# Patient Record
Sex: MALE | Race: BLACK OR AFRICAN AMERICAN | NOT HISPANIC OR LATINO | URBAN - METROPOLITAN AREA
[De-identification: names, ages, dates, MRNs, and addresses within clinical notes are randomized per-mention and may not be internally consistent; named-entity substitution may affect disease eponyms.]

---

## 2017-12-17 ENCOUNTER — INPATIENT (INPATIENT)
Facility: HOSPITAL | Age: 77
LOS: 5 days | Discharge: HOME | End: 2017-12-23
Attending: INTERNAL MEDICINE | Admitting: INTERNAL MEDICINE
Payer: MEDICARE

## 2017-12-17 VITALS
RESPIRATION RATE: 20 BRPM | OXYGEN SATURATION: 93 % | DIASTOLIC BLOOD PRESSURE: 118 MMHG | HEART RATE: 88 BPM | TEMPERATURE: 97 F | SYSTOLIC BLOOD PRESSURE: 249 MMHG

## 2017-12-17 DIAGNOSIS — J18.9 PNEUMONIA, UNSPECIFIED ORGANISM: ICD-10-CM

## 2017-12-17 DIAGNOSIS — N17.9 ACUTE KIDNEY FAILURE, UNSPECIFIED: ICD-10-CM

## 2017-12-17 DIAGNOSIS — E11.9 TYPE 2 DIABETES MELLITUS WITHOUT COMPLICATIONS: ICD-10-CM

## 2017-12-17 DIAGNOSIS — I16.0 HYPERTENSIVE URGENCY: ICD-10-CM

## 2017-12-17 DIAGNOSIS — N13.9 OBSTRUCTIVE AND REFLUX UROPATHY, UNSPECIFIED: ICD-10-CM

## 2017-12-17 DIAGNOSIS — N20.0 CALCULUS OF KIDNEY: ICD-10-CM

## 2017-12-17 DIAGNOSIS — N40.0 BENIGN PROSTATIC HYPERPLASIA WITHOUT LOWER URINARY TRACT SYMPTOMS: ICD-10-CM

## 2017-12-17 LAB
ALBUMIN SERPL ELPH-MCNC: 3.4 G/DL — SIGNIFICANT CHANGE UP (ref 3.3–5)
ALP SERPL-CCNC: 85 U/L — SIGNIFICANT CHANGE UP (ref 40–120)
ALT FLD-CCNC: 23 U/L — SIGNIFICANT CHANGE UP (ref 12–78)
ANION GAP SERPL CALC-SCNC: 8 MMOL/L — SIGNIFICANT CHANGE UP (ref 5–17)
ANION GAP SERPL CALC-SCNC: 9 MMOL/L — SIGNIFICANT CHANGE UP (ref 5–17)
APPEARANCE UR: ABNORMAL
APPEARANCE UR: CLEAR — SIGNIFICANT CHANGE UP
APTT BLD: 27.7 SEC — SIGNIFICANT CHANGE UP (ref 27.5–37.4)
AST SERPL-CCNC: 35 U/L — SIGNIFICANT CHANGE UP (ref 15–37)
BACTERIA # UR AUTO: ABNORMAL
BACTERIA # UR AUTO: ABNORMAL
BASOPHILS # BLD AUTO: 0.1 K/UL — SIGNIFICANT CHANGE UP (ref 0–0.2)
BASOPHILS NFR BLD AUTO: 0.8 % — SIGNIFICANT CHANGE UP (ref 0–2)
BILIRUB SERPL-MCNC: 0.4 MG/DL — SIGNIFICANT CHANGE UP (ref 0.2–1.2)
BILIRUB UR-MCNC: NEGATIVE — SIGNIFICANT CHANGE UP
BILIRUB UR-MCNC: NEGATIVE — SIGNIFICANT CHANGE UP
BUN SERPL-MCNC: 32 MG/DL — HIGH (ref 7–23)
BUN SERPL-MCNC: 34 MG/DL — HIGH (ref 7–23)
CALCIUM SERPL-MCNC: 8.3 MG/DL — LOW (ref 8.5–10.1)
CALCIUM SERPL-MCNC: 8.6 MG/DL — SIGNIFICANT CHANGE UP (ref 8.5–10.1)
CHLORIDE SERPL-SCNC: 104 MMOL/L — SIGNIFICANT CHANGE UP (ref 96–108)
CHLORIDE SERPL-SCNC: 105 MMOL/L — SIGNIFICANT CHANGE UP (ref 96–108)
CK MB CFR SERPL CALC: 2.8 NG/ML — SIGNIFICANT CHANGE UP (ref 0.5–3.6)
CO2 SERPL-SCNC: 28 MMOL/L — SIGNIFICANT CHANGE UP (ref 22–31)
CO2 SERPL-SCNC: 32 MMOL/L — HIGH (ref 22–31)
COLOR SPEC: ABNORMAL
COLOR SPEC: YELLOW — SIGNIFICANT CHANGE UP
CREAT SERPL-MCNC: 3.01 MG/DL — HIGH (ref 0.5–1.3)
CREAT SERPL-MCNC: 3.03 MG/DL — HIGH (ref 0.5–1.3)
DIFF PNL FLD: ABNORMAL
DIFF PNL FLD: ABNORMAL
EOSINOPHIL # BLD AUTO: 0.8 K/UL — HIGH (ref 0–0.5)
EOSINOPHIL NFR BLD AUTO: 5.8 % — SIGNIFICANT CHANGE UP (ref 0–6)
EPI CELLS # UR: SIGNIFICANT CHANGE UP
EPI CELLS # UR: SIGNIFICANT CHANGE UP
FLUAV SPEC QL CULT: NEGATIVE — SIGNIFICANT CHANGE UP
FLUBV AG SPEC QL IA: NEGATIVE — SIGNIFICANT CHANGE UP
GLUCOSE BLDC GLUCOMTR-MCNC: 178 MG/DL — HIGH (ref 70–99)
GLUCOSE BLDC GLUCOMTR-MCNC: 69 MG/DL — LOW (ref 70–99)
GLUCOSE BLDC GLUCOMTR-MCNC: 91 MG/DL — SIGNIFICANT CHANGE UP (ref 70–99)
GLUCOSE BLDC GLUCOMTR-MCNC: 99 MG/DL — SIGNIFICANT CHANGE UP (ref 70–99)
GLUCOSE SERPL-MCNC: 133 MG/DL — HIGH (ref 70–99)
GLUCOSE SERPL-MCNC: 175 MG/DL — HIGH (ref 70–99)
GLUCOSE UR QL: 100 MG/DL
GLUCOSE UR QL: NEGATIVE MG/DL — SIGNIFICANT CHANGE UP
HCT VFR BLD CALC: 38.8 % — LOW (ref 39–50)
HGB BLD-MCNC: 11.7 G/DL — LOW (ref 13–17)
INR BLD: 1.06 RATIO — SIGNIFICANT CHANGE UP (ref 0.88–1.16)
KETONES UR-MCNC: ABNORMAL
KETONES UR-MCNC: NEGATIVE — SIGNIFICANT CHANGE UP
LACTATE SERPL-SCNC: 1.4 MMOL/L — SIGNIFICANT CHANGE UP (ref 0.7–2)
LEUKOCYTE ESTERASE UR-ACNC: ABNORMAL
LEUKOCYTE ESTERASE UR-ACNC: NEGATIVE — SIGNIFICANT CHANGE UP
LYMPHOCYTES # BLD AUTO: 1.6 K/UL — SIGNIFICANT CHANGE UP (ref 1–3.3)
LYMPHOCYTES # BLD AUTO: 11.8 % — LOW (ref 13–44)
MAGNESIUM SERPL-MCNC: 2.3 MG/DL — SIGNIFICANT CHANGE UP (ref 1.6–2.6)
MAGNESIUM SERPL-MCNC: 2.4 MG/DL — SIGNIFICANT CHANGE UP (ref 1.6–2.6)
MCHC RBC-ENTMCNC: 24.7 PG — LOW (ref 27–34)
MCHC RBC-ENTMCNC: 30.1 GM/DL — LOW (ref 32–36)
MCV RBC AUTO: 81.9 FL — SIGNIFICANT CHANGE UP (ref 80–100)
MONOCYTES # BLD AUTO: 0.6 K/UL — SIGNIFICANT CHANGE UP (ref 0–0.9)
MONOCYTES NFR BLD AUTO: 4.7 % — SIGNIFICANT CHANGE UP (ref 2–14)
NEUTROPHILS # BLD AUTO: 10.2 K/UL — HIGH (ref 1.8–7.4)
NEUTROPHILS NFR BLD AUTO: 76.8 % — SIGNIFICANT CHANGE UP (ref 43–77)
NITRITE UR-MCNC: NEGATIVE — SIGNIFICANT CHANGE UP
NITRITE UR-MCNC: POSITIVE
NT-PROBNP SERPL-SCNC: 2690 PG/ML — HIGH (ref 0–450)
PH UR: 5 — SIGNIFICANT CHANGE UP (ref 5–8)
PH UR: 7 — SIGNIFICANT CHANGE UP (ref 5–8)
PHOSPHATE SERPL-MCNC: 4.1 MG/DL — SIGNIFICANT CHANGE UP (ref 2.5–4.5)
PLATELET # BLD AUTO: 245 K/UL — SIGNIFICANT CHANGE UP (ref 150–400)
POTASSIUM SERPL-MCNC: 3.7 MMOL/L — SIGNIFICANT CHANGE UP (ref 3.5–5.3)
POTASSIUM SERPL-MCNC: 4.3 MMOL/L — SIGNIFICANT CHANGE UP (ref 3.5–5.3)
POTASSIUM SERPL-SCNC: 3.7 MMOL/L — SIGNIFICANT CHANGE UP (ref 3.5–5.3)
POTASSIUM SERPL-SCNC: 4.3 MMOL/L — SIGNIFICANT CHANGE UP (ref 3.5–5.3)
PROCALCITONIN SERPL-MCNC: 1.16 NG/ML — HIGH (ref 0–0.04)
PROT SERPL-MCNC: 8.1 GM/DL — SIGNIFICANT CHANGE UP (ref 6–8.3)
PROT UR-MCNC: 100 MG/DL
PROT UR-MCNC: 500 MG/DL
PROTHROM AB SERPL-ACNC: 11.6 SEC — SIGNIFICANT CHANGE UP (ref 9.8–12.7)
PSA FLD-MCNC: 24.96 NG/ML — HIGH (ref 0–4)
RBC # BLD: 4.73 M/UL — SIGNIFICANT CHANGE UP (ref 4.2–5.8)
RBC # FLD: 14.1 % — SIGNIFICANT CHANGE UP (ref 11–15)
RBC CASTS # UR COMP ASSIST: >50 /HPF (ref 0–4)
RBC CASTS # UR COMP ASSIST: ABNORMAL /HPF (ref 0–4)
SODIUM SERPL-SCNC: 142 MMOL/L — SIGNIFICANT CHANGE UP (ref 135–145)
SODIUM SERPL-SCNC: 144 MMOL/L — SIGNIFICANT CHANGE UP (ref 135–145)
SP GR SPEC: 1.01 — SIGNIFICANT CHANGE UP (ref 1.01–1.02)
SP GR SPEC: 1.02 — SIGNIFICANT CHANGE UP (ref 1.01–1.02)
TROPONIN I SERPL-MCNC: 0.05 NG/ML — HIGH (ref 0.01–0.04)
UROBILINOGEN FLD QL: NEGATIVE MG/DL — SIGNIFICANT CHANGE UP
UROBILINOGEN FLD QL: NEGATIVE MG/DL — SIGNIFICANT CHANGE UP
WBC # BLD: 13.2 K/UL — HIGH (ref 3.8–10.5)
WBC # FLD AUTO: 13.2 K/UL — HIGH (ref 3.8–10.5)
WBC UR QL: ABNORMAL
WBC UR QL: SIGNIFICANT CHANGE UP

## 2017-12-17 PROCEDURE — 71250 CT THORAX DX C-: CPT | Mod: 26

## 2017-12-17 PROCEDURE — 71010: CPT | Mod: 26

## 2017-12-17 PROCEDURE — 93010 ELECTROCARDIOGRAM REPORT: CPT

## 2017-12-17 PROCEDURE — 99291 CRITICAL CARE FIRST HOUR: CPT

## 2017-12-17 PROCEDURE — 74176 CT ABD & PELVIS W/O CONTRAST: CPT | Mod: 26

## 2017-12-17 PROCEDURE — 70450 CT HEAD/BRAIN W/O DYE: CPT | Mod: 26

## 2017-12-17 RX ORDER — HYDRALAZINE HCL 50 MG
50 TABLET ORAL ONCE
Qty: 0 | Refills: 0 | Status: COMPLETED | OUTPATIENT
Start: 2017-12-17 | End: 2017-12-17

## 2017-12-17 RX ORDER — GLUCAGON INJECTION, SOLUTION 0.5 MG/.1ML
1 INJECTION, SOLUTION SUBCUTANEOUS ONCE
Qty: 0 | Refills: 0 | Status: DISCONTINUED | OUTPATIENT
Start: 2017-12-17 | End: 2017-12-23

## 2017-12-17 RX ORDER — DEXTROSE 50 % IN WATER 50 %
1 SYRINGE (ML) INTRAVENOUS ONCE
Qty: 0 | Refills: 0 | Status: DISCONTINUED | OUTPATIENT
Start: 2017-12-17 | End: 2017-12-23

## 2017-12-17 RX ORDER — INSULIN LISPRO 100/ML
VIAL (ML) SUBCUTANEOUS AT BEDTIME
Qty: 0 | Refills: 0 | Status: DISCONTINUED | OUTPATIENT
Start: 2017-12-17 | End: 2017-12-23

## 2017-12-17 RX ORDER — DEXTROSE 50 % IN WATER 50 %
25 SYRINGE (ML) INTRAVENOUS ONCE
Qty: 0 | Refills: 0 | Status: DISCONTINUED | OUTPATIENT
Start: 2017-12-17 | End: 2017-12-23

## 2017-12-17 RX ORDER — SODIUM CHLORIDE 9 MG/ML
1000 INJECTION, SOLUTION INTRAVENOUS
Qty: 0 | Refills: 0 | Status: DISCONTINUED | OUTPATIENT
Start: 2017-12-17 | End: 2017-12-23

## 2017-12-17 RX ORDER — TAMSULOSIN HYDROCHLORIDE 0.4 MG/1
0.4 CAPSULE ORAL ONCE
Qty: 0 | Refills: 0 | Status: COMPLETED | OUTPATIENT
Start: 2017-12-17 | End: 2017-12-17

## 2017-12-17 RX ORDER — ALBUTEROL 90 UG/1
1 AEROSOL, METERED ORAL EVERY 4 HOURS
Qty: 0 | Refills: 0 | Status: COMPLETED | OUTPATIENT
Start: 2017-12-17 | End: 2018-11-15

## 2017-12-17 RX ORDER — IPRATROPIUM/ALBUTEROL SULFATE 18-103MCG
3 AEROSOL WITH ADAPTER (GRAM) INHALATION EVERY 6 HOURS
Qty: 0 | Refills: 0 | Status: DISCONTINUED | OUTPATIENT
Start: 2017-12-17 | End: 2017-12-22

## 2017-12-17 RX ORDER — FUROSEMIDE 40 MG
80 TABLET ORAL ONCE
Qty: 0 | Refills: 0 | Status: COMPLETED | OUTPATIENT
Start: 2017-12-17 | End: 2017-12-17

## 2017-12-17 RX ORDER — HYDRALAZINE HCL 50 MG
10 TABLET ORAL ONCE
Qty: 0 | Refills: 0 | Status: COMPLETED | OUTPATIENT
Start: 2017-12-17 | End: 2017-12-17

## 2017-12-17 RX ORDER — CHLORHEXIDINE GLUCONATE 213 G/1000ML
1 SOLUTION TOPICAL DAILY
Qty: 0 | Refills: 0 | Status: DISCONTINUED | OUTPATIENT
Start: 2017-12-17 | End: 2017-12-23

## 2017-12-17 RX ORDER — CEFTRIAXONE 500 MG/1
1 INJECTION, POWDER, FOR SOLUTION INTRAMUSCULAR; INTRAVENOUS ONCE
Qty: 0 | Refills: 0 | Status: COMPLETED | OUTPATIENT
Start: 2017-12-17 | End: 2017-12-17

## 2017-12-17 RX ORDER — AZITHROMYCIN 500 MG/1
500 TABLET, FILM COATED ORAL ONCE
Qty: 0 | Refills: 0 | Status: COMPLETED | OUTPATIENT
Start: 2017-12-17 | End: 2017-12-17

## 2017-12-17 RX ORDER — INSULIN LISPRO 100/ML
VIAL (ML) SUBCUTANEOUS
Qty: 0 | Refills: 0 | Status: DISCONTINUED | OUTPATIENT
Start: 2017-12-17 | End: 2017-12-23

## 2017-12-17 RX ORDER — ONDANSETRON 8 MG/1
4 TABLET, FILM COATED ORAL ONCE
Qty: 0 | Refills: 0 | Status: COMPLETED | OUTPATIENT
Start: 2017-12-17 | End: 2017-12-17

## 2017-12-17 RX ORDER — HYDRALAZINE HCL 50 MG
10 TABLET ORAL EVERY 6 HOURS
Qty: 0 | Refills: 0 | Status: DISCONTINUED | OUTPATIENT
Start: 2017-12-17 | End: 2017-12-23

## 2017-12-17 RX ORDER — MORPHINE SULFATE 50 MG/1
4 CAPSULE, EXTENDED RELEASE ORAL ONCE
Qty: 0 | Refills: 0 | Status: DISCONTINUED | OUTPATIENT
Start: 2017-12-17 | End: 2017-12-17

## 2017-12-17 RX ORDER — AZITHROMYCIN 500 MG/1
500 TABLET, FILM COATED ORAL EVERY 24 HOURS
Qty: 0 | Refills: 0 | Status: DISCONTINUED | OUTPATIENT
Start: 2017-12-18 | End: 2017-12-23

## 2017-12-17 RX ORDER — LABETALOL HCL 100 MG
100 TABLET ORAL
Qty: 0 | Refills: 0 | Status: DISCONTINUED | OUTPATIENT
Start: 2017-12-17 | End: 2017-12-23

## 2017-12-17 RX ORDER — HEPARIN SODIUM 5000 [USP'U]/ML
5000 INJECTION INTRAVENOUS; SUBCUTANEOUS EVERY 8 HOURS
Qty: 0 | Refills: 0 | Status: DISCONTINUED | OUTPATIENT
Start: 2017-12-17 | End: 2017-12-23

## 2017-12-17 RX ORDER — AMLODIPINE BESYLATE 2.5 MG/1
10 TABLET ORAL DAILY
Qty: 0 | Refills: 0 | Status: DISCONTINUED | OUTPATIENT
Start: 2017-12-17 | End: 2017-12-23

## 2017-12-17 RX ORDER — CEFTRIAXONE 500 MG/1
1 INJECTION, POWDER, FOR SOLUTION INTRAMUSCULAR; INTRAVENOUS EVERY 24 HOURS
Qty: 0 | Refills: 0 | Status: DISCONTINUED | OUTPATIENT
Start: 2017-12-18 | End: 2017-12-23

## 2017-12-17 RX ORDER — DEXTROSE 50 % IN WATER 50 %
12.5 SYRINGE (ML) INTRAVENOUS ONCE
Qty: 0 | Refills: 0 | Status: DISCONTINUED | OUTPATIENT
Start: 2017-12-17 | End: 2017-12-23

## 2017-12-17 RX ORDER — ACETAMINOPHEN 500 MG
650 TABLET ORAL EVERY 6 HOURS
Qty: 0 | Refills: 0 | Status: DISCONTINUED | OUTPATIENT
Start: 2017-12-17 | End: 2017-12-23

## 2017-12-17 RX ORDER — INFLUENZA VIRUS VACCINE 15; 15; 15; 15 UG/.5ML; UG/.5ML; UG/.5ML; UG/.5ML
0.5 SUSPENSION INTRAMUSCULAR ONCE
Qty: 0 | Refills: 0 | Status: COMPLETED | OUTPATIENT
Start: 2017-12-17 | End: 2017-12-17

## 2017-12-17 RX ORDER — NITROGLYCERIN 6.5 MG
10 CAPSULE, EXTENDED RELEASE ORAL
Qty: 50 | Refills: 0 | Status: DISCONTINUED | OUTPATIENT
Start: 2017-12-17 | End: 2017-12-17

## 2017-12-17 RX ORDER — TIOTROPIUM BROMIDE 18 UG/1
1 CAPSULE ORAL; RESPIRATORY (INHALATION) DAILY
Qty: 0 | Refills: 0 | Status: COMPLETED | OUTPATIENT
Start: 2017-12-17 | End: 2018-11-15

## 2017-12-17 RX ORDER — LABETALOL HCL 100 MG
10 TABLET ORAL ONCE
Qty: 0 | Refills: 0 | Status: COMPLETED | OUTPATIENT
Start: 2017-12-17 | End: 2017-12-17

## 2017-12-17 RX ADMIN — MORPHINE SULFATE 4 MILLIGRAM(S): 50 CAPSULE, EXTENDED RELEASE ORAL at 08:07

## 2017-12-17 RX ADMIN — AMLODIPINE BESYLATE 10 MILLIGRAM(S): 2.5 TABLET ORAL at 18:28

## 2017-12-17 RX ADMIN — Medication 3 MICROGRAM(S)/MIN: at 13:10

## 2017-12-17 RX ADMIN — Medication 100 MILLIGRAM(S): at 18:28

## 2017-12-17 RX ADMIN — ONDANSETRON 4 MILLIGRAM(S): 8 TABLET, FILM COATED ORAL at 08:07

## 2017-12-17 RX ADMIN — Medication 10 MILLIGRAM(S): at 07:48

## 2017-12-17 RX ADMIN — Medication 3 MICROGRAM(S)/MIN: at 09:03

## 2017-12-17 RX ADMIN — CEFTRIAXONE 100 GRAM(S): 500 INJECTION, POWDER, FOR SOLUTION INTRAMUSCULAR; INTRAVENOUS at 09:41

## 2017-12-17 RX ADMIN — Medication 80 MILLIGRAM(S): at 22:56

## 2017-12-17 RX ADMIN — Medication 10 MILLIGRAM(S): at 18:28

## 2017-12-17 RX ADMIN — HEPARIN SODIUM 5000 UNIT(S): 5000 INJECTION INTRAVENOUS; SUBCUTANEOUS at 21:30

## 2017-12-17 RX ADMIN — AZITHROMYCIN 255 MILLIGRAM(S): 500 TABLET, FILM COATED ORAL at 10:05

## 2017-12-17 RX ADMIN — CHLORHEXIDINE GLUCONATE 1 APPLICATION(S): 213 SOLUTION TOPICAL at 13:11

## 2017-12-17 RX ADMIN — TAMSULOSIN HYDROCHLORIDE 0.4 MILLIGRAM(S): 0.4 CAPSULE ORAL at 11:00

## 2017-12-17 RX ADMIN — Medication 50 MILLIGRAM(S): at 08:33

## 2017-12-17 RX ADMIN — Medication 10 MILLIGRAM(S): at 13:09

## 2017-12-17 RX ADMIN — MORPHINE SULFATE 4 MILLIGRAM(S): 50 CAPSULE, EXTENDED RELEASE ORAL at 09:56

## 2017-12-17 RX ADMIN — Medication 10 MILLIGRAM(S): at 23:28

## 2017-12-17 RX ADMIN — HEPARIN SODIUM 5000 UNIT(S): 5000 INJECTION INTRAVENOUS; SUBCUTANEOUS at 13:13

## 2017-12-17 NOTE — ED PROVIDER NOTE - CRITICAL CARE PROVIDED
interpretation of diagnostic studies/consultation with other physicians/direct patient care (not related to procedure)/documentation/consult w/ pt's family directly relating to pts condition

## 2017-12-17 NOTE — CONSULT NOTE ADULT - PROBLEM SELECTOR RECOMMENDATION 2
will manage conservatively for now,   Strain all urine  f/u urine culture will manage conservatively for now, IV antibiotics  Strain all urine  f/u urine culture

## 2017-12-17 NOTE — H&P ADULT - NSHPPHYSICALEXAM_GEN_ALL_CORE
GENERAL: NAD, well-groomed, well-developed  HEAD:  Atraumatic, Normocephalic  EYES: EOMI, PERRLA, +3,  conjunctiva and sclera clear  ENMT: No tonsillar erythema, exudates, or enlargement; Moist mucous membranes, Good dentition,  NECK: Supple, No JVD, Normal thyroid  NERVOUS SYSTEM:  Alert & Oriented X3, Motor CARDENAS, DTRs 2+ intact and symmetric  CHEST/LUNG: +rhonchi bilat, no wheezing, or rubs  HEART: Regular rate and rhythm; No murmurs, rubs, or gallops  ABDOMEN:  Round, Soft, Nontender, Nondistended; Bowel sounds present  EXTREMITIES:  2+ Peripheral Pulses, No clubbing, cyanosis, or + edema  SKIN: No rashes or lesions

## 2017-12-17 NOTE — H&P ADULT - PROBLEM SELECTOR PLAN 3
as above  Urology consult appreciated.  continue Flomax  insert Aguilar catheter strict I & O as above  Urology consult appreciated.  continue Flomax  insert Aguilar catheter strict I & O  US kidneys

## 2017-12-17 NOTE — H&P ADULT - NSHPLABSRESULTS_GEN_ALL_CORE
CBC Full  -  ( 17 Dec 2017 07:46 )  WBC Count : 13.2 K/uL  Hemoglobin : 11.7 g/dL  Hematocrit : 38.8 %  Platelet Count - Automated : 245 K/uL  Mean Cell Volume : 81.9 fl  Mean Cell Hemoglobin : 24.7 pg  Mean Cell Hemoglobin Concentration : 30.1 gm/dL  Auto Neutrophil # : 10.2 K/uL  Auto Lymphocyte # : 1.6 K/uL  Auto Monocyte # : 0.6 K/uL  Auto Eosinophil # : 0.8 K/uL  Auto Basophil # : 0.1 K/uL  Auto Neutrophil % : 76.8 %  Auto Lymphocyte % : 11.8 %  Auto Monocyte % : 4.7 %  Auto Eosinophil % : 5.8 %  Auto Basophil % : 0.8 %          142  |  105  |  32<H>  ----------------------------<  175<H>  3.7   |  28  |  3.03<H>    Ca    8.6      17 Dec 2017 07:46  Mg     2.3         TPro  8.1  /  Alb  3.4  /  TBili  0.4  /  DBili  x   /  AST  35  /  ALT  23  /  AlkPhos  85      LIVER FUNCTIONS - ( 17 Dec 2017 07:46 )  Alb: 3.4 g/dL / Pro: 8.1 gm/dL / ALK PHOS: 85 U/L / ALT: 23 U/L / AST: 35 U/L / GGT: x           CAPILLARY BLOOD GLUCOSE      POCT Blood Glucose.: 178 mg/dL (17 Dec 2017 12:32)    PT/INR - ( 17 Dec 2017 07:46 )   PT: 11.6 sec;   INR: 1.06 ratio         PTT - ( 17 Dec 2017 07:46 )  PTT:27.7 sec  Urinalysis Basic - ( 17 Dec 2017 09:46 )    Color: Yellow / Appearance: Clear / S.010 / pH: x  Gluc: x / Ketone: Negative  / Bili: Negative / Urobili: Negative mg/dL   Blood: x / Protein: 100 mg/dL / Nitrite: Negative   Leuk Esterase: Negative / RBC: 6-10 /HPF / WBC 0-2   Sq Epi: x / Non Sq Epi: Occasional / Bacteria: Occasional    < from: CT Head No Cont (17 @ 09:28) >    IMPRESSION:  Moderate small vessel white matter ischemic changes. No intracranial   hemorrhage, midline shift or mass effect.   Extensive sinus opacification with ossific densities in the ethmoid   sinuses and chronic changes of the medial walls of the maxillary sinuses.   Correlate clinically for acute on chronic sinusitis.  < end of copied text >  < from: CT Abdomen and Pelvis No Cont (17 @ 09:28) >    IMPRESSION:     Right upper lobe pneumonia.  Mild right hydroureteronephrosis and large perinephric and periureteral   stranding due to a 2 mm calculus within the distal right ureter.    Markedly enlarged prostate gland. Mild urinary bladder wall thickening   may be due to chronic bladder outlet obstruction. Correlate with   urinalysis.    < end of copied text >

## 2017-12-17 NOTE — ED PROVIDER NOTE - MEDICAL DECISION MAKING DETAILS
Patient pw chest pain. Patient has elevated bp, will need to lower it. concern for pna, will xray and culture. Patient pw chest pain. Patient has elevated bp, will need to lower it. concern for pna, will xray and culture. hypertensive emergency diagnosed 2/2 elevated trop with pressures in the 240s. started nitro gtt. patient ct scan confirms pna, CAP by crtieria. will treat for gram negative rods. Patient also has renal failure ,likely caused by the stone but also from htn and dm. icu called to evaluate. Patient pw chest pain. Patient has elevated bp, will need to lower it. concern for pna, will xray and culture. hypertensive emergency diagnosed 2/2 elevated trop with pressures in the 240s. started nitro gtt. patient ct scan confirms pna, CAP by crtieria. will treat for gram negative rods. Patient also has renal failure ,likely caused by the stone but also from htn and dm. icu called to evaluate. will hold off on fluid resuscitation while patient is this hypertensive.

## 2017-12-17 NOTE — H&P ADULT - PROBLEM SELECTOR PLAN 1
Admit ICU  -titrate bp down 25% for fist 24 hours - maintain sbp around 160 to 180 for now   -titrate tridal to off  - start Norvasc, labetalol, and hydralazine with parameters  - Adjust BP meds. Monitor BP closely. Salt restriction.   - Pt advised on compliance.

## 2017-12-17 NOTE — H&P ADULT - PROBLEM SELECTOR PLAN 2
.pneu As above  -cover with broad spectrum ABX,  -titrate oxygen, r/o flu and follow up viral panel, follow up cultures, dvt and ppi prophylaxis, send urine for legionella

## 2017-12-17 NOTE — ED ADULT NURSE NOTE - OBJECTIVE STATEMENT
Patients family at bedside stating patient had pain in the abdomen since this morning. Kt diarrhea, vomited x 1 this AM.

## 2017-12-17 NOTE — H&P ADULT - HISTORY OF PRESENT ILLNESS
77M hx htn, dm pw chest pain, abd pain, weakness and vomiting. per children, patient has uri x2 weeks but this morning was much worse. patient notes that he feels very week and that the real discomfort started early in the morning. chest pain is primarily left sided. abd pain is bl sides. no diarrhea, no dysuria, no fever.  In ER patient had elevated bp, hypertensive emergency diagnosed 2/2 elevated trop with pressures in the 240s. started nitro gtt.and sent to CT confirms pna  ICU called for further management

## 2017-12-17 NOTE — H&P ADULT - NSHPREVIEWOFSYSTEMS_GEN_ALL_CORE
CONSTITUTIONAL: No fever, weight loss, or fatigue  EYES: No eye pain, visual disturbances, or discharge  ENMT:  No difficulty hearing, tinnitus, vertigo; No sinus or throat pain  NECK: No pain or stiffness  RESPIRATORY: + cough,  no wheezing, chills or hemoptysis; + shortness of breath  CARDIOVASCULAR: + chest pain, palpitations, dizziness, or leg swelling  GASTROINTESTINAL: No abdominal or epigastric pain. No nausea, vomiting, or hematemesis; + diarrhea or constipation. No melena or hematochezia.  GENITOURINARY: + dysuria, + frequency, hematuria, or incontinence  NEUROLOGICAL: No headaches, memory loss, loss of strength, numbness, or tremors  SKIN: No itching, burning, rashes, or lesions   MUSCULOSKELETAL: No joint pain or swelling; No muscle, back, or extremity pain  PSYCHIATRIC: No depression, anxiety, mood swings, or difficulty sleeping  HEME/LYMPH: No easy bruising, or bleeding gums  ALLERGY AND IMMUNOLOGIC: No hives or eczema

## 2017-12-17 NOTE — ED PROVIDER NOTE - SECONDARY DIAGNOSIS.
CAP (community acquired pneumonia) Acute renal failure superimposed on stage 4 chronic kidney disease, unspecified acute renal failure type

## 2017-12-17 NOTE — ED PROVIDER NOTE - CARE PLAN
Principal Discharge DX:	Other chest pain Principal Discharge DX:	Hypertensive emergency  Secondary Diagnosis:	CAP (community acquired pneumonia)  Secondary Diagnosis:	Acute renal failure superimposed on stage 4 chronic kidney disease, unspecified acute renal failure type

## 2017-12-17 NOTE — ED PROVIDER NOTE - OBJECTIVE STATEMENT
Pertinent PMH/PSH/FHx/SHx and Review of Systems contained within:  77M hx htn, dm pw chest pain, abd pain, weakness and vomiting. per children, patient has uri x2 weeks but this morning was much worse. patient notes that he feels very week and that the real discomfort started early in the morning. chest pain is primarily left sided. abd pain is bl sides. no diarrhea, no dysuria, no fever  Fh and Sh not otherwise contributory  ROS otherwise negative

## 2017-12-17 NOTE — H&P ADULT - ASSESSMENT
77M hx htn, dm pw chest pain, abd pain, recent upper respiratory infection found to have markedly elevated BP in ER. and on CT found  Right upper lobe pneumonia. Mild right hydroureteronephrosis and large perinephric and periureteral  and a calculus within the distal right ureter and markedly enlarged prostate gland. Admit to ICU for hypertensive urgency, obstructive uropathy and pneumonia. D/W Dr. Cano critical care

## 2017-12-18 DIAGNOSIS — I10 ESSENTIAL (PRIMARY) HYPERTENSION: ICD-10-CM

## 2017-12-18 DIAGNOSIS — N40.1 BENIGN PROSTATIC HYPERPLASIA WITH LOWER URINARY TRACT SYMPTOMS: ICD-10-CM

## 2017-12-18 DIAGNOSIS — I16.1 HYPERTENSIVE EMERGENCY: ICD-10-CM

## 2017-12-18 DIAGNOSIS — H40.9 UNSPECIFIED GLAUCOMA: ICD-10-CM

## 2017-12-18 LAB
ANION GAP SERPL CALC-SCNC: 9 MMOL/L — SIGNIFICANT CHANGE UP (ref 5–17)
BUN SERPL-MCNC: 35 MG/DL — HIGH (ref 7–23)
CALCIUM SERPL-MCNC: 8.2 MG/DL — LOW (ref 8.5–10.1)
CHLORIDE SERPL-SCNC: 103 MMOL/L — SIGNIFICANT CHANGE UP (ref 96–108)
CHOLEST SERPL-MCNC: 164 MG/DL — SIGNIFICANT CHANGE UP (ref 10–199)
CO2 SERPL-SCNC: 30 MMOL/L — SIGNIFICANT CHANGE UP (ref 22–31)
CREAT SERPL-MCNC: 3.22 MG/DL — HIGH (ref 0.5–1.3)
GLUCOSE BLDC GLUCOMTR-MCNC: 108 MG/DL — HIGH (ref 70–99)
GLUCOSE BLDC GLUCOMTR-MCNC: 117 MG/DL — HIGH (ref 70–99)
GLUCOSE BLDC GLUCOMTR-MCNC: 131 MG/DL — HIGH (ref 70–99)
GLUCOSE BLDC GLUCOMTR-MCNC: 179 MG/DL — HIGH (ref 70–99)
GLUCOSE SERPL-MCNC: 115 MG/DL — HIGH (ref 70–99)
HBA1C BLD-MCNC: 6.4 % — HIGH (ref 4–5.6)
HCT VFR BLD CALC: 36.1 % — LOW (ref 39–50)
HDLC SERPL-MCNC: 49 MG/DL — SIGNIFICANT CHANGE UP (ref 40–125)
HGB BLD-MCNC: 11 G/DL — LOW (ref 13–17)
LIPID PNL WITH DIRECT LDL SERPL: 88 MG/DL — SIGNIFICANT CHANGE UP
MAGNESIUM SERPL-MCNC: 2.3 MG/DL — SIGNIFICANT CHANGE UP (ref 1.6–2.6)
MCHC RBC-ENTMCNC: 24.7 PG — LOW (ref 27–34)
MCHC RBC-ENTMCNC: 30.5 GM/DL — LOW (ref 32–36)
MCV RBC AUTO: 81.2 FL — SIGNIFICANT CHANGE UP (ref 80–100)
PHOSPHATE SERPL-MCNC: 4.9 MG/DL — HIGH (ref 2.5–4.5)
PLATELET # BLD AUTO: 237 K/UL — SIGNIFICANT CHANGE UP (ref 150–400)
POTASSIUM SERPL-MCNC: 3.8 MMOL/L — SIGNIFICANT CHANGE UP (ref 3.5–5.3)
POTASSIUM SERPL-SCNC: 3.8 MMOL/L — SIGNIFICANT CHANGE UP (ref 3.5–5.3)
RBC # BLD: 4.44 M/UL — SIGNIFICANT CHANGE UP (ref 4.2–5.8)
RBC # FLD: 14.2 % — SIGNIFICANT CHANGE UP (ref 11–15)
SODIUM SERPL-SCNC: 142 MMOL/L — SIGNIFICANT CHANGE UP (ref 135–145)
TOTAL CHOLESTEROL/HDL RATIO MEASUREMENT: 3.3 RATIO — LOW (ref 3.4–9.6)
TRIGL SERPL-MCNC: 134 MG/DL — SIGNIFICANT CHANGE UP (ref 10–149)
TROPONIN I SERPL-MCNC: 0.06 NG/ML — HIGH (ref 0.01–0.04)
WBC # BLD: 9.5 K/UL — SIGNIFICANT CHANGE UP (ref 3.8–10.5)
WBC # FLD AUTO: 9.5 K/UL — SIGNIFICANT CHANGE UP (ref 3.8–10.5)

## 2017-12-18 PROCEDURE — 76775 US EXAM ABDO BACK WALL LIM: CPT | Mod: 26

## 2017-12-18 PROCEDURE — 99238 HOSP IP/OBS DSCHRG MGMT 30/<: CPT

## 2017-12-18 RX ADMIN — Medication 100 MILLIGRAM(S): at 18:51

## 2017-12-18 RX ADMIN — Medication 10 MILLIGRAM(S): at 05:55

## 2017-12-18 RX ADMIN — Medication 3 MILLILITER(S): at 06:06

## 2017-12-18 RX ADMIN — HEPARIN SODIUM 5000 UNIT(S): 5000 INJECTION INTRAVENOUS; SUBCUTANEOUS at 14:15

## 2017-12-18 RX ADMIN — CEFTRIAXONE 100 GRAM(S): 500 INJECTION, POWDER, FOR SOLUTION INTRAMUSCULAR; INTRAVENOUS at 05:55

## 2017-12-18 RX ADMIN — Medication 3 MILLILITER(S): at 12:39

## 2017-12-18 RX ADMIN — Medication 10 MILLIGRAM(S): at 14:14

## 2017-12-18 RX ADMIN — HEPARIN SODIUM 5000 UNIT(S): 5000 INJECTION INTRAVENOUS; SUBCUTANEOUS at 05:30

## 2017-12-18 RX ADMIN — CHLORHEXIDINE GLUCONATE 1 APPLICATION(S): 213 SOLUTION TOPICAL at 12:43

## 2017-12-18 RX ADMIN — AMLODIPINE BESYLATE 10 MILLIGRAM(S): 2.5 TABLET ORAL at 05:29

## 2017-12-18 RX ADMIN — Medication 3 MILLILITER(S): at 00:14

## 2017-12-18 RX ADMIN — Medication 3 MILLILITER(S): at 23:33

## 2017-12-18 RX ADMIN — Medication 3 MILLILITER(S): at 17:36

## 2017-12-18 RX ADMIN — Medication 2: at 07:43

## 2017-12-18 RX ADMIN — Medication 100 MILLIGRAM(S): at 05:29

## 2017-12-18 RX ADMIN — HEPARIN SODIUM 5000 UNIT(S): 5000 INJECTION INTRAVENOUS; SUBCUTANEOUS at 21:35

## 2017-12-18 RX ADMIN — AZITHROMYCIN 255 MILLIGRAM(S): 500 TABLET, FILM COATED ORAL at 05:55

## 2017-12-18 NOTE — PROGRESS NOTE ADULT - SUBJECTIVE AND OBJECTIVE BOX
HPI:  77M hx htn, dm pw chest pain, abd pain, weakness and vomiting. per children, patient has uri x2 weeks but this morning was much worse. patient notes that he feels very week and that the real discomfort started early in the morning. chest pain is primarily left sided. abd pain is bl sides. no diarrhea, no dysuria, no fever.  In ER patient had elevated bp, hypertensive emergency diagnosed 2/2 elevated trop with pressures in the 240s. started nitro gtt.and sent to CT confirms pna  ICU called for further management (17 Dec 2017 15:28)    Over 24 Hrs: off nitro drip, remains stable    PAST MEDICAL & SURGICAL HISTORY:  HTN (hypertension)  BPH (Benign Prostatic Hypertrophy)  Glaucoma (Increased Eye Pressure)  HTN - Hypertension  No significant past surgical history  Laser Surgery :Right: ?  regarding procedure ; 12/07 ; secondary to glaucoma  Laser Surgery Left: ? regarding procedure ; secondary to glaucoma 12/07  Excision of Sinus Polyp: 2006      ## ROS:   CONSTITUTIONAL: No fever, chills  EYES: No eye pain, visual disturbances  ENMT:  No difficulty hearing, No sinus or throat pain  RESPIRATORY: No cough, wheezing, hemoptysis; No shortness of breath  CARDIOVASCULAR: No chest pain, palpitations  GASTROINTESTINAL: No abdominal or epigastric pain. No nausea, vomiting, or hematemesis; No diarrhea. No melena or hematochezia.  GENITOURINARY: No dysuria, frequency, hematuria  NEUROLOGICAL: No headaches  ENDOCRINE: No heat or cold intolerance  MUSCULOSKELETAL: No joint pain or swelling; No muscle, back, or extremity pain    ## O/E:  Vitals: T(C): 37.1 (12-18-17 @ 12:00), Max: 37.4 (12-18-17 @ 06:25)  HR: 92 (12-18-17 @ 14:00) (75 - 92)  BP: 165/66 (12-18-17 @ 14:00) (129/68 - 183/91)  BP(mean): 86 (12-18-17 @ 14:00) (84 - 122)  RR: 18 (12-18-17 @ 14:00) (12 - 20)  SpO2: 100% (12-18-17 @ 14:00) (95% - 100%)  Wt(kg): --  Gen: lying comfortably in bed in no apparent distress  HEENT: PERRL, EOMI  Resp: CTA B/L , + nafisa  CVS: S1S2 no m/r/g  Abd: soft NT/ND +BS  Ext: no c/c/e  Neuro: A&Ox3      12-17 @ 07:01  -  12-18 @ 07:00  --------------------------------------------------------  IN: 461 mL / OUT: 1210 mL / NET: -749 mL    12-18 @ 07:01  -  12-18 @ 14:49  --------------------------------------------------------  IN: 0 mL / OUT: 245 mL / NET: -245 mL          ## Labs:                        11.0   9.5   )-----------( 237      ( 18 Dec 2017 03:59 )             36.1     12-18    142  |  103  |  35<H>  ----------------------------<  115<H>  3.8   |  30  |  3.22<H>    Ca    8.2<L>      18 Dec 2017 03:59  Phos  4.9     12-18  Mg     2.3     12-18    TPro  8.1  /  Alb  3.4  /  TBili  0.4  /  DBili  x   /  AST  35  /  ALT  23  /  AlkPhos  85  12-17    PT/INR - ( 17 Dec 2017 07:46 )   PT: 11.6 sec;   INR: 1.06 ratio         PTT - ( 17 Dec 2017 07:46 )  PTT:27.7 sec    CARDIAC MARKERS ( 17 Dec 2017 07:46 )  .049 ng/mL / x     / x     / x     / 2.8 ng/mL        ## Imaging: reviewed    MEDICATIONS  (STANDING):  ALBUTerol    90 MICROgram(s) HFA Inhaler 1 Puff(s) Inhalation every 4 hours  ALBUTerol/ipratropium for Nebulization 3 milliLiter(s) Nebulizer every 6 hours  amLODIPine   Tablet 10 milliGRAM(s) Oral daily  azithromycin  IVPB 500 milliGRAM(s) IV Intermittent every 24 hours  cefTRIAXone   IVPB 1 Gram(s) IV Intermittent every 24 hours  chlorhexidine 4% Liquid 1 Application(s) Topical daily  dextrose 5%. 1000 milliLiter(s) (50 mL/Hr) IV Continuous <Continuous>  dextrose 50% Injectable 12.5 Gram(s) IV Push once  dextrose 50% Injectable 25 Gram(s) IV Push once  dextrose 50% Injectable 25 Gram(s) IV Push once  heparin  Injectable 5000 Unit(s) SubCutaneous every 8 hours  hydrALAZINE 10 milliGRAM(s) Oral every 6 hours  influenza   Vaccine 0.5 milliLiter(s) IntraMuscular once  insulin lispro (HumaLOG) corrective regimen sliding scale   SubCutaneous three times a day before meals  insulin lispro (HumaLOG) corrective regimen sliding scale   SubCutaneous at bedtime  labetalol 100 milliGRAM(s) Oral two times a day  tiotropium 18 MICROgram(s) Capsule 1 Capsule(s) Inhalation daily    MEDICATIONS  (PRN):  acetaminophen   Tablet. 650 milliGRAM(s) Oral every 6 hours PRN Mild Pain (1 - 3)  dextrose Gel 1 Dose(s) Oral once PRN Blood Glucose LESS THAN 70 milliGRAM(s)/deciliter  glucagon  Injectable 1 milliGRAM(s) IntraMuscular once PRN Glucose LESS THAN 70 milligrams/deciliter        ## Code status:  Goals of care discussion: [x] yes [ ] no  [x] full code  [ ] DNR  [ ] DNI  [ ] MERY

## 2017-12-18 NOTE — PROGRESS NOTE ADULT - SUBJECTIVE AND OBJECTIVE BOX
Patient is a 77y old  Male who presents with a chief complaint of The patient is a 77y Male complaining of chest pain. (17 Dec 2017 15:28)    PAST MEDICAL & SURGICAL HISTORY:  HTN (hypertension)  BPH (Benign Prostatic Hypertrophy)  Glaucoma (Increased Eye Pressure)  HTN - Hypertension  No significant past surgical history  Laser Surgery :Right: ?  regarding procedure ;  ; secondary to glaucoma  Laser Surgery Left: ? regarding procedure ; secondary to glaucoma   Excision of Sinus Polyp:     BAKARI CHARLES   77y    Male    BRIEF HOSPITAL COURSE:  admit with hypertensive urgency and CP SOB     Review of Systems:   + COUGH + chills PTA  + wheeze.       All other ROS are negative.    ICU Vital Signs Last 24 Hrs  T(C): 36.9 (17 Dec 2017 23:57), Max: 37.1 (17 Dec 2017 15:07)  T(F): 98.5 (17 Dec 2017 23:57), Max: 98.8 (17 Dec 2017 15:07)  HR: 81 (18 Dec 2017 00:16) (70 - 97)  BP: 182/84 (18 Dec 2017 00:00) (144/70 - 249/118)  BP(mean): 104 (18 Dec 2017 00:00) (85 - 128)  ABP: --  ABP(mean): --  RR: 17 (18 Dec 2017 00:00) (14 - 20)  SpO2: 100% (18 Dec 2017 00:16) (93% - 100%)    Physical Examination:    General:  NAD    HEENT:   no JVD    PULM:  bilaterall rhonchi with wheeze    CVS:  s1 s2 reg    ABD: soft NT ND    EXT:  no edema     SKIN:  warm    Neuro: alert non focal       LABS:                        11.7   13.2  )-----------( 245      ( 17 Dec 2017 07:46 )             38.8     12-    144  |  104  |  34<H>  ----------------------------<  133<H>  4.3   |  32<H>  |  3.01<H>    Ca    8.3<L>      17 Dec 2017 16:20  Phos  4.1     12-17  Mg     2.4     12-17    TPro  8.1  /  Alb  3.4  /  TBili  0.4  /  DBili  x   /  AST  35  /  ALT  23  /  AlkPhos  85  12-17      CARDIAC MARKERS ( 17 Dec 2017 07:46 )  .049 ng/mL / x     / x     / x     / 2.8 ng/mL      CAPILLARY BLOOD GLUCOSE  POCT Blood Glucose.: 91 mg/dL (17 Dec 2017 21:25)  PT/INR - ( 17 Dec 2017 07:46 )   PT: 11.6 sec;   INR: 1.06 ratio      PTT - ( 17 Dec 2017 07:46 )  PTT:27.7 sec  Urinalysis Basic - ( 17 Dec 2017 21:34 )    Color:  / Appearance: Slightly Turbid / S.025 / pH: x  Gluc: x / Ketone: Trace  / Bili: Negative / Urobili: Negative mg/dL   Blood: x / Protein: 500 mg/dL / Nitrite: Positive   Leuk Esterase: Moderate / RBC: >50 /HPF / WBC 6-10   Sq Epi: x / Non Sq Epi: Few / Bacteria: Many      CULTURES:  Rapid RVP Result: Detected ( @ 11:01)    Medications:  azithromycin  IVPB 500 milliGRAM(s) IV Intermittent every 24 hours  cefTRIAXone   IVPB 1 Gram(s) IV Intermittent every 24 hours  amLODIPine   Tablet 10 milliGRAM(s) Oral daily  hydrALAZINE 10 milliGRAM(s) Oral every 6 hours  labetalol 100 milliGRAM(s) Oral two times a day  ALBUTerol    90 MICROgram(s) HFA Inhaler 1 Puff(s) Inhalation every 4 hours  ALBUTerol/ipratropium for Nebulization 3 milliLiter(s) Nebulizer every 6 hours  tiotropium 18 MICROgram(s) Capsule 1 Capsule(s) Inhalation reji  acetaminophen   Tablet. 650 milliGRAM(s) Oral every 6 hours ME  heparin  Injectable 5000 Unit(s) SubCutaneous every 8 hours  dextrose 50% Injectable 12.5 Gram(s) IV Push once  dextrose 50% Injectable 25 Gram(s) IV Push once  dextrose 50% Injectable 25 Gram(s) IV Push once  dextrose Gel 1 Dose(s) Oral once PRN  glucagon  Injectable 1 milliGRAM(s) IntraMuscular once PRN  insulin lispro (HumaLOG) corrective regimen sliding scale   SubCutaneous three times a day before meals  insulin lispro (HumaLOG) corrective regimen sliding scale   SubCutaneous at bedtime  dextrose 5%. 1000 milliLiter(s) IV Continuous <Continuous>  influenza   Vaccine 0.5 milliLiter(s) IntraMuscular once  chlorhexidine 4% Liquid 1 Application(s) Topical daily        12-17 @ 07:01  -   @ 00:53  --------------------------------------------------------  IN: 91 mL / OUT: 760 mL / NET: -669 mL        RADIOLOGY/IMAGING/ECHO  < from: CT Chest No Cont (17 @ 09:28) >  IMPRESSION:     Right upper lobe pneumonia.    Mild right hydroureteronephrosis and large perinephric and periureteral   stranding due to a 2 mm calculus within the distal right ureter.    Markedly enlarged prostate gland. Mild urinary bladder wall thickening   may be due to chronic bladder outlet obstruction. Correlate with   urinalysis.    Additional findings as above.      < end of copied text >  < from: CT Chest No Cont (17 @ 09:28) >  IMPRESSION:     Right upper lobe pneumonia.    Mild right hydroureteronephrosis and large perinephric and periureteral   stranding due to a 2 mm calculus within the distal right ureter.    Markedly enlarged prostate gland. Mild urinary bladder wall thickening   may be due to chronic bladder outlet obstruction. Correlate with   urinalysis.    Additional findings as above.      Assessment/Plan:    77m HX HTN DM BPH admit with CP  hypertensive urgency BP > 240 systolic, rigors cough productive of yellow sputum     + chlamydia PNA   R hydro with perinephric stranding with r distal ureteral stone.  High PSA (?baseline)      NTG tapered off.  On PO meds  BP acceptable.    ABX for chlamydia PNA CAP  + PCT  wheezing from PNA  duonebs ordered  Urology consult for obstructing R ureteral stone.  ? VIRGEN/CKD  non oliguric  DVT prophylaxis   ISS  POC glucose OK

## 2017-12-18 NOTE — PROGRESS NOTE ADULT - ASSESSMENT
community acquired pneumonia   chlamydia pneumonia pneumonia   on zithromax which is the drug of choice any how   discharge plan   was in icu with hypertensive emergency

## 2017-12-18 NOTE — CONSULT NOTE ADULT - ASSESSMENT
76 y/o female admitted with pneumonia, mild right hydronephrosis secondary to 2mm calculus in distal right ureter, BPH
77M hx htn, dm pw chest pain, abd pain, weakness and vomiting. per children, patient has uri x2 weeks but this morning was much worse. patient notes that he feels very week and that the real discomfort started early in the morning. chest pain is primarily left sided. abd pain is bl sides. no diarrhea, no dysuria, no fever.  In ER patient had elevated bp, hypertensive emergency diagnosed 2/2 elevated trop with pressures in the 240s. started nitro gtt.and sent to CT confirm pneumonia. Admitted to CCU for further management. ID consult noted. Started on broad spectrum antibiotics. Pt. alert and awake at present.
sepsis   community acquired pneumonia   obstructive uropathy and perinephric stranding   broad spectrum antibiotics   discussed with NP in icu   will follow with you thanks

## 2017-12-18 NOTE — CONSULT NOTE ADULT - I WAS PHYSICALLY PRESENT FOR THE KEY PORTIONS OF THE EVALUATION AND MANAGEMENT (E/M) SERVICE PROVIDED.  I AGREE WITH THE ABOVE HISTORY, PHYSICAL, AND PLAN WHICH I HAVE REVIEWED AND EDITED WHERE APPROPRIATE
Triage:  Pt send to ED by [facility] referral due to reoccurring HA noted with position changes and intermittent upper back pain noted with activity. Pt states has evaluation of symptoms at [facility], but sent to ED for CT of head. Statement Selected

## 2017-12-18 NOTE — CONSULT NOTE ADULT - PROBLEM SELECTOR RECOMMENDATION 3
32 day old  with seizures. LP not obtained to rule out meningitis.   Discussed extensively with parents, re continuing treating with antimicrobials due to lack of LP.   For present recommend to continue ceftriaxone, ampicllin and acyclovir. Urology consult

## 2017-12-18 NOTE — PROGRESS NOTE ADULT - SUBJECTIVE AND OBJECTIVE BOX
HPI:  77M hx htn, dm pw chest pain, abd pain, weakness and vomiting. per children, patient has uri x2 weeks but this morning was much worse. patient notes that he feels very week and that the real discomfort started early in the morning. chest pain is primarily left sided. abd pain is bl sides. no diarrhea, no dysuria, no fever.  In ER patient had elevated bp, hypertensive emergency diagnosed 2/2 elevated trop with pressures in the 240s. started nitro gtt.and sent to CT confirms pna  seen in icu being transferred   feels much better       Allergies    grass, pollen (Rhinitis)  No Known Drug Allergies    Intolerances        MEDICATIONS  (STANDING):  ALBUTerol    90 MICROgram(s) HFA Inhaler 1 Puff(s) Inhalation every 4 hours  ALBUTerol/ipratropium for Nebulization 3 milliLiter(s) Nebulizer every 6 hours  amLODIPine   Tablet 10 milliGRAM(s) Oral daily  azithromycin  IVPB 500 milliGRAM(s) IV Intermittent every 24 hours  cefTRIAXone   IVPB 1 Gram(s) IV Intermittent every 24 hours  chlorhexidine 4% Liquid 1 Application(s) Topical daily  dextrose 5%. 1000 milliLiter(s) (50 mL/Hr) IV Continuous <Continuous>  dextrose 50% Injectable 12.5 Gram(s) IV Push once  dextrose 50% Injectable 25 Gram(s) IV Push once  dextrose 50% Injectable 25 Gram(s) IV Push once  heparin  Injectable 5000 Unit(s) SubCutaneous every 8 hours  hydrALAZINE 10 milliGRAM(s) Oral every 6 hours  influenza   Vaccine 0.5 milliLiter(s) IntraMuscular once  insulin lispro (HumaLOG) corrective regimen sliding scale   SubCutaneous three times a day before meals  insulin lispro (HumaLOG) corrective regimen sliding scale   SubCutaneous at bedtime  labetalol 100 milliGRAM(s) Oral two times a day  tiotropium 18 MICROgram(s) Capsule 1 Capsule(s) Inhalation daily    MEDICATIONS  (PRN):  acetaminophen   Tablet. 650 milliGRAM(s) Oral every 6 hours PRN Mild Pain (1 - 3)  dextrose Gel 1 Dose(s) Oral once PRN Blood Glucose LESS THAN 70 milliGRAM(s)/deciliter  glucagon  Injectable 1 milliGRAM(s) IntraMuscular once PRN Glucose LESS THAN 70 milligrams/deciliter      REVIEW OF SYSTEMS:    still coughing   still short of breath   fatigue plus   VITAL SIGNS:  T(C): 37.3 (1218-17 @ 16:45), Max: 37.4 (17 @ 06:25)  T(F): 99.2 (17 @ 16:45), Max: 99.3 (17 @ 06:25)  HR: 82 (17 @ 19:00) (72 - 92)  BP: 142/75 (17 @ 19:00) (129/68 - 183/91)  RR: 16 (17 @ 16:45) (12 - 20)  SpO2: 98% (17 @ 17:36) (95% - 100%)  Wt(kg): --    PHYSICAL EXAM:    GENERAL: not in any distress  HEENT: Neck is supple, normocephalic, atraumatic   CHEST/LUNG: decreased  to auscultation bilaterally; occ rhonchi   HEART: Regular rate and rhythm; No murmurs, rubs, or gallops  ABDOMEN: Soft, Nontender, Nondistended; Bowel sounds present, no rebound   EXTREMITIES:  2+ Peripheral Pulses, No clubbing, cyanosis, or edema  SKIN: No rashes or lesions  BACK: no pressor sore   NERVOUS SYSTEM:  Alert & Oriented X3, Good concentration  PSYCH: normal affect   an plus   LABS:                         11.0   9.5   )-----------( 237      ( 18 Dec 2017 03:59 )             36.1         142  |  103  |  35<H>  ----------------------------<  115<H>  3.8   |  30  |  3.22<H>    Ca    8.2<L>      18 Dec 2017 03:59  Phos  4.9       Mg     2.3         TPro  8.1  /  Alb  3.4  /  TBili  0.4  /  DBili  x   /  AST  35  /  ALT  23  /  AlkPhos  85      LIVER FUNCTIONS - ( 17 Dec 2017 07:46 )  Alb: 3.4 g/dL / Pro: 8.1 gm/dL / ALK PHOS: 85 U/L / ALT: 23 U/L / AST: 35 U/L / GGT: x           PT/INR - ( 17 Dec 2017 07:46 )   PT: 11.6 sec;   INR: 1.06 ratio         PTT - ( 17 Dec 2017 07:46 )  PTT:27.7 sec  Urinalysis Basic - ( 17 Dec 2017 21:34 )    Color:  / Appearance: Slightly Turbid / S.025 / pH: x  Gluc: x / Ketone: Trace  / Bili: Negative / Urobili: Negative mg/dL   Blood: x / Protein: 500 mg/dL / Nitrite: Positive   Leuk Esterase: Moderate / RBC: >50 /HPF / WBC 6-10   Sq Epi: x / Non Sq Epi: Few / Bacteria: Many        CARDIAC MARKERS ( 18 Dec 2017 03:59 )  .063 ng/mL / x     / x     / x     / x      CARDIAC MARKERS ( 17 Dec 2017 07:46 )  .049 ng/mL / x     / x     / x     / 2.8 ng/mL                    Culture Results:   No growth to date. ( @ 11:20)  Culture Results:   No growth to date. ( @ 11:20)                Radiology:    < from: Xray Chest 1 View AP/PA. (17 @ 08:44) >  MPRESSION: Bilateral patchy airspace opacities which may represent   pneumonia or pulmonary edema                EVELIA CAMACHO M.D., ATTENDING RADIOLOGIST  This document has been electronically signed. Dec 17 2017 10:42AM                < end of copied text >

## 2017-12-18 NOTE — CONSULT NOTE ADULT - PROBLEM SELECTOR PROBLEM 1
CAP (community acquired pneumonia)

## 2017-12-18 NOTE — CONSULT NOTE ADULT - PROBLEM SELECTOR PROBLEM 4
Diabetes mellitus
Essential hypertension
Acute renal failure superimposed on stage 4 chronic kidney disease, unspecified acute renal failure type

## 2017-12-18 NOTE — CHART NOTE - NSCHARTNOTEFT_GEN_A_CORE
77M hx htn, dm pw chest pain, abd pain, weakness and vomiting, recent URI, cough, phelm and rigors.  Chest pain is primarily left sided  In ER patient had elevated bp, hypertensive emergency diagnosed 2/2 elevated trop with pressures in the 240s. started nitro gtt.and sent to CT confirms pna  ICU called for further management 77M hx htn, dm pw chest pain, abd pain, weakness and vomiting, recent URI, cough, phelm and rigors.  Chest pain is primarily left sided  In ER patient had elevated bp, hypertensive emergency diagnosed 2/2 elevated trop with pressures in the 240s. started nitro gtt, and sent to CT confirms pna  ICU called for further management.  Pt found to have RUL infiltrate found to be Chlamydia PNA, obstructive uropathy with VIRGEN and hydronephrosis, markedly enlarged prostate gland. CT abdomen noted to have calculus within the distal R ureter.      Pt now off tridil gtt, BP normalized.  TTE pending, respiratory distress may have CHF component (responded well to lasix), Urology ([facility]) seeing patient.  Renal U/s pending.  Will likely need surgical intervention as per urology/surgeryb 77M hx htn, dm pw chest pain, abd pain, weakness and vomiting, recent URI, cough, phelm and rigors.  Chest pain is primarily left sided  In ER patient had elevated bp, hypertensive emergency diagnosed 2/2 elevated trop with pressures in the 240s. started nitro gtt, and sent to CT confirms pna  ICU called for further management.  Pt found to have RUL infiltrate found to be Chlamydia PNA, obstructive uropathy with VIRGEN and hydronephrosis, markedly enlarged prostate gland. CT abdomen noted to have calculus within the distal R ureter.      Pt now off tridil gtt, BP normalized.  TTE pending, respiratory distress may have CHF component (responded well to lasix), Urology ([facility]) seeing patient.  Renal U/s pending.  Will likely need surgical intervention as per urology/surgery.    Pt HD and VSS, pt can be transferred to med/surg floor at this time.  Pt signed out to Dr. Brown

## 2017-12-18 NOTE — PROGRESS NOTE ADULT - ASSESSMENT
77m HX HTN DM BPH admit with CP  hypertensive urgency BP > 240 systolic, rigors cough productive of yellow sputum + chlamydia PNA   R hydro with perinephric stranding with r distal ureteral stone. obstructive uropathy, High PSA (?baseline)      off nitro , BP NOW CONTROLLED ON NORVASC, HYDRALAZINE AND Labetalol  hihg PSA, obs uropathy, fup with urology, repeat US of kidneys as per Dr. Vásquez  cont abx for RUL pna, + chlamydia pneumonia, ID Fup  fup c/s  diurese prn, check echo, fup trops  monitor renal function and UO  DVT ppx

## 2017-12-18 NOTE — CONSULT NOTE ADULT - PROBLEM SELECTOR PROBLEM 5
Acute renal failure superimposed on stage 4 chronic kidney disease, unspecified acute renal failure type
Benign prostatic hyperplasia with lower urinary tract symptoms, symptom details unspecified

## 2017-12-18 NOTE — PROGRESS NOTE ADULT - SUBJECTIVE AND OBJECTIVE BOX
Patient seen and examined bedside in CCU.  No complaints offered.   Reports cough but denies dyspnea.  No overnight events.    T(F): 98.9 (12-18-17 @ 07:30), Max: 99.3 (12-18-17 @ 06:25)  HR: 83 (12-18-17 @ 11:00) (75 - 97)  BP: 152/73 (12-18-17 @ 11:00) (129/68 - 201/105)  RR: 17 (12-18-17 @ 11:00) (12 - 20)  SpO2: 99% (12-18-17 @ 11:00) (95% - 100%)  Wt(kg): --  CAPILLARY BLOOD GLUCOSE      POCT Blood Glucose.: 179 mg/dL (18 Dec 2017 07:35)  POCT Blood Glucose.: 91 mg/dL (17 Dec 2017 21:25)  POCT Blood Glucose.: 69 mg/dL (17 Dec 2017 21:22)  POCT Blood Glucose.: 99 mg/dL (17 Dec 2017 18:24)  POCT Blood Glucose.: 178 mg/dL (17 Dec 2017 12:32)      PHYSICAL EXAM:  General: NAD, alert and awake  HEENT: NCAT, EOMI, conjunctiva clear  Chest: respirations nonlabored. Coarse breath sounds b/l  Abdomen: soft, NTND.   Extremities: no pedal edema or calf tenderness noted   : uncircumcised phallus, adequate meatus. An catheter indwelling draining clear yellow urine    LABS:                        11.0   9.5   )-----------( 237      ( 18 Dec 2017 03:59 )             36.1   12-18    142  |  103  |  35<H>  ----------------------------<  115<H>  3.8   |  30  |  3.22<H>    Ca    8.2<L>      18 Dec 2017 03:59  Phos  4.9     12-18  Mg     2.3     12-18    TPro  8.1  /  Alb  3.4  /  TBili  0.4  /  DBili  x   /  AST  35  /  ALT  23  /  AlkPhos  85  12-17  PT/INR - ( 17 Dec 2017 07:46 )   PT: 11.6 sec;   INR: 1.06 ratio    PTT - ( 17 Dec 2017 07:46 )  PTT:27.7 sec    I/O: 461/1210cc    I&O's Detail    17 Dec 2017 07:01  -  18 Dec 2017 07:00  --------------------------------------------------------  IN:    IV PiggyBack: 50 mL    nitroglycerin  Infusion: 91 mL    Oral Fluid: 320 mL  Total IN: 461 mL    OUT:    Indwelling Catheter - Urethral: 1210 mL  Total OUT: 1210 mL    Total NET: -749 mL      18 Dec 2017 07:01  -  18 Dec 2017 11:25  --------------------------------------------------------  IN:  Total IN: 0 mL    OUT:    Indwelling Catheter - Urethral: 85 mL  Total OUT: 85 mL    Total NET: -85 mL      < from: CT Abdomen and Pelvis No Cont (12.17.17 @ 09:28) >  Right upper lobe pneumonia.    Mild right hydroureteronephrosis and large perinephric and periureteral   stranding due to a 2 mm calculus within the distal right ureter.    Markedly enlarged prostate gland. Mild urinary bladder wall thickening   may be due to chronic bladder outlet obstruction. Correlate with   urinalysis.    < end of copied text >      Prostate Ca Screen, PSA Total (12.17.17 @ 21:37)    Prostate Ca Screen, PSA Total: 24.96: Serum PSA is not an absolute test for malignancy. The PSA value  should be used in conjunction with information available from  clinical and other diagnostic procedures.  METHOD: Roche EIA ng/mL      Impression: 77y Male PMH HTN, glaucoma, BPH admitted with hypertensive emergency, chlamydia pneumonia, acute on chronic renal failure found with right hydronephrosis secondary to R ureteral stone. Afebrile with improved leukocytosis    Recommendations:  - IV hydration recommended. Continue an catheter for urine output monitoring. Trend renal function. Continue regular diet.  - as discussed with Dr Vásquez, Follow up renal US to evaluate R hydro; for ureteroscopy/stent insertion planning  - Follow up urine culture results  - continue zithromax/rocephin per ID for pneumonia  - continue CCU care; as discussed with CCU team, patient for downgrade to medical floor. C/w present antihypertensive therapy  - Continue VTE ppx c heparin

## 2017-12-19 LAB
ALBUMIN SERPL ELPH-MCNC: 2.5 G/DL — LOW (ref 3.3–5)
ALP SERPL-CCNC: 66 U/L — SIGNIFICANT CHANGE UP (ref 40–120)
ALT FLD-CCNC: 11 U/L — LOW (ref 12–78)
ANION GAP SERPL CALC-SCNC: 9 MMOL/L — SIGNIFICANT CHANGE UP (ref 5–17)
AST SERPL-CCNC: 15 U/L — SIGNIFICANT CHANGE UP (ref 15–37)
BILIRUB SERPL-MCNC: 0.4 MG/DL — SIGNIFICANT CHANGE UP (ref 0.2–1.2)
BUN SERPL-MCNC: 37 MG/DL — HIGH (ref 7–23)
CALCIUM SERPL-MCNC: 7.9 MG/DL — LOW (ref 8.5–10.1)
CHLORIDE SERPL-SCNC: 98 MMOL/L — SIGNIFICANT CHANGE UP (ref 96–108)
CO2 SERPL-SCNC: 31 MMOL/L — SIGNIFICANT CHANGE UP (ref 22–31)
CREAT SERPL-MCNC: 3.69 MG/DL — HIGH (ref 0.5–1.3)
CULTURE RESULTS: NO GROWTH — SIGNIFICANT CHANGE UP
GLUCOSE BLDC GLUCOMTR-MCNC: 107 MG/DL — HIGH (ref 70–99)
GLUCOSE BLDC GLUCOMTR-MCNC: 139 MG/DL — HIGH (ref 70–99)
GLUCOSE BLDC GLUCOMTR-MCNC: 140 MG/DL — HIGH (ref 70–99)
GLUCOSE BLDC GLUCOMTR-MCNC: 143 MG/DL — HIGH (ref 70–99)
GLUCOSE SERPL-MCNC: 152 MG/DL — HIGH (ref 70–99)
HCT VFR BLD CALC: 33.1 % — LOW (ref 39–50)
HGB BLD-MCNC: 9.8 G/DL — LOW (ref 13–17)
LEGIONELLA AG UR QL: NEGATIVE — SIGNIFICANT CHANGE UP
MAGNESIUM SERPL-MCNC: 2.2 MG/DL — SIGNIFICANT CHANGE UP (ref 1.6–2.6)
MCHC RBC-ENTMCNC: 23.7 PG — LOW (ref 27–34)
MCHC RBC-ENTMCNC: 29.7 GM/DL — LOW (ref 32–36)
MCV RBC AUTO: 79.8 FL — LOW (ref 80–100)
PHOSPHATE SERPL-MCNC: 4.1 MG/DL — SIGNIFICANT CHANGE UP (ref 2.5–4.5)
PLATELET # BLD AUTO: 225 K/UL — SIGNIFICANT CHANGE UP (ref 150–400)
POTASSIUM SERPL-MCNC: 3.4 MMOL/L — LOW (ref 3.5–5.3)
POTASSIUM SERPL-SCNC: 3.4 MMOL/L — LOW (ref 3.5–5.3)
PROT SERPL-MCNC: 6.6 GM/DL — SIGNIFICANT CHANGE UP (ref 6–8.3)
RBC # BLD: 4.15 M/UL — LOW (ref 4.2–5.8)
RBC # FLD: 14.7 % — SIGNIFICANT CHANGE UP (ref 11–15)
SODIUM SERPL-SCNC: 138 MMOL/L — SIGNIFICANT CHANGE UP (ref 135–145)
SPECIMEN SOURCE: SIGNIFICANT CHANGE UP
TROPONIN I SERPL-MCNC: 0.05 NG/ML — HIGH (ref 0.01–0.04)
WBC # BLD: 8.4 K/UL — SIGNIFICANT CHANGE UP (ref 3.8–10.5)
WBC # FLD AUTO: 8.4 K/UL — SIGNIFICANT CHANGE UP (ref 3.8–10.5)

## 2017-12-19 RX ORDER — TAMSULOSIN HYDROCHLORIDE 0.4 MG/1
0.4 CAPSULE ORAL AT BEDTIME
Qty: 0 | Refills: 0 | Status: DISCONTINUED | OUTPATIENT
Start: 2017-12-19 | End: 2017-12-23

## 2017-12-19 RX ADMIN — AZITHROMYCIN 255 MILLIGRAM(S): 500 TABLET, FILM COATED ORAL at 05:32

## 2017-12-19 RX ADMIN — Medication 10 MILLIGRAM(S): at 00:12

## 2017-12-19 RX ADMIN — Medication 3 MILLILITER(S): at 11:20

## 2017-12-19 RX ADMIN — Medication 10 MILLIGRAM(S): at 23:53

## 2017-12-19 RX ADMIN — CEFTRIAXONE 100 GRAM(S): 500 INJECTION, POWDER, FOR SOLUTION INTRAMUSCULAR; INTRAVENOUS at 05:33

## 2017-12-19 RX ADMIN — AMLODIPINE BESYLATE 10 MILLIGRAM(S): 2.5 TABLET ORAL at 05:32

## 2017-12-19 RX ADMIN — TAMSULOSIN HYDROCHLORIDE 0.4 MILLIGRAM(S): 0.4 CAPSULE ORAL at 21:33

## 2017-12-19 RX ADMIN — Medication 10 MILLIGRAM(S): at 18:50

## 2017-12-19 RX ADMIN — Medication 10 MILLIGRAM(S): at 05:32

## 2017-12-19 RX ADMIN — HEPARIN SODIUM 5000 UNIT(S): 5000 INJECTION INTRAVENOUS; SUBCUTANEOUS at 14:53

## 2017-12-19 RX ADMIN — HEPARIN SODIUM 5000 UNIT(S): 5000 INJECTION INTRAVENOUS; SUBCUTANEOUS at 05:32

## 2017-12-19 RX ADMIN — Medication 3 MILLILITER(S): at 05:33

## 2017-12-19 RX ADMIN — Medication 100 MILLIGRAM(S): at 05:32

## 2017-12-19 RX ADMIN — HEPARIN SODIUM 5000 UNIT(S): 5000 INJECTION INTRAVENOUS; SUBCUTANEOUS at 21:33

## 2017-12-19 RX ADMIN — Medication 3 MILLILITER(S): at 17:03

## 2017-12-19 RX ADMIN — Medication 100 MILLIGRAM(S): at 18:50

## 2017-12-19 NOTE — PROGRESS NOTE ADULT - SUBJECTIVE AND OBJECTIVE BOX
Patient seen and examined bedside resting comfortably.  Aguilar Catheter draining well.    T(F): 99 (12-19-17 @ 11:10), Max: 99.7 (12-18-17 @ 23:49)  HR: 83 (12-19-17 @ 11:10) (72 - 100)  BP: 133/80 (12-19-17 @ 11:10) (133/80 - 174/82)  RR: 20 (12-19-17 @ 11:10) (16 - 20)  SpO2: 98% (12-19-17 @ 11:10) (92% - 100%)    CAPILLARY BLOOD GLUCOSE      POCT Blood Glucose.: 139 mg/dL (19 Dec 2017 12:06)  POCT Blood Glucose.: 140 mg/dL (19 Dec 2017 08:24)  POCT Blood Glucose.: 117 mg/dL (18 Dec 2017 21:37)  POCT Blood Glucose.: 131 mg/dL (18 Dec 2017 16:33)      PHYSICAL EXAM:  General: NAD, WDWN  Neuro:  Alert & conscious  HEENT: NCAT, EOMI, conjunctiva clear  Lung: respirations nonlabored, good inspiratory effort  Abdomen: soft, NTND. No CVA tenderness  Extremities: no pedal edema or calf tenderness noted   : Aguilar catheter draining well.  LABS:                        9.8    8.4   )-----------( 225      ( 19 Dec 2017 07:29 )             33.1     12-19    138  |  98  |  37<H>  ----------------------------<  152<H>  3.4<L>   |  31  |  3.69<H>    Ca    7.9<L>      19 Dec 2017 07:29  Phos  4.1     12-19  Mg     2.2     12-19    TPro  6.6  /  Alb  2.5<L>  /  TBili  0.4  /  DBili  x   /  AST  15  /  ALT  11<L>  /  AlkPhos  66  12-19      I&O's Detail    18 Dec 2017 07:01  -  19 Dec 2017 07:00  --------------------------------------------------------  IN:    Solution: 50 mL    Solution: 250 mL  Total IN: 300 mL    OUT:    Indwelling Catheter - Urethral: 845 mL  Total OUT: 845 mL    Total NET: -545 mL

## 2017-12-19 NOTE — PROGRESS NOTE ADULT - SUBJECTIVE AND OBJECTIVE BOX
HPI:  77M hx htn, dm pw chest pain, abd pain, weakness and vomiting. per children, patient has uri x2 weeks but this morning was much worse. patient notes that he feels very week and that the real discomfort started early in the morning. chest pain is primarily left sided. abd pain is bl sides. no diarrhea, no dysuria, no fever.  In ER patient had elevated bp, hypertensive emergency diagnosed 2/2 elevated trop with pressures in the 240s. started nitro gtt.and sent to CT confirms pna  ICU called for further management (17 Dec 2017 15:28)  rsv panel with chlamydia trachomatis pneumonia    Allergies    grass, pollen (Rhinitis)  No Known Drug Allergies    Intolerances        MEDICATIONS  (STANDING):  ALBUTerol    90 MICROgram(s) HFA Inhaler 1 Puff(s) Inhalation every 4 hours  ALBUTerol/ipratropium for Nebulization 3 milliLiter(s) Nebulizer every 6 hours  amLODIPine   Tablet 10 milliGRAM(s) Oral daily  azithromycin  IVPB 500 milliGRAM(s) IV Intermittent every 24 hours  cefTRIAXone   IVPB 1 Gram(s) IV Intermittent every 24 hours  chlorhexidine 4% Liquid 1 Application(s) Topical daily  dextrose 5%. 1000 milliLiter(s) (50 mL/Hr) IV Continuous <Continuous>  dextrose 50% Injectable 12.5 Gram(s) IV Push once  dextrose 50% Injectable 25 Gram(s) IV Push once  dextrose 50% Injectable 25 Gram(s) IV Push once  heparin  Injectable 5000 Unit(s) SubCutaneous every 8 hours  hydrALAZINE 10 milliGRAM(s) Oral every 6 hours  influenza   Vaccine 0.5 milliLiter(s) IntraMuscular once  insulin lispro (HumaLOG) corrective regimen sliding scale   SubCutaneous three times a day before meals  insulin lispro (HumaLOG) corrective regimen sliding scale   SubCutaneous at bedtime  labetalol 100 milliGRAM(s) Oral two times a day  tamsulosin 0.4 milliGRAM(s) Oral at bedtime  tiotropium 18 MICROgram(s) Capsule 1 Capsule(s) Inhalation daily    MEDICATIONS  (PRN):  acetaminophen   Tablet. 650 milliGRAM(s) Oral every 6 hours PRN Mild Pain (1 - 3)  dextrose Gel 1 Dose(s) Oral once PRN Blood Glucose LESS THAN 70 milliGRAM(s)/deciliter  glucagon  Injectable 1 milliGRAM(s) IntraMuscular once PRN Glucose LESS THAN 70 milligrams/deciliter      REVIEW OF SYSTEMS:    feels better   cough plus short of breath plus  VITAL SIGNS:  T(C): 37.2 (17 @ 11:10), Max: 37.6 (17 @ 23:49)  T(F): 99 (17 @ 11:10), Max: 99.7 (17 @ 23:49)  HR: 83 (17 @ 11:10) (72 - 100)  BP: 133/80 (17 @ 11:10) (133/80 - 174/82)  RR: 20 (17 @ 11:10) (16 - 20)  SpO2: 98% (17 @ 11:10) (92% - 100%)  Wt(kg): --    PHYSICAL EXAM:    GENERAL: not in any distress  HEENT: Neck is supple, normocephalic, atraumatic   CHEST/LUNG: rhonchi  HEART: Regular rate and rhythm; No murmurs, rubs, or gallops  ABDOMEN: Soft, Nontender, Nondistended; Bowel sounds present, no rebound   EXTREMITIES:  2+ Peripheral Pulses, No clubbing, cyanosis, or edema  SKIN: No rashes or lesions  BACK: no pressor sore   NERVOUS SYSTEM:  Alert & Oriented X3, Good concentration  PSYCH: normal affect     LABS:                         9.8    8.4   )-----------( 225      ( 19 Dec 2017 07:29 )             33.1         138  |  98  |  37<H>  ----------------------------<  152<H>  3.4<L>   |  31  |  3.69<H>    Ca    7.9<L>      19 Dec 2017 07:29  Phos  4.1       Mg     2.2         TPro  6.6  /  Alb  2.5<L>  /  TBili  0.4  /  DBili  x   /  AST  15  /  ALT  11<L>  /  AlkPhos  66      LIVER FUNCTIONS - ( 19 Dec 2017 07:29 )  Alb: 2.5 g/dL / Pro: 6.6 gm/dL / ALK PHOS: 66 U/L / ALT: 11 U/L / AST: 15 U/L / GGT: x             Urinalysis Basic - ( 17 Dec 2017 21:34 )    Color:  / Appearance: Slightly Turbid / S.025 / pH: x  Gluc: x / Ketone: Trace  / Bili: Negative / Urobili: Negative mg/dL   Blood: x / Protein: 500 mg/dL / Nitrite: Positive   Leuk Esterase: Moderate / RBC: >50 /HPF / WBC 6-10   Sq Epi: x / Non Sq Epi: Few / Bacteria: Many        CARDIAC MARKERS ( 19 Dec 2017 07:29 )  .054 ng/mL / x     / x     / x     / x      CARDIAC MARKERS ( 18 Dec 2017 03:59 )  .063 ng/mL / x     / x     / x     / x                        Culture Results:   No growth ( @ 08:52)  Culture Results:   No growth to date. ( @ 11:20)  Culture Results:   No growth to date. ( @ 11:20)                Radiology: HPI:  77M hx htn, dm pw chest pain, abd pain, weakness and vomiting. per children, patient has uri x2 weeks but this morning was much worse. patient notes that he feels very week and that the real discomfort started early in the morning. chest pain is primarily left sided. abd pain is bl sides. no diarrhea, no dysuria, no fever.  In ER patient had elevated bp, hypertensive emergency diagnosed 2/2 elevated trop with pressures in the 240s. started nitro gtt.and sent to CT confirms pna  ICU called for further management (17 Dec 2017 15:28)  rsv panel with chlamydia trachomatis pneumonia  much better   Allergies    grass, pollen (Rhinitis)  No Known Drug Allergies    Intolerances        MEDICATIONS  (STANDING):  ALBUTerol    90 MICROgram(s) HFA Inhaler 1 Puff(s) Inhalation every 4 hours  ALBUTerol/ipratropium for Nebulization 3 milliLiter(s) Nebulizer every 6 hours  amLODIPine   Tablet 10 milliGRAM(s) Oral daily  azithromycin  IVPB 500 milliGRAM(s) IV Intermittent every 24 hours  cefTRIAXone   IVPB 1 Gram(s) IV Intermittent every 24 hours  chlorhexidine 4% Liquid 1 Application(s) Topical daily  dextrose 5%. 1000 milliLiter(s) (50 mL/Hr) IV Continuous <Continuous>  dextrose 50% Injectable 12.5 Gram(s) IV Push once  dextrose 50% Injectable 25 Gram(s) IV Push once  dextrose 50% Injectable 25 Gram(s) IV Push once  heparin  Injectable 5000 Unit(s) SubCutaneous every 8 hours  hydrALAZINE 10 milliGRAM(s) Oral every 6 hours  influenza   Vaccine 0.5 milliLiter(s) IntraMuscular once  insulin lispro (HumaLOG) corrective regimen sliding scale   SubCutaneous three times a day before meals  insulin lispro (HumaLOG) corrective regimen sliding scale   SubCutaneous at bedtime  labetalol 100 milliGRAM(s) Oral two times a day  tamsulosin 0.4 milliGRAM(s) Oral at bedtime  tiotropium 18 MICROgram(s) Capsule 1 Capsule(s) Inhalation daily    MEDICATIONS  (PRN):  acetaminophen   Tablet. 650 milliGRAM(s) Oral every 6 hours PRN Mild Pain (1 - 3)  dextrose Gel 1 Dose(s) Oral once PRN Blood Glucose LESS THAN 70 milliGRAM(s)/deciliter  glucagon  Injectable 1 milliGRAM(s) IntraMuscular once PRN Glucose LESS THAN 70 milligrams/deciliter      REVIEW OF SYSTEMS:    feels better   cough plus short of breath plus  VITAL SIGNS:  T(C): 37.2 (17 @ 11:10), Max: 37.6 (17 @ 23:49)  T(F): 99 (17 @ 11:10), Max: 99.7 (17 @ 23:49)  HR: 83 (17 @ 11:10) (72 - 100)  BP: 133/80 (17 @ 11:10) (133/80 - 174/82)  RR: 20 (17 @ 11:10) (16 - 20)  SpO2: 98% (17 @ 11:10) (92% - 100%)  Wt(kg): --    PHYSICAL EXAM:    GENERAL: not in any distress  HEENT: Neck is supple, normocephalic, atraumatic   CHEST/LUNG: rhonchi  HEART: Regular rate and rhythm; No murmurs, rubs, or gallops  ABDOMEN: Soft, Nontender, Nondistended; Bowel sounds present, no rebound   EXTREMITIES:  2+ Peripheral Pulses, No clubbing, cyanosis, or edema  SKIN: No rashes or lesions  BACK: no pressor sore   NERVOUS SYSTEM:  Alert & Oriented X3, Good concentration  PSYCH: normal affect     LABS:                         9.8    8.4   )-----------( 225      ( 19 Dec 2017 07:29 )             33.1         138  |  98  |  37<H>  ----------------------------<  152<H>  3.4<L>   |  31  |  3.69<H>    Ca    7.9<L>      19 Dec 2017 07:29  Phos  4.1       Mg     2.2         TPro  6.6  /  Alb  2.5<L>  /  TBili  0.4  /  DBili  x   /  AST  15  /  ALT  11<L>  /  AlkPhos  66      LIVER FUNCTIONS - ( 19 Dec 2017 07:29 )  Alb: 2.5 g/dL / Pro: 6.6 gm/dL / ALK PHOS: 66 U/L / ALT: 11 U/L / AST: 15 U/L / GGT: x             Urinalysis Basic - ( 17 Dec 2017 21:34 )    Color:  / Appearance: Slightly Turbid / S.025 / pH: x  Gluc: x / Ketone: Trace  / Bili: Negative / Urobili: Negative mg/dL   Blood: x / Protein: 500 mg/dL / Nitrite: Positive   Leuk Esterase: Moderate / RBC: >50 /HPF / WBC 6-10   Sq Epi: x / Non Sq Epi: Few / Bacteria: Many        CARDIAC MARKERS ( 19 Dec 2017 07:29 )  .054 ng/mL / x     / x     / x     / x      CARDIAC MARKERS ( 18 Dec 2017 03:59 )  .063 ng/mL / x     / x     / x     / x                        Culture Results:   No growth ( @ 08:52)  Culture Results:   No growth to date. ( @ 11:20)  Culture Results:   No growth to date. ( @ 11:20)                Radiology:

## 2017-12-19 NOTE — PROGRESS NOTE ADULT - SUBJECTIVE AND OBJECTIVE BOX
Patient is a 77y old  Male who presents with a chief complaint of The patient is a 77y Male complaining of chest pain. (17 Dec 2017 15:28)      INTERVAL HPI/OVERNIGHT EVENTS:    MEDICATIONS  (STANDING):  ALBUTerol    90 MICROgram(s) HFA Inhaler 1 Puff(s) Inhalation every 4 hours  ALBUTerol/ipratropium for Nebulization 3 milliLiter(s) Nebulizer every 6 hours  amLODIPine   Tablet 10 milliGRAM(s) Oral daily  azithromycin  IVPB 500 milliGRAM(s) IV Intermittent every 24 hours  cefTRIAXone   IVPB 1 Gram(s) IV Intermittent every 24 hours  chlorhexidine 4% Liquid 1 Application(s) Topical daily  dextrose 5%. 1000 milliLiter(s) (50 mL/Hr) IV Continuous <Continuous>  dextrose 50% Injectable 12.5 Gram(s) IV Push once  dextrose 50% Injectable 25 Gram(s) IV Push once  dextrose 50% Injectable 25 Gram(s) IV Push once  heparin  Injectable 5000 Unit(s) SubCutaneous every 8 hours  hydrALAZINE 10 milliGRAM(s) Oral every 6 hours  influenza   Vaccine 0.5 milliLiter(s) IntraMuscular once  insulin lispro (HumaLOG) corrective regimen sliding scale   SubCutaneous three times a day before meals  insulin lispro (HumaLOG) corrective regimen sliding scale   SubCutaneous at bedtime  labetalol 100 milliGRAM(s) Oral two times a day  tamsulosin 0.4 milliGRAM(s) Oral at bedtime  tiotropium 18 MICROgram(s) Capsule 1 Capsule(s) Inhalation daily    MEDICATIONS  (PRN):  acetaminophen   Tablet. 650 milliGRAM(s) Oral every 6 hours PRN Mild Pain (1 - 3)  dextrose Gel 1 Dose(s) Oral once PRN Blood Glucose LESS THAN 70 milliGRAM(s)/deciliter  glucagon  Injectable 1 milliGRAM(s) IntraMuscular once PRN Glucose LESS THAN 70 milligrams/deciliter      Allergies    grass, pollen (Rhinitis)  No Known Drug Allergies    Intolerances        REVIEW OF SYSTEMS:  CONSTITUTIONAL: No fever, weight loss, or fatigue  EYES: No eye pain, visual disturbances, or discharge  ENMT:  No difficulty hearing, tinnitus, vertigo; No sinus or throat pain  NECK: No pain or stiffness  BREASTS: No pain, masses, or nipple discharge  RESPIRATORY: No cough, wheezing, chills or hemoptysis; No shortness of breath  CARDIOVASCULAR: No chest pain, palpitations, dizziness, or leg swelling  GASTROINTESTINAL: No abdominal or epigastric pain. No nausea, vomiting, or hematemesis; No diarrhea or constipation. No melena or hematochezia.  GENITOURINARY: No dysuria, frequency, hematuria, or incontinence  NEUROLOGICAL: No headaches, memory loss, loss of strength, numbness, or tremors  SKIN: No itching, burning, rashes, or lesions   LYMPH NODES: No enlarged glands  ENDOCRINE: No heat or cold intolerance; No hair loss  MUSCULOSKELETAL: No joint pain or swelling; No muscle, back, or extremity pain  PSYCHIATRIC: No depression, anxiety, mood swings, or difficulty sleeping  HEME/LYMPH: No easy bruising, or bleeding gums  ALLERGY AND IMMUNOLOGIC: No hives or eczema    Vital Signs Last 24 Hrs  T(C): 37.2 (19 Dec 2017 05:13), Max: 37.6 (18 Dec 2017 23:49)  T(F): 99 (19 Dec 2017 05:13), Max: 99.7 (18 Dec 2017 23:49)  HR: 94 (19 Dec 2017 05:45) (72 - 100)  BP: 162/100 (19 Dec 2017 05:13) (138/85 - 174/82)  BP(mean): 94 (18 Dec 2017 16:00) (86 - 104)  RR: 18 (19 Dec 2017 05:13) (16 - 20)  SpO2: 92% (19 Dec 2017 05:45) (92% - 100%)    PHYSICAL EXAM:  GENERAL: NAD, well-groomed, Obese  HEAD:  Atraumatic, Normocephalic  EYES: EOMI, PERRL, conjunctiva and sclera clear  ENMT:  Moist mucous membranes, Good dentition, No lesions  NECK: Supple, No JVD, Normal thyroid  NERVOUS SYSTEM:  Alert & Oriented X 3, Good concentration; Motor Strength 5/5 B/L upper and lower extremities; DTRs 2+ intact and symmetric  CHEST/LUNG: Clear to percussion bilaterally; No rales, rhonchi, wheezing, or rubs  HEART: Regular rate and rhythm; No murmurs, rubs, or gallops  ABDOMEN: Soft, Nontender, Nondistended; Bowel sounds present. obese. Aguilar in place.  EXTREMITIES:  2+ Peripheral Pulses, No clubbing, cyanosis, or edema  LYMPH: No lymphadenopathy noted  SKIN: No rashes or lesions    LABS:                        9.8    8.4   )-----------( 225      ( 19 Dec 2017 07:29 )             33.1         138  |  98  |  37<H>  ----------------------------<  152<H>  3.4<L>   |  31  |  3.69<H>    Ca    7.9<L>      19 Dec 2017 07:29  Phos  4.1       Mg     2.2         TPro  6.6  /  Alb  2.5<L>  /  TBili  0.4  /  DBili  x   /  AST  15  /  ALT  11<L>  /  AlkPhos  66        Urinalysis Basic - ( 17 Dec 2017 21:34 )    Color:  / Appearance: Slightly Turbid / S.025 / pH: x  Gluc: x / Ketone: Trace  / Bili: Negative / Urobili: Negative mg/dL   Blood: x / Protein: 500 mg/dL / Nitrite: Positive   Leuk Esterase: Moderate / RBC: >50 /HPF / WBC 6-10   Sq Epi: x / Non Sq Epi: Few / Bacteria: Many      CAPILLARY BLOOD GLUCOSE      POCT Blood Glucose.: 140 mg/dL (19 Dec 2017 08:24)  POCT Blood Glucose.: 117 mg/dL (18 Dec 2017 21:37)  POCT Blood Glucose.: 131 mg/dL (18 Dec 2017 16:33)  POCT Blood Glucose.: 108 mg/dL (18 Dec 2017 12:38)      CARDIAC MARKERS ( 19 Dec 2017 07:29 )  .054 ng/mL / x     / x     / x     / x      CARDIAC MARKERS ( 18 Dec 2017 03:59 )  .063 ng/mL / x     / x     / x     / x          Hemoglobin A1C, Whole Blood: 6.4 % ( @ 09:48)  Procalcitonin, Serum: 1.16 ng/mL ( @ 21:06)    Cholesterol, Serum: 164 mg/dL ( @ 09:48)  HDL Cholesterol, Serum: 49 mg/dL ( @ 09:48)  Triglycerides, Serum: 134 mg/dL ( @ 09:48)        Culture - Blood (collected 17 Dec 2017 11:20)  Source: .Blood Blood  Preliminary Report (18 Dec 2017 12:01):    No growth to date.    Culture - Blood (collected 17 Dec 2017 11:20)  Source: .Blood Blood  Preliminary Report (18 Dec 2017 12:01):    No growth to date.        RADIOLOGY & ADDITIONAL TESTS:  < from: CT Abdomen and Pelvis No Cont (17 @ 09:28) >    EXAM:  CT ABDOMEN AND PELVIS                          EXAM:  CT CHEST                            PROCEDURE DATE:  2017          INTERPRETATION:  CLINICAL INFORMATION: Pneumonia and abdominal pain    COMPARISON: None    PROCEDURE:     CT of the chest, abdomen and pelvis without oral or intravenous contrast.   Coronal and sagittal reformats were performed.  FINDINGS:    LUNG AND LARGE AIRWAYS: The tracheobronchial tree is patent. There are   patchy opacities in the right upper lobe as well as faint dependent   patchy opacities in the left upper lobe and lung bases. Normal limits.  PLEURA: Within normal limits.  VESSELS: Within normal limits.  HEART: Upper limits of normal in size. No pericardial effusion.  MEDIASTINUM AND SAUMYA: A few mildly prominent mediastinal nodes, likely   reactive.  CHEST WALL AND LOWER NECK: There is a lipoma within the right scapular   region.    LIVER: Within normal limits.  BILE DUCTS: Normal caliber.  GALLBLADDER: Within normal limits.  SPLEEN: Within normal limits.  PANCREAS: Within normal limits.  ADRENALS: Within normal limits.  KIDNEYS/URETERS: Mild right hydroureteronephrosis due to a 2 mm calculus   within the right UVJ. There is surrounding right perinephric and   periureteral fat stranding. There are bilateral renal cysts as well as   additional subcentimeter hypodensities, too small to characterize.    BLADDER: Mild urinary bladder wall thickening.  REPRODUCTIVE ORGANS: Prostate gland is enlarged.    BOWEL: No bowel obstruction. Appendix is normal.  PERITONEUM: No ascites.  VESSELS:  Moderate atherosclerotic changes of the aorta and branching   vessels.  RETROPERITONEUM: No lymphadenopathy.    ABDOMINAL WALL: There is a small fat-containing umbilical hernia.  BONES: Within normal limits.    IMPRESSION:     Right upper lobe pneumonia.    Mild right hydroureteronephrosis and large perinephric and periureteral   stranding due to a 2 mm calculus within the distal right ureter.    Markedly enlarged prostate gland. Mild urinary bladder wall thickening   may be due to chronic bladder outlet obstruction. Correlate with   urinalysis.    Additional findings as above.        EVELIA CAMACHO M.D., ATTENDING RADIOLOGIST  This document has been electronically signed. Dec 17 2017 10:24AM       < end of copied text >    Imaging Personally Reviewed:  [x ] YES  [ ] NO    Consultant(s) Notes Reviewed:  [x ] YES  [ ] NO    Care Discussed with Consultants/Other Providers [ ] YES  [ ] NO    PROBLEMS:  HYPERTENSIVE EMERGENCY, COMMUNITY ACQUIRED PNEUMONIA  CHEST PAIN  Hypertensive emergency  Other chest pain  Hypertensive emergency  Glaucoma of both eyes, unspecified glaucoma type  Benign prostatic hyperplasia with lower urinary tract symptoms, symptom details unspecified  Essential hypertension  Diabetes mellitus  Obstructive uropathy  Hypertensive urgency  Acute renal failure superimposed on stage 4 chronic kidney disease, unspecified acute renal failure type  BPH (benign prostatic hyperplasia)  Renal stone  CAP (community acquired pneumonia)      Care discussed with family,         [  ]   yes  [  ]  No    imp:    stable[ ]    unstable[  ]     improving [   ]       unchanged  [  ]                Plans:  Continue present plans  [  ]               New consult [  ]   specialty  .......               order mariaa[  ]    test name.                  Discharge Planning  [  ]

## 2017-12-19 NOTE — PROGRESS NOTE ADULT - ASSESSMENT
77M hx htn, dm pw chest pain, abd pain, weakness and vomiting. per children, patient has uri x2 weeks but this morning was much worse. patient notes that he feels very week and that the real discomfort started early in the morning. chest pain is primarily left sided. abd pain is bl sides. no diarrhea, no dysuria, no fever.  In ER patient had elevated bp, hypertensive emergency diagnosed 2/2 elevated trop with pressures in the 240s. started nitro gtt.and sent to CT confirm pneumonia. Admitted to CCU for further management. ID consult noted. Started on broad spectrum antibiotics. Pt. alert and awake at present.   12/19/17 : Transferred from CCU. Alert and awake. Asymptomatic Aguilar draining clear urine. Cough present. Continue Iv antibiotics. Started on Flomax. Urology F/U.

## 2017-12-20 LAB
ALBUMIN SERPL ELPH-MCNC: 2.6 G/DL — LOW (ref 3.3–5)
ALP SERPL-CCNC: 61 U/L — SIGNIFICANT CHANGE UP (ref 40–120)
ALT FLD-CCNC: 15 U/L — SIGNIFICANT CHANGE UP (ref 12–78)
ANION GAP SERPL CALC-SCNC: 10 MMOL/L — SIGNIFICANT CHANGE UP (ref 5–17)
AST SERPL-CCNC: 15 U/L — SIGNIFICANT CHANGE UP (ref 15–37)
BILIRUB SERPL-MCNC: 0.3 MG/DL — SIGNIFICANT CHANGE UP (ref 0.2–1.2)
BUN SERPL-MCNC: 39 MG/DL — HIGH (ref 7–23)
CALCIUM SERPL-MCNC: 8 MG/DL — LOW (ref 8.5–10.1)
CHLORIDE SERPL-SCNC: 100 MMOL/L — SIGNIFICANT CHANGE UP (ref 96–108)
CO2 SERPL-SCNC: 29 MMOL/L — SIGNIFICANT CHANGE UP (ref 22–31)
CREAT SERPL-MCNC: 3.43 MG/DL — HIGH (ref 0.5–1.3)
CULTURE RESULTS: NO GROWTH — SIGNIFICANT CHANGE UP
GLUCOSE BLDC GLUCOMTR-MCNC: 110 MG/DL — HIGH (ref 70–99)
GLUCOSE BLDC GLUCOMTR-MCNC: 133 MG/DL — HIGH (ref 70–99)
GLUCOSE BLDC GLUCOMTR-MCNC: 140 MG/DL — HIGH (ref 70–99)
GLUCOSE BLDC GLUCOMTR-MCNC: 175 MG/DL — HIGH (ref 70–99)
GLUCOSE SERPL-MCNC: 161 MG/DL — HIGH (ref 70–99)
HCT VFR BLD CALC: 30.8 % — LOW (ref 39–50)
HGB BLD-MCNC: 9.4 G/DL — LOW (ref 13–17)
MCHC RBC-ENTMCNC: 24.3 PG — LOW (ref 27–34)
MCHC RBC-ENTMCNC: 30.5 GM/DL — LOW (ref 32–36)
MCV RBC AUTO: 79.6 FL — LOW (ref 80–100)
PLATELET # BLD AUTO: 205 K/UL — SIGNIFICANT CHANGE UP (ref 150–400)
POTASSIUM SERPL-MCNC: 3.5 MMOL/L — SIGNIFICANT CHANGE UP (ref 3.5–5.3)
POTASSIUM SERPL-SCNC: 3.5 MMOL/L — SIGNIFICANT CHANGE UP (ref 3.5–5.3)
PROT SERPL-MCNC: 6.5 GM/DL — SIGNIFICANT CHANGE UP (ref 6–8.3)
RBC # BLD: 3.88 M/UL — LOW (ref 4.2–5.8)
RBC # FLD: 14.7 % — SIGNIFICANT CHANGE UP (ref 11–15)
SODIUM SERPL-SCNC: 139 MMOL/L — SIGNIFICANT CHANGE UP (ref 135–145)
SPECIMEN SOURCE: SIGNIFICANT CHANGE UP
WBC # BLD: 7.4 K/UL — SIGNIFICANT CHANGE UP (ref 3.8–10.5)
WBC # FLD AUTO: 7.4 K/UL — SIGNIFICANT CHANGE UP (ref 3.8–10.5)

## 2017-12-20 RX ADMIN — TAMSULOSIN HYDROCHLORIDE 0.4 MILLIGRAM(S): 0.4 CAPSULE ORAL at 21:12

## 2017-12-20 RX ADMIN — AZITHROMYCIN 255 MILLIGRAM(S): 500 TABLET, FILM COATED ORAL at 05:12

## 2017-12-20 RX ADMIN — Medication 10 MILLIGRAM(S): at 17:56

## 2017-12-20 RX ADMIN — Medication 2: at 08:14

## 2017-12-20 RX ADMIN — HEPARIN SODIUM 5000 UNIT(S): 5000 INJECTION INTRAVENOUS; SUBCUTANEOUS at 05:12

## 2017-12-20 RX ADMIN — Medication 100 MILLIGRAM(S): at 05:13

## 2017-12-20 RX ADMIN — HEPARIN SODIUM 5000 UNIT(S): 5000 INJECTION INTRAVENOUS; SUBCUTANEOUS at 14:20

## 2017-12-20 RX ADMIN — Medication 3 MILLILITER(S): at 05:20

## 2017-12-20 RX ADMIN — HEPARIN SODIUM 5000 UNIT(S): 5000 INJECTION INTRAVENOUS; SUBCUTANEOUS at 21:12

## 2017-12-20 RX ADMIN — Medication 3 MILLILITER(S): at 11:16

## 2017-12-20 RX ADMIN — CEFTRIAXONE 100 GRAM(S): 500 INJECTION, POWDER, FOR SOLUTION INTRAMUSCULAR; INTRAVENOUS at 05:12

## 2017-12-20 RX ADMIN — Medication 10 MILLIGRAM(S): at 05:12

## 2017-12-20 RX ADMIN — AMLODIPINE BESYLATE 10 MILLIGRAM(S): 2.5 TABLET ORAL at 05:12

## 2017-12-20 RX ADMIN — Medication 3 MILLILITER(S): at 23:48

## 2017-12-20 RX ADMIN — Medication 3 MILLILITER(S): at 17:01

## 2017-12-20 RX ADMIN — Medication 10 MILLIGRAM(S): at 11:51

## 2017-12-20 RX ADMIN — Medication 100 MILLIGRAM(S): at 18:06

## 2017-12-20 RX ADMIN — Medication 3 MILLILITER(S): at 00:19

## 2017-12-20 NOTE — PROGRESS NOTE ADULT - SUBJECTIVE AND OBJECTIVE BOX
Patient is a 77y old  Male who presents with a chief complaint of The patient is a 77y Male complaining of chest pain. (17 Dec 2017 15:28)      INTERVAL HPI/OVERNIGHT EVENTS:    MEDICATIONS  (STANDING):  ALBUTerol    90 MICROgram(s) HFA Inhaler 1 Puff(s) Inhalation every 4 hours  ALBUTerol/ipratropium for Nebulization 3 milliLiter(s) Nebulizer every 6 hours  amLODIPine   Tablet 10 milliGRAM(s) Oral daily  azithromycin  IVPB 500 milliGRAM(s) IV Intermittent every 24 hours  cefTRIAXone   IVPB 1 Gram(s) IV Intermittent every 24 hours  chlorhexidine 4% Liquid 1 Application(s) Topical daily  dextrose 5%. 1000 milliLiter(s) (50 mL/Hr) IV Continuous <Continuous>  dextrose 50% Injectable 12.5 Gram(s) IV Push once  dextrose 50% Injectable 25 Gram(s) IV Push once  dextrose 50% Injectable 25 Gram(s) IV Push once  heparin  Injectable 5000 Unit(s) SubCutaneous every 8 hours  hydrALAZINE 10 milliGRAM(s) Oral every 6 hours  influenza   Vaccine 0.5 milliLiter(s) IntraMuscular once  insulin lispro (HumaLOG) corrective regimen sliding scale   SubCutaneous three times a day before meals  insulin lispro (HumaLOG) corrective regimen sliding scale   SubCutaneous at bedtime  labetalol 100 milliGRAM(s) Oral two times a day  tamsulosin 0.4 milliGRAM(s) Oral at bedtime  tiotropium 18 MICROgram(s) Capsule 1 Capsule(s) Inhalation daily    MEDICATIONS  (PRN):  acetaminophen   Tablet. 650 milliGRAM(s) Oral every 6 hours PRN Mild Pain (1 - 3)  dextrose Gel 1 Dose(s) Oral once PRN Blood Glucose LESS THAN 70 milliGRAM(s)/deciliter  glucagon  Injectable 1 milliGRAM(s) IntraMuscular once PRN Glucose LESS THAN 70 milligrams/deciliter      Allergies    grass, pollen (Rhinitis)  No Known Drug Allergies    Intolerances        REVIEW OF SYSTEMS:  CONSTITUTIONAL: No fever, weight loss, or fatigue  EYES: No eye pain, visual disturbances, or discharge  ENMT:  No difficulty hearing, tinnitus, vertigo; No sinus or throat pain  NECK: No pain or stiffness  BREASTS: No pain, masses, or nipple discharge  RESPIRATORY: No cough, wheezing, chills or hemoptysis; No shortness of breath  CARDIOVASCULAR: No chest pain, palpitations, dizziness, or leg swelling  GASTROINTESTINAL: No abdominal or epigastric pain. No nausea, vomiting, or hematemesis; No diarrhea or constipation. No melena or hematochezia.  GENITOURINARY: No dysuria, frequency, hematuria, or incontinence  NEUROLOGICAL: No headaches, memory loss, loss of strength, numbness, or tremors  SKIN: No itching, burning, rashes, or lesions   LYMPH NODES: No enlarged glands  ENDOCRINE: No heat or cold intolerance; No hair loss  MUSCULOSKELETAL: No joint pain or swelling; No muscle, back, or extremity pain  PSYCHIATRIC: No depression, anxiety, mood swings, or difficulty sleeping  HEME/LYMPH: No easy bruising, or bleeding gums  ALLERGY AND IMMUNOLOGIC: No hives or eczema    Vital Signs Last 24 Hrs  T(C): 36.6 (20 Dec 2017 05:14), Max: 37.2 (19 Dec 2017 11:10)  T(F): 97.8 (20 Dec 2017 05:14), Max: 99 (19 Dec 2017 11:10)  HR: 70 (20 Dec 2017 06:24) (70 - 95)  BP: 154/84 (20 Dec 2017 05:14) (133/80 - 154/84)  BP(mean): --  RR: 18 (20 Dec 2017 05:14) (16 - 20)  SpO2: 95% (20 Dec 2017 06:24) (92% - 98%)    PHYSICAL EXAM:  GENERAL: NAD, well-groomed, well-developed  HEAD:  Atraumatic, Normocephalic  EYES: EOMI, PERRL, conjunctiva and sclera clear  ENMT:  Moist mucous membranes, Good dentition, No lesions  NECK: Supple, No JVD, Normal thyroid  NERVOUS SYSTEM:  Alert & Oriented X3, Good concentration; Motor Strength 5/5 B/L upper and lower extremities; DTRs 2+ intact and symmetric  CHEST/LUNG: Clear to percussion bilaterally; No rales, rhonchi, wheezing, or rubs  HEART: Regular rate and rhythm; No murmurs, rubs, or gallops  ABDOMEN: Soft, Nontender, Nondistended; Bowel sounds present  EXTREMITIES:  2+ Peripheral Pulses, No clubbing, cyanosis, or edema  LYMPH: No lymphadenopathy noted  SKIN: No rashes or lesions    LABS:                        9.4    7.4   )-----------( 205      ( 20 Dec 2017 08:01 )             30.8     12-20    139  |  100  |  39<H>  ----------------------------<  161<H>  3.5   |  29  |  3.43<H>    Ca    8.0<L>      20 Dec 2017 08:01  Phos  4.1     12-19  Mg     2.2     12-19    TPro  6.5  /  Alb  2.6<L>  /  TBili  0.3  /  DBili  x   /  AST  15  /  ALT  15  /  AlkPhos  61  12-20        CAPILLARY BLOOD GLUCOSE      POCT Blood Glucose.: 175 mg/dL (20 Dec 2017 08:13)  POCT Blood Glucose.: 143 mg/dL (19 Dec 2017 21:32)  POCT Blood Glucose.: 107 mg/dL (19 Dec 2017 16:56)  POCT Blood Glucose.: 139 mg/dL (19 Dec 2017 12:06)      CARDIAC MARKERS ( 19 Dec 2017 07:29 )  .054 ng/mL / x     / x     / x     / x          Hemoglobin A1C, Whole Blood: 6.4 % (12-18 @ 09:48)  Procalcitonin, Serum: 1.16 ng/mL (12-17 @ 21:06)    Cholesterol, Serum: 164 mg/dL (12-18 @ 09:48)  HDL Cholesterol, Serum: 49 mg/dL (12-18 @ 09:48)  Triglycerides, Serum: 134 mg/dL (12-18 @ 09:48)        Culture - Urine (collected 18 Dec 2017 08:52)  Source: .Urine Catheterized  Final Report (19 Dec 2017 11:42):    No growth    Culture - Blood (collected 17 Dec 2017 11:20)  Source: .Blood Blood  Preliminary Report (18 Dec 2017 12:01):    No growth to date.    Culture - Blood (collected 17 Dec 2017 11:20)  Source: .Blood Blood  Preliminary Report (18 Dec 2017 12:01):    No growth to date.        RADIOLOGY & ADDITIONAL TESTS:    Imaging Personally Reviewed:  [ ] YES  [ ] NO    Consultant(s) Notes Reviewed:  [ ] YES  [ ] NO    Care Discussed with Consultants/Other Providers [ ] YES  [ ] NO    PROBLEMS:  HYPERTENSIVE EMERGENCY, COMMUNITY ACQUIRED PNEUMONIA  CHEST PAIN  Hypertensive emergency  Other chest pain  Hypertensive emergency  Glaucoma of both eyes, unspecified glaucoma type  Benign prostatic hyperplasia with lower urinary tract symptoms, symptom details unspecified  Essential hypertension  Diabetes mellitus  Obstructive uropathy  Hypertensive urgency  Acute renal failure superimposed on stage 4 chronic kidney disease, unspecified acute renal failure type  BPH (benign prostatic hyperplasia)  Renal stone  CAP (community acquired pneumonia)      Care discussed with family,         [  ]   yes  [  ]  No    imp:    stable[ ]    unstable[  ]     improving [   ]       unchanged  [  ]                Plans:  Continue present plans  [  ]               New consult [  ]   specialty  .......               order mariaa[  ]    test name.                  Discharge Planning  [  ]

## 2017-12-20 NOTE — CONSULT NOTE ADULT - SUBJECTIVE AND OBJECTIVE BOX
Chief Complaint:  Patient is a 77y old  Male who presents with a chief complaint of     HPI: 77M hx htn, dm pw chest pain, abd pain, weakness and vomiting. per children, patient has uri x2 weeks but this morning was much worse. patient notes that he feels very week and that the real discomfort started early in the morning. chest pain is primarily left sided. abd pain is bl sides. no diarrhea, no dysuria, no fever      PMH/PSH:PAST MEDICAL & SURGICAL HISTORY:      Allergies:  No Known Allergies      Medications:  heparin  Injectable 5000 Unit(s) SubCutaneous every 8 hours  hydrALAZINE Injectable 10 milliGRAM(s) IV Push Once  nitroglycerin  Infusion 10 MICROgram(s)/Min IV Continuous <Continuous>      Review of Systems:    General:  No weight loss, fevers, chills, night sweats, fatigue,   Eyes:  Good vision, no reported pain  ENT:  No sore throat, pain, runny nose, dysphagia  CV:  No pain, palpitations, hypo/hypertension  Resp:  No dyspnea, cough, tachypnea, wheezing  GI:  +abdominal pain, No nausea, + vomiting, No diarrhea, No constipation, No pruritis, No rectal bleeding, No tarry stools, No dysphagia,  :  No pain, bleeding, incontinence, nocturia  Muscle:  + weakness weakness  Breast:  No pain, abscess, mass, discharge  Neuro:  No weakness, tingling, memory problems  Psych:  No fatigue, insomnia, mood problems, depression  Endocrine:  No polyuria, polydipsia cold/heat intolerance  Heme:  No petechiae, ecchymosis, easy bruisability  Skin:  No rash, tattoos, scars, edema    Relevant Family History:   FAMILY HISTORY:      Relevant Social History:  Denies ETOH or tobacco history    Physical Exam:    Vital Signs:  Vital Signs Last 24 Hrs  T(C): 36.8 (17 Dec 2017 11:34), Max: 36.8 (17 Dec 2017 11:34)  T(F): 98.3 (17 Dec 2017 11:34), Max: 98.3 (17 Dec 2017 11:34)  HR: 80 (17 Dec 2017 11:34) (70 - 88)  BP: 194/106 (17 Dec 2017 11:34) (194/106 - 249/118)  BP(mean): --  RR: 19 (17 Dec 2017 11:34) (19 - 20)  SpO2: 95% (17 Dec 2017 11:34) (93% - 95%)  Daily     Daily     General:  Appears stated age, well-groomed, well-nourished, no distress  HEENT:  NC/AT,  conjunctivae clear and pink, no thyromegaly, nodules, adenopathy, no JVD  Chest:  Full & symmetric excursion, no increased effort, breath sounds clear  Cardiovascular:  Regular rhythm, S1, S2, no murmur/rub/S3/S4, no abdominal bruit, no edema  Abdomen:  Soft, non-tender, non-distended, normoactive bowel sounds,  no masses , no hepatosplenomegaly, no signs of chronic liver disease  Extremities:  no cyanosis, clubbing or edema  Skin:  No rash/erythema/ecchymoses/petechiae/wounds/abscess/warm/dry  Neuro/Psych:  Alert, oriented, no asterixis, no tremor, no encephalopathy    Laboratory:                          11.7   13.2  )-----------( 245      ( 17 Dec 2017 07:46 )             38.8         142  |  105  |  32<H>  ----------------------------<  175<H>  3.7   |  28  |  3.03<H>    Ca    8.6      17 Dec 2017 07:46  Mg     2.3         TPro  8.1  /  Alb  3.4  /  TBili  0.4  /  DBili  x   /  AST  35  /  ALT  23  /  AlkPhos  85      LIVER FUNCTIONS - ( 17 Dec 2017 07:46 )  Alb: 3.4 g/dL / Pro: 8.1 gm/dL / ALK PHOS: 85 U/L / ALT: 23 U/L / AST: 35 U/L / GGT: x           PT/INR - ( 17 Dec 2017 07:46 )   PT: 11.6 sec;   INR: 1.06 ratio         PTT - ( 17 Dec 2017 07:46 )  PTT:27.7 sec  Urinalysis Basic - ( 17 Dec 2017 09:46 )    Color: Yellow / Appearance: Clear / S.010 / pH: x  Gluc: x / Ketone: Negative  / Bili: Negative / Urobili: Negative mg/dL   Blood: x / Protein: 100 mg/dL / Nitrite: Negative   Leuk Esterase: Negative / RBC: 6-10 /HPF / WBC 0-2   Sq Epi: x / Non Sq Epi: Occasional / Bacteria: Occasional          Intake and Output      Imaging:    < from: CT Abdomen and Pelvis No Cont (17 @ 09:28) >    EXAM:  CT ABDOMEN AND PELVIS                          EXAM:  CT CHEST                            PROCEDURE DATE:  2017          INTERPRETATION:  CLINICAL INFORMATION: Pneumonia and abdominal pain    COMPARISON: None    PROCEDURE:     CT of the chest, abdomen and pelvis without oral or intravenous contrast.   Coronal and sagittal reformats were performed.  FINDINGS:    LUNG AND LARGE AIRWAYS: The tracheobronchial tree is patent. There are   patchy opacities in the right upper lobe as well as faint dependent   patchy opacities in the left upper lobe and lung bases. Normal limits.  PLEURA: Within normal limits.  VESSELS: Within normal limits.  HEART: Upper limits of normal in size. No pericardial effusion.  MEDIASTINUM AND SAUMYA: A few mildly prominent mediastinal nodes, likely   reactive.  CHEST WALL AND LOWER NECK: There is a lipoma within the right scapular   region.    LIVER: Within normal limits.  BILE DUCTS: Normal caliber.  GALLBLADDER: Within normal limits.  SPLEEN: Within normal limits.  PANCREAS: Within normal limits.  ADRENALS: Within normal limits.  KIDNEYS/URETERS: Mild right hydroureteronephrosis due to a 2 mm calculus   within the right UVJ. There is surrounding right perinephric and   periureteral fat stranding. There are bilateral renal cysts as well as   additional subcentimeter hypodensities, too small to characterize.    BLADDER: Mild urinary bladder wall thickening.  REPRODUCTIVE ORGANS: Prostate gland is enlarged.    BOWEL: No bowel obstruction. Appendix is normal.  PERITONEUM: No ascites.  VESSELS:  Moderate atherosclerotic changes of the aorta and branching   vessels.  RETROPERITONEUM: No lymphadenopathy.    ABDOMINAL WALL: There is a small fat-containing umbilical hernia.  BONES: Within normal limits.    IMPRESSION:     Right upper lobe pneumonia.    Mild right hydroureteronephrosis and large perinephric and periureteral   stranding due to a 2 mm calculus within the distal right ureter.    Markedly enlarged prostate gland. Mild urinary bladder wall thickening   may be due to chronic bladder outlet obstruction. Correlate with   urinalysis.    Additional findings as above.
HPI:  77M hx htn, dm pw chest pain, abd pain, weakness and vomiting. per children, patient has uri x2 weeks but this morning was much worse. patient notes that he feels very week and that the real discomfort started early in the morning. chest pain is primarily left sided. abd pain is bl sides. no diarrhea, no dysuria, no fever.  In ER patient had elevated bp, hypertensive emergency diagnosed 2/2 elevated trop with pressures in the 240s. started nitro gtt.and sent to CT confirms pna  ICU called for further management (17 Dec 2017 15:28)      PAST MEDICAL & SURGICAL HISTORY:  HTN (hypertension)  BPH (Benign Prostatic Hypertrophy)  Glaucoma (Increased Eye Pressure)  HTN - Hypertension  No significant past surgical history  Laser Surgery :Right: ?  regarding procedure ;  ; secondary to glaucoma  Laser Surgery Left: ? regarding procedure ; secondary to glaucoma   Excision of Sinus Polyp:       REVIEW OF SYSTEMS:    CONSTITUTIONAL: No fever, weight loss, or fatigue  EYES: No eye pain, visual disturbances, or discharge  ENMT:  No difficulty hearing, tinnitus, vertigo; No sinus or throat pain  NECK: No pain or stiffness  BREASTS: No pain, masses, or nipple discharge  RESPIRATORY: No cough, wheezing, chills or hemoptysis; No shortness of breath  CARDIOVASCULAR: No chest pain, palpitations, dizziness, or leg swelling  GASTROINTESTINAL: No abdominal or epigastric pain. No nausea, vomiting, or hematemesis; No diarrhea or constipation. No melena or hematochezia.  GENITOURINARY: No dysuria, frequency, hematuria, or incontinence  NEUROLOGICAL: No headaches, memory loss, loss of strength, numbness, or tremors  SKIN: No itching, burning, rashes, or lesions   LYMPH NODES: No enlarged glands  ENDOCRINE: No heat or cold intolerance; No hair loss  MUSCULOSKELETAL: No joint pain or swelling; No muscle, back, or extremity pain  PSYCHIATRIC: No depression, anxiety, mood swings, or difficulty sleeping  HEME/LYMPH: No easy bruising, or bleeding gums  ALLERGY AND IMMUNOLOGIC: No hives or eczema      MEDICATIONS  (STANDING):  ALBUTerol    90 MICROgram(s) HFA Inhaler 1 Puff(s) Inhalation every 4 hours  ALBUTerol/ipratropium for Nebulization 3 milliLiter(s) Nebulizer every 6 hours  amLODIPine   Tablet 10 milliGRAM(s) Oral daily  azithromycin  IVPB 500 milliGRAM(s) IV Intermittent every 24 hours  cefTRIAXone   IVPB 1 Gram(s) IV Intermittent every 24 hours  chlorhexidine 4% Liquid 1 Application(s) Topical daily  dextrose 5%. 1000 milliLiter(s) (50 mL/Hr) IV Continuous <Continuous>  dextrose 50% Injectable 12.5 Gram(s) IV Push once  dextrose 50% Injectable 25 Gram(s) IV Push once  dextrose 50% Injectable 25 Gram(s) IV Push once  heparin  Injectable 5000 Unit(s) SubCutaneous every 8 hours  hydrALAZINE 10 milliGRAM(s) Oral every 6 hours  influenza   Vaccine 0.5 milliLiter(s) IntraMuscular once  insulin lispro (HumaLOG) corrective regimen sliding scale   SubCutaneous three times a day before meals  insulin lispro (HumaLOG) corrective regimen sliding scale   SubCutaneous at bedtime  labetalol 100 milliGRAM(s) Oral two times a day  tiotropium 18 MICROgram(s) Capsule 1 Capsule(s) Inhalation daily    MEDICATIONS  (PRN):  acetaminophen   Tablet. 650 milliGRAM(s) Oral every 6 hours PRN Mild Pain (1 - 3)  dextrose Gel 1 Dose(s) Oral once PRN Blood Glucose LESS THAN 70 milliGRAM(s)/deciliter  glucagon  Injectable 1 milliGRAM(s) IntraMuscular once PRN Glucose LESS THAN 70 milligrams/deciliter      Allergies    grass, pollen (Rhinitis)  No Known Drug Allergies    Intolerances        SOCIAL HISTORY:    FAMILY HISTORY:  No pertinent family history in first degree relatives      Vital Signs Last 24 Hrs  T(C): 37.1 (18 Dec 2017 12:00), Max: 37.4 (18 Dec 2017 06:25)  T(F): 98.8 (18 Dec 2017 12:00), Max: 99.3 (18 Dec 2017 06:25)  HR: 92 (18 Dec 2017 14:00) (75 - 93)  BP: 165/66 (18 Dec 2017 14:00) (129/68 - 183/91)  BP(mean): 86 (18 Dec 2017 14:00) (84 - 122)  RR: 18 (18 Dec 2017 14:00) (12 - 20)  SpO2: 100% (18 Dec 2017 14:00) (95% - 100%)    PHYSICAL EXAM:    GENERAL: NAD, well-groomed, well-developed  HEAD:  Atraumatic, Normocephalic  EYES: EOMI, PERRL, conjunctiva and sclera clear  ENMT: No tonsillar erythema, exudates, or enlargement; Moist mucous membranes, Good dentition, No lesions  NECK: Supple, No JVD, Normal thyroid  NERVOUS SYSTEM:  Alert & Oriented X3, Good concentration; Motor Strength 5/5 B/L upper and lower extremities; DTRs 2+ intact and symmetric  CHEST/LUNG: Clear to percussion bilaterally; No rales, rhonchi, wheezing, or rubs  HEART: Regular rate and rhythm; No murmurs, rubs, or gallops  ABDOMEN: Soft, Nontender, Nondistended; Bowel sounds present  EXTREMITIES:  2+ Peripheral Pulses, No clubbing, cyanosis, or edema  LYMPH: No lymphadenopathy notedRECTAL: No masses, prostate normal size and smooth, Guiac negative   BREAST: No palpatble masses, skin no lesions no retractions, no discharages. adenexa no palpable masses noted   GYN: uterus normal size, adenexa no palpable masses, no CMT, no uterine discharge   SKIN: No rashes or lesions      LABS:                        11.0   9.5   )-----------( 237      ( 18 Dec 2017 03:59 )             36.1     12-    142  |  103  |  35<H>  ----------------------------<  115<H>  3.8   |  30  |  3.22<H>    Ca    8.2<L>      18 Dec 2017 03:59  Phos  4.9       Mg     2.3         TPro  8.1  /  Alb  3.4  /  TBili  0.4  /  DBili  x   /  AST  35  /  ALT  23  /  AlkPhos  85  12-17    PT/INR - ( 17 Dec 2017 07:46 )   PT: 11.6 sec;   INR: 1.06 ratio         PTT - ( 17 Dec 2017 07:46 )  PTT:27.7 sec  Urinalysis Basic - ( 17 Dec 2017 21:34 )    Color:  / Appearance: Slightly Turbid / S.025 / pH: x  Gluc: x / Ketone: Trace  / Bili: Negative / Urobili: Negative mg/dL   Blood: x / Protein: 500 mg/dL / Nitrite: Positive   Leuk Esterase: Moderate / RBC: >50 /HPF / WBC 6-10   Sq Epi: x / Non Sq Epi: Few / Bacteria: Many        Culture - Blood (collected 17 Dec 2017 11:20)  Source: .Blood Blood  Preliminary Report (18 Dec 2017 12:01):    No growth to date.    Culture - Blood (collected 17 Dec 2017 11:20)  Source: .Blood Blood  Preliminary Report (18 Dec 2017 12:01):    No growth to date.      RADIOLOGY & ADDITIONAL STUDIES:
Information from chart:  "Patient is a 77y old  Male who presents with a chief complaint of The patient is a 77y Male complaining of chest pain. (17 Dec 2017 15:28)    HPI:  77M hx htn, dm pw chest pain, abd pain, weakness and vomiting. per children, patient has uri x2 weeks but this morning was much worse. patient notes that he feels very week and that the real discomfort started early in the morning. chest pain is primarily left sided. abd pain is bl sides. no diarrhea, no dysuria, no fever.  In ER patient had elevated bp, hypertensive emergency diagnosed 2/2 elevated trop with pressures in the 240s. started nitro gtt.and sent to CT confirms pna  ICU called for further management (17 Dec 2017 15:28)   "  Patient with ICU course; MAP improved;   No hx of renal disease ; DM 5 years denies retinopathy; HTN 10 years; poor historian; Cr 1.7 in .    PAST MEDICAL & SURGICAL HISTORY:  HTN (hypertension)  BPH (Benign Prostatic Hypertrophy)  Glaucoma (Increased Eye Pressure)  HTN - Hypertension  No significant past surgical history  Laser Surgery :Right: ?  regarding procedure ;  ; secondary to glaucoma  Laser Surgery Left: ? regarding procedure ; secondary to glaucoma   Excision of Sinus Polyp:     FAMILY HISTORY:  No pertinent family history in first degree relatives    Allergies    grass, pollen (Rhinitis)  No Known Drug Allergies    Intolerances      Home Medications:  Aspir 81 oral delayed release tablet:  (17 Dec 2017 17:36)  metFORMIN:  (17 Dec 2017 17:36)  Tribenzor:  (17 Dec 2017 17:36)    MEDICATIONS  (STANDING):  ALBUTerol    90 MICROgram(s) HFA Inhaler 1 Puff(s) Inhalation every 4 hours  ALBUTerol/ipratropium for Nebulization 3 milliLiter(s) Nebulizer every 6 hours  amLODIPine   Tablet 10 milliGRAM(s) Oral daily  azithromycin  IVPB 500 milliGRAM(s) IV Intermittent every 24 hours  cefTRIAXone   IVPB 1 Gram(s) IV Intermittent every 24 hours  chlorhexidine 4% Liquid 1 Application(s) Topical daily  dextrose 5%. 1000 milliLiter(s) (50 mL/Hr) IV Continuous <Continuous>  dextrose 50% Injectable 12.5 Gram(s) IV Push once  dextrose 50% Injectable 25 Gram(s) IV Push once  dextrose 50% Injectable 25 Gram(s) IV Push once  heparin  Injectable 5000 Unit(s) SubCutaneous every 8 hours  hydrALAZINE 10 milliGRAM(s) Oral every 6 hours  influenza   Vaccine 0.5 milliLiter(s) IntraMuscular once  insulin lispro (HumaLOG) corrective regimen sliding scale   SubCutaneous three times a day before meals  insulin lispro (HumaLOG) corrective regimen sliding scale   SubCutaneous at bedtime  labetalol 100 milliGRAM(s) Oral two times a day  tamsulosin 0.4 milliGRAM(s) Oral at bedtime  tiotropium 18 MICROgram(s) Capsule 1 Capsule(s) Inhalation daily    MEDICATIONS  (PRN):  acetaminophen   Tablet. 650 milliGRAM(s) Oral every 6 hours PRN Mild Pain (1 - 3)  dextrose Gel 1 Dose(s) Oral once PRN Blood Glucose LESS THAN 70 milliGRAM(s)/deciliter  glucagon  Injectable 1 milliGRAM(s) IntraMuscular once PRN Glucose LESS THAN 70 milligrams/deciliter    Vital Signs Last 24 Hrs  T(C): 36.1 (20 Dec 2017 12:00), Max: 37.2 (19 Dec 2017 17:14)  T(F): 97 (20 Dec 2017 12:00), Max: 99 (19 Dec 2017 17:14)  HR: 77 (20 Dec 2017 12:00) (70 - 86)  BP: 135/71 (20 Dec 2017 12:00) (135/71 - 154/84)  BP(mean): --  RR: 17 (20 Dec 2017 12:00) (16 - 18)  SpO2: 98% (20 Dec 2017 12:00) (92% - 98%)    Daily     Daily Weight in k.6 (20 Dec 2017 05:14)  CAPILLARY BLOOD GLUCOSE      POCT Blood Glucose.: 140 mg/dL (20 Dec 2017 11:16)  POCT Blood Glucose.: 175 mg/dL (20 Dec 2017 08:13)  POCT Blood Glucose.: 143 mg/dL (19 Dec 2017 21:32)  POCT Blood Glucose.: 107 mg/dL (19 Dec 2017 16:56)    PHYSICAL EXAM:      T(C): 36.1 (17 @ 12:00), Max: 37.2 (17 @ 17:14)  HR: 77 (17 @ 12:00) (70 - 86)  BP: 135/71 (17 @ 12:00) (135/71 - 154/84)  RR: 17 (12-20-17 @ 12:00) (16 - 18)  SpO2: 98% (17 @ 12:00) (92% - 98%)  Wt(kg): --  Respiratory: clear anteriorly, decreased BS at bases  Cardiovascular: S1 S2  Gastrointestinal: soft NT ND +BS  Extremities: 1 edema      < from: US Renal (.17 @ 13:56) >  FINDINGS:    Right kidney:  10.5 x 4.3 x 4.3 cm. No  hydronephrosis or calculi.   Multiple cysts are seen in the right kidney, the largest of which in the   interpolar region measures 2.6 x 2.7 cm.    Left kidney:  10.1x 4.7 x 4.2 cm. No hydronephrosis or calculi. Multiple   cysts are seen in the left kidney, the largest of which in the upper pole   measures 3.3 x 3.7 cm.    Urinary bladder: Empty with a Aguilar catheter in place.    IMPRESSION: Bilateral renal cysts.      < end of copied text >              139  |  100  |  39<H>  ----------------------------<  161<H>  3.5   |  29  |  3.43<H>    Ca    8.0<L>      20 Dec 2017 08:01  Phos  4.1       Mg     2.2     -    TPro  6.5  /  Alb  2.6<L>  /  TBili  0.3  /  DBili  x   /  AST  15  /  ALT  15  /  AlkPhos  61  -                          9.4    7.4   )-----------( 205      ( 20 Dec 2017 08:01 )             30.8         Assessment and Plan    VIRGEN, suspected CKD with risk factors; HTN crisis, injury;   Slow titration of BP medications, readings much improved.  Will follow and add ACE/ARB once indices stabilize.
HPI:  77M hx htn, dm pw chest pain, abd pain, weakness and vomiting. per children, patient has uri x2 weeks but this morning was much worse. patient notes that he feels very week and that the real discomfort started early in the morning. chest pain is primarily left sided. abd pain is bl sides. no diarrhea, no dysuria, no fever.  In ER patient had elevated bp, hypertensive emergency diagnosed 2/2 elevated trop with pressures in the 240s. started nitro gtt.and sent to CT confirms pna  ICU called for further management (17 Dec 2017 15:28)      PAST MEDICAL & SURGICAL HISTORY:  HTN (hypertension)  BPH (Benign Prostatic Hypertrophy)  Glaucoma (Increased Eye Pressure)  HTN - Hypertension  No significant past surgical history  Laser Surgery :Right: ?  regarding procedure ;  ; secondary to glaucoma  Laser Surgery Left: ? regarding procedure ; secondary to glaucoma   Excision of Sinus Polyp:       SOCHX:  ex  tobacco,  quit  but only 3-5 cig per day  -no  alcohol  travel to [place] in may   FMHX: FA/MO  non- contributory   retired lives with wife 2 kids 17 and 23    Recent Travel:    Immunizations:    Allergies    Drug Allergies Not Recorded  grass, pollen (Rhinitis)    Intolerances        MEDICATIONS  (STANDING):  amLODIPine   Tablet 10 milliGRAM(s) Oral daily  chlorhexidine 4% Liquid 1 Application(s) Topical daily  dextrose 5%. 1000 milliLiter(s) (50 mL/Hr) IV Continuous <Continuous>  dextrose 50% Injectable 12.5 Gram(s) IV Push once  dextrose 50% Injectable 25 Gram(s) IV Push once  dextrose 50% Injectable 25 Gram(s) IV Push once  heparin  Injectable 5000 Unit(s) SubCutaneous every 8 hours  hydrALAZINE 10 milliGRAM(s) Oral every 6 hours  influenza   Vaccine 0.5 milliLiter(s) IntraMuscular once  insulin lispro (HumaLOG) corrective regimen sliding scale   SubCutaneous three times a day before meals  insulin lispro (HumaLOG) corrective regimen sliding scale   SubCutaneous at bedtime  labetalol 100 milliGRAM(s) Oral two times a day    MEDICATIONS  (PRN):  acetaminophen   Tablet. 650 milliGRAM(s) Oral every 6 hours PRN Mild Pain (1 - 3)  dextrose Gel 1 Dose(s) Oral once PRN Blood Glucose LESS THAN 70 milliGRAM(s)/deciliter  glucagon  Injectable 1 milliGRAM(s) IntraMuscular once PRN Glucose LESS THAN 70 milligrams/deciliter      REVIEW OF SYSTEMS:  CONSTITUTIONAL: No fever, weight loss, or fatigue  EYES: No eye pain, visual disturbances, or discharge  ENMT:  No difficulty hearing, tinnitus, vertigo; No sinus or throat pain  NECK: No pain or stiffness  RESPIRATORY: plus  cough, wheezing,   no chills or hemoptysis; plus  shortness of breath  CARDIOVASCULAR: No chest pain, palpitations, dizziness, or leg swelling  GASTROINTESTINAL: No abdominal or epigastric pain. No nausea, vomiting, or hematemesis; No diarrhea or constipation. No melena or hematochezia.  GENITOURINARY: No dysuria, frequency, hematuria, or incontinence  NEUROLOGICAL: No headaches, memory loss, loss of strength, numbness, or tremors  SKIN: No itching, burning, rashes, or lesions   LYMPH NODES: No enlarged glands  ENDOCRINE: No heat or cold intolerance; No hair loss  MUSCULOSKELETAL: No joint pain or swelling; No muscle, back, or extremity pain  PSYCHIATRIC: No depression, anxiety, mood swings, or difficulty sleeping  HEME/LYMPH: No easy bruising, or bleeding gums  ALLERGY AND IMMUNOLOGIC: No hives or eczema    VITAL SIGNS:    T(C): 37.1 (17 @ 15:07), Max: 37.1 (17 @ 15:07)  T(F): 98.8 (17 @ 15:07), Max: 98.8 (17 @ 15:07)  HR: 75 (17 @ 20:00) (70 - 97)  BP: 144/70 (17 @ 20:00) (144/70 - 249/118)  RR: 16 (17 @ 20:00) (14 - 20)  SpO2: 99% (17 @ 20:00) (93% - 100%)    PHYSICAL EXAM:    GENERAL: NAD, well-groomed, well-developed; obese  HEAD:  Atraumatic, Normocephalic  EYES: EOMI, PERRLA, conjunctiva and sclera clear  ENMT: Moist mucous membranes,   NECK: Supple, No JVD, Normal thyroid  NERVOUS SYSTEM:  Alert & Oriented X3, Good concentration; Motor Strength 5/5 B/L upper and lower extremities; DTRs 2+ intact and symmetric  CHEST/LUNG: bilateral rhonchi wheezing   poor air entry today  auscultation bilaterally;   HEART: Regular rate and rhythm; No murmurs, rubs, or gallops  ABDOMEN: Soft, Nontender, Nondistended; Bowel sounds present  obese   EXTREMITIES:  2+ Peripheral Pulses, No clubbing, cyanosis, or edema  left foot sl decreased pulses   LYMPH: No lymphadenopathy noted  SKIN: No rashes or lesions  BACK: no pressor sore     LABS:                         11.7   13.2  )-----------( 245      ( 17 Dec 2017 07:46 )             38.8         144  |  104  |  34<H>  ----------------------------<  133<H>  4.3   |  32<H>  |  3.01<H>    Ca    8.3<L>      17 Dec 2017 16:20  Phos  4.1       Mg     2.4         TPro  8.1  /  Alb  3.4  /  TBili  0.4  /  DBili  x   /  AST  35  /  ALT  23  /  AlkPhos  85      LIVER FUNCTIONS - ( 17 Dec 2017 07:46 )  Alb: 3.4 g/dL / Pro: 8.1 gm/dL / ALK PHOS: 85 U/L / ALT: 23 U/L / AST: 35 U/L / GGT: x           PT/INR - ( 17 Dec 2017 07:46 )   PT: 11.6 sec;   INR: 1.06 ratio         PTT - ( 17 Dec 2017 07:46 )  PTT:27.7 sec  Urinalysis Basic - ( 17 Dec 2017 09:46 )    Color: Yellow / Appearance: Clear / S.010 / pH: x  Gluc: x / Ketone: Negative  / Bili: Negative / Urobili: Negative mg/dL   Blood: x / Protein: 100 mg/dL / Nitrite: Negative   Leuk Esterase: Negative / RBC: 6-10 /HPF / WBC 0-2   Sq Epi: x / Non Sq Epi: Occasional / Bacteria: Occasional        CARDIAC MARKERS ( 17 Dec 2017 07:46 )  .049 ng/mL / x     / x     / x     / 2.8 ng/mL                                  Radiology:    < from: CT Abdomen and Pelvis No Cont (17 @ 09:28) >  IMPRESSION:     Right upper lobe pneumonia.    Mild right hydroureteronephrosis and large perinephric and periureteral   stranding due to a 2 mm calculus within the distal right ureter.    Markedly enlarged prostate gland. Mild urinary bladder wall thickening   may be due to chronic bladder outlet obstruction. Correlate with   urinalysis.    Additional findings as above.      < end of copied text >

## 2017-12-20 NOTE — PROGRESS NOTE ADULT - SUBJECTIVE AND OBJECTIVE BOX
77M hx htn, dm pw chest pain, abd pain, weakness and vomiting. per children, patient has uri x2 weeks but this morning was much worse. patient notes that he feels very week and that the real discomfort started early in the morning. chest pain is primarily left sided. abd pain is bl sides. no diarrhea, no dysuria, no fever.  In ER patient had elevated bp, hypertensive emergency diagnosed 2/2 elevated trop with pressures in the 240s. started nitro gtt.and sent to CT confirms pna  ICU called for further management (17 Dec 2017 15:28)  rsv panel with chlamydia trachomatis pneumonia  much better   Allergies    grass, pollen (Rhinitis)  No Known Drug Allergies    Intolerances        MEDICATIONS  (STANDING):  ALBUTerol    90 MICROgram(s) HFA Inhaler 1 Puff(s) Inhalation every 4 hours  ALBUTerol/ipratropium for Nebulization 3 milliLiter(s) Nebulizer every 6 hours  amLODIPine   Tablet 10 milliGRAM(s) Oral daily  azithromycin  IVPB 500 milliGRAM(s) IV Intermittent every 24 hours  cefTRIAXone   IVPB 1 Gram(s) IV Intermittent every 24 hours  chlorhexidine 4% Liquid 1 Application(s) Topical daily  dextrose 5%. 1000 milliLiter(s) (50 mL/Hr) IV Continuous <Continuous>  dextrose 50% Injectable 12.5 Gram(s) IV Push once  dextrose 50% Injectable 25 Gram(s) IV Push once  dextrose 50% Injectable 25 Gram(s) IV Push once  heparin  Injectable 5000 Unit(s) SubCutaneous every 8 hours  hydrALAZINE 10 milliGRAM(s) Oral every 6 hours  influenza   Vaccine 0.5 milliLiter(s) IntraMuscular once  insulin lispro (HumaLOG) corrective regimen sliding scale   SubCutaneous three times a day before meals  insulin lispro (HumaLOG) corrective regimen sliding scale   SubCutaneous at bedtime  labetalol 100 milliGRAM(s) Oral two times a day  tamsulosin 0.4 milliGRAM(s) Oral at bedtime  tiotropium 18 MICROgram(s) Capsule 1 Capsule(s) Inhalation daily    MEDICATIONS  (PRN):  acetaminophen   Tablet. 650 milliGRAM(s) Oral every 6 hours PRN Mild Pain (1 - 3)  dextrose Gel 1 Dose(s) Oral once PRN Blood Glucose LESS THAN 70 milliGRAM(s)/deciliter  glucagon  Injectable 1 milliGRAM(s) IntraMuscular once PRN Glucose LESS THAN 70 milligrams/deciliter      REVIEW OF SYSTEMS:  decreased cough   no short of breath   clinically better   VITAL SIGNS:  T(C): 36.1 (12-20-17 @ 12:00), Max: 37.2 (12-19-17 @ 17:14)  T(F): 97 (12-20-17 @ 12:00), Max: 99 (12-19-17 @ 17:14)  HR: 77 (12-20-17 @ 12:00) (70 - 86)  BP: 135/71 (12-20-17 @ 12:00) (135/71 - 154/84)  RR: 17 (12-20-17 @ 12:00) (16 - 18)  SpO2: 98% (12-20-17 @ 12:00) (92% - 98%)  Wt(kg): --    PHYSICAL EXAM:    GENERAL: not in any distress  HEENT: Neck is supple, normocephalic, atraumatic   CHEST/LUNG: Clear to auscultation bilaterally; occ , rhonchi,  HEART: Regular rate and rhythm; No murmurs, rubs, or gallops  ABDOMEN: Soft, Nontender, Nondistended; Bowel sounds present, no rebound   EXTREMITIES:  2+ Peripheral Pulses, No clubbing, cyanosis, or edema  GENITOURINARY: NEGATIVE   SKIN: No rashes or lesions  BACK: no pressor sore   NERVOUS SYSTEM:  Alert & Oriented X3, Good concentration  PSYCH: normal affect     LABS:                         9.4    7.4   )-----------( 205      ( 20 Dec 2017 08:01 )             30.8     12-20    139  |  100  |  39<H>  ----------------------------<  161<H>  3.5   |  29  |  3.43<H>    Ca    8.0<L>      20 Dec 2017 08:01  Phos  4.1     12-19  Mg     2.2     12-19    TPro  6.5  /  Alb  2.6<L>  /  TBili  0.3  /  DBili  x   /  AST  15  /  ALT  15  /  AlkPhos  61  12-20    LIVER FUNCTIONS - ( 20 Dec 2017 08:01 )  Alb: 2.6 g/dL / Pro: 6.5 gm/dL / ALK PHOS: 61 U/L / ALT: 15 U/L / AST: 15 U/L / GGT: x                 CARDIAC MARKERS ( 19 Dec 2017 07:29 )  .054 ng/mL / x     / x     / x     / x                        Culture Results:   No growth (12-19 @ 10:01)  Culture Results:   No growth (12-18 @ 08:52)  Culture Results:   No growth to date. (12-17 @ 11:20)  Culture Results:   No growth to date. (12-17 @ 11:20)          Legionella Antigen, Urine: Negative (12-18 @ 08:59)        Radiology:    < from: Xray Chest 1 View AP/PA. (12.17.17 @ 08:44) >  MPRESSION: Bilateral patchy airspace opacities which may represent   pneumonia or pulmonary edema                EVELIA CAMACHO M.D., ATTENDING RADIOLOGIST  This document has been electronically signed. Dec 17 2017 10:42AM                < end of copied text >

## 2017-12-20 NOTE — PROGRESS NOTE ADULT - ASSESSMENT
community acquired pneumonia   chlamydia pneumonia pneumonia   on zithromax which is the drug of choice any how   discharge plan   was in icu with hypertensive emergency   renal consult noted

## 2017-12-20 NOTE — PROGRESS NOTE ADULT - SUBJECTIVE AND OBJECTIVE BOX
Patient seen and examined bedside resting comfortably. No complaints offered. Admits to some hesitancy when urinating. Voiding spontaneously without difficulty. Denies hematuria and dysuria. Denies nausea and vomiting. Tolerating diet. Denies dyspnea.    T(F): 97.8 (12-20-17 @ 05:14), Max: 99 (12-19-17 @ 11:10)  HR: 70 (12-20-17 @ 06:24) (70 - 95)  BP: 154/84 (12-20-17 @ 05:14) (133/80 - 154/84)  RR: 18 (12-20-17 @ 05:14) (16 - 20)  SpO2: 95% (12-20-17 @ 06:24) (92% - 98%)  POCT Blood Glucose.: 175 mg/dL (20 Dec 2017 08:13)  POCT Blood Glucose.: 143 mg/dL (19 Dec 2017 21:32)  POCT Blood Glucose.: 107 mg/dL (19 Dec 2017 16:56)  POCT Blood Glucose.: 139 mg/dL (19 Dec 2017 12:06)    PHYSICAL EXAM:  General: NAD, WDWN, alert  HEENT: NCAT, EOMI, conjunctiva clear  CV: +S1S2 regular rate and rhythm  Lung: rhonchi bilaterally, respirations nonlabored, good inspiratory effort  Abdomen: Moderated distention, nontender  : voiding, yellow urine noted in urinal, No CVA tenderness. PVR: 27    LABS:                        9.4    7.4   )-----------( 205      ( 20 Dec 2017 08:01 )             30.8     12-20    139  |  100  |  39<H>  ----------------------------<  161<H>  3.5   |  29  |  3.43<H>    Ca    8.0<L>      20 Dec 2017 08:01  Phos  4.1     12-19  Mg     2.2     12-19    TPro  6.5  /  Alb  2.6<L>  /  TBili  0.3  /  DBili  x   /  AST  15  /  ALT  15  /  AlkPhos  61  12-20    I&O's Detail    19 Dec 2017 07:01  -  20 Dec 2017 07:00  --------------------------------------------------------  IN:    Solution: 250 mL    Solution: 50 mL  Total IN: 300 mL    OUT:    Voided: 600 mL  Total OUT: 600 mL    Total NET: -300 mL    Impression: 77y Male admitted with PMH HTN, BPH, Glaucoma admitted with Chlamydia PNA, hypertensive urgency and found to have right hydroureteronephrosis secondary to UVJ renal stone and enlarged Prostate, acute on chronic renal failure    Plan:  -voiding, no  intervention planned as patient is now pain free and without fever, possibly passed stone  -continue Flomax  -continue current medical management  -continue antibiotics per ID  -discussed with Dr Vásquez

## 2017-12-20 NOTE — PROGRESS NOTE ADULT - ASSESSMENT
77M hx htn, dm pw chest pain, abd pain, weakness and vomiting. per children, patient has uri x2 weeks but this morning was much worse. patient notes that he feels very week and that the real discomfort started early in the morning. chest pain is primarily left sided. abd pain is bl sides. no diarrhea, no dysuria, no fever.  In ER patient had elevated bp, hypertensive emergency diagnosed 2/2 elevated trop with pressures in the 240s. started nitro gtt.and sent to CT confirm pneumonia. Admitted to CCU for further management. ID consult noted. Started on broad spectrum antibiotics. Pt. alert and awake at present.  12/20/18 ; Pt alert ,awake. Asymptomatic. Elevated BUN and Creatinine. On Azithromycin for pneumonia. BPH with LUTS. Urology f/u noted, TOV.

## 2017-12-21 LAB
ANION GAP SERPL CALC-SCNC: 8 MMOL/L — SIGNIFICANT CHANGE UP (ref 5–17)
BUN SERPL-MCNC: 43 MG/DL — HIGH (ref 7–23)
CALCIUM SERPL-MCNC: 8.3 MG/DL — LOW (ref 8.5–10.1)
CHLORIDE SERPL-SCNC: 102 MMOL/L — SIGNIFICANT CHANGE UP (ref 96–108)
CO2 SERPL-SCNC: 30 MMOL/L — SIGNIFICANT CHANGE UP (ref 22–31)
CREAT SERPL-MCNC: 3.21 MG/DL — HIGH (ref 0.5–1.3)
GLUCOSE BLDC GLUCOMTR-MCNC: 100 MG/DL — HIGH (ref 70–99)
GLUCOSE BLDC GLUCOMTR-MCNC: 110 MG/DL — HIGH (ref 70–99)
GLUCOSE BLDC GLUCOMTR-MCNC: 115 MG/DL — HIGH (ref 70–99)
GLUCOSE BLDC GLUCOMTR-MCNC: 143 MG/DL — HIGH (ref 70–99)
GLUCOSE SERPL-MCNC: 153 MG/DL — HIGH (ref 70–99)
HCT VFR BLD CALC: 32.3 % — LOW (ref 39–50)
HGB BLD-MCNC: 9.8 G/DL — LOW (ref 13–17)
MCHC RBC-ENTMCNC: 24.2 PG — LOW (ref 27–34)
MCHC RBC-ENTMCNC: 30.2 GM/DL — LOW (ref 32–36)
MCV RBC AUTO: 80.2 FL — SIGNIFICANT CHANGE UP (ref 80–100)
PLATELET # BLD AUTO: 218 K/UL — SIGNIFICANT CHANGE UP (ref 150–400)
POTASSIUM SERPL-MCNC: 3.6 MMOL/L — SIGNIFICANT CHANGE UP (ref 3.5–5.3)
POTASSIUM SERPL-SCNC: 3.6 MMOL/L — SIGNIFICANT CHANGE UP (ref 3.5–5.3)
RBC # BLD: 4.03 M/UL — LOW (ref 4.2–5.8)
RBC # FLD: 15.1 % — HIGH (ref 11–15)
SODIUM SERPL-SCNC: 140 MMOL/L — SIGNIFICANT CHANGE UP (ref 135–145)
WBC # BLD: 6.5 K/UL — SIGNIFICANT CHANGE UP (ref 3.8–10.5)
WBC # FLD AUTO: 6.5 K/UL — SIGNIFICANT CHANGE UP (ref 3.8–10.5)

## 2017-12-21 RX ADMIN — Medication 10 MILLIGRAM(S): at 17:40

## 2017-12-21 RX ADMIN — Medication 10 MILLIGRAM(S): at 02:52

## 2017-12-21 RX ADMIN — Medication 10 MILLIGRAM(S): at 08:32

## 2017-12-21 RX ADMIN — HEPARIN SODIUM 5000 UNIT(S): 5000 INJECTION INTRAVENOUS; SUBCUTANEOUS at 22:08

## 2017-12-21 RX ADMIN — Medication 3 MILLILITER(S): at 05:20

## 2017-12-21 RX ADMIN — AMLODIPINE BESYLATE 10 MILLIGRAM(S): 2.5 TABLET ORAL at 06:27

## 2017-12-21 RX ADMIN — HEPARIN SODIUM 5000 UNIT(S): 5000 INJECTION INTRAVENOUS; SUBCUTANEOUS at 06:27

## 2017-12-21 RX ADMIN — Medication 3 MILLILITER(S): at 23:29

## 2017-12-21 RX ADMIN — AZITHROMYCIN 255 MILLIGRAM(S): 500 TABLET, FILM COATED ORAL at 05:27

## 2017-12-21 RX ADMIN — TAMSULOSIN HYDROCHLORIDE 0.4 MILLIGRAM(S): 0.4 CAPSULE ORAL at 22:08

## 2017-12-21 RX ADMIN — Medication 100 MILLIGRAM(S): at 17:40

## 2017-12-21 RX ADMIN — Medication 100 MILLIGRAM(S): at 06:27

## 2017-12-21 RX ADMIN — CEFTRIAXONE 100 GRAM(S): 500 INJECTION, POWDER, FOR SOLUTION INTRAMUSCULAR; INTRAVENOUS at 05:33

## 2017-12-21 RX ADMIN — HEPARIN SODIUM 5000 UNIT(S): 5000 INJECTION INTRAVENOUS; SUBCUTANEOUS at 14:29

## 2017-12-21 RX ADMIN — Medication 3 MILLILITER(S): at 11:07

## 2017-12-21 RX ADMIN — Medication 3 MILLILITER(S): at 17:33

## 2017-12-21 NOTE — PROGRESS NOTE ADULT - SUBJECTIVE AND OBJECTIVE BOX
Patient is a 77y old  Male who presents with a chief complaint of The patient is a 77y Male complaining of chest pain. (17 Dec 2017 15:28)      INTERVAL HPI/OVERNIGHT EVENTS: Says voiding freely.    MEDICATIONS  (STANDING):  ALBUTerol    90 MICROgram(s) HFA Inhaler 1 Puff(s) Inhalation every 4 hours  ALBUTerol/ipratropium for Nebulization 3 milliLiter(s) Nebulizer every 6 hours  amLODIPine   Tablet 10 milliGRAM(s) Oral daily  azithromycin  IVPB 500 milliGRAM(s) IV Intermittent every 24 hours  cefTRIAXone   IVPB 1 Gram(s) IV Intermittent every 24 hours  chlorhexidine 4% Liquid 1 Application(s) Topical daily  dextrose 5%. 1000 milliLiter(s) (50 mL/Hr) IV Continuous <Continuous>  dextrose 50% Injectable 12.5 Gram(s) IV Push once  dextrose 50% Injectable 25 Gram(s) IV Push once  dextrose 50% Injectable 25 Gram(s) IV Push once  heparin  Injectable 5000 Unit(s) SubCutaneous every 8 hours  hydrALAZINE 10 milliGRAM(s) Oral every 6 hours  influenza   Vaccine 0.5 milliLiter(s) IntraMuscular once  insulin lispro (HumaLOG) corrective regimen sliding scale   SubCutaneous three times a day before meals  insulin lispro (HumaLOG) corrective regimen sliding scale   SubCutaneous at bedtime  labetalol 100 milliGRAM(s) Oral two times a day  tamsulosin 0.4 milliGRAM(s) Oral at bedtime  tiotropium 18 MICROgram(s) Capsule 1 Capsule(s) Inhalation daily    MEDICATIONS  (PRN):  acetaminophen   Tablet. 650 milliGRAM(s) Oral every 6 hours PRN Mild Pain (1 - 3)  dextrose Gel 1 Dose(s) Oral once PRN Blood Glucose LESS THAN 70 milliGRAM(s)/deciliter  glucagon  Injectable 1 milliGRAM(s) IntraMuscular once PRN Glucose LESS THAN 70 milligrams/deciliter      Allergies    grass, pollen (Rhinitis)  No Known Drug Allergies    Intolerances        REVIEW OF SYSTEMS:  CONSTITUTIONAL: No fever, weight loss, or fatigue  EYES: No eye pain, visual disturbances, or discharge  ENMT:  No difficulty hearing, tinnitus, vertigo; No sinus or throat pain  NECK: No pain or stiffness  BREASTS: No pain, masses, or nipple discharge  RESPIRATORY: Still coughing  CARDIOVASCULAR: No chest pain, palpitations, dizziness, or leg swelling  GASTROINTESTINAL: No abdominal or epigastric pain. No nausea, vomiting, or hematemesis; No diarrhea or constipation. No melena or hematochezia.  GENITOURINARY: No dysuria, frequency, hematuria, or incontinence  NEUROLOGICAL: No headaches, memory loss, loss of strength, numbness, or tremors  SKIN: No itching, burning, rashes, or lesions   LYMPH NODES: No enlarged glands  ENDOCRINE: No heat or cold intolerance; No hair loss  MUSCULOSKELETAL: No joint pain or swelling; No muscle, back, or extremity pain  PSYCHIATRIC: No depression, anxiety, mood swings, or difficulty sleeping  HEME/LYMPH: No easy bruising, or bleeding gums  ALLERGY AND IMMUNOLOGIC: No hives or eczema    Vital Signs Last 24 Hrs  T(C): 36.9 (21 Dec 2017 05:38), Max: 36.9 (21 Dec 2017 05:38)  T(F): 98.4 (21 Dec 2017 05:38), Max: 98.4 (21 Dec 2017 05:38)  HR: 78 (21 Dec 2017 06:51) (72 - 86)  BP: 141/86 (21 Dec 2017 06:51) (135/71 - 190/93)  BP(mean): --  RR: 18 (21 Dec 2017 06:51) (17 - 20)  SpO2: 97% (21 Dec 2017 06:51) (96% - 99%)    PHYSICAL EXAM:  GENERAL: NAD, well-groomed, well-developed  HEAD:  Atraumatic, Normocephalic  EYES: EOMI, PERRL, conjunctiva and sclera clear  ENMT:  Moist mucous membranes, Good dentition, No lesions  NECK: Supple, No JVD, Normal thyroid  NERVOUS SYSTEM:  Alert & Oriented X3, Good concentration; Motor Strength 5/5 B/L upper and lower extremities; DTRs 2+ intact and symmetric  CHEST/LUNG: Clear to percussion bilaterally; No rales, rhonchi, wheezing, or rubs  HEART: Regular rate and rhythm; No murmurs, rubs, or gallops  ABDOMEN: Soft, Nontender, Nondistended; Bowel sounds present. Obese.  EXTREMITIES:  2+ Peripheral Pulses, No clubbing, cyanosis, or edema  LYMPH: No lymphadenopathy noted  SKIN: No rashes or lesions    LABS:                        9.8    6.5   )-----------( 218      ( 21 Dec 2017 07:34 )             32.3     12-21    140  |  102  |  43<H>  ----------------------------<  153<H>  3.6   |  30  |  3.21<H>    Ca    8.3<L>      21 Dec 2017 07:34    TPro  6.5  /  Alb  2.6<L>  /  TBili  0.3  /  DBili  x   /  AST  15  /  ALT  15  /  AlkPhos  61  12-20        CAPILLARY BLOOD GLUCOSE      POCT Blood Glucose.: 115 mg/dL (21 Dec 2017 08:31)  POCT Blood Glucose.: 110 mg/dL (20 Dec 2017 21:09)  POCT Blood Glucose.: 133 mg/dL (20 Dec 2017 16:34)  POCT Blood Glucose.: 140 mg/dL (20 Dec 2017 11:16)          Hemoglobin A1C, Whole Blood: 6.4 % (12-18 @ 09:48)  Procalcitonin, Serum: 1.16 ng/mL (12-17 @ 21:06)    Cholesterol, Serum: 164 mg/dL (12-18 @ 09:48)  HDL Cholesterol, Serum: 49 mg/dL (12-18 @ 09:48)  Triglycerides, Serum: 134 mg/dL (12-18 @ 09:48)        Culture - Urine (collected 19 Dec 2017 10:01)  Source: .Urine Catheterized  Final Report (20 Dec 2017 11:44):    No growth    Culture - Urine (collected 18 Dec 2017 08:52)  Source: .Urine Catheterized  Final Report (19 Dec 2017 11:42):    No growth    Culture - Blood (collected 17 Dec 2017 11:20)  Source: .Blood Blood  Preliminary Report (18 Dec 2017 12:01):    No growth to date.    Culture - Blood (collected 17 Dec 2017 11:20)  Source: .Blood Blood  Preliminary Report (18 Dec 2017 12:01):    No growth to date.        RADIOLOGY & ADDITIONAL TESTS:    Imaging Personally Reviewed:  [ ] YES  [ ] NO    Consultant(s) Notes Reviewed:  [ ] YES  [ ] NO    Care Discussed with Consultants/Other Providers [ ] YES  [ ] NO    PROBLEMS:  HYPERTENSIVE EMERGENCY, COMMUNITY ACQUIRED PNEUMONIA  CHEST PAIN  Hypertensive emergency  Other chest pain  Hypertensive emergency  Glaucoma of both eyes, unspecified glaucoma type  Benign prostatic hyperplasia with lower urinary tract symptoms, symptom details unspecified  Essential hypertension  Diabetes mellitus  Obstructive uropathy  Hypertensive urgency  Acute renal failure superimposed on stage 4 chronic kidney disease, unspecified acute renal failure type  BPH (benign prostatic hyperplasia)  Renal stone  CAP (community acquired pneumonia)      Care discussed with family,         [  ]   yes  [  ]  No    imp:    stable[ ]    unstable[  ]     improving [   ]       unchanged  [  ]                Plans:  Continue present plans  [  ]               New consult [  ]   specialty  .......               order mariaa[  ]    test name.                  Discharge Planning  [  ]

## 2017-12-21 NOTE — PROGRESS NOTE ADULT - SUBJECTIVE AND OBJECTIVE BOX
Subjective: pos productive cough.       MEDICATIONS  (STANDING):  ALBUTerol    90 MICROgram(s) HFA Inhaler 1 Puff(s) Inhalation every 4 hours  ALBUTerol/ipratropium for Nebulization 3 milliLiter(s) Nebulizer every 6 hours  amLODIPine   Tablet 10 milliGRAM(s) Oral daily  azithromycin  IVPB 500 milliGRAM(s) IV Intermittent every 24 hours  cefTRIAXone   IVPB 1 Gram(s) IV Intermittent every 24 hours  chlorhexidine 4% Liquid 1 Application(s) Topical daily  dextrose 5%. 1000 milliLiter(s) (50 mL/Hr) IV Continuous <Continuous>  dextrose 50% Injectable 12.5 Gram(s) IV Push once  dextrose 50% Injectable 25 Gram(s) IV Push once  dextrose 50% Injectable 25 Gram(s) IV Push once  heparin  Injectable 5000 Unit(s) SubCutaneous every 8 hours  hydrALAZINE 10 milliGRAM(s) Oral every 6 hours  influenza   Vaccine 0.5 milliLiter(s) IntraMuscular once  insulin lispro (HumaLOG) corrective regimen sliding scale   SubCutaneous three times a day before meals  insulin lispro (HumaLOG) corrective regimen sliding scale   SubCutaneous at bedtime  labetalol 100 milliGRAM(s) Oral two times a day  tamsulosin 0.4 milliGRAM(s) Oral at bedtime  tiotropium 18 MICROgram(s) Capsule 1 Capsule(s) Inhalation daily    MEDICATIONS  (PRN):  acetaminophen   Tablet. 650 milliGRAM(s) Oral every 6 hours PRN Mild Pain (1 - 3)  dextrose Gel 1 Dose(s) Oral once PRN Blood Glucose LESS THAN 70 milliGRAM(s)/deciliter  glucagon  Injectable 1 milliGRAM(s) IntraMuscular once PRN Glucose LESS THAN 70 milligrams/deciliter          T(C): 35.7 (12-21-17 @ 11:20), Max: 36.9 (12-21-17 @ 05:38)  HR: 69 (12-21-17 @ 11:20) (69 - 86)  BP: 145/79 (12-21-17 @ 11:20) (135/71 - 190/93)  RR: 18 (12-21-17 @ 11:20) (17 - 20)  SpO2: 100% (12-21-17 @ 11:20) (96% - 100%)  Wt(kg): --        I&O's Detail    20 Dec 2017 07:01  -  21 Dec 2017 07:00  --------------------------------------------------------  IN:    Oral Fluid: 650 mL    Solution: 250 mL    Solution: 50 mL  Total IN: 950 mL    OUT:    Voided: 550 mL  Total OUT: 550 mL    Total NET: 400 mL               PHYSICAL EXAM:    GENERAL: comfortable  EYES: EOMI, PERRLA, conjunctiva and sclera clear  NECK: Supple, no inc in JVP  CHEST/LUNG: rhonchi  HEART: S1S2  ABDOMEN: Soft, Nontender, Nondistended; Bowel sounds present  EXTREMITIES:  trace edema    LABS:  CBC Full  -  ( 21 Dec 2017 07:34 )  WBC Count : 6.5 K/uL  Hemoglobin : 9.8 g/dL  Hematocrit : 32.3 %  Platelet Count - Automated : 218 K/uL  Mean Cell Volume : 80.2 fl  Mean Cell Hemoglobin : 24.2 pg  Mean Cell Hemoglobin Concentration : 30.2 gm/dL  Auto Neutrophil # : x  Auto Lymphocyte # : x  Auto Monocyte # : x  Auto Eosinophil # : x  Auto Basophil # : x  Auto Neutrophil % : x  Auto Lymphocyte % : x  Auto Monocyte % : x  Auto Eosinophil % : x  Auto Basophil % : x    12-21    140  |  102  |  43<H>  ----------------------------<  153<H>  3.6   |  30  |  3.21<H>    Ca    8.3<L>      21 Dec 2017 07:34    TPro  6.5  /  Alb  2.6<L>  /  TBili  0.3  /  DBili  x   /  AST  15  /  ALT  15  /  AlkPhos  61  12-20        Culture Results:   No growth (12-19 @ 10:01)      Impression:  * VIRGEN -- peak Cr 3.6. Improving. Renal US without hydro  * Suspected CKD   * Uncontrolled BP. Better  Recommendations:  * Keep -150  * BMP/Cr daily  * Obtain outpt records of baseline

## 2017-12-21 NOTE — PROGRESS NOTE ADULT - SUBJECTIVE AND OBJECTIVE BOX
77M hx htn, dm pw chest pain, abd pain, weakness and vomiting. per children, patient has uri x2 weeks but this morning was much worse. patient notes that he feels very week and that the real discomfort started early in the morning. chest pain is primarily left sided. abd pain is bl sides. no diarrhea, no dysuria, no fever.  In ER patient had elevated bp, hypertensive emergency diagnosed 2/2 elevated trop with pressures in the 240s. started nitro gtt.and sent to CT confirms pna  ICU called for further management (17 Dec 2017 15:28)  rsv panel with chlamydia trachomatis pneumonia  doing well   main issue is now renal failure    grass, pollen (Rhinitis)  No Known Drug Allergies    Intolerances        MEDICATIONS  (STANDING):  ALBUTerol    90 MICROgram(s) HFA Inhaler 1 Puff(s) Inhalation every 4 hours  ALBUTerol/ipratropium for Nebulization 3 milliLiter(s) Nebulizer every 6 hours  amLODIPine   Tablet 10 milliGRAM(s) Oral daily  azithromycin  IVPB 500 milliGRAM(s) IV Intermittent every 24 hours  cefTRIAXone   IVPB 1 Gram(s) IV Intermittent every 24 hours  chlorhexidine 4% Liquid 1 Application(s) Topical daily  dextrose 5%. 1000 milliLiter(s) (50 mL/Hr) IV Continuous <Continuous>  dextrose 50% Injectable 12.5 Gram(s) IV Push once  dextrose 50% Injectable 25 Gram(s) IV Push once  dextrose 50% Injectable 25 Gram(s) IV Push once  heparin  Injectable 5000 Unit(s) SubCutaneous every 8 hours  hydrALAZINE 10 milliGRAM(s) Oral every 6 hours  influenza   Vaccine 0.5 milliLiter(s) IntraMuscular once  insulin lispro (HumaLOG) corrective regimen sliding scale   SubCutaneous three times a day before meals  insulin lispro (HumaLOG) corrective regimen sliding scale   SubCutaneous at bedtime  labetalol 100 milliGRAM(s) Oral two times a day  tamsulosin 0.4 milliGRAM(s) Oral at bedtime  tiotropium 18 MICROgram(s) Capsule 1 Capsule(s) Inhalation daily    MEDICATIONS  (PRN):  acetaminophen   Tablet. 650 milliGRAM(s) Oral every 6 hours PRN Mild Pain (1 - 3)  dextrose Gel 1 Dose(s) Oral once PRN Blood Glucose LESS THAN 70 milliGRAM(s)/deciliter  glucagon  Injectable 1 milliGRAM(s) IntraMuscular once PRN Glucose LESS THAN 70 milligrams/deciliter      REVIEW OF SYSTEMS:    feels fine   still coughing a lot   VITAL SIGNS:  T(C): 35.7 (12-21-17 @ 11:20), Max: 36.9 (12-21-17 @ 05:38)  T(F): 96.2 (12-21-17 @ 11:20), Max: 98.4 (12-21-17 @ 05:38)  HR: 69 (12-21-17 @ 11:20) (69 - 86)  BP: 145/79 (12-21-17 @ 11:20) (141/86 - 190/93)  RR: 18 (12-21-17 @ 11:20) (18 - 20)  SpO2: 100% (12-21-17 @ 11:20) (96% - 100%)  Wt(kg): --    PHYSICAL EXAM:    GENERAL: not in any distress  HEENT: Neck is supple, normocephalic, atraumatic   CHEST/LUNG: Clear to auscultation left  right lung  rales, rhonchi, plus   HEART: Regular rate and rhythm; No murmurs, rubs, or gallops  ABDOMEN: Soft, Nontender, Nondistended; Bowel sounds present, no rebound   EXTREMITIES:  2+ Peripheral Pulses, No clubbing, cyanosis, or edema  SKIN: No rashes or lesions  BACK: no pressor sore   NERVOUS SYSTEM:  Alert & Oriented X3,   LABS:                         9.8    6.5   )-----------( 218      ( 21 Dec 2017 07:34 )             32.3     12-21    140  |  102  |  43<H>  ----------------------------<  153<H>  3.6   |  30  |  3.21<H>    Ca    8.3<L>      21 Dec 2017 07:34    TPro  6.5  /  Alb  2.6<L>  /  TBili  0.3  /  DBili  x   /  AST  15  /  ALT  15  /  AlkPhos  61  12-20    LIVER FUNCTIONS - ( 20 Dec 2017 08:01 )  Alb: 2.6 g/dL / Pro: 6.5 gm/dL / ALK PHOS: 61 U/L / ALT: 15 U/L / AST: 15 U/L / GGT: x                                   Culture Results:   No growth (12-19 @ 10:01)  Culture Results:   No growth (12-18 @ 08:52)  Culture Results:   No growth to date. (12-17 @ 11:20)  Culture Results:   No growth to date. (12-17 @ 11:20)          Legionella Antigen, Urine: Negative (12-18 @ 08:59)        Radiology:      < from: Xray Chest 1 View AP/PA. (12.17.17 @ 08:44) >  IMPRESSION: Bilateral patchy airspace opacities which may represent   pneumonia or pulmonary edema                EVELIA CAMACHO M.D., ATTENDING RADIOLOGIST  This document has been electronically signed. Dec 17 2017 10:42AM                < end of copied text >

## 2017-12-21 NOTE — PROGRESS NOTE ADULT - ASSESSMENT
community acquired pneumonia   chlamydia pneumonia pneumonia   on zithromax which is the drug of choice any how   discharge plan   was in icu with hypertensive emergency   renal consult noted  will repeat chest xray and procalcitonin

## 2017-12-21 NOTE — PROGRESS NOTE ADULT - ASSESSMENT
77M hx htn, dm pw chest pain, abd pain, weakness and vomiting. per children, patient has uri x2 weeks but this morning was much worse. patient notes that he feels very week and that the real discomfort started early in the morning. chest pain is primarily left sided. abd pain is bl sides. no diarrhea, no dysuria, no fever.  In ER patient had elevated bp, hypertensive emergency diagnosed 2/2 elevated trop with pressures in the 240s. started nitro gtt.and sent to CT confirm pneumonia. Admitted to CCU for further management. ID consult noted. Started on broad spectrum antibiotics. Pt. alert and awake at present.  12/20/18 ; Pt alert ,awake. Asymptomatic. Elevated BUN and Creatinine. On Azithromycin for pneumonia. BPH with LUTS. Urology f/u noted, TOV.  12/21/17 : Pt. still has the cough. Voiding freely. bun rising. Will Do PVR .Urology f/u. ID f/U noted.

## 2017-12-21 NOTE — PROGRESS NOTE ADULT - SUBJECTIVE AND OBJECTIVE BOX
Patient seen and examined in chair.  Reports minimal hematuria.  He is otherwise without complaints.  Denies dysuria, urinary frequency.  Denies fever, chills, chest pain, sob.    T(F): 98.4 (12-21-17 @ 05:38), Max: 98.4 (12-21-17 @ 05:38)  HR: 78 (12-21-17 @ 06:51) (72 - 86)  BP: 141/86 (12-21-17 @ 06:51) (135/71 - 190/93)  RR: 18 (12-21-17 @ 06:51) (17 - 20)  SpO2: 97% (12-21-17 @ 06:51) (96% - 99%)    PHYSICAL EXAM:  General: Alert, oriented, NAD  CV: +S1S2 regular rate and rhythm  Lung: Respirations nonlabored  Abdomen: Soft, NTND  Extremities: No pedal edema, no calf tenderness b/l   : No suprapubic tenderness, external genitalia WNL     LABS:                        9.8    6.5   )-----------( 218      ( 21 Dec 2017 07:34 )             32.3     12-21    140  |  102  |  43<H>  ----------------------------<  153<H>  3.6   |  30  |  3.21<H>    Ca    8.3<L>      21 Dec 2017 07:34    TPro  6.5  /  Alb  2.6<L>  /  TBili  0.3  /  DBili  x   /  AST  15  /  ALT  15  /  AlkPhos  61  12-20    Impression: 77 year old male with PMH HTN, BPH, glaucoma admitted with Chlamydia PNA and hypertensive urgency, found to have right hydroureteronephrosis secondary to UVJ renal stone and enlarged prostate, acute on chronic renal failure  Plan:  - no  intervention planned  - continue flomax  - antibiotics per ID  - continue medical management

## 2017-12-22 LAB
ALBUMIN SERPL ELPH-MCNC: 2.5 G/DL — LOW (ref 3.3–5)
ALP SERPL-CCNC: 64 U/L — SIGNIFICANT CHANGE UP (ref 40–120)
ALT FLD-CCNC: 16 U/L — SIGNIFICANT CHANGE UP (ref 12–78)
ANION GAP SERPL CALC-SCNC: 7 MMOL/L — SIGNIFICANT CHANGE UP (ref 5–17)
AST SERPL-CCNC: 19 U/L — SIGNIFICANT CHANGE UP (ref 15–37)
BILIRUB SERPL-MCNC: 0.5 MG/DL — SIGNIFICANT CHANGE UP (ref 0.2–1.2)
BUN SERPL-MCNC: 37 MG/DL — HIGH (ref 7–23)
CALCIUM SERPL-MCNC: 8.1 MG/DL — LOW (ref 8.5–10.1)
CHLORIDE SERPL-SCNC: 104 MMOL/L — SIGNIFICANT CHANGE UP (ref 96–108)
CO2 SERPL-SCNC: 31 MMOL/L — SIGNIFICANT CHANGE UP (ref 22–31)
CREAT SERPL-MCNC: 2.81 MG/DL — HIGH (ref 0.5–1.3)
CULTURE RESULTS: SIGNIFICANT CHANGE UP
CULTURE RESULTS: SIGNIFICANT CHANGE UP
GLUCOSE BLDC GLUCOMTR-MCNC: 121 MG/DL — HIGH (ref 70–99)
GLUCOSE BLDC GLUCOMTR-MCNC: 126 MG/DL — HIGH (ref 70–99)
GLUCOSE BLDC GLUCOMTR-MCNC: 134 MG/DL — HIGH (ref 70–99)
GLUCOSE BLDC GLUCOMTR-MCNC: 99 MG/DL — SIGNIFICANT CHANGE UP (ref 70–99)
GLUCOSE SERPL-MCNC: 125 MG/DL — HIGH (ref 70–99)
HCT VFR BLD CALC: 30.3 % — LOW (ref 39–50)
HGB BLD-MCNC: 9.6 G/DL — LOW (ref 13–17)
MCHC RBC-ENTMCNC: 25.5 PG — LOW (ref 27–34)
MCHC RBC-ENTMCNC: 31.6 GM/DL — LOW (ref 32–36)
MCV RBC AUTO: 80.8 FL — SIGNIFICANT CHANGE UP (ref 80–100)
PLATELET # BLD AUTO: 220 K/UL — SIGNIFICANT CHANGE UP (ref 150–400)
POTASSIUM SERPL-MCNC: 3.9 MMOL/L — SIGNIFICANT CHANGE UP (ref 3.5–5.3)
POTASSIUM SERPL-SCNC: 3.9 MMOL/L — SIGNIFICANT CHANGE UP (ref 3.5–5.3)
PROT SERPL-MCNC: 6.5 GM/DL — SIGNIFICANT CHANGE UP (ref 6–8.3)
RBC # BLD: 3.75 M/UL — LOW (ref 4.2–5.8)
RBC # FLD: 14.4 % — SIGNIFICANT CHANGE UP (ref 11–15)
SODIUM SERPL-SCNC: 142 MMOL/L — SIGNIFICANT CHANGE UP (ref 135–145)
SPECIMEN SOURCE: SIGNIFICANT CHANGE UP
SPECIMEN SOURCE: SIGNIFICANT CHANGE UP
WBC # BLD: 6.6 K/UL — SIGNIFICANT CHANGE UP (ref 3.8–10.5)
WBC # FLD AUTO: 6.6 K/UL — SIGNIFICANT CHANGE UP (ref 3.8–10.5)

## 2017-12-22 RX ORDER — TIOTROPIUM BROMIDE 18 UG/1
1 CAPSULE ORAL; RESPIRATORY (INHALATION) DAILY
Qty: 0 | Refills: 0 | Status: DISCONTINUED | OUTPATIENT
Start: 2017-12-22 | End: 2017-12-23

## 2017-12-22 RX ORDER — ALBUTEROL 90 UG/1
1 AEROSOL, METERED ORAL EVERY 4 HOURS
Qty: 0 | Refills: 0 | Status: DISCONTINUED | OUTPATIENT
Start: 2017-12-22 | End: 2017-12-23

## 2017-12-22 RX ADMIN — Medication 3 MILLILITER(S): at 11:26

## 2017-12-22 RX ADMIN — Medication 10 MILLIGRAM(S): at 17:57

## 2017-12-22 RX ADMIN — HEPARIN SODIUM 5000 UNIT(S): 5000 INJECTION INTRAVENOUS; SUBCUTANEOUS at 06:18

## 2017-12-22 RX ADMIN — ALBUTEROL 1 PUFF(S): 90 AEROSOL, METERED ORAL at 17:01

## 2017-12-22 RX ADMIN — Medication 100 MILLIGRAM(S): at 06:19

## 2017-12-22 RX ADMIN — ALBUTEROL 1 PUFF(S): 90 AEROSOL, METERED ORAL at 22:14

## 2017-12-22 RX ADMIN — HEPARIN SODIUM 5000 UNIT(S): 5000 INJECTION INTRAVENOUS; SUBCUTANEOUS at 22:13

## 2017-12-22 RX ADMIN — HEPARIN SODIUM 5000 UNIT(S): 5000 INJECTION INTRAVENOUS; SUBCUTANEOUS at 13:35

## 2017-12-22 RX ADMIN — ALBUTEROL 1 PUFF(S): 90 AEROSOL, METERED ORAL at 13:35

## 2017-12-22 RX ADMIN — TAMSULOSIN HYDROCHLORIDE 0.4 MILLIGRAM(S): 0.4 CAPSULE ORAL at 22:14

## 2017-12-22 RX ADMIN — Medication 10 MILLIGRAM(S): at 00:14

## 2017-12-22 RX ADMIN — Medication 10 MILLIGRAM(S): at 11:38

## 2017-12-22 RX ADMIN — TIOTROPIUM BROMIDE 1 CAPSULE(S): 18 CAPSULE ORAL; RESPIRATORY (INHALATION) at 13:36

## 2017-12-22 RX ADMIN — Medication 100 MILLIGRAM(S): at 17:57

## 2017-12-22 RX ADMIN — Medication 3 MILLILITER(S): at 05:23

## 2017-12-22 RX ADMIN — AZITHROMYCIN 255 MILLIGRAM(S): 500 TABLET, FILM COATED ORAL at 05:15

## 2017-12-22 RX ADMIN — Medication 10 MILLIGRAM(S): at 06:18

## 2017-12-22 RX ADMIN — AMLODIPINE BESYLATE 10 MILLIGRAM(S): 2.5 TABLET ORAL at 06:19

## 2017-12-22 RX ADMIN — CEFTRIAXONE 100 GRAM(S): 500 INJECTION, POWDER, FOR SOLUTION INTRAMUSCULAR; INTRAVENOUS at 05:16

## 2017-12-22 NOTE — PROGRESS NOTE ADULT - SUBJECTIVE AND OBJECTIVE BOX
Patient seen in follow up for VIRGEN HTN; no distress.    MEDICATIONS  (STANDING):  ALBUTerol    90 MICROgram(s) HFA Inhaler 1 Puff(s) Inhalation every 4 hours  amLODIPine   Tablet 10 milliGRAM(s) Oral daily  azithromycin  IVPB 500 milliGRAM(s) IV Intermittent every 24 hours  cefTRIAXone   IVPB 1 Gram(s) IV Intermittent every 24 hours  chlorhexidine 4% Liquid 1 Application(s) Topical daily  dextrose 5%. 1000 milliLiter(s) (50 mL/Hr) IV Continuous <Continuous>  dextrose 50% Injectable 12.5 Gram(s) IV Push once  dextrose 50% Injectable 25 Gram(s) IV Push once  dextrose 50% Injectable 25 Gram(s) IV Push once  heparin  Injectable 5000 Unit(s) SubCutaneous every 8 hours  hydrALAZINE 10 milliGRAM(s) Oral every 6 hours  influenza   Vaccine 0.5 milliLiter(s) IntraMuscular once  insulin lispro (HumaLOG) corrective regimen sliding scale   SubCutaneous three times a day before meals  insulin lispro (HumaLOG) corrective regimen sliding scale   SubCutaneous at bedtime  labetalol 100 milliGRAM(s) Oral two times a day  tamsulosin 0.4 milliGRAM(s) Oral at bedtime  tiotropium 18 MICROgram(s) Capsule 1 Capsule(s) Inhalation daily    MEDICATIONS  (PRN):  acetaminophen   Tablet. 650 milliGRAM(s) Oral every 6 hours PRN Mild Pain (1 - 3)  dextrose Gel 1 Dose(s) Oral once PRN Blood Glucose LESS THAN 70 milliGRAM(s)/deciliter  glucagon  Injectable 1 milliGRAM(s) IntraMuscular once PRN Glucose LESS THAN 70 milligrams/deciliter    PHYSICAL EXAM:      T(C): 36 (12-22-17 @ 11:15), Max: 36.4 (12-21-17 @ 17:37)  HR: 79 (12-22-17 @ 11:51) (69 - 81)  BP: 148/77 (12-22-17 @ 11:15) (140/91 - 157/79)  RR: 20 (12-22-17 @ 11:15) (16 - 20)  SpO2: 97% (12-22-17 @ 11:51) (97% - 100%)  Wt(kg): --  Respiratory: scattered ronchi   Cardiovascular: S1 S2  Gastrointestinal: soft NT ND +BS  Extremities:   1 edema                                    9.6    6.6   )-----------( 220      ( 22 Dec 2017 07:46 )             30.3     12-22    142  |  104  |  37<H>  ----------------------------<  125<H>  3.9   |  31  |  2.81<H>    Ca    8.1<L>      22 Dec 2017 07:46    TPro  6.5  /  Alb  2.5<L>  /  TBili  0.5  /  DBili  x   /  AST  19  /  ALT  16  /  AlkPhos  64  12-22      LIVER FUNCTIONS - ( 22 Dec 2017 07:46 )  Alb: 2.5 g/dL / Pro: 6.5 gm/dL / ALK PHOS: 64 U/L / ALT: 16 U/L / AST: 19 U/L / GGT: x             Assessment and Plan:  VIRGEN suspected CKD 3; slowly improved;  Continue current rx; slow  titration of BP medications.

## 2017-12-22 NOTE — PROGRESS NOTE ADULT - ASSESSMENT
77M hx htn, dm pw chest pain, abd pain, weakness and vomiting. per children, patient has uri x2 weeks but this morning was much worse. patient notes that he feels very week and that the real discomfort started early in the morning. chest pain is primarily left sided. abd pain is bl sides. no diarrhea, no dysuria, no fever.  In ER patient had elevated bp, hypertensive emergency diagnosed 2/2 elevated trop with pressures in the 240s. started nitro gtt.and sent to CT confirm pneumonia. Admitted to CCU for further management. ID consult noted. Started on broad spectrum antibiotics. Pt. alert and awake at present.  12/20/18 ; Pt alert ,awake. Asymptomatic. Elevated BUN and Creatinine. On Azithromycin for pneumonia. BPH with LUTS. Urology f/u noted, TOV.  12/21/17 : Pt. still has the cough. Voiding freely. bun rising. Will Do PVR .Urology f/u. ID f/U noted.  12/22/17 : Pt. asymptomatic. Alert. BUN and creatinine trending down. Off Aguilar, voiding freely. On Antibiotics for pneumonia. BP is controlled. check renal function in AM . Discharge planning for AM.

## 2017-12-22 NOTE — PROGRESS NOTE ADULT - SUBJECTIVE AND OBJECTIVE BOX
Patient seen and examined bedside resting comfortably.  C/o mild cough still.   Voiding spontaneously without difficulty.  Denies hematuria and dysuria. Denies nausea and vomiting. Tolerating diet.  Denies chest pain, dyspnea, fever/chills.     T(F): 97.6 (12-22-17 @ 06:12), Max: 97.6 (12-21-17 @ 17:37)  HR: 80 (12-22-17 @ 06:53) (69 - 80)  BP: 157/79 (12-22-17 @ 06:12) (139/81 - 157/79)  RR: 16 (12-22-17 @ 06:12) (16 - 18)  SpO2: 98% (12-22-17 @ 06:53) (97% - 100%)    POCT Blood Glucose.: 126 mg/dL (22 Dec 2017 07:29)  POCT Blood Glucose.: 110 mg/dL (21 Dec 2017 22:06)  POCT Blood Glucose.: 100 mg/dL (21 Dec 2017 15:45)  POCT Blood Glucose.: 143 mg/dL (21 Dec 2017 12:04)      PHYSICAL EXAM:    General: NAD, alert and awake  HEENT: NCAT, EOMI, conjunctiva clear  Chest: nonlabored respirations, CTA b/l.  Abdomen: soft, NT/ND.   Extremities: no pedal edema or calf tenderness noted   : No suprapubic tenderness. No bladder distention. Clear yellow urine seen in urinal at bedside.     LABS:                        9.6    6.6   )-----------( 220      ( 22 Dec 2017 07:46 )             30.3   12-22    142  |  104  |  37<H>  ----------------------------<  125<H>  3.9   |  31  |  2.81<H>    Ca    8.1<L>      22 Dec 2017 07:46    TPro  6.5  /  Alb  2.5<L>  /  TBili  0.5  /  DBili  x   /  AST  19  /  ALT  16  /  AlkPhos  64  12-22    I&O's Detail    21 Dec 2017 07:01  -  22 Dec 2017 07:00  --------------------------------------------------------  IN:    Solution: 50 mL    Solution: 250 mL  Total IN: 300 mL    OUT:    Voided: 1200 mL  Total OUT: 1200 mL    Total NET: -900 mL    Impression: 77 year old male with PMH HTN, BPH, glaucoma admitted with Chlamydia PNA and hypertensive urgency, found to have right hydroureteronephrosis secondary to UVJ renal stone and enlarged prostate, acute on chronic renal failure    Plan:  - Patient doing well, voiding with no urinary symptoms, afebrile, Cr improving-- no intervention planned  - continue flomax  - antibiotics per ID  - continue medical management and supportive care  -  stable for discharge.  - Discussed with Dr. Vásquez

## 2017-12-22 NOTE — PROGRESS NOTE ADULT - SUBJECTIVE AND OBJECTIVE BOX
77M hx htn, dm pw chest pain, abd pain, weakness and vomiting. per children, patient has uri x2 weeks but this morning was much worse. patient notes that he feels very week and that the real discomfort started early in the morning. chest pain is primarily left sided. abd pain is bl sides. no diarrhea, no dysuria, no fever.  In ER patient had elevated bp, hypertensive emergency diagnosed 2/2 elevated trop with pressures in the 240s. started nitro gtt.and sent to CT confirms pna  ICU called for further management (17 Dec 2017 15:28)  patient stabilized transferred out   rsv panel with chlamydia trachomatis pneumonia  doing well   main issue is now renal failure    Allergies    grass, pollen (Rhinitis)  No Known Drug Allergies    Intolerances        MEDICATIONS  (STANDING):  ALBUTerol    90 MICROgram(s) HFA Inhaler 1 Puff(s) Inhalation every 4 hours  amLODIPine   Tablet 10 milliGRAM(s) Oral daily  azithromycin  IVPB 500 milliGRAM(s) IV Intermittent every 24 hours  cefTRIAXone   IVPB 1 Gram(s) IV Intermittent every 24 hours  chlorhexidine 4% Liquid 1 Application(s) Topical daily  dextrose 5%. 1000 milliLiter(s) (50 mL/Hr) IV Continuous <Continuous>  dextrose 50% Injectable 12.5 Gram(s) IV Push once  dextrose 50% Injectable 25 Gram(s) IV Push once  dextrose 50% Injectable 25 Gram(s) IV Push once  heparin  Injectable 5000 Unit(s) SubCutaneous every 8 hours  hydrALAZINE 10 milliGRAM(s) Oral every 6 hours  influenza   Vaccine 0.5 milliLiter(s) IntraMuscular once  insulin lispro (HumaLOG) corrective regimen sliding scale   SubCutaneous three times a day before meals  insulin lispro (HumaLOG) corrective regimen sliding scale   SubCutaneous at bedtime  labetalol 100 milliGRAM(s) Oral two times a day  tamsulosin 0.4 milliGRAM(s) Oral at bedtime  tiotropium 18 MICROgram(s) Capsule 1 Capsule(s) Inhalation daily    MEDICATIONS  (PRN):  acetaminophen   Tablet. 650 milliGRAM(s) Oral every 6 hours PRN Mild Pain (1 - 3)  dextrose Gel 1 Dose(s) Oral once PRN Blood Glucose LESS THAN 70 milliGRAM(s)/deciliter  glucagon  Injectable 1 milliGRAM(s) IntraMuscular once PRN Glucose LESS THAN 70 milligrams/deciliter      REVIEW OF SYSTEMS:    feels fine   cough is better     VITAL SIGNS:  T(C): 36 (12-22-17 @ 11:15), Max: 36.4 (12-22-17 @ 06:12)  T(F): 96.8 (12-22-17 @ 11:15), Max: 97.6 (12-22-17 @ 06:12)  HR: 81 (12-22-17 @ 18:01) (73 - 81)  BP: 166/81 (12-22-17 @ 18:01) (140/91 - 166/81)  RR: 16 (12-22-17 @ 18:01) (16 - 20)  SpO2: 95% (12-22-17 @ 18:01) (95% - 100%)  Wt(kg): --    PHYSICAL EXAM:    GENERAL: not in any distress  HEENT: Neck is supple, normocephalic, atraumatic   CHEST/LUNG: occasional  rhonchi, no wheezing  HEART: Regular rate and rhythm; No murmurs, rubs, or gallops  ABDOMEN: Soft, Nontender, Nondistended; Bowel sounds present, no rebound   EXTREMITIES:  2+ Peripheral Pulses, No clubbing, cyanosis, or edema  GENITOURINARY: NEGATIVE   SKIN: No rashes or lesions  BACK: no pressor sore   NERVOUS SYSTEM:  Alert & Oriented X3, Good concentration  PSYCH: normal affect     LABS:                         9.6    6.6   )-----------( 220      ( 22 Dec 2017 07:46 )             30.3     12-22    142  |  104  |  37<H>  ----------------------------<  125<H>  3.9   |  31  |  2.81<H>    Ca    8.1<L>      22 Dec 2017 07:46    TPro  6.5  /  Alb  2.5<L>  /  TBili  0.5  /  DBili  x   /  AST  19  /  ALT  16  /  AlkPhos  64  12-22    LIVER FUNCTIONS - ( 22 Dec 2017 07:46 )  Alb: 2.5 g/dL / Pro: 6.5 gm/dL / ALK PHOS: 64 U/L / ALT: 16 U/L / AST: 19 U/L / GGT: x                                   Culture Results:   No growth (12-19 @ 10:01)  Culture Results:   No growth (12-18 @ 08:52)  Culture Results:   No growth at 5 days. (12-17 @ 11:20)  Culture Results:   No growth at 5 days. (12-17 @ 11:20)          Legionella Antigen, Urine: Negative (12-18 @ 08:59)        Radiology:

## 2017-12-22 NOTE — PROGRESS NOTE ADULT - SUBJECTIVE AND OBJECTIVE BOX
Patient is a 77y old  Male who presents with a chief complaint of The patient is a 77y Male complaining of chest pain. (17 Dec 2017 15:28)      INTERVAL HPI/OVERNIGHT EVENTS: Mild cough    MEDICATIONS  (STANDING):  ALBUTerol    90 MICROgram(s) HFA Inhaler 1 Puff(s) Inhalation every 4 hours  ALBUTerol/ipratropium for Nebulization 3 milliLiter(s) Nebulizer every 6 hours  amLODIPine   Tablet 10 milliGRAM(s) Oral daily  azithromycin  IVPB 500 milliGRAM(s) IV Intermittent every 24 hours  cefTRIAXone   IVPB 1 Gram(s) IV Intermittent every 24 hours  chlorhexidine 4% Liquid 1 Application(s) Topical daily  dextrose 5%. 1000 milliLiter(s) (50 mL/Hr) IV Continuous <Continuous>  dextrose 50% Injectable 12.5 Gram(s) IV Push once  dextrose 50% Injectable 25 Gram(s) IV Push once  dextrose 50% Injectable 25 Gram(s) IV Push once  heparin  Injectable 5000 Unit(s) SubCutaneous every 8 hours  hydrALAZINE 10 milliGRAM(s) Oral every 6 hours  influenza   Vaccine 0.5 milliLiter(s) IntraMuscular once  insulin lispro (HumaLOG) corrective regimen sliding scale   SubCutaneous three times a day before meals  insulin lispro (HumaLOG) corrective regimen sliding scale   SubCutaneous at bedtime  labetalol 100 milliGRAM(s) Oral two times a day  tamsulosin 0.4 milliGRAM(s) Oral at bedtime  tiotropium 18 MICROgram(s) Capsule 1 Capsule(s) Inhalation daily    MEDICATIONS  (PRN):  acetaminophen   Tablet. 650 milliGRAM(s) Oral every 6 hours PRN Mild Pain (1 - 3)  dextrose Gel 1 Dose(s) Oral once PRN Blood Glucose LESS THAN 70 milliGRAM(s)/deciliter  glucagon  Injectable 1 milliGRAM(s) IntraMuscular once PRN Glucose LESS THAN 70 milligrams/deciliter      Allergies    grass, pollen (Rhinitis)  No Known Drug Allergies    Intolerances        REVIEW OF SYSTEMS:  CONSTITUTIONAL: No fever, weight loss, or fatigue  EYES: No eye pain, visual disturbances, or discharge  ENMT:  No difficulty hearing, tinnitus, vertigo; No sinus or throat pain  NECK: No pain or stiffness  BREASTS: No pain, masses, or nipple discharge  RESPIRATORY: No cough, wheezing, chills or hemoptysis; No shortness of breath  CARDIOVASCULAR: No chest pain, palpitations, dizziness, or leg swelling  GASTROINTESTINAL: No abdominal or epigastric pain. No nausea, vomiting, or hematemesis; No diarrhea or constipation. No melena or hematochezia.  GENITOURINARY: No dysuria, frequency, hematuria, or incontinence  NEUROLOGICAL: No headaches, memory loss, loss of strength, numbness, or tremors  SKIN: No itching, burning, rashes, or lesions   LYMPH NODES: No enlarged glands  ENDOCRINE: No heat or cold intolerance; No hair loss  MUSCULOSKELETAL: No joint pain or swelling; No muscle, back, or extremity pain  PSYCHIATRIC: No depression, anxiety, mood swings, or difficulty sleeping  HEME/LYMPH: No easy bruising, or bleeding gums  ALLERGY AND IMMUNOLOGIC: No hives or eczema    Vital Signs Last 24 Hrs  T(C): 36.4 (22 Dec 2017 06:12), Max: 36.4 (21 Dec 2017 17:37)  T(F): 97.6 (22 Dec 2017 06:12), Max: 97.6 (21 Dec 2017 17:37)  HR: 80 (22 Dec 2017 06:53) (69 - 81)  BP: 157/79 (22 Dec 2017 06:12) (139/81 - 157/79)  BP(mean): --  RR: 16 (22 Dec 2017 06:12) (16 - 18)  SpO2: 98% (22 Dec 2017 06:53) (97% - 100%)    PHYSICAL EXAM:  GENERAL: NAD, well-groomed, well-developed  HEAD:  Atraumatic, Normocephalic  EYES: EOMI, PERRL, conjunctiva and sclera clear  ENMT:  Moist mucous membranes, Good dentition, No lesions  NECK: Supple, No JVD, Normal thyroid  NERVOUS SYSTEM:  Alert & Oriented X3, Good concentration; Motor Strength 5/5 B/L upper and lower extremities; DTRs 2+ intact and symmetric  CHEST/LUNG: Clear to percussion bilaterally; No rales, rhonchi, wheezing, or rubs  HEART: Regular rate and rhythm; No murmurs, rubs, or gallops  ABDOMEN: Soft, Nontender, Nondistended; Bowel sounds present. Obese  EXTREMITIES:  2+ Peripheral Pulses, No clubbing, cyanosis, or edema  LYMPH: No lymphadenopathy noted  SKIN: No rashes or lesions    LABS:                        9.6    6.6   )-----------( 220      ( 22 Dec 2017 07:46 )             30.3     12-22    142  |  104  |  37<H>  ----------------------------<  125<H>  3.9   |  31  |  2.81<H>    Ca    8.1<L>      22 Dec 2017 07:46    TPro  6.5  /  Alb  2.5<L>  /  TBili  0.5  /  DBili  x   /  AST  19  /  ALT  16  /  AlkPhos  64  12-22        CAPILLARY BLOOD GLUCOSE      POCT Blood Glucose.: 126 mg/dL (22 Dec 2017 07:29)  POCT Blood Glucose.: 110 mg/dL (21 Dec 2017 22:06)  POCT Blood Glucose.: 100 mg/dL (21 Dec 2017 15:45)  POCT Blood Glucose.: 143 mg/dL (21 Dec 2017 12:04)          Hemoglobin A1C, Whole Blood: 6.4 % (12-18 @ 09:48)  Procalcitonin, Serum: 1.16 ng/mL (12-17 @ 21:06)    Cholesterol, Serum: 164 mg/dL (12-18 @ 09:48)  HDL Cholesterol, Serum: 49 mg/dL (12-18 @ 09:48)  Triglycerides, Serum: 134 mg/dL (12-18 @ 09:48)        Culture - Urine (collected 19 Dec 2017 10:01)  Source: .Urine Catheterized  Final Report (20 Dec 2017 11:44):    No growth    Culture - Urine (collected 18 Dec 2017 08:52)  Source: .Urine Catheterized  Final Report (19 Dec 2017 11:42):    No growth    Culture - Blood (collected 17 Dec 2017 11:20)  Source: .Blood Blood  Preliminary Report (18 Dec 2017 12:01):    No growth to date.    Culture - Blood (collected 17 Dec 2017 11:20)  Source: .Blood Blood  Preliminary Report (18 Dec 2017 12:01):    No growth to date.    PVR : 101 ml.    RADIOLOGY & ADDITIONAL TESTS:    Imaging Personally Reviewed:  [ ] YES  [ ] NO    Consultant(s) Notes Reviewed:  [ ] YES  [ ] NO    Care Discussed with Consultants/Other Providers [ ] YES  [ ] NO    PROBLEMS:  HYPERTENSIVE EMERGENCY, COMMUNITY ACQUIRED PNEUMONIA  CHEST PAIN  Hypertensive emergency  Other chest pain  Hypertensive emergency  Glaucoma of both eyes, unspecified glaucoma type  Benign prostatic hyperplasia with lower urinary tract symptoms, symptom details unspecified  Essential hypertension  Diabetes mellitus  Obstructive uropathy  Hypertensive urgency  Acute renal failure superimposed on stage 4 chronic kidney disease, unspecified acute renal failure type  BPH (benign prostatic hyperplasia)  Renal stone  CAP (community acquired pneumonia)      Care discussed with family,         [  ]   yes  [  ]  No    imp:    stable[ ]    unstable[  ]     improving [   ]       unchanged  [  ]                Plans:  Continue present plans  [  ]               New consult [  ]   specialty  .......               order mariaa[  ]    test name.                  Discharge Planning  [  ]

## 2017-12-23 ENCOUNTER — TRANSCRIPTION ENCOUNTER (OUTPATIENT)
Age: 77
End: 2017-12-23

## 2017-12-23 VITALS
OXYGEN SATURATION: 94 % | DIASTOLIC BLOOD PRESSURE: 68 MMHG | TEMPERATURE: 98 F | RESPIRATION RATE: 16 BRPM | HEART RATE: 80 BPM | SYSTOLIC BLOOD PRESSURE: 129 MMHG

## 2017-12-23 LAB
ANION GAP SERPL CALC-SCNC: 9 MMOL/L — SIGNIFICANT CHANGE UP (ref 5–17)
BUN SERPL-MCNC: 34 MG/DL — HIGH (ref 7–23)
CALCIUM SERPL-MCNC: 8.3 MG/DL — LOW (ref 8.5–10.1)
CHLORIDE SERPL-SCNC: 104 MMOL/L — SIGNIFICANT CHANGE UP (ref 96–108)
CO2 SERPL-SCNC: 28 MMOL/L — SIGNIFICANT CHANGE UP (ref 22–31)
CREAT SERPL-MCNC: 2.65 MG/DL — HIGH (ref 0.5–1.3)
GLUCOSE BLDC GLUCOMTR-MCNC: 143 MG/DL — HIGH (ref 70–99)
GLUCOSE BLDC GLUCOMTR-MCNC: 148 MG/DL — HIGH (ref 70–99)
GLUCOSE SERPL-MCNC: 128 MG/DL — HIGH (ref 70–99)
HCT VFR BLD CALC: 32 % — LOW (ref 39–50)
HGB BLD-MCNC: 9.6 G/DL — LOW (ref 13–17)
MCHC RBC-ENTMCNC: 24.1 PG — LOW (ref 27–34)
MCHC RBC-ENTMCNC: 30 GM/DL — LOW (ref 32–36)
MCV RBC AUTO: 80.3 FL — SIGNIFICANT CHANGE UP (ref 80–100)
PLATELET # BLD AUTO: 236 K/UL — SIGNIFICANT CHANGE UP (ref 150–400)
POTASSIUM SERPL-MCNC: 4.3 MMOL/L — SIGNIFICANT CHANGE UP (ref 3.5–5.3)
POTASSIUM SERPL-SCNC: 4.3 MMOL/L — SIGNIFICANT CHANGE UP (ref 3.5–5.3)
PROCALCITONIN SERPL-MCNC: 0.18 NG/ML — HIGH (ref 0–0.04)
RBC # BLD: 3.98 M/UL — LOW (ref 4.2–5.8)
RBC # FLD: 15.2 % — HIGH (ref 11–15)
SODIUM SERPL-SCNC: 141 MMOL/L — SIGNIFICANT CHANGE UP (ref 135–145)
WBC # BLD: 6.2 K/UL — SIGNIFICANT CHANGE UP (ref 3.8–10.5)
WBC # FLD AUTO: 6.2 K/UL — SIGNIFICANT CHANGE UP (ref 3.8–10.5)

## 2017-12-23 PROCEDURE — 71010: CPT | Mod: 26

## 2017-12-23 RX ORDER — LABETALOL HCL 100 MG
2 TABLET ORAL
Qty: 0 | Refills: 0 | COMMUNITY
Start: 2017-12-23 | End: 2018-01-21

## 2017-12-23 RX ORDER — TIOTROPIUM BROMIDE 18 UG/1
1 CAPSULE ORAL; RESPIRATORY (INHALATION)
Qty: 1 | Refills: 0 | OUTPATIENT
Start: 2017-12-23 | End: 2018-01-21

## 2017-12-23 RX ORDER — LABETALOL HCL 100 MG
1 TABLET ORAL
Qty: 60 | Refills: 0 | OUTPATIENT
Start: 2017-12-23 | End: 2018-01-21

## 2017-12-23 RX ORDER — ASPIRIN/CALCIUM CARB/MAGNESIUM 324 MG
0 TABLET ORAL
Qty: 0 | Refills: 0 | COMMUNITY

## 2017-12-23 RX ORDER — TAMSULOSIN HYDROCHLORIDE 0.4 MG/1
1 CAPSULE ORAL
Qty: 30 | Refills: 0 | OUTPATIENT
Start: 2017-12-23 | End: 2018-01-21

## 2017-12-23 RX ORDER — METFORMIN HYDROCHLORIDE 850 MG/1
0 TABLET ORAL
Qty: 0 | Refills: 0 | COMMUNITY

## 2017-12-23 RX ORDER — AMLODIPINE BESYLATE 2.5 MG/1
1 TABLET ORAL
Qty: 30 | Refills: 0 | OUTPATIENT
Start: 2017-12-23 | End: 2018-01-21

## 2017-12-23 RX ORDER — REPAGLINIDE 1 MG/1
1 TABLET ORAL
Qty: 0 | Refills: 0 | Status: DISCONTINUED | OUTPATIENT
Start: 2017-12-23 | End: 2017-12-23

## 2017-12-23 RX ORDER — ALBUTEROL 90 UG/1
1 AEROSOL, METERED ORAL
Qty: 1 | Refills: 0 | OUTPATIENT
Start: 2017-12-23 | End: 2018-01-21

## 2017-12-23 RX ORDER — ASPIRIN/CALCIUM CARB/MAGNESIUM 324 MG
1 TABLET ORAL
Qty: 30 | Refills: 0 | OUTPATIENT
Start: 2017-12-23 | End: 2018-01-21

## 2017-12-23 RX ORDER — REPAGLINIDE 1 MG/1
1 TABLET ORAL
Qty: 90 | Refills: 0 | OUTPATIENT
Start: 2017-12-23 | End: 2018-01-21

## 2017-12-23 RX ORDER — INFLUENZA VIRUS VACCINE 15; 15; 15; 15 UG/.5ML; UG/.5ML; UG/.5ML; UG/.5ML
0 SUSPENSION INTRAMUSCULAR
Qty: 0 | Refills: 0 | COMMUNITY
Start: 2017-12-23

## 2017-12-23 RX ORDER — AZITHROMYCIN 500 MG/1
1 TABLET, FILM COATED ORAL
Qty: 4 | Refills: 0 | OUTPATIENT
Start: 2017-12-23 | End: 2017-12-26

## 2017-12-23 RX ORDER — HYDRALAZINE HCL 50 MG
1 TABLET ORAL
Qty: 120 | Refills: 0 | OUTPATIENT
Start: 2017-12-23 | End: 2018-01-21

## 2017-12-23 RX ADMIN — AZITHROMYCIN 255 MILLIGRAM(S): 500 TABLET, FILM COATED ORAL at 06:48

## 2017-12-23 RX ADMIN — AMLODIPINE BESYLATE 10 MILLIGRAM(S): 2.5 TABLET ORAL at 06:49

## 2017-12-23 RX ADMIN — REPAGLINIDE 1 MILLIGRAM(S): 1 TABLET ORAL at 13:06

## 2017-12-23 RX ADMIN — HEPARIN SODIUM 5000 UNIT(S): 5000 INJECTION INTRAVENOUS; SUBCUTANEOUS at 13:07

## 2017-12-23 RX ADMIN — CEFTRIAXONE 100 GRAM(S): 500 INJECTION, POWDER, FOR SOLUTION INTRAMUSCULAR; INTRAVENOUS at 06:48

## 2017-12-23 RX ADMIN — Medication 10 MILLIGRAM(S): at 00:17

## 2017-12-23 RX ADMIN — Medication 10 MILLIGRAM(S): at 06:50

## 2017-12-23 RX ADMIN — Medication 100 MILLIGRAM(S): at 06:50

## 2017-12-23 RX ADMIN — ALBUTEROL 1 PUFF(S): 90 AEROSOL, METERED ORAL at 06:49

## 2017-12-23 RX ADMIN — ALBUTEROL 1 PUFF(S): 90 AEROSOL, METERED ORAL at 10:56

## 2017-12-23 RX ADMIN — ALBUTEROL 1 PUFF(S): 90 AEROSOL, METERED ORAL at 14:45

## 2017-12-23 RX ADMIN — ALBUTEROL 1 PUFF(S): 90 AEROSOL, METERED ORAL at 02:52

## 2017-12-23 RX ADMIN — TIOTROPIUM BROMIDE 1 CAPSULE(S): 18 CAPSULE ORAL; RESPIRATORY (INHALATION) at 13:06

## 2017-12-23 RX ADMIN — HEPARIN SODIUM 5000 UNIT(S): 5000 INJECTION INTRAVENOUS; SUBCUTANEOUS at 06:49

## 2017-12-23 NOTE — DISCHARGE NOTE ADULT - SECONDARY DIAGNOSIS.
Essential hypertension Acute renal failure superimposed on stage 4 chronic kidney disease, unspecified acute renal failure type Benign prostatic hyperplasia with lower urinary tract symptoms, symptom details unspecified Type 2 diabetes mellitus with hyperglycemia, without long-term current use of insulin Hydronephrosis due to obstruction of ureter

## 2017-12-23 NOTE — PROGRESS NOTE ADULT - PROVIDER SPECIALTY LIST ADULT
Critical Care
Critical Care
Infectious Disease
Internal Medicine
Intervent Cardiology
Nephrology
Nephrology
Urology
Infectious Disease

## 2017-12-23 NOTE — PROGRESS NOTE ADULT - PROBLEM SELECTOR PROBLEM 5
Diabetes mellitus
Obstructive uropathy
Benign prostatic hyperplasia with lower urinary tract symptoms, symptom details unspecified

## 2017-12-23 NOTE — PROGRESS NOTE ADULT - PROBLEM SELECTOR PROBLEM 4
Renal stone
Essential hypertension

## 2017-12-23 NOTE — PROGRESS NOTE ADULT - ASSESSMENT
77M hx htn, dm pw chest pain, abd pain, weakness and vomiting. per children, patient has uri x2 weeks but this morning was much worse. patient notes that he feels very week and that the real discomfort started early in the morning. chest pain is primarily left sided. abd pain is bl sides. no diarrhea, no dysuria, no fever.  In ER patient had elevated bp, hypertensive emergency diagnosed 2/2 elevated trop with pressures in the 240s. started nitro gtt.and sent to CT confirm pneumonia. Admitted to CCU for further management. ID consult noted. Started on broad spectrum antibiotics. Pt. alert and awake at present.  12/20/18 ; Pt alert ,awake. Asymptomatic. Elevated BUN and Creatinine. On Azithromycin for pneumonia. BPH with LUTS. Urology f/u noted, TOV.  12/21/17 : Pt. still has the cough. Voiding freely. bun rising. Will Do PVR .Urology f/u. ID f/U noted. Hydronephrosis resolved.  12/22/17 : Pt. asymptomatic. Alert. BUN and creatinine trending down. Off Aguilar, voiding freely. On Antibiotics for pneumonia. BP is controlled. check renal function in AM . Discharge planning for AM.  12/23/17 : Pt alert, asymptomatic. renal function improving. Voiding freely. BP controlled. Discharge home. Adv. F/U with PMD. Discharge home on Azithromycin.

## 2017-12-23 NOTE — DISCHARGE NOTE ADULT - MEDICATION SUMMARY - MEDICATIONS TO TAKE
I will START or STAY ON the medications listed below when I get home from the hospital:    Aspir 81 oral delayed release tablet  -- 1 tab(s) by mouth once a day   -- Indication: For CAD    tamsulosin 0.4 mg oral capsule  -- 1 cap(s) by mouth once a day (at bedtime)  -- Indication: For BPH (benign prostatic hyperplasia)    repaglinide 1 mg oral tablet  -- 1 tab(s) by mouth 3 times a day (before meals)  -- Indication: For DM    labetalol 100 mg oral tablet  -- 1 tab(s) by mouth 2 times a day  -- Indication: For HTN (hypertension)    albuterol 90 mcg/inh inhalation aerosol  -- 1 puff(s) inhaled every 4 hours  -- Indication: For COPD    tiotropium 18 mcg inhalation capsule  -- 1 cap(s) inhaled once a day  -- Indication: For COPD    amLODIPine 10 mg oral tablet  -- 1 tab(s) by mouth once a day  -- Indication: For HTN (hypertension)    influenza virus vaccine, inactivated  -- Indication: For Vaccine    azithromycin 500 mg oral tablet  -- 1 tab(s) by mouth once a day  -- Indication: For Pneumonia    hydrALAZINE 10 mg oral tablet  -- 1 tab(s) by mouth every 6 hours  -- Indication: For HTN (hypertension)

## 2017-12-23 NOTE — DISCHARGE NOTE ADULT - PATIENT PORTAL LINK FT
“You can access the [facility] Patient Portal, offered by [facility], by registering with the following website: [email or web address]”

## 2017-12-23 NOTE — DISCHARGE NOTE ADULT - CARE PLAN
Principal Discharge DX:	Hypertensive urgency  Goal:	Resolved  Instructions for follow-up, activity and diet:	DASH/TLC  Secondary Diagnosis:	Essential hypertension  Goal:	Monitor BP  Secondary Diagnosis:	Acute renal failure superimposed on stage 4 chronic kidney disease, unspecified acute renal failure type  Goal:	Improving  Secondary Diagnosis:	Benign prostatic hyperplasia with lower urinary tract symptoms, symptom details unspecified  Goal:	Flomax, F/U with Urology  Secondary Diagnosis:	Type 2 diabetes mellitus with hyperglycemia, without long-term current use of insulin  Goal:	Monitor BS  Secondary Diagnosis:	Hydronephrosis due to obstruction of ureter  Goal:	Resolved

## 2017-12-23 NOTE — PROGRESS NOTE ADULT - PROBLEM SELECTOR PROBLEM 7
Acute renal failure superimposed on stage 4 chronic kidney disease, unspecified acute renal failure type
Glaucoma of both eyes, unspecified glaucoma type

## 2017-12-23 NOTE — PROGRESS NOTE ADULT - PROBLEM SELECTOR PROBLEM 2
Essential hypertension
Benign prostatic hyperplasia with lower urinary tract symptoms, symptom details unspecified
Hypertensive urgency

## 2017-12-23 NOTE — PROGRESS NOTE ADULT - PROBLEM SELECTOR PROBLEM 3
Acute renal failure superimposed on stage 4 chronic kidney disease, unspecified acute renal failure type
Glaucoma of both eyes, unspecified glaucoma type
Obstructive uropathy

## 2017-12-23 NOTE — PROGRESS NOTE ADULT - PROBLEM SELECTOR PROBLEM 6
Hypertensive emergency
CAP (community acquired pneumonia)
Acute renal failure superimposed on stage 4 chronic kidney disease, unspecified acute renal failure type

## 2017-12-23 NOTE — DISCHARGE NOTE ADULT - MEDICATION SUMMARY - MEDICATIONS TO STOP TAKING
I will STOP taking the medications listed below when I get home from the hospital:    metFORMIN    Tribenzor

## 2017-12-23 NOTE — PROGRESS NOTE ADULT - SUBJECTIVE AND OBJECTIVE BOX
Patient is a 77y old  Male who presents with a chief complaint of The patient is a 77y Male complaining of chest pain. (17 Dec 2017 15:28)      INTERVAL HPI/OVERNIGHT EVENTS: clinically improved.     MEDICATIONS  (STANDING):  ALBUTerol    90 MICROgram(s) HFA Inhaler 1 Puff(s) Inhalation every 4 hours  amLODIPine   Tablet 10 milliGRAM(s) Oral daily  azithromycin  IVPB 500 milliGRAM(s) IV Intermittent every 24 hours  cefTRIAXone   IVPB 1 Gram(s) IV Intermittent every 24 hours  chlorhexidine 4% Liquid 1 Application(s) Topical daily  dextrose 5%. 1000 milliLiter(s) (50 mL/Hr) IV Continuous <Continuous>  dextrose 50% Injectable 12.5 Gram(s) IV Push once  dextrose 50% Injectable 25 Gram(s) IV Push once  dextrose 50% Injectable 25 Gram(s) IV Push once  heparin  Injectable 5000 Unit(s) SubCutaneous every 8 hours  hydrALAZINE 10 milliGRAM(s) Oral every 6 hours  influenza   Vaccine 0.5 milliLiter(s) IntraMuscular once  insulin lispro (HumaLOG) corrective regimen sliding scale   SubCutaneous three times a day before meals  insulin lispro (HumaLOG) corrective regimen sliding scale   SubCutaneous at bedtime  labetalol 100 milliGRAM(s) Oral two times a day  tamsulosin 0.4 milliGRAM(s) Oral at bedtime  tiotropium 18 MICROgram(s) Capsule 1 Capsule(s) Inhalation daily    MEDICATIONS  (PRN):  acetaminophen   Tablet. 650 milliGRAM(s) Oral every 6 hours PRN Mild Pain (1 - 3)  dextrose Gel 1 Dose(s) Oral once PRN Blood Glucose LESS THAN 70 milliGRAM(s)/deciliter  glucagon  Injectable 1 milliGRAM(s) IntraMuscular once PRN Glucose LESS THAN 70 milligrams/deciliter      Allergies    grass, pollen (Rhinitis)  No Known Drug Allergies    Intolerances        REVIEW OF SYSTEMS:  CONSTITUTIONAL: No fever, weight loss, or fatigue  EYES: No eye pain, visual disturbances, or discharge  ENMT:  No difficulty hearing, tinnitus, vertigo; No sinus or throat pain  NECK: No pain or stiffness  BREASTS: No pain, masses, or nipple discharge  RESPIRATORY: No cough, wheezing, chills or hemoptysis; No shortness of breath  CARDIOVASCULAR: No chest pain, palpitations, dizziness, or leg swelling  GASTROINTESTINAL: No abdominal or epigastric pain. No nausea, vomiting, or hematemesis; No diarrhea or constipation. No melena or hematochezia.  GENITOURINARY: No dysuria, frequency, hematuria, or incontinence  NEUROLOGICAL: No headaches, memory loss, loss of strength, numbness, or tremors  SKIN: No itching, burning, rashes, or lesions   LYMPH NODES: No enlarged glands  ENDOCRINE: No heat or cold intolerance; No hair loss  MUSCULOSKELETAL: No joint pain or swelling; No muscle, back, or extremity pain  PSYCHIATRIC: No depression, anxiety, mood swings, or difficulty sleeping  HEME/LYMPH: No easy bruising, or bleeding gums  ALLERGY AND IMMUNOLOGIC: No hives or eczema    Vital Signs Last 24 Hrs  T(C): 36.4 (23 Dec 2017 10:43), Max: 36.6 (22 Dec 2017 18:01)  T(F): 97.6 (23 Dec 2017 10:43), Max: 97.8 (22 Dec 2017 18:01)  HR: 74 (23 Dec 2017 10:43) (73 - 82)  BP: 138/71 (23 Dec 2017 10:43) (138/71 - 166/81)  BP(mean): --  RR: 18 (23 Dec 2017 10:43) (16 - 20)  SpO2: 100% (23 Dec 2017 10:43) (95% - 100%)    PHYSICAL EXAM:  GENERAL: NAD, well-groomed, well-developed. Obese  HEAD:  Atraumatic, Normocephalic  EYES: EOMI, PERRL, conjunctiva and sclera clear  ENMT:  Moist mucous membranes, Good dentition, No lesions  NECK: Supple, No JVD, Normal thyroid  NERVOUS SYSTEM:  Alert & Oriented X3, Good concentration; Motor Strength 5/5 B/L upper and lower extremities; DTRs 2+ intact and symmetric  CHEST/LUNG: Clear to percussion bilaterally; No rales, rhonchi, wheezing, or rubs  HEART: Regular rate and rhythm; No murmurs, rubs, or gallops  ABDOMEN: Soft, Nontender, Nondistended; Bowel sounds present. Obese.  EXTREMITIES:  2+ Peripheral Pulses, No clubbing, cyanosis, or edema  LYMPH: No lymphadenopathy noted  SKIN: No rashes or lesions    LABS:                        9.6    6.2   )-----------( 236      ( 23 Dec 2017 09:07 )             32.0     12-23    141  |  104  |  34<H>  ----------------------------<  128<H>  4.3   |  28  |  2.65<H>    Ca    8.3<L>      23 Dec 2017 09:07    TPro  6.5  /  Alb  2.5<L>  /  TBili  0.5  /  DBili  x   /  AST  19  /  ALT  16  /  AlkPhos  64  12-22        CAPILLARY BLOOD GLUCOSE      POCT Blood Glucose.: 148 mg/dL (23 Dec 2017 08:22)  POCT Blood Glucose.: 99 mg/dL (22 Dec 2017 21:47)  POCT Blood Glucose.: 121 mg/dL (22 Dec 2017 16:28)  POCT Blood Glucose.: 134 mg/dL (22 Dec 2017 11:35)          Procalcitonin, Serum: 0.18 ng/mL (12-23 @ 00:24)  Hemoglobin A1C, Whole Blood: 6.4 % (12-18 @ 09:48)  Procalcitonin, Serum: 1.16 ng/mL (12-17 @ 21:06)    Cholesterol, Serum: 164 mg/dL (12-18 @ 09:48)  HDL Cholesterol, Serum: 49 mg/dL (12-18 @ 09:48)  Triglycerides, Serum: 134 mg/dL (12-18 @ 09:48)    Direct LDL: 88: LDL     Culture - Urine (collected 19 Dec 2017 10:01)  Source: .Urine Catheterized  Final Report (20 Dec 2017 11:44):    No growth    Culture - Urine (collected 18 Dec 2017 08:52)  Source: .Urine Catheterized  Final Report (19 Dec 2017 11:42):    No growth    Culture - Blood (collected 17 Dec 2017 11:20)  Source: .Blood Blood  Final Report (22 Dec 2017 12:01):    No growth at 5 days.    Culture - Blood (collected 17 Dec 2017 11:20)  Source: .Blood Blood  Final Report (22 Dec 2017 12:01):    No growth at 5 days.        RADIOLOGY & ADDITIONAL TESTS:  < from: Xray Chest 1 View AP/PA. (12.23.17 @ 08:24) >    EXAM:  CHEST SINGLE VIEW                            PROCEDURE DATE:  12/23/2017          INTERPRETATION:  Exam Date: 12/23/2017 8:24 AM    History: Cough    Technique: Single frontal portable view of the chest with comparison to    12/17/2017    Findings:    The heart is enlarged. Improving right upper lobe pneumonia. . Resolution   of left lower lobe pneumonia. The apices and hemidiaphragms are   unremarkable. Degenerative changes of the visualized osseous structures.        Impression:    Improving right upper lobe pneumonia. . Resolution of left lower lobe   pneumonia.      CONRADO DALAL M.D., ATTENDING RADIOLOGIST  This document has been electronically signed. Dec 23 2017 10:26AM       < end of copied text >  < from: US Renal (12.18.17 @ 13:56) >    EXAM:  US KIDNEY(S)                            PROCEDURE DATE:  12/18/2017          INTERPRETATION:  CLINICAL INFORMATION: Hydronephrosis.    COMPARISON: Comparison is made to the CT scan of the abdomen dated   12/17/2017.    TECHNIQUE: Sonography of the kidneys and bladder.     FINDINGS:    Right kidney:  10.5 x 4.3 x 4.3 cm. No  hydronephrosis or calculi.   Multiple cysts are seen in the right kidney, the largest of which in the   interpolar region measures 2.6 x 2.7 cm.    Left kidney:  10.1x 4.7 x 4.2 cm. No hydronephrosis or calculi. Multiple   cysts are seen in the left kidney, the largest of which in the upper pole   measures 3.3 x 3.7 cm.    Urinary bladder: Empty with a Aguilar catheter in place.    IMPRESSION: Bilateral renal cysts.      JOSUE MOLINA M.D. ATTENDING RADIOLOGIST  This document has been electronically signed. Dec 18 2017  2:03PM       < end of copied text >    Imaging Personally Reviewed:  [x ] YES  [ ] NO    Consultant(s) Notes Reviewed:  [x ] YES  [ ] NO    Care Discussed with Consultants/Other Providers [ ] YES  [ ] NO    PROBLEMS:  HYPERTENSIVE EMERGENCY, COMMUNITY ACQUIRED PNEUMONIA  CHEST PAIN  Hypertensive emergency  Other chest pain  Hypertensive emergency  Glaucoma of both eyes, unspecified glaucoma type  Benign prostatic hyperplasia with lower urinary tract symptoms, symptom details unspecified  Essential hypertension  Diabetes mellitus  Obstructive uropathy  Hypertensive urgency  Acute renal failure superimposed on stage 4 chronic kidney disease, unspecified acute renal failure type  BPH (benign prostatic hyperplasia)  Renal stone  CAP (community acquired pneumonia)      Care discussed with family,         [  ]   yes  [  ]  No    imp:    stable[ ]    unstable[  ]     improving [   ]       unchanged  [  ]                Plans:  Continue present plans  [  ]               New consult [  ]   specialty  .......               order mariaa[  ]    test name.                  Discharge Planning  [  ]

## 2017-12-28 DIAGNOSIS — H40.9 UNSPECIFIED GLAUCOMA: ICD-10-CM

## 2017-12-28 DIAGNOSIS — N18.4 CHRONIC KIDNEY DISEASE, STAGE 4 (SEVERE): ICD-10-CM

## 2017-12-28 DIAGNOSIS — I16.0 HYPERTENSIVE URGENCY: ICD-10-CM

## 2017-12-28 DIAGNOSIS — N13.2 HYDRONEPHROSIS WITH RENAL AND URETERAL CALCULOUS OBSTRUCTION: ICD-10-CM

## 2017-12-28 DIAGNOSIS — J16.0 CHLAMYDIAL PNEUMONIA: ICD-10-CM

## 2017-12-28 DIAGNOSIS — Z79.84 LONG TERM (CURRENT) USE OF ORAL HYPOGLYCEMIC DRUGS: ICD-10-CM

## 2017-12-28 DIAGNOSIS — I12.9 HYPERTENSIVE CHRONIC KIDNEY DISEASE WITH STAGE 1 THROUGH STAGE 4 CHRONIC KIDNEY DISEASE, OR UNSPECIFIED CHRONIC KIDNEY DISEASE: ICD-10-CM

## 2017-12-28 DIAGNOSIS — N17.9 ACUTE KIDNEY FAILURE, UNSPECIFIED: ICD-10-CM

## 2017-12-28 DIAGNOSIS — Z79.82 LONG TERM (CURRENT) USE OF ASPIRIN: ICD-10-CM

## 2017-12-28 DIAGNOSIS — N40.1 BENIGN PROSTATIC HYPERPLASIA WITH LOWER URINARY TRACT SYMPTOMS: ICD-10-CM

## 2017-12-28 DIAGNOSIS — E11.22 TYPE 2 DIABETES MELLITUS WITH DIABETIC CHRONIC KIDNEY DISEASE: ICD-10-CM

## 2017-12-28 DIAGNOSIS — E11.65 TYPE 2 DIABETES MELLITUS WITH HYPERGLYCEMIA: ICD-10-CM

## 2018-06-04 ENCOUNTER — EMERGENCY (EMERGENCY)
Facility: HOSPITAL | Age: 78
LOS: 1 days | Discharge: HOME | End: 2018-06-04
Attending: EMERGENCY MEDICINE | Admitting: EMERGENCY MEDICINE
Payer: MEDICARE

## 2018-06-04 VITALS
SYSTOLIC BLOOD PRESSURE: 208 MMHG | RESPIRATION RATE: 18 BRPM | DIASTOLIC BLOOD PRESSURE: 104 MMHG | OXYGEN SATURATION: 99 % | HEART RATE: 73 BPM | TEMPERATURE: 98 F

## 2018-06-04 VITALS — DIASTOLIC BLOOD PRESSURE: 90 MMHG | SYSTOLIC BLOOD PRESSURE: 180 MMHG

## 2018-06-04 PROCEDURE — 99284 EMERGENCY DEPT VISIT MOD MDM: CPT

## 2018-06-04 RX ORDER — NITROGLYCERIN 6.5 MG
0.3 CAPSULE, EXTENDED RELEASE ORAL ONCE
Qty: 0 | Refills: 0 | Status: DISCONTINUED | OUTPATIENT
Start: 2018-06-04 | End: 2018-06-04

## 2018-06-04 RX ORDER — LABETALOL HCL 100 MG
10 TABLET ORAL ONCE
Qty: 0 | Refills: 0 | Status: COMPLETED | OUTPATIENT
Start: 2018-06-04 | End: 2018-06-04

## 2018-06-04 RX ORDER — HYDRALAZINE HCL 50 MG
20 TABLET ORAL ONCE
Qty: 0 | Refills: 0 | Status: COMPLETED | OUTPATIENT
Start: 2018-06-04 | End: 2018-06-04

## 2018-06-04 RX ORDER — NITROGLYCERIN 6.5 MG
0.4 CAPSULE, EXTENDED RELEASE ORAL ONCE
Qty: 0 | Refills: 0 | Status: COMPLETED | OUTPATIENT
Start: 2018-06-04 | End: 2018-06-04

## 2018-06-04 RX ADMIN — Medication 0.4 MILLIGRAM(S): at 22:20

## 2018-06-04 RX ADMIN — Medication 10 MILLIGRAM(S): at 21:40

## 2018-06-04 RX ADMIN — Medication 20 MILLIGRAM(S): at 20:35

## 2018-06-04 RX ADMIN — Medication 0.4 MILLIGRAM(S): at 21:40

## 2018-06-04 NOTE — ED ADULT NURSE NOTE - PMH
BPH (Benign Prostatic Hypertrophy)    Glaucoma (Increased Eye Pressure)    HTN (hypertension)    HTN - Hypertension

## 2018-06-04 NOTE — ED ADULT NURSE NOTE - OBJECTIVE STATEMENT
Received pt into spot #25. Pt A/O x 4 calm cooperative, no acute distress noted. Pt accompanied by wife and young male family member. Pt denies any pain or discomfort. Pt admits to not being compliant with bp meds on a daily basis. Pt states only takes it some days when he remembers. Pt took BP meds today and took BP meds this past friday. Denies headache, dizziness, SOB, palpitations, Chest pain, nausea or diaphoresis. BP obtained in left arm 246/119, repeated again 235/114. BP reading in right arm 225/125. Pt placed on tele monitor SR HR 70s. Awaiting eval by MD.

## 2018-06-04 NOTE — ED PROVIDER NOTE - MEDICAL DECISION MAKING DETAILS
77y.o. M presenting with asymptomatic hypertension from PMD office in setting of noncompliance, will administer BP med, FS, medication compliance, follow up with PMD tomorrow 77y.o. M presenting with asymptomatic hypertension from PMD office in setting of noncompliance, will administer BP med, FS, medication compliance, follow up with PMD tomorrow    Cabot: 77M with HTN, not compliant with his medication (hydral 10mg q6h) who was sent in from his PMD's office with HTN to the 200s/100s.  Pt denies CP, SOB, blurred vision, HA, numbness, weakness.  On exam, hypertensive to 220/110, NAD, lungs CTAB, heart sounds normal, abd benign, LEs with trace edema (chronic per pt).  Will treat asymptomatic HTN to < to 200/100 or less.  Pt will see his PMD tomorrow for medication adjustments as needed.

## 2018-06-04 NOTE — ED ADULT TRIAGE NOTE - CHIEF COMPLAINT QUOTE
pt was at his PMD office today for routine check up. sent into ED for SBP of 200. pt compliant with medications. denies HA/blurry vision/cp.

## 2018-06-04 NOTE — ED PROVIDER NOTE - PSH
Excision of Sinus Polyp  2006  Laser Surgery :Right  ?  regarding procedure ; 12/07 ; secondary to glaucoma  Laser Surgery Left  ? regarding procedure ; secondary to glaucoma 12/07  No significant past surgical history

## 2018-06-04 NOTE — ED PROVIDER NOTE - OBJECTIVE STATEMENT
Pt is 78yo M with PMHx of HTN (inconsistent with medication), T2DM (not on regular medication) presenting due to finding of elevated BP to 220s/110s in outpatient physician office. Pt just returned [place] [facility] yesterday morning from a 4month trip. He has inconsistently been taking his BP medication. Pt asymptomatic upon presentation. Pt denies headache, dizziness, SOB, palpitations, CP, fever, chills nausea or diaphoresis.

## 2018-06-04 NOTE — ED PROVIDER NOTE - ATTENDING CONTRIBUTION TO CARE
.attest    Cabot: 77M with HTN, not compliant with his medication (hydral 10mg q6h) who was sent in from his PMD's office with HTN to the 200s/100s.  Pt denies CP, SOB, blurred vision, HA, numbness, weakness.  On exam, hypertensive to 220/110, NAD, lungs CTAB, heart sounds normal, abd benign, LEs with trace edema (chronic per pt).  Will treat asymptomatic HTN to < to 200/100 or less.  Pt will see his PMD tomorrow for medication adjustments as needed.

## 2018-06-05 ENCOUNTER — EMERGENCY (EMERGENCY)
Facility: HOSPITAL | Age: 78
LOS: 1 days | Discharge: HOME | End: 2018-06-05
Attending: EMERGENCY MEDICINE | Admitting: EMERGENCY MEDICINE
Payer: MEDICARE

## 2018-06-05 VITALS
SYSTOLIC BLOOD PRESSURE: 227 MMHG | HEART RATE: 69 BPM | RESPIRATION RATE: 16 BRPM | DIASTOLIC BLOOD PRESSURE: 107 MMHG | OXYGEN SATURATION: 100 %

## 2018-06-05 VITALS
TEMPERATURE: 98 F | RESPIRATION RATE: 14 BRPM | DIASTOLIC BLOOD PRESSURE: 106 MMHG | SYSTOLIC BLOOD PRESSURE: 214 MMHG | HEART RATE: 68 BPM | OXYGEN SATURATION: 100 %

## 2018-06-05 LAB
BUN SERPL-MCNC: 33 MG/DL — HIGH (ref 7–23)
CALCIUM SERPL-MCNC: 8.6 MG/DL — SIGNIFICANT CHANGE UP (ref 8.4–10.5)
CHLORIDE SERPL-SCNC: 101 MMOL/L — SIGNIFICANT CHANGE UP (ref 98–107)
CO2 SERPL-SCNC: 23 MMOL/L — SIGNIFICANT CHANGE UP (ref 22–31)
CREAT SERPL-MCNC: 3.46 MG/DL — HIGH (ref 0.5–1.3)
GLUCOSE SERPL-MCNC: 83 MG/DL — SIGNIFICANT CHANGE UP (ref 70–99)
POTASSIUM SERPL-MCNC: 4 MMOL/L — SIGNIFICANT CHANGE UP (ref 3.5–5.3)
POTASSIUM SERPL-SCNC: 4 MMOL/L — SIGNIFICANT CHANGE UP (ref 3.5–5.3)
SODIUM SERPL-SCNC: 138 MMOL/L — SIGNIFICANT CHANGE UP (ref 135–145)

## 2018-06-05 PROCEDURE — 93010 ELECTROCARDIOGRAM REPORT: CPT

## 2018-06-05 PROCEDURE — 99284 EMERGENCY DEPT VISIT MOD MDM: CPT | Mod: 25,GC

## 2018-06-05 RX ORDER — HYDRALAZINE HCL 50 MG
10 TABLET ORAL ONCE
Qty: 0 | Refills: 0 | Status: COMPLETED | OUTPATIENT
Start: 2018-06-05 | End: 2018-06-05

## 2018-06-05 RX ORDER — HYDRALAZINE HCL 50 MG
5 TABLET ORAL ONCE
Qty: 0 | Refills: 0 | Status: COMPLETED | OUTPATIENT
Start: 2018-06-05 | End: 2018-06-05

## 2018-06-05 RX ADMIN — Medication 5 MILLIGRAM(S): at 18:59

## 2018-06-05 RX ADMIN — Medication 10 MILLIGRAM(S): at 19:38

## 2018-06-05 NOTE — ED ADULT NURSE NOTE - OBJECTIVE STATEMENT
Pt presents to ED R#1 Aox4, in NAD, c/o elevated BPs today (200+ day). Endorsed mild dizziness, denies other acute complaints. Respirations appear even and unlabored.

## 2018-06-05 NOTE — ED ADULT TRIAGE NOTE - CHIEF COMPLAINT QUOTE
Pt with a pmhx of  HTN (inconsistent with medication), T2DM (not on regular medication) presents with c/o ongoing HTN. Pt seen and d/c'ed from [facility] ED yesterday for same complaint. Pt states that he took his blood pressure today to monitor it, he did not have any complaints at that time nor at the present. Of note, pt just returned from [facility] 2 days ago from a 4month trip. Pt is intermittently compliant with his rx'ed medication. Pt denies having any symptoms including but not limited to headache, dizziness, SOB, palpitations, CP, fever, chills nausea or diaphoresis.

## 2018-06-05 NOTE — ED PROVIDER NOTE - CARE PLAN
Principal Discharge DX:	Hypertension, unspecified type Principal Discharge DX:	Hypertension, unspecified type  Assessment and plan of treatment:	-- Follow up with your primary doctor tomorrow for further BP management. Bring a copy of your results from today and discuss them with your doctor.   -- It is very important that you take your blood pressure medications as prescribed. Limited salt intake.  -- Return to ER immediately for new or worsening symptoms, any urgent issues, or for any concerns.

## 2018-06-05 NOTE — ED PROVIDER NOTE - PLAN OF CARE
-- Follow up with your primary doctor tomorrow for further BP management. Bring a copy of your results from today and discuss them with your doctor.   -- It is very important that you take your blood pressure medications as prescribed. Limited salt intake.  -- Return to ER immediately for new or worsening symptoms, any urgent issues, or for any concerns.

## 2018-06-05 NOTE — ED PROVIDER NOTE - MEDICAL DECISION MAKING DETAILS
Pt with persistent HTN. Asymptomatic. Will get EKG and BMP to eval for end-organ damage, give BP meds. Had long discussion with pt regarding need to be compliant with his antihypertensives and to limited salt intake.

## 2018-06-05 NOTE — ED ADULT NURSE NOTE - CHIEF COMPLAINT QUOTE
Pt with a pmhx of  HTN (inconsistent with medication), T2DM (not on regular medication) presents with c/o ongoing HTN. Pt seen and d/c'ed from [facility] ED yesterday for same complaint. Pt states that he took his blood pressure today to monitor it, he did not have any complaints at that time nor at the present. Of note, pt just returned from [facility] 2 days ago from a 4month trip. Pt is intermittently compliant with his rx'ed medication. Pt denies having any symptoms including but not limited to headache, dizziness, SOB, palpitations, CP, fever, chills nausea or diaphoresis.

## 2018-06-05 NOTE — ED PROVIDER NOTE - ATTENDING CONTRIBUTION TO CARE
I performed a face to face bedside interview with patient regarding history of present illness, review of symptoms and past medical history. I completed an independent physical exam.  I have discussed patient's plan of care.   I agree with note as stated above, having amended the EMR as needed to reflect my findings. I have discussed the assessment and plan of care.  This includes during the time I functioned as the attending physician for this patient.  Attending Contribution to Care: agree with plan of resident. pt p/w elevated bp with no symptoms. states was concerned secondary to elevated bp. no complaints, including no headache, cp, dec urination, ams, agitation, bmp wnl. will f/u with pmd for better management of bp. I performed a face to face bedside interview with patient regarding history of present illness, review of symptoms and past medical history. I completed an independent physical exam.  I have discussed patient's plan of care.   I agree with note as stated above, having amended the EMR as needed to reflect my findings. I have discussed the assessment and plan of care.  This includes during the time I functioned as the attending physician for this patient.  Attending Contribution to Care: agree with plan of resident. pt p/w elevated bp with no symptoms. states was concerned secondary to elevated bp. no complaints, including no headache, cp, dec urination, ams, agitation, bmp wnl with chronic ckd with no hyperk. . will f/u with pmd for better management of bp.

## 2018-06-05 NOTE — ED PROVIDER NOTE - PROGRESS NOTE DETAILS
Gollogly: Pt with elevated Cr. D/w pt. Emphasized importance of PMD follow up for antihypertensive medication optimization. Gave strict return precautions. Will not give further IV antihypertensives in the ED out of concern for iatrogenic complications of overaggressive BP management, as pt's hypertension is likely chronic.

## 2018-06-26 ENCOUNTER — INPATIENT (INPATIENT)
Facility: HOSPITAL | Age: 78
LOS: 3 days | Discharge: HOME | End: 2018-06-30
Attending: INTERNAL MEDICINE | Admitting: INTERNAL MEDICINE
Payer: MEDICARE

## 2018-06-26 VITALS
TEMPERATURE: 98 F | HEART RATE: 69 BPM | RESPIRATION RATE: 18 BRPM | DIASTOLIC BLOOD PRESSURE: 113 MMHG | OXYGEN SATURATION: 99 % | SYSTOLIC BLOOD PRESSURE: 202 MMHG

## 2018-06-26 DIAGNOSIS — E87.70 FLUID OVERLOAD, UNSPECIFIED: ICD-10-CM

## 2018-06-26 LAB
ALBUMIN SERPL ELPH-MCNC: 3.8 G/DL — SIGNIFICANT CHANGE UP (ref 3.3–5)
ALP SERPL-CCNC: 74 U/L — SIGNIFICANT CHANGE UP (ref 40–120)
ALT FLD-CCNC: 49 U/L — HIGH (ref 4–41)
AST SERPL-CCNC: 30 U/L — SIGNIFICANT CHANGE UP (ref 4–40)
BASOPHILS # BLD AUTO: 0.03 K/UL — SIGNIFICANT CHANGE UP (ref 0–0.2)
BASOPHILS NFR BLD AUTO: 0.5 % — SIGNIFICANT CHANGE UP (ref 0–2)
BILIRUB SERPL-MCNC: < 0.2 MG/DL — LOW (ref 0.2–1.2)
BUN SERPL-MCNC: 43 MG/DL — HIGH (ref 7–23)
CALCIUM SERPL-MCNC: 8.7 MG/DL — SIGNIFICANT CHANGE UP (ref 8.4–10.5)
CHLORIDE SERPL-SCNC: 107 MMOL/L — SIGNIFICANT CHANGE UP (ref 98–107)
CO2 SERPL-SCNC: 26 MMOL/L — SIGNIFICANT CHANGE UP (ref 22–31)
CREAT SERPL-MCNC: 3.16 MG/DL — HIGH (ref 0.5–1.3)
EOSINOPHIL # BLD AUTO: 0.49 K/UL — SIGNIFICANT CHANGE UP (ref 0–0.5)
EOSINOPHIL NFR BLD AUTO: 8 % — HIGH (ref 0–6)
GLUCOSE SERPL-MCNC: 91 MG/DL — SIGNIFICANT CHANGE UP (ref 70–99)
HCT VFR BLD CALC: 31.7 % — LOW (ref 39–50)
HGB BLD-MCNC: 9.8 G/DL — LOW (ref 13–17)
IMM GRANULOCYTES # BLD AUTO: 0.02 # — SIGNIFICANT CHANGE UP
IMM GRANULOCYTES NFR BLD AUTO: 0.3 % — SIGNIFICANT CHANGE UP (ref 0–1.5)
LYMPHOCYTES # BLD AUTO: 1.54 K/UL — SIGNIFICANT CHANGE UP (ref 1–3.3)
LYMPHOCYTES # BLD AUTO: 25 % — SIGNIFICANT CHANGE UP (ref 13–44)
MCHC RBC-ENTMCNC: 23.6 PG — LOW (ref 27–34)
MCHC RBC-ENTMCNC: 30.9 % — LOW (ref 32–36)
MCV RBC AUTO: 76.4 FL — LOW (ref 80–100)
MONOCYTES # BLD AUTO: 0.69 K/UL — SIGNIFICANT CHANGE UP (ref 0–0.9)
MONOCYTES NFR BLD AUTO: 11.2 % — SIGNIFICANT CHANGE UP (ref 2–14)
NEUTROPHILS # BLD AUTO: 3.38 K/UL — SIGNIFICANT CHANGE UP (ref 1.8–7.4)
NEUTROPHILS NFR BLD AUTO: 55 % — SIGNIFICANT CHANGE UP (ref 43–77)
NRBC # FLD: 0 — SIGNIFICANT CHANGE UP
NT-PROBNP SERPL-SCNC: 5457 PG/ML — SIGNIFICANT CHANGE UP
PLATELET # BLD AUTO: 156 K/UL — SIGNIFICANT CHANGE UP (ref 150–400)
PMV BLD: 11.6 FL — SIGNIFICANT CHANGE UP (ref 7–13)
POTASSIUM SERPL-MCNC: 4.7 MMOL/L — SIGNIFICANT CHANGE UP (ref 3.5–5.3)
POTASSIUM SERPL-SCNC: 4.7 MMOL/L — SIGNIFICANT CHANGE UP (ref 3.5–5.3)
PROT SERPL-MCNC: 6.8 G/DL — SIGNIFICANT CHANGE UP (ref 6–8.3)
RBC # BLD: 4.15 M/UL — LOW (ref 4.2–5.8)
RBC # FLD: 15.9 % — HIGH (ref 10.3–14.5)
SODIUM SERPL-SCNC: 144 MMOL/L — SIGNIFICANT CHANGE UP (ref 135–145)
WBC # BLD: 6.15 K/UL — SIGNIFICANT CHANGE UP (ref 3.8–10.5)
WBC # FLD AUTO: 6.15 K/UL — SIGNIFICANT CHANGE UP (ref 3.8–10.5)

## 2018-06-26 PROCEDURE — 71046 X-RAY EXAM CHEST 2 VIEWS: CPT | Mod: 26

## 2018-06-26 RX ORDER — LABETALOL HCL 100 MG
200 TABLET ORAL
Qty: 0 | Refills: 0 | Status: DISCONTINUED | OUTPATIENT
Start: 2018-06-27 | End: 2018-06-27

## 2018-06-26 RX ORDER — SODIUM CHLORIDE 9 MG/ML
1000 INJECTION, SOLUTION INTRAVENOUS
Qty: 0 | Refills: 0 | Status: DISCONTINUED | OUTPATIENT
Start: 2018-06-26 | End: 2018-06-30

## 2018-06-26 RX ORDER — FUROSEMIDE 40 MG
40 TABLET ORAL ONCE
Qty: 0 | Refills: 0 | Status: COMPLETED | OUTPATIENT
Start: 2018-06-26 | End: 2018-06-26

## 2018-06-26 RX ORDER — DEXTROSE 50 % IN WATER 50 %
25 SYRINGE (ML) INTRAVENOUS ONCE
Qty: 0 | Refills: 0 | Status: DISCONTINUED | OUTPATIENT
Start: 2018-06-26 | End: 2018-06-30

## 2018-06-26 RX ORDER — NITROGLYCERIN 6.5 MG
1 CAPSULE, EXTENDED RELEASE ORAL ONCE
Qty: 0 | Refills: 0 | Status: COMPLETED | OUTPATIENT
Start: 2018-06-26 | End: 2018-06-26

## 2018-06-26 RX ORDER — ASPIRIN/CALCIUM CARB/MAGNESIUM 324 MG
81 TABLET ORAL DAILY
Qty: 0 | Refills: 0 | Status: DISCONTINUED | OUTPATIENT
Start: 2018-06-26 | End: 2018-06-30

## 2018-06-26 RX ORDER — INSULIN LISPRO 100/ML
VIAL (ML) SUBCUTANEOUS
Qty: 0 | Refills: 0 | Status: DISCONTINUED | OUTPATIENT
Start: 2018-06-26 | End: 2018-06-30

## 2018-06-26 RX ORDER — DEXTROSE 50 % IN WATER 50 %
12.5 SYRINGE (ML) INTRAVENOUS ONCE
Qty: 0 | Refills: 0 | Status: DISCONTINUED | OUTPATIENT
Start: 2018-06-26 | End: 2018-06-30

## 2018-06-26 RX ORDER — SODIUM CHLORIDE 9 MG/ML
3 INJECTION INTRAMUSCULAR; INTRAVENOUS; SUBCUTANEOUS EVERY 8 HOURS
Qty: 0 | Refills: 0 | Status: DISCONTINUED | OUTPATIENT
Start: 2018-06-26 | End: 2018-06-30

## 2018-06-26 RX ORDER — TAMSULOSIN HYDROCHLORIDE 0.4 MG/1
0.4 CAPSULE ORAL AT BEDTIME
Qty: 0 | Refills: 0 | Status: DISCONTINUED | OUTPATIENT
Start: 2018-06-26 | End: 2018-06-30

## 2018-06-26 RX ORDER — INSULIN LISPRO 100/ML
VIAL (ML) SUBCUTANEOUS AT BEDTIME
Qty: 0 | Refills: 0 | Status: DISCONTINUED | OUTPATIENT
Start: 2018-06-26 | End: 2018-06-30

## 2018-06-26 RX ORDER — GLUCAGON INJECTION, SOLUTION 0.5 MG/.1ML
1 INJECTION, SOLUTION SUBCUTANEOUS ONCE
Qty: 0 | Refills: 0 | Status: DISCONTINUED | OUTPATIENT
Start: 2018-06-26 | End: 2018-06-30

## 2018-06-26 RX ORDER — HEPARIN SODIUM 5000 [USP'U]/ML
5000 INJECTION INTRAVENOUS; SUBCUTANEOUS EVERY 12 HOURS
Qty: 0 | Refills: 0 | Status: DISCONTINUED | OUTPATIENT
Start: 2018-06-26 | End: 2018-06-30

## 2018-06-26 RX ORDER — FUROSEMIDE 40 MG
40 TABLET ORAL
Qty: 0 | Refills: 0 | Status: DISCONTINUED | OUTPATIENT
Start: 2018-06-27 | End: 2018-06-30

## 2018-06-26 RX ORDER — LABETALOL HCL 100 MG
10 TABLET ORAL ONCE
Qty: 0 | Refills: 0 | Status: COMPLETED | OUTPATIENT
Start: 2018-06-26 | End: 2018-06-26

## 2018-06-26 RX ORDER — FUROSEMIDE 40 MG
60 TABLET ORAL ONCE
Qty: 0 | Refills: 0 | Status: COMPLETED | OUTPATIENT
Start: 2018-06-26 | End: 2018-06-26

## 2018-06-26 RX ORDER — LABETALOL HCL 100 MG
200 TABLET ORAL ONCE
Qty: 0 | Refills: 0 | Status: COMPLETED | OUTPATIENT
Start: 2018-06-26 | End: 2018-06-26

## 2018-06-26 RX ORDER — DEXTROSE 50 % IN WATER 50 %
15 SYRINGE (ML) INTRAVENOUS ONCE
Qty: 0 | Refills: 0 | Status: DISCONTINUED | OUTPATIENT
Start: 2018-06-26 | End: 2018-06-30

## 2018-06-26 RX ORDER — HYDRALAZINE HCL 50 MG
50 TABLET ORAL ONCE
Qty: 0 | Refills: 0 | Status: COMPLETED | OUTPATIENT
Start: 2018-06-26 | End: 2018-06-26

## 2018-06-26 RX ORDER — HYDRALAZINE HCL 50 MG
50 TABLET ORAL EVERY 8 HOURS
Qty: 0 | Refills: 0 | Status: DISCONTINUED | OUTPATIENT
Start: 2018-06-27 | End: 2018-06-30

## 2018-06-26 RX ADMIN — Medication 1 INCH(S): at 21:00

## 2018-06-26 RX ADMIN — Medication 50 MILLIGRAM(S): at 21:00

## 2018-06-26 RX ADMIN — Medication 60 MILLIGRAM(S): at 22:39

## 2018-06-26 RX ADMIN — Medication 40 MILLIGRAM(S): at 21:00

## 2018-06-26 RX ADMIN — Medication 10 MILLIGRAM(S): at 22:16

## 2018-06-26 RX ADMIN — Medication 200 MILLIGRAM(S): at 21:00

## 2018-06-26 NOTE — ED PROVIDER NOTE - PROGRESS NOTE DETAILS
Rhonda PGY-3: Admitted pt to Dr Jaramillo, he's requesting additional 60mg Lasix IV. Text paged tele pa

## 2018-06-26 NOTE — H&P ADULT - ATTENDING COMMENTS
patient well known to me he was sent to the ER by EMS due to elevated blood p it was 230/110 after EMS arrived his and he received for nitroglycerin sublingual.  Blood pres dropped to 180/100 he feels better today his breathing improved a after receiving 100 mg Lasix in the ER.  He was seen at 9 AM in his  bed sitting at the bedsi  76 y/o M with hx of CKD, HTN, DM presents with HAWKINS x 1-2 days. Patient states he is normally able to walk 2-3 blocks and can only walk 5-10 steps now. HE also complains of b/l LE edema x 3 days. Denies fever, chills, cough, falls, LOC, chest pain, abdominal pain, nausea, vomiting, melena, hematochezia, calf tenderness, or dysuria.     Patient states he has not urinating as much over the last weeks secondary to "dribbling." He was on flomax in the past, but has not been taking it for months.   On physical exam there is decreased in a month of rhonchi at the bases of and decreasing edema of   lower extremity is troponin elevated secondary to renal fail failure we'll perform an echo and will change Normodyne to Coreg 6. twice a day continue Lasix 40 mg  IV twice a day.  His medical case was di and medical team in detail  on 6/26/2018 6/27/2018

## 2018-06-26 NOTE — H&P ADULT - HISTORY OF PRESENT ILLNESS
76 y/o M with hx of CKD, HTN, DM presents with HAWKINS x 1-2 days. Patient states he is normally able to walk 2-3 blocks and can only walk 5-10 steps now. HE also complains of b/l LE edema x 3 days. Denies fever, chills, cough, falls, LOC, chest pain, abdominal pain, nausea, vomiting, melena, hematochezia, calf tenderness, or dysuria.     Patient states he has not urinating as much over the last weeks secondary to "dribbling." He was on flomax in the past, but has not been taking it for months.

## 2018-06-26 NOTE — ED ADULT NURSE NOTE - OBJECTIVE STATEMENT
Pt received in spot 1, A&OX3, NAD.  c/o intermittent SOB for the past week, worse upon exertion.  Denies any chest pain/palpitations.  Pt states was at cardiologist office today to get checked out and was noted to be hypertensive.  Pt denies any HA/dizziness/lightheadedness/nausea. NSR on monitor.  Respirations even and unlabored on room air.  Crackles noted to base of lungs.  +1 pedal edema noted B/L.  Pt appears comfortable. Pt arrives with 20g IVL to L hand placed by EMS.  Flushes without difficulty.  Labs sent.  Will continue to monitor. Pt received in spot 1, A&OX3, NAD.  c/o intermittent SOB for the past week, worse upon exertion.  Denies any chest pain/palpitations.  Pt states was at cardiologist office today to get checked out and was noted to be hypertensive.  Pt endorses compliancy with meds, but missed afternoon doses.  Pt denies any HA/dizziness/lightheadedness/nausea. NSR on monitor.  Respirations even and unlabored on room air.  Crackles noted to base of lungs.  +1 pedal edema noted B/L.  Pt appears comfortable. Pt arrives with 20g IVL to L hand placed by EMS.  Flushes without difficulty.  Labs sent.  Will continue to monitor.

## 2018-06-26 NOTE — H&P ADULT - PROBLEM SELECTOR PLAN 1
Admit to tele  check cbc, bmp, a1c, flp, tsh, trend CE  echo ordered  avoid rapid BP correction  restart labetalol and hydralazine  f/u MD note  Discussed with Dr. Roman

## 2018-06-26 NOTE — H&P ADULT - NSHPLABSRESULTS_GEN_ALL_CORE
EKG; NSR 67 TWI in v5-V6, I, AVL                          9.8    6.15  )-----------( 156      ( 26 Jun 2018 20:51 )             31.7     06-26    144  |  107  |  43<H>  ----------------------------<  91  4.7   |  26  |  3.16<H>    Ca    8.7      26 Jun 2018 20:51    TPro  6.8  /  Alb  3.8  /  TBili  < 0.2<L>  /  DBili  x   /  AST  30  /  ALT  49<H>  /  AlkPhos  74  06-26

## 2018-06-26 NOTE — ED PROVIDER NOTE - MEDICAL DECISION MAKING DETAILS
Pt with hypertension and signs of fluid overload (rales on exam, LE edema)  - anti-hypertensives, nitro paste, lasix, labs, cxr, reassess. Likely admit.

## 2018-06-26 NOTE — ED ADULT TRIAGE NOTE - CHIEF COMPLAINT QUOTE
Pt brought in by EMS from home complaining of HTN and intermittent SOB pt given 4 sublingual nitros and ASA by EMS with no relief. Pt denies chest pain, N/V/D, fever or chills.

## 2018-06-26 NOTE — ED PROVIDER NOTE - ATTENDING CONTRIBUTION TO CARE
I agree with the above H&P.  Briefly this is a 77 year old with strong cardiac hx presents with velazco and b/l edema as well as htn.  likley chf exacerbation. no signs pulm edema.  will diurese, rule out acs and other electrolyte abn and admit for further workup.; doubt pna, ptx, dissection.

## 2018-06-27 DIAGNOSIS — N18.9 CHRONIC KIDNEY DISEASE, UNSPECIFIED: ICD-10-CM

## 2018-06-27 DIAGNOSIS — N40.0 BENIGN PROSTATIC HYPERPLASIA WITHOUT LOWER URINARY TRACT SYMPTOMS: ICD-10-CM

## 2018-06-27 DIAGNOSIS — I10 ESSENTIAL (PRIMARY) HYPERTENSION: ICD-10-CM

## 2018-06-27 DIAGNOSIS — E87.70 FLUID OVERLOAD, UNSPECIFIED: ICD-10-CM

## 2018-06-27 DIAGNOSIS — I16.1 HYPERTENSIVE EMERGENCY: ICD-10-CM

## 2018-06-27 DIAGNOSIS — Z29.9 ENCOUNTER FOR PROPHYLACTIC MEASURES, UNSPECIFIED: ICD-10-CM

## 2018-06-27 DIAGNOSIS — E11.9 TYPE 2 DIABETES MELLITUS WITHOUT COMPLICATIONS: ICD-10-CM

## 2018-06-27 LAB
BASOPHILS # BLD AUTO: 0.02 K/UL — SIGNIFICANT CHANGE UP (ref 0–0.2)
BASOPHILS NFR BLD AUTO: 0.3 % — SIGNIFICANT CHANGE UP (ref 0–2)
BUN SERPL-MCNC: 43 MG/DL — HIGH (ref 7–23)
CALCIUM SERPL-MCNC: 9 MG/DL — SIGNIFICANT CHANGE UP (ref 8.4–10.5)
CHLORIDE SERPL-SCNC: 104 MMOL/L — SIGNIFICANT CHANGE UP (ref 98–107)
CHOLEST SERPL-MCNC: 164 MG/DL — SIGNIFICANT CHANGE UP (ref 120–199)
CK MB BLD-MCNC: 3.04 NG/ML — SIGNIFICANT CHANGE UP (ref 1–6.6)
CK MB BLD-MCNC: 3.46 NG/ML — SIGNIFICANT CHANGE UP (ref 1–6.6)
CK MB BLD-MCNC: SIGNIFICANT CHANGE UP (ref 0–2.5)
CK MB BLD-MCNC: SIGNIFICANT CHANGE UP (ref 0–2.5)
CK SERPL-CCNC: 102 U/L — SIGNIFICANT CHANGE UP (ref 30–200)
CK SERPL-CCNC: 109 U/L — SIGNIFICANT CHANGE UP (ref 30–200)
CO2 SERPL-SCNC: 27 MMOL/L — SIGNIFICANT CHANGE UP (ref 22–31)
CREAT SERPL-MCNC: 3.13 MG/DL — HIGH (ref 0.5–1.3)
EOSINOPHIL # BLD AUTO: 0.48 K/UL — SIGNIFICANT CHANGE UP (ref 0–0.5)
EOSINOPHIL NFR BLD AUTO: 8.2 % — HIGH (ref 0–6)
GLUCOSE BLDC GLUCOMTR-MCNC: 146 MG/DL — HIGH (ref 70–99)
GLUCOSE BLDC GLUCOMTR-MCNC: 151 MG/DL — HIGH (ref 70–99)
GLUCOSE BLDC GLUCOMTR-MCNC: 76 MG/DL — SIGNIFICANT CHANGE UP (ref 70–99)
GLUCOSE BLDC GLUCOMTR-MCNC: 91 MG/DL — SIGNIFICANT CHANGE UP (ref 70–99)
GLUCOSE SERPL-MCNC: 102 MG/DL — HIGH (ref 70–99)
HBA1C BLD-MCNC: 5.7 % — HIGH (ref 4–5.6)
HCT VFR BLD CALC: 33.5 % — LOW (ref 39–50)
HDLC SERPL-MCNC: 71 MG/DL — HIGH (ref 35–55)
HGB BLD-MCNC: 10.6 G/DL — LOW (ref 13–17)
IMM GRANULOCYTES # BLD AUTO: 0.01 # — SIGNIFICANT CHANGE UP
IMM GRANULOCYTES NFR BLD AUTO: 0.2 % — SIGNIFICANT CHANGE UP (ref 0–1.5)
LIPID PNL WITH DIRECT LDL SERPL: 88 MG/DL — SIGNIFICANT CHANGE UP
LYMPHOCYTES # BLD AUTO: 1.38 K/UL — SIGNIFICANT CHANGE UP (ref 1–3.3)
LYMPHOCYTES # BLD AUTO: 23.6 % — SIGNIFICANT CHANGE UP (ref 13–44)
MAGNESIUM SERPL-MCNC: 2.1 MG/DL — SIGNIFICANT CHANGE UP (ref 1.6–2.6)
MCHC RBC-ENTMCNC: 23.8 PG — LOW (ref 27–34)
MCHC RBC-ENTMCNC: 31.6 % — LOW (ref 32–36)
MCV RBC AUTO: 75.3 FL — LOW (ref 80–100)
MONOCYTES # BLD AUTO: 0.61 K/UL — SIGNIFICANT CHANGE UP (ref 0–0.9)
MONOCYTES NFR BLD AUTO: 10.4 % — SIGNIFICANT CHANGE UP (ref 2–14)
NEUTROPHILS # BLD AUTO: 3.34 K/UL — SIGNIFICANT CHANGE UP (ref 1.8–7.4)
NEUTROPHILS NFR BLD AUTO: 57.3 % — SIGNIFICANT CHANGE UP (ref 43–77)
NRBC # FLD: 0 — SIGNIFICANT CHANGE UP
PHOSPHATE SERPL-MCNC: 3.5 MG/DL — SIGNIFICANT CHANGE UP (ref 2.5–4.5)
PLATELET # BLD AUTO: 182 K/UL — SIGNIFICANT CHANGE UP (ref 150–400)
PMV BLD: 12.9 FL — SIGNIFICANT CHANGE UP (ref 7–13)
POTASSIUM SERPL-MCNC: 3.5 MMOL/L — SIGNIFICANT CHANGE UP (ref 3.5–5.3)
POTASSIUM SERPL-SCNC: 3.5 MMOL/L — SIGNIFICANT CHANGE UP (ref 3.5–5.3)
RBC # BLD: 4.45 M/UL — SIGNIFICANT CHANGE UP (ref 4.2–5.8)
RBC # FLD: 15.6 % — HIGH (ref 10.3–14.5)
SODIUM SERPL-SCNC: 145 MMOL/L — SIGNIFICANT CHANGE UP (ref 135–145)
TRIGL SERPL-MCNC: 79 MG/DL — SIGNIFICANT CHANGE UP (ref 10–149)
TROPONIN T, HIGH SENSITIVITY: 49 NG/L — SIGNIFICANT CHANGE UP (ref ?–14)
TROPONIN T, HIGH SENSITIVITY: 53 NG/L — CRITICAL HIGH (ref ?–14)
TROPONIN T, HIGH SENSITIVITY: 61 NG/L — CRITICAL HIGH (ref ?–14)
TSH SERPL-MCNC: 2.31 UIU/ML — SIGNIFICANT CHANGE UP (ref 0.27–4.2)
WBC # BLD: 5.84 K/UL — SIGNIFICANT CHANGE UP (ref 3.8–10.5)
WBC # FLD AUTO: 5.84 K/UL — SIGNIFICANT CHANGE UP (ref 3.8–10.5)

## 2018-06-27 PROCEDURE — 93010 ELECTROCARDIOGRAM REPORT: CPT

## 2018-06-27 RX ORDER — HYDRALAZINE HCL 50 MG
5 TABLET ORAL ONCE
Qty: 0 | Refills: 0 | Status: COMPLETED | OUTPATIENT
Start: 2018-06-27 | End: 2018-06-28

## 2018-06-27 RX ORDER — POTASSIUM CHLORIDE 20 MEQ
20 PACKET (EA) ORAL ONCE
Qty: 0 | Refills: 0 | Status: COMPLETED | OUTPATIENT
Start: 2018-06-27 | End: 2018-06-27

## 2018-06-27 RX ORDER — CARVEDILOL PHOSPHATE 80 MG/1
6.25 CAPSULE, EXTENDED RELEASE ORAL EVERY 12 HOURS
Qty: 0 | Refills: 0 | Status: DISCONTINUED | OUTPATIENT
Start: 2018-06-27 | End: 2018-06-28

## 2018-06-27 RX ADMIN — Medication 50 MILLIGRAM(S): at 06:28

## 2018-06-27 RX ADMIN — HEPARIN SODIUM 5000 UNIT(S): 5000 INJECTION INTRAVENOUS; SUBCUTANEOUS at 06:28

## 2018-06-27 RX ADMIN — Medication 1 INCH(S): at 08:51

## 2018-06-27 RX ADMIN — Medication 200 MILLIGRAM(S): at 06:28

## 2018-06-27 RX ADMIN — HEPARIN SODIUM 5000 UNIT(S): 5000 INJECTION INTRAVENOUS; SUBCUTANEOUS at 18:20

## 2018-06-27 RX ADMIN — SODIUM CHLORIDE 3 MILLILITER(S): 9 INJECTION INTRAMUSCULAR; INTRAVENOUS; SUBCUTANEOUS at 14:59

## 2018-06-27 RX ADMIN — Medication 20 MILLIEQUIVALENT(S): at 08:51

## 2018-06-27 RX ADMIN — SODIUM CHLORIDE 3 MILLILITER(S): 9 INJECTION INTRAMUSCULAR; INTRAVENOUS; SUBCUTANEOUS at 06:28

## 2018-06-27 RX ADMIN — SODIUM CHLORIDE 3 MILLILITER(S): 9 INJECTION INTRAMUSCULAR; INTRAVENOUS; SUBCUTANEOUS at 22:00

## 2018-06-27 RX ADMIN — Medication 50 MILLIGRAM(S): at 22:39

## 2018-06-27 RX ADMIN — Medication 81 MILLIGRAM(S): at 11:12

## 2018-06-27 RX ADMIN — CARVEDILOL PHOSPHATE 6.25 MILLIGRAM(S): 80 CAPSULE, EXTENDED RELEASE ORAL at 11:12

## 2018-06-27 RX ADMIN — Medication 40 MILLIGRAM(S): at 06:28

## 2018-06-27 RX ADMIN — TAMSULOSIN HYDROCHLORIDE 0.4 MILLIGRAM(S): 0.4 CAPSULE ORAL at 22:39

## 2018-06-27 RX ADMIN — Medication 40 MILLIGRAM(S): at 18:19

## 2018-06-27 RX ADMIN — Medication 50 MILLIGRAM(S): at 14:59

## 2018-06-28 LAB
BUN SERPL-MCNC: 48 MG/DL — HIGH (ref 7–23)
CALCIUM SERPL-MCNC: 8.7 MG/DL — SIGNIFICANT CHANGE UP (ref 8.4–10.5)
CHLORIDE SERPL-SCNC: 99 MMOL/L — SIGNIFICANT CHANGE UP (ref 98–107)
CO2 SERPL-SCNC: 28 MMOL/L — SIGNIFICANT CHANGE UP (ref 22–31)
CREAT SERPL-MCNC: 3.16 MG/DL — HIGH (ref 0.5–1.3)
GLUCOSE BLDC GLUCOMTR-MCNC: 117 MG/DL — HIGH (ref 70–99)
GLUCOSE BLDC GLUCOMTR-MCNC: 118 MG/DL — HIGH (ref 70–99)
GLUCOSE BLDC GLUCOMTR-MCNC: 133 MG/DL — HIGH (ref 70–99)
GLUCOSE BLDC GLUCOMTR-MCNC: 90 MG/DL — SIGNIFICANT CHANGE UP (ref 70–99)
GLUCOSE SERPL-MCNC: 114 MG/DL — HIGH (ref 70–99)
HCT VFR BLD CALC: 34.7 % — LOW (ref 39–50)
HGB BLD-MCNC: 10.9 G/DL — LOW (ref 13–17)
MAGNESIUM SERPL-MCNC: 2 MG/DL — SIGNIFICANT CHANGE UP (ref 1.6–2.6)
MCHC RBC-ENTMCNC: 24.1 PG — LOW (ref 27–34)
MCHC RBC-ENTMCNC: 31.4 % — LOW (ref 32–36)
MCV RBC AUTO: 76.8 FL — LOW (ref 80–100)
NRBC # FLD: 0 — SIGNIFICANT CHANGE UP
PLATELET # BLD AUTO: 179 K/UL — SIGNIFICANT CHANGE UP (ref 150–400)
PMV BLD: 12.5 FL — SIGNIFICANT CHANGE UP (ref 7–13)
POTASSIUM SERPL-MCNC: 3.4 MMOL/L — LOW (ref 3.5–5.3)
POTASSIUM SERPL-SCNC: 3.4 MMOL/L — LOW (ref 3.5–5.3)
RBC # BLD: 4.52 M/UL — SIGNIFICANT CHANGE UP (ref 4.2–5.8)
RBC # FLD: 15 % — HIGH (ref 10.3–14.5)
SODIUM SERPL-SCNC: 140 MMOL/L — SIGNIFICANT CHANGE UP (ref 135–145)
WBC # BLD: 5.7 K/UL — SIGNIFICANT CHANGE UP (ref 3.8–10.5)
WBC # FLD AUTO: 5.7 K/UL — SIGNIFICANT CHANGE UP (ref 3.8–10.5)

## 2018-06-28 PROCEDURE — 93306 TTE W/DOPPLER COMPLETE: CPT | Mod: 26

## 2018-06-28 RX ORDER — HYDRALAZINE HCL 50 MG
5 TABLET ORAL ONCE
Qty: 0 | Refills: 0 | Status: DISCONTINUED | OUTPATIENT
Start: 2018-06-28 | End: 2018-06-28

## 2018-06-28 RX ORDER — POTASSIUM CHLORIDE 20 MEQ
20 PACKET (EA) ORAL ONCE
Qty: 0 | Refills: 0 | Status: COMPLETED | OUTPATIENT
Start: 2018-06-28 | End: 2018-06-28

## 2018-06-28 RX ORDER — CARVEDILOL PHOSPHATE 80 MG/1
12.5 CAPSULE, EXTENDED RELEASE ORAL EVERY 12 HOURS
Qty: 0 | Refills: 0 | Status: DISCONTINUED | OUTPATIENT
Start: 2018-06-28 | End: 2018-06-30

## 2018-06-28 RX ORDER — CARVEDILOL PHOSPHATE 80 MG/1
6.25 CAPSULE, EXTENDED RELEASE ORAL ONCE
Qty: 0 | Refills: 0 | Status: COMPLETED | OUTPATIENT
Start: 2018-06-28 | End: 2018-06-28

## 2018-06-28 RX ORDER — HYDRALAZINE HCL 50 MG
10 TABLET ORAL ONCE
Qty: 0 | Refills: 0 | Status: COMPLETED | OUTPATIENT
Start: 2018-06-28 | End: 2018-06-28

## 2018-06-28 RX ADMIN — Medication 40 MILLIGRAM(S): at 17:46

## 2018-06-28 RX ADMIN — SODIUM CHLORIDE 3 MILLILITER(S): 9 INJECTION INTRAMUSCULAR; INTRAVENOUS; SUBCUTANEOUS at 13:54

## 2018-06-28 RX ADMIN — TAMSULOSIN HYDROCHLORIDE 0.4 MILLIGRAM(S): 0.4 CAPSULE ORAL at 21:40

## 2018-06-28 RX ADMIN — Medication 40 MILLIGRAM(S): at 05:40

## 2018-06-28 RX ADMIN — SODIUM CHLORIDE 3 MILLILITER(S): 9 INJECTION INTRAMUSCULAR; INTRAVENOUS; SUBCUTANEOUS at 21:34

## 2018-06-28 RX ADMIN — Medication 50 MILLIGRAM(S): at 21:40

## 2018-06-28 RX ADMIN — Medication 50 MILLIGRAM(S): at 05:40

## 2018-06-28 RX ADMIN — Medication 5 MILLIGRAM(S): at 00:11

## 2018-06-28 RX ADMIN — HEPARIN SODIUM 5000 UNIT(S): 5000 INJECTION INTRAVENOUS; SUBCUTANEOUS at 17:46

## 2018-06-28 RX ADMIN — CARVEDILOL PHOSPHATE 6.25 MILLIGRAM(S): 80 CAPSULE, EXTENDED RELEASE ORAL at 05:40

## 2018-06-28 RX ADMIN — Medication 20 MILLIEQUIVALENT(S): at 09:16

## 2018-06-28 RX ADMIN — CARVEDILOL PHOSPHATE 12.5 MILLIGRAM(S): 80 CAPSULE, EXTENDED RELEASE ORAL at 17:45

## 2018-06-28 RX ADMIN — Medication 50 MILLIGRAM(S): at 13:58

## 2018-06-28 RX ADMIN — Medication 10 MILLIGRAM(S): at 01:12

## 2018-06-28 RX ADMIN — Medication 81 MILLIGRAM(S): at 10:13

## 2018-06-28 RX ADMIN — SODIUM CHLORIDE 3 MILLILITER(S): 9 INJECTION INTRAMUSCULAR; INTRAVENOUS; SUBCUTANEOUS at 05:37

## 2018-06-28 RX ADMIN — HEPARIN SODIUM 5000 UNIT(S): 5000 INJECTION INTRAVENOUS; SUBCUTANEOUS at 05:40

## 2018-06-28 RX ADMIN — CARVEDILOL PHOSPHATE 6.25 MILLIGRAM(S): 80 CAPSULE, EXTENDED RELEASE ORAL at 10:13

## 2018-06-28 NOTE — DIETITIAN INITIAL EVALUATION ADULT. - DIET TYPE
caffeine free/consistent carbohydrate (no snacks)/regular/No Carbonated beverages, No Chocolate/low sodium

## 2018-06-28 NOTE — DIETITIAN INITIAL EVALUATION ADULT. - NS AS NUTRI INTERV MEALS SNACK
Other (specify)/Diets modified for specific foods and ingredients/1. Suggest: PO diet rx: Regular Consistent Carbohydrate (no snacks), DASH/TLC (cholesterol and sodium restricted);              2. Encourage & assist Pt with meals; Monitor PO diet tolerance; Honor food preferences;                3. Monitor labs, Daily weights, hydration status;

## 2018-06-28 NOTE — DIETITIAN INITIAL EVALUATION ADULT. - OTHER INFO
Nutrition Consult X Registered Dietitian. Pt 78 yo male with appears alert, oriented. Per Pt his appetite usually okay; No chew/swallow problem voiced; no nausea/vomiting/diarrhea reported @ present. At home Pt tries to avoid salt & sugar reported. RDN discussed Consistent Carbohydrate & DASH/TLC (cholesterol and sodium restricted) diet with Pt; Written  materials on diet also provided. RDN remains available, Pt made aware.

## 2018-06-28 NOTE — PROGRESS NOTE ADULT - SUBJECTIVE AND OBJECTIVE BOX
CHIEF COMPLAINT:    SUBJECTIVE:     REVIEW OF SYSTEMS:    CONSTITUTIONAL: (  )  weakness,  (  ) fevers or chills  EYES/ENT: (  )visual changes;     NECK: (  ) pain or stiffness  RESPIRATORY:   (  )cough, wheezing, hemoptysis;  (  ) shortness of breath  CARDIOVASCULAR:  (  )chest pain or palpitations  GASTROINTESTINAL:   (  )abdominal or epigastric pain.  (  ) nausea, vomiting, or hematemesis;   (   ) diarrhea or constipation.   GENITOURINARY:   (    ) dysuria, frequency or hematuria  NEUROLOGICAL:  (   ) numbness or weakness   All other review of systems is negative unless indicated above    Vital Signs Last 24 Hrs  T(C): 36.8 (28 Jun 2018 05:35), Max: 37 (27 Jun 2018 14:47)  T(F): 98.2 (28 Jun 2018 05:35), Max: 98.6 (27 Jun 2018 14:47)  HR: 75 (28 Jun 2018 07:26) (64 - 79)  BP: 188/92 (28 Jun 2018 05:35) (188/92 - 209/108)  BP(mean): --  RR: 18 (28 Jun 2018 05:35) (16 - 18)  SpO2: 98% (28 Jun 2018 05:35) (96% - 100%)    I&O's Summary    27 Jun 2018 07:01  -  28 Jun 2018 07:00  --------------------------------------------------------  IN: 1310 mL / OUT: 1830 mL / NET: -520 mL        CAPILLARY BLOOD GLUCOSE      POCT Blood Glucose.: 146 mg/dL (27 Jun 2018 21:54)  POCT Blood Glucose.: 76 mg/dL (27 Jun 2018 17:42)  POCT Blood Glucose.: 151 mg/dL (27 Jun 2018 12:06)  POCT Blood Glucose.: 91 mg/dL (27 Jun 2018 08:38)      PHYSICAL EXAM:    Constitutional:  (   ) NAD,   (   )awake and alert  HEENT: PERR, EOMI,    Neck: Soft and supple, No LAD, No JVD  Respiratory:  (    Breath sounds are clear bilaterally,    (   ) wheezing, rales or rhonchi  Cardiovascular:     (   )S1 and S2, regular rate and rhythm, no Murmurs, gallops or rubs  Gastrointestinal:  (   )Bowel Sounds present, soft,   (  )nontender, nondistended,    Extremities:    (  ) peripheral edema  Vascular: 2+ peripheral pulses  Neurological:    (    )A/O x 3,   (  ) focal deficits  Musculoskeletal:    (   )  normal strength b/l upper  (     ) normal  lower extremities  Skin: No rashes    MEDICATIONS:  MEDICATIONS  (STANDING):  aspirin enteric coated 81 milliGRAM(s) Oral daily  carvedilol 6.25 milliGRAM(s) Oral every 12 hours  dextrose 5%. 1000 milliLiter(s) (50 mL/Hr) IV Continuous <Continuous>  dextrose 50% Injectable 12.5 Gram(s) IV Push once  dextrose 50% Injectable 25 Gram(s) IV Push once  dextrose 50% Injectable 25 Gram(s) IV Push once  furosemide   Injectable 40 milliGRAM(s) IV Push two times a day  heparin  Injectable 5000 Unit(s) SubCutaneous every 12 hours  hydrALAZINE 50 milliGRAM(s) Oral every 8 hours  insulin lispro (HumaLOG) corrective regimen sliding scale   SubCutaneous three times a day before meals  insulin lispro (HumaLOG) corrective regimen sliding scale   SubCutaneous at bedtime  sodium chloride 0.9% lock flush 3 milliLiter(s) IV Push every 8 hours  tamsulosin 0.4 milliGRAM(s) Oral at bedtime      LABS: All Labs Reviewed:                        10.9   5.70  )-----------( 179      ( 28 Jun 2018 06:15 )             34.7     06-27    145  |  104  |  43<H>  ----------------------------<  102<H>  3.5   |  27  |  3.13<H>    Ca    9.0      27 Jun 2018 07:30  Phos  3.5     06-27  Mg     2.1     06-27    TPro  6.8  /  Alb  3.8  /  TBili  < 0.2<L>  /  DBili  x   /  AST  30  /  ALT  49<H>  /  AlkPhos  74  06-26      CARDIAC MARKERS ( 27 Jun 2018 07:30 )  x     / x     / 109 u/L / 3.46 ng/mL / x      CARDIAC MARKERS ( 26 Jun 2018 20:51 )  x     / x     / 102 u/L / 3.04 ng/mL / x          Blood Culture:   Urine Culture      RADIOLOGY/EKG:    ASSESSMENT AND PLAN:    DVT PPX:    ADVANCED DIRECTIVE:    DISPOSITION: CHIEF COMPLAINT: Patient condition improved significantly less short of breath but  still blood pressure elevated     78 y/o M with hx of CKD, HTN, DM presents with HAWKINS x 1-2 days. Patient states he is normally able to walk 2-3 blocks and can only walk 5-10 steps now. HE also complains of b/l LE edema x 3 days. Denies fever, chills, cough, falls, LOC, chest pain, abdominal pain, nausea, vomiting, melena, hematochezia, calf tenderness, or dysuria.     Patient states he has not urinating as much over the last weeks secondary to "dribbling." He was on flomax in the past, but has not been taking it for months.     Review of Systems:  · Negative General Symptoms	no fever; no chills; no sweating	  · Negative Skin Symptoms	no rash; no itching; no dryness	  · Negative Ophthalmologic Symptoms	no diplopia; no photophobia; no lacrimation L; no lacrimation R	  · Negative ENMT Symptoms	no hearing difficulty; no ear pain; no tinnitus	  · Negative Respiratory and Thorax Symptoms	no wheezing; no cough; no hemoptysis	  · Respiratory and Thorax Symptoms	dyspnea	  · Negative Cardiovascular Symptoms	no chest pain; no palpitations; no orthopnea	  · Cardiovascular Symptoms	dyspnea on exertion	  · Negative Gastrointestinal Symptoms	no nausea; no vomiting; no diarrhea; no constipation	  · Negative General Genitourinary Symptoms	no hematuria; no renal colic; no flank pain L	  · Negative Musculoskeletal Symptoms	no arthralgia; no arthritis; no joint swelling	  · Negative Neurological Symptoms	no transient paralysis; no weakness; no paresthesias	    Vital Signs Last 24 Hrs  T(C): 36.8 (28 Jun 2018 05:35), Max: 37 (27 Jun 2018 14:47)  T(F): 98.2 (28 Jun 2018 05:35), Max: 98.6 (27 Jun 2018 14:47)  HR: 75 (28 Jun 2018 07:26) (64 - 79)  BP: 188/92 (28 Jun 2018 05:35) (188/92 - 209/108)  BP(mean): --  RR: 18 (28 Jun 2018 05:35) (16 - 18)  SpO2: 98% (28 Jun 2018 05:35) (96% - 100%)    I&O's Summary    27 Jun 2018 07:01  -  28 Jun 2018 07:00  --------------------------------------------------------  IN: 1310 mL / OUT: 1830 mL / NET: -520 mL        CAPILLARY BLOOD GLUCOSE      POCT Blood Glucose.: 146 mg/dL (27 Jun 2018 21:54)  POCT Blood Glucose.: 76 mg/dL (27 Jun 2018 17:42)  POCT Blood Glucose.: 151 mg/dL (27 Jun 2018 12:06)  POCT Blood Glucose.: 91 mg/dL (27 Jun 2018 08:38)      PHYSICAL EXAM:       · Constitutional Details well-developed; well-groomed; well-nourished; no distress  · Eyes detailed exam  · Eyes Details EOMI; conjunctiva clear  · ENMT detailed exam  · ENMT Details mouth  · Mouth moist  · Neck detailed exam  · Neck Details supple; no JVD  · Back detailed exam  · Back Details normal shape; ROM intact; strength intact  · Respiratory detailed exam  · Respiratory Details airway patent; breath sounds equal; good air movement; respirations non-labored; clear to auscultation bilaterally  · Cardiovascular detailed exam  · Cardiovascular Details regular rate and rhythm  no rub  · Gastrointestinal detailed exam  · GI Normal soft; nontender  · Extremities detailed exam   	      MEDICATIONS:  MEDICATIONS  (STANDING):  aspirin enteric coated 81 milliGRAM(s) Oral daily  carvedilol 6.25 milliGRAM(s) Oral every 12 hours  dextrose 5%. 1000 milliLiter(s) (50 mL/Hr) IV Continuous <Continuous>  dextrose 50% Injectable 12.5 Gram(s) IV Push once  dextrose 50% Injectable 25 Gram(s) IV Push once  dextrose 50% Injectable 25 Gram(s) IV Push once  furosemide   Injectable 40 milliGRAM(s) IV Push two times a day  heparin  Injectable 5000 Unit(s) SubCutaneous every 12 hours  hydrALAZINE 50 milliGRAM(s) Oral every 8 hours  insulin lispro (HumaLOG) corrective regimen sliding scale   SubCutaneous three times a day before meals  insulin lispro (HumaLOG) corrective regimen sliding scale   SubCutaneous at bedtime  sodium chloride 0.9% lock flush 3 milliLiter(s) IV Push every 8 hours  tamsulosin 0.4 milliGRAM(s) Oral at bedtime      LABS: All Labs Reviewed:                        10.9   5.70  )-----------( 179      ( 28 Jun 2018 06:15 )             34.7     06-27    145  |  104  |  43<H>  ----------------------------<  102<H>  3.5   |  27  |  3.13<H>    Ca    9.0      27 Jun 2018 07:30  Phos  3.5     06-27  Mg     2.1     06-27    TPro  6.8  /  Alb  3.8  /  TBili  < 0.2<L>  /  DBili  x   /  AST  30  /  ALT  49<H>  /  AlkPhos  74  06-26      CARDIAC MARKERS ( 27 Jun 2018 07:30 )  x     / x     / 109 u/L / 3.46 ng/mL / x      CARDIAC MARKERS ( 26 Jun 2018 20:51 )  x     / x     / 102 u/L / 3.04 ng/mL / x          Blood Culture:   Urine Culture      RADIOLOGY/EKG:    ASSESSMENT AND PLAN:    8 y/o M with hx of CKD, HTN, DM presents with HAWKINS x 1-2 days. admitted for HTN emergency    Problem/Plan - 1:  ·  Problem: Hypertensive emergency.  Plan: Admit to tele  check cbc, bmp, a1c, flp, tsh, trend CE  echo ordered         Problem/Plan - 2:  ·  Problem: Fluid overload.  Plan: Admit to tele  strict I&Os  Fluid restriction to 1000 mL daily   Lasix 40 IV BID  echo  Pending      Problem/Plan - 3:  ·  Problem: Hypertension.  Plan: With increase Coreg to 12.5 twice a day and continue hydralazine.     Problem/Plan - 4:  ·  Problem: Diabetes mellitus, type 2.  Plan: ISS  monitor fingersticks  A1C.     Problem/Plan - 5:  ·  Problem: BPH (benign prostatic hyperplasia).  Plan: Will restart flomax now given patient complaining of dribbling.      Problem/Plan - 6:  Problem: CKD (chronic kidney disease). Plan: Monitor Cr  avoid nephrotoxic meds.    Problem/Plan - 7:  ·  Problem: Need for prophylactic measure.  Plan: hep sc. DVT PPX:    ADVANCED DIRECTIVE:    DISPOSITION:

## 2018-06-28 NOTE — CHART NOTE - NSCHARTNOTEFT_GEN_A_CORE
Patient noted to have elevated BP to 208/98 despite 50mg Hydralazine PO and 5mg IV Hydralazine.  He was completely asymptomatic.  An additional 10 mg IV Hydralazine was given and BP decreased to 192/92.  As patient remains asymptomatic and BP have already decreased by approximately 25% in first 24 hours (was 260/106 in ED), will continue to lower BP slowly and monitor closely at this time.  Continue with Coreg, Hydralazine and Lasix as ordered.

## 2018-06-28 NOTE — DIETITIAN INITIAL EVALUATION ADULT. - PERTINENT LABORATORY DATA
(6/28) H/H 10.9/34.7 L, K 3.4 L, BUN 48 H, Creat 3.16 H, Glu 114 H;         (6/27) HDL 71 H, HbA1c 5.7% H;           (6/26) Albumin 3.8, ALT 49 H

## 2018-06-29 LAB
BUN SERPL-MCNC: 56 MG/DL — HIGH (ref 7–23)
CALCIUM SERPL-MCNC: 8.8 MG/DL — SIGNIFICANT CHANGE UP (ref 8.4–10.5)
CHLORIDE SERPL-SCNC: 100 MMOL/L — SIGNIFICANT CHANGE UP (ref 98–107)
CO2 SERPL-SCNC: 27 MMOL/L — SIGNIFICANT CHANGE UP (ref 22–31)
CREAT SERPL-MCNC: 3.6 MG/DL — HIGH (ref 0.5–1.3)
GLUCOSE BLDC GLUCOMTR-MCNC: 116 MG/DL — HIGH (ref 70–99)
GLUCOSE BLDC GLUCOMTR-MCNC: 130 MG/DL — HIGH (ref 70–99)
GLUCOSE BLDC GLUCOMTR-MCNC: 150 MG/DL — HIGH (ref 70–99)
GLUCOSE BLDC GLUCOMTR-MCNC: 78 MG/DL — SIGNIFICANT CHANGE UP (ref 70–99)
GLUCOSE SERPL-MCNC: 112 MG/DL — HIGH (ref 70–99)
HCT VFR BLD CALC: 35.4 % — LOW (ref 39–50)
HGB BLD-MCNC: 11.5 G/DL — LOW (ref 13–17)
MAGNESIUM SERPL-MCNC: 2.2 MG/DL — SIGNIFICANT CHANGE UP (ref 1.6–2.6)
MCHC RBC-ENTMCNC: 23.9 PG — LOW (ref 27–34)
MCHC RBC-ENTMCNC: 32.5 % — SIGNIFICANT CHANGE UP (ref 32–36)
MCV RBC AUTO: 73.6 FL — LOW (ref 80–100)
NRBC # FLD: 0 — SIGNIFICANT CHANGE UP
PLATELET # BLD AUTO: 181 K/UL — SIGNIFICANT CHANGE UP (ref 150–400)
PMV BLD: 11.6 FL — SIGNIFICANT CHANGE UP (ref 7–13)
POTASSIUM SERPL-MCNC: 3.7 MMOL/L — SIGNIFICANT CHANGE UP (ref 3.5–5.3)
POTASSIUM SERPL-SCNC: 3.7 MMOL/L — SIGNIFICANT CHANGE UP (ref 3.5–5.3)
RBC # BLD: 4.81 M/UL — SIGNIFICANT CHANGE UP (ref 4.2–5.8)
RBC # FLD: 15.1 % — HIGH (ref 10.3–14.5)
SODIUM SERPL-SCNC: 142 MMOL/L — SIGNIFICANT CHANGE UP (ref 135–145)
WBC # BLD: 6.33 K/UL — SIGNIFICANT CHANGE UP (ref 3.8–10.5)
WBC # FLD AUTO: 6.33 K/UL — SIGNIFICANT CHANGE UP (ref 3.8–10.5)

## 2018-06-29 PROCEDURE — 93018 CV STRESS TEST I&R ONLY: CPT | Mod: GC

## 2018-06-29 PROCEDURE — 78452 HT MUSCLE IMAGE SPECT MULT: CPT | Mod: 26

## 2018-06-29 PROCEDURE — 93016 CV STRESS TEST SUPVJ ONLY: CPT | Mod: GC

## 2018-06-29 RX ORDER — AMLODIPINE BESYLATE 2.5 MG/1
5 TABLET ORAL DAILY
Qty: 0 | Refills: 0 | Status: DISCONTINUED | OUTPATIENT
Start: 2018-06-29 | End: 2018-06-30

## 2018-06-29 RX ADMIN — Medication 40 MILLIGRAM(S): at 17:31

## 2018-06-29 RX ADMIN — CARVEDILOL PHOSPHATE 12.5 MILLIGRAM(S): 80 CAPSULE, EXTENDED RELEASE ORAL at 05:32

## 2018-06-29 RX ADMIN — Medication 40 MILLIGRAM(S): at 05:33

## 2018-06-29 RX ADMIN — CARVEDILOL PHOSPHATE 12.5 MILLIGRAM(S): 80 CAPSULE, EXTENDED RELEASE ORAL at 17:30

## 2018-06-29 RX ADMIN — SODIUM CHLORIDE 3 MILLILITER(S): 9 INJECTION INTRAMUSCULAR; INTRAVENOUS; SUBCUTANEOUS at 21:44

## 2018-06-29 RX ADMIN — AMLODIPINE BESYLATE 5 MILLIGRAM(S): 2.5 TABLET ORAL at 08:12

## 2018-06-29 RX ADMIN — SODIUM CHLORIDE 3 MILLILITER(S): 9 INJECTION INTRAMUSCULAR; INTRAVENOUS; SUBCUTANEOUS at 14:57

## 2018-06-29 RX ADMIN — Medication 81 MILLIGRAM(S): at 08:13

## 2018-06-29 RX ADMIN — Medication 50 MILLIGRAM(S): at 13:21

## 2018-06-29 RX ADMIN — TAMSULOSIN HYDROCHLORIDE 0.4 MILLIGRAM(S): 0.4 CAPSULE ORAL at 21:46

## 2018-06-29 RX ADMIN — HEPARIN SODIUM 5000 UNIT(S): 5000 INJECTION INTRAVENOUS; SUBCUTANEOUS at 17:29

## 2018-06-29 RX ADMIN — Medication 50 MILLIGRAM(S): at 21:44

## 2018-06-29 RX ADMIN — Medication 50 MILLIGRAM(S): at 05:32

## 2018-06-29 RX ADMIN — HEPARIN SODIUM 5000 UNIT(S): 5000 INJECTION INTRAVENOUS; SUBCUTANEOUS at 05:32

## 2018-06-29 RX ADMIN — SODIUM CHLORIDE 3 MILLILITER(S): 9 INJECTION INTRAMUSCULAR; INTRAVENOUS; SUBCUTANEOUS at 05:33

## 2018-06-29 NOTE — PROGRESS NOTE ADULT - SUBJECTIVE AND OBJECTIVE BOX
CHIEF COMPLAINT:  Patient condition is stable no shortness of breath echo result noted his blood pressure still elevated call Dr. Esteves to see him as a cardiology consult  SUBJECTIVE:     REVIEW OF SYSTEMS: Feels okay no complaint    CONSTITUTIONAL: (  )  weakness,  (  ) fevers or chills  EYES/ENT: (  )visual changes;     NECK: (  ) pain or stiffness  RESPIRATORY:   (  )cough, wheezing, hemoptysis;  (  ) shortness of breath  CARDIOVASCULAR:  (  )chest pain or palpitations  GASTROINTESTINAL:   (  )abdominal or epigastric pain.  (  ) nausea, vomiting, or hematemesis;   (   ) diarrhea or constipation.   GENITOURINARY:   (    ) dysuria, frequency or hematuria  NEUROLOGICAL:  (   ) numbness or weakness   All other review of systems is negative unless indicated above    Vital Signs Last 24 Hrs  T(C): 37.1 (29 Jun 2018 05:00), Max: 37.1 (29 Jun 2018 05:00)  T(F): 98.7 (29 Jun 2018 05:00), Max: 98.7 (29 Jun 2018 05:00)  HR: 76 (29 Jun 2018 05:00) (70 - 78)  BP: 198/83 (29 Jun 2018 05:00) (184/80 - 198/83)  BP(mean): --  RR: 17 (29 Jun 2018 05:00) (14 - 17)  SpO2: 98% (29 Jun 2018 05:00) (98% - 100%)    I&O's Summary    28 Jun 2018 07:01  -  29 Jun 2018 07:00  --------------------------------------------------------  IN: 1100 mL / OUT: 2500 mL / NET: -1400 mL    29 Jun 2018 07:01  -  29 Jun 2018 10:13  --------------------------------------------------------  IN: 6 mL / OUT: 350 mL / NET: -344 mL        CAPILLARY BLOOD GLUCOSE      POCT Blood Glucose.: 116 mg/dL (29 Jun 2018 08:17)  POCT Blood Glucose.: 118 mg/dL (28 Jun 2018 22:00)  POCT Blood Glucose.: 133 mg/dL (28 Jun 2018 17:39)  POCT Blood Glucose.: 90 mg/dL (28 Jun 2018 13:41)      PHYSICAL EXAM:    Constitutional:  (  x ) NAD,   ( x  )awake and alert  HEENT: PERR, EOMI,    Neck: Soft and supple, No LAD, No JVD  Respiratory:  (   x Breath sounds are clear bilaterally,    (   ) wheezing, rales or rhonchi  Cardiovascular:     (  x )S1 and S2, regular rate and rhythm,  +  S4 gallop at apex  Gastrointestinal:  (  x )Bowel Sounds present, soft,   (x  )nontender, nondistended,    Extremities:    (  ) peripheral edema  Vascular: 2+ peripheral pulses  Neurological:    (  x  )A/O x 3,   ( x ) focal deficits  Musculoskeletal:    (  x )  normal strength b/l upper  (   x  ) normal  lower extremities  Skin: No rashes    MEDICATIONS:  MEDICATIONS  (STANDING):  amLODIPine   Tablet 5 milliGRAM(s) Oral daily  aspirin enteric coated 81 milliGRAM(s) Oral daily  carvedilol 12.5 milliGRAM(s) Oral every 12 hours  dextrose 5%. 1000 milliLiter(s) (50 mL/Hr) IV Continuous <Continuous>  dextrose 50% Injectable 12.5 Gram(s) IV Push once  dextrose 50% Injectable 25 Gram(s) IV Push once  dextrose 50% Injectable 25 Gram(s) IV Push once  furosemide   Injectable 40 milliGRAM(s) IV Push two times a day  heparin  Injectable 5000 Unit(s) SubCutaneous every 12 hours  hydrALAZINE 50 milliGRAM(s) Oral every 8 hours  insulin lispro (HumaLOG) corrective regimen sliding scale   SubCutaneous three times a day before meals  insulin lispro (HumaLOG) corrective regimen sliding scale   SubCutaneous at bedtime  sodium chloride 0.9% lock flush 3 milliLiter(s) IV Push every 8 hours  tamsulosin 0.4 milliGRAM(s) Oral at bedtime      LABS: All Labs Reviewed:                        11.5   6.33  )-----------( 181      ( 29 Jun 2018 06:21 )             35.4     06-29    142  |  100  |  56<H>  ----------------------------<  112<H>  3.7   |  27  |  3.60<H>    Ca    8.8      29 Jun 2018 06:21  Mg     2.2     06-29            Blood Culture:   Urine Culture      RADIOLOGY/EKG:    ASSESSMENT AND PLAN:  Mr. Starr Stevenson is a 77 year old man with a history of chronic renal insufficiency stage 5, diabetes mellitus and hypertension who now presents with recent exertional shortness of breath, bilateral lower extremity edema and elevated blood pressure.  Elevated BNP on admission.     Blood pressure elevation exacerbated by CHF.  #1 HTN/CHF  Will continue with diuresis.  Presently not on ARB or ACE inhibitor due to renal dysfunction.    Continue with diuresis-furosemide 40 mg twice a day.    Continue carvedilol and hydralazine.   We'll add   amlodipine 5 mg daily to improve control of blood pressure.    Nuclear stress test to evaluate for coronary artery disease As outpatient.   #2 CRF stable  secondary to hypertension  Daily BMP to monitor renal functio nand electrolytes due to recent use of loop diuretics.    DVT PPX:    ADVANCED DIRECTIVE:    DISPOSITION:DC home in a day or 2  Discuss with the patient and a ADSE in detail

## 2018-06-29 NOTE — CONSULT NOTE ADULT - ASSESSMENT
Mr. Starr Stevenson is a 77 year old man with a history of chronic renal insufficiency stage 5, diabetes mellitus and hypertension who now presents with recent exertional shortness of breath, bilateral lower extremity edema and elevated blood pressure.  Elevated BNP on admission.  Has evidence of mild biventricular heart failure on examination today.  Blood pressure elevation exacerbated by CHF.  Will continue with diuresis.  Presently not on ARB or ACE inhibitor due to renal dysfunction.  Continue with diuresis-furosemide 40 mg twice a day.  Agree with carvedilol and hydralazine.  Add amlodipine 5 mg daily to improve control of blood pressure.  Nuclear stress test to evaluate for coronary artery disease.  Daily BMP to monitor renal functio nand electrolytes due to recent use of loop diuretics.

## 2018-06-29 NOTE — CONSULT NOTE ADULT - SUBJECTIVE AND OBJECTIVE BOX
Patient seen and evaluated @ Room [facility]  Chief Complaint: Elevated blood pressure, shortness of breath    HPI:  78 y/o M with hx of CKD, HTN, DM presents with HAWKINS x 1-2 days. Patient states he is normally able to walk 2-3 blocks and can only walk 5-10 steps now. HE also complains of b/l LE edema x 3 days. Denies fever, chills, cough, falls, LOC, chest pain, abdominal pain, nausea, vomiting, melena, hematochezia, calf tenderness, or dysuria.     Patient states he has not urinating as much over the last weeks secondary to "dribbling." He was on flomax in the past, but has not been taking it for months. (2018 23:46)    PMH:   HTN (hypertension)  BPH (Benign Prostatic Hypertrophy)  Glaucoma (Increased Eye Pressure)  HTN - Hypertension    PSH:   No significant past surgical history  Laser Surgery :Right  Laser Surgery Left  Excision of Sinus Polyp    Medications:   aspirin enteric coated 81 milliGRAM(s) Oral daily  carvedilol 12.5 milliGRAM(s) Oral every 12 hours  dextrose 40% Gel 15 Gram(s) Oral once PRN  dextrose 5%. 1000 milliLiter(s) IV Continuous <Continuous>  dextrose 50% Injectable 12.5 Gram(s) IV Push once  dextrose 50% Injectable 25 Gram(s) IV Push once  dextrose 50% Injectable 25 Gram(s) IV Push once  furosemide   Injectable 40 milliGRAM(s) IV Push two times a day  glucagon  Injectable 1 milliGRAM(s) IntraMuscular once PRN  heparin  Injectable 5000 Unit(s) SubCutaneous every 12 hours  hydrALAZINE 50 milliGRAM(s) Oral every 8 hours  insulin lispro (HumaLOG) corrective regimen sliding scale   SubCutaneous three times a day before meals  insulin lispro (HumaLOG) corrective regimen sliding scale   SubCutaneous at bedtime  sodium chloride 0.9% lock flush 3 milliLiter(s) IV Push every 8 hours  tamsulosin 0.4 milliGRAM(s) Oral at bedtime    Allergies:  grass, pollen (Rhinitis)  No Known Drug Allergies    FAMILY HISTORY:  No pertinent family history in first degree relatives    Social History:  Smoking:  Alcohol:  Drugs:    Review of Systems:  Constitutional: No Fever, Chills,  No Fatigue, No Weight change   HEENT: No Blurred vision, No Headache   Respiratory: No Cough, No sputum production, No Wheezing, No Shortness of breath  Cardiovascular: No Chest Pain, No Palpitations, No Lightheadedness, No Falling, No Syncope, + HAWKINS, No PND, No Orthopnea, + Peripehral Edema  Gastrointestinal: No Abdominal Pain, No Diarrhea, No Constipation, No Nausea, No Vomiting, Normal Appetite   Genitourinary: No Dysuria  Extremities: + Swelling, No Claudication,   Neurologic:  No Focal deficit, No Weakness, No Dysphagia, No Paresthesias, No Syncope  Skin: No Rash,  No Ecchymoses, No Wounds, No Tenderness, No Drainage     Physical Exam:  T(C): 37.1 (18 @ 05:00), Max: 37.1 (18 @ 05:00)  HR: 76 (18 @ 05:00) (70 - 84)  BP: 198/83 (18 @ 05:00) (184/80 - 198/83)  RR: 17 (18 @ 05:00) (14 - 17)  SpO2: 98% (18 @ 05:00) (98% - 100%)     @ 07:01  -   @ 07:00  --------------------------------------------------------  IN: 1100 mL / OUT: 2500 mL / NET: -1400 mL    Daily Weight in k.6 (2018 06:12)    Physical Exam:  Appearance:  Normal, NAD  Eyes: PERRL, EOMI  HENT:  Normal oral mus[place]a, ]NC/AT  Neck: + heptojugular reflux, carotid 2+ equal without bruits  No Thyromegaly  Cardiovascular: normal regular S1, physiologic split S2, S3 and S4 gallop at apex  Respiratory: Clear to percussion and auscultation bilaterally  Gastrointestinal: Soft, Non-tender, Non-distended, BS+, No masses  Neurologic: Non-focal, No focal neurologic deficits  Skin: No rashes, No ecchymoses, No cyanosis, no peripheral edema  Pulses-2+ right dorsalis pedis, trace left posterior tibialis    Cardiovascular Diagnostic Testing:  ECG: Sinus rhythm with nonspecific ST and T wave changes  Telemetry: sinus with rate 60-70 per minute    Echo: noted, LVH, diastolic dysfunction, normal LV systolic function  Labs:                        11.5   6.33  )-----------( 181      ( 2018 06:21 )             35.4         142  |  100  |  56<H>  ----------------------------<  112<H>  3.7   |  27  |  3.60<H>    Ca    8.8      2018 06:21  Mg     2.2           Serum Pro-Brain Natriuretic Peptide: 5457 pg/mL ( @ 20:51)      Hemoglobin A1C, Whole Blood: 5.7 % ( @ 07:30)    Thyroid Stimulating Hormone, Serum: 2.31 uIU/mL ( @ 07:30)

## 2018-06-30 ENCOUNTER — TRANSCRIPTION ENCOUNTER (OUTPATIENT)
Age: 78
End: 2018-06-30

## 2018-06-30 VITALS — SYSTOLIC BLOOD PRESSURE: 154 MMHG | DIASTOLIC BLOOD PRESSURE: 85 MMHG | HEART RATE: 86 BPM

## 2018-06-30 LAB
BUN SERPL-MCNC: 57 MG/DL — HIGH (ref 7–23)
CALCIUM SERPL-MCNC: 8.5 MG/DL — SIGNIFICANT CHANGE UP (ref 8.4–10.5)
CHLORIDE SERPL-SCNC: 97 MMOL/L — LOW (ref 98–107)
CO2 SERPL-SCNC: 27 MMOL/L — SIGNIFICANT CHANGE UP (ref 22–31)
CREAT SERPL-MCNC: 3.68 MG/DL — HIGH (ref 0.5–1.3)
GLUCOSE BLDC GLUCOMTR-MCNC: 110 MG/DL — HIGH (ref 70–99)
GLUCOSE BLDC GLUCOMTR-MCNC: 119 MG/DL — HIGH (ref 70–99)
GLUCOSE BLDC GLUCOMTR-MCNC: 96 MG/DL — SIGNIFICANT CHANGE UP (ref 70–99)
GLUCOSE SERPL-MCNC: 112 MG/DL — HIGH (ref 70–99)
HCT VFR BLD CALC: 34.2 % — LOW (ref 39–50)
HGB BLD-MCNC: 11 G/DL — LOW (ref 13–17)
MAGNESIUM SERPL-MCNC: 2.2 MG/DL — SIGNIFICANT CHANGE UP (ref 1.6–2.6)
MCHC RBC-ENTMCNC: 23.8 PG — LOW (ref 27–34)
MCHC RBC-ENTMCNC: 32.2 % — SIGNIFICANT CHANGE UP (ref 32–36)
MCV RBC AUTO: 74 FL — LOW (ref 80–100)
NRBC # FLD: 0 — SIGNIFICANT CHANGE UP
PLATELET # BLD AUTO: 177 K/UL — SIGNIFICANT CHANGE UP (ref 150–400)
PMV BLD: 11.9 FL — SIGNIFICANT CHANGE UP (ref 7–13)
POTASSIUM SERPL-MCNC: 3.3 MMOL/L — LOW (ref 3.5–5.3)
POTASSIUM SERPL-SCNC: 3.3 MMOL/L — LOW (ref 3.5–5.3)
RBC # BLD: 4.62 M/UL — SIGNIFICANT CHANGE UP (ref 4.2–5.8)
RBC # FLD: 15.2 % — HIGH (ref 10.3–14.5)
SODIUM SERPL-SCNC: 138 MMOL/L — SIGNIFICANT CHANGE UP (ref 135–145)
WBC # BLD: 4.9 K/UL — SIGNIFICANT CHANGE UP (ref 3.8–10.5)
WBC # FLD AUTO: 4.9 K/UL — SIGNIFICANT CHANGE UP (ref 3.8–10.5)

## 2018-06-30 RX ORDER — HYDRALAZINE HCL 50 MG
75 TABLET ORAL EVERY 8 HOURS
Qty: 0 | Refills: 0 | Status: DISCONTINUED | OUTPATIENT
Start: 2018-06-30 | End: 2018-06-30

## 2018-06-30 RX ORDER — HYDRALAZINE HCL 50 MG
3 TABLET ORAL
Qty: 270 | Refills: 0 | OUTPATIENT
Start: 2018-06-30 | End: 2018-07-29

## 2018-06-30 RX ORDER — HYDRALAZINE HCL 50 MG
1 TABLET ORAL
Qty: 0 | Refills: 0 | COMMUNITY

## 2018-06-30 RX ORDER — FUROSEMIDE 40 MG
40 TABLET ORAL
Qty: 0 | Refills: 0 | Status: DISCONTINUED | OUTPATIENT
Start: 2018-06-30 | End: 2018-06-30

## 2018-06-30 RX ORDER — FUROSEMIDE 40 MG
1 TABLET ORAL
Qty: 30 | Refills: 0 | OUTPATIENT
Start: 2018-06-30 | End: 2018-07-29

## 2018-06-30 RX ORDER — HYDRALAZINE HCL 50 MG
2 TABLET ORAL
Qty: 0 | Refills: 0 | COMMUNITY
Start: 2018-06-30 | End: 2018-07-29

## 2018-06-30 RX ORDER — AMLODIPINE BESYLATE 2.5 MG/1
1 TABLET ORAL
Qty: 30 | Refills: 0 | OUTPATIENT
Start: 2018-06-30 | End: 2018-07-29

## 2018-06-30 RX ORDER — CARVEDILOL PHOSPHATE 80 MG/1
1 CAPSULE, EXTENDED RELEASE ORAL
Qty: 60 | Refills: 0 | OUTPATIENT
Start: 2018-06-30 | End: 2018-07-29

## 2018-06-30 RX ORDER — FUROSEMIDE 40 MG
40 TABLET ORAL DAILY
Qty: 0 | Refills: 0 | Status: DISCONTINUED | OUTPATIENT
Start: 2018-06-30 | End: 2018-06-30

## 2018-06-30 RX ORDER — CARVEDILOL PHOSPHATE 80 MG/1
6.25 CAPSULE, EXTENDED RELEASE ORAL ONCE
Qty: 0 | Refills: 0 | Status: DISCONTINUED | OUTPATIENT
Start: 2018-06-30 | End: 2018-06-30

## 2018-06-30 RX ORDER — POTASSIUM CHLORIDE 20 MEQ
40 PACKET (EA) ORAL ONCE
Qty: 0 | Refills: 0 | Status: COMPLETED | OUTPATIENT
Start: 2018-06-30 | End: 2018-06-30

## 2018-06-30 RX ADMIN — SODIUM CHLORIDE 3 MILLILITER(S): 9 INJECTION INTRAMUSCULAR; INTRAVENOUS; SUBCUTANEOUS at 13:02

## 2018-06-30 RX ADMIN — CARVEDILOL PHOSPHATE 12.5 MILLIGRAM(S): 80 CAPSULE, EXTENDED RELEASE ORAL at 17:03

## 2018-06-30 RX ADMIN — Medication 81 MILLIGRAM(S): at 13:02

## 2018-06-30 RX ADMIN — AMLODIPINE BESYLATE 5 MILLIGRAM(S): 2.5 TABLET ORAL at 04:59

## 2018-06-30 RX ADMIN — Medication 75 MILLIGRAM(S): at 13:02

## 2018-06-30 RX ADMIN — HEPARIN SODIUM 5000 UNIT(S): 5000 INJECTION INTRAVENOUS; SUBCUTANEOUS at 04:59

## 2018-06-30 RX ADMIN — SODIUM CHLORIDE 3 MILLILITER(S): 9 INJECTION INTRAMUSCULAR; INTRAVENOUS; SUBCUTANEOUS at 04:59

## 2018-06-30 RX ADMIN — CARVEDILOL PHOSPHATE 12.5 MILLIGRAM(S): 80 CAPSULE, EXTENDED RELEASE ORAL at 04:58

## 2018-06-30 RX ADMIN — Medication 40 MILLIEQUIVALENT(S): at 13:02

## 2018-06-30 RX ADMIN — Medication 50 MILLIGRAM(S): at 04:59

## 2018-06-30 RX ADMIN — Medication 40 MILLIGRAM(S): at 04:59

## 2018-06-30 NOTE — DISCHARGE NOTE ADULT - MEDICATION SUMMARY - MEDICATIONS TO CHANGE
I will SWITCH the dose or number of times a day I take the medications listed below when I get home from the hospital:    amLODIPine 10 mg oral tablet  -- 1 tab(s) by mouth once a day    hydrALAZINE 50 mg oral tablet  -- 1 tab(s) by mouth every 8 hours

## 2018-06-30 NOTE — DISCHARGE NOTE ADULT - HOSPITAL COURSE
76 y/o M with hx of CKD, HTN, DM presents with HAWKINS x 1-2 days. Patient states he is normally able to walk 2-3 blocks and can only walk 5-10 steps now. HE also complains of b/l LE edema x 3 days. Denies fever, chills, cough, falls, LOC, chest pain, abdominal pain, nausea, vomiting, melena, hematochezia, calf tenderness, or dysuria. Patient states he has not urinating as much over the last weeks secondary to "dribbling." He was on flomax in the past, but has not been taking it for months.     Hospital course:  H/H: 9.8/31.7  43/3.16  BNP ;5457  Chest xray: neg  Trop HS: 49> 61> 53 likely secondary to CKD, chest pain free   6/28 Echo: EF 66% Severe concentric left ventricular hypertrophy. Normal left ventricular systolic function. No segmental wall motion abnormalities. Mild diastolic dysfunction (Stage I).  4. Normal right ventricular size and function. Normal tricuspid valve. Minimal tricuspid regurgitation. Estimated pulmonary artery systolic pressure equals 38mm Hg, assuming right atrial pressure equals 10  mm Hg, consistent with borderline pulmonary hypertension. *** No previous Echo exam.  6/29 NST: IMPRESSIONS:Normal Study* The left ventricle was normal in size.* Tracer uptake was homogeneous throughout the left ventricle.* Normal study; no evidence for myocardial infarction or ischemia.* Gated wall motion analysis was performed, and shows normal wall motion. 76 y/o M with hx of CKD, HTN, DM presents with HAWKINS x 1-2 days. Patient states he is normally able to walk 2-3 blocks and can only walk 5-10 steps now. HE also complains of b/l LE edema x 3 days. Denies fever, chills, cough, falls, LOC, chest pain, abdominal pain, nausea, vomiting, melena, hematochezia, calf tenderness, or dysuria. Patient states he has not urinating as much over the last weeks secondary to "dribbling." He was on flomax in the past, but has not been taking it for months.     Hospital course:  H/H: 9.8/31.7  43/3.16  BNP ;5457  Chest xray: neg  Trop HS: 49> 61> 53 likely secondary to CKD, chest pain free   6/28 Echo: EF 66% Severe concentric left ventricular hypertrophy. Normal left ventricular systolic function. No segmental wall motion abnormalities. Mild diastolic dysfunction (Stage I).  4. Normal right ventricular size and function. Normal tricuspid valve. Minimal tricuspid regurgitation. Estimated pulmonary artery systolic pressure equals 38mm Hg, assuming right atrial pressure equals 10  mm Hg, consistent with borderline pulmonary hypertension. *** No previous Echo exam.  6/29 NST: IMPRESSIONS:Normal Study* The left ventricle was normal in size.* Tracer uptake was homogeneous throughout the left ventricle.* Normal study; no evidence for myocardial infarction or ischemia.* Gated wall motion analysis was performed, and shows normal wall motion.    Pt noted with fluid overload started on IV lasix with improvement and transitioned to PO. Cards consulted recommended NST and TTE. TTE noted mild dialstolic dysfunction otherwise unremarkable. NST normal. As per Dr Jaramillo pt cleared for discharge on 6/30

## 2018-06-30 NOTE — PROGRESS NOTE ADULT - SUBJECTIVE AND OBJECTIVE BOX
CHIEF COMPLAINT:   Patient condition is stable no shortness of breath echo result noted his blood pressure still elevated   Dr. Esteves   consult NOTED.  SUBJECTIVE:     REVIEW OF SYSTEMS: Feels okay no complaint    CONSTITUTIONAL: (  )  weakness,  (  ) fevers or chills  EYES/ENT: (  )visual changes;     NECK: (  ) pain or stiffness  RESPIRATORY:   (  )cough, wheezing, hemoptysis;  (  ) shortness of breath  CARDIOVASCULAR:  (  )chest pain or palpitations  GASTROINTESTINAL:   (  )abdominal or epigastric pain.  (  ) nausea, vomiting, or hematemesis;   (   ) diarrhea or constipation.   GENITOURINARY:   (    ) dysuria, frequency or hematuria  NEUROLOGICAL:  (   ) numbness or weakness   All other review of systems is negative unless indicated above    Vital Signs Last 24 Hrs  T(C): 37.1 (30 Jun 2018 04:59), Max: 37.1 (30 Jun 2018 04:59)  T(F): 98.8 (30 Jun 2018 04:59), Max: 98.8 (30 Jun 2018 04:59)  HR: 70 (30 Jun 2018 04:59) (65 - 75)  BP: 195/89 (30 Jun 2018 04:59) (155/80 - 195/89)  BP(mean): --  RR: 16 (30 Jun 2018 04:59) (14 - 17)  SpO2: 98% (30 Jun 2018 04:59) (98% - 100%)    I&O's Summary    29 Jun 2018 07:01  -  30 Jun 2018 07:00  --------------------------------------------------------  IN: 740 mL / OUT: 1530 mL / NET: -790 mL    30 Jun 2018 07:01  -  30 Jun 2018 07:49  --------------------------------------------------------  IN: 0 mL / OUT: 400 mL / NET: -400 mL        CAPILLARY BLOOD GLUCOSE      POCT Blood Glucose.: 150 mg/dL (29 Jun 2018 21:44)  POCT Blood Glucose.: 78 mg/dL (29 Jun 2018 17:53)  POCT Blood Glucose.: 130 mg/dL (29 Jun 2018 12:00)  POCT Blood Glucose.: 116 mg/dL (29 Jun 2018 08:17)      PHYSICAL EXAM:     Constitutional:  (  x ) NAD,   ( x  )awake and alert  HEENT: PERR, EOMI,    Neck: Soft and supple, No LAD, No JVD  Respiratory:  (   x Breath sounds are clear bilaterally,    (   ) wheezing, rales or rhonchi  Cardiovascular:     (  x )S1 and S2, regular rate and rhythm,  +  S4 gallop at apex  Gastrointestinal:  (  x )Bowel Sounds present, soft,   (x  )nontender, nondistended,    Extremities:    (  ) peripheral edema  Vascular: 2+ peripheral pulses  Neurological:    (  x  )A/O x 3,   ( x ) focal deficits  Musculoskeletal:    (  x )  normal strength b/l upper  (   x  ) normal  lower extremities  Skin: No rashes  MEDICATIONS:  MEDICATIONS  (STANDING):  amLODIPine   Tablet 5 milliGRAM(s) Oral daily  aspirin enteric coated 81 milliGRAM(s) Oral daily  carvedilol 12.5 milliGRAM(s) Oral every 12 hours  dextrose 5%. 1000 milliLiter(s) (50 mL/Hr) IV Continuous <Continuous>  dextrose 50% Injectable 12.5 Gram(s) IV Push once  dextrose 50% Injectable 25 Gram(s) IV Push once  dextrose 50% Injectable 25 Gram(s) IV Push once  furosemide   Injectable 40 milliGRAM(s) IV Push two times a day  heparin  Injectable 5000 Unit(s) SubCutaneous every 12 hours  hydrALAZINE 50 milliGRAM(s) Oral every 8 hours  insulin lispro (HumaLOG) corrective regimen sliding scale   SubCutaneous three times a day before meals  insulin lispro (HumaLOG) corrective regimen sliding scale   SubCutaneous at bedtime  sodium chloride 0.9% lock flush 3 milliLiter(s) IV Push every 8 hours  tamsulosin 0.4 milliGRAM(s) Oral at bedtime      LABS: All Labs Reviewed:                        11.5   6.33  )-----------( 181      ( 29 Jun 2018 06:21 )             35.4     06-29    142  |  100  |  56<H>  ----------------------------<  112<H>  3.7   |  27  |  3.60<H>    Ca    8.8      29 Jun 2018 06:21  Mg     2.2     06-29            Blood Culture:   Urine Culture      RADIOLOGY/EKG:    ASSESSMENT AND PLAN:  Mr. Starr Stevenson is a 77 year old man with a history of chronic renal insufficiency stage 5, diabetes mellitus and hypertension who now presents with recent exertional shortness of breath, bilateral lower extremity edema and elevated blood pressure.  Elevated BNP on admission.     Blood pressure elevation exacerbated by CHF.  #1 HTN/CHF  Will continue with diuresis.  Presently not on ARB or ACE inhibitor due to renal dysfunction.    Continue with diuresis-furosemide 40 mg twice a day.    Continue carvedilol and hydralazine. WILL INCREASE  COREG  TO   25  MG  IN  AM    ON    amlodipine 5 mg daily to improve control of blood pressure.    Nuclear stress test to evaluate for coronary artery disease As outpatient.   #2 CRF stable  secondary to hypertension  Daily BMP to monitor renal functio nand electrolytes due to recent use of loop diuretics.    DVT PPX:    ADVANCED DIRECTIVE:    DISPOSITION:DC home today  if  BP  IMPRONED  Discuss with the patient and a ADS in detail  DVT PPX:    ADVANCED DIRECTIVE:    DISPOSITION:

## 2018-06-30 NOTE — DISCHARGE NOTE ADULT - MEDICATION SUMMARY - MEDICATIONS TO STOP TAKING
I will STOP taking the medications listed below when I get home from the hospital:    labetalol 100 mg oral tablet  -- 2 tab(s) by mouth 2 times a day

## 2018-06-30 NOTE — DISCHARGE NOTE ADULT - PATIENT PORTAL LINK FT
You can access the [facility] Portal, offered by [facility], by registering with the following website: [email or web address]

## 2018-06-30 NOTE — PROVIDER CONTACT NOTE (OTHER) - DATE AND TIME:
12/06/17          [id number] [facility] [id number]               You are receiving this letter because you have a  Medicare Advantage insurance plan that offers an Annual Health Assessment appointment.  This was performed for you in [place] 30-Jun-2018 12:29

## 2018-06-30 NOTE — DISCHARGE NOTE ADULT - MEDICATION SUMMARY - MEDICATIONS TO TAKE
I will START or STAY ON the medications listed below when I get home from the hospital:    Aspir 81 oral delayed release tablet  -- 1 tab(s) by mouth once a day   -- Indication: For Cad    tamsulosin 0.4 mg oral capsule  -- 1 cap(s) by mouth once a day (at bedtime)  -- Indication: For BPH (benign prostatic hyperplasia)    carvedilol 12.5 mg oral tablet  -- 1 tab(s) by mouth every 12 hours  -- Indication: For Htn    amLODIPine 5 mg oral tablet  -- 1 tab(s) by mouth once a day  -- Indication: For Htn    furosemide 40 mg oral tablet  -- 1 tab(s) by mouth once a day  -- Indication: For Heart failure    hydrALAZINE 25 mg oral tablet  -- 3 tab(s) by mouth every 8 hours  -- Indication: For Htn

## 2018-06-30 NOTE — DISCHARGE NOTE ADULT - CARE PLAN
Principal Discharge DX:	Fluid overload  Goal:	improve breathing  Assessment and plan of treatment:	continue lasix as directed and follow up with your cardiologist/pmd in 1 week. please check your potassium and kidney function with your next visit at your provider.  Secondary Diagnosis:	Hypertensive emergency  Goal:	To maintain a normal blood pressure to prevent heart attack, stroke and renal failure.  Assessment and plan of treatment:	Low sodium and fat diet, continue anti-hypertensive medications, and follow up with primary care physician.  Secondary Diagnosis:	Diabetes mellitus, type 2  Goal:	To maintain a normal blood glucose level and HgA1C level < 5.7 and to prevent diabetic complications such as uncontrolled diabetes, diabetic coma, blindness, renal failure, and amputations.  Assessment and plan of treatment:	Monitor finger sticks pre-meal and bedtime, low salt, fat and carbohydrate diet, minimize glucose intake.  Exercise daily for at least 30 minutes and weight loss.  Follow up with primary care physician and endocrinologist for routine Hemoglobin A1C checks and management.  Follow up with your ophthalmologist for routine yearly vision exams.  Secondary Diagnosis:	CKD (chronic kidney disease)  Goal:	To prevent shortness of breath, fluid overload, and electrolytes imbalance, and to slow down worsening kidney disease.  Assessment and plan of treatment:	Continue blood pressure, cholesterol and diabetic medications. Goal of hemoglobin A1C (HgbA1C) < 7%.  Avoid nephrotoxic drugs such as nonsteroidal anti-inflammatory agents (NSAIDs).   Please follow up with your nephrologist to monitor your kidney function, continue with low protein and potassium diet.

## 2018-06-30 NOTE — DISCHARGE NOTE ADULT - PLAN OF CARE
improve breathing continue lasix as directed and follow up with your cardiologist/pmd in 1 week. please check your potassium and kidney function with your next visit at your provider. To maintain a normal blood pressure to prevent heart attack, stroke and renal failure. Low sodium and fat diet, continue anti-hypertensive medications, and follow up with primary care physician. To maintain a normal blood glucose level and HgA1C level < 5.7 and to prevent diabetic complications such as uncontrolled diabetes, diabetic coma, blindness, renal failure, and amputations. Monitor finger sticks pre-meal and bedtime, low salt, fat and carbohydrate diet, minimize glucose intake.  Exercise daily for at least 30 minutes and weight loss.  Follow up with primary care physician and endocrinologist for routine Hemoglobin A1C checks and management.  Follow up with your ophthalmologist for routine yearly vision exams. To prevent shortness of breath, fluid overload, and electrolytes imbalance, and to slow down worsening kidney disease. Continue blood pressure, cholesterol and diabetic medications. Goal of hemoglobin A1C (HgbA1C) < 7%.  Avoid nephrotoxic drugs such as nonsteroidal anti-inflammatory agents (NSAIDs).   Please follow up with your nephrologist to monitor your kidney function, continue with low protein and potassium diet.

## 2018-06-30 NOTE — PROGRESS NOTE ADULT - ASSESSMENT
Assessment  1. CHF - improved  2. CKD  3. Moderate pulmonary hypertension  4. HTN    Plan  1. Decrease lasix to once daily  2. Uptitrate hydralazine and amlodipine to optimize blood pressure control  3. Nuc stress reviewed. No evidence of inducible ischemia.

## 2018-06-30 NOTE — PROGRESS NOTE ADULT - SUBJECTIVE AND OBJECTIVE BOX
COVERAGE FOR DR. WINCHESTER    CHIEF COMPLAINT: comfortable without any complaints      MEDICATIONS:  amLODIPine   Tablet 5 milliGRAM(s) Oral daily  aspirin enteric coated 81 milliGRAM(s) Oral daily  carvedilol 12.5 milliGRAM(s) Oral every 12 hours  furosemide    Tablet 40 milliGRAM(s) Oral daily  heparin  Injectable 5000 Unit(s) SubCutaneous every 12 hours  hydrALAZINE 75 milliGRAM(s) Oral every 8 hours  tamsulosin 0.4 milliGRAM(s) Oral at bedtime            dextrose 40% Gel 15 Gram(s) Oral once PRN  dextrose 50% Injectable 12.5 Gram(s) IV Push once  dextrose 50% Injectable 25 Gram(s) IV Push once  dextrose 50% Injectable 25 Gram(s) IV Push once  glucagon  Injectable 1 milliGRAM(s) IntraMuscular once PRN  insulin lispro (HumaLOG) corrective regimen sliding scale   SubCutaneous three times a day before meals  insulin lispro (HumaLOG) corrective regimen sliding scale   SubCutaneous at bedtime    dextrose 5%. 1000 milliLiter(s) IV Continuous <Continuous>  sodium chloride 0.9% lock flush 3 milliLiter(s) IV Push every 8 hours        PHYSICAL EXAM:  T(C): 36.6 (06-30-18 @ 12:00), Max: 37.1 (06-30-18 @ 04:59)  HR: 69 (06-30-18 @ 12:00) (62 - 70)  BP: 180/92 (06-30-18 @ 12:00) (154/80 - 195/89)  RR: 18 (06-30-18 @ 12:00) (14 - 18)  SpO2: 98% (06-30-18 @ 12:00) (97% - 100%)  Wt(kg): --  I&O's Summary    29 Jun 2018 07:01  -  30 Jun 2018 07:00  --------------------------------------------------------  IN: 740 mL / OUT: 1530 mL / NET: -790 mL    30 Jun 2018 07:01  -  30 Jun 2018 14:55  --------------------------------------------------------  IN: 240 mL / OUT: 800 mL / NET: -560 mL        Appearance: Normal	  Cardiovascular: Normal S1 S2, No JVD, No murmurs, No edema  Respiratory: Lungs clear to auscultation	  Psychiatry: A & O x 3, Mood & affect appropriate  Gastrointestinal:  Soft, Non-tender, + BS	  Skin: No rashes, No ecchymoses, No cyanosis	  Neurologic: Non-focal  Extremities: Normal range of motion, No clubbing, cyanosis or edema  Vascular: Peripheral pulses palpable 2+ bilaterally    TELEMETRY: Sinus rhythm                          11.0   4.90  )-----------( 177      ( 30 Jun 2018 07:10 )             34.2     06-30    138  |  97<L>  |  57<H>  ----------------------------<  112<H>  3.3<L>   |  27  |  3.68<H>    Ca    8.5      30 Jun 2018 07:10  Mg     2.2     06-30

## 2018-11-09 ENCOUNTER — INPATIENT (INPATIENT)
Facility: HOSPITAL | Age: 78
LOS: 5 days | Discharge: HOME | DRG: 291 | End: 2018-11-15
Attending: INTERNAL MEDICINE | Admitting: INTERNAL MEDICINE
Payer: MEDICARE

## 2018-11-09 VITALS
OXYGEN SATURATION: 95 % | DIASTOLIC BLOOD PRESSURE: 104 MMHG | WEIGHT: 166.89 LBS | SYSTOLIC BLOOD PRESSURE: 236 MMHG | TEMPERATURE: 97 F | HEIGHT: 63 IN | HEART RATE: 82 BPM | RESPIRATION RATE: 20 BRPM

## 2018-11-09 DIAGNOSIS — I10 ESSENTIAL (PRIMARY) HYPERTENSION: ICD-10-CM

## 2018-11-09 DIAGNOSIS — N18.4 CHRONIC KIDNEY DISEASE, STAGE 4 (SEVERE): ICD-10-CM

## 2018-11-09 DIAGNOSIS — R06.2 WHEEZING: ICD-10-CM

## 2018-11-09 DIAGNOSIS — D64.9 ANEMIA, UNSPECIFIED: ICD-10-CM

## 2018-11-09 DIAGNOSIS — I50.9 HEART FAILURE, UNSPECIFIED: ICD-10-CM

## 2018-11-09 DIAGNOSIS — I16.1 HYPERTENSIVE EMERGENCY: ICD-10-CM

## 2018-11-09 DIAGNOSIS — I50.33 ACUTE ON CHRONIC DIASTOLIC (CONGESTIVE) HEART FAILURE: ICD-10-CM

## 2018-11-09 DIAGNOSIS — Z29.9 ENCOUNTER FOR PROPHYLACTIC MEASURES, UNSPECIFIED: ICD-10-CM

## 2018-11-09 DIAGNOSIS — E11.22 TYPE 2 DIABETES MELLITUS WITH DIABETIC CHRONIC KIDNEY DISEASE: ICD-10-CM

## 2018-11-09 DIAGNOSIS — N40.0 BENIGN PROSTATIC HYPERPLASIA WITHOUT LOWER URINARY TRACT SYMPTOMS: ICD-10-CM

## 2018-11-09 LAB
ALBUMIN SERPL ELPH-MCNC: 4.1 G/DL — SIGNIFICANT CHANGE UP (ref 3.3–5)
ALP SERPL-CCNC: 84 U/L — SIGNIFICANT CHANGE UP (ref 40–120)
ALT FLD-CCNC: 45 U/L — SIGNIFICANT CHANGE UP (ref 10–45)
ANION GAP SERPL CALC-SCNC: 9 MMOL/L — SIGNIFICANT CHANGE UP (ref 5–17)
APTT BLD: 31.9 SEC — SIGNIFICANT CHANGE UP (ref 27.5–36.3)
AST SERPL-CCNC: 31 U/L — SIGNIFICANT CHANGE UP (ref 10–40)
BASOPHILS # BLD AUTO: 0.1 K/UL — SIGNIFICANT CHANGE UP (ref 0–0.2)
BASOPHILS NFR BLD AUTO: 1 % — SIGNIFICANT CHANGE UP (ref 0–2)
BILIRUB SERPL-MCNC: 0.4 MG/DL — SIGNIFICANT CHANGE UP (ref 0.2–1.2)
BUN SERPL-MCNC: 33 MG/DL — HIGH (ref 7–23)
CALCIUM SERPL-MCNC: 9 MG/DL — SIGNIFICANT CHANGE UP (ref 8.4–10.5)
CHLORIDE SERPL-SCNC: 103 MMOL/L — SIGNIFICANT CHANGE UP (ref 96–108)
CO2 SERPL-SCNC: 27 MMOL/L — SIGNIFICANT CHANGE UP (ref 22–31)
CREAT SERPL-MCNC: 3.16 MG/DL — HIGH (ref 0.5–1.3)
EOSINOPHIL # BLD AUTO: 0.8 K/UL — HIGH (ref 0–0.5)
EOSINOPHIL NFR BLD AUTO: 13.8 % — HIGH (ref 0–6)
GLUCOSE BLDC GLUCOMTR-MCNC: 107 MG/DL — HIGH (ref 70–99)
GLUCOSE BLDC GLUCOMTR-MCNC: 222 MG/DL — HIGH (ref 70–99)
GLUCOSE BLDC GLUCOMTR-MCNC: 254 MG/DL — HIGH (ref 70–99)
GLUCOSE SERPL-MCNC: 134 MG/DL — HIGH (ref 70–99)
HCT VFR BLD CALC: 32.7 % — LOW (ref 39–50)
HGB BLD-MCNC: 10.7 G/DL — LOW (ref 13–17)
INR BLD: 0.98 RATIO — SIGNIFICANT CHANGE UP (ref 0.88–1.16)
LYMPHOCYTES # BLD AUTO: 1 K/UL — SIGNIFICANT CHANGE UP (ref 1–3.3)
LYMPHOCYTES # BLD AUTO: 18.9 % — SIGNIFICANT CHANGE UP (ref 13–44)
MCHC RBC-ENTMCNC: 25 PG — LOW (ref 27–34)
MCHC RBC-ENTMCNC: 32.7 GM/DL — SIGNIFICANT CHANGE UP (ref 32–36)
MCV RBC AUTO: 76.6 FL — LOW (ref 80–100)
MONOCYTES # BLD AUTO: 0.5 K/UL — SIGNIFICANT CHANGE UP (ref 0–0.9)
MONOCYTES NFR BLD AUTO: 9.1 % — SIGNIFICANT CHANGE UP (ref 2–14)
NEUTROPHILS # BLD AUTO: 3.1 K/UL — SIGNIFICANT CHANGE UP (ref 1.8–7.4)
NEUTROPHILS NFR BLD AUTO: 57.2 % — SIGNIFICANT CHANGE UP (ref 43–77)
NT-PROBNP SERPL-SCNC: 7007 PG/ML — HIGH (ref 0–300)
PLATELET # BLD AUTO: 165 K/UL — SIGNIFICANT CHANGE UP (ref 150–400)
POTASSIUM SERPL-MCNC: 4.3 MMOL/L — SIGNIFICANT CHANGE UP (ref 3.5–5.3)
POTASSIUM SERPL-SCNC: 4.3 MMOL/L — SIGNIFICANT CHANGE UP (ref 3.5–5.3)
PROT SERPL-MCNC: 6.9 G/DL — SIGNIFICANT CHANGE UP (ref 6–8.3)
PROTHROM AB SERPL-ACNC: 11.2 SEC — SIGNIFICANT CHANGE UP (ref 10–12.9)
RBC # BLD: 4.27 M/UL — SIGNIFICANT CHANGE UP (ref 4.2–5.8)
RBC # FLD: 14 % — SIGNIFICANT CHANGE UP (ref 10.3–14.5)
SODIUM SERPL-SCNC: 139 MMOL/L — SIGNIFICANT CHANGE UP (ref 135–145)
WBC # BLD: 5.4 K/UL — SIGNIFICANT CHANGE UP (ref 3.8–10.5)
WBC # FLD AUTO: 5.4 K/UL — SIGNIFICANT CHANGE UP (ref 3.8–10.5)

## 2018-11-09 PROCEDURE — 99285 EMERGENCY DEPT VISIT HI MDM: CPT | Mod: GC

## 2018-11-09 PROCEDURE — 99223 1ST HOSP IP/OBS HIGH 75: CPT

## 2018-11-09 PROCEDURE — 71046 X-RAY EXAM CHEST 2 VIEWS: CPT | Mod: 26

## 2018-11-09 RX ORDER — TAMSULOSIN HYDROCHLORIDE 0.4 MG/1
0.4 CAPSULE ORAL AT BEDTIME
Qty: 0 | Refills: 0 | Status: DISCONTINUED | OUTPATIENT
Start: 2018-11-09 | End: 2018-11-15

## 2018-11-09 RX ORDER — ASPIRIN/CALCIUM CARB/MAGNESIUM 324 MG
81 TABLET ORAL DAILY
Qty: 0 | Refills: 0 | Status: DISCONTINUED | OUTPATIENT
Start: 2018-11-09 | End: 2018-11-15

## 2018-11-09 RX ORDER — AMLODIPINE BESYLATE 2.5 MG/1
5 TABLET ORAL DAILY
Qty: 0 | Refills: 0 | Status: DISCONTINUED | OUTPATIENT
Start: 2018-11-09 | End: 2018-11-12

## 2018-11-09 RX ORDER — SODIUM CHLORIDE 9 MG/ML
1000 INJECTION, SOLUTION INTRAVENOUS
Qty: 0 | Refills: 0 | Status: DISCONTINUED | OUTPATIENT
Start: 2018-11-09 | End: 2018-11-15

## 2018-11-09 RX ORDER — HYDRALAZINE HCL 50 MG
5 TABLET ORAL ONCE
Qty: 0 | Refills: 0 | Status: COMPLETED | OUTPATIENT
Start: 2018-11-09 | End: 2018-11-09

## 2018-11-09 RX ORDER — ALBUTEROL 90 UG/1
2.5 AEROSOL, METERED ORAL ONCE
Qty: 0 | Refills: 0 | Status: COMPLETED | OUTPATIENT
Start: 2018-11-09 | End: 2018-11-09

## 2018-11-09 RX ORDER — GLUCAGON INJECTION, SOLUTION 0.5 MG/.1ML
1 INJECTION, SOLUTION SUBCUTANEOUS ONCE
Qty: 0 | Refills: 0 | Status: DISCONTINUED | OUTPATIENT
Start: 2018-11-09 | End: 2018-11-15

## 2018-11-09 RX ORDER — HEPARIN SODIUM 5000 [USP'U]/ML
5000 INJECTION INTRAVENOUS; SUBCUTANEOUS EVERY 8 HOURS
Qty: 0 | Refills: 0 | Status: DISCONTINUED | OUTPATIENT
Start: 2018-11-09 | End: 2018-11-15

## 2018-11-09 RX ORDER — INSULIN LISPRO 100/ML
VIAL (ML) SUBCUTANEOUS AT BEDTIME
Qty: 0 | Refills: 0 | Status: DISCONTINUED | OUTPATIENT
Start: 2018-11-09 | End: 2018-11-15

## 2018-11-09 RX ORDER — FUROSEMIDE 40 MG
40 TABLET ORAL ONCE
Qty: 0 | Refills: 0 | Status: DISCONTINUED | OUTPATIENT
Start: 2018-11-09 | End: 2018-11-09

## 2018-11-09 RX ORDER — CARVEDILOL PHOSPHATE 80 MG/1
25 CAPSULE, EXTENDED RELEASE ORAL EVERY 12 HOURS
Qty: 0 | Refills: 0 | Status: DISCONTINUED | OUTPATIENT
Start: 2018-11-09 | End: 2018-11-15

## 2018-11-09 RX ORDER — DEXTROSE 50 % IN WATER 50 %
12.5 SYRINGE (ML) INTRAVENOUS ONCE
Qty: 0 | Refills: 0 | Status: DISCONTINUED | OUTPATIENT
Start: 2018-11-09 | End: 2018-11-15

## 2018-11-09 RX ORDER — FUROSEMIDE 40 MG
20 TABLET ORAL ONCE
Qty: 0 | Refills: 0 | Status: COMPLETED | OUTPATIENT
Start: 2018-11-09 | End: 2018-11-10

## 2018-11-09 RX ORDER — IPRATROPIUM/ALBUTEROL SULFATE 18-103MCG
3 AEROSOL WITH ADAPTER (GRAM) INHALATION EVERY 6 HOURS
Qty: 0 | Refills: 0 | Status: DISCONTINUED | OUTPATIENT
Start: 2018-11-09 | End: 2018-11-15

## 2018-11-09 RX ORDER — DEXTROSE 50 % IN WATER 50 %
15 SYRINGE (ML) INTRAVENOUS ONCE
Qty: 0 | Refills: 0 | Status: DISCONTINUED | OUTPATIENT
Start: 2018-11-09 | End: 2018-11-15

## 2018-11-09 RX ORDER — FUROSEMIDE 40 MG
20 TABLET ORAL EVERY 12 HOURS
Qty: 0 | Refills: 0 | Status: DISCONTINUED | OUTPATIENT
Start: 2018-11-09 | End: 2018-11-12

## 2018-11-09 RX ORDER — HYDRALAZINE HCL 50 MG
50 TABLET ORAL EVERY 8 HOURS
Qty: 0 | Refills: 0 | Status: DISCONTINUED | OUTPATIENT
Start: 2018-11-09 | End: 2018-11-11

## 2018-11-09 RX ORDER — INSULIN LISPRO 100/ML
VIAL (ML) SUBCUTANEOUS
Qty: 0 | Refills: 0 | Status: DISCONTINUED | OUTPATIENT
Start: 2018-11-09 | End: 2018-11-15

## 2018-11-09 RX ADMIN — Medication 20 MILLIGRAM(S): at 18:21

## 2018-11-09 RX ADMIN — TAMSULOSIN HYDROCHLORIDE 0.4 MILLIGRAM(S): 0.4 CAPSULE ORAL at 22:48

## 2018-11-09 RX ADMIN — Medication 3 MILLILITER(S): at 23:47

## 2018-11-09 RX ADMIN — Medication 5 MILLIGRAM(S): at 21:37

## 2018-11-09 RX ADMIN — Medication 5 MILLIGRAM(S): at 20:07

## 2018-11-09 RX ADMIN — AMLODIPINE BESYLATE 5 MILLIGRAM(S): 2.5 TABLET ORAL at 22:48

## 2018-11-09 RX ADMIN — HEPARIN SODIUM 5000 UNIT(S): 5000 INJECTION INTRAVENOUS; SUBCUTANEOUS at 22:48

## 2018-11-09 RX ADMIN — Medication 5 MILLIGRAM(S): at 22:47

## 2018-11-09 RX ADMIN — ALBUTEROL 2.5 MILLIGRAM(S): 90 AEROSOL, METERED ORAL at 13:54

## 2018-11-09 RX ADMIN — ALBUTEROL 2.5 MILLIGRAM(S): 90 AEROSOL, METERED ORAL at 13:13

## 2018-11-09 RX ADMIN — Medication 62.5 MILLIGRAM(S): at 13:12

## 2018-11-09 RX ADMIN — Medication 3 MILLILITER(S): at 18:43

## 2018-11-09 RX ADMIN — Medication 50 MILLIGRAM(S): at 18:23

## 2018-11-09 NOTE — CHART NOTE - NSCHARTNOTEFT_GEN_A_CORE
ER note  patient well known to me from the office .  Was seen in the office on 11/8/2018 his blood pressure was elevated to 100 20/120-140/120.  EKG was done and no acute ST-T c 5 patient go to the ER he suffers to come to the ER last  but apparently he decided to come this afternoon.  As per patient, his compliant with meds   His medical care  discussed with  ER TEAM in detail his home meds  Coreg 12.5 q 12 hours  Hydralazine 50 mg q 8 hours  Lasix 40 mg daily  Norvasc 5 mg daily  aspirin 81 mg daily

## 2018-11-09 NOTE — H&P ADULT - ASSESSMENT
79yo male with hx of HTN, CHF, presents to the ED with complaints of SOB x 1 week secondary to CHF exacerbation due to Hypertensive emergency. Pt on exam is also noted to have b/l wheezing.

## 2018-11-09 NOTE — H&P ADULT - NSHPSOCIALHISTORY_GEN_ALL_CORE
Lives at home with wife and 2 children  Retired from [employer] 5 years ago  Former smoker; quit 40yrs ago  Social EtOH use, denies illicit drug use

## 2018-11-09 NOTE — ED ADULT TRIAGE NOTE - CHIEF COMPLAINT QUOTE
Patient came to the ED c/o shortness of breath for one week.  Patient went to his PMD yesterday who told him to come to the ED.  Patient denies; cough, chest pain, fevers, N/V, swelling in feet, sick contacts.  Patient returned from [facility] 10/8/18.  Patient took his Labetalol and Hydralazine today.

## 2018-11-09 NOTE — ED ADULT NURSE NOTE - CHPI ED NUR SYMPTOMS NEG
no chest pain/no chills/no edema/no body aches/no fever/no headache/no cough/no diaphoresis/no hemoptysis

## 2018-11-09 NOTE — ED PROVIDER NOTE - PMH
Diabetes    Hypertension BPH (Benign Prostatic Hypertrophy)    Diabetes    Glaucoma (Increased Eye Pressure)    HTN (hypertension)    HTN - Hypertension    Hypertension

## 2018-11-09 NOTE — CHART NOTE - NSCHARTNOTEFT_GEN_A_CORE
Notified by RN that patient's /100 mm of hg (manual). Seen and examined the patient. Patient mentating well, mild SOB noted. O2sat 94 to 95% on O2NC 3l/mt.    Vital Signs Last 24 Hrs  T(C): 36.7 (09 Nov 2018 19:42), Max: 36.8 (09 Nov 2018 17:05)  T(F): 98 (09 Nov 2018 19:42), Max: 98.2 (09 Nov 2018 17:05)  HR: 80 (09 Nov 2018 21:17) (78 - 88)  BP: 213/80 (09 Nov 2018 21:17) (170/98 - 236/104)  BP(mean): --  RR: 20 (09 Nov 2018 21:17) (20 - 20)  SpO2: 99% (09 Nov 2018 21:17) (95% - 99%)          77yo male with hx of HTN, CHF, presents to the ED with complaints of SOB x 1 week secondary to CHF exacerbation due to Hypertensive emergency. Pt on exam is also noted to have b/l wheezing.      Problem/Plan - 1:  ·  Problem: Hypertensive emergency.  Plan: -Improvement in blood pressure  -Initial /104. Repeat 170/98  -Goal not to decrease BP more than it is right now for the next 24hrs.  -Start Lasix 20mg IV BID  -Continue home med Hydralazine 50mg TID  -Start pt on Carvedilol 25mg BID tomorrow (Dose equivalent of Labetalol 200mg BID)  -Continue to monitor vitals.      Problem/Plan - 2:  ·  Problem: Acute on chronic diastolic congestive heart failure.  Plan: -Secondary to Hypertensive emergency  -CXR consistent with pleural effusion, Elevated ProBNP  -Has not been on Lasix at home. Start pt on Lasix 20mg IV BID  -Strict I/O's  -Continue to monitor renal function (may improve as this may be cardio-renal syndrome)  -Fluid restrictions  -Follow up with ECHO. Notified by RN that patient's /100 mm of hg (manual). Seen and examined the patient. Patient mentating well, mild SOB noted. O2sat 94 to 95% on O2NC 3l/mt.      Vital Signs Last 24 Hrs  T(C): 36.7 (09 Nov 2018 19:42), Max: 36.8 (09 Nov 2018 17:05)  T(F): 98 (09 Nov 2018 19:42), Max: 98.2 (09 Nov 2018 17:05)  HR: 80 (09 Nov 2018 21:17) (78 - 88)  BP: 213/80 (09 Nov 2018 21:17) (170/98 - 236/104)  BP(mean): --  RR: 20 (09 Nov 2018 21:17) (20 - 20)  SpO2: 99% (09 Nov 2018 21:17) (95% - 99%)    Physical exam     Constitutional Well-developed, well nourished, appears in mild SOB   Pulm: fine crackles B/L LL  CVS: s1s2 RRR  GI; Soft, NT,ND    79yo male with hx of HTN, CHF, presents to the ED with complaints of SOB x 1 week secondary to CHF exacerbation due to Hypertensive emergency. Patient received multiple doses of Hydralazine IVp in the ED. BP now 220/100 mm of hg. Patient with mild SOB. otherwise asymptomatic.  Discussed with Attending, per Attending, patient well known to him, has been having elevated BP for last 2-3 months.  Last TTE in 06/2018 with EF OF 66%    CHF exacerbation/Hypertensive Emergency    Coreg 25mg PO BID started from tomorrow   Amlodipine 5mg PO daily  aspirin 81mg Po daily  C/W Hydralazine 50 mg PO tid   Hydralazine 5mg IVP ordered  Consider Metoprolol IV if BP not trending down ;target  SBP not lower than 170 mm of hg  Lasix 20mg IVp one dose now  C/W Lasix 20mg IV bid  Trend renal function  Cardiology consult in am per Attending discretion  Will follow closely  Will update with primary team    Laura Johnson P BC  [id number]

## 2018-11-09 NOTE — H&P ADULT - PROBLEM SELECTOR PLAN 1
-Improvement in blood pressure  -Initial /104. Repeat 170/98  -Goal not to decrease BP more than it is right now for the next 24hrs.  -Start Lasix 20mg IV BID  -Continue home med Hydralazine 50mg TID  -Start pt on Carvedilol 25mg BID tomorrow (Dose equivalent of Labetalol 200mg BID)  -Continue to monitor vitals

## 2018-11-09 NOTE — H&P ADULT - FAMILY HISTORY
Mother  Still living? Unknown  Family history of diabetes mellitus (DM), Age at diagnosis: Age Unknown     Sibling  Still living? Unknown  Family history of diabetes mellitus (DM), Age at diagnosis: Age Unknown

## 2018-11-09 NOTE — H&P ADULT - PMH
BPH (Benign Prostatic Hypertrophy)    Diabetes    Glaucoma (Increased Eye Pressure)    HTN (hypertension)    HTN - Hypertension    Hypertension

## 2018-11-09 NOTE — H&P ADULT - HISTORY OF PRESENT ILLNESS
79yo male with hx of HTN, CHF, presents to the ED with complaints of SOB x 1 week. Pt states he initially had symptoms of URI, s/p treatment with Azithromycin, had improvement in symptoms. For the past week, however, he noted symptoms of sob on exertion as well at rest. He denies associated symptoms of cp, palpitations, N/V, dizziness, lightheadedness, blurry vision. In addition to his symptoms of sob, he also noted LE edema. He denies increase number of pillow usage at night time, however, does endorse worsening symptoms while laying flat.   Pt states he saw his PMD yesterday and was noted in office to have /120-220/120. He states he has been compliant with his medications. He was advised to go to the ED for further evaluation but states he didn't go because it was too late in the day. BP medication review, he is only on Labetalol 200mg BID + Hydralazine 20mg TID. He cannot recall being on Carvedilol. He was on Amlodipine in the past but has not been on it in a while. He was on Lasix during previous hospital admission but was discharged with only 20 day supply without any refills. He has not been on it for close to 1 year.   Currently pt states his symptoms are improving but continues to feel sob intermittently. He has never used O2 at home. He also denies hx of COPD or Asthma.

## 2018-11-09 NOTE — H&P ADULT - PROBLEM SELECTOR PLAN 2
-Secondary to Hypertensive emergency  -CXR consistent with pleural effusion, Elevated ProBNP  -Has not been on Lasix at home. Start pt on Lasix 20mg IV BID  -Strict I/O's  -Continue to monitor renal function (may improve as this may be cardio-renal syndrome)  -Fluid restrictions  -Follow up with ECHO

## 2018-11-09 NOTE — ED PROVIDER NOTE - PROGRESS NOTE DETAILS
Kristopher PGY2: on reassessment, breathing improved, in no distress, pt hypertensive systolic bp 200, asked RN to notify primary team if there is a preference for additional medication, pt otherwise appears NAD, relaxed, without pain or discomfort.

## 2018-11-09 NOTE — PROVIDER CONTACT NOTE (OTHER) - ACTION/TREATMENT ORDERED:
Hydralazine 5 mg IVP and amlodipine 5 mg po ordered and administered, will reassess BP in one hour. Continue to monitor.

## 2018-11-09 NOTE — H&P ADULT - PROBLEM SELECTOR PLAN 6
-Chronic  -Uncontrolled likely secondary to worsening Renal function  -Consider switching from Labetalol 200mg BID to Carvedilol 25mg BID  -Continue Hydralazine 50mg TID

## 2018-11-09 NOTE — H&P ADULT - PROBLEM SELECTOR PLAN 4
-Chronic  -Trending creatinine, pts Cr is at baseline  -In the setting of CHF exacerbation, pt will benefit from Lasix treatment. Renal function may improve with diuretic use as this may be cardio-renal syndrome  -Continue to monitor

## 2018-11-09 NOTE — ED ADULT NURSE NOTE - OBJECTIVE STATEMENT
77 yo M aaox4 C/o of SOB x 1wk Pt brought to room 37  from triage. Pt reports recent URI diagnoses. Sent To ED by PCP for lack of improvement in breathing. B/L Wheezes noted upon auscultation. B/l LE swelling (chronic) - Denies N/V. #20 gauge in RAC x's 1 attempt pt. tolerated well, labs drawn and sent, EKG Done and CXR at bedside. MD aware and at bedside evaluating.

## 2018-11-09 NOTE — ED ADULT NURSE NOTE - CHIEF COMPLAINT QUOTE
Patient came to the ED c/o shortness of breath for one week.  Patient went to his PMD yesterday who told him to come to the ED.  Patient denies; cough, chest pain, fevers, N/V, swelling in feet, sick contacts.  Patient returned from [facility] 10/8/18.  Patient took his Labetalol and Hydralazine today.

## 2018-11-09 NOTE — ED PROVIDER NOTE - OBJECTIVE STATEMENT
78M DM, HTN, former smoker,c/o sob/nasal congestion, no  fevers or chills, abx azithromycin 2 weeks ago for URI, 78M DM, HTN, former smoker,c/o sob/nasal congestion, no  fevers or chills, abx azithromycin 2 weeks ago for URI, now with increased SOB, LE edema and HAWKINS 78M DM, HTN, former smoker,c/o sob/nasal congestion x 1 week, no  fevers or chills, with recent abx azithromycin 2 weeks ago for URI, now with increased SOB, LE edema and HAWKINS. Per pt, he has no known pulmonary dz, per wife at bedside, he has possible hx of CHF. Per PCP, pt was advised much earlier to come to ED for SOB but pt waited. No chest pain, no dizziness.

## 2018-11-09 NOTE — PROVIDER CONTACT NOTE (OTHER) - ASSESSMENT
Patient A&Ox4, lying in bed, no acute distress noted or reported. Pt denies chest pain, dizziness  or SOB. V/S /95 electronic, 220/100 manual, HR 85, RR 18, Temp. 97.4, O2 sat 99%.

## 2018-11-09 NOTE — ED PROVIDER NOTE - PHYSICAL EXAMINATION
Gen:  alert, awake, in mild distress  HEENT:  atraumatic head, airway clear, pupils equal and round  CV:  rrr, nl S1, S2, no m/r/g  Pulm:  diffuse wheezing and crackles at b/l bases  Abd: s/nt/nd, +BS  Ext:  moving all extremities, b/l LE edema  Neuro:  grossly intact, no focal deficits  Skin:  clear, dry, intact  Psych: AOx3, normal affect, no apparent risk to self or others

## 2018-11-10 LAB
ANION GAP SERPL CALC-SCNC: 13 MMOL/L — SIGNIFICANT CHANGE UP (ref 5–17)
ANION GAP SERPL CALC-SCNC: 15 MMOL/L — SIGNIFICANT CHANGE UP (ref 5–17)
BASOPHILS # BLD AUTO: 0 K/UL — SIGNIFICANT CHANGE UP (ref 0–0.2)
BASOPHILS NFR BLD AUTO: 0.3 % — SIGNIFICANT CHANGE UP (ref 0–2)
BUN SERPL-MCNC: 34 MG/DL — HIGH (ref 7–23)
BUN SERPL-MCNC: 36 MG/DL — HIGH (ref 7–23)
CALCIUM SERPL-MCNC: 9.1 MG/DL — SIGNIFICANT CHANGE UP (ref 8.4–10.5)
CALCIUM SERPL-MCNC: 9.1 MG/DL — SIGNIFICANT CHANGE UP (ref 8.4–10.5)
CHLORIDE SERPL-SCNC: 102 MMOL/L — SIGNIFICANT CHANGE UP (ref 96–108)
CHLORIDE SERPL-SCNC: 98 MMOL/L — SIGNIFICANT CHANGE UP (ref 96–108)
CO2 SERPL-SCNC: 24 MMOL/L — SIGNIFICANT CHANGE UP (ref 22–31)
CO2 SERPL-SCNC: 25 MMOL/L — SIGNIFICANT CHANGE UP (ref 22–31)
CREAT SERPL-MCNC: 2.92 MG/DL — HIGH (ref 0.5–1.3)
CREAT SERPL-MCNC: 3.11 MG/DL — HIGH (ref 0.5–1.3)
EOSINOPHIL # BLD AUTO: 0 K/UL — SIGNIFICANT CHANGE UP (ref 0–0.5)
EOSINOPHIL NFR BLD AUTO: 0.2 % — SIGNIFICANT CHANGE UP (ref 0–6)
FERRITIN SERPL-MCNC: 77 NG/ML — SIGNIFICANT CHANGE UP (ref 30–400)
GLUCOSE BLDC GLUCOMTR-MCNC: 108 MG/DL — HIGH (ref 70–99)
GLUCOSE BLDC GLUCOMTR-MCNC: 85 MG/DL — SIGNIFICANT CHANGE UP (ref 70–99)
GLUCOSE BLDC GLUCOMTR-MCNC: 89 MG/DL — SIGNIFICANT CHANGE UP (ref 70–99)
GLUCOSE BLDC GLUCOMTR-MCNC: 96 MG/DL — SIGNIFICANT CHANGE UP (ref 70–99)
GLUCOSE SERPL-MCNC: 120 MG/DL — HIGH (ref 70–99)
GLUCOSE SERPL-MCNC: 214 MG/DL — HIGH (ref 70–99)
HBA1C BLD-MCNC: 5.7 % — HIGH (ref 4–5.6)
HCT VFR BLD CALC: 33.4 % — LOW (ref 39–50)
HGB BLD-MCNC: 10.7 G/DL — LOW (ref 13–17)
IRON SATN MFR SERPL: 18 % — SIGNIFICANT CHANGE UP (ref 16–55)
IRON SATN MFR SERPL: 41 UG/DL — LOW (ref 45–165)
LYMPHOCYTES # BLD AUTO: 0.7 K/UL — LOW (ref 1–3.3)
LYMPHOCYTES # BLD AUTO: 11.3 % — LOW (ref 13–44)
MAGNESIUM SERPL-MCNC: 2.1 MG/DL — SIGNIFICANT CHANGE UP (ref 1.6–2.6)
MAGNESIUM SERPL-MCNC: 2.2 MG/DL — SIGNIFICANT CHANGE UP (ref 1.6–2.6)
MCHC RBC-ENTMCNC: 24.3 PG — LOW (ref 27–34)
MCHC RBC-ENTMCNC: 32 GM/DL — SIGNIFICANT CHANGE UP (ref 32–36)
MCV RBC AUTO: 75.9 FL — LOW (ref 80–100)
MONOCYTES # BLD AUTO: 0.4 K/UL — SIGNIFICANT CHANGE UP (ref 0–0.9)
MONOCYTES NFR BLD AUTO: 6.2 % — SIGNIFICANT CHANGE UP (ref 2–14)
NEUTROPHILS # BLD AUTO: 5.1 K/UL — SIGNIFICANT CHANGE UP (ref 1.8–7.4)
NEUTROPHILS NFR BLD AUTO: 82 % — HIGH (ref 43–77)
PHOSPHATE SERPL-MCNC: 2.4 MG/DL — LOW (ref 2.5–4.5)
PLATELET # BLD AUTO: 179 K/UL — SIGNIFICANT CHANGE UP (ref 150–400)
POTASSIUM SERPL-MCNC: 3.9 MMOL/L — SIGNIFICANT CHANGE UP (ref 3.5–5.3)
POTASSIUM SERPL-MCNC: 4.2 MMOL/L — SIGNIFICANT CHANGE UP (ref 3.5–5.3)
POTASSIUM SERPL-SCNC: 3.9 MMOL/L — SIGNIFICANT CHANGE UP (ref 3.5–5.3)
POTASSIUM SERPL-SCNC: 4.2 MMOL/L — SIGNIFICANT CHANGE UP (ref 3.5–5.3)
RBC # BLD: 4.4 M/UL — SIGNIFICANT CHANGE UP (ref 4.2–5.8)
RBC # FLD: 14.1 % — SIGNIFICANT CHANGE UP (ref 10.3–14.5)
SODIUM SERPL-SCNC: 137 MMOL/L — SIGNIFICANT CHANGE UP (ref 135–145)
SODIUM SERPL-SCNC: 140 MMOL/L — SIGNIFICANT CHANGE UP (ref 135–145)
TIBC SERPL-MCNC: 223 UG/DL — SIGNIFICANT CHANGE UP (ref 220–430)
UIBC SERPL-MCNC: 182 UG/DL — SIGNIFICANT CHANGE UP (ref 110–370)
WBC # BLD: 6.3 K/UL — SIGNIFICANT CHANGE UP (ref 3.8–10.5)
WBC # FLD AUTO: 6.3 K/UL — SIGNIFICANT CHANGE UP (ref 3.8–10.5)

## 2018-11-10 RX ORDER — SODIUM,POTASSIUM PHOSPHATES 278-250MG
1 POWDER IN PACKET (EA) ORAL
Qty: 0 | Refills: 0 | Status: DISCONTINUED | OUTPATIENT
Start: 2018-11-10 | End: 2018-11-10

## 2018-11-10 RX ORDER — SODIUM,POTASSIUM PHOSPHATES 278-250MG
1 POWDER IN PACKET (EA) ORAL
Qty: 0 | Refills: 0 | Status: COMPLETED | OUTPATIENT
Start: 2018-11-10 | End: 2018-11-10

## 2018-11-10 RX ORDER — INFLUENZA VIRUS VACCINE 15; 15; 15; 15 UG/.5ML; UG/.5ML; UG/.5ML; UG/.5ML
0.5 SUSPENSION INTRAMUSCULAR ONCE
Qty: 0 | Refills: 0 | Status: COMPLETED | OUTPATIENT
Start: 2018-11-10 | End: 2018-11-10

## 2018-11-10 RX ADMIN — Medication 1 PACKET(S): at 05:25

## 2018-11-10 RX ADMIN — Medication 1 PACKET(S): at 13:14

## 2018-11-10 RX ADMIN — HEPARIN SODIUM 5000 UNIT(S): 5000 INJECTION INTRAVENOUS; SUBCUTANEOUS at 05:25

## 2018-11-10 RX ADMIN — HEPARIN SODIUM 5000 UNIT(S): 5000 INJECTION INTRAVENOUS; SUBCUTANEOUS at 21:47

## 2018-11-10 RX ADMIN — Medication 20 MILLIGRAM(S): at 01:42

## 2018-11-10 RX ADMIN — Medication 20 MILLIGRAM(S): at 17:41

## 2018-11-10 RX ADMIN — Medication 50 MILLIGRAM(S): at 13:14

## 2018-11-10 RX ADMIN — Medication 50 MILLIGRAM(S): at 21:47

## 2018-11-10 RX ADMIN — Medication 50 MILLIGRAM(S): at 04:00

## 2018-11-10 RX ADMIN — Medication 3 MILLILITER(S): at 17:41

## 2018-11-10 RX ADMIN — Medication 3 MILLILITER(S): at 05:25

## 2018-11-10 RX ADMIN — Medication 20 MILLIGRAM(S): at 06:00

## 2018-11-10 RX ADMIN — HEPARIN SODIUM 5000 UNIT(S): 5000 INJECTION INTRAVENOUS; SUBCUTANEOUS at 13:14

## 2018-11-10 RX ADMIN — Medication 3 MILLILITER(S): at 13:14

## 2018-11-10 RX ADMIN — CARVEDILOL PHOSPHATE 25 MILLIGRAM(S): 80 CAPSULE, EXTENDED RELEASE ORAL at 17:42

## 2018-11-10 RX ADMIN — Medication 81 MILLIGRAM(S): at 13:14

## 2018-11-10 RX ADMIN — TAMSULOSIN HYDROCHLORIDE 0.4 MILLIGRAM(S): 0.4 CAPSULE ORAL at 21:47

## 2018-11-10 RX ADMIN — Medication 3 MILLILITER(S): at 23:59

## 2018-11-10 RX ADMIN — CARVEDILOL PHOSPHATE 25 MILLIGRAM(S): 80 CAPSULE, EXTENDED RELEASE ORAL at 05:25

## 2018-11-10 NOTE — PROVIDER CONTACT NOTE (OTHER) - ASSESSMENT
Patient lying in bed at time of episode, denies chest pain, palpitations or dizziness. V/S /82, HR 88 beats per minute.

## 2018-11-10 NOTE — PROGRESS NOTE ADULT - SUBJECTIVE AND OBJECTIVE BOX
CHIEF COMPLAINT:    SUBJECTIVE:     REVIEW OF SYSTEMS:    CONSTITUTIONAL: (  )  weakness,  (  ) fevers or chills  EYES/ENT: (  )visual changes;     NECK: (  ) pain or stiffness  RESPIRATORY:   (  )cough, wheezing, hemoptysis;  (  ) shortness of breath  CARDIOVASCULAR:  (  )chest pain or palpitations  GASTROINTESTINAL:   (  )abdominal or epigastric pain.  (  ) nausea, vomiting, or hematemesis;   (   ) diarrhea or constipation.   GENITOURINARY:   (    ) dysuria, frequency or hematuria  NEUROLOGICAL:  (   ) numbness or weakness   All other review of systems is negative unless indicated above    Vital Signs Last 24 Hrs  T(C): 36.8 (10 Nov 2018 12:02), Max: 36.8 (09 Nov 2018 17:05)  T(F): 98.3 (10 Nov 2018 12:02), Max: 98.3 (10 Nov 2018 12:02)  HR: 68 (10 Nov 2018 12:02) (68 - 91)  BP: 181/82 (10 Nov 2018 12:02) (176/92 - 220/100)  BP(mean): --  RR: 18 (10 Nov 2018 12:02) (18 - 20)  SpO2: 98% (10 Nov 2018 12:02) (95% - 100%)    I&O's Summary    10 Nov 2018 07:01  -  10 Nov 2018 14:23  --------------------------------------------------------  IN: 240 mL / OUT: 1200 mL / NET: -960 mL        CAPILLARY BLOOD GLUCOSE      POCT Blood Glucose.: 85 mg/dL (10 Nov 2018 12:59)  POCT Blood Glucose.: 108 mg/dL (10 Nov 2018 08:41)  POCT Blood Glucose.: 222 mg/dL (09 Nov 2018 23:17)  POCT Blood Glucose.: 254 mg/dL (09 Nov 2018 22:08)  POCT Blood Glucose.: 107 mg/dL (09 Nov 2018 16:48)      PHYSICAL EXAM:    Constitutional:  (   ) NAD,   (   )awake and alert  HEENT: PERR, EOMI,    Neck: Soft and supple, No LAD, No JVD  Respiratory:  (    Breath sounds are clear bilaterally,    (   ) wheezing, rales or rhonchi  Cardiovascular:     (   )S1 and S2, regular rate and rhythm, no Murmurs, gallops or rubs  Gastrointestinal:  (   )Bowel Sounds present, soft,   (  )nontender, nondistended,    Extremities:    (  ) peripheral edema  Vascular: 2+ peripheral pulses  Neurological:    (    )A/O x 3,   (  ) focal deficits  Musculoskeletal:    (   )  normal strength b/l upper  (     ) normal  lower extremities  Skin: No rashes    MEDICATIONS:  MEDICATIONS  (STANDING):  ALBUTerol/ipratropium for Nebulization 3 milliLiter(s) Nebulizer every 6 hours  amLODIPine   Tablet 5 milliGRAM(s) Oral daily  aspirin enteric coated 81 milliGRAM(s) Oral daily  carvedilol 25 milliGRAM(s) Oral every 12 hours  dextrose 5%. 1000 milliLiter(s) (50 mL/Hr) IV Continuous <Continuous>  dextrose 50% Injectable 12.5 Gram(s) IV Push once  furosemide   Injectable 20 milliGRAM(s) IV Push every 12 hours  heparin  Injectable 5000 Unit(s) SubCutaneous every 8 hours  hydrALAZINE 50 milliGRAM(s) Oral every 8 hours  influenza   Vaccine 0.5 milliLiter(s) IntraMuscular once  insulin lispro (HumaLOG) corrective regimen sliding scale   SubCutaneous at bedtime  insulin lispro (HumaLOG) corrective regimen sliding scale   SubCutaneous three times a day before meals  tamsulosin 0.4 milliGRAM(s) Oral at bedtime      LABS: All Labs Reviewed:                        10.7   6.3   )-----------( 179      ( 10 Nov 2018 06:33 )             33.4     11-10    140  |  102  |  36<H>  ----------------------------<  120<H>  4.2   |  25  |  3.11<H>    Ca    9.1      10 Nov 2018 06:33  Phos  2.4     11-10  Mg     2.1     11-10    TPro  6.9  /  Alb  4.1  /  TBili  0.4  /  DBili  x   /  AST  31  /  ALT  45  /  AlkPhos  84  11-09    PT/INR - ( 09 Nov 2018 13:26 )   PT: 11.2 sec;   INR: 0.98 ratio         PTT - ( 09 Nov 2018 13:26 )  PTT:31.9 sec      Blood Culture:   Urine Culture      RADIOLOGY/EKG:    ASSESSMENT AND PLAN:    DVT PPX:    ADVANCED DIRECTIVE:    DISPOSITION: CHIEF COMPLAINT:    SOB elevated blood pressure    History of Present Illness:     79yo male with hx of HTN, CHF, presents to the ED with complaints of SOB x 1 week. Pt states he initially had symptoms of URI, s/p treatment with Azithromycin, had improvement in symptoms. For the past week, however, he noted symptoms of sob on exertion as well at rest. He denies associated symptoms of cp, palpitations, N/V, dizziness, lightheadedness, blurry vision. In addition to his symptoms of sob, he also noted LE edema. He denies increase number of pillow usage at night time, however, does endorse worsening symptoms while laying flat.   Pt was seen by me   in office on 11/8/2018 advised patient to go to ER 2/2 to have /120-220/120.    BP medication review,  an updated discussed with the NP last night     and all medication. updated  his blood pressure  much whether  Currently pt states his symptoms are improving but continues to feel sob intermittently. He has never used O2 at home. He also denies hx of COPD or Asthma.   SUBJECTIVE:     REVIEW OF SYSTEMS:       · General  negative    · Skin/Breast  negative    · Ophthalmologic  negative    · ENMT  negative    · Respiratory and Thorax Symptoms  wheezing  dyspnea    · Cardiovascular Symptoms  dyspnea on exertion; orthopnea; paroxysmal nocturnal dyspnea; peripheral edema    · Gastrointestinal  negative    · Genitourinary  negative    · Musculoskeletal  negative    · Neurological  negative    · Psychiatric  negative    · Hematology/Lymphatics  negative    · Endocrine  negative    · Allergic/Immunologic  negative          Vital Signs Last 24 Hrs  T(C): 36.8 (10 Nov 2018 12:02), Max: 36.8 (09 Nov 2018 17:05)  T(F): 98.3 (10 Nov 2018 12:02), Max: 98.3 (10 Nov 2018 12:02)  HR: 68 (10 Nov 2018 12:02) (68 - 91)  BP: 181/82 (10 Nov 2018 12:02) (176/92 - 220/100)  BP(mean): --  RR: 18 (10 Nov 2018 12:02) (18 - 20)  SpO2: 98% (10 Nov 2018 12:02) (95% - 100%)    I&O's Summary    10 Nov 2018 07:01  -  10 Nov 2018 14:23  --------------------------------------------------------  IN: 240 mL / OUT: 1200 mL / NET: -960 mL        CAPILLARY BLOOD GLUCOSE      POCT Blood Glucose.: 85 mg/dL (10 Nov 2018 12:59)  POCT Blood Glucose.: 108 mg/dL (10 Nov 2018 08:41)  POCT Blood Glucose.: 222 mg/dL (09 Nov 2018 23:17)  POCT Blood Glucose.: 254 mg/dL (09 Nov 2018 22:08)  POCT Blood Glucose.: 107 mg/dL (09 Nov 2018 16:48)      PHYSICAL EXAM:  · Constitutional  Well-developed, well nourished    · Eyes  EOMI; PERRL; no drainage or redness    · ENMT  No oral lesions; no gross abnormalities    · Neck  No bruits; no thyromegaly or nodules    · Back  No deformity or limitation of movement    · Respiratory  detailed exam    · Respiratory Details  rales; wheezes    · Rales  upper L; lower L; upper R; middle R; lower R    · Wheezes  upper L; lower L; upper R; middle R; lower R    · Cardiovascular  Regular rate & rhythm, normal S1, S2; no murmurs, gallops or rubs; no S3, S4    · Gastrointestinal  Soft, non-tender, no hepatosplenomegaly, normal bowel sounds    · Extremities  detailed exam    · Extremities Details  pedal edema    · Pedal Edema Severity  2+    · Pedal Edema Type  pitting    · Vascular  Equal and normal pulses (carotid, femoral, dorsalis pedis)    · Neurological  Alert & oriented; no sensory, motor or coordination deficits, normal reflexes    · Skin  No lesions; no rash    · Musculoskeletal  No joint pain, swelling or deformity; no limitation of movement    · Psychiatric  Affect and characteristics of appearance, verbalizations, behaviors are appropriate           MEDICATIONS:  MEDICATIONS  (STANDING):  ALBUTerol/ipratropium for Nebulization 3 milliLiter(s) Nebulizer every 6 hours  amLODIPine   Tablet 5 milliGRAM(s) Oral daily  aspirin enteric coated 81 milliGRAM(s) Oral daily  carvedilol 25 milliGRAM(s) Oral every 12 hours  dextrose 5%. 1000 milliLiter(s) (50 mL/Hr) IV Continuous <Continuous>  dextrose 50% Injectable 12.5 Gram(s) IV Push once  furosemide   Injectable 20 milliGRAM(s) IV Push every 12 hours  heparin  Injectable 5000 Unit(s) SubCutaneous every 8 hours  hydrALAZINE 50 milliGRAM(s) Oral every 8 hours  influenza   Vaccine 0.5 milliLiter(s) IntraMuscular once  insulin lispro (HumaLOG) corrective regimen sliding scale   SubCutaneous at bedtime  insulin lispro (HumaLOG) corrective regimen sliding scale   SubCutaneous three times a day before meals  tamsulosin 0.4 milliGRAM(s) Oral at bedtime      LABS: All Labs Reviewed:                        10.7   6.3   )-----------( 179      ( 10 Nov 2018 06:33 )             33.4     11-10    140  |  102  |  36<H>  ----------------------------<  120<H>  4.2   |  25  |  3.11<H>    Ca    9.1      10 Nov 2018 06:33  Phos  2.4     11-10  Mg     2.1     11-10    TPro  6.9  /  Alb  4.1  /  TBili  0.4  /  DBili  x   /  AST  31  /  ALT  45  /  AlkPhos  84  11-09    PT/INR - ( 09 Nov 2018 13:26 )   PT: 11.2 sec;   INR: 0.98 ratio         PTT - ( 09 Nov 2018 13:26 )  PTT:31.9 sec      Blood Culture:   Urine Culture      RADIOLOGY/EKG:    ASSESSMENT AND PLAN:  · Assessment      79yo male with hx of HTN, CHF, presents to the ED with complaints of SOB x 1 week secondary to CHF exacerbation due to Hypertensive emergency. Pt on exam is also noted to have b/l wheezing.     Problem/Plan - 1:  ·  Problem: Hypertensive emergency.  Plan: -Improvement in blood pressure  -Initial /104. Repeat 170/98  -Goal not to decrease BP more than it is right now for the next 24hrs.  -Start Lasix 20mg IV BID  -Continue home med Hydralazine 50mg TID  -Stared t pt on Carvedilol 25mg BID  and Norvasc 5 mg  -Continue to monitor vitals.  cardiology consult call.  He'll see the patient in a.m. patient had none sustained V. tach (9 beat)    Problem/Plan - 2:  ·  Problem: Acute on chronic diastolic congestive heart failure.  Plan: -Secondary to Hypertensive emergency  -CXR consistent with pleural effusion, Elevated ProBNP  -Has not been on Lasix at home. Start pt on Lasix 20mg IV BID  -Strict I/O's  -Continue to monitor renal function (may improve as this may be cardio-renal syndrome)  -Fluid restrictions  -Follow up with ECHO.     Problem/Plan - 3:  ·  Problem: Wheezing on auscultation.  Plan: -No hx of COPD or obstructive airway disease  -Physical exam findings  -S/p IV Solumedrol   -Start DuoNebs   -Continue to monitor symptoms.     Problem/Plan - 4:  ·  Problem: CKD (chronic kidney disease), stage IV.  Plan: -Chronic  -Trending creatinine, pts Cr is at baseline  -In the setting of CHF exacerbation, pt will benefit from Lasix treatment. Renal function may improve with diuretic use as this may be cardio-renal syndrome  -Continue to monitor.     Problem/Plan - 5:  ·  Problem: Anemia, unspecified type.  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  -Follow up with Iron panel.     Problem/Plan - 6:  Problem: Essential hypertension. Plan: -Chronic  -Uncontrolled likely secondary to worsening Renal function     -Continue Hydralazine 50mg TID.    Problem/Plan - 7:  ·  Problem: Type 2 diabetes mellitus with chronic kidney disease, without long-term current use of insulin, unspecified CKD stage.  Plan: -Chronic  -Not on any medications  -Follow up with HA1C   -ISS +Fingersticks.     Problem/Plan - 8:  ·  Problem: Benign prostatic hyperplasia, unspecified whether lower urinary tract symptoms present.  Plan: -Chronic  -Continue Tamsulosin.     Problem/Plan - 9:  ·  Problem: Prophylactic measure.  Plan: Heparin SQ for DVT ppx  Wife, Alona Clements, [phone].    DVT PPX:    ADVANCED DIRECTIVE:    DISPOSITION:

## 2018-11-11 LAB
ANION GAP SERPL CALC-SCNC: 16 MMOL/L — SIGNIFICANT CHANGE UP (ref 5–17)
BUN SERPL-MCNC: 46 MG/DL — HIGH (ref 7–23)
CALCIUM SERPL-MCNC: 8.6 MG/DL — SIGNIFICANT CHANGE UP (ref 8.4–10.5)
CHLORIDE SERPL-SCNC: 101 MMOL/L — SIGNIFICANT CHANGE UP (ref 96–108)
CO2 SERPL-SCNC: 26 MMOL/L — SIGNIFICANT CHANGE UP (ref 22–31)
CREAT SERPL-MCNC: 3.42 MG/DL — HIGH (ref 0.5–1.3)
GLUCOSE BLDC GLUCOMTR-MCNC: 118 MG/DL — HIGH (ref 70–99)
GLUCOSE BLDC GLUCOMTR-MCNC: 157 MG/DL — HIGH (ref 70–99)
GLUCOSE BLDC GLUCOMTR-MCNC: 93 MG/DL — SIGNIFICANT CHANGE UP (ref 70–99)
GLUCOSE SERPL-MCNC: 81 MG/DL — SIGNIFICANT CHANGE UP (ref 70–99)
HCT VFR BLD CALC: 32.8 % — LOW (ref 39–50)
HGB BLD-MCNC: 10.1 G/DL — LOW (ref 13–17)
MCHC RBC-ENTMCNC: 23.4 PG — LOW (ref 27–34)
MCHC RBC-ENTMCNC: 30.6 GM/DL — LOW (ref 32–36)
MCV RBC AUTO: 76.3 FL — LOW (ref 80–100)
PLATELET # BLD AUTO: 164 K/UL — SIGNIFICANT CHANGE UP (ref 150–400)
POTASSIUM SERPL-MCNC: 3.9 MMOL/L — SIGNIFICANT CHANGE UP (ref 3.5–5.3)
POTASSIUM SERPL-SCNC: 3.9 MMOL/L — SIGNIFICANT CHANGE UP (ref 3.5–5.3)
RBC # BLD: 4.3 M/UL — SIGNIFICANT CHANGE UP (ref 4.2–5.8)
RBC # FLD: 13.9 % — SIGNIFICANT CHANGE UP (ref 10.3–14.5)
SODIUM SERPL-SCNC: 143 MMOL/L — SIGNIFICANT CHANGE UP (ref 135–145)
WBC # BLD: 7 K/UL — SIGNIFICANT CHANGE UP (ref 3.8–10.5)
WBC # FLD AUTO: 7 K/UL — SIGNIFICANT CHANGE UP (ref 3.8–10.5)

## 2018-11-11 RX ORDER — HYDRALAZINE HCL 50 MG
75 TABLET ORAL EVERY 8 HOURS
Qty: 0 | Refills: 0 | Status: DISCONTINUED | OUTPATIENT
Start: 2018-11-11 | End: 2018-11-11

## 2018-11-11 RX ORDER — HYDRALAZINE HCL 50 MG
100 TABLET ORAL EVERY 8 HOURS
Qty: 0 | Refills: 0 | Status: DISCONTINUED | OUTPATIENT
Start: 2018-11-12 | End: 2018-11-15

## 2018-11-11 RX ORDER — HYDRALAZINE HCL 50 MG
25 TABLET ORAL ONCE
Qty: 0 | Refills: 0 | Status: COMPLETED | OUTPATIENT
Start: 2018-11-11 | End: 2018-11-11

## 2018-11-11 RX ADMIN — Medication 3 MILLILITER(S): at 23:07

## 2018-11-11 RX ADMIN — Medication 75 MILLIGRAM(S): at 21:15

## 2018-11-11 RX ADMIN — Medication 50 MILLIGRAM(S): at 04:33

## 2018-11-11 RX ADMIN — Medication 25 MILLIGRAM(S): at 23:06

## 2018-11-11 RX ADMIN — AMLODIPINE BESYLATE 5 MILLIGRAM(S): 2.5 TABLET ORAL at 00:40

## 2018-11-11 RX ADMIN — Medication 3 MILLILITER(S): at 13:26

## 2018-11-11 RX ADMIN — Medication 81 MILLIGRAM(S): at 13:26

## 2018-11-11 RX ADMIN — TAMSULOSIN HYDROCHLORIDE 0.4 MILLIGRAM(S): 0.4 CAPSULE ORAL at 21:15

## 2018-11-11 RX ADMIN — Medication 25 MILLIGRAM(S): at 18:49

## 2018-11-11 RX ADMIN — HEPARIN SODIUM 5000 UNIT(S): 5000 INJECTION INTRAVENOUS; SUBCUTANEOUS at 05:02

## 2018-11-11 RX ADMIN — Medication 50 MILLIGRAM(S): at 13:26

## 2018-11-11 RX ADMIN — CARVEDILOL PHOSPHATE 25 MILLIGRAM(S): 80 CAPSULE, EXTENDED RELEASE ORAL at 04:33

## 2018-11-11 RX ADMIN — Medication 3 MILLILITER(S): at 05:02

## 2018-11-11 RX ADMIN — CARVEDILOL PHOSPHATE 25 MILLIGRAM(S): 80 CAPSULE, EXTENDED RELEASE ORAL at 18:19

## 2018-11-11 RX ADMIN — Medication 20 MILLIGRAM(S): at 18:19

## 2018-11-11 RX ADMIN — HEPARIN SODIUM 5000 UNIT(S): 5000 INJECTION INTRAVENOUS; SUBCUTANEOUS at 21:15

## 2018-11-11 RX ADMIN — HEPARIN SODIUM 5000 UNIT(S): 5000 INJECTION INTRAVENOUS; SUBCUTANEOUS at 13:25

## 2018-11-11 RX ADMIN — Medication 3 MILLILITER(S): at 18:19

## 2018-11-11 RX ADMIN — Medication 20 MILLIGRAM(S): at 05:02

## 2018-11-11 NOTE — CONSULT NOTE ADULT - SUBJECTIVE AND OBJECTIVE BOX
CHIEF COMPLAINT:Patient is a 78y old  Male who presents with a chief complaint of SOB (11 Nov 2018 20:55)      HISTORY OF PRESENT ILLNESS:HPI:  79yo male with hx of HTN, CHF, presents to the ED with complaints of SOB x 1 week. Pt states he initially had symptoms of URI, s/p treatment with Azithromycin, had improvement in symptoms. For the past week, however, he noted symptoms of sob on exertion as well at rest. He denies associated symptoms of cp, palpitations, N/V, dizziness, lightheadedness, blurry vision. In addition to his symptoms of sob, he also noted LE edema. He denies increase number of pillow usage at night time, however, does endorse worsening symptoms while laying flat.   Pt states he saw his PMD yesterday and was noted in office to have /120-220/120. He states he has been compliant with his medications. He was advised to go to the ED for further evaluation but states he didn't go because it was too late in the day. BP medication review, he is only on Labetalol 200mg BID + Hydralazine 20mg TID. He cannot recall being on Carvedilol. He was on Amlodipine in the past but has not been on it in a while. He was on Lasix during previous hospital admission but was discharged with only 20 day supply without any refills. He has not been on it for close to 1 year.   Currently pt states his symptoms are improving but continues to feel sob intermittently. He has never used O2 at home. He also denies hx of COPD or Asthma. (09 Nov 2018 15:49)      PAST MEDICAL & SURGICAL HISTORY:  Diabetes  Hypertension  HTN (hypertension)  BPH (Benign Prostatic Hypertrophy)  Glaucoma (Increased Eye Pressure)  HTN - Hypertension  No significant past surgical history  Laser Surgery :Right: ?  regarding procedure ; 12/07 ; secondary to glaucoma  Laser Surgery Left: ? regarding procedure ; secondary to glaucoma 12/07  Excision of Sinus Polyp: 2006          MEDICATIONS:  amLODIPine   Tablet 5 milliGRAM(s) Oral daily  aspirin enteric coated 81 milliGRAM(s) Oral daily  carvedilol 25 milliGRAM(s) Oral every 12 hours  furosemide   Injectable 20 milliGRAM(s) IV Push every 12 hours  heparin  Injectable 5000 Unit(s) SubCutaneous every 8 hours  hydrALAZINE 25 milliGRAM(s) Oral once  tamsulosin 0.4 milliGRAM(s) Oral at bedtime      ALBUTerol/ipratropium for Nebulization 3 milliLiter(s) Nebulizer every 6 hours        dextrose 40% Gel 15 Gram(s) Oral once PRN  dextrose 50% Injectable 12.5 Gram(s) IV Push once  glucagon  Injectable 1 milliGRAM(s) IntraMuscular once PRN  insulin lispro (HumaLOG) corrective regimen sliding scale   SubCutaneous at bedtime  insulin lispro (HumaLOG) corrective regimen sliding scale   SubCutaneous three times a day before meals    dextrose 5%. 1000 milliLiter(s) IV Continuous <Continuous>  influenza   Vaccine 0.5 milliLiter(s) IntraMuscular once      FAMILY HISTORY:  Family history of diabetes mellitus (DM) (Mother, Sibling)      Non-contributory    SOCIAL HISTORY:    not a smoker    Allergies    grass, pollen (Rhinitis)  No Known Drug Allergies    Intolerances    	    REVIEW OF SYSTEMS: TODAY  CONSTITUTIONAL: No fever  EYES: No eye pain, visual disturbances, or discharge  ENMT:  No difficulty hearing, tinnitus  NECK: No pain or stiffness  RESPIRATORY: No cough, wheezing,  CARDIOVASCULAR: No chest pain, palpitations, passing out, dizziness, or leg swelling  GASTROINTESTINAL:  No nausea, vomiting, diarrhea or constipation. No melena.  GENITOURINARY: No dysuria, hematuria  NEUROLOGICAL: No stroke like symptoms  SKIN: No burning or lesions   LYMPH Nodes: No enlarged glands  ENDOCRINE: No heat or cold intolerance  MUSCULOSKELETAL: No joint pain or swelling  PSYCHIATRIC: No  anxiety, mood swings  HEME/LYMPH: No bleeding gums  ALLERY AND IMMUNOLOGIC: No hives or eczema	    All other ROS negative    PHYSICAL EXAM:  T(C): 36.7 (11-11-18 @ 21:29), Max: 36.8 (11-11-18 @ 17:50)  HR: 69 (11-11-18 @ 21:29) (64 - 69)  BP: 185/79 (11-11-18 @ 21:29) (172/80 - 190/80)  RR: 18 (11-11-18 @ 21:29) (16 - 18)  SpO2: 99% (11-11-18 @ 21:29) (98% - 99%)  Wt(kg): --  I&O's Summary    10 Nov 2018 07:01  -  11 Nov 2018 07:00  --------------------------------------------------------  IN: 900 mL / OUT: 2550 mL / NET: -1650 mL    11 Nov 2018 07:01  -  11 Nov 2018 22:49  --------------------------------------------------------  IN: 920 mL / OUT: 1050 mL / NET: -130 mL        Appearance: Normal	  HEENT:   Normal oral mucosa, EOMI	  Cardiovascular: Normal S1 S2, No JVD, No murmurs  Respiratory: Lungs clear to auscultation	  Psychiatry: Alert, Mood & affect appropriate  Gastrointestinal:  Soft, Non-tender, + BS	  Skin: No rashes, No ecchymoses, No cyanosis	  Neurologic: Non-focal  Extremities:  No clubbing, cyanosis or edema  Vascular: Peripheral pulses palpable  bilaterally  	    	  	  CARDIAC MARKERS:                                  10.1   7.0   )-----------( 164      ( 11 Nov 2018 07:25 )             32.8     11-11    143  |  101  |  46<H>  ----------------------------<  81  3.9   |  26  |  3.42<H>    Ca    8.6      11 Nov 2018 07:25  Phos  2.4     11-10  Mg     2.1     11-10            EKG: NSR with TWI in antrolateral leads, unchanged from prior   Radiology:  < from: Xray Chest 2 Views PA/Lat (11.09.18 @ 13:20) >  Impression:    The heart is markedly enlarged. Bilateral pleural effusion. Seen   especially in the lateral projection of the chest. No acute pathology is   seen in the visualized bones.    < end of copied text >      ASSESSMENT AND PLAN:  79yo male with hx of HTN, CHF, presents to the ED with complaints of SOB x 1 week secondary to CHF exacerbation due to Hypertensive emergency. Pt on exam was noted to have b/l wheezing.     Problem/Plan - 1:  ·  Problem: Hypertensive emergency.  Plan: -Improvement in blood pressure but still uncontrolled  - Increase Hydral to 100mg PO TID  - COntinue Carvedilol 25mg BID  and Norvasc 5 mg  - May increase Norvasc to 10mg daily if remains uncontrolled in 24-48 hours despite above .    Problem/Plan - 2:  ·  Problem: Acute on chronic diastolic heart failure.  Plan: -Secondary to Hypertensive emergency  - Has responded well to Lasix 20mg IV BID with no orthopnea and improved dyspnea  - Recent TTE at [facility] with severe LVH and normal EF  - NM stress test at [facility] with no ischemia/Infarct  Problem/Plan - 3:  ·  Problem: NSVT, 9 beats.  Plan: On max dose Coreg, no further events    Problem/Plan - 4:  ·  Problem: CKD (chronic kidney disease), stage IV.  Plan: -Chronic  -  Cr is stable at baseline, reason for uncontrolled HTN       Problem/Plan - 5:  ·  Problem: Type 2 diabetes mellitus with chronic kidney disease, without long-term current use of insulin, unspecified CKD stage.  Plan: -Chronic  - A1c well controlled at 5.7    Plan for d/c in the next 48 hours with outpt follow up        Sridhar Carrillo DO [facility]  [phone]

## 2018-11-11 NOTE — PROGRESS NOTE ADULT - SUBJECTIVE AND OBJECTIVE BOX
CHIEF COMPLAINT: patient feel better, was admitted secondary to SOB elevated blood pressure   77yo male with hx of HTN, CHF, presents to the ED with complaints of SOB x 1 week. Pt states he initially had symptoms of URI, s/p treatment with Azithromycin, had improvement in symptoms. For the past week, however, he noted symptoms of sob on exertion as well at rest. He denies associated symptoms of cp, palpitations, N/V, dizziness, lightheadedness, blurry vision. In addition to his symptoms of sob, he also noted LE edema. He denies increase number of pillow usage at night time, however, does endorse worsening symptoms while laying flat.   Pt was seen by me   in office on 11/8/2018 advised patient to go to ER 2/2 to have /120-220/120.  SUBJECTIVE:     REVIEW OF SYSTEMS:     · General	negative	  · Skin/Breast	negative	  · Ophthalmologic	negative	  · ENMT	negative	  · Respiratory and Thorax Symptoms	wheezing  dyspnea	  · Cardiovascular Symptoms	dyspnea on exertion; orthopnea; paroxysmal nocturnal dyspnea; peripheral edema	  · Gastrointestinal	negative	  · Genitourinary	negative	  · Musculoskeletal	negative	  · Neurological	negative	  · Psychiatric	negative	  · Hematology/Lymphatics	negative	  · Endocrine	negative	  · Allergic/Immunologic	negative	        Vital Signs Last 24 Hrs  T(C): 36.8 (11 Nov 2018 17:50), Max: 36.8 (11 Nov 2018 17:50)  T(F): 98.2 (11 Nov 2018 17:50), Max: 98.2 (11 Nov 2018 17:50)  HR: 66 (11 Nov 2018 17:50) (59 - 68)  BP: 172/80 (11 Nov 2018 17:50) (172/80 - 190/80)  BP(mean): --  RR: 16 (11 Nov 2018 17:50) (16 - 18)  SpO2: 98% (11 Nov 2018 17:50) (95% - 99%)    I&O's Summary    10 Nov 2018 07:01  -  11 Nov 2018 07:00  --------------------------------------------------------  IN: 900 mL / OUT: 2550 mL / NET: -1650 mL    11 Nov 2018 07:01  -  11 Nov 2018 20:56  --------------------------------------------------------  IN: 720 mL / OUT: 1050 mL / NET: -330 mL        CAPILLARY BLOOD GLUCOSE      POCT Blood Glucose.: 118 mg/dL (11 Nov 2018 12:58)  POCT Blood Glucose.: 93 mg/dL (11 Nov 2018 09:08)  POCT Blood Glucose.: 96 mg/dL (10 Nov 2018 22:02)      PHYSICAL EXAM:  · Constitutional	Well-developed, well nourished	  · Eyes	EOMI; PERRL; no drainage or redness	  · ENMT	No oral lesions; no gross abnormalities	  · Neck	No bruits; no thyromegaly or nodules	  · Back	No deformity or limitation of movement	  · Respiratory	detailed exam	  · Respiratory Details	 none	  · Rales	 none	  · Wheezes	 none	  · Cardiovascular	Regular rate & rhythm, normal S1, S2; no murmurs, gallops or rubs; no S3, S4	  · Gastrointestinal	Soft, non-tender, no hepatosplenomegaly, normal bowel sounds	  · Extremities	detailed exam	  · Extremities Details	pedal edema	  · Pedal Edema Severity	1+	  · Pedal Edema Type	pitting	  · Vascular	Equal and normal pulses (carotid, femoral, dorsalis pedis)	  · Neurological	Alert & oriented; no sensory, motor or coordination deficits, normal reflexes	  · Skin	No lesions; no rash	  · Musculoskeletal	No joint pain, swelling or deformity; no limitation of movement	  · Psychiatric	Affect and characteristics of appearance, verbalizations, behaviors are appropriate	         MEDICATIONS:  MEDICATIONS  (STANDING):  ALBUTerol/ipratropium for Nebulization 3 milliLiter(s) Nebulizer every 6 hours  amLODIPine   Tablet 5 milliGRAM(s) Oral daily  aspirin enteric coated 81 milliGRAM(s) Oral daily  carvedilol 25 milliGRAM(s) Oral every 12 hours  dextrose 5%. 1000 milliLiter(s) (50 mL/Hr) IV Continuous <Continuous>  dextrose 50% Injectable 12.5 Gram(s) IV Push once  furosemide   Injectable 20 milliGRAM(s) IV Push every 12 hours  heparin  Injectable 5000 Unit(s) SubCutaneous every 8 hours  hydrALAZINE 75 milliGRAM(s) Oral every 8 hours  influenza   Vaccine 0.5 milliLiter(s) IntraMuscular once  insulin lispro (HumaLOG) corrective regimen sliding scale   SubCutaneous at bedtime  insulin lispro (HumaLOG) corrective regimen sliding scale   SubCutaneous three times a day before meals  tamsulosin 0.4 milliGRAM(s) Oral at bedtime      LABS: All Labs Reviewed:                        10.1   7.0   )-----------( 164      ( 11 Nov 2018 07:25 )             32.8     11-11    143  |  101  |  46<H>  ----------------------------<  81  3.9   |  26  |  3.42<H>    Ca    8.6      11 Nov 2018 07:25  Phos  2.4     11-10  Mg     2.1     11-10            Blood Culture:   Urine Culture      RADIOLOGY/EKG:    ASSESSMENT AND PLAN:  77yo male with hx of HTN, CHF, presents to the ED with complaints of SOB x 1 week secondary to CHF exacerbation due to Hypertensive emergency. Pt on exam was noted to have b/l wheezing.     Problem/Plan - 1:  ·  Problem: Hypertensive emergency.  Plan: -Improvement in blood pressure  -Initial /104. Repeat 170/98  -Goal not to decrease BP more than it is right now for the next 24hrs.  -Start Lasix 20mg IV BID  -Continue home med Hydralazine 50mg TID  -Stared t pt on Carvedilol 25mg BID  and Norvasc 5 mg  -Continue to monitor vitals.  cardiology consult call.  He'll see the patient today .   patient had none sustained V. tach (9 beat) L 11/10/2018    Problem/Plan - 2:  ·  Problem: Acute on chronic diastolic congestive heart failure.  Plan: -Secondary to Hypertensive emergency  -CXR consistent with pleural effusion, Elevated ProBNP  -Has not been on Lasix at home.   on Lasix 20mg IV BID  -Strict I/O's  -Continue to monitor renal function (may improve as this may be cardio-renal syndrome)  -Fluid restrictions  -Follow up with ECHO.     Problem/Plan - 3:  ·  Problem: Wheezing on auscultation.  Plan: -No hx of COPD or obstructive airway disease  -Physical exam findings  -S/p IV Solumedrol   -Start DuoNebs   -Continue to monitor symptoms.     Problem/Plan - 4:  ·  Problem: CKD (chronic kidney disease), stage IV.  Plan: -Chronic  -Trending creatinine, pts Cr is at baseline  -In the setting of CHF exacerbation, pt will benefit from Lasix treatment. Renal function may improve with diuretic use as this may be cardio-renal syndrome  -Continue to monitor.     Problem/Plan - 5:  ·  Problem: Anemia, unspecified type.  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  -Follow up with Iron panel.     Problem/Plan - 6:  Problem: Essential hypertension. Plan: -Chronic  -Uncontrolled likely secondary to worsening Renal function     -Continue Hydralazine 50mg TID.    Problem/Plan - 7:  ·  Problem: Type 2 diabetes mellitus with chronic kidney disease, without long-term current use of insulin, unspecified CKD stage.  Plan: -Chronic  -Not on any medications  -Follow up with HA1C   -ISS +Fingersticks.     Problem/Plan - 8:  ·  Problem: Benign prostatic hyperplasia, unspecified whether lower urinary tract symptoms present.  Plan: -Chronic  -Continue Tamsulosin.     Problem/Plan - 9:  ·  Problem: Prophylactic measure.  Plan: Heparin SQ for DVT ppx  Wife, Alona Clements, [phone].    DVT PPX:    ADVANCED DIRECTIVE:    DISPOSITION:

## 2018-11-12 LAB
ANION GAP SERPL CALC-SCNC: 15 MMOL/L — SIGNIFICANT CHANGE UP (ref 5–17)
APPEARANCE UR: CLEAR — SIGNIFICANT CHANGE UP
BACTERIA # UR AUTO: NEGATIVE — SIGNIFICANT CHANGE UP
BILIRUB UR-MCNC: NEGATIVE — SIGNIFICANT CHANGE UP
BUN SERPL-MCNC: 58 MG/DL — HIGH (ref 7–23)
CALCIUM SERPL-MCNC: 8.4 MG/DL — SIGNIFICANT CHANGE UP (ref 8.4–10.5)
CHLORIDE SERPL-SCNC: 101 MMOL/L — SIGNIFICANT CHANGE UP (ref 96–108)
CO2 SERPL-SCNC: 26 MMOL/L — SIGNIFICANT CHANGE UP (ref 22–31)
COLOR SPEC: SIGNIFICANT CHANGE UP
CREAT SERPL-MCNC: 3.99 MG/DL — HIGH (ref 0.5–1.3)
DIFF PNL FLD: NEGATIVE — SIGNIFICANT CHANGE UP
EPI CELLS # UR: 0 /HPF — SIGNIFICANT CHANGE UP
GLUCOSE BLDC GLUCOMTR-MCNC: 102 MG/DL — HIGH (ref 70–99)
GLUCOSE BLDC GLUCOMTR-MCNC: 104 MG/DL — HIGH (ref 70–99)
GLUCOSE BLDC GLUCOMTR-MCNC: 115 MG/DL — HIGH (ref 70–99)
GLUCOSE BLDC GLUCOMTR-MCNC: 210 MG/DL — HIGH (ref 70–99)
GLUCOSE BLDC GLUCOMTR-MCNC: 83 MG/DL — SIGNIFICANT CHANGE UP (ref 70–99)
GLUCOSE SERPL-MCNC: 115 MG/DL — HIGH (ref 70–99)
GLUCOSE UR QL: NEGATIVE — SIGNIFICANT CHANGE UP
HCT VFR BLD CALC: 34.7 % — LOW (ref 39–50)
HGB BLD-MCNC: 11 G/DL — LOW (ref 13–17)
HYALINE CASTS # UR AUTO: 0 /LPF — SIGNIFICANT CHANGE UP (ref 0–2)
KETONES UR-MCNC: NEGATIVE — SIGNIFICANT CHANGE UP
LEUKOCYTE ESTERASE UR-ACNC: NEGATIVE — SIGNIFICANT CHANGE UP
MAGNESIUM SERPL-MCNC: 2.2 MG/DL — SIGNIFICANT CHANGE UP (ref 1.6–2.6)
MCHC RBC-ENTMCNC: 24.3 PG — LOW (ref 27–34)
MCHC RBC-ENTMCNC: 31.6 GM/DL — LOW (ref 32–36)
MCV RBC AUTO: 76.9 FL — LOW (ref 80–100)
NITRITE UR-MCNC: NEGATIVE — SIGNIFICANT CHANGE UP
PH UR: 6 — SIGNIFICANT CHANGE UP (ref 5–8)
PLATELET # BLD AUTO: 164 K/UL — SIGNIFICANT CHANGE UP (ref 150–400)
POTASSIUM SERPL-MCNC: 4.2 MMOL/L — SIGNIFICANT CHANGE UP (ref 3.5–5.3)
POTASSIUM SERPL-SCNC: 4.2 MMOL/L — SIGNIFICANT CHANGE UP (ref 3.5–5.3)
PROT UR-MCNC: ABNORMAL
RBC # BLD: 4.52 M/UL — SIGNIFICANT CHANGE UP (ref 4.2–5.8)
RBC # FLD: 14.6 % — HIGH (ref 10.3–14.5)
RBC CASTS # UR COMP ASSIST: 1 /HPF — SIGNIFICANT CHANGE UP (ref 0–4)
SODIUM SERPL-SCNC: 142 MMOL/L — SIGNIFICANT CHANGE UP (ref 135–145)
SP GR SPEC: 1.02 — SIGNIFICANT CHANGE UP (ref 1.01–1.02)
UROBILINOGEN FLD QL: NEGATIVE — SIGNIFICANT CHANGE UP
WBC # BLD: 6.5 K/UL — SIGNIFICANT CHANGE UP (ref 3.8–10.5)
WBC # FLD AUTO: 6.5 K/UL — SIGNIFICANT CHANGE UP (ref 3.8–10.5)
WBC UR QL: 0 /HPF — SIGNIFICANT CHANGE UP (ref 0–5)

## 2018-11-12 PROCEDURE — 93306 TTE W/DOPPLER COMPLETE: CPT | Mod: 26

## 2018-11-12 RX ORDER — HYDRALAZINE HCL 50 MG
5 TABLET ORAL ONCE
Qty: 0 | Refills: 0 | Status: COMPLETED | OUTPATIENT
Start: 2018-11-12 | End: 2018-11-12

## 2018-11-12 RX ORDER — NIFEDIPINE 30 MG
30 TABLET, EXTENDED RELEASE 24 HR ORAL DAILY
Qty: 0 | Refills: 0 | Status: DISCONTINUED | OUTPATIENT
Start: 2018-11-12 | End: 2018-11-13

## 2018-11-12 RX ORDER — FLUTICASONE PROPIONATE 50 MCG
1 SPRAY, SUSPENSION NASAL
Qty: 0 | Refills: 0 | Status: DISCONTINUED | OUTPATIENT
Start: 2018-11-12 | End: 2018-11-15

## 2018-11-12 RX ORDER — SODIUM CHLORIDE 0.65 %
1 AEROSOL, SPRAY (ML) NASAL THREE TIMES A DAY
Qty: 0 | Refills: 0 | Status: DISCONTINUED | OUTPATIENT
Start: 2018-11-12 | End: 2018-11-15

## 2018-11-12 RX ADMIN — Medication 100 MILLIGRAM(S): at 04:16

## 2018-11-12 RX ADMIN — Medication 1 SPRAY(S): at 17:30

## 2018-11-12 RX ADMIN — TAMSULOSIN HYDROCHLORIDE 0.4 MILLIGRAM(S): 0.4 CAPSULE ORAL at 21:31

## 2018-11-12 RX ADMIN — HEPARIN SODIUM 5000 UNIT(S): 5000 INJECTION INTRAVENOUS; SUBCUTANEOUS at 05:43

## 2018-11-12 RX ADMIN — HEPARIN SODIUM 5000 UNIT(S): 5000 INJECTION INTRAVENOUS; SUBCUTANEOUS at 21:32

## 2018-11-12 RX ADMIN — Medication 5 MILLIGRAM(S): at 01:47

## 2018-11-12 RX ADMIN — Medication 20 MILLIGRAM(S): at 05:43

## 2018-11-12 RX ADMIN — HEPARIN SODIUM 5000 UNIT(S): 5000 INJECTION INTRAVENOUS; SUBCUTANEOUS at 13:11

## 2018-11-12 RX ADMIN — AMLODIPINE BESYLATE 5 MILLIGRAM(S): 2.5 TABLET ORAL at 04:16

## 2018-11-12 RX ADMIN — Medication 81 MILLIGRAM(S): at 13:11

## 2018-11-12 RX ADMIN — Medication 30 MILLIGRAM(S): at 17:29

## 2018-11-12 RX ADMIN — Medication 3 MILLILITER(S): at 05:43

## 2018-11-12 RX ADMIN — Medication 1 SPRAY(S): at 21:31

## 2018-11-12 RX ADMIN — CARVEDILOL PHOSPHATE 25 MILLIGRAM(S): 80 CAPSULE, EXTENDED RELEASE ORAL at 17:29

## 2018-11-12 RX ADMIN — Medication 100 MILLIGRAM(S): at 21:31

## 2018-11-12 RX ADMIN — Medication 100 MILLIGRAM(S): at 13:11

## 2018-11-12 RX ADMIN — CARVEDILOL PHOSPHATE 25 MILLIGRAM(S): 80 CAPSULE, EXTENDED RELEASE ORAL at 04:16

## 2018-11-12 RX ADMIN — Medication 3 MILLILITER(S): at 17:29

## 2018-11-12 RX ADMIN — Medication 3 MILLILITER(S): at 13:10

## 2018-11-12 NOTE — PROGRESS NOTE ADULT - SUBJECTIVE AND OBJECTIVE BOX
Patient is a 78y old  Male who presents with a chief complaint of SOB (12 Nov 2018 21:43)      INTERVAL HISTORY: feels ok      MEDICATIONS:  carvedilol 25 milliGRAM(s) Oral every 12 hours  hydrALAZINE 100 milliGRAM(s) Oral every 8 hours  NIFEdipine XL 30 milliGRAM(s) Oral daily  tamsulosin 0.4 milliGRAM(s) Oral at bedtime        PHYSICAL EXAM:  T(C): 36.7 (11-12-18 @ 21:38), Max: 36.7 (11-12-18 @ 21:38)  HR: 74 (11-12-18 @ 21:38) (62 - 82)  BP: 190/84 (11-12-18 @ 21:38) (162/65 - 190/84)  RR: 18 (11-12-18 @ 21:38) (18 - 18)  SpO2: 95% (11-12-18 @ 21:38) (95% - 98%)  Wt(kg): --  I&O's Summary    11 Nov 2018 07:01  -  12 Nov 2018 07:00  --------------------------------------------------------  IN: 920 mL / OUT: 2650 mL / NET: -1730 mL    12 Nov 2018 07:01  -  12 Nov 2018 22:36  --------------------------------------------------------  IN: 300 mL / OUT: 1400 mL / NET: -1100 mL          Appearance: Normal	  HEENT:    PERRL, EOMI	  Cardiovascular:  S1 S2, No JVD  Respiratory: Lungs clear to auscultation	  Psychiatry: Alert  Gastrointestinal:  Soft, Non-tender, + BS	  Skin: No rashes, No cyanosis  Extremities:  No edema of LE                                11.0   6.5   )-----------( 164      ( 12 Nov 2018 06:52 )             34.7     11-12    142  |  101  |  58<H>  ----------------------------<  115<H>  4.2   |  26  |  3.99<H>    Ca    8.4      12 Nov 2018 06:50  Mg     2.2     11-12          Labs personally reviewed      ASSESSMENT AND PLAN:  77yo male with hx of HTN, CHF, presents to the ED with complaints of SOB x 1 week secondary to CHF exacerbation due to Hypertensive emergency. Pt on exam was noted to have b/l wheezing.     Problem/Plan - 1:  ·  Problem: Hypertensive emergency.  Plan: -Improvement in blood pressure but still uncontrolled  - Increased Hydral to 100mg PO TID  - Continue Carvedilol 25mg BID, agree with replacing Norvasc 5 mg with Nifedipine    Problem/Plan - 2:  ·  Problem: Acute on chronic diastolic heart failure.  Plan: -Secondary to Hypertensive emergency  - Has responded well to Lasix 20mg IV BID with no orthopnea and improved dyspnea, hold further diuresis for now   - Recent TTE at [facility] with severe LVH and normal EF, Echo here the same  - NM stress test at [facility] with no ischemia/Infarct    Problem/Plan - 3:  ·  Problem: NSVT, 9 beats.  Plan: On max dose Coreg, no further events    Problem/Plan - 4:  ·  Problem: CKD (chronic kidney disease), stage IV.  Plan: -Chronic  -  Cr is mildly higher, reason for uncontrolled HTN       Problem/Plan - 5:  ·  Problem: Type 2 diabetes mellitus with chronic kidney disease, without long-term current use of insulin, unspecified CKD stage.  Plan: -Chronic  - A1c well controlled at 5.7    Plan for d/c in the next 48 hours with outpt follow up        Sridhar Carrillo DO [facility]  Cardiovascular Medicine  [phone]

## 2018-11-12 NOTE — CHART NOTE - NSCHARTNOTEFT_GEN_A_CORE
Pt was seen and examined.  Briefly this is a elderly male c hx HTN new DM pw acute diastolic heart failure  Acute on chronic renal failure--CKD stage 4  BPH    [facility] dw cards re changing norvasc to procardia  Renal Check UA and renal sono;  Off diuretics  Endo-Check PTH        Sayed Juan  [facility]  [phone]

## 2018-11-12 NOTE — PROGRESS NOTE ADULT - SUBJECTIVE AND OBJECTIVE BOX
CHIEF COMPLAINT:  patient feel better, was admitted secondary to SOB elevated blood pressure   77yo male with hx of HTN, CHF, presents to the ED with complaints of SOB x 1 week. Pt states he initially had symptoms of URI, s/p treatment with Azithromycin, had improvement in symptoms. For the past week, however, he noted symptoms of sob on exertion as well at rest. He denies associated symptoms of cp, palpitations, N/V, dizziness, lightheadedness, blurry vision. In addition to his symptoms of sob, he also noted LE edema. He denies increase number of pillow usage at night time, however, does endorse worsening symptoms while laying flat.   Pt was seen by me   in office on 11/8/2018 advised patient to go to ER 2/2 to have /120-220/120  SUBJECTIVE:     REVIEW OF SYSTEMS:  · General	negative	  · Skin/Breast	negative	  · Ophthalmologic	negative	  · ENMT	negative	  · Respiratory and Thorax Symptoms	wheezing  dyspnea	  · Cardiovascular Symptoms	dyspnea on exertion; orthopnea; paroxysmal nocturnal dyspnea; peripheral edema	  · Gastrointestinal	negative	  · Genitourinary	negative	  · Musculoskeletal	negative	  · Neurological	negative	  · Psychiatric	negative	  · Hematology/Lymphatics	negative	  · Endocrine	negative	  · Allergic/Immunologic	negative	         Vital Signs Last 24 Hrs  T(C): 36.7 (12 Nov 2018 21:38), Max: 36.7 (12 Nov 2018 21:38)  T(F): 98.1 (12 Nov 2018 21:38), Max: 98.1 (12 Nov 2018 21:38)  HR: 74 (12 Nov 2018 21:38) (62 - 82)  BP: 190/84 (12 Nov 2018 21:38) (162/65 - 190/84)  BP(mean): --  RR: 18 (12 Nov 2018 21:38) (18 - 18)  SpO2: 95% (12 Nov 2018 21:38) (95% - 98%)    I&O's Summary    11 Nov 2018 07:01  -  12 Nov 2018 07:00  --------------------------------------------------------  IN: 920 mL / OUT: 2650 mL / NET: -1730 mL    12 Nov 2018 07:01  -  12 Nov 2018 21:43  --------------------------------------------------------  IN: 300 mL / OUT: 1400 mL / NET: -1100 mL        CAPILLARY BLOOD GLUCOSE      POCT Blood Glucose.: 210 mg/dL (12 Nov 2018 21:37)  POCT Blood Glucose.: 102 mg/dL (12 Nov 2018 17:14)  POCT Blood Glucose.: 83 mg/dL (12 Nov 2018 13:00)  POCT Blood Glucose.: 104 mg/dL (12 Nov 2018 08:52)  POCT Blood Glucose.: 157 mg/dL (11 Nov 2018 21:52)      PHYSICAL EXAM:       · Constitutional	Well-developed, well nourished	  · Eyes	EOMI; PERRL; no drainage or redness	  · ENMT	No oral lesions; no gross abnormalities	  · Neck	No bruits; no thyromegaly or nodules	  · Back	No deformity or limitation of movement	  · Respiratory	detailed exam	  · Respiratory Details	 none	  · Rales	 none	  · Wheezes	 none	  · Cardiovascular	Regular rate & rhythm, normal S1, S2; no murmurs, gallops or rubs; no S3, S4	  · Gastrointestinal	Soft, non-tender, no hepatosplenomegaly, normal bowel sounds	  · Extremities	detailed exam	  · Extremities Details	pedal edema	  · Pedal Edema Severity	1+	  · Pedal Edema Type	pitting	  · Vascular	Equal and normal pulses (carotid, femoral, dorsalis pedis)	  · Neurological	Alert & oriented; no sensory, motor or coordination deficits, normal reflexes	  · Skin	No lesions; no rash	  · Musculoskeletal	No joint pain, swelling or deformity; no limitation of movement	  · Psychiatric	Affect and characteristics of appearance, verbalizations, behaviors are appropriate	      MEDICATIONS:  MEDICATIONS  (STANDING):  ALBUTerol/ipratropium for Nebulization 3 milliLiter(s) Nebulizer every 6 hours  aspirin enteric coated 81 milliGRAM(s) Oral daily  carvedilol 25 milliGRAM(s) Oral every 12 hours  dextrose 5%. 1000 milliLiter(s) (50 mL/Hr) IV Continuous <Continuous>  dextrose 50% Injectable 12.5 Gram(s) IV Push once  fluticasone propionate 50 MICROgram(s)/spray Nasal Spray 1 Spray(s) Both Nostrils two times a day  heparin  Injectable 5000 Unit(s) SubCutaneous every 8 hours  hydrALAZINE 100 milliGRAM(s) Oral every 8 hours  influenza   Vaccine 0.5 milliLiter(s) IntraMuscular once  insulin lispro (HumaLOG) corrective regimen sliding scale   SubCutaneous at bedtime  insulin lispro (HumaLOG) corrective regimen sliding scale   SubCutaneous three times a day before meals  NIFEdipine XL 30 milliGRAM(s) Oral daily  tamsulosin 0.4 milliGRAM(s) Oral at bedtime      LABS: All Labs Reviewed:                        11.0   6.5   )-----------( 164      ( 12 Nov 2018 06:52 )             34.7     11-12    142  |  101  |  58<H>        46<H>                        ----------------------------<  115<H>  4.2   |  26  |  3.99<H>     3.42<H>    Ca    8.4      12 Nov 2018 06:50  Mg     2.2     11-12            Blood Culture:   Urine Culture      RADIOLOGY/EKG:    ASSESSMENT AND PLAN:  77yo male with hx of HTN, CHF, presents to the ED with complaints of SOB x 1 week secondary to CHF exacerbation due to Hypertensive emergency. Pt on exam was noted to have b/l wheezing.     Problem/Plan - 1:  ·  Problem: Hypertensive emergency.  Plan: -Improvement in blood pressure  -Initial /104. Repeat 170/98  -Goal not to decrease BP more than it is right now for the next 24hrs.  - dc  Lasix 20mg IV BID  -Continue home med Hydralazine 50mg TID  -Stared t pt on Carvedilol 25mg BID  and  procardia  5 mg  -Continue to monitor vitals.  cardiology consult noted.   patient had none sustained V. tach (9 beat) L 11/10/2018    Problem/Plan - 2:  ·  Problem: Acute on chronic diastolic congestive heart failure.  Plan: -Secondary to Hypertensive emergency  -CXR consistent with pleural effusion, Elevated ProBNP  -Has not been on Lasix at home.   off Lasix 20mg IV BID  -Strict I/O's  -Continue to monitor renal function (may improve as this may be cardio-renal syndrome)  -Fluid restrictions  -Follow up with ECHO.     Problem/Plan - 3:  ·  Problem: Wheezing on auscultation.  Plan: -No hx of COPD or obstructive airway disease  -Physical exam findings  -S/p IV Solumedrol   -Start DuoNebs   -Continue to monitor symptoms.     Problem/Plan - 4:  ·  Problem: CKD (chronic kidney disease), stage IV.  Plan: -Chronic  -increasing in  creatinine, asked renal to see pt     -Continue to monitor.     Problem/Plan - 5:  ·  Problem: Anemia, unspecified type.  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  -Follow up with Iron panel.     Problem/Plan - 6:  Problem: Essential hypertension. Plan: -Chronic  -Uncontrolled likely secondary to worsening Renal function     -Continue Hydralazine 50mg TID.    Problem/Plan - 7:  ·  Problem: Type 2 diabetes mellitus with chronic kidney disease, without long-term current use of insulin, unspecified CKD stage.  Plan: -Chronic  -Not on any medications  -Follow up with HA1C   -ISS +Fingersticks.     Problem/Plan - 8:  ·  Problem: Benign prostatic hyperplasia, unspecified whether lower urinary tract symptoms present.  Plan: -Chronic  -Continue Tamsulosin.     Problem/Plan - 9:  ·  Problem: Prophylactic measure.  Plan: Heparin SQ for DVT ppx  DVT PPX:    ADVANCED DIRECTIVE:    DISPOSITION:

## 2018-11-13 LAB
ANION GAP SERPL CALC-SCNC: 12 MMOL/L — SIGNIFICANT CHANGE UP (ref 5–17)
BUN SERPL-MCNC: 53 MG/DL — HIGH (ref 7–23)
CALCIUM SERPL-MCNC: 8.8 MG/DL — SIGNIFICANT CHANGE UP (ref 8.4–10.5)
CALCIUM SERPL-MCNC: 9.1 MG/DL — SIGNIFICANT CHANGE UP (ref 8.4–10.5)
CHLORIDE SERPL-SCNC: 102 MMOL/L — SIGNIFICANT CHANGE UP (ref 96–108)
CO2 SERPL-SCNC: 28 MMOL/L — SIGNIFICANT CHANGE UP (ref 22–31)
CREAT ?TM UR-MCNC: 132 MG/DL — SIGNIFICANT CHANGE UP
CREAT SERPL-MCNC: 3.27 MG/DL — HIGH (ref 0.5–1.3)
GLUCOSE BLDC GLUCOMTR-MCNC: 117 MG/DL — HIGH (ref 70–99)
GLUCOSE BLDC GLUCOMTR-MCNC: 163 MG/DL — HIGH (ref 70–99)
GLUCOSE BLDC GLUCOMTR-MCNC: 196 MG/DL — HIGH (ref 70–99)
GLUCOSE BLDC GLUCOMTR-MCNC: 74 MG/DL — SIGNIFICANT CHANGE UP (ref 70–99)
GLUCOSE SERPL-MCNC: 107 MG/DL — HIGH (ref 70–99)
HCT VFR BLD CALC: 35 % — LOW (ref 39–50)
HGB BLD-MCNC: 11 G/DL — LOW (ref 13–17)
MAGNESIUM SERPL-MCNC: 2.1 MG/DL — SIGNIFICANT CHANGE UP (ref 1.6–2.6)
MCHC RBC-ENTMCNC: 24.1 PG — LOW (ref 27–34)
MCHC RBC-ENTMCNC: 31.3 GM/DL — LOW (ref 32–36)
MCV RBC AUTO: 77 FL — LOW (ref 80–100)
PLATELET # BLD AUTO: 165 K/UL — SIGNIFICANT CHANGE UP (ref 150–400)
POTASSIUM SERPL-MCNC: 3.6 MMOL/L — SIGNIFICANT CHANGE UP (ref 3.5–5.3)
POTASSIUM SERPL-SCNC: 3.6 MMOL/L — SIGNIFICANT CHANGE UP (ref 3.5–5.3)
PROT ?TM UR-MCNC: 99 MG/DL — HIGH (ref 0–12)
PROT/CREAT UR-RTO: 0.8 RATIO — HIGH (ref 0–0.2)
PTH-INTACT FLD-MCNC: 180 PG/ML — HIGH (ref 15–65)
RBC # BLD: 4.55 M/UL — SIGNIFICANT CHANGE UP (ref 4.2–5.8)
RBC # FLD: 14.5 % — SIGNIFICANT CHANGE UP (ref 10.3–14.5)
SODIUM SERPL-SCNC: 142 MMOL/L — SIGNIFICANT CHANGE UP (ref 135–145)
VIT D25+D1,25 OH+D1,25 PNL SERPL-MCNC: 43.6 PG/ML — SIGNIFICANT CHANGE UP (ref 19.9–79.3)
WBC # BLD: 6.4 K/UL — SIGNIFICANT CHANGE UP (ref 3.8–10.5)
WBC # FLD AUTO: 6.4 K/UL — SIGNIFICANT CHANGE UP (ref 3.8–10.5)

## 2018-11-13 PROCEDURE — 76770 US EXAM ABDO BACK WALL COMP: CPT | Mod: 26

## 2018-11-13 RX ORDER — NIFEDIPINE 30 MG
60 TABLET, EXTENDED RELEASE 24 HR ORAL DAILY
Qty: 0 | Refills: 0 | Status: DISCONTINUED | OUTPATIENT
Start: 2018-11-13 | End: 2018-11-15

## 2018-11-13 RX ORDER — CALCITRIOL 0.5 UG/1
0.25 CAPSULE ORAL
Qty: 0 | Refills: 0 | Status: DISCONTINUED | OUTPATIENT
Start: 2018-11-13 | End: 2018-11-15

## 2018-11-13 RX ADMIN — HEPARIN SODIUM 5000 UNIT(S): 5000 INJECTION INTRAVENOUS; SUBCUTANEOUS at 13:22

## 2018-11-13 RX ADMIN — Medication 30 MILLIGRAM(S): at 05:50

## 2018-11-13 RX ADMIN — Medication 100 MILLIGRAM(S): at 13:22

## 2018-11-13 RX ADMIN — Medication 1 SPRAY(S): at 21:15

## 2018-11-13 RX ADMIN — Medication 1 SPRAY(S): at 21:13

## 2018-11-13 RX ADMIN — Medication 100 MILLIGRAM(S): at 21:14

## 2018-11-13 RX ADMIN — HEPARIN SODIUM 5000 UNIT(S): 5000 INJECTION INTRAVENOUS; SUBCUTANEOUS at 21:14

## 2018-11-13 RX ADMIN — Medication 81 MILLIGRAM(S): at 13:23

## 2018-11-13 RX ADMIN — CARVEDILOL PHOSPHATE 25 MILLIGRAM(S): 80 CAPSULE, EXTENDED RELEASE ORAL at 17:46

## 2018-11-13 RX ADMIN — Medication 60 MILLIGRAM(S): at 13:21

## 2018-11-13 RX ADMIN — Medication 3 MILLILITER(S): at 05:50

## 2018-11-13 RX ADMIN — HEPARIN SODIUM 5000 UNIT(S): 5000 INJECTION INTRAVENOUS; SUBCUTANEOUS at 05:50

## 2018-11-13 RX ADMIN — Medication 100 MILLIGRAM(S): at 05:51

## 2018-11-13 RX ADMIN — CARVEDILOL PHOSPHATE 25 MILLIGRAM(S): 80 CAPSULE, EXTENDED RELEASE ORAL at 05:51

## 2018-11-13 RX ADMIN — Medication 3 MILLILITER(S): at 13:20

## 2018-11-13 RX ADMIN — Medication 3 MILLILITER(S): at 17:46

## 2018-11-13 RX ADMIN — Medication 3 MILLILITER(S): at 00:16

## 2018-11-13 RX ADMIN — Medication 3 MILLILITER(S): at 23:06

## 2018-11-13 RX ADMIN — Medication 1 SPRAY(S): at 05:51

## 2018-11-13 RX ADMIN — Medication 1 SPRAY(S): at 13:21

## 2018-11-13 RX ADMIN — TAMSULOSIN HYDROCHLORIDE 0.4 MILLIGRAM(S): 0.4 CAPSULE ORAL at 21:14

## 2018-11-13 RX ADMIN — Medication 1: at 13:22

## 2018-11-13 NOTE — PROGRESS NOTE ADULT - ASSESSMENT
The patient is a 78-year-old gentleman with past medical history of new-onset diabetes, hypertension, chronic kidney disease stage IV (likely), who presents for evaluation of acute diastolic heart failure.  The patient has stage II diastolic dysfunction ensuing that his hypertension is likely the cause of his renal insufficiency.   Subnephrotic range proteinuria  Hypertensive urgency  Secondary hyperparathyroidism   1.	Cardiology.  Increase Procardia to 60mg po qd.  2.	Renal.  Right now agree with discontinuing his Lasix.  Aldactone would not be a good option given his advanced renal failure.  Will order renal sonogram  3.	Gastroenterology.  The patient was told to maintain a low-salt diet.  We will check his serum phosphorus and PTH as well.  4.       Endo-Start Rocaltrol qod The patient is a 78-year-old gentleman with past medical history of new-onset diabetes, hypertension, chronic kidney disease stage IV (likely), who presents for evaluation of acute diastolic heart failure.  The patient has stage II diastolic dysfunction ensuing that his hypertension is likely the cause of his renal insufficiency.   Subnephrotic range proteinuria  Hypertensive urgency  Secondary hyperparathyroidism   1.	Cardiology.  Increase Procardia to 60mg po qd.  2.	Renal.  Off  his Lasix.  Aldactone would not be a good option given his advanced renal failure.  Will order renal sonogram  3.	Gastroenterology.  The patient was told to maintain a low-salt diet.     4.       Endo-Start Rocaltrol qod

## 2018-11-13 NOTE — PROGRESS NOTE ADULT - SUBJECTIVE AND OBJECTIVE BOX
NEPHROLOGY-[facility] [phone]        Patient seen and examined in bed.  He was in good spirits and offered no complaints        MEDICATIONS  (STANDING):  ALBUTerol/ipratropium for Nebulization 3 milliLiter(s) Nebulizer every 6 hours  aspirin enteric coated 81 milliGRAM(s) Oral daily  carvedilol 25 milliGRAM(s) Oral every 12 hours  dextrose 5%. 1000 milliLiter(s) (50 mL/Hr) IV Continuous <Continuous>  dextrose 50% Injectable 12.5 Gram(s) IV Push once  fluticasone propionate 50 MICROgram(s)/spray Nasal Spray 1 Spray(s) Both Nostrils two times a day  heparin  Injectable 5000 Unit(s) SubCutaneous every 8 hours  hydrALAZINE 100 milliGRAM(s) Oral every 8 hours  influenza   Vaccine 0.5 milliLiter(s) IntraMuscular once  insulin lispro (HumaLOG) corrective regimen sliding scale   SubCutaneous at bedtime  insulin lispro (HumaLOG) corrective regimen sliding scale   SubCutaneous three times a day before meals  NIFEdipine XL 30 milliGRAM(s) Oral daily  tamsulosin 0.4 milliGRAM(s) Oral at bedtime      VITAL:  T(C): , Max: 36.7 (18 @ 21:38)  T(F): , Max: 98.1 (18 @ 21:38)  HR: 74 (18 @ 04:09)  BP: 178/86 (18 @ 04:09)  BP(mean): --  RR: 18 (18 @ 04:09)  SpO2: 98% (18 @ 04:09)  Wt(kg): --    I and O's:     @ 07:01  -   @ 07:00  --------------------------------------------------------  IN: 660 mL / OUT: 2100 mL / NET: -1440 mL          PHYSICAL EXAM:    Constitutional: NAD  Neck:  No JVD  Respiratory: CTAB/L  Cardiovascular: S1 and S2  Gastrointestinal: BS+, soft, NT/ND  Extremities: No peripheral edema  Neurological: A/O x 3, no focal deficits  Psychiatric: Normal mood, normal affect  : No Aguilar  Skin: No rashes  Access: Not applicable    LABS:                        11.0   6.4   )-----------( 165      ( 2018 05:53 )             35.0         142  |  102  |  53<H>  ----------------------------<  107<H>  3.6   |  28  |  3.27<H>    Ca    8.8      2018 05:53  Mg     2.1                 Urine Studies:  Urinalysis Basic - ( 2018 17:21 )    Color: Light Yellow / Appearance: Clear / S.017 / pH: x  Gluc: x / Ketone: Negative  / Bili: Negative / Urobili: Negative   Blood: x / Protein: 100 mg/dL / Nitrite: Negative   Leuk Esterase: Negative / RBC: 1 /hpf / WBC 0 /hpf   Sq Epi: x / Non Sq Epi: 0 /hpf / Bacteria: Negative      Creatinine, Random Urine: 132 mg/dL ( @ 20:15)  Protein/Creatinine Ratio Calculation: 0.8 Ratio ( @ 20:15)        RADIOLOGY & ADDITIONAL STUDIES:

## 2018-11-13 NOTE — PROGRESS NOTE ADULT - SUBJECTIVE AND OBJECTIVE BOX
Patient is a 78y old  Male who presents with a chief complaint of SOB (13 Nov 2018 09:57)      INTERVAL HISTORY: feels ok  	  MEDICATIONS:  carvedilol 25 milliGRAM(s) Oral every 12 hours  hydrALAZINE 100 milliGRAM(s) Oral every 8 hours  NIFEdipine XL 60 milliGRAM(s) Oral daily  tamsulosin 0.4 milliGRAM(s) Oral at bedtime        PHYSICAL EXAM:  T(C): 36.9 (11-13-18 @ 21:29), Max: 36.9 (11-13-18 @ 21:29)  HR: 67 (11-13-18 @ 21:29) (64 - 74)  BP: 165/75 (11-13-18 @ 21:29) (159/76 - 178/86)  RR: 18 (11-13-18 @ 21:29) (18 - 18)  SpO2: 95% (11-13-18 @ 21:29) (95% - 98%)  Wt(kg): --  I&O's Summary    12 Nov 2018 07:01  -  13 Nov 2018 07:00  --------------------------------------------------------  IN: 660 mL / OUT: 2100 mL / NET: -1440 mL    13 Nov 2018 07:01  -  13 Nov 2018 22:51  --------------------------------------------------------  IN: 680 mL / OUT: 1250 mL / NET: -570 mL          Appearance: Normal	  HEENT:    PERRL, EOMI	  Cardiovascular:  S1 S2, No JVD  Respiratory: Lungs clear to auscultation	  Psychiatry: Alert  Gastrointestinal:  Soft, Non-tender, + BS	  Skin: No rashes, No cyanosis  Extremities:  No edema of LE                                11.0   6.4   )-----------( 165      ( 13 Nov 2018 05:53 )             35.0     11-13    142  |  102  |  53<H>  ----------------------------<  107<H>  3.6   |  28  |  3.27<H>    Ca    8.8      13 Nov 2018 05:53  Mg     2.1     11-13          Labs personally reviewed      ASSESSMENT AND PLAN:  77yo male with hx of HTN, CHF, presents to the ED with complaints of SOB x 1 week secondary to CHF exacerbation due to Hypertensive emergency. Pt on exam was noted to have b/l wheezing.     Problem/Plan - 1:  ·  Problem: Hypertensive emergency.  Plan: -Improvement in blood pressure but still uncontrolled  - Increased Hydral to 100mg PO TID  - Continue Carvedilol 25mg BID, uptitrate Nifedipine    Problem/Plan - 2:  ·  Problem: Acute on chronic diastolic heart failure.  Plan: -Secondary to Hypertensive emergency  - Has responded well to Lasix 20mg IV BID with no orthopnea and improved dyspnea, hold further diuresis for now, resume PO lasix at discharge   - Recent TTE at [facility] with severe LVH and normal EF, Echo here the same  - NM stress test at [facility] with no ischemia/Infarct    Problem/Plan - 3:  ·  Problem: NSVT, 9 beats.  Plan: On max dose Coreg, no further events    Problem/Plan - 4:  ·  Problem: CKD (chronic kidney disease), stage IV.  Plan: -Chronic  -  Cr is mildly higher, reason for uncontrolled HTN       Problem/Plan - 5:  ·  Problem: Type 2 diabetes mellitus with chronic kidney disease, without long-term current use of insulin, unspecified CKD stage.  Plan: -Chronic  - A1c well controlled at 5.7        Sridhar Carrillo DO [facility]  Cardiovascular Medicine  [phone]

## 2018-11-13 NOTE — PROGRESS NOTE ADULT - SUBJECTIVE AND OBJECTIVE BOX
CHIEF COMPLAINT:    SUBJECTIVE:     REVIEW OF SYSTEMS:    CONSTITUTIONAL: (  )  weakness,  (  ) fevers or chills  EYES/ENT: (  )visual changes;     NECK: (  ) pain or stiffness  RESPIRATORY:   (  )cough, wheezing, hemoptysis;  (  ) shortness of breath  CARDIOVASCULAR:  (  )chest pain or palpitations  GASTROINTESTINAL:   (  )abdominal or epigastric pain.  (  ) nausea, vomiting, or hematemesis;   (   ) diarrhea or constipation.   GENITOURINARY:   (    ) dysuria, frequency or hematuria  NEUROLOGICAL:  (   ) numbness or weakness   All other review of systems is negative unless indicated above    Vital Signs Last 24 Hrs  T(C): 36.6 (13 Nov 2018 04:09), Max: 36.7 (12 Nov 2018 21:38)  T(F): 97.9 (13 Nov 2018 04:09), Max: 98.1 (12 Nov 2018 21:38)  HR: 74 (13 Nov 2018 04:09) (64 - 76)  BP: 178/86 (13 Nov 2018 04:09) (171/77 - 190/84)  BP(mean): --  RR: 18 (13 Nov 2018 04:09) (18 - 18)  SpO2: 98% (13 Nov 2018 04:09) (95% - 98%)    I&O's Summary    12 Nov 2018 07:01  -  13 Nov 2018 07:00  --------------------------------------------------------  IN: 660 mL / OUT: 2100 mL / NET: -1440 mL        CAPILLARY BLOOD GLUCOSE      POCT Blood Glucose.: 117 mg/dL (13 Nov 2018 09:09)  POCT Blood Glucose.: 210 mg/dL (12 Nov 2018 21:37)  POCT Blood Glucose.: 102 mg/dL (12 Nov 2018 17:14)  POCT Blood Glucose.: 83 mg/dL (12 Nov 2018 13:00)      PHYSICAL EXAM:    Constitutional:  (   ) NAD,   (   )awake and alert  HEENT: PERR, EOMI,    Neck: Soft and supple, No LAD, No JVD  Respiratory:  (    Breath sounds are clear bilaterally,    (   ) wheezing, rales or rhonchi  Cardiovascular:     (   )S1 and S2, regular rate and rhythm, no Murmurs, gallops or rubs  Gastrointestinal:  (   )Bowel Sounds present, soft,   (  )nontender, nondistended,    Extremities:    (  ) peripheral edema  Vascular: 2+ peripheral pulses  Neurological:    (    )A/O x 3,   (  ) focal deficits  Musculoskeletal:    (   )  normal strength b/l upper  (     ) normal  lower extremities  Skin: No rashes    MEDICATIONS:  MEDICATIONS  (STANDING):  ALBUTerol/ipratropium for Nebulization 3 milliLiter(s) Nebulizer every 6 hours  aspirin enteric coated 81 milliGRAM(s) Oral daily  carvedilol 25 milliGRAM(s) Oral every 12 hours  dextrose 5%. 1000 milliLiter(s) (50 mL/Hr) IV Continuous <Continuous>  dextrose 50% Injectable 12.5 Gram(s) IV Push once  fluticasone propionate 50 MICROgram(s)/spray Nasal Spray 1 Spray(s) Both Nostrils two times a day  heparin  Injectable 5000 Unit(s) SubCutaneous every 8 hours  hydrALAZINE 100 milliGRAM(s) Oral every 8 hours  influenza   Vaccine 0.5 milliLiter(s) IntraMuscular once  insulin lispro (HumaLOG) corrective regimen sliding scale   SubCutaneous at bedtime  insulin lispro (HumaLOG) corrective regimen sliding scale   SubCutaneous three times a day before meals  tamsulosin 0.4 milliGRAM(s) Oral at bedtime      LABS: All Labs Reviewed:                        11.0   6.4   )-----------( 165      ( 13 Nov 2018 05:53 )             35.0     11-13    142  |  102  |  53<H>  ----------------------------<  107<H>  3.6   |  28  |  3.27<H>    Ca    8.8      13 Nov 2018 05:53  Mg     2.1     11-13            Blood Culture:   Urine Culture      RADIOLOGY/EKG:    ASSESSMENT AND PLAN:    DVT PPX:    ADVANCED DIRECTIVE:    DISPOSITION: CHIEF COMPLAINT: nephrology consult appreciated.  Patient scheduled for renal sonogram  patient feel better, was admitted secondary to SOB elevated blood pressure   79yo male with hx of HTN, CHF, presents to the ED with complaints of SOB x 1 week. Pt states he initially had symptoms of URI, s/p treatment with Azithromycin, had improvement in symptoms. For the past week, however, he noted symptoms of sob on exertion as well at rest. He denies associated symptoms of cp, palpitations, N/V, dizziness, lightheadedness, blurry vision. In addition to his symptoms of sob, he also noted LE edema. He denies increase number of pillow usage at night time, however, does endorse worsening symptoms while laying flat.   Pt was seen by me   in office on 11/8/2018 advised patient to go to ER 2/2 to have /120-220/120  SUBJECTIVE:     REVIEW OF SYSTEMS:       · General	negative	  · Skin/Breast	negative	  · Ophthalmologic	negative	  · ENMT	negative	  · Respiratory and Thorax Symptoms	wheezing  dyspnea	  · Cardiovascular Symptoms	dyspnea on exertion; orthopnea; paroxysmal nocturnal dyspnea; peripheral edema	  · Gastrointestinal	negative	  · Genitourinary	negative	  · Musculoskeletal	negative	  · Neurological	negative	  · Psychiatric	negative	  · Hematology/Lymphatics	negative	  · Endocrine	negative	  · Allergic/Immunologic	negative	              Vital Signs Last 24 Hrs  T(C): 36.6 (13 Nov 2018 04:09), Max: 36.7 (12 Nov 2018 21:38)  T(F): 97.9 (13 Nov 2018 04:09), Max: 98.1 (12 Nov 2018 21:38)  HR: 74 (13 Nov 2018 04:09) (64 - 76)  BP: 178/86 (13 Nov 2018 04:09) (171/77 - 190/84)  BP(mean): --  RR: 18 (13 Nov 2018 04:09) (18 - 18)  SpO2: 98% (13 Nov 2018 04:09) (95% - 98%)    I&O's Summary    12 Nov 2018 07:01  -  13 Nov 2018 07:00  --------------------------------------------------------  IN: 660 mL / OUT: 2100 mL / NET: -1440 mL        CAPILLARY BLOOD GLUCOSE      POCT Blood Glucose.: 117 mg/dL (13 Nov 2018 09:09)  POCT Blood Glucose.: 210 mg/dL (12 Nov 2018 21:37)  POCT Blood Glucose.: 102 mg/dL (12 Nov 2018 17:14)  POCT Blood Glucose.: 83 mg/dL (12 Nov 2018 13:00)      PHYSICAL EXAM:  · Constitutional	Well-developed, well nourished	  · Eyes	EOMI; PERRL; no drainage or redness	  · ENMT	No oral lesions; no gross abnormalities	  · Neck	No bruits; no thyromegaly or nodules	  · Back	No deformity or limitation of movement	  · Respiratory	detailed exam	  · Respiratory Details	 none	  · Rales	 none	  · Wheezes	 none	  · Cardiovascular	Regular rate & rhythm, normal S1, S2; no murmurs, gallops or rubs; no S3, S4	  · Gastrointestinal	Soft, non-tender, no hepatosplenomegaly, normal bowel sounds	  · Extremities	detailed exam	  · Extremities Details	pedal edema	  · Pedal Edema Severity	1+	  · Pedal Edema Type	pitting	  · Vascular	Equal and normal pulses (carotid, femoral, dorsalis pedis)	  · Neurological	Alert & oriented; no sensory, motor or coordination deficits, normal reflexes	  · Skin	No lesions; no rash	  · Musculoskeletal	No joint pain, swelling or deformity; no limitation of movement	  · Psychiatric	Affect and characteristics of appearance, verbalizations, behaviors are appropriate	        MEDICATIONS:  MEDICATIONS  (STANDING):  ALBUTerol/ipratropium for Nebulization 3 milliLiter(s) Nebulizer every 6 hours  aspirin enteric coated 81 milliGRAM(s) Oral daily  carvedilol 25 milliGRAM(s) Oral every 12 hours  dextrose 5%. 1000 milliLiter(s) (50 mL/Hr) IV Continuous <Continuous>  dextrose 50% Injectable 12.5 Gram(s) IV Push once  fluticasone propionate 50 MICROgram(s)/spray Nasal Spray 1 Spray(s) Both Nostrils two times a day  heparin  Injectable 5000 Unit(s) SubCutaneous every 8 hours  hydrALAZINE 100 milliGRAM(s) Oral every 8 hours  influenza   Vaccine 0.5 milliLiter(s) IntraMuscular once  insulin lispro (HumaLOG) corrective regimen sliding scale   SubCutaneous at bedtime  insulin lispro (HumaLOG) corrective regimen sliding scale   SubCutaneous three times a day before meals  tamsulosin 0.4 milliGRAM(s) Oral at bedtime      LABS: All Labs Reviewed:                        11.0   6.4   )-----------( 165      ( 13 Nov 2018 05:53 )             35.0     11-13    142  |  102  |  53<H>  ----------------------------<  107<H>  3.6   |  28  |  3.27<H>    Ca    8.8      13 Nov 2018 05:53  Mg     2.1     11-13            Blood Culture:   Urine Culture      RADIOLOGY/EKG:    ASSESSMENT AND PLAN:  79yo male with hx of HTN, CHF, presents to the ED with complaints of SOB x 1 week secondary to CHF exacerbation due to Hypertensive emergency. Pt on exam was noted to have b/l wheezing.     Problem/Plan - 1:  ·  Problem: Hypertensive emergency.  Plan: -Improvement in blood pressure  -Initial /104. Repeat 170/98  -Goal not to decrease BP more than it is right now for the next 24hrs.  - dc  Lasix 20mg IV BID  -Continue home med Hydralazine 50mg TID  -Stared t pt on Carvedilol 25mg BID  and  procardia  5 mg  -Continue to monitor vitals.  cardiology consult noted.   patient had none sustained V. tach (9 beat) L 11/10/2018    Problem/Plan - 2:  ·  Problem: Acute on chronic diastolic congestive heart failure.  Plan: -Secondary to Hypertensive emergency  -CXR consistent with pleural effusion, Elevated ProBNP  -Has not been on Lasix at home.   off Lasix 20mg IV BID  -Strict I/O's  -Continue to monitor renal function (may improve as this may be cardio-renal syndrome)  -Fluid restrictions  -Follow up with ECHO.     Problem/Plan - 3:  ·  Problem: Wheezing on auscultation.  Plan: -No hx of COPD or obstructive airway disease  -Physical exam findings  -S/p IV Solumedrol   -Start DuoNebs   -Continue to monitor symptoms.     Problem/Plan - 4:  ·  Problem: CKD (chronic kidney disease), stage IV.  Plan: -Chronic  -increasing in  creatinine,  Start the IV hydration will monitor him and him     -Continue to monitor.     Problem/Plan - 5:  ·  Problem: Anemia, unspecified type.  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  -Follow up with Iron panel.     Problem/Plan - 6:  Problem: Essential hypertension. Plan: -Chronic  -Uncontrolled likely secondary to worsening Renal function     -Continue Hydralazine 100 mg TID.    Problem/Plan - 7:  ·  Problem: Type 2 diabetes mellitus with chronic kidney disease, without long-term current use of insulin, unspecified CKD stage.  Plan: -Chronic  -Not on any medications  -Follow up with HA1C   -ISS +Fingersticks.     Problem/Plan - 8:  ·  Problem: Benign prostatic hyperplasia, unspecified whether lower urinary tract symptoms present.  Plan: -Chronic  -Continue Tamsulosin.     Problem/Plan - 9:  ·  Problem: Prophylactic measure.  Plan: Heparin SQ for DVT ppx  DVT PPX:    ADVANCED DIRECTIVE:    DISPOSITION:

## 2018-11-14 LAB
ANION GAP SERPL CALC-SCNC: 13 MMOL/L — SIGNIFICANT CHANGE UP (ref 5–17)
BUN SERPL-MCNC: 50 MG/DL — HIGH (ref 7–23)
CALCIUM SERPL-MCNC: 8.5 MG/DL — SIGNIFICANT CHANGE UP (ref 8.4–10.5)
CHLORIDE SERPL-SCNC: 104 MMOL/L — SIGNIFICANT CHANGE UP (ref 96–108)
CO2 SERPL-SCNC: 24 MMOL/L — SIGNIFICANT CHANGE UP (ref 22–31)
CREAT SERPL-MCNC: 2.94 MG/DL — HIGH (ref 0.5–1.3)
GLUCOSE BLDC GLUCOMTR-MCNC: 109 MG/DL — HIGH (ref 70–99)
GLUCOSE BLDC GLUCOMTR-MCNC: 116 MG/DL — HIGH (ref 70–99)
GLUCOSE BLDC GLUCOMTR-MCNC: 123 MG/DL — HIGH (ref 70–99)
GLUCOSE BLDC GLUCOMTR-MCNC: 159 MG/DL — HIGH (ref 70–99)
GLUCOSE SERPL-MCNC: 97 MG/DL — SIGNIFICANT CHANGE UP (ref 70–99)
HCT VFR BLD CALC: 34.4 % — LOW (ref 39–50)
HGB BLD-MCNC: 10.8 G/DL — LOW (ref 13–17)
MCHC RBC-ENTMCNC: 24.3 PG — LOW (ref 27–34)
MCHC RBC-ENTMCNC: 31.4 GM/DL — LOW (ref 32–36)
MCV RBC AUTO: 77.2 FL — LOW (ref 80–100)
PLATELET # BLD AUTO: 153 K/UL — SIGNIFICANT CHANGE UP (ref 150–400)
POTASSIUM SERPL-MCNC: 3.7 MMOL/L — SIGNIFICANT CHANGE UP (ref 3.5–5.3)
POTASSIUM SERPL-SCNC: 3.7 MMOL/L — SIGNIFICANT CHANGE UP (ref 3.5–5.3)
RBC # BLD: 4.46 M/UL — SIGNIFICANT CHANGE UP (ref 4.2–5.8)
RBC # FLD: 14.7 % — HIGH (ref 10.3–14.5)
SODIUM SERPL-SCNC: 141 MMOL/L — SIGNIFICANT CHANGE UP (ref 135–145)
WBC # BLD: 6 K/UL — SIGNIFICANT CHANGE UP (ref 3.8–10.5)
WBC # FLD AUTO: 6 K/UL — SIGNIFICANT CHANGE UP (ref 3.8–10.5)

## 2018-11-14 RX ORDER — FINASTERIDE 5 MG/1
5 TABLET, FILM COATED ORAL DAILY
Qty: 0 | Refills: 0 | Status: DISCONTINUED | OUTPATIENT
Start: 2018-11-14 | End: 2018-11-15

## 2018-11-14 RX ADMIN — Medication 3 MILLILITER(S): at 05:32

## 2018-11-14 RX ADMIN — Medication 1 SPRAY(S): at 21:14

## 2018-11-14 RX ADMIN — Medication 60 MILLIGRAM(S): at 05:32

## 2018-11-14 RX ADMIN — Medication 81 MILLIGRAM(S): at 13:09

## 2018-11-14 RX ADMIN — Medication 3 MILLILITER(S): at 11:52

## 2018-11-14 RX ADMIN — Medication 1 SPRAY(S): at 08:18

## 2018-11-14 RX ADMIN — CARVEDILOL PHOSPHATE 25 MILLIGRAM(S): 80 CAPSULE, EXTENDED RELEASE ORAL at 05:32

## 2018-11-14 RX ADMIN — Medication 3 MILLILITER(S): at 17:34

## 2018-11-14 RX ADMIN — HEPARIN SODIUM 5000 UNIT(S): 5000 INJECTION INTRAVENOUS; SUBCUTANEOUS at 05:32

## 2018-11-14 RX ADMIN — Medication 100 MILLIGRAM(S): at 21:13

## 2018-11-14 RX ADMIN — CARVEDILOL PHOSPHATE 25 MILLIGRAM(S): 80 CAPSULE, EXTENDED RELEASE ORAL at 17:34

## 2018-11-14 RX ADMIN — Medication 3 MILLILITER(S): at 23:21

## 2018-11-14 RX ADMIN — FINASTERIDE 5 MILLIGRAM(S): 5 TABLET, FILM COATED ORAL at 13:09

## 2018-11-14 RX ADMIN — HEPARIN SODIUM 5000 UNIT(S): 5000 INJECTION INTRAVENOUS; SUBCUTANEOUS at 13:10

## 2018-11-14 RX ADMIN — CALCITRIOL 0.25 MICROGRAM(S): 0.5 CAPSULE ORAL at 08:18

## 2018-11-14 RX ADMIN — TAMSULOSIN HYDROCHLORIDE 0.4 MILLIGRAM(S): 0.4 CAPSULE ORAL at 21:13

## 2018-11-14 RX ADMIN — Medication 100 MILLIGRAM(S): at 05:32

## 2018-11-14 RX ADMIN — Medication 100 MILLIGRAM(S): at 13:09

## 2018-11-14 RX ADMIN — HEPARIN SODIUM 5000 UNIT(S): 5000 INJECTION INTRAVENOUS; SUBCUTANEOUS at 21:13

## 2018-11-14 NOTE — PROGRESS NOTE ADULT - SUBJECTIVE AND OBJECTIVE BOX
Patient is a 78y old  Male who presents with a chief complaint of SOB (14 Nov 2018 11:04)      INTERVAL HISTORY:  feels ok    TELEMETRY: no events  	  MEDICATIONS:  carvedilol 25 milliGRAM(s) Oral every 12 hours  hydrALAZINE 100 milliGRAM(s) Oral every 8 hours  NIFEdipine XL 60 milliGRAM(s) Oral daily  tamsulosin 0.4 milliGRAM(s) Oral at bedtime        PHYSICAL EXAM:  T(C): 36.6 (11-14-18 @ 20:50), Max: 36.7 (11-14-18 @ 04:05)  HR: 68 (11-14-18 @ 20:50) (68 - 74)  BP: 179/88 (11-14-18 @ 20:50) (153/73 - 189/85)  RR: 18 (11-14-18 @ 20:50) (18 - 18)  SpO2: 97% (11-14-18 @ 20:50) (94% - 97%)  Wt(kg): --  I&O's Summary    13 Nov 2018 07:01  -  14 Nov 2018 07:00  --------------------------------------------------------  IN: 730 mL / OUT: 1250 mL / NET: -520 mL    14 Nov 2018 07:01  -  14 Nov 2018 21:45  --------------------------------------------------------  IN: 120 mL / OUT: 1000 mL / NET: -880 mL          Appearance: Normal	  HEENT:    PERRL, EOMI	  Cardiovascular:  S1 S2, No JVD  Respiratory: Lungs clear to auscultation	  Psychiatry: Alert  Gastrointestinal:  Soft, Non-tender, + BS	  Skin: No rashes, No cyanosis  Extremities:  No edema of LE                                10.8   6.0   )-----------( 153      ( 14 Nov 2018 06:29 )             34.4     11-14    141  |  104  |  50<H>  ----------------------------<  97  3.7   |  24  |  2.94<H>    Ca    8.5      14 Nov 2018 06:27  Mg     2.1     11-13          Labs personally reviewed      ASSESSMENT AND PLAN:  77yo male with hx of HTN, CHF, presents to the ED with complaints of SOB x 1 week secondary to CHF exacerbation due to Hypertensive emergency. Pt on exam was noted to have b/l wheezing.     Problem/Plan - 1:  ·  Problem: Hypertensive emergency.  Plan: -Improvement in blood pressure but still uncontrolled  - Increased Hydral to 100mg PO TID  - Continue Carvedilol 25mg BID, uptitrate Nifedipine    Problem/Plan - 2:  ·  Problem: Acute on chronic diastolic heart failure.  Plan: -Secondary to Hypertensive emergency  - Has responded well to Lasix 20mg IV BID with no orthopnea and improved dyspnea, hold further diuresis for now, resume PO lasix at discharge   - Recent TTE at [facility] with severe LVH and normal EF, Echo here the same  - NM stress test at [facility] with no ischemia/Infarct    Problem/Plan - 3:  ·  Problem: NSVT, 9 beats.  Plan: On max dose Coreg, no further events    Problem/Plan - 4:  ·  Problem: CKD (chronic kidney disease), stage IV.  Plan: -Chronic  -  VIRGEN resolved       Problem/Plan - 5:  ·  Problem: Type 2 diabetes mellitus with chronic kidney disease, without long-term current use of insulin, unspecified CKD stage.  Plan: -Chronic  - A1c well controlled at 5.7        Sridhar Carrillo DO [facility]  Cardiovascular Medicine  [phone]

## 2018-11-14 NOTE — PROGRESS NOTE ADULT - ASSESSMENT
The patient is a 78-year-old gentleman with past medical history of new-onset diabetes, hypertension, chronic kidney disease stage IV (likely), who presents for evaluation of acute diastolic heart failure.  The patient has stage II diastolic dysfunction ensuing that his hypertension is likely the cause of his renal insufficiency.   Subnephrotic range proteinuria  Hypertensive urgency  Secondary hyperparathyroidism   1.	Cardiology.   Procardia to 60mg po qd.  I would leave him at this BP range and further escalate as outpt   2.	Renal.  Off  his Lasix.  Aldactone would not be a good option given his advanced renal failure.  Start Proscar   3.	Gastroenterology.  The patient was told to maintain a low-salt diet.     4.       Endo-  Rocaltrol qod

## 2018-11-14 NOTE — PROGRESS NOTE ADULT - SUBJECTIVE AND OBJECTIVE BOX
CHIEF COMPLAINT:    SUBJECTIVE:     REVIEW OF SYSTEMS:    CONSTITUTIONAL: (  )  weakness,  (  ) fevers or chills  EYES/ENT: (  )visual changes;     NECK: (  ) pain or stiffness  RESPIRATORY:   (  )cough, wheezing, hemoptysis;  (  ) shortness of breath  CARDIOVASCULAR:  (  )chest pain or palpitations  GASTROINTESTINAL:   (  )abdominal or epigastric pain.  (  ) nausea, vomiting, or hematemesis;   (   ) diarrhea or constipation.   GENITOURINARY:   (    ) dysuria, frequency or hematuria  NEUROLOGICAL:  (   ) numbness or weakness   All other review of systems is negative unless indicated above    Vital Signs Last 24 Hrs  T(C): 36.7 (14 Nov 2018 04:05), Max: 36.9 (13 Nov 2018 21:29)  T(F): 98 (14 Nov 2018 04:05), Max: 98.4 (13 Nov 2018 21:29)  HR: 74 (14 Nov 2018 04:05) (65 - 74)  BP: 153/73 (14 Nov 2018 04:05) (153/73 - 168/76)  BP(mean): --  RR: 18 (14 Nov 2018 04:05) (18 - 18)  SpO2: 94% (14 Nov 2018 04:05) (94% - 96%)    I&O's Summary    13 Nov 2018 07:01  -  14 Nov 2018 07:00  --------------------------------------------------------  IN: 730 mL / OUT: 1250 mL / NET: -520 mL    14 Nov 2018 07:01  -  14 Nov 2018 11:05  --------------------------------------------------------  IN: 0 mL / OUT: 1000 mL / NET: -1000 mL        CAPILLARY BLOOD GLUCOSE      POCT Blood Glucose.: 123 mg/dL (14 Nov 2018 08:56)  POCT Blood Glucose.: 196 mg/dL (13 Nov 2018 21:58)  POCT Blood Glucose.: 74 mg/dL (13 Nov 2018 17:52)  POCT Blood Glucose.: 163 mg/dL (13 Nov 2018 12:56)      PHYSICAL EXAM:    Constitutional:  (   ) NAD,   (   )awake and alert  HEENT: PERR, EOMI,    Neck: Soft and supple, No LAD, No JVD  Respiratory:  (    Breath sounds are clear bilaterally,    (   ) wheezing, rales or rhonchi  Cardiovascular:     (   )S1 and S2, regular rate and rhythm, no Murmurs, gallops or rubs  Gastrointestinal:  (   )Bowel Sounds present, soft,   (  )nontender, nondistended,    Extremities:    (  ) peripheral edema  Vascular: 2+ peripheral pulses  Neurological:    (    )A/O x 3,   (  ) focal deficits  Musculoskeletal:    (   )  normal strength b/l upper  (     ) normal  lower extremities  Skin: No rashes    MEDICATIONS:  MEDICATIONS  (STANDING):  ALBUTerol/ipratropium for Nebulization 3 milliLiter(s) Nebulizer every 6 hours  aspirin enteric coated 81 milliGRAM(s) Oral daily  calcitriol   Capsule 0.25 MICROGram(s) Oral <User Schedule>  carvedilol 25 milliGRAM(s) Oral every 12 hours  dextrose 5%. 1000 milliLiter(s) (50 mL/Hr) IV Continuous <Continuous>  dextrose 50% Injectable 12.5 Gram(s) IV Push once  finasteride 5 milliGRAM(s) Oral daily  fluticasone propionate 50 MICROgram(s)/spray Nasal Spray 1 Spray(s) Both Nostrils two times a day  heparin  Injectable 5000 Unit(s) SubCutaneous every 8 hours  hydrALAZINE 100 milliGRAM(s) Oral every 8 hours  influenza   Vaccine 0.5 milliLiter(s) IntraMuscular once  insulin lispro (HumaLOG) corrective regimen sliding scale   SubCutaneous at bedtime  insulin lispro (HumaLOG) corrective regimen sliding scale   SubCutaneous three times a day before meals  NIFEdipine XL 60 milliGRAM(s) Oral daily  tamsulosin 0.4 milliGRAM(s) Oral at bedtime      LABS: All Labs Reviewed:                        10.8   6.0   )-----------( 153      ( 14 Nov 2018 06:29 )             34.4     11-14    141  |  104  |  50<H>  ----------------------------<  97  3.7   |  24  |  2.94<H>    Ca    8.5      14 Nov 2018 06:27  Mg     2.1     11-13            Blood Culture:   Urine Culture      RADIOLOGY/EKG:    ASSESSMENT AND PLAN:    DVT PPX:    ADVANCED DIRECTIVE:    DISPOSITION: CHIEF COMPLAINT:  patient condition improving significantly  awake and verbal laying in the bed without SOB nephrology  followup appreciated  SUBJECTIVE:     REVIEW OF SYSTEMS:    CONSTITUTIONAL: (  )  weakness,  (  ) fevers or chills  EYES/ENT: (  )visual changes;     NECK: (  ) pain or stiffness  RESPIRATORY:   (  )cough, wheezing, hemoptysis;  (x  ) shortness of breath  CARDIOVASCULAR:  (  )chest pain or palpitations  GASTROINTESTINAL:   (  )abdominal or epigastric pain.  (  ) nausea, vomiting, or hematemesis;   (   ) diarrhea or constipation.   GENITOURINARY:   (    ) dysuria, frequency or hematuria  NEUROLOGICAL:  (   ) numbness or weakness   All other review of systems is negative unless indicated above    Vital Signs Last 24 Hrs  T(C): 36.7 (14 Nov 2018 04:05), Max: 36.9 (13 Nov 2018 21:29)  T(F): 98 (14 Nov 2018 04:05), Max: 98.4 (13 Nov 2018 21:29)  HR: 74 (14 Nov 2018 04:05) (65 - 74)  BP: 153/73 (14 Nov 2018 04:05) (153/73 - 168/76)  BP(mean): --  RR: 18 (14 Nov 2018 04:05) (18 - 18)  SpO2: 94% (14 Nov 2018 04:05) (94% - 96%)    I&O's Summary    13 Nov 2018 07:01  -  14 Nov 2018 07:00  --------------------------------------------------------  IN: 730 mL / OUT: 1250 mL / NET: -520 mL    14 Nov 2018 07:01  -  14 Nov 2018 11:05  --------------------------------------------------------  IN: 0 mL / OUT: 1000 mL / NET: -1000 mL        CAPILLARY BLOOD GLUCOSE      POCT Blood Glucose.: 123 mg/dL (14 Nov 2018 08:56)  POCT Blood Glucose.: 196 mg/dL (13 Nov 2018 21:58)  POCT Blood Glucose.: 74 mg/dL (13 Nov 2018 17:52)  POCT Blood Glucose.: 163 mg/dL (13 Nov 2018 12:56)      PHYSICAL EXAM:    Constitutional:  ( x  ) NAD,   ( x  )awake and alert  HEENT: PERR, EOMI,    Neck: Soft and supple, No LAD, No JVD  Respiratory:  (   x Breath sounds are clear bilaterally,    (   ) wheezing, rales or rhonchi  Cardiovascular:     (  x )S1 and S2, regular rate and rhythm, no Murmurs, gallops or rubs  Gastrointestinal:  (  x )Bowel Sounds present, soft,   ( x )nontender, nondistended,    Extremities:    ( x ) peripheral edema  Vascular: 2+ peripheral pulses  Neurological:    (   x )A/O x 3,   (  ) focal deficits  Musculoskeletal:    ( x  )  normal strength b/l upper  (     ) normal  lower extremities  Skin: No rashes    MEDICATIONS:  MEDICATIONS  (STANDING):  ALBUTerol/ipratropium for Nebulization 3 milliLiter(s) Nebulizer every 6 hours  aspirin enteric coated 81 milliGRAM(s) Oral daily  calcitriol   Capsule 0.25 MICROGram(s) Oral <User Schedule>  carvedilol 25 milliGRAM(s) Oral every 12 hours  dextrose 5%. 1000 milliLiter(s) (50 mL/Hr) IV Continuous <Continuous>  dextrose 50% Injectable 12.5 Gram(s) IV Push once  finasteride 5 milliGRAM(s) Oral daily  fluticasone propionate 50 MICROgram(s)/spray Nasal Spray 1 Spray(s) Both Nostrils two times a day  heparin  Injectable 5000 Unit(s) SubCutaneous every 8 hours  hydrALAZINE 100 milliGRAM(s) Oral every 8 hours  influenza   Vaccine 0.5 milliLiter(s) IntraMuscular once  insulin lispro (HumaLOG) corrective regimen sliding scale   SubCutaneous at bedtime  insulin lispro (HumaLOG) corrective regimen sliding scale   SubCutaneous three times a day before meals  NIFEdipine XL 60 milliGRAM(s) Oral daily  tamsulosin 0.4 milliGRAM(s) Oral at bedtime      LABS: All Labs Reviewed:                        10.8   6.0   )-----------( 153      ( 14 Nov 2018 06:29 )             34.4     11-14    141  |  104  |  50<H>  ----------------------------<  97  3.7   |  24  |  2.94<H>    Ca    8.5      14 Nov 2018 06:27  Mg     2.1     11-13            Blood Culture:   Urine Culture      RADIOLOGY/EKG:    ASSESSMENT AND PLAN:   77yo male with hx of HTN, CHF, presents to the ED with complaints of SOB x 1 week secondary to CHF exacerbation due to Hypertensive emergency. Pt on exam was noted to have b/l wheezing.     Problem/Plan - 1:  ·  Problem: Hypertensive emergency.  Plan: -Improvement in blood pressure  -Initial /104. Repeat 170/98  -Goal not to decrease BP more than it is right now for the next 24hrs.  - dc  Lasix 20mg IV BID  -Continue home med Hydralazine 50mg TID  -Stared t pt on Carvedilol 25mg BID  and  procardia  5 mg  -Continue to monitor vitals.  cardiology consult noted.   patient had none sustained V. tach (9 beat) L 11/10/2018    Problem/Plan - 2:  ·  Problem: Acute on chronic diastolic congestive heart failure.  Plan: -Secondary to Hypertensive emergency  -CXR consistent with pleural effusion, Elevated ProBNP  -Has not been on Lasix at home.   off Lasix 20mg IV BID  -Strict I/O's  -Continue to monitor renal function (may improve as this may be cardio-renal syndrome)  -Fluid restrictions  -Follow up with ECHO.     Problem/Plan - 3:  ·  Problem: Wheezing on auscultation.  Plan: -No hx of COPD or obstructive airway disease  -Physical exam findings  -S/p IV Solumedrol   -Start DuoNebs   -Continue to monitor symptoms.     Problem/Plan - 4:  ·  Problem: CKD (chronic kidney disease), stage IV.  Plan: -Chronic  -increasing in  creatinine,  Start the IV hydration will monitor him and him     -Continue to monitor.     Problem/Plan - 5:  ·  Problem: Anemia, unspecified type.  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  -Follow up with Iron panel.     Problem/Plan - 6:  Problem: Essential hypertension. Plan: -Chronic  -Uncontrolled likely secondary to worsening Renal function     -Continue Hydralazine 100 mg TID .Procardia 60 mg and Coreg 25 twice a     Problem/Plan - 7:  ·  Problem: Type 2 diabetes mellitus with chronic kidney disease, without long-term current use of insulin, unspecified CKD stage.  Plan: -Chronic  -Not on any medications  -Follow up with HA1C   -ISS +Fingersticks.     Problem/Plan - 8:  ·  Problem: Benign prostatic hyperplasia, unspecified whether lower urinary tract symptoms present.  Plan: -Chronic  -Continue Tamsulosin.     Problem/Plan - 9:  ·  Problem: Prophylactic measure.  Plan: Heparin SQ for DVT ppx  DVT PPX:    DVT PPX:    ADVANCED DIRECTIVE:  discharge patient in a.m. discussed  pt and medical team  DISPOSITION:

## 2018-11-14 NOTE — PROGRESS NOTE ADULT - SUBJECTIVE AND OBJECTIVE BOX
NEPHROLOGY-[facility] [phone]        Patient seen and examined in bed.  He felt better        MEDICATIONS  (STANDING):  ALBUTerol/ipratropium for Nebulization 3 milliLiter(s) Nebulizer every 6 hours  aspirin enteric coated 81 milliGRAM(s) Oral daily  calcitriol   Capsule 0.25 MICROGram(s) Oral <User Schedule>  carvedilol 25 milliGRAM(s) Oral every 12 hours  dextrose 5%. 1000 milliLiter(s) (50 mL/Hr) IV Continuous <Continuous>  dextrose 50% Injectable 12.5 Gram(s) IV Push once  fluticasone propionate 50 MICROgram(s)/spray Nasal Spray 1 Spray(s) Both Nostrils two times a day  heparin  Injectable 5000 Unit(s) SubCutaneous every 8 hours  hydrALAZINE 100 milliGRAM(s) Oral every 8 hours  influenza   Vaccine 0.5 milliLiter(s) IntraMuscular once  insulin lispro (HumaLOG) corrective regimen sliding scale   SubCutaneous at bedtime  insulin lispro (HumaLOG) corrective regimen sliding scale   SubCutaneous three times a day before meals  NIFEdipine XL 60 milliGRAM(s) Oral daily  tamsulosin 0.4 milliGRAM(s) Oral at bedtime      VITAL:  T(C): , Max: 36.9 (18 @ 21:29)  T(F): , Max: 98.4 (18 @ 21:29)  HR: 74 (18 @ 04:05)  BP: 153/73 (18 @ 04:05)  BP(mean): --  RR: 18 (18 @ 04:05)  SpO2: 94% (18 @ 04:05)  Wt(kg): --    I and O's:     @ 07:01  -   @ 07:00  --------------------------------------------------------  IN: 730 mL / OUT: 1250 mL / NET: -520 mL     @ 07:01  -   @ 09:33  --------------------------------------------------------  IN: 0 mL / OUT: 1000 mL / NET: -1000 mL          PHYSICAL EXAM:    Constitutional: NAD  Neck:  No JVD  Respiratory: CTAB/L  Cardiovascular: S1 and S2  Gastrointestinal: BS+, soft, NT/ND  Extremities: No peripheral edema  Neurological: A/O x 3, no focal deficits  Psychiatric: Normal mood, normal affect  : No Aguilar  Skin: No rashes  Access: Not applicable    LABS:                        10.8   6.0   )-----------( 153      ( 2018 06:29 )             34.4     11-14    141  |  104  |  50<H>  ----------------------------<  97  3.7   |  24  |  2.94<H>    Ca    8.5      2018 06:27  Mg     2.1     13            Urine Studies:  Urinalysis Basic - ( 2018 17:21 )    Color: Light Yellow / Appearance: Clear / S.017 / pH: x  Gluc: x / Ketone: Negative  / Bili: Negative / Urobili: Negative   Blood: x / Protein: 100 mg/dL / Nitrite: Negative   Leuk Esterase: Negative / RBC: 1 /hpf / WBC 0 /hpf   Sq Epi: x / Non Sq Epi: 0 /hpf / Bacteria: Negative      Creatinine, Random Urine: 132 mg/dL ( @ 20:15)  Protein/Creatinine Ratio Calculation: 0.8 Ratio ( @ 20:15)        RADIOLOGY & ADDITIONAL STUDIES:      < from: US Kidney and Bladder (18 @ 11:10) >    EXAM:  US KIDNEYS AND BLADDER                            PROCEDURE DATE:  2018            INTERPRETATION:  CLINICAL INFORMATION: Chronic kidney disease. Creatinine   4.0    COMPARISON: Renal ultrasound 2017.    TECHNIQUE: Sonography of the kidneys and bladder.     FINDINGS:    Right kidney:  11.1 cm. No renal mass, hydronephrosis or calculi.   Multiple simple cysts measure up to 3.5 cm.    Left kidney:  10.1 cm. No renal mass, hydronephrosis or calculi. Multiple   simple cysts measure up to 3.7 cm.    Urinary bladder: There is mild concentric urinary bladder wall thickening   and trabeculation, likely due to chronic outlet obstruction from an   enlarged prostate.    Prostate: 5.1 x 6.8 x 7.7 cm, with a volume of 139 mL.    IMPRESSION:     No hydronephrosis.    Mild concentric urinary bladder wall thickening and trabeculation, likely   due to chronic outlet obstruction from an enlarged prostate.                    EKATERINA MARTINEZ M.D., RADIOLOGY FELLOW  This document has been electronically signed.  MADELYN NAILS M.D., ATTENDING RADIOLOGIST  This document has been electronically signed. 2018 12:05PM                < end of copied text >

## 2018-11-15 ENCOUNTER — TRANSCRIPTION ENCOUNTER (OUTPATIENT)
Age: 78
End: 2018-11-15

## 2018-11-15 VITALS
DIASTOLIC BLOOD PRESSURE: 76 MMHG | SYSTOLIC BLOOD PRESSURE: 173 MMHG | TEMPERATURE: 98 F | OXYGEN SATURATION: 96 % | HEART RATE: 63 BPM | RESPIRATION RATE: 18 BRPM

## 2018-11-15 LAB
ANION GAP SERPL CALC-SCNC: 14 MMOL/L — SIGNIFICANT CHANGE UP (ref 5–17)
BUN SERPL-MCNC: 48 MG/DL — HIGH (ref 7–23)
CALCIUM SERPL-MCNC: 8.8 MG/DL — SIGNIFICANT CHANGE UP (ref 8.4–10.5)
CHLORIDE SERPL-SCNC: 106 MMOL/L — SIGNIFICANT CHANGE UP (ref 96–108)
CO2 SERPL-SCNC: 23 MMOL/L — SIGNIFICANT CHANGE UP (ref 22–31)
CREAT SERPL-MCNC: 2.84 MG/DL — HIGH (ref 0.5–1.3)
GLUCOSE BLDC GLUCOMTR-MCNC: 104 MG/DL — HIGH (ref 70–99)
GLUCOSE BLDC GLUCOMTR-MCNC: 110 MG/DL — HIGH (ref 70–99)
GLUCOSE BLDC GLUCOMTR-MCNC: 117 MG/DL — HIGH (ref 70–99)
GLUCOSE BLDC GLUCOMTR-MCNC: 146 MG/DL — HIGH (ref 70–99)
GLUCOSE SERPL-MCNC: 100 MG/DL — HIGH (ref 70–99)
HCT VFR BLD CALC: 34.8 % — LOW (ref 39–50)
HGB BLD-MCNC: 11 G/DL — LOW (ref 13–17)
MCHC RBC-ENTMCNC: 24.2 PG — LOW (ref 27–34)
MCHC RBC-ENTMCNC: 31.6 GM/DL — LOW (ref 32–36)
MCV RBC AUTO: 76.5 FL — LOW (ref 80–100)
PLATELET # BLD AUTO: 168 K/UL — SIGNIFICANT CHANGE UP (ref 150–400)
POTASSIUM SERPL-MCNC: 3.7 MMOL/L — SIGNIFICANT CHANGE UP (ref 3.5–5.3)
POTASSIUM SERPL-SCNC: 3.7 MMOL/L — SIGNIFICANT CHANGE UP (ref 3.5–5.3)
RBC # BLD: 4.55 M/UL — SIGNIFICANT CHANGE UP (ref 4.2–5.8)
RBC # FLD: 14.7 % — HIGH (ref 10.3–14.5)
SODIUM SERPL-SCNC: 143 MMOL/L — SIGNIFICANT CHANGE UP (ref 135–145)
WBC # BLD: 6 K/UL — SIGNIFICANT CHANGE UP (ref 3.8–10.5)
WBC # FLD AUTO: 6 K/UL — SIGNIFICANT CHANGE UP (ref 3.8–10.5)

## 2018-11-15 PROCEDURE — 83880 ASSAY OF NATRIURETIC PEPTIDE: CPT

## 2018-11-15 PROCEDURE — 94640 AIRWAY INHALATION TREATMENT: CPT

## 2018-11-15 PROCEDURE — 82570 ASSAY OF URINE CREATININE: CPT

## 2018-11-15 PROCEDURE — 82310 ASSAY OF CALCIUM: CPT

## 2018-11-15 PROCEDURE — 96374 THER/PROPH/DIAG INJ IV PUSH: CPT

## 2018-11-15 PROCEDURE — 80048 BASIC METABOLIC PNL TOTAL CA: CPT

## 2018-11-15 PROCEDURE — 99285 EMERGENCY DEPT VISIT HI MDM: CPT | Mod: 25

## 2018-11-15 PROCEDURE — 83970 ASSAY OF PARATHORMONE: CPT

## 2018-11-15 PROCEDURE — 82962 GLUCOSE BLOOD TEST: CPT

## 2018-11-15 PROCEDURE — 83550 IRON BINDING TEST: CPT

## 2018-11-15 PROCEDURE — 93005 ELECTROCARDIOGRAM TRACING: CPT

## 2018-11-15 PROCEDURE — 83540 ASSAY OF IRON: CPT

## 2018-11-15 PROCEDURE — 84484 ASSAY OF TROPONIN QUANT: CPT

## 2018-11-15 PROCEDURE — 85730 THROMBOPLASTIN TIME PARTIAL: CPT

## 2018-11-15 PROCEDURE — 85027 COMPLETE CBC AUTOMATED: CPT

## 2018-11-15 PROCEDURE — 71046 X-RAY EXAM CHEST 2 VIEWS: CPT

## 2018-11-15 PROCEDURE — 93306 TTE W/DOPPLER COMPLETE: CPT

## 2018-11-15 PROCEDURE — 82652 VIT D 1 25-DIHYDROXY: CPT

## 2018-11-15 PROCEDURE — 82728 ASSAY OF FERRITIN: CPT

## 2018-11-15 PROCEDURE — 76770 US EXAM ABDO BACK WALL COMP: CPT

## 2018-11-15 PROCEDURE — 84100 ASSAY OF PHOSPHORUS: CPT

## 2018-11-15 PROCEDURE — 83036 HEMOGLOBIN GLYCOSYLATED A1C: CPT

## 2018-11-15 PROCEDURE — 85610 PROTHROMBIN TIME: CPT

## 2018-11-15 PROCEDURE — 84156 ASSAY OF PROTEIN URINE: CPT

## 2018-11-15 PROCEDURE — 80053 COMPREHEN METABOLIC PANEL: CPT

## 2018-11-15 PROCEDURE — 83735 ASSAY OF MAGNESIUM: CPT

## 2018-11-15 PROCEDURE — 81001 URINALYSIS AUTO W/SCOPE: CPT

## 2018-11-15 RX ORDER — SODIUM CHLORIDE 0.65 %
1 AEROSOL, SPRAY (ML) NASAL
Qty: 0 | Refills: 0 | DISCHARGE
Start: 2018-11-15

## 2018-11-15 RX ORDER — NIFEDIPINE 30 MG
1 TABLET, EXTENDED RELEASE 24 HR ORAL
Qty: 30 | Refills: 0
Start: 2018-11-15 | End: 2018-12-14

## 2018-11-15 RX ORDER — LABETALOL HCL 100 MG
1 TABLET ORAL
Qty: 0 | Refills: 0 | COMMUNITY

## 2018-11-15 RX ORDER — ASPIRIN/CALCIUM CARB/MAGNESIUM 324 MG
1 TABLET ORAL
Qty: 0 | Refills: 0 | DISCHARGE
Start: 2018-11-15

## 2018-11-15 RX ORDER — TAMSULOSIN HYDROCHLORIDE 0.4 MG/1
1 CAPSULE ORAL
Qty: 0 | Refills: 0 | DISCHARGE
Start: 2018-11-15

## 2018-11-15 RX ORDER — HYDRALAZINE HCL 50 MG
1 TABLET ORAL
Qty: 90 | Refills: 0
Start: 2018-11-15 | End: 2018-12-14

## 2018-11-15 RX ORDER — CARVEDILOL PHOSPHATE 80 MG/1
1 CAPSULE, EXTENDED RELEASE ORAL
Qty: 60 | Refills: 0
Start: 2018-11-15 | End: 2018-12-14

## 2018-11-15 RX ORDER — CALCITRIOL 0.5 UG/1
1 CAPSULE ORAL
Qty: 13 | Refills: 0
Start: 2018-11-15 | End: 2018-12-14

## 2018-11-15 RX ORDER — FLUTICASONE PROPIONATE 50 MCG
1 SPRAY, SUSPENSION NASAL
Qty: 1 | Refills: 0
Start: 2018-11-15 | End: 2018-11-28

## 2018-11-15 RX ORDER — NIFEDIPINE 30 MG
90 TABLET, EXTENDED RELEASE 24 HR ORAL DAILY
Qty: 0 | Refills: 0 | Status: DISCONTINUED | OUTPATIENT
Start: 2018-11-15 | End: 2018-11-15

## 2018-11-15 RX ORDER — FINASTERIDE 5 MG/1
1 TABLET, FILM COATED ORAL
Qty: 30 | Refills: 0
Start: 2018-11-15 | End: 2018-12-14

## 2018-11-15 RX ADMIN — Medication 3 MILLILITER(S): at 05:02

## 2018-11-15 RX ADMIN — CARVEDILOL PHOSPHATE 25 MILLIGRAM(S): 80 CAPSULE, EXTENDED RELEASE ORAL at 17:56

## 2018-11-15 RX ADMIN — Medication 60 MILLIGRAM(S): at 05:02

## 2018-11-15 RX ADMIN — Medication 3 MILLILITER(S): at 12:17

## 2018-11-15 RX ADMIN — Medication 100 MILLIGRAM(S): at 05:02

## 2018-11-15 RX ADMIN — Medication 3 MILLILITER(S): at 17:56

## 2018-11-15 RX ADMIN — Medication 100 MILLIGRAM(S): at 21:27

## 2018-11-15 RX ADMIN — Medication 100 MILLIGRAM(S): at 13:08

## 2018-11-15 RX ADMIN — TAMSULOSIN HYDROCHLORIDE 0.4 MILLIGRAM(S): 0.4 CAPSULE ORAL at 21:29

## 2018-11-15 RX ADMIN — FINASTERIDE 5 MILLIGRAM(S): 5 TABLET, FILM COATED ORAL at 12:17

## 2018-11-15 RX ADMIN — Medication 1 SPRAY(S): at 05:06

## 2018-11-15 RX ADMIN — Medication 1 SPRAY(S): at 17:56

## 2018-11-15 RX ADMIN — CARVEDILOL PHOSPHATE 25 MILLIGRAM(S): 80 CAPSULE, EXTENDED RELEASE ORAL at 05:02

## 2018-11-15 RX ADMIN — HEPARIN SODIUM 5000 UNIT(S): 5000 INJECTION INTRAVENOUS; SUBCUTANEOUS at 05:02

## 2018-11-15 RX ADMIN — Medication 81 MILLIGRAM(S): at 12:17

## 2018-11-15 RX ADMIN — HEPARIN SODIUM 5000 UNIT(S): 5000 INJECTION INTRAVENOUS; SUBCUTANEOUS at 13:08

## 2018-11-15 RX ADMIN — Medication 0.1 MILLIGRAM(S): at 14:16

## 2018-11-15 NOTE — DISCHARGE NOTE ADULT - PATIENT PORTAL LINK FT
You can access the [facility] Patient Portal, offered by [facility], by registering with the following website: [email or web address]

## 2018-11-15 NOTE — DISCHARGE NOTE ADULT - PLAN OF CARE
Patient's blood pressure will remain controlled Continue to follow a low salt/sodium diet.  Perform physical activities as tolerated in consultation with your Primary Care Provider and physical therapist.  Take all medications as prescribed.  Follow up with your medical doctor for routine blood pressure monitoring at your next visit.  Notify your doctor if you have any of the following symptoms:  Dizziness, lightheadedness, blurry vision, headache, chest pain, or shortness of breath. Take all medications as prescribed.  Stop smoking if you currently smoke, and avoid high altitudes.  Weigh yourself daily.  If you gain 3lbs in 3 days, or 5lbs in a week call your Health Care Provider.  Eat a low sodium diet.  If you have pulmonary hypertension and you are a female of child bearing age, talk to your caregiver about using birth control pills or getting pregnant.  Call your Health Care Provider if you have any swelling or increased swelling in your feet, ankles, and/or stomach.  If you experience dizziness, chest pain, or shortness of breath, seek immediate medical attention. Avoid taking NSAIDs (ex: Ibuprofen, Advil, Celebrex, Naprosyn) and other agents that can harm the kidneys such as intravenous contrast for diagnostic testing, combination cold medications, etc. until you are instructed to do so by your Primary Care Physician.  Have all of your medications adjusted for your renal function by your Health Care Provider.  Blood pressure control is important.  Take all medication as prescribed.  Do not overconsume foods that are high in potassium, such as bananas, until you are instructed to do so by your primary care physician. Make sure you get your HgA1c checked every three months.  If you take oral diabetes medications, check your blood glucose at least two times a day.  If you take short-acting insulin, check your blood glucose before meals and at bedtime.  It's important not to skip any meals.  Keep a log of your blood glucose results and always take it with you to your doctor appointments.  Keep a list of your current medications including over the counter medications and bring this medication list with you to all your doctor appointments.  If you have not seen your ophthalmologist this year, call for appointment.  Check your feet daily for redness, sores, or openings.  Do not self treat.  If there is no improvement in two days, call your primary care physician for an appointment.    HgA1c this admission was 5.7.  Patient will see his Primary Care Doctor on November 20th, 2018. Continue current medications.  Contact your doctor if you are unable to urinate despite the sensation to do so.

## 2018-11-15 NOTE — DISCHARGE NOTE ADULT - CARE PROVIDER_API CALL
Melvin Jaramillo), Medicine  [address] [place] [address]  Phone: [phone]  Fax: [phone]    Minerva Martinez), Internal Medicine; Nephrology  [address] [address]  Phone: [phone]  Fax: [phone]

## 2018-11-15 NOTE — PROGRESS NOTE ADULT - SUBJECTIVE AND OBJECTIVE BOX
NEPHROLOGY-[facility] [phone]        Patient seen and examined in bed.  He was in good spirits         MEDICATIONS  (STANDING):  ALBUTerol/ipratropium for Nebulization 3 milliLiter(s) Nebulizer every 6 hours  aspirin enteric coated 81 milliGRAM(s) Oral daily  calcitriol   Capsule 0.25 MICROGram(s) Oral <User Schedule>  carvedilol 25 milliGRAM(s) Oral every 12 hours  dextrose 5%. 1000 milliLiter(s) (50 mL/Hr) IV Continuous <Continuous>  dextrose 50% Injectable 12.5 Gram(s) IV Push once  finasteride 5 milliGRAM(s) Oral daily  fluticasone propionate 50 MICROgram(s)/spray Nasal Spray 1 Spray(s) Both Nostrils two times a day  heparin  Injectable 5000 Unit(s) SubCutaneous every 8 hours  hydrALAZINE 100 milliGRAM(s) Oral every 8 hours  influenza   Vaccine 0.5 milliLiter(s) IntraMuscular once  insulin lispro (HumaLOG) corrective regimen sliding scale   SubCutaneous at bedtime  insulin lispro (HumaLOG) corrective regimen sliding scale   SubCutaneous three times a day before meals  NIFEdipine XL 60 milliGRAM(s) Oral daily  tamsulosin 0.4 milliGRAM(s) Oral at bedtime      VITAL:  T(C): , Max: 36.7 (11-14-18 @ 12:55)  T(F): , Max: 98 (11-14-18 @ 12:55)  HR: 64 (11-15-18 @ 06:07)  BP: 162/76 (11-15-18 @ 06:07)  BP(mean): --  RR: 18 (11-15-18 @ 04:00)  SpO2: 98% (11-15-18 @ 04:00)  Wt(kg): --    I and O's:    11-14 @ 07:01  -  11-15 @ 07:00  --------------------------------------------------------  IN: 360 mL / OUT: 1350 mL / NET: -990 mL    11-15 @ 07:01  -  11-15 @ 10:33  --------------------------------------------------------  IN: 240 mL / OUT: 250 mL / NET: -10 mL          PHYSICAL EXAM:    Constitutional: NAD  Neck:  No JVD  Respiratory: CTAB/L  Cardiovascular: S1 and S2  Gastrointestinal: BS+, soft, NT/ND  Extremities: No peripheral edema  Neurological: A/O x 3, no focal deficits  Psychiatric: Normal mood, normal affect  : No Aguilar  Skin: No rashes  Access: Not applicable    LABS:                        11.0   6.0   )-----------( 168      ( 15 Nov 2018 06:09 )             34.8     11-15    143  |  106  |  48<H>  ----------------------------<  100<H>  3.7   |  23  |  2.84<H>    Ca    8.8      15 Nov 2018 06:08            Urine Studies:          RADIOLOGY & ADDITIONAL STUDIES:

## 2018-11-15 NOTE — PROGRESS NOTE ADULT - ASSESSMENT
The patient is a 78-year-old gentleman with past medical history of new-onset diabetes, hypertension, chronic kidney disease stage IV (likely), who presents for evaluation of acute diastolic heart failure.  The patient has stage II diastolic dysfunction ensuing that his hypertension is likely the cause of his renal insufficiency.   Subnephrotic range proteinuria  Hypertensive urgency  Secondary hyperparathyroidism   1.	Cardiology.   Procardia to 60mg po qd.  I would leave him at this BP range and further escalate as outpt   2.	Renal.  Off  his Lasix.  Aldactone would not be a good option given his advanced renal failure.  Cont Proscar   3.	Gastroenterology.  The patient was told to maintain a low-salt diet.     4.       Endo-  Rocaltrol qod      Outpt follow up

## 2018-11-15 NOTE — DISCHARGE NOTE ADULT - MEDICATION SUMMARY - MEDICATIONS TO STOP TAKING
I will STOP taking the medications listed below when I get home from the hospital:    amLODIPine 5 mg oral tablet  -- 1 tab(s) by mouth once a day    furosemide 40 mg oral tablet  -- 1 tab(s) by mouth once a day    labetalol 200 mg oral tablet  -- 1 tab(s) by mouth 2 times a day

## 2018-11-15 NOTE — DISCHARGE NOTE ADULT - HOSPITAL COURSE
78-year-old male with PMH of new-onset diabetes, hypertension, and chronic kidney disease stage IV (likely), who presented for evaluation of acute diastolic heart failure 2/2 hypertensive urgency.  Patient initially diuresis with IV Lasix and his antihypertensive regimen was adjusted; symptoms have now resolved.  Echocardiogram showed severe LVH, moderate diastolic dysfunction, and EF of 75%.  Patient’s antihypertensive regimen will be further uptitrated in the outpatient setting by his nephrologist.  Urinalysis with subnephrotic range proteinuria.  Renal US showed no hydronephrosis; mild concentric urinary bladder wall thickening and trabeculation, likely due to chronic outlet obstruction from an enlarged prostate.  Patient was started on Proscar.  Patient was started on Recaltrol every other day for secondary hyperparathyroidism.  Patient was cleared for discharge by his Attending/Primary Care Doctor and Nephrologist for discharge to home. 78-year-old male with PMH of new-onset diabetes, hypertension, and chronic kidney disease stage IV (likely), who presented for evaluation of acute diastolic heart failure 2/2 hypertensive urgency.  Patient initially diuresis with IV Lasix and his antihypertensive regimen was adjusted; symptoms have now resolved.  Echocardiogram showed severe LVH, moderate diastolic dysfunction, and EF of 75%.  Patient given one dose of clonidine on the day of discharge for hypertension to the 180's, BP improved to 160's.  Patient’s antihypertensive regimen will be further uptitrated in the outpatient setting by his nephrologist.  Urinalysis with subnephrotic range proteinuria.  Renal US showed no hydronephrosis; mild concentric urinary bladder wall thickening and trabeculation, likely due to chronic outlet obstruction from an enlarged prostate.  Patient was started on Proscar.  Patient was started on Recaltrol every other day for secondary hyperparathyroidism.  Patient was cleared for discharge by his Attending/Primary Care Doctor and Nephrologist for discharge to home.

## 2018-11-15 NOTE — PROGRESS NOTE ADULT - SUBJECTIVE AND OBJECTIVE BOX
CHIEF COMPLAINT:    SUBJECTIVE:     REVIEW OF SYSTEMS:    CONSTITUTIONAL: (  )  weakness,  (  ) fevers or chills  EYES/ENT: (  )visual changes;     NECK: (  ) pain or stiffness  RESPIRATORY:   (  )cough, wheezing, hemoptysis;  (  ) shortness of breath  CARDIOVASCULAR:  (  )chest pain or palpitations  GASTROINTESTINAL:   (  )abdominal or epigastric pain.  (  ) nausea, vomiting, or hematemesis;   (   ) diarrhea or constipation.   GENITOURINARY:   (    ) dysuria, frequency or hematuria  NEUROLOGICAL:  (   ) numbness or weakness   All other review of systems is negative unless indicated above    Vital Signs Last 24 Hrs  T(C): 36.5 (15 Nov 2018 04:00), Max: 36.7 (14 Nov 2018 12:55)  T(F): 97.7 (15 Nov 2018 04:00), Max: 98 (14 Nov 2018 12:55)  HR: 64 (15 Nov 2018 06:07) (64 - 71)  BP: 162/76 (15 Nov 2018 06:07) (162/76 - 189/85)  BP(mean): --  RR: 18 (15 Nov 2018 04:00) (18 - 18)  SpO2: 98% (15 Nov 2018 04:00) (94% - 98%)    I&O's Summary    14 Nov 2018 07:01  -  15 Nov 2018 07:00  --------------------------------------------------------  IN: 360 mL / OUT: 1350 mL / NET: -990 mL    15 Nov 2018 07:01  -  15 Nov 2018 10:56  --------------------------------------------------------  IN: 240 mL / OUT: 250 mL / NET: -10 mL        CAPILLARY BLOOD GLUCOSE      POCT Blood Glucose.: 110 mg/dL (15 Nov 2018 09:01)  POCT Blood Glucose.: 159 mg/dL (14 Nov 2018 22:03)  POCT Blood Glucose.: 109 mg/dL (14 Nov 2018 17:56)  POCT Blood Glucose.: 116 mg/dL (14 Nov 2018 13:10)      PHYSICAL EXAM:    Constitutional:  (   ) NAD,   (   )awake and alert  HEENT: PERR, EOMI,    Neck: Soft and supple, No LAD, No JVD  Respiratory:  (    Breath sounds are clear bilaterally,    (   ) wheezing, rales or rhonchi  Cardiovascular:     (   )S1 and S2, regular rate and rhythm, no Murmurs, gallops or rubs  Gastrointestinal:  (   )Bowel Sounds present, soft,   (  )nontender, nondistended,    Extremities:    (  ) peripheral edema  Vascular: 2+ peripheral pulses  Neurological:    (    )A/O x 3,   (  ) focal deficits  Musculoskeletal:    (   )  normal strength b/l upper  (     ) normal  lower extremities  Skin: No rashes    MEDICATIONS:  MEDICATIONS  (STANDING):  ALBUTerol/ipratropium for Nebulization 3 milliLiter(s) Nebulizer every 6 hours  aspirin enteric coated 81 milliGRAM(s) Oral daily  calcitriol   Capsule 0.25 MICROGram(s) Oral <User Schedule>  carvedilol 25 milliGRAM(s) Oral every 12 hours  dextrose 5%. 1000 milliLiter(s) (50 mL/Hr) IV Continuous <Continuous>  dextrose 50% Injectable 12.5 Gram(s) IV Push once  finasteride 5 milliGRAM(s) Oral daily  fluticasone propionate 50 MICROgram(s)/spray Nasal Spray 1 Spray(s) Both Nostrils two times a day  heparin  Injectable 5000 Unit(s) SubCutaneous every 8 hours  hydrALAZINE 100 milliGRAM(s) Oral every 8 hours  influenza   Vaccine 0.5 milliLiter(s) IntraMuscular once  insulin lispro (HumaLOG) corrective regimen sliding scale   SubCutaneous at bedtime  insulin lispro (HumaLOG) corrective regimen sliding scale   SubCutaneous three times a day before meals  NIFEdipine XL 60 milliGRAM(s) Oral daily  tamsulosin 0.4 milliGRAM(s) Oral at bedtime      LABS: All Labs Reviewed:                        11.0   6.0   )-----------( 168      ( 15 Nov 2018 06:09 )             34.8     11-15    143  |  106  |  48<H>  ----------------------------<  100<H>  3.7   |  23  |  2.84<H>    Ca    8.8      15 Nov 2018 06:08            Blood Culture:   Urine Culture      RADIOLOGY/EKG:    ASSESSMENT AND PLAN:    DVT PPX:    ADVANCED DIRECTIVE:    DISPOSITION:DC HOME TODAY CHIEF COMPLAINT:  Patient condition stable Awake and verbal  SUBJECTIVE:     REVIEW OF SYSTEMS: Asymptomatic    CONSTITUTIONAL: (  )  weakness,  (  ) fevers or chills  EYES/ENT: (  )visual changes;     NECK: (  ) pain or stiffness  RESPIRATORY:   (  )cough, wheezing, hemoptysis;  (  ) shortness of breath  CARDIOVASCULAR:  (  )chest pain or palpitations  GASTROINTESTINAL:   (  )abdominal or epigastric pain.  (  ) nausea, vomiting, or hematemesis;   (   ) diarrhea or constipation.   GENITOURINARY:   (    ) dysuria, frequency or hematuria  NEUROLOGICAL:  (   ) numbness or weakness   All other review of systems is negative unless indicated above    Vital Signs Last 24 Hrs  T(C): 36.5 (15 Nov 2018 04:00), Max: 36.7 (14 Nov 2018 12:55)  T(F): 97.7 (15 Nov 2018 04:00), Max: 98 (14 Nov 2018 12:55)  HR: 64 (15 Nov 2018 06:07) (64 - 71)  BP: 162/76 (15 Nov 2018 06:07) (162/76 - 189/85)  BP(mean): --  RR: 18 (15 Nov 2018 04:00) (18 - 18)  SpO2: 98% (15 Nov 2018 04:00) (94% - 98%)    I&O's Summary    14 Nov 2018 07:01  -  15 Nov 2018 07:00  --------------------------------------------------------  IN: 360 mL / OUT: 1350 mL / NET: -990 mL    15 Nov 2018 07:01  -  15 Nov 2018 10:56  --------------------------------------------------------  IN: 240 mL / OUT: 250 mL / NET: -10 mL        CAPILLARY BLOOD GLUCOSE      POCT Blood Glucose.: 110 mg/dL (15 Nov 2018 09:01)  POCT Blood Glucose.: 159 mg/dL (14 Nov 2018 22:03)  POCT Blood Glucose.: 109 mg/dL (14 Nov 2018 17:56)  POCT Blood Glucose.: 116 mg/dL (14 Nov 2018 13:10)      PHYSICAL EXAM:    Constitutional:  ( x  ) NAD,   ( x  )awake and alert  HEENT: PERR, EOMI,    Neck: Soft and supple, No LAD, No JVD  Respiratory:  (  x  Breath sounds are clear bilaterally,    (   ) wheezing, rales or rhonchi  Cardiovascular:     (   )S1 and S2, regular rate and rhythm, no Murmurs, gallops or rubs  Gastrointestinal:  (   )Bowel Sounds present, soft,   (  )nontender, nondistended,    Extremities:    (+  ) peripheral edema  Vascular: 2+ peripheral pulses  Neurological:    (  x  )A/O x 3,   (  ) focal deficits  Musculoskeletal:    (   )  normal strength b/l upper  (     ) normal  lower extremities  Skin: No rashes    MEDICATIONS:  MEDICATIONS  (STANDING):  ALBUTerol/ipratropium for Nebulization 3 milliLiter(s) Nebulizer every 6 hours  aspirin enteric coated 81 milliGRAM(s) Oral daily  calcitriol   Capsule 0.25 MICROGram(s) Oral <User Schedule>  carvedilol 25 milliGRAM(s) Oral every 12 hours  dextrose 5%. 1000 milliLiter(s) (50 mL/Hr) IV Continuous <Continuous>  dextrose 50% Injectable 12.5 Gram(s) IV Push once  finasteride 5 milliGRAM(s) Oral daily  fluticasone propionate 50 MICROgram(s)/spray Nasal Spray 1 Spray(s) Both Nostrils two times a day  heparin  Injectable 5000 Unit(s) SubCutaneous every 8 hours  hydrALAZINE 100 milliGRAM(s) Oral every 8 hours  influenza   Vaccine 0.5 milliLiter(s) IntraMuscular once  insulin lispro (HumaLOG) corrective regimen sliding scale   SubCutaneous at bedtime  insulin lispro (HumaLOG) corrective regimen sliding scale   SubCutaneous three times a day before meals  NIFEdipine XL 60 milliGRAM(s) Oral daily  tamsulosin 0.4 milliGRAM(s) Oral at bedtime      LABS: All Labs Reviewed:                        11.0   6.0   )-----------( 168      ( 15 Nov 2018 06:09 )             34.8     11-15    143  |  106  |  48<H>  ----------------------------<  100<H>  3.7   |  23  |  2.84<H>    Ca    8.8      15 Nov 2018 06:08            Blood Culture:   Urine Culture      RADIOLOGY/EKG:    ASSESSMENT AND PLAN:  79yo male with hx of HTN, CHF, presents to the ED with complaints of SOB x 1 week secondary to CHF exacerbation due to Hypertensive emergency. Pt on exam was noted to have b/l wheezing.     Problem/Plan - 1:  ·  Problem: Hypertensive emergency.  Plan: -Improvement in blood pressure   Stable for discharge homediscussed with nurse practitioner on the floor     - dc  Lasix 20mg IV BID  -Continue home med Hydralazine 50mg TID  -Stared t pt on Carvedilol 25mg BID  and  procardia  5 mg       Problem/Plan - 2:  ·  Problem: Acute on chronic diastolic congestive heart failure.  Plan: -Secondary to Hypertensive emergency  -CXR consistent with pleural effusion, Elevated ProBNP  -Has not been on Lasix at home.   off Lasix 20mg IV BID  -Strict I/O's  -Continue to monitor renal function (may improve as this may be cardio-renal syndrome)  -Fluid restrictions  -Follow up with ECHO.     Problem/Plan - 3:  ·  Problem: Wheezing on auscultation.  Plan: -No hx of COPD or obstructive airway disease  -Physical exam findings  -S/p IV Solumedrol   -Start DuoNebs   -Continue to monitor symptoms.     Problem/Plan - 4:  ·  Problem: CKD (chronic kidney disease), stage IV.  Plan: -Chronic  -increasing in  creatinine,  Start the IV hydration will monitor him and him     -Continue to monitor.     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       Problem/Plan - 6:  Problem: Essential hypertension. Plan: -Chronic  -Uncontrolled likely secondary to worsening Renal function     -Continue Hydralazine 100 mg TID .Procardia 60 mg and Coreg 25 twice a     Problem/Plan - 7:  ·  Problem: Type 2 diabetes mellitus  Hemoglobin A1c 5.7  -Not on any medications       Problem/Plan - 8:  ·  Problem: Benign prostatic hyperplasia, unspecified whether lower urinary tract symptoms present.  Plan: -Chronic  -Continue Tamsulosin. And Proscar  DVT PPX:    ADVANCED DIRECTIVE:    DISPOSITION:DC HOME TODAY

## 2018-11-15 NOTE — DISCHARGE NOTE ADULT - MEDICATION SUMMARY - MEDICATIONS TO CHANGE
I will SWITCH the dose or number of times a day I take the medications listed below when I get home from the hospital:    hydrALAZINE 25 mg oral tablet  -- 2 tab(s) by mouth every 8 hours

## 2018-11-15 NOTE — DISCHARGE NOTE ADULT - SECONDARY DIAGNOSIS.
Chronic diastolic congestive heart failure CKD (chronic kidney disease), stage IV Type 2 diabetes mellitus with chronic kidney disease, without long-term current use of insulin, unspecified CKD stage BPH (benign prostatic hyperplasia)

## 2018-11-15 NOTE — DISCHARGE NOTE ADULT - CARE PLAN
Principal Discharge DX:	Hypertensive urgency  Goal:	Patient's blood pressure will remain controlled  Assessment and plan of treatment:	Continue to follow a low salt/sodium diet.  Perform physical activities as tolerated in consultation with your Primary Care Provider and physical therapist.  Take all medications as prescribed.  Follow up with your medical doctor for routine blood pressure monitoring at your next visit.  Notify your doctor if you have any of the following symptoms:  Dizziness, lightheadedness, blurry vision, headache, chest pain, or shortness of breath.  Secondary Diagnosis:	Chronic diastolic congestive heart failure  Assessment and plan of treatment:	Take all medications as prescribed.  Stop smoking if you currently smoke, and avoid high altitudes.  Weigh yourself daily.  If you gain 3lbs in 3 days, or 5lbs in a week call your Health Care Provider.  Eat a low sodium diet.  If you have pulmonary hypertension and you are a female of child bearing age, talk to your caregiver about using birth control pills or getting pregnant.  Call your Health Care Provider if you have any swelling or increased swelling in your feet, ankles, and/or stomach.  If you experience dizziness, chest pain, or shortness of breath, seek immediate medical attention.  Secondary Diagnosis:	CKD (chronic kidney disease), stage IV  Assessment and plan of treatment:	Avoid taking NSAIDs (ex: Ibuprofen, Advil, Celebrex, Naprosyn) and other agents that can harm the kidneys such as intravenous contrast for diagnostic testing, combination cold medications, etc. until you are instructed to do so by your Primary Care Physician.  Have all of your medications adjusted for your renal function by your Health Care Provider.  Blood pressure control is important.  Take all medication as prescribed.  Do not overconsume foods that are high in potassium, such as bananas, until you are instructed to do so by your primary care physician.  Secondary Diagnosis:	Type 2 diabetes mellitus with chronic kidney disease, without long-term current use of insulin, unspecified CKD stage  Assessment and plan of treatment:	Make sure you get your HgA1c checked every three months.  If you take oral diabetes medications, check your blood glucose at least two times a day.  If you take short-acting insulin, check your blood glucose before meals and at bedtime.  It's important not to skip any meals.  Keep a log of your blood glucose results and always take it with you to your doctor appointments.  Keep a list of your current medications including over the counter medications and bring this medication list with you to all your doctor appointments.  If you have not seen your ophthalmologist this year, call for appointment.  Check your feet daily for redness, sores, or openings.  Do not self treat.  If there is no improvement in two days, call your primary care physician for an appointment.    HgA1c this admission was 5.7.  Patient will see his Primary Care Doctor on November 20th, 2018.  Secondary Diagnosis:	BPH (benign prostatic hyperplasia)  Assessment and plan of treatment:	Continue current medications.  Contact your doctor if you are unable to urinate despite the sensation to do so.

## 2018-11-15 NOTE — DISCHARGE NOTE ADULT - MEDICATION SUMMARY - MEDICATIONS TO TAKE
I will START or STAY ON the medications listed below when I get home from the hospital:    finasteride 5 mg oral tablet  -- 1 tab(s) by mouth once a day  -- Indication: For BPH (benign prostatic hyperplasia)    aspirin 81 mg oral delayed release tablet  -- 1 tab(s) by mouth once a day  -- Indication: For Preventative    tamsulosin 0.4 mg oral capsule  -- 1 cap(s) by mouth once a day (at bedtime)  -- Indication: For BPH (benign prostatic hyperplasia)    carvedilol 25 mg oral tablet  -- 1 tab(s) by mouth every 12 hours  -- Indication: For Chronic diastolic congestive heart failure    NIFEdipine 60 mg oral tablet, extended release  -- 1 tab(s) by mouth once a day  -- Indication: For Hypertensive urgency    sodium chloride 0.65% nasal spray  -- 1 spray(s) in each nostril 3 times a day  -- Indication: For Nasal spray    fluticasone 50 mcg/inh nasal spray  -- 1 spray(s) into nose 2 times a day  -- Indication: For Nasal [place]    hydrALAZINE 100 mg oral tablet  -- 1 tab(s) by mouth every 8 hours  -- Indication: For Hypertensive urgency    calcitriol 0.25 mcg oral capsule  -- 1 cap(s) by mouth Monday, Wednesday, and Friday   -- Indication: For Supplement/Chronic Kidney Disease I will START or STAY ON the medications listed below when I get home from the hospital:    finasteride 5 mg oral tablet  -- 1 tab(s) by mouth once a day  -- Indication: For BPH (benign prostatic hyperplasia)    aspirin 81 mg oral delayed release tablet  -- 1 tab(s) by mouth once a day  -- Indication: For Preventative    tamsulosin 0.4 mg oral capsule  -- 1 cap(s) by mouth once a day (at bedtime)  -- Indication: For BPH (benign prostatic hyperplasia)    carvedilol 25 mg oral tablet  -- 1 tab(s) by mouth every 12 hours  -- Indication: For Chronic diastolic congestive heart failure    NIFEdipine 60 mg oral tablet, extended release  -- 1 tab(s) by mouth once a day  -- Indication: For Hypertensive urgency    sodium chloride 0.65% nasal spray  -- 1 spray(s) in each nostril 3 times a day  -- Indication: For Nasal spray    fluticasone 50 mcg/inh nasal spray  -- 1 spray(s) into nose 2 times a day  -- Indication: For Nasal [place]    hydrALAZINE 100 mg oral tablet  -- 1 tab(s) by mouth every 8 hours  -- Indication: For Hypertensive urgency    calcitriol 0.25 mcg oral capsule  -- 1 cap(s) by mouth Monday, Wednesday, and Friday   -- Indication: For Supplement/Chronic Kidney Disease

## 2018-11-15 NOTE — DISCHARGE NOTE ADULT - ADDITIONAL INSTRUCTIONS
Follow up with your Primary Care Doctor on Tuesday.  Follow up with your Nephrologist within 1 week.

## 2018-11-15 NOTE — CHART NOTE - NSCHARTNOTEFT_GEN_A_CORE
's this afternoon, improved to 160's s/p clonidine - BP is acceptable.  Patient to be discharged today.  Antihypertensives will continue to be uptitrated int he outpatient setting by patient's nephrologist.  Preceding d/w Dr. Martinez.    Indigo Will, XENIA  [phone]

## 2018-11-15 NOTE — DISCHARGE NOTE ADULT - OTHER SIGNIFICANT FINDINGS
Study Date: 11/12/2018  ------------------------------------------------------------------------  PROCEDURE: Transthoracic echocardiogram with 2-D, M-Mode  and complete spectral and color flow Doppler.  INDICATION: Heart failure, unspecified (I50.9)  ------------------------------------------------------------------------  Dimensions:    Normal Values:  LA:     4.7    2.0 - 4.0 cm  Ao:     3.4    2.0 - 3.8 cm  SEPTUM: 2.0    0.6 - 1.2 cm  PWT:    1.5    0.6 - 1.1 cm  LVIDd:  4.6    3.0 - 5.6 cm  LVIDs:  2.9    1.8 - 4.0 cm  Derived variables:  LVMI: 201 g/m2  RWT: 0.65  Fractional short: 37 %  EF (Visual Estimate): 75 %  Doppler Peak Velocity (m/sec): AoV=1.7  ------------------------------------------------------------------------  Observations:  Mitral Valve: Normal mitral valve. Mild mitral  regurgitation.  Aortic Valve/Aorta: Normal trileaflet aortic valve. Peak  transaortic valve gradient equals 12 mm Hg. Minimal aortic  regurgitation.  Peak left ventricular outflow tract  gradient equals 5 mm Hg.  Aortic Root: 3.4 cm.  Left Atrium: Severely dilated left atrium.  LA volume index  = 56 cc/m2.  Left Ventricle: Hyperdynamic left ventricular systolic  function. Severe concentric left ventricular hypertrophy  with midcavity obliteration. Peak gradient in midcavity is  measured to be 26mmHg at rest, and 54mHg with valsalva  (severely elevated). Moderate diastolic dysfunction (Stage  II).  Right Heart: Normal right atrium. Normal right ventricular  size and systolic function. Normal tricuspid valve. Minimal  tricuspid regurgitation. Normal pulmonic valve. Minimal  pulmonic regurgitation.  Pericardium/Pleura: Normal pericardium with trace  pericardial effusion.  Hemodynamic: Estimated right atrial pressure is 8 mm Hg.  ------------------------------------------------------------------------  Conclusions:  1. Severe concentric left ventricular hypertrophy with  midcavity obliteration. Peak gradient in midcavity is  measured to be 26mmHg at rest, and 54mHg with valsalva  (severely elevated).  2. Hyperdynamic left ventricular systolic function.  3. Moderate diastolic dysfunction (Stage II).  4. Normal right ventricular size and systolic function.  5. Normal pericardium with trace pericardial effusion.  Severe LVH may be consistent with hypertensive heart  diseae, however HOCM or infiltrative cardiomyopathies (i.e.  amyloid) cannot be ruled out. Cardiac MRI may be helpful in  further differentiation.  *** No previous Echo exam.    ------------------------------------------    EXAM:  US KIDNEYS AND BLADDER                            PROCEDURE DATE:  11/13/2018            INTERPRETATION:  CLINICAL INFORMATION: Chronic kidney disease. Creatinine   4.0    COMPARISON: Renal ultrasound 12/18/2017.    TECHNIQUE: Sonography of the kidneys and bladder.     FINDINGS:    Right kidney:  11.1 cm. No renal mass, hydronephrosis or calculi.   Multiple simple cysts measure up to 3.5 cm.    Left kidney:  10.1 cm. No renal mass, hydronephrosis or calculi. Multiple   simple cysts measure up to 3.7 cm.    Urinary bladder: There is mild concentric urinary bladder wall thickening   and trabeculation, likely due to chronic outlet obstruction from an   enlarged prostate.    Prostate: 5.1 x 6.8 x 7.7 cm, with a volume of 139 mL.    IMPRESSION:     No hydronephrosis.    Mild concentric urinary bladder wall thickening and trabeculation, likely   due to chronic outlet obstruction from an enlarged prostate.

## 2020-02-08 ENCOUNTER — EMERGENCY (EMERGENCY)
Facility: HOSPITAL | Age: 80
LOS: 1 days | Discharge: HOME | End: 2020-02-08
Attending: EMERGENCY MEDICINE | Admitting: EMERGENCY MEDICINE
Payer: MEDICARE

## 2020-02-08 VITALS
SYSTOLIC BLOOD PRESSURE: 193 MMHG | HEART RATE: 68 BPM | RESPIRATION RATE: 17 BRPM | DIASTOLIC BLOOD PRESSURE: 68 MMHG | TEMPERATURE: 98 F | OXYGEN SATURATION: 96 %

## 2020-02-08 VITALS
SYSTOLIC BLOOD PRESSURE: 174 MMHG | TEMPERATURE: 98 F | HEART RATE: 50 BPM | DIASTOLIC BLOOD PRESSURE: 81 MMHG | OXYGEN SATURATION: 99 % | RESPIRATION RATE: 18 BRPM

## 2020-02-08 LAB
ALBUMIN SERPL ELPH-MCNC: 4 G/DL — SIGNIFICANT CHANGE UP (ref 3.3–5)
ALP SERPL-CCNC: 66 U/L — SIGNIFICANT CHANGE UP (ref 40–120)
ALT FLD-CCNC: 11 U/L — SIGNIFICANT CHANGE UP (ref 4–41)
ANION GAP SERPL CALC-SCNC: 10 MMO/L — SIGNIFICANT CHANGE UP (ref 7–14)
APTT BLD: 31.1 SEC — SIGNIFICANT CHANGE UP (ref 27.5–36.3)
AST SERPL-CCNC: 22 U/L — SIGNIFICANT CHANGE UP (ref 4–40)
BASOPHILS # BLD AUTO: 0.03 K/UL — SIGNIFICANT CHANGE UP (ref 0–0.2)
BASOPHILS NFR BLD AUTO: 0.5 % — SIGNIFICANT CHANGE UP (ref 0–2)
BILIRUB SERPL-MCNC: 0.2 MG/DL — SIGNIFICANT CHANGE UP (ref 0.2–1.2)
BUN SERPL-MCNC: 29 MG/DL — HIGH (ref 7–23)
CALCIUM SERPL-MCNC: 9.4 MG/DL — SIGNIFICANT CHANGE UP (ref 8.4–10.5)
CHLORIDE SERPL-SCNC: 109 MMOL/L — HIGH (ref 98–107)
CO2 SERPL-SCNC: 27 MMOL/L — SIGNIFICANT CHANGE UP (ref 22–31)
CREAT SERPL-MCNC: 3.07 MG/DL — HIGH (ref 0.5–1.3)
EOSINOPHIL # BLD AUTO: 0.52 K/UL — HIGH (ref 0–0.5)
EOSINOPHIL NFR BLD AUTO: 9.4 % — HIGH (ref 0–6)
GLUCOSE SERPL-MCNC: 84 MG/DL — SIGNIFICANT CHANGE UP (ref 70–99)
HCT VFR BLD CALC: 34.1 % — LOW (ref 39–50)
HGB BLD-MCNC: 10.4 G/DL — LOW (ref 13–17)
IMM GRANULOCYTES NFR BLD AUTO: 0.4 % — SIGNIFICANT CHANGE UP (ref 0–1.5)
INR BLD: 0.94 — SIGNIFICANT CHANGE UP (ref 0.88–1.17)
LYMPHOCYTES # BLD AUTO: 1.66 K/UL — SIGNIFICANT CHANGE UP (ref 1–3.3)
LYMPHOCYTES # BLD AUTO: 30.1 % — SIGNIFICANT CHANGE UP (ref 13–44)
MCHC RBC-ENTMCNC: 23.8 PG — LOW (ref 27–34)
MCHC RBC-ENTMCNC: 30.5 % — LOW (ref 32–36)
MCV RBC AUTO: 78 FL — LOW (ref 80–100)
MONOCYTES # BLD AUTO: 0.62 K/UL — SIGNIFICANT CHANGE UP (ref 0–0.9)
MONOCYTES NFR BLD AUTO: 11.2 % — SIGNIFICANT CHANGE UP (ref 2–14)
NEUTROPHILS # BLD AUTO: 2.67 K/UL — SIGNIFICANT CHANGE UP (ref 1.8–7.4)
NEUTROPHILS NFR BLD AUTO: 48.4 % — SIGNIFICANT CHANGE UP (ref 43–77)
NRBC # FLD: 0 K/UL — SIGNIFICANT CHANGE UP (ref 0–0)
NT-PROBNP SERPL-SCNC: 4817 PG/ML — SIGNIFICANT CHANGE UP
PLATELET # BLD AUTO: 164 K/UL — SIGNIFICANT CHANGE UP (ref 150–400)
PMV BLD: 10.5 FL — SIGNIFICANT CHANGE UP (ref 7–13)
POTASSIUM SERPL-MCNC: 4.4 MMOL/L — SIGNIFICANT CHANGE UP (ref 3.5–5.3)
POTASSIUM SERPL-SCNC: 4.4 MMOL/L — SIGNIFICANT CHANGE UP (ref 3.5–5.3)
PROT SERPL-MCNC: 7.3 G/DL — SIGNIFICANT CHANGE UP (ref 6–8.3)
PROTHROM AB SERPL-ACNC: 10.7 SEC — SIGNIFICANT CHANGE UP (ref 9.8–13.1)
RBC # BLD: 4.37 M/UL — SIGNIFICANT CHANGE UP (ref 4.2–5.8)
RBC # FLD: 16.7 % — HIGH (ref 10.3–14.5)
SODIUM SERPL-SCNC: 146 MMOL/L — HIGH (ref 135–145)
TROPONIN T, HIGH SENSITIVITY: 39 NG/L — SIGNIFICANT CHANGE UP (ref ?–14)
TROPONIN T, HIGH SENSITIVITY: 42 NG/L — SIGNIFICANT CHANGE UP (ref ?–14)
WBC # BLD: 5.52 K/UL — SIGNIFICANT CHANGE UP (ref 3.8–10.5)
WBC # FLD AUTO: 5.52 K/UL — SIGNIFICANT CHANGE UP (ref 3.8–10.5)

## 2020-02-08 PROCEDURE — 71046 X-RAY EXAM CHEST 2 VIEWS: CPT | Mod: 26

## 2020-02-08 PROCEDURE — 93971 EXTREMITY STUDY: CPT | Mod: 26,LT

## 2020-02-08 PROCEDURE — 99284 EMERGENCY DEPT VISIT MOD MDM: CPT | Mod: 25

## 2020-02-08 PROCEDURE — 93010 ELECTROCARDIOGRAM REPORT: CPT

## 2020-02-08 NOTE — ED PROVIDER NOTE - ATTENDING CONTRIBUTION TO CARE
I, Jennifer Cabot, MD, have performed a history and physical exam of the patient and discussed their management with the resident. I reviewed the resident's note and agree with the documented findings and plan of care. My medical decision making and observations are found above.    michjuhi: 79M with PMH of diabetes, hypertension, and chronic kidney disease stage IV (likely from prior hospital admission), here with BLE edema L>R and left 1st MTP pain.  No prior gout.  No trauma.  No F/C.  No CP, SOB, orthopnea.  On exam, HDS, NAD, MMM, eyes clear, lungs CTAB, heart sounds normal, abd soft, NT, ND, no CVAT, RLE without notable edema, LLE with 2+ edema, worst at 1st MTP with extreme TTP and overlying mild erythema, wwp, neuro: alert and oriented, no focal deficits, radial pulses 2+ bilat, palp DP on RLE, dopp DP on LLE.  Likely gout, very unlikely ACS or CHF.  Will also check basic labs, trop, BNP, EKG, CXR.  If neg, will discharge with prednisone for gout.

## 2020-02-08 NOTE — ED PROVIDER NOTE - OBJECTIVE STATEMENT
Calvin LUCIANO MD PGY2: 79 M PMH diabetes, hypertension, and chronic kidney disease stage IV (likely from prior hospital admission) admitted to [facility] in Nov 18 whom is here for bilateral lower extremity swelling over the past few days. Was seen at [facility]D earlier for XR (which was clear) and LE swelling and was sent to ED for inability to palpate DP pulses on L foot. Patient also is complaining of L foot MTP joint pain.

## 2020-02-08 NOTE — ED PROVIDER NOTE - FAMILY HISTORY
Mother  Still living? Unknown  Family history of diabetes mellitus (DM), Age at diagnosis: Age Unknown     Sibling  Still living? Unknown  Family history of diabetes mellitus (DM), Age at diagnosis: Age Unknown Statement Selected

## 2020-02-08 NOTE — ED PROVIDER NOTE - PHYSICAL EXAMINATION
Calvin LUCIANO MD PGY2:   PHYSICAL EXAM:    GENERAL: NAD, well-developed  HEENT:  Atraumatic, Normocephalic  CHEST/LUNG: Chest rise equal bilaterally. CTAB.   HEART: Regular rate and rhythm  ABDOMEN: Soft, Nontender, Nondistended  EXTREMITIES:  2+ Peripheral Pulses. Bilateral + 3 pitting edema. Dopplerable L PT and DP pulses.   PSYCH: A&Ox3  SKIN: No obvious rashes or lesions  MSK: Mildly swollen L MTP joint with TTP without overlying skin damage likely gout.

## 2020-02-08 NOTE — ED PROVIDER NOTE - PATIENT PORTAL LINK FT
You can access the FollowMyHealth Patient Portal offered by [facility] by registering at the following website: [email or web address]. By joining [employer]’s FollowMyHealth portal, you will also be able to view your health information using other applications (apps) compatible with our system.

## 2020-02-08 NOTE — ED PROVIDER NOTE - NSFOLLOWUPINSTRUCTIONS_ED_ALL_ED_FT
Your diagnosis: leg swelling    Discharge instructions:    - Please follow up with your Primary Care Doctor.    - Tylenol up to 650 mg every 8 hours as needed for pain and/or Motrin up to 600 mg every 6 hours as needed for pain. Take any prescribed medications as instructed: prednisone 50 mg once a day for 5 days.    - Be sure to return to the ED if you develop new or worsening symptoms. Specific signs and symptoms to be vigilant of: fever or chills, chest pain, difficulty breathing, palpitations, loss of consciousness, worsening leg swelling, leg pain, redness or pus.

## 2020-02-08 NOTE — ED ADULT NURSE NOTE - NSIMPLEMENTINTERV_GEN_ALL_ED
Implemented All Fall Risk Interventions:  [place] to call system. Call bell, personal items and telephone within reach. Instruct patient to call for assistance. Room bathroom lighting operational. Non-slip footwear when patient is off stretcher. Physically safe environment: no spills, clutter or unnecessary equipment. Stretcher in lowest position, wheels locked, appropriate side rails in place. Provide visual cue, wrist band, yellow gown, etc. Monitor gait and stability. Monitor for mental status changes and reorient to person, place, and time. Review medications for side effects contributing to fall risk. Reinforce activity limits and safety measures with patient and family.

## 2020-02-08 NOTE — ED ADULT NURSE NOTE - OBJECTIVE STATEMENT
pt is a 79 yr old male c/o worsening LE leg swelling, worse in left leg as per pt. + difficulty walking, less  pain at rest. pt denies chest pain, ha, dizziness, lightheadedness. piv #20  placed in right ac, plan for xray, ekg and us. vss, will ctm

## 2020-02-08 NOTE — ED PROVIDER NOTE - CLINICAL SUMMARY MEDICAL DECISION MAKING FREE TEXT BOX
Calvin LUCIANO MD PGY2:   - hx CKD, now with b/l lower extremity swelling L>R   - c/f CHF exacerbation, worsening renal function, DVT  - EKG, BNP, BMP, and US to assess  - CXR to eval fluid status  - prednisone for gout?

## 2020-10-07 NOTE — DISCHARGE NOTE ADULT - NS AS DC STROKE DX YN
Procedure(s):  RIGHT HIP ARTHROPLASTY TOTAL.     spinal    Anesthesia Post Evaluation      Multimodal analgesia: multimodal analgesia used between 6 hours prior to anesthesia start to PACU discharge  Patient location during evaluation: bedside  Patient participation: complete - patient participated  Level of consciousness: awake and responsive to light touch  Pain management: adequate  Airway patency: patent  Anesthetic complications: no  Cardiovascular status: acceptable, hemodynamically stable, blood pressure returned to baseline and stable  Respiratory status: acceptable, unassisted, spontaneous ventilation and nonlabored ventilation  Hydration status: acceptable        INITIAL Post-op Vital signs:   Vitals Value Taken Time   /66 10/7/2020 12:36 PM   Temp 36.3 °C (97.3 °F) 10/7/2020 12:21 PM   Pulse 65 10/7/2020 12:36 PM   Resp 20 10/7/2020 12:36 PM   SpO2 97 % 10/7/2020 12:36 PM no

## 2020-11-20 NOTE — ED ADULT NURSE NOTE - RN DISCHARGE SIGNATURE
70 yo F POD#1 s/p cystoscopy, L JJ stent placement      Plan:  No acute  intervention needed at this time   Cont. current ICU management   IV Abx as per ID  Analgesia prn x pain   Cont Atkinson catheter to gravity  Strict I&O   TOV when stable   Stent removal as an outpatient    attending will F/U 05-Jun-2018

## 2021-03-04 ENCOUNTER — INPATIENT (INPATIENT)
Facility: HOSPITAL | Age: 81
LOS: 25 days | Discharge: HOME | DRG: 264 | End: 2021-03-30
Attending: INTERNAL MEDICINE | Admitting: INTERNAL MEDICINE
Payer: COMMERCIAL

## 2021-03-04 VITALS
RESPIRATION RATE: 26 BRPM | DIASTOLIC BLOOD PRESSURE: 78 MMHG | HEART RATE: 74 BPM | OXYGEN SATURATION: 76 % | HEIGHT: 63 IN | TEMPERATURE: 98 F | WEIGHT: 147.05 LBS | SYSTOLIC BLOOD PRESSURE: 194 MMHG

## 2021-03-04 DIAGNOSIS — R09.89 OTHER SPECIFIED SYMPTOMS AND SIGNS INVOLVING THE CIRCULATORY AND RESPIRATORY SYSTEMS: ICD-10-CM

## 2021-03-04 DIAGNOSIS — N40.0 BENIGN PROSTATIC HYPERPLASIA WITHOUT LOWER URINARY TRACT SYMPTOMS: ICD-10-CM

## 2021-03-04 DIAGNOSIS — Z29.9 ENCOUNTER FOR PROPHYLACTIC MEASURES, UNSPECIFIED: ICD-10-CM

## 2021-03-04 DIAGNOSIS — N18.4 CHRONIC KIDNEY DISEASE, STAGE 4 (SEVERE): ICD-10-CM

## 2021-03-04 DIAGNOSIS — E78.5 HYPERLIPIDEMIA, UNSPECIFIED: ICD-10-CM

## 2021-03-04 DIAGNOSIS — I10 ESSENTIAL (PRIMARY) HYPERTENSION: ICD-10-CM

## 2021-03-04 DIAGNOSIS — I50.9 HEART FAILURE, UNSPECIFIED: ICD-10-CM

## 2021-03-04 DIAGNOSIS — J96.01 ACUTE RESPIRATORY FAILURE WITH HYPOXIA: ICD-10-CM

## 2021-03-04 LAB
ALBUMIN SERPL ELPH-MCNC: 3.9 G/DL — SIGNIFICANT CHANGE UP (ref 3.3–5)
ALP SERPL-CCNC: 82 U/L — SIGNIFICANT CHANGE UP (ref 40–120)
ALT FLD-CCNC: 70 U/L — HIGH (ref 10–45)
ANION GAP SERPL CALC-SCNC: 12 MMOL/L — SIGNIFICANT CHANGE UP (ref 5–17)
ANION GAP SERPL CALC-SCNC: 16 MMOL/L — SIGNIFICANT CHANGE UP (ref 5–17)
APTT BLD: 29.8 SEC — SIGNIFICANT CHANGE UP (ref 27.5–35.5)
AST SERPL-CCNC: 70 U/L — HIGH (ref 10–40)
BASOPHILS # BLD AUTO: 0.04 K/UL — SIGNIFICANT CHANGE UP (ref 0–0.2)
BASOPHILS NFR BLD AUTO: 0.6 % — SIGNIFICANT CHANGE UP (ref 0–2)
BILIRUB SERPL-MCNC: 0.3 MG/DL — SIGNIFICANT CHANGE UP (ref 0.2–1.2)
BUN SERPL-MCNC: 33 MG/DL — HIGH (ref 7–23)
BUN SERPL-MCNC: 34 MG/DL — HIGH (ref 7–23)
CALCIUM SERPL-MCNC: 7.9 MG/DL — LOW (ref 8.4–10.5)
CALCIUM SERPL-MCNC: 8.9 MG/DL — SIGNIFICANT CHANGE UP (ref 8.4–10.5)
CHLORIDE SERPL-SCNC: 106 MMOL/L — SIGNIFICANT CHANGE UP (ref 96–108)
CHLORIDE SERPL-SCNC: 110 MMOL/L — HIGH (ref 96–108)
CO2 SERPL-SCNC: 21 MMOL/L — LOW (ref 22–31)
CO2 SERPL-SCNC: 24 MMOL/L — SIGNIFICANT CHANGE UP (ref 22–31)
CREAT SERPL-MCNC: 4.12 MG/DL — HIGH (ref 0.5–1.3)
CREAT SERPL-MCNC: 4.46 MG/DL — HIGH (ref 0.5–1.3)
EOSINOPHIL # BLD AUTO: 0.25 K/UL — SIGNIFICANT CHANGE UP (ref 0–0.5)
EOSINOPHIL NFR BLD AUTO: 3.7 % — SIGNIFICANT CHANGE UP (ref 0–6)
GLUCOSE SERPL-MCNC: 117 MG/DL — HIGH (ref 70–99)
GLUCOSE SERPL-MCNC: 172 MG/DL — HIGH (ref 70–99)
HCT VFR BLD CALC: 37.3 % — LOW (ref 39–50)
HGB BLD-MCNC: 10.9 G/DL — LOW (ref 13–17)
IMM GRANULOCYTES NFR BLD AUTO: 0.6 % — SIGNIFICANT CHANGE UP (ref 0–1.5)
INR BLD: 1.04 RATIO — SIGNIFICANT CHANGE UP (ref 0.88–1.16)
LYMPHOCYTES # BLD AUTO: 1.13 K/UL — SIGNIFICANT CHANGE UP (ref 1–3.3)
LYMPHOCYTES # BLD AUTO: 16.7 % — SIGNIFICANT CHANGE UP (ref 13–44)
MAGNESIUM SERPL-MCNC: 2.6 MG/DL — SIGNIFICANT CHANGE UP (ref 1.6–2.6)
MCHC RBC-ENTMCNC: 23.5 PG — LOW (ref 27–34)
MCHC RBC-ENTMCNC: 29.2 GM/DL — LOW (ref 32–36)
MCV RBC AUTO: 80.4 FL — SIGNIFICANT CHANGE UP (ref 80–100)
MONOCYTES # BLD AUTO: 0.67 K/UL — SIGNIFICANT CHANGE UP (ref 0–0.9)
MONOCYTES NFR BLD AUTO: 9.9 % — SIGNIFICANT CHANGE UP (ref 2–14)
NEUTROPHILS # BLD AUTO: 4.62 K/UL — SIGNIFICANT CHANGE UP (ref 1.8–7.4)
NEUTROPHILS NFR BLD AUTO: 68.5 % — SIGNIFICANT CHANGE UP (ref 43–77)
NRBC # BLD: 0 /100 WBCS — SIGNIFICANT CHANGE UP (ref 0–0)
NT-PROBNP SERPL-SCNC: HIGH PG/ML (ref 0–300)
PHOSPHATE SERPL-MCNC: 4.4 MG/DL — SIGNIFICANT CHANGE UP (ref 2.5–4.5)
PLATELET # BLD AUTO: 237 K/UL — SIGNIFICANT CHANGE UP (ref 150–400)
POTASSIUM SERPL-MCNC: 4 MMOL/L — SIGNIFICANT CHANGE UP (ref 3.5–5.3)
POTASSIUM SERPL-MCNC: 6.1 MMOL/L — HIGH (ref 3.5–5.3)
POTASSIUM SERPL-SCNC: 4 MMOL/L — SIGNIFICANT CHANGE UP (ref 3.5–5.3)
POTASSIUM SERPL-SCNC: 6.1 MMOL/L — HIGH (ref 3.5–5.3)
PROT SERPL-MCNC: 7.2 G/DL — SIGNIFICANT CHANGE UP (ref 6–8.3)
PROTHROM AB SERPL-ACNC: 12.5 SEC — SIGNIFICANT CHANGE UP (ref 10.6–13.6)
RBC # BLD: 4.64 M/UL — SIGNIFICANT CHANGE UP (ref 4.2–5.8)
RBC # FLD: 18.9 % — HIGH (ref 10.3–14.5)
SARS-COV-2 RNA SPEC QL NAA+PROBE: SIGNIFICANT CHANGE UP
SODIUM SERPL-SCNC: 143 MMOL/L — SIGNIFICANT CHANGE UP (ref 135–145)
SODIUM SERPL-SCNC: 146 MMOL/L — HIGH (ref 135–145)
TROPONIN T, HIGH SENSITIVITY RESULT: 179 NG/L — HIGH (ref 0–51)
WBC # BLD: 6.75 K/UL — SIGNIFICANT CHANGE UP (ref 3.8–10.5)
WBC # FLD AUTO: 6.75 K/UL — SIGNIFICANT CHANGE UP (ref 3.8–10.5)

## 2021-03-04 PROCEDURE — 99223 1ST HOSP IP/OBS HIGH 75: CPT

## 2021-03-04 PROCEDURE — 99285 EMERGENCY DEPT VISIT HI MDM: CPT | Mod: GC

## 2021-03-04 PROCEDURE — 71045 X-RAY EXAM CHEST 1 VIEW: CPT | Mod: 26

## 2021-03-04 PROCEDURE — 93010 ELECTROCARDIOGRAM REPORT: CPT

## 2021-03-04 RX ORDER — FUROSEMIDE 40 MG
80 TABLET ORAL EVERY 12 HOURS
Refills: 0 | Status: DISCONTINUED | OUTPATIENT
Start: 2021-03-05 | End: 2021-03-07

## 2021-03-04 RX ORDER — FUROSEMIDE 40 MG
80 TABLET ORAL ONCE
Refills: 0 | Status: COMPLETED | OUTPATIENT
Start: 2021-03-04 | End: 2021-03-04

## 2021-03-04 RX ORDER — CARVEDILOL PHOSPHATE 80 MG/1
25 CAPSULE, EXTENDED RELEASE ORAL EVERY 12 HOURS
Refills: 0 | Status: DISCONTINUED | OUTPATIENT
Start: 2021-03-04 | End: 2021-03-09

## 2021-03-04 RX ORDER — NITROGLYCERIN 6.5 MG
1 CAPSULE, EXTENDED RELEASE ORAL DAILY
Refills: 0 | Status: DISCONTINUED | OUTPATIENT
Start: 2021-03-04 | End: 2021-03-04

## 2021-03-04 RX ORDER — NIFEDIPINE 30 MG
90 TABLET, EXTENDED RELEASE 24 HR ORAL DAILY
Refills: 0 | Status: DISCONTINUED | OUTPATIENT
Start: 2021-03-05 | End: 2021-03-09

## 2021-03-04 RX ORDER — NITROGLYCERIN 6.5 MG
1 CAPSULE, EXTENDED RELEASE ORAL ONCE
Refills: 0 | Status: COMPLETED | OUTPATIENT
Start: 2021-03-04 | End: 2021-03-04

## 2021-03-04 RX ORDER — TAMSULOSIN HYDROCHLORIDE 0.4 MG/1
0.4 CAPSULE ORAL AT BEDTIME
Refills: 0 | Status: DISCONTINUED | OUTPATIENT
Start: 2021-03-04 | End: 2021-03-09

## 2021-03-04 RX ORDER — HEPARIN SODIUM 5000 [USP'U]/ML
5000 INJECTION INTRAVENOUS; SUBCUTANEOUS EVERY 12 HOURS
Refills: 0 | Status: DISCONTINUED | OUTPATIENT
Start: 2021-03-04 | End: 2021-03-09

## 2021-03-04 RX ORDER — HYDRALAZINE HCL 50 MG
100 TABLET ORAL EVERY 8 HOURS
Refills: 0 | Status: DISCONTINUED | OUTPATIENT
Start: 2021-03-04 | End: 2021-03-09

## 2021-03-04 RX ADMIN — Medication 1 INCH(S): at 17:07

## 2021-03-04 RX ADMIN — CARVEDILOL PHOSPHATE 25 MILLIGRAM(S): 80 CAPSULE, EXTENDED RELEASE ORAL at 23:01

## 2021-03-04 RX ADMIN — HEPARIN SODIUM 5000 UNIT(S): 5000 INJECTION INTRAVENOUS; SUBCUTANEOUS at 23:37

## 2021-03-04 RX ADMIN — TAMSULOSIN HYDROCHLORIDE 0.4 MILLIGRAM(S): 0.4 CAPSULE ORAL at 23:38

## 2021-03-04 RX ADMIN — Medication 80 MILLIGRAM(S): at 17:07

## 2021-03-04 RX ADMIN — Medication 100 MILLIGRAM(S): at 21:19

## 2021-03-04 NOTE — H&P ADULT - PROBLEM SELECTOR PLAN 3
BPs very elevated on arrival. Will try not to lower too rapidly given unclear baseline as pt states they are poorly controlled.  -Resume hydralazine 100mg TID tonight  -Stagger Coreg 25mg Q12  -Nifedipine 90mg daily in AM  -Trend vital signs

## 2021-03-04 NOTE — H&P ADULT - NSICDXPASTSURGICALHX_GEN_ALL_CORE_FT
PAST SURGICAL HISTORY:  Excision of Sinus Polyp 2006    Laser Surgery :Right ?  regarding procedure ; 12/07 ; secondary to glaucoma    Laser Surgery Left ? regarding procedure ; secondary to glaucoma 12/07    No significant past surgical history

## 2021-03-04 NOTE — H&P ADULT - PROBLEM SELECTOR PLAN 4
Crt increased compared to 2018 but unclear if new baseline. HTN related?  -Trend BMP, mg  -Renal dose medications  -A1c

## 2021-03-04 NOTE — H&P ADULT - ASSESSMENT
80M w/ hx of CKD4, dCHF, difficult to control HTN, DM?? p/w SOB and like acute on chronic congestive heart failure

## 2021-03-04 NOTE — H&P ADULT - NSICDXPASTMEDICALHX_GEN_ALL_CORE_FT
PAST MEDICAL HISTORY:  BPH (Benign Prostatic Hypertrophy)     Diabetes     Glaucoma (Increased Eye Pressure)     HTN (hypertension)     HTN - Hypertension     Hypertension

## 2021-03-04 NOTE — H&P ADULT - NSICDXFAMILYHX_GEN_ALL_CORE_FT
FAMILY HISTORY:  Mother  Still living? Unknown  Family history of diabetes mellitus (DM), Age at diagnosis: Age Unknown    Sibling  Still living? Unknown  Family history of diabetes mellitus (DM), Age at diagnosis: Age Unknown

## 2021-03-04 NOTE — ED PROVIDER NOTE - PHYSICAL EXAMINATION
GENERAL: uncomfortable appearing  HEENT: normal conjunctiva  CARDIAC: regular rate and regular rhythm BP 180s/110s  PULM: bilateral rales/crackles mostly lower lung fields, sats <80 on RA, upper 90s on 4L NC, tachypnic to mid 20s  GI: abdomen distended, soft, nontender  : no suprapubic tenderness  NEURO: alert and oriented x 3, normal speech, moving all extremities without lateralization  MSK: bilateral lower ext pitting edema to prox tib  SKIN: no visible rashes  PSYCH: appropriate mood and affect

## 2021-03-04 NOTE — ED PROVIDER NOTE - OBJECTIVE STATEMENT
80M PMH diabetes, hypertension, and chronic kidney disease stage IV, HF, presenting with 3 weeks of sob, bilateral leg swelling and orthopnea. States he's on lasix at home, just got back from [place] and reportedly covid negative today. States he's been urinating less and less lately. Denies any fevers/chills, uri sxs, cough, abd pain, n/v/d, rash. Denies any chest pain, lightheadedness, palpitations.

## 2021-03-04 NOTE — H&P ADULT - NSHPLABSRESULTS_GEN_ALL_CORE
I have reviewed the labs, imaging and ekg. EKG with NSR, incomplete RBBB, non-specific ST segment findings

## 2021-03-04 NOTE — H&P ADULT - NSHPPHYSICALEXAM_GEN_ALL_CORE
Vital Signs Last 24 Hrs  T(C): 36.4 (03-04-21 @ 20:06), Max: 36.9 (03-04-21 @ 16:18)  T(F): 97.6 (03-04-21 @ 20:06), Max: 98.5 (03-04-21 @ 16:18)  HR: 64 (03-04-21 @ 20:06) (63 - 74)  BP: 175/85 (03-04-21 @ 20:06) (173/84 - 194/78)  BP(mean): 111 (03-04-21 @ 20:06) (111 - 111)  RR: 20 (03-04-21 @ 20:06) (15 - 26)  SpO2: 97% (03-04-21 @ 20:06) (76% - 99%)

## 2021-03-04 NOTE — ED ADULT NURSE NOTE - NSIMPLEMENTINTERV_GEN_ALL_ED
Implemented All Universal Safety Interventions:  [place] to call system. Call bell, personal items and telephone within reach. Instruct patient to call for assistance. Room bathroom lighting operational. Non-slip footwear when patient is off stretcher. Physically safe environment: no spills, clutter or unnecessary equipment. Stretcher in lowest position, wheels locked, appropriate side rails in place.

## 2021-03-04 NOTE — H&P ADULT - HISTORY OF PRESENT ILLNESS
80M w/ hx of CKD4, dCHF, difficult to control HTN, DM?? p/w SOB and CHF. 80M w/ hx of CKD4, dCHF, difficult to control HTN, DM?? p/w SOB and CHF. Pt states he has been in [facility] since Dec 27th 2020 and just returned home from [facility] today. Pt states he started to get worsening SOB over the past 2-3 weeks in [facility]. He states he discussed this with his doctor in [facility] and was [place] and [place]. Pt endorses worsening orthopnea and LE edema. Denies any chest pain, palpitations, fevers or chills. Endorses cough relatively unchanged. Endorses okay medication compliance although did not take any medications today. Denies headache. Occasional dizziness but no syncope.     In ER: Given lasix 80IV, nitro patch

## 2021-03-04 NOTE — H&P ADULT - BACK EXAM
[facility]  OPERATIVE REPORT    Name:  Shila Chery  MR#:  [id number]  :  1941  ACCOUNT #:  [de-identified]  DATE OF SERVICE:  2020    PREOPERATIVE DIAGNOSES:  History of benign prostatic hyperplasia, prior transurethral resection of the prostate and bipolar vaporization of nodular regrowth with bilateral lower pole renal stones, and possible right proximal ureteral stones. POSTOPERATIVE DIAGNOSES:  History of benign prostatic hyperplasia, prior transurethral resection of the prostate and bipolar vaporization of nodular regrowth with bilateral lower pole renal stones, and possible right proximal ureteral stones. PROCEDURE PERFORMED:  Cystoscopy, right retrograde pyelogram, and right ureteroscopy. SURGEON:  Margareth Kumar MD    ASSISTANT:        ANESTHESIA:  General.    COMPLICATIONS:  None. SPECIMENS REMOVED:  None. IMPLANTS:  None. ESTIMATED BLOOD LOSS:  Minimal.    FINDINGS:  On ureteroscopy was that in the proximal third ureter was an area of calcifications that were submucosal that where probably the calcification seen on the CT scan and there were no stones within the lumen of the ureter. RETROGRADE PYELOGRAM FINDINGS:  Delicate ureter with no proximal hydronephrosis. A calcification was seen in the lower pole of each kidney. The calcification on the right was in the lower pole of kasey, possibly associated with intrapapillary calcification. No luminal obstruction. No obvious calcifications along the course of the ureter and good drainage on the post drainage views. INDICATION:  The patient is a 55-year-old male who had a previous TURP and had a right large proximal ureteral stone. In , he underwent cystoscopy, right ureteroscopy of the rather large stone that was impacted with laser lithotripsy and stent, and also bipolar ablation of nodular regrowth of BPH.   The patient's only symptom is he has had some hematospermia at times, but generally, he is voiding well. He has some chronic back problems and has hardware in his spine. He got bilateral lower pole stones that were unchanged as followed on KUB in the office. For some reason, he received a CT scan showed possible stones in the proximal ureter. He was set up by the nurse practitioner for the procedure today. PROCEDURE:  The patient was taken to the operating room suite, underwent general anesthesia, placed in the dorsal lithotomy position, prepped with Betadine and draped in appropriate manner. A #22-Pashto cystoscope was passed per urethra into the bladder. The prostatic fossa was generally opened. There was some nodular regrowth in the left posterior lateral prostate of some nodular tissue, but did not appear to be obstructing. There was non-excessive postvoid residual in the bladder. There was some grade I trabeculation of the detrusor muscle and no bladder lesions. Both ureteral orifices were identified and were normal.    I did not see any irregularities in the bladder that could cause blood in the urine. The prostate had no irregularities that would be causing any blood in the urine or blood with ejaculation. The patient's main complaint is hematospermia which is a non-issue if there is no symptoms associated with it. At this point, a bulb-tip retrograde pyelogram was performed. The bulb tip easily went up to the ureteral orifice. Retrograde pyelogram was performed, showed a delicate ureter all the way up to a nonobstructive kidney. No obvious obstructions were seen in the ureter and during the procedure, the ureter was observed to drain nicely with no hang up of contrast anywhere along the course of the ureter. At this point, cystoscope was removed and the ureteroscope was passed under direct vision up the ureter. URETEROSCOPIC FINDINGS:  No distal ureteral stones. The ureteroscope was passed up the ureter easily to the ureteropelvic junction.   About two-thirds of the way up the ureter, an area of submucosal calcifications were seen that were probably where there were some extravasated stones at the time of his previous ureteroscopy several years ago. No obvious obstruction seen anywhere and no obvious stones seen anywhere along the course of the ureter. At this point, the ureteroscope was removed. The bladder was drained. He was sent to recovery in a stable condition.       Ana Smith MD      [id number]  D:  12/07/2020 8:52  T:  12/07/2020 16:00  JOB #:  [id number] declines ROM intact

## 2021-03-04 NOTE — H&P ADULT - PROBLEM SELECTOR PLAN 1
grossly fluid overloaded on exam and elevated BNP. Hx of severe LVH on prior TTE. Would suspect dCHF in setting of uncontrolled HTN. Minimal urine output after initial dose of 80IV lasix, possibly in setting of CKD.  -Will cont. lasix 80mv IV BID for now  -Strict I/Os and daily weights  -F/u cardiology recommendations  -TTE

## 2021-03-04 NOTE — ED ADULT NURSE NOTE - OBJECTIVE STATEMENT
80 year old male A&OX4 with PMH HTN, HLD, BPH, DM, presents with worsening shortness of breath x 3 weeks with decreased urinary output over the same time frame. Patient report noticing increased swelling to bilateal lower extremities as well as dyspnea on exertion. On arrival, patient was found to be hypoxemic on room air at 77%. On 4 lpm nasal canula, patient is 98% and reports decreased difficulty breathing. No accessory muscle use or nasal flaring noted. Pt talking in words/short sentences. Abdomen is distended but non tender to palpation. Denies chest pain, fevers, chills, weakness, dizziness, nausea, vomiting.

## 2021-03-04 NOTE — ED PROVIDER NOTE - PROGRESS NOTE DETAILS
Walter, PGY2: spoke with Dr. Carrillo, pts cards, aware pt tba for acute on chronic HF exac  Spoke with Dr. Carroll, recs admitting to Dr. Carrillo Walter, PGY2: pt given lasix/nitro, breathing comfortably 4L NC, awaiting electrolytes and trop before admission Dr Carrillo bedside  Robert Reynolds MD, Facep

## 2021-03-04 NOTE — ED PROVIDER NOTE - ATTENDING CONTRIBUTION TO CARE
Private Physician Cards, Sridhar Carrillo, Nephro Roni Ali,   80y male pmh CHF.BPH , DMT2, HTN,HLD, CKD, PT  c/o progressive shortness of breath,velazco,orthopnea. PT has been in [facility] until today. Came to ed from [place], No loc/palps/abd pain/cp. PE WDWN male mild dsypnea, chest clear anterior & posterior cv no rubs, gallops or murmurs abd soft +bs protuberant. MSK Maurilio pitting edema  Robert Reynolds MD, Facep

## 2021-03-04 NOTE — ED ADULT NURSE REASSESSMENT NOTE - NS ED NURSE REASSESS COMMENT FT1
Report taken from NAVEED Solis at 1900. Pt AAOx4, VS as documented, pt cont on 4L O2 with spo2 97% and patient cont on cardiac monitor (NSR noted at this time). Pt denies any pain or discomfort at this time. Pt denies SOB at rest but reports HAWKINS. +3 edema of BL LE. No abdominal distention per patient report. Repeat labs drawn and sent. Bed locked in lowest position with appropriate side rails raised. Pt given call bell and explained call bell system. Admission wristband placed. Pt aware of plan to await bed assignment. Pt has no other questions or concerns at this time.

## 2021-03-04 NOTE — H&P ADULT - NSHPSOCIALHISTORY_GEN_ALL_CORE
Denies smoking, occasional ETOH. [place]? Denies ambulatory assist devices

## 2021-03-04 NOTE — CONSULT NOTE ADULT - SUBJECTIVE AND OBJECTIVE BOX
DATE OF SERVICE: 03-04-21 @ 23:18    CHIEF COMPLAINT:Patient is a 80y old  Male who presents with a chief complaint of Shortness of breath (04 Mar 2021 21:08)      HISTORY OF PRESENT ILLNESS:HPI:  80M w/ hx of CKD4, dCHF, difficult to control HTN, DM?? p/w SOB and CHF. Pt states he has been in [facility] since Dec 27th 2020 and just returned home from [facility] today. Pt states he started to get worsening SOB over the past 2-3 weeks in [facility]. He states he discussed this with his doctor in [facility] and was [place] and [place]. Pt endorses worsening orthopnea and LE edema. Denies any chest pain, palpitations, fevers or chills. Endorses cough relatively unchanged. Endorses okay medication compliance although did not take any medications today. Denies headache. Occasional dizziness but no syncope.     In ER: Given lasix 80IV, nitro patch (04 Mar 2021 21:08)      PAST MEDICAL & SURGICAL HISTORY:  Diabetes    Hypertension    HTN (hypertension)    BPH (Benign Prostatic Hypertrophy)    Glaucoma (Increased Eye Pressure)    HTN - Hypertension    No significant past surgical history    Laser Surgery :Right  ?  regarding procedure ; 12/07 ; secondary to glaucoma    Laser Surgery Left  ? regarding procedure ; secondary to glaucoma 12/07    Excision of Sinus Polyp  2006            MEDICATIONS:  carvedilol 25 milliGRAM(s) Oral every 12 hours  heparin   Injectable 5000 Unit(s) SubCutaneous every 12 hours  hydrALAZINE 100 milliGRAM(s) Oral every 8 hours  tamsulosin 0.4 milliGRAM(s) Oral at bedtime                  FAMILY HISTORY:  Family history of diabetes mellitus (DM) (Mother, Sibling)        Non-contributory    SOCIAL HISTORY:    [ ] Tobacco  [ ] Drugs  [ ] Alcohol    Allergies    grass, pollen (Rhinitis)  No Known Drug Allergies    Intolerances    	    REVIEW OF SYSTEMS:  CONSTITUTIONAL: No fever  EYES: No eye pain, visual disturbances, or discharge  ENMT:  No difficulty hearing, tinnitus  NECK: No pain or stiffness  RESPIRATORY: No cough, wheezing,  CARDIOVASCULAR: No chest pain, palpitations, passing out, dizziness, or leg swelling  GASTROINTESTINAL:  No nausea, vomiting, diarrhea or constipation. No melena.  GENITOURINARY: No dysuria, hematuria  NEUROLOGICAL: No stroke like symptoms  SKIN: No burning or lesions   ENDOCRINE: No heat or cold intolerance  MUSCULOSKELETAL: No joint pain or swelling  PSYCHIATRIC: No  anxiety, mood swings  HEME/LYMPH: No bleeding gums  ALLERGY AND IMMUNOLOGIC: No hives or eczema	    All other ROS negative    PHYSICAL EXAM:  T(C): 36.4 (03-04-21 @ 20:06), Max: 36.9 (03-04-21 @ 16:18)  HR: 68 (03-04-21 @ 23:00) (63 - 77)  BP: 157/84 (03-04-21 @ 23:00) (157/84 - 194/78)  RR: 17 (03-04-21 @ 23:00) (15 - 26)  SpO2: 95% (03-04-21 @ 23:00) (76% - 99%)  Wt(kg): --  I&O's Summary      Appearance: Normal	  HEENT:   Normal oral mucosa, EOMI	  Cardiovascular:  S1 S2, No JVD,    Respiratory: Lungs clear to auscultation	  Psychiatry: Alert  Gastrointestinal:  Soft, Non-tender, + BS	  Skin: No rashes   Neurologic: Non-focal  Extremities:  No edema  Vascular: Peripheral pulses palpable    	    	  	  CARDIAC MARKERS:  Labs personally reviewed by me                                  10.9   6.75  )-----------( 237      ( 04 Mar 2021 16:58 )             37.3     03-04    146<H>  |  110<H>  |  33<H>  ----------------------------<  117<H>  4.0   |  24  |  4.12<H>    Ca    7.9<L>      04 Mar 2021 20:05  Phos  4.4     03-04  Mg     2.6     03-04    TPro  7.2  /  Alb  3.9  /  TBili  0.3  /  DBili  x   /  AST  70<H>  /  ALT  70<H>  /  AlkPhos  82  03-04          EKG: Personally reviewed by me -   Radiology: Personally reviewed by me -     Assessment:  · Assessment	  80M w/ hx of CKD4, dCHF, difficult to control HTN, DM?? p/w SOB and like acute on chronic congestive heart failure    Problem/Plan - 1:  ·  Problem: Acute on chronic congestive heart failure, unspecified heart failure type.  Plan: grossly fluid overloaded on exam and elevated BNP. Hx of severe LVH on prior TTE. Would suspect dCHF in setting of uncontrolled HTN.    -Will cont. lasix 80mv IV BID    -TTE.     Problem/Plan - 2:  ·  Problem: Acute respiratory failure with hypoxia.  Plan: Hypoxic to <80 on RA initially on arrival. Improved on 02. Suspect CHF related given clinical exam and CXR  -Cont. lasix 80IV Q12 for now  -Supplemental 02  -F/u LE dopplers.     Problem/Plan - 3:  ·  Problem: Essential hypertension.  Plan: BPs very elevated on arrival. Will try not to lower too rapidly given unclear baseline as pt states they are poorly controlled.  -Resume hydralazine 100mg TID tonight  -Stagger Coreg 25mg Q12  -Nifedipine 90mg daily in AM  -Trend vital signs.     Problem/Plan - 4:  ·  Problem: Chronic kidney disease (CKD), stage IV (severe).  Plan:  VIRGEN on CKD, renal consult in AM, Dr Martinez    Problem/Plan - 5:  ·  Problem: Hyperlipidemia, unspecified hyperlipidemia type.  Plan: -check lipids.     Problem/Plan - 6:  Problem: Benign prostatic hyperplasia, unspecified whether lower urinary tract symptoms present. Plan: -Cont. flomax.    Problem/Plan - 7:  ·  Problem: Prophylactic measure.  Plan: DVT PPX  -SCDs.     PCP is Dr Jaramillo, will contact him to transfer to his service Friday and for noncardiac care          Differential diagnosis and plan of care discussed with patient after the evaluation. Counseling on diet, nutritional counseling, weight management, exercise and medication compliance was done. Advanced care planning/advanced directives discussed with patient/family. DNR status including forceful chest compressions to attempt to restart the heart, ventilator support/artificial breathing, electric shock, artificial nutrition, health care proxy, Molst form all discussed with pt. Pt wishes to consider. More than fifteen minutes spent on discussing advanced directives.          Sridhar Carrillo DO [facility]  Cardiovascular Medicine  [address], [address]  Office [phone]  Cell [phone]

## 2021-03-04 NOTE — ED PROVIDER NOTE - CONTACT TIME
Exercising to Stay Healthy  Exercising regularly is important. It has many health benefits, such as:  · Improving your overall fitness, flexibility, and endurance.  · Increasing your bone density.  · Helping with weight control.  · Decreasing your body fat.  · Increasing your muscle strength.  · Reducing stress and tension.  · Improving your overall health.    In order to become healthy and stay healthy, it is recommended that you do moderate-intensity and vigorous-intensity exercise. You can tell that you are exercising at a moderate intensity if you have a higher heart rate and faster breathing, but you are still able to hold a conversation. You can tell that you are exercising at a vigorous intensity if you are breathing much harder and faster and cannot hold a conversation while exercising.  How often should I exercise?  Choose an activity that you enjoy and set realistic goals. Your health care provider can help you to make an activity plan that works for you. Exercise regularly as directed by your health care provider. This may include:  · Doing resistance training twice each week, such as:  ? Push-ups.  ? Sit-ups.  ? Lifting weights.  ? Using resistance bands.  · Doing a given intensity of exercise for a given amount of time. Choose from these options:  ? 150 minutes of moderate-intensity exercise every week.  ? 75 minutes of vigorous-intensity exercise every week.  ? A mix of moderate-intensity and vigorous-intensity exercise every week.    Children, pregnant women, people who are out of shape, people who are overweight, and older adults may need to consult a health care provider for individual recommendations. If you have any sort of medical condition, be sure to consult your health care provider before starting a new exercise program.  What are some exercise ideas?  Some moderate-intensity exercise ideas include:  · Walking at a rate of 1 mile in 15  minutes.  · Biking.  · Hiking.  · Golfing.  · Dancing.    Some vigorous-intensity exercise ideas include:  · Walking at a rate of at least 4.5 miles per hour.  · Jogging or running at a rate of 5 miles per hour.  · Biking at a rate of at least 10 miles per hour.  · Lap swimming.  · Roller-skating or in-line skating.  · Cross-country skiing.  · Vigorous competitive sports, such as football, basketball, and soccer.  · Jumping rope.  · Aerobic dancing.    What are some everyday activities that can help me to get exercise?  · Yard work, such as:  ? Pushing a .  ? Raking and bagging leaves.  · Washing and waxing your car.  · Pushing a stroller.  · Shoveling snow.  · Gardening.  · Washing windows or floors.  How can I be more active in my day-to-day activities?  · Use the stairs instead of the elevator.  · Take a walk during your lunch break.  · If you drive, park your car farther away from work or school.  · If you take public transportation, get off one stop early and walk the rest of the way.  · Make all of your phone calls while standing up and walking around.  · Get up, stretch, and walk around every 30 minutes throughout the day.  What guidelines should I follow while exercising?  · Do not exercise so much that you hurt yourself, feel dizzy, or get very short of breath.  · Consult your health care provider before starting a new exercise program.  · Wear comfortable clothes and shoes with good support.  · Drink plenty of water while you exercise to prevent dehydration or heat stroke. Body water is lost during exercise and must be replaced.  · Work out until you breathe faster and your heart beats faster.  This information is not intended to replace advice given to you by your health care provider. Make sure you discuss any questions you have with your health care provider.  Document Released: 01/20/2012 Document Revised: 05/25/2017 Document Reviewed: 05/21/2015  [employer] Interactive Patient Education © 2018  [employer]     04-Mar-2021 20:02

## 2021-03-04 NOTE — H&P ADULT - ATTENDING COMMENTS
I was asked to see this patient by the hospitalist in charge. Dr. Carrillo to assume care for patient in AM and thereafter

## 2021-03-04 NOTE — ED PROVIDER NOTE - CLINICAL SUMMARY MEDICAL DECISION MAKING FREE TEXT BOX
Concern for acute on chronic HF exac +/- component of worsening CKD/ESRD. Will eval for ischemic cause. Otherwise pt doing alright on 4L NC, will give lasix and Nitro. Labs, cxr, ekg, biomarkers. Adm to tele.

## 2021-03-04 NOTE — H&P ADULT - PROBLEM SELECTOR PLAN 2
Hypoxic to <80 on RA initially on arrival. Improved on 02. Suspect CHF related given clinical exam and CXR  -Cont. lasix 80IV Q12 for now  -Supplemental 02  -F/u LE dopplers

## 2021-03-05 LAB
A1C WITH ESTIMATED AVERAGE GLUCOSE RESULT: 5.8 % — HIGH (ref 4–5.6)
ALBUMIN SERPL ELPH-MCNC: 3.4 G/DL — SIGNIFICANT CHANGE UP (ref 3.3–5)
ALP SERPL-CCNC: 80 U/L — SIGNIFICANT CHANGE UP (ref 40–120)
ALT FLD-CCNC: 52 U/L — HIGH (ref 10–45)
ANION GAP SERPL CALC-SCNC: 10 MMOL/L — SIGNIFICANT CHANGE UP (ref 5–17)
APPEARANCE UR: CLEAR — SIGNIFICANT CHANGE UP
AST SERPL-CCNC: 31 U/L — SIGNIFICANT CHANGE UP (ref 10–40)
BACTERIA # UR AUTO: NEGATIVE — SIGNIFICANT CHANGE UP
BASOPHILS # BLD AUTO: 0.03 K/UL — SIGNIFICANT CHANGE UP (ref 0–0.2)
BASOPHILS NFR BLD AUTO: 0.5 % — SIGNIFICANT CHANGE UP (ref 0–2)
BILIRUB SERPL-MCNC: 0.2 MG/DL — SIGNIFICANT CHANGE UP (ref 0.2–1.2)
BILIRUB UR-MCNC: NEGATIVE — SIGNIFICANT CHANGE UP
BUN SERPL-MCNC: 36 MG/DL — HIGH (ref 7–23)
CALCIUM SERPL-MCNC: 8.6 MG/DL — SIGNIFICANT CHANGE UP (ref 8.4–10.5)
CHLORIDE SERPL-SCNC: 107 MMOL/L — SIGNIFICANT CHANGE UP (ref 96–108)
CHOLEST SERPL-MCNC: 157 MG/DL — SIGNIFICANT CHANGE UP
CO2 SERPL-SCNC: 25 MMOL/L — SIGNIFICANT CHANGE UP (ref 22–31)
COLOR SPEC: SIGNIFICANT CHANGE UP
CREAT ?TM UR-MCNC: 61 MG/DL — SIGNIFICANT CHANGE UP
CREAT SERPL-MCNC: 4.61 MG/DL — HIGH (ref 0.5–1.3)
DIFF PNL FLD: NEGATIVE — SIGNIFICANT CHANGE UP
EOSINOPHIL # BLD AUTO: 0.22 K/UL — SIGNIFICANT CHANGE UP (ref 0–0.5)
EOSINOPHIL NFR BLD AUTO: 3.9 % — SIGNIFICANT CHANGE UP (ref 0–6)
EPI CELLS # UR: 1 — SIGNIFICANT CHANGE UP
ESTIMATED AVERAGE GLUCOSE: 120 MG/DL — HIGH (ref 68–114)
GLUCOSE BLDC GLUCOMTR-MCNC: 120 MG/DL — HIGH (ref 70–99)
GLUCOSE BLDC GLUCOMTR-MCNC: 87 MG/DL — SIGNIFICANT CHANGE UP (ref 70–99)
GLUCOSE SERPL-MCNC: 99 MG/DL — SIGNIFICANT CHANGE UP (ref 70–99)
GLUCOSE UR QL: NEGATIVE — SIGNIFICANT CHANGE UP
HCT VFR BLD CALC: 36.4 % — LOW (ref 39–50)
HDLC SERPL-MCNC: 60 MG/DL — SIGNIFICANT CHANGE UP
HGB BLD-MCNC: 10.6 G/DL — LOW (ref 13–17)
HYALINE CASTS # UR AUTO: 3 /LPF — HIGH (ref 0–7)
IMM GRANULOCYTES NFR BLD AUTO: 0.5 % — SIGNIFICANT CHANGE UP (ref 0–1.5)
KETONES UR-MCNC: NEGATIVE — SIGNIFICANT CHANGE UP
LEUKOCYTE ESTERASE UR-ACNC: ABNORMAL
LIPID PNL WITH DIRECT LDL SERPL: 70 MG/DL — SIGNIFICANT CHANGE UP
LYMPHOCYTES # BLD AUTO: 0.85 K/UL — LOW (ref 1–3.3)
LYMPHOCYTES # BLD AUTO: 14.9 % — SIGNIFICANT CHANGE UP (ref 13–44)
MAGNESIUM SERPL-MCNC: 2.5 MG/DL — SIGNIFICANT CHANGE UP (ref 1.6–2.6)
MCHC RBC-ENTMCNC: 23.7 PG — LOW (ref 27–34)
MCHC RBC-ENTMCNC: 29.1 GM/DL — LOW (ref 32–36)
MCV RBC AUTO: 81.4 FL — SIGNIFICANT CHANGE UP (ref 80–100)
MONOCYTES # BLD AUTO: 0.82 K/UL — SIGNIFICANT CHANGE UP (ref 0–0.9)
MONOCYTES NFR BLD AUTO: 14.4 % — HIGH (ref 2–14)
NEUTROPHILS # BLD AUTO: 3.74 K/UL — SIGNIFICANT CHANGE UP (ref 1.8–7.4)
NEUTROPHILS NFR BLD AUTO: 65.8 % — SIGNIFICANT CHANGE UP (ref 43–77)
NITRITE UR-MCNC: NEGATIVE — SIGNIFICANT CHANGE UP
NON HDL CHOLESTEROL: 97 MG/DL — SIGNIFICANT CHANGE UP
NRBC # BLD: 0 /100 WBCS — SIGNIFICANT CHANGE UP (ref 0–0)
PH UR: 5.5 — SIGNIFICANT CHANGE UP (ref 5–8)
PLATELET # BLD AUTO: 159 K/UL — SIGNIFICANT CHANGE UP (ref 150–400)
POTASSIUM SERPL-MCNC: 4.4 MMOL/L — SIGNIFICANT CHANGE UP (ref 3.5–5.3)
POTASSIUM SERPL-SCNC: 4.4 MMOL/L — SIGNIFICANT CHANGE UP (ref 3.5–5.3)
PROT ?TM UR-MCNC: 123 MG/DL — HIGH (ref 0–12)
PROT SERPL-MCNC: 6 G/DL — SIGNIFICANT CHANGE UP (ref 6–8.3)
PROT UR-MCNC: 100 — SIGNIFICANT CHANGE UP
PROT/CREAT UR-RTO: 2 RATIO — HIGH (ref 0–0.2)
RBC # BLD: 4.47 M/UL — SIGNIFICANT CHANGE UP (ref 4.2–5.8)
RBC # FLD: 18.9 % — HIGH (ref 10.3–14.5)
RBC CASTS # UR COMP ASSIST: 2 /HPF — SIGNIFICANT CHANGE UP (ref 0–4)
SODIUM SERPL-SCNC: 142 MMOL/L — SIGNIFICANT CHANGE UP (ref 135–145)
SP GR SPEC: 1.01 — LOW (ref 1.01–1.02)
TRIGL SERPL-MCNC: 134 MG/DL — SIGNIFICANT CHANGE UP
TROPONIN T, HIGH SENSITIVITY RESULT: 235 NG/L — HIGH (ref 0–51)
UROBILINOGEN FLD QL: NEGATIVE — SIGNIFICANT CHANGE UP
WBC # BLD: 5.69 K/UL — SIGNIFICANT CHANGE UP (ref 3.8–10.5)
WBC # FLD AUTO: 5.69 K/UL — SIGNIFICANT CHANGE UP (ref 3.8–10.5)
WBC UR QL: 3 /HPF — SIGNIFICANT CHANGE UP (ref 0–5)

## 2021-03-05 PROCEDURE — 76770 US EXAM ABDO BACK WALL COMP: CPT | Mod: 26

## 2021-03-05 PROCEDURE — 93970 EXTREMITY STUDY: CPT | Mod: 26

## 2021-03-05 PROCEDURE — 93306 TTE W/DOPPLER COMPLETE: CPT | Mod: 26

## 2021-03-05 RX ORDER — HYDRALAZINE HCL 50 MG
5 TABLET ORAL ONCE
Refills: 0 | Status: COMPLETED | OUTPATIENT
Start: 2021-03-05 | End: 2021-03-05

## 2021-03-05 RX ADMIN — TAMSULOSIN HYDROCHLORIDE 0.4 MILLIGRAM(S): 0.4 CAPSULE ORAL at 21:40

## 2021-03-05 RX ADMIN — Medication 80 MILLIGRAM(S): at 05:04

## 2021-03-05 RX ADMIN — HEPARIN SODIUM 5000 UNIT(S): 5000 INJECTION INTRAVENOUS; SUBCUTANEOUS at 05:05

## 2021-03-05 RX ADMIN — Medication 1 INCH(S): at 05:03

## 2021-03-05 RX ADMIN — Medication 80 MILLIGRAM(S): at 17:10

## 2021-03-05 RX ADMIN — HEPARIN SODIUM 5000 UNIT(S): 5000 INJECTION INTRAVENOUS; SUBCUTANEOUS at 17:10

## 2021-03-05 RX ADMIN — Medication 100 MILLIGRAM(S): at 05:04

## 2021-03-05 RX ADMIN — CARVEDILOL PHOSPHATE 25 MILLIGRAM(S): 80 CAPSULE, EXTENDED RELEASE ORAL at 05:04

## 2021-03-05 RX ADMIN — CARVEDILOL PHOSPHATE 25 MILLIGRAM(S): 80 CAPSULE, EXTENDED RELEASE ORAL at 17:10

## 2021-03-05 RX ADMIN — Medication 100 MILLIGRAM(S): at 21:40

## 2021-03-05 RX ADMIN — Medication 100 MILLIGRAM(S): at 13:36

## 2021-03-05 RX ADMIN — Medication 5 MILLIGRAM(S): at 18:32

## 2021-03-05 NOTE — CONSULT NOTE ADULT - ASSESSMENT
80M w/ hx of CKD4, dCHF, difficult to control HTN, DM?? p/w SOB and CHF  I suspect he has diastolic CHF at present   CKD stage 4-5     1 Renal-Agree with the diuretics as outlines;  Outs>> ins   Monitor the creatinine which hopefully will stablize  2 CVS-Awaiting echo and may need ischemic eval as well.  Plan per cards;  SBP is still a little elevated at present   3 -Renal sono for prognostication.    4 Vasc-As outpt will need avf ..HD will be with in a yr     [facility]   [phone]

## 2021-03-05 NOTE — CONSULT NOTE ADULT - SUBJECTIVE AND OBJECTIVE BOX
NEPHROLOGY - NSN    Patient seen and examined.    HPI:  80M w/ hx of CKD4, dCHF, difficult to control HTN, DM?? p/w SOB and CHF. Pt states he has been in [facility] since Dec 27th 2020 and just returned home from [facility] today. Pt states he started to get worsening SOB over the past 2-3 weeks in [facility]. He states he discussed this with his doctor in [facility] and was advised to [place] and treatment. Pt endorses worsening orthopnea and LE edema. Denies any chest pain, palpitations, fevers or chills. Endorses cough relatively unchanged. Endorses okay medication compliance although did not take any medications today. Denies headache. Occasional dizziness but no syncope.   The patient has a history of new-onset diabetes-- two or three years.  Does not have in his eyes.  He has never suffered from a stroke.  He has never had a heart attack.  The patient has had a history of hypertension for at least 20 to 25 years.  Average blood pressure is about 170 to 180.  The patient does not take any herbal medications.  No contrast procedures.  Denies any hematuria or any bubbles in his urine.    The patient urinates multiple times at night.  He also has been told BPH  In march of 2020 his creatinine was 3.0       PAST MEDICAL & SURGICAL HISTORY:  Diabetes    Hypertension    HTN (hypertension)    BPH (Benign Prostatic Hypertrophy)    Glaucoma (Increased Eye Pressure)    HTN - Hypertension    No significant past surgical history    Laser Surgery :Right  ?  regarding procedure ; 12/07 ; secondary to glaucoma    Laser Surgery Left  ? regarding procedure ; secondary to glaucoma 12/07    Excision of Sinus Polyp  2006        MEDICATIONS  (STANDING):  carvedilol 25 milliGRAM(s) Oral every 12 hours  furosemide   Injectable 80 milliGRAM(s) IV Push every 12 hours  heparin   Injectable 5000 Unit(s) SubCutaneous every 12 hours  hydrALAZINE 100 milliGRAM(s) Oral every 8 hours  NIFEdipine XL 90 milliGRAM(s) Oral daily  tamsulosin 0.4 milliGRAM(s) Oral at bedtime      Allergies    grass, pollen (Rhinitis)  No Known Drug Allergies    Intolerances        SOCIAL HISTORY:  Denies alcohol abuse, drug abuse or tobacco usage.     FAMILY HISTORY:  Family history of diabetes mellitus (DM) (Mother, Sibling)        VITALS:  T(C): 36.4 (03-05-21 @ 04:20), Max: 37 (03-05-21 @ 00:45)  HR: 67 (03-05-21 @ 05:02) (63 - 77)  BP: 169/84 (03-05-21 @ 05:02) (157/84 - 194/78)  RR: 20 (03-05-21 @ 00:45) (15 - 26)  SpO2: 92% (03-05-21 @ 04:20) (76% - 99%)    REVIEW OF SYSTEMS:  Denies any nausea, vomiting, diarrhea, fever or chills. Denies chest pain, SOB, focal weakness, hematuria or dysuria. Good oral intake and denies fatigue or weakness. All other pertinent systems are reviewed and are negative.    PHYSICAL EXAM:  Constitutional: NAD  HEENT: EOMI  Neck:  No JVD, supple   Respiratory: CTA B/L  Cardiovascular: S1 and S2, RRR  Gastrointestinal: + BS, soft, NT, ND  Extremities: No peripheral edema, + peripheral pulses  Neurological: A/O x 3, CN2-12 intact  Psychiatric: Normal mood, normal affect  : No Aguilar  Skin: No rashes, C/D/I  Access: Not applicable    I and O's:    Height (cm): 160 (03-04 @ 16:18)  Weight (kg): 66.7 (03-04 @ 16:18)  BMI (kg/m2): 26.1 (03-04 @ 16:18)  BSA (m2): 1.7 (03-04 @ 16:18)    LABS:                        10.6   5.69  )-----------( 159      ( 05 Mar 2021 07:11 )             36.4     03-05    142  |  107  |  36<H>  ----------------------------<  99  4.4   |  25  |  4.61<H>    Ca    8.6      05 Mar 2021 07:11  Phos  4.4     03-04  Mg     2.5     03-05    TPro  6.0  /  Alb  3.4  /  TBili  0.2  /  DBili  x   /  AST  31  /  ALT  52<H>  /  AlkPhos  80  03-05      URINE:      RADIOLOGY & ADDITIONAL STUDIES:    < from: Xray Chest 1 View- PORTABLE-Urgent (Xray Chest 1 View- PORTABLE-Urgent .) (03.04.21 @ 17:47) >    EXAM:  XR CHEST PORTABLE URGENT 1V                            PROCEDURE DATE:  03/04/2021            INTERPRETATION:  CLINICAL INDICATION: Shortness of breath.    TECHNIQUE: Portable frontal view of the chest.    COMPARISON: Frontal chest radiograph from 12/23/2017.    FINDINGS:    Cardiac silhouette is not well evaluated in this projection.  Pulmonary edema with bilateral pleural effusions. No pneumothorax.  No acute osseous abnormality.      IMPRESSION:    Pulmonary edema with bilateral pleural effusions.              LUNA MILLS M.D., RADIOLOGY RESIDENT  This document has been electronically signed.  KARLI PEOPLES MD; Attending Radiologist  This document has been electronically signed. Mar  5 2021  8:40AM    < end of copied text >   NEPHROLOGY - NSN    Patient seen and examined.    HPI:  80M w/ hx of CKD4, dCHF, difficult to control HTN, DM?? p/w SOB and CHF. Pt states he has been in [facility] since Dec 27th 2020 and just returned home from [facility] today. Pt states he started to get worsening SOB over the past 2-3 weeks in [facility]. He states he discussed this with his doctor in [facility] and was advised to [place] and [place]. Pt endorses worsening orthopnea and LE edema. Denies any chest pain, palpitations, fevers or chills. Endorses cough relatively unchanged. Endorses okay medication compliance although did not take any medications today. Denies headache. Occasional dizziness but no syncope.   The patient has a history of new-onset diabetes-- two or three years.  Does not have in his eyes.  He has never suffered from a stroke.  He has never had a heart attack.  The patient has had a history of hypertension for at least 20 to 25 years.  Average blood pressure is about 170 to 180.  The patient does not take any herbal medications.  No contrast procedures.  Denies any hematuria or any bubbles in his urine.    The patient urinates multiple times at night.  He also has been told BPH  In march of 2020 his creatinine was 3.0       PAST MEDICAL & SURGICAL HISTORY:  Diabetes    Hypertension    HTN (hypertension)    BPH (Benign Prostatic Hypertrophy)    Glaucoma (Increased Eye Pressure)    HTN - Hypertension    No significant past surgical history    Laser Surgery :Right  ?  regarding procedure ; 12/07 ; secondary to glaucoma    Laser Surgery Left  ? regarding procedure ; secondary to glaucoma 12/07    Excision of Sinus Polyp  2006        MEDICATIONS  (STANDING):  carvedilol 25 milliGRAM(s) Oral every 12 hours  furosemide   Injectable 80 milliGRAM(s) IV Push every 12 hours  heparin   Injectable 5000 Unit(s) SubCutaneous every 12 hours  hydrALAZINE 100 milliGRAM(s) Oral every 8 hours  NIFEdipine XL 90 milliGRAM(s) Oral daily  tamsulosin 0.4 milliGRAM(s) Oral at bedtime      Allergies    grass, pollen (Rhinitis)  No Known Drug Allergies    Intolerances        SOCIAL HISTORY:  Denies alcohol abuse, drug abuse or tobacco usage.     FAMILY HISTORY:  Family history of diabetes mellitus (DM) (Mother, Sibling)        VITALS:  T(C): 36.4 (03-05-21 @ 04:20), Max: 37 (03-05-21 @ 00:45)  HR: 67 (03-05-21 @ 05:02) (63 - 77)  BP: 169/84 (03-05-21 @ 05:02) (157/84 - 194/78)  RR: 20 (03-05-21 @ 00:45) (15 - 26)  SpO2: 92% (03-05-21 @ 04:20) (76% - 99%)    REVIEW OF SYSTEMS:  Denies any nausea, vomiting, diarrhea, fever or chills. Denies chest pain, SOB, focal weakness, hematuria or dysuria. Good oral intake and denies fatigue or weakness. All other pertinent systems are reviewed and are negative.    PHYSICAL EXAM:  Constitutional: NAD  HEENT: EOMI  Neck:  No JVD, supple   Respiratory: CTA B/L  Cardiovascular: S1 and S2, RRR  Gastrointestinal: + BS, soft, NT, ND  Extremities: +3 peripheral edema, + peripheral pulses  Neurological: A/O x 3, CN2-12 intact  Psychiatric: Normal mood, normal affect  : No Aguilar  Skin: No rashes, C/D/I  Access: Not applicable    I and O's:    Height (cm): 160 (03-04 @ 16:18)  Weight (kg): 66.7 (03-04 @ 16:18)  BMI (kg/m2): 26.1 (03-04 @ 16:18)  BSA (m2): 1.7 (03-04 @ 16:18)    LABS:                        10.6   5.69  )-----------( 159      ( 05 Mar 2021 07:11 )             36.4     03-05    142  |  107  |  36<H>  ----------------------------<  99  4.4   |  25  |  4.61<H>    Ca    8.6      05 Mar 2021 07:11  Phos  4.4     03-04  Mg     2.5     03-05    TPro  6.0  /  Alb  3.4  /  TBili  0.2  /  DBili  x   /  AST  31  /  ALT  52<H>  /  AlkPhos  80  03-05      URINE:      RADIOLOGY & ADDITIONAL STUDIES:    < from: Xray Chest 1 View- PORTABLE-Urgent (Xray Chest 1 View- PORTABLE-Urgent .) (03.04.21 @ 17:47) >    EXAM:  XR CHEST PORTABLE URGENT 1V                            PROCEDURE DATE:  03/04/2021            INTERPRETATION:  CLINICAL INDICATION: Shortness of breath.    TECHNIQUE: Portable frontal view of the chest.    COMPARISON: Frontal chest radiograph from 12/23/2017.    FINDINGS:    Cardiac silhouette is not well evaluated in this projection.  Pulmonary edema with bilateral pleural effusions. No pneumothorax.  No acute osseous abnormality.      IMPRESSION:    Pulmonary edema with bilateral pleural effusions.              LUNA MILLS M.D., RADIOLOGY RESIDENT  This document has been electronically signed.  KARLI PEOPLES MD; Attending Radiologist  This document has been electronically signed. Mar  5 2021  8:40AM    < end of copied text >

## 2021-03-05 NOTE — PROGRESS NOTE ADULT - SUBJECTIVE AND OBJECTIVE BOX
Patient is a 80y old  Male who presents with a chief complaint of Shortness of breath (05 Mar 2021 09:40)      INTERVAL HISTORY: denies chest pain, palpitations, Mild + SOB    TELEMETRY Personally reviewed: NSR 60-80s    REVIEW OF SYSTEMS:   CONSTITUTIONAL: No weakness  EYES/ENT: No visual changes; No throat pain  Neck: No pain or stiffness  Respiratory: No cough, wheezing, No shortness of breath  CARDIOVASCULAR: no chest pain or palpitations + LE edema   GASTROINTESTINAL: No abdominal pain, no nausea, vomiting or hematemesis  GENITOURINARY: No dysuria, frequency or hematuria  NEUROLOGICAL: No stroke like symptoms  SKIN: No rashes    	  MEDICATIONS:  carvedilol 25 milliGRAM(s) Oral every 12 hours  furosemide   Injectable 80 milliGRAM(s) IV Push every 12 hours  hydrALAZINE 100 milliGRAM(s) Oral every 8 hours  NIFEdipine XL 90 milliGRAM(s) Oral daily  tamsulosin 0.4 milliGRAM(s) Oral at bedtime        PHYSICAL EXAM:  T(C): 36.7 (03-05-21 @ 11:16), Max: 37 (03-05-21 @ 00:45)  HR: 71 (03-05-21 @ 11:16) (63 - 77)  BP: 157/67 (03-05-21 @ 11:16) (157/67 - 194/78)  RR: 18 (03-05-21 @ 11:16) (15 - 26)  SpO2: 94% (03-05-21 @ 11:16) (76% - 99%)  Wt(kg): --  I&O's Summary    05 Mar 2021 07:01  -  05 Mar 2021 12:48  --------------------------------------------------------  IN: 520 mL / OUT: 350 mL / NET: 170 mL    Height (cm): 160 (03-04 @ 16:18)  Weight (kg): 66.7 (03-04 @ 16:18)  BMI (kg/m2): 26.1 (03-04 @ 16:18)  BSA (m2): 1.7 (03-04 @ 16:18)    Appearance: In no distress	  HEENT:    PERRL, EOMI	  Cardiovascular:  S1 S2, No JVD  Respiratory: Lungs clear to auscultation	  Gastrointestinal:  Soft, Non-tender, + BS	  Vascularature:  No edema of LE  Psychiatric: Appropriate affect   Neuro: no acute focal deficits                         10.6   5.69  )-----------( 159      ( 05 Mar 2021 07:11 )             36.4     03-05    142  |  107  |  36<H>  ----------------------------<  99  4.4   |  25  |  4.61<H>    Ca    8.6      05 Mar 2021 07:11  Phos  4.4     03-04  Mg     2.5     03-05    TPro  6.0  /  Alb  3.4  /  TBili  0.2  /  DBili  x   /  AST  31  /  ALT  52<H>  /  AlkPhos  80  03-05    Labs personally reviewed    ASSESSMENT/PLAN: 	  80M w/ hx of CKD4, dCHF, difficult to control HTN, DM?? p/w SOB and like acute on chronic congestive heart failure    Problem/Plan - 1:  ·  Problem: Acute on chronic congestive heart failure, unspecified heart failure type.  Plan: grossly fluid overloaded on exam and elevated BNP. Hx of severe LVH on prior TTE. Would suspect dCHF in setting of uncontrolled HTN.    -Will cont. lasix 80mv IV BID    -TTE.     Problem/Plan - 2:  ·  Problem: Acute respiratory failure with hypoxia.  Plan: Hypoxic to <80 on RA initially on arrival. Improved on 02. Suspect CHF related given clinical exam and CXR  -Cont. lasix 80IV Q12 for now  -Supplemental 02  -F/u LE dopplers.     Problem/Plan - 3:  ·  Problem: Essential hypertension.  Plan: BPs very elevated on arrival. Will try not to lower too rapidly given unclear baseline as pt states they are poorly controlled.  -c/w hydralazine 100mg TID   -Stagger Coreg 25mg Q12  -Nifedipine 90mg daily in AM  -Trend vital signs.     Problem/Plan - 4:  ·  Problem: Chronic kidney disease (CKD), stage IV (severe).  Plan:  VIRGEN on CKD,  - pending Renal eval    Problem/Plan - 5:  ·  Problem: Hyperlipidemia, unspecified hyperlipidemia type.  Plan: -check lipids.     Problem/Plan - 6:  Problem: Benign prostatic hyperplasia, unspecified whether lower urinary tract symptoms present. Plan: -Cont. flomax.    Problem/Plan - 7:  ·  Problem: Prophylactic measure.  Plan: DVT PPX  -SCDs.               Betzaida Carrillo DO [facility]  Cardiovascular Medicine  [address] [place] [address], [address]  Office: [phone]  Cell: [phone]

## 2021-03-06 LAB
ALBUMIN SERPL ELPH-MCNC: 3.4 G/DL — SIGNIFICANT CHANGE UP (ref 3.3–5)
ALP SERPL-CCNC: 99 U/L — SIGNIFICANT CHANGE UP (ref 40–120)
ALT FLD-CCNC: 55 U/L — HIGH (ref 10–45)
ANION GAP SERPL CALC-SCNC: 13 MMOL/L — SIGNIFICANT CHANGE UP (ref 5–17)
AST SERPL-CCNC: 46 U/L — HIGH (ref 10–40)
BILIRUB SERPL-MCNC: 0.2 MG/DL — SIGNIFICANT CHANGE UP (ref 0.2–1.2)
BUN SERPL-MCNC: 43 MG/DL — HIGH (ref 7–23)
CALCIUM SERPL-MCNC: 8.6 MG/DL — SIGNIFICANT CHANGE UP (ref 8.4–10.5)
CHLORIDE SERPL-SCNC: 106 MMOL/L — SIGNIFICANT CHANGE UP (ref 96–108)
CO2 SERPL-SCNC: 21 MMOL/L — LOW (ref 22–31)
CREAT SERPL-MCNC: 4.91 MG/DL — HIGH (ref 0.5–1.3)
GLUCOSE SERPL-MCNC: 109 MG/DL — HIGH (ref 70–99)
HCT VFR BLD CALC: 37.7 % — LOW (ref 39–50)
HGB BLD-MCNC: 10.6 G/DL — LOW (ref 13–17)
MCHC RBC-ENTMCNC: 23.3 PG — LOW (ref 27–34)
MCHC RBC-ENTMCNC: 28.1 GM/DL — LOW (ref 32–36)
MCV RBC AUTO: 83 FL — SIGNIFICANT CHANGE UP (ref 80–100)
NRBC # BLD: 0 /100 WBCS — SIGNIFICANT CHANGE UP (ref 0–0)
PLATELET # BLD AUTO: 186 K/UL — SIGNIFICANT CHANGE UP (ref 150–400)
POTASSIUM SERPL-MCNC: 5.3 MMOL/L — SIGNIFICANT CHANGE UP (ref 3.5–5.3)
POTASSIUM SERPL-SCNC: 5.3 MMOL/L — SIGNIFICANT CHANGE UP (ref 3.5–5.3)
PROT SERPL-MCNC: 6.1 G/DL — SIGNIFICANT CHANGE UP (ref 6–8.3)
RBC # BLD: 4.54 M/UL — SIGNIFICANT CHANGE UP (ref 4.2–5.8)
RBC # FLD: 18.8 % — HIGH (ref 10.3–14.5)
SARS-COV-2 IGG SERPL QL IA: NEGATIVE — SIGNIFICANT CHANGE UP
SARS-COV-2 IGM SERPL IA-ACNC: 0.21 INDEX — SIGNIFICANT CHANGE UP
SODIUM SERPL-SCNC: 140 MMOL/L — SIGNIFICANT CHANGE UP (ref 135–145)
TROPONIN T, HIGH SENSITIVITY RESULT: 247 NG/L — HIGH (ref 0–51)
WBC # BLD: 7.44 K/UL — SIGNIFICANT CHANGE UP (ref 3.8–10.5)
WBC # FLD AUTO: 7.44 K/UL — SIGNIFICANT CHANGE UP (ref 3.8–10.5)

## 2021-03-06 RX ORDER — SODIUM ZIRCONIUM CYCLOSILICATE 10 G/10G
5 POWDER, FOR SUSPENSION ORAL ONCE
Refills: 0 | Status: COMPLETED | OUTPATIENT
Start: 2021-03-06 | End: 2021-03-06

## 2021-03-06 RX ADMIN — Medication 100 MILLIGRAM(S): at 13:51

## 2021-03-06 RX ADMIN — CARVEDILOL PHOSPHATE 25 MILLIGRAM(S): 80 CAPSULE, EXTENDED RELEASE ORAL at 05:52

## 2021-03-06 RX ADMIN — Medication 100 MILLIGRAM(S): at 21:31

## 2021-03-06 RX ADMIN — Medication 90 MILLIGRAM(S): at 04:25

## 2021-03-06 RX ADMIN — TAMSULOSIN HYDROCHLORIDE 0.4 MILLIGRAM(S): 0.4 CAPSULE ORAL at 21:31

## 2021-03-06 RX ADMIN — SODIUM ZIRCONIUM CYCLOSILICATE 5 GRAM(S): 10 POWDER, FOR SUSPENSION ORAL at 14:21

## 2021-03-06 RX ADMIN — Medication 80 MILLIGRAM(S): at 17:38

## 2021-03-06 RX ADMIN — Medication 100 MILLIGRAM(S): at 05:51

## 2021-03-06 RX ADMIN — HEPARIN SODIUM 5000 UNIT(S): 5000 INJECTION INTRAVENOUS; SUBCUTANEOUS at 05:51

## 2021-03-06 RX ADMIN — HEPARIN SODIUM 5000 UNIT(S): 5000 INJECTION INTRAVENOUS; SUBCUTANEOUS at 17:38

## 2021-03-06 RX ADMIN — CARVEDILOL PHOSPHATE 25 MILLIGRAM(S): 80 CAPSULE, EXTENDED RELEASE ORAL at 17:38

## 2021-03-06 RX ADMIN — Medication 80 MILLIGRAM(S): at 05:51

## 2021-03-06 NOTE — PROGRESS NOTE ADULT - SUBJECTIVE AND OBJECTIVE BOX
Patient is a 80y old  Male who presents with a chief complaint of Shortness of breath (06 Mar 2021 09:59)      INTERVAL HISTORY: feels ok  REVIEW OF SYSTEMS:   CONSTITUTIONAL: No weakness  EYES/ENT: No visual changes; No throat pain  Neck: No pain or stiffness  Respiratory: No cough, wheezing, No shortness of breath  CARDIOVASCULAR: no chest pain or palpitations  GASTROINTESTINAL: No abdominal pain, no nausea, vomiting or hematemesis  GENITOURINARY: No dysuria, frequency or hematuria  NEUROLOGICAL: No stroke like symptoms  SKIN: No rashes  	  MEDICATIONS:  carvedilol 25 milliGRAM(s) Oral every 12 hours  furosemide   Injectable 80 milliGRAM(s) IV Push every 12 hours  hydrALAZINE 100 milliGRAM(s) Oral every 8 hours  NIFEdipine XL 90 milliGRAM(s) Oral daily  tamsulosin 0.4 milliGRAM(s) Oral at bedtime    PHYSICAL EXAM:  T(C): 36.4 (03-06-21 @ 04:23), Max: 36.7 (03-05-21 @ 11:16)  HR: 66 (03-06-21 @ 08:48) (66 - 85)  BP: 149/67 (03-06-21 @ 08:48) (149/67 - 209/98)  RR: 18 (03-06-21 @ 04:23) (18 - 18)  SpO2: 92% (03-06-21 @ 04:23) (92% - 95%)  Wt(kg): --  I&O's Summary    05 Mar 2021 07:01  -  06 Mar 2021 07:00  --------------------------------------------------------  IN: 520 mL / OUT: 1350 mL / NET: -830 mL    06 Mar 2021 07:01  -  06 Mar 2021 10:57  --------------------------------------------------------  IN: 280 mL / OUT: 0 mL / NET: 280 mL    Appearance: In no distress	  HEENT:    PERRL, EOMI	  Cardiovascular:  S1 S2, No JVD  Respiratory: Lungs clear to auscultation	  Gastrointestinal:  Soft, Non-tender, + BS	  Vascularature:  No edema of LE  Psychiatric: Appropriate affect   Neuro: no acute focal deficits                         10.6   7.44  )-----------( 186      ( 06 Mar 2021 06:45 )             37.7     03-06    140  |  106  |  43<H>  ----------------------------<  109<H>  5.3   |  21<L>  |  4.91<H>    Ca    8.6      06 Mar 2021 06:45  Phos  4.4     03-04  Mg     2.5     03-05    TPro  6.1  /  Alb  3.4  /  TBili  0.2  /  DBili  x   /  AST  46<H>  /  ALT  55<H>  /  AlkPhos  99  03-0    Labs personally reviewed  < from: Transthoracic Echocardiogram (03.05.21 @ 08:41) >  EF (Visual Estimate): 60 %    < end of copied text >  < from: Transthoracic Echocardiogram (03.05.21 @ 08:41) >  Conclusions:  1. Calcified trileaflet aortic valve with mildly  decreased  opening.  2. Moderate concentric left ventricular hypertrophy.  3. Mild segmental left ventricular systolic dysfunction.  Peak left ventricular outflow tract gradient equals 52 mm  Hg, consistent with moderately severe LVOT obstruction.HR  70 The mid inferolateral wall, and the basal  inferolateral  wall are hypokinetic. The basal inferior wall is akinetic.  4. Normal right ventricular size with decreased right  ventricular systolic function.    < end of copied text >    ASSESSMENT/PLAN: 	  80M w/ hx of CKD4, dCHF, difficult to control HTN, DM?? p/w SOB and like acute on chronic congestive heart failure    Problem/Plan - 1:  ·  Problem: Acute on chronic congestive heart failure, unspecified heart failure type.  Plan: grossly fluid overloaded on exam and elevated BNP. Hx of severe LVH on prior TTE. Would suspect dCHF in setting of uncontrolled HTN.    -Will cont. lasix 80mv IV BID    -TTE- EF 60% with mid inferolateral wall and basal inferolateral wall hypokinetic, will need ischemic eval: NM stress test vs cath ( likely diagnostic only if Cr doesn't improve) .. continue to diurese for now until euvolemic    Problem/Plan - 2:  ·  Problem: Acute respiratory failure with hypoxia.  Plan: Hypoxic to <80 on RA initially on arrival. Improved on 02. Suspect CHF related given clinical exam and CXR  -Cont. lasix 80IV Q12 for now  -Supplemental 02  -LE doppler neg DVT    Problem/Plan - 3:  ·  Problem: Essential hypertension.  Plan: BPs very elevated on arrival. Will try not to lower too rapidly given unclear baseline as pt states they are poorly controlled.  -c/w hydralazine 100mg TID   -Stagger Coreg 25mg Q12  -Nifedipine 90mg daily in AM  - may add Clonidine 0.1mg BID for better blood pressure control    Problem/Plan - 4:  ·  Problem: Chronic kidney disease (CKD), stage IV (severe).  Plan:  VIRGEN on CKD,  - avoid nephrotoxic agents  - Aporeciate Renal reccs-Renal sonogram, continue with diuresing, will need eventual AVF for dialysis     Problem/Plan - 5:  ·  Problem: Hyperlipidemia, unspecified hyperlipidemia type.  Plan: -check lipids.     Problem/Plan - 6:  Problem: Benign prostatic hyperplasia, unspecified whether lower urinary tract symptoms present. Plan: -Cont. flomax.    Problem/Plan - 7:  ·  Problem: Prophylactic measure.  Plan: DVT PPX  -SCDs.                   Betzaida Maharaj ANP-C  Sridhar Carrillo DO [facility]  Cardiovascular Medicine  [address] [facility], [address]  Office: [phone]  Cell: [phone]

## 2021-03-06 NOTE — PROGRESS NOTE ADULT - SUBJECTIVE AND OBJECTIVE BOX
NEPHROLOGY     Patient seen and examined.    MEDICATIONS  (STANDING):  carvedilol 25 milliGRAM(s) Oral every 12 hours  furosemide   Injectable 80 milliGRAM(s) IV Push every 12 hours  heparin   Injectable 5000 Unit(s) SubCutaneous every 12 hours  hydrALAZINE 100 milliGRAM(s) Oral every 8 hours  NIFEdipine XL 90 milliGRAM(s) Oral daily  tamsulosin 0.4 milliGRAM(s) Oral at bedtime    VITALS:  T(C): , Max: 36.7 (21 @ 11:16)  T(F): , Max: 98.1 (21 @ 11:16)  HR: 66 (21 @ 08:48)  BP: 149/67 (21 @ 08:48)  RR: 18 (21 @ 04:23)  SpO2: 92% (21 @ 04:23)    I and O's:     @ 07:01  -   @ 07:00  --------------------------------------------------------  IN: 520 mL / OUT: 1350 mL / NET: -830 mL     @ 07:01  -   @ 09:59  --------------------------------------------------------  IN: 280 mL / OUT: 0 mL / NET: 280 mL    PHYSICAL EXAM:  Constitutional: NAD  HEENT: EOMI  Neck:  No JVD, supple   Respiratory: CTA B/L  Cardiovascular: S1 and S2, RRR  Gastrointestinal: + BS, soft, NT, ND  Extremities: +3 peripheral edema, + peripheral pulses  Neurological: A/O x 3, CN2-12 intact  Psychiatric: Normal mood, normal affect  : No Aguilar  Skin: No rashes, C/D/I    LABS:                        10.6   7.44  )-----------( 186      ( 06 Mar 2021 06:45 )             37.7         140  |  106  |  43<H>  ----------------------------<  109<H>  5.3   |  21<L>  |  4.91<H>    Ca    8.6      06 Mar 2021 06:45  Phos  4.4       Mg     2.5         TPro  6.1  /  Alb  3.4  /  TBili  0.2  /  DBili  x   /  AST  46<H>  /  ALT  55<H>  /  AlkPhos  99      Urine Studies:  Urinalysis Basic - ( 05 Mar 2021 22:56 )    Color: Light Yellow / Appearance: Clear / S.009 / pH: x  Gluc: x / Ketone: Negative  / Bili: Negative / Urobili: Negative   Blood: x / Protein: 100 / Nitrite: Negative   Leuk Esterase: Moderate / RBC: 2 /hpf / WBC 3 /HPF   Sq Epi: x / Non Sq Epi: 1 / Bacteria: Negative    Creatinine, Random Urine: 61 mg/dL ( @ 22:56)  Protein/Creatinine Ratio Calculation: 2.0 Ratio ( @ 22:56)      RADIOLOGY & ADDITIONAL STUDIES:    EXAM:  XR CHEST PORTABLE URGENT 1V                            PROCEDURE DATE:  2021        INTERPRETATION:  CLINICAL INDICATION: Shortness of breath.    TECHNIQUE: Portable frontal view of the chest.    COMPARISON: Frontal chest radiograph from 2017.    FINDINGS:    Cardiac silhouette is not well evaluated in this projection.  Pulmonary edema with bilateral pleural effusions. No pneumothorax.  No acute osseous abnormality.      IMPRESSION:    Pulmonary edema with bilateral pleural effusions.    LUNA MILLS M.D., RADIOLOGY RESIDENT  This document has been electronically signed.  KARLI PEOPLES MD; Attending Radiologist  This document has been electronically signed. Mar  5 2021  8:40AM      EXAM:  DUPLEX SCAN EXT VEINS LOWER BI                          PROCEDURE DATE:  2021      INTERPRETATION:  CLINICAL INFORMATION: Bilateral lower extremity edema, recent airplane travel.    COMPARISON: 2020.    TECHNIQUE: Duplex sonography of the BILATERAL LOWER extremity veins with color and spectral Doppler, with and without compression.    FINDINGS:    There is normal compressibility of the bilateral common femoral, femoral and popliteal veins.  Doppler examination shows normal spontaneous and phasic flow.    Calf veins are not well visualized. Bilateral subcutaneous edema.    IMPRESSION:  No evidence of deep venous thrombosis in either lower extremity.      JUDIE ARAGON MD; Attending Radiologist  This document has been electronically signed. Mar  5 2021  3:50PM   NEPHROLOGY     Patient seen and examined sitting in bed having breakfast. Pt reports feeling ok, though still sob, on 4L NC, no complaints of pain, in no acute distress.    MEDICATIONS  (STANDING):  carvedilol 25 milliGRAM(s) Oral every 12 hours  furosemide   Injectable 80 milliGRAM(s) IV Push every 12 hours  heparin   Injectable 5000 Unit(s) SubCutaneous every 12 hours  hydrALAZINE 100 milliGRAM(s) Oral every 8 hours  NIFEdipine XL 90 milliGRAM(s) Oral daily  tamsulosin 0.4 milliGRAM(s) Oral at bedtime    VITALS:  T(C): , Max: 36.7 (21 @ 11:16)  T(F): , Max: 98.1 (21 @ 11:16)  HR: 66 (21 @ 08:48)  BP: 149/67 (21 @ 08:48)  RR: 18 (21 @ 04:23)  SpO2: 92% (21 @ 04:23)    I and O's:     @ 07:01  -   @ 07:00  --------------------------------------------------------  IN: 520 mL / OUT: 1350 mL / NET: -830 mL     @ 07:01  -   @ 09:59  --------------------------------------------------------  IN: 280 mL / OUT: 0 mL / NET: 280 mL    PHYSICAL EXAM:  Constitutional: NAD  HEENT: EOMI  Neck:  No JVD, supple   Respiratory: CTA B/L  Cardiovascular: S1 and S2, RRR  Gastrointestinal: + BS, soft, NT, ND  Extremities: +3 peripheral edema, + peripheral pulses  Neurological: A/O x 3, CN2-12 intact  Psychiatric: Normal mood, normal affect  : No Aguilar  Skin: No rashes, C/D/I    LABS:                        10.6   7.44  )-----------( 186      ( 06 Mar 2021 06:45 )             37.7     03-    140  |  106  |  43<H>  ----------------------------<  109<H>  5.3   |  21<L>  |  4.91<H>    Ca    8.6      06 Mar 2021 06:45  Phos  4.4     -  Mg     2.5     -    TPro  6.1  /  Alb  3.4  /  TBili  0.2  /  DBili  x   /  AST  46<H>  /  ALT  55<H>  /  AlkPhos  99  -    Urine Studies:  Urinalysis Basic - ( 05 Mar 2021 22:56 )    Color: Light Yellow / Appearance: Clear / S.009 / pH: x  Gluc: x / Ketone: Negative  / Bili: Negative / Urobili: Negative   Blood: x / Protein: 100 / Nitrite: Negative   Leuk Esterase: Moderate / RBC: 2 /hpf / WBC 3 /HPF   Sq Epi: x / Non Sq Epi: 1 / Bacteria: Negative    Creatinine, Random Urine: 61 mg/dL ( @ 22:56)  Protein/Creatinine Ratio Calculation: 2.0 Ratio ( @ 22:56)      RADIOLOGY & ADDITIONAL STUDIES:    EXAM:  XR CHEST PORTABLE URGENT 1V                            PROCEDURE DATE:  2021        INTERPRETATION:  CLINICAL INDICATION: Shortness of breath.    TECHNIQUE: Portable frontal view of the chest.    COMPARISON: Frontal chest radiograph from 2017.    FINDINGS:    Cardiac silhouette is not well evaluated in this projection.  Pulmonary edema with bilateral pleural effusions. No pneumothorax.  No acute osseous abnormality.      IMPRESSION:    Pulmonary edema with bilateral pleural effusions.    LUNA MILLS M.D., RADIOLOGY RESIDENT  This document has been electronically signed.  KARLI PEOPLES MD; Attending Radiologist  This document has been electronically signed. Mar  5 2021  8:40AM      EXAM:  DUPLEX SCAN EXT VEINS LOWER BI                          PROCEDURE DATE:  2021      INTERPRETATION:  CLINICAL INFORMATION: Bilateral lower extremity edema, recent airplane travel.    COMPARISON: 2020.    TECHNIQUE: Duplex sonography of the BILATERAL LOWER extremity veins with color and spectral Doppler, with and without compression.    FINDINGS:    There is normal compressibility of the bilateral common femoral, femoral and popliteal veins.  Doppler examination shows normal spontaneous and phasic flow.    Calf veins are not well visualized. Bilateral subcutaneous edema.    IMPRESSION:  No evidence of deep venous thrombosis in either lower extremity.      JUDIE ARAGON MD; Attending Radiologist  This document has been electronically signed. Mar  5 2021  3:50PM    Patient name: BAKARI CHARLES  YOB: 1940   Age: 80 (M)   MR#: [id number]  Study Date: 3/5/2021  [facility] RDCS  Study quality: Technically difficult  Referring Physician: Sridhar Carrillo MD  Blood Pressure: 169/84 mmHg  Height: 160 cm  Weight: 67 kg  BSA: 1.7 m2  ------------------------------------------------------------------------  PROCEDURE: Transthoracic echocardiogram with 2-D, M-Mode  and complete spectral and color flow Doppler.  INDICATION: Dyspnea, unspecified (R06.00)  ------------------------------------------------------------------------  Dimensions:    Normal Values:  LA:     3.6    2.0 - 4.0 cm  Ao:     3.3    2.0 - 3.8 cm  SEPTUM: 1.5    0.6 - 1.2 cm  PWT:    1.0    0.6 - 1.1 cm  LVIDd:  5.2    3.0 - 5.6 cm  LVIDs:  3.1    1.8 - 4.0 cm  Derived variables:  LVMI: 155 g/m2  RWT: 0.38  Fractional short: 40 %  EF (Visual Estimate): 60 %  Doppler Peak Velocity (m/sec): AoV=1.9  ------------------------------------------------------------------------  Observations:  Mitral Valve: Normal mitral valve. Mean transmitral valve  gradient equals 2 mm Hg, consistent with mild mitral  stenosis.  Aortic Valve/Aorta: Calcified trileaflet aortic valve with  mildly  decreased opening. Peak transaortic valve gradient  equals 14 mm Hg, mean transaortic valve gradient equals 4  mm Hg, aortic valve velocity time integral equals 38 cm.  Peak left ventricular outflow tract gradient equals 52 mm  Hg, consistent with moderately severe LVOT obstruction.HR  70  Aortic Root: 3.3 cm.  Left Atrium: Moderately dilated left atrium.  LA volume  index = 48 cc/m2.  Left Ventricle: Mild segmental left ventricular systolic  dysfunction. The mid inferolateral wall, and the basal  inferolateral wall are hypokinetic. The basal inferior wall  is akinetic.  Moderate concentric left ventricular  hypertrophy. Indeterminate diastolic function.  Right Heart: Right atrium not well visualized. Normal right  ventricular size with decreased right ventricular systolic  function. Tricuspid valve not well visualized. Normal  pulmonic valve. Minimal pulmonic regurgitation.  Pericardium/Pleura: Small pericardial effusion.  Left pleural effusion.  Hemodynamic: Estimated right atrial pressure is 8 mm Hg.  Unable to estimate RVSP.  ------------------------------------------------------------------------  Conclusions:  1. Calcified trileaflet aortic valve with mildly  decreased  opening.  2. Moderate concentric left ventricular hypertrophy.  3. Mild segmental left ventricular systolic dysfunction.  Peak left ventricular outflow tract gradient equals 52 mm  Hg, consistent with moderately severe LVOT obstruction.HR  70 The mid inferolateral wall, and the basal  inferolateral  wall are hypokinetic. The basal inferior wall is akinetic.  4. Normal right ventricular size with decreased right  ventricular systolic function.  ------------------------------------------------------------------------  Confirmed on  3/5/2021 - 21:19:28 by REYNALDO Gardner

## 2021-03-06 NOTE — PROGRESS NOTE ADULT - SUBJECTIVE AND OBJECTIVE BOX
CHIEF COMPLAINT:  Reason for Admission: Shortness of breath  History of Present Illness:   80M w/ hx of CKD4, dCHF, difficult to control HTN, DM?? p/w SOB and CHF. Pt states he has been in [facility] since Dec 27th 2020 and just returned home from [facility] today. Pt states he started to get worsening SOB over the past 2-3 weeks in [facility]. He states he discussed this with his doctor in [facility] and was advised to come back [place] the [place] and treatment. Pt endorses worsening orthopnea and LE edema. Denies any chest pain, palpitations, fevers or chills. Endorses cough relatively unchanged. Endorses okay medication compliance although did not take any medications today. Denies headache. Occasional dizziness but no syncope.     In ER: Given lasix 80IV, nitro patchSUBJECTIVE:     REVIEW OF SYSTEMS:    CONSTITUTIONAL: (  )  weakness,  (  ) fevers or chills  EYES/ENT: (  )visual changes;     NECK: (  ) pain or stiffness  RESPIRATORY:   (  )cough, wheezing, hemoptysis;  (  ) shortness of breath  CARDIOVASCULAR:  (  )chest pain or palpitations  GASTROINTESTINAL:   (  )abdominal or epigastric pain.  (  ) nausea, vomiting, or hematemesis;   (   ) diarrhea or constipation.   GENITOURINARY:   (    ) dysuria, frequency or hematuria  NEUROLOGICAL:  (   ) numbness or weakness   All other review of systems is negative unless indicated above    Vital Signs Last 24 Hrs  T(C): 36.6 (06 Mar 2021 11:25), Max: 36.6 (06 Mar 2021 11:25)  T(F): 97.8 (06 Mar 2021 11:25), Max: 97.8 (06 Mar 2021 11:25)  HR: 72 (06 Mar 2021 17:35) (66 - 85)  BP: 159/63 (06 Mar 2021 17:35) (139/61 - 199/88)  BP(mean): --  RR: 19 (06 Mar 2021 13:46) (18 - 19)  SpO2: 90% (06 Mar 2021 13:46) (90% - 95%)    I&O's Summary    05 Mar 2021 07:01  -  06 Mar 2021 07:00  --------------------------------------------------------  IN: 520 mL / OUT: 1350 mL / NET: -830 mL    06 Mar 2021 07:01  -  06 Mar 2021 19:05  --------------------------------------------------------  IN: 480 mL / OUT: 650 mL / NET: -170 mL        CAPILLARY BLOOD GLUCOSE      POCT Blood Glucose.: 120 mg/dL (05 Mar 2021 21:36)      PHYSICAL EXAM:    Constitutional:  (   ) NAD,   (   )awake and alert  HEENT: PERR, EOMI,    Neck: Soft and supple, No LAD, No JVD  Respiratory:  (    Breath sounds are clear bilaterally,    (   ) wheezing, rales or rhonchi  Cardiovascular:     (   )S1 and S2, regular rate and rhythm, no Murmurs, gallops or rubs  Gastrointestinal:  (   )Bowel Sounds present, soft,   (  )nontender, nondistended,    Extremities:    (  ) peripheral edema  Vascular: 2+ peripheral pulses  Neurological:    (    )A/O x 3,   (  ) focal deficits  Musculoskeletal:    (   )  normal strength b/l upper  (     ) normal  lower extremities  Skin: No rashes    MEDICATIONS:  MEDICATIONS  (STANDING):  carvedilol 25 milliGRAM(s) Oral every 12 hours  furosemide   Injectable 80 milliGRAM(s) IV Push every 12 hours  heparin   Injectable 5000 Unit(s) SubCutaneous every 12 hours  hydrALAZINE 100 milliGRAM(s) Oral every 8 hours  NIFEdipine XL 90 milliGRAM(s) Oral daily  tamsulosin 0.4 milliGRAM(s) Oral at bedtime      LABS: All Labs Reviewed:                        10.6   7.44  )-----------( 186      ( 06 Mar 2021 06:45 )             37.7     03-06    140  |  106  |  43<H>  ----------------------------<  109<H>  5.3   |  21<L>  |  4.91<H>    Ca    8.6      06 Mar 2021 06:45  Mg     2.5     03-05    TPro  6.1  /  Alb  3.4  /  TBili  0.2  /  DBili  x   /  AST  46<H>  /  ALT  55<H>  /  AlkPhos  99  03-06          Blood Culture:   Urine Culture      RADIOLOGY/EKG:    ASSESSMENT AND PLAN:  80M w/ hx of CKD4, dCHF, difficult to control HTN, DM?? p/w SOB and like acute on chronic congestive heart failure    Problem/Plan - 1:  ·  Problem: Acute on chronic congestive heart failure, unspecified heart failure type.  Plan: grossly fluid overloaded on exam and elevated BNP. Hx of severe LVH on prior TTE. Would suspect dCHF in setting of uncontrolled HTN. Minimal urine output after initial dose of 80IV lasix, possibly in setting of CKD.  -Will cont. lasix 80mv IV BID for now  -Strict I/Os and daily weights  -F/u cardiology recommendations  -TTE.     Problem/Plan - 2:  ·  Problem: Acute respiratory failure with hypoxia.  Plan: Hypoxic to <80 on RA initially on arrival. Improved on 02. Suspect CHF related given clinical exam and CXR  -Cont. lasix 80IV Q12 for now  -Supplemental 02  -F/u LE dopplers.     Problem/Plan - 3:  ·  Problem: Essential hypertension.  Plan: BPs very elevated on arrival. Will try not to lower too rapidly given unclear baseline as pt states they are poorly controlled.  -Resume hydralazine 100mg TID tonight  -Stagger Coreg 25mg Q12  -Nifedipine 90mg daily in AM  -Trend vital signs.     Problem/Plan - 4:  ·  Problem: Chronic kidney disease (CKD), stage IV (severe).  Plan: Crt increased compared to 2018 but unclear if new baseline. HTN related?  -Trend BMP, mg  -Renal dose medications  -A1c.     Problem/Plan - 5:  ·  Problem: Hyperlipidemia, unspecified hyperlipidemia type.  Plan: -check lipids.     Problem/Plan - 6:  Problem: Benign prostatic hyperplasia, unspecified whether lower urinary tract symptoms present. Plan: -Cont. flomax.    Problem/Plan - 7:  ·  Problem: Prophylactic measure.  Plan: DVT PPX  -SCDs.   DVT PPX:    ADVANCED DIRECTIVE:    DISPOSITION: CHIEF COMPLAINT: event overnight patient well known to me requested by  Dr rodgers  to follow him today  Reason for Admission: Shortness of breath  History of Present Illness:   80M w/ hx of CKD4, dCHF, difficult to control HTN, DM?? p/w SOB and CHF. Pt states he has been in [facility] since Dec 27th 2020 and just returned home from [facility] today. Pt states he started to get worsening SOB over the past 2-3 weeks in [facility]. He states he discussed this with his doctor in [facility] and was advised to [place] and [place]. Pt endorses worsening orthopnea and LE edema. Denies any chest pain, palpitations, fevers or chills. Endorses cough relatively unchanged. Endorses okay medication compliance although did not take any medications today. Denies headache. Occasional dizziness but no syncope.     In ER: Given lasix 80IV, nitro patch  SUBJECTIVE:     REVIEW OF SYSTEMS:    CONSTITUTIONAL: (  )  weakness,  (  ) fevers or chills  EYES/ENT: (  )visual changes;     NECK: (  ) pain or stiffness  RESPIRATORY:   (  )cough, wheezing, hemoptysis;  ( x ) shortness of breath  CARDIOVASCULAR:  (  )chest pain or palpitations  GASTROINTESTINAL:   (  )abdominal or epigastric pain.  (  ) nausea, vomiting, or hematemesis;   (   ) diarrhea or constipation.   GENITOURINARY:   (    ) dysuria, frequency or hematuria  NEUROLOGICAL:  (   ) numbness or weakness   All other review of systems is negative unless indicated above    Vital Signs Last 24 Hrs  T(C): 36.6 (06 Mar 2021 11:25), Max: 36.6 (06 Mar 2021 11:25)  T(F): 97.8 (06 Mar 2021 11:25), Max: 97.8 (06 Mar 2021 11:25)  HR: 72 (06 Mar 2021 17:35) (66 - 85)  BP: 159/63 (06 Mar 2021 17:35) (139/61 - 199/88)  BP(mean): --  RR: 19 (06 Mar 2021 13:46) (18 - 19)  SpO2: 90% (06 Mar 2021 13:46) (90% - 95%)    I&O's Summary    05 Mar 2021 07:01  -  06 Mar 2021 07:00  --------------------------------------------------------  IN: 520 mL / OUT: 1350 mL / NET: -830 mL    06 Mar 2021 07:01  -  06 Mar 2021 19:05  --------------------------------------------------------  IN: 480 mL / OUT: 650 mL / NET: -170 mL        CAPILLARY BLOOD GLUCOSE      POCT Blood Glucose.: 120 mg/dL (05 Mar 2021 21:36)      PHYSICAL EXAM:    Constitutional:  (x   ) NAD,   (  x )awake and alert  HEENT: PERR, EOMI,    Neck: Soft and supple, No LAD, No JVD  Respiratory:  (  decreased  Breath sounds   Cardiovascular:     ( x  )S1 and S2, regular rate and rhythm, no Murmurs, gallops or rubs  Gastrointestinal:  ( x  )Bowel Sounds present, soft,   (  )nontender, nondistended,    Extremities:    ( xxxx ) peripheral edema  Vascular: 2+ peripheral pulses  Neurological:    (   x )A/O x 3,   (  ) focal deficits  Musculoskeletal:    ( x  )  normal strength b/l upper  (     ) normal  lower extremities  Skin: No rashes    MEDICATIONS:  MEDICATIONS  (STANDING):  carvedilol 25 milliGRAM(s) Oral every 12 hours  furosemide   Injectable 80 milliGRAM(s) IV Push every 12 hours  heparin   Injectable 5000 Unit(s) SubCutaneous every 12 hours  hydrALAZINE 100 milliGRAM(s) Oral every 8 hours  NIFEdipine XL 90 milliGRAM(s) Oral daily  tamsulosin 0.4 milliGRAM(s) Oral at bedtime      LABS: All Labs Reviewed:                        10.6   7.44  )-----------( 186      ( 06 Mar 2021 06:45 )             37.7     03-06    140  |  106  |  43<H>  ----------------------------<  109<H>  5.3   |  21<L>  |  4.91<H>    Ca    8.6      06 Mar 2021 06:45  Mg     2.5     03-05    TPro  6.1  /  Alb  3.4  /  TBili  0.2  /  DBili  x   /  AST  46<H>  /  ALT  55<H>  /  AlkPhos  99  03-06          Blood Culture:   Urine Culture      RADIOLOGY/EKG:    ASSESSMENT AND PLAN:  80M w/ hx of CKD4, dCHF, difficult to control HTN, DM?? p/w SOB and like acute on chronic congestive heart failure    Problem/Plan - 1:  ·  Problem: Acute on chronic congestive heart failure, unspecified heart failure type.  Plan: grossly fluid overloaded on exam and elevated BNP. Hx of severe LVH on prior TTE. Would suspect dCHF in setting of uncontrolled HTN. Minimal urine output after initial dose of 80IV lasix, possibly in setting of CKD.  -Will cont. lasix 80mv IV BID for now  -Strict I/Os and daily weights       Problem/Plan - 2:  ·  Problem: Acute respiratory failure with hypoxia.  Plan: Hypoxic to <80 on RA initially on arrival. Improved on 02. Suspect CHF related given clinical exam and CXR  -Cont. lasix 80IV Q12 for now  -Supplemental 02  -F/u LE dopplers.     Problem/Plan - 3:  ·  Problem: Essential hypertension.  Plan: BPs very elevated on arrival. Will try not to lower too rapidly given unclear baseline as pt states they are poorly controlled.  -Resume hydralazine 100mg TID tonight  -Stagger Coreg 25mg Q12  -Nifedipine 90mg daily in AM  -Trend vital signs.     Problem/Plan - 4:  ·  Problem: Chronic kidney disease (CKD), stage IV (severe).  Plan: Crt increased compared to 2018 but unclear if new baseline. HTN related?  -Trend BMP, mg  -Renal dose medications  -A1c.     Problem/Plan - 5:  ·  Problem: Hyperlipidemia, unspecified hyperlipidemia type.  Plan: -check lipids.     Problem/Plan - 6:  Problem: Benign prostatic hyperplasia, unspecified whether lower urinary tract symptoms present. Plan: -Cont. flomax.    Problem/Plan - 7:  ·  Problem: Prophylactic measure.  Plan: DVT PPX  -SCDs.   DVT PPX:    ADVANCED DIRECTIVE:    DISPOSITION:

## 2021-03-06 NOTE — PROGRESS NOTE ADULT - ASSESSMENT
ASSESSMENT/RECOMMENDATION:   80M w/ hx of CKD4, dCHF, difficult to control HTN, DM?? p/w SOB and CHF  I suspect he has diastolic CHF at present   CKD stage 4-5     1 Renal-Agree with the diuretics as outlines;  Outs>> ins   Monitor the creatinine which hopefully will stablize  2 CVS-Awaiting echo and may need ischemic eval as well.  Plan per cards;  SBP is still a little elevated at present   3 -Renal sono for prognostication.    4 Vasc-As outpt will need avf ..HD will be with in a yr    ASSESSMENT/RECOMMENDATION:   80M w/ hx of CKD4, dCHF, difficult to control HTN, DM?? p/w SOB and CHF  I suspect he has diastolic CHF at present   CKD stage 4-5   Hyperkalemia   Wt 180.5lbs today (178.1lbs yesterday)     1 Renal-Agree with the diuretics as outlines;  Outs>> ins   Continue to monitor the creatinine which hopefully will stabilize  Lokelma 5g po x 1  2 CVS- TTE yesterday, may need ischemic eval; Plan per cards; BP meds as ordered; SBP elevated overnight, slightly better this morning   3 -Renal sono for prognostication.    4 Vasc-As outpt will need avf ..HD will be with in a yr     D/w Dr. Juan Boss, NP-C  [facility]  [phone]    ASSESSMENT/RECOMMENDATION:   80M w/ hx of CKD4, dCHF, difficult to control HTN, DM?? p/w SOB and CHF  I suspect he has diastolic CHF at present   CKD stage 4-5   Hyperkalemia - mild  Wt 180.5lbs today (178.1lbs yesterday)     1 Renal-Agree with the diuretics as outlines;  Outs>> ins   Continue to monitor the creatinine which hopefully will stabilize  Lokelma 5g po x 1  2 CVS- TTE yesterday, may need ischemic eval; Plan per cards; BP meds as ordered; SBP elevated overnight, slightly better this morning   3 -Renal sono for prognostication.    4 Vasc-As outpt will need avf ..HD will be with in a yr     D/w Dr. Juan Boss, NP-C  [facility]  [phone]

## 2021-03-06 NOTE — PHYSICAL THERAPY INITIAL EVALUATION ADULT - PERTINENT HX OF CURRENT PROBLEM, REHAB EVAL
80M w/ hx of CKD4, dCHF, difficult to control HTN, DM?? p/w SOB and like acute on chronic congestive heart failure, CXR: Pulmonary edema with b/l pleural effusion, hospital; course + for hypertension notes.

## 2021-03-06 NOTE — PHYSICAL THERAPY INITIAL EVALUATION ADULT - ADDITIONAL COMMENTS
as per pt: PTA pt was living in a PH + Stairs (bedroom on 2nd floor states could stay on 1st floor if needed)/ and was independent in all functional mobility and ADL's. no AD for gait.

## 2021-03-06 NOTE — CHART NOTE - NSCHARTNOTEFT_GEN_A_CORE
F/U note on Hypertensive urgency    80M w/ hx of CKD4, dCHF, difficult to control HTN, DM?? p/w SOB and like acute on chronic congestive heart failure  BPs very elevated since arrival,  received hydralazine ivp in the evening, On antihypertensives   Bp trending down, SBP in 160's now, patient on diuresis with Lasix ivp  Patient remains asymptomatic    Vital Signs Last 24 Hrs  T(C): 36.7 (05 Mar 2021 11:16), Max: 36.7 (05 Mar 2021 11:16)  T(F): 98.1 (05 Mar 2021 11:16), Max: 98.1 (05 Mar 2021 11:16)  HR: 79 (05 Mar 2021 22:53) (67 - 85)  BP: 161/63 (05 Mar 2021 22:53) (157/67 - 209/98)  BP(mean): --  RR: 18 (05 Mar 2021 21:21) (18 - 18)  SpO2: 95% (05 Mar 2021 21:21) (92% - 95%)    < from: Transthoracic Echocardiogram (03.05.21 @ 08:41) >    Conclusions:  1. Calcified trileaflet aortic valve with mildly  decreased  opening.  2. Moderate concentric left ventricular hypertrophy.  3. Mild segmental left ventricular systolic dysfunction.  Peak left ventricular outflow tract gradient equals 52 mm  Hg, consistent with moderately severe LVOT obstruction.HR  70 The mid inferolateral wall, and the basal  inferolateral  wall are hypokinetic. The basal inferior wall is akinetic.  4. Normal right ventricular size with decreased right  ventricular systolic function.    Hypertensive urgency  SBP in 160's now  Patient with no acute focal deficits now  C/W Nifedipine, hydralazine, Coreg  C/W Diuresis  Trend vitals  F/U cardiology am  Will continue to monitor    Laura Alex [facility] [place]  [id number]

## 2021-03-07 LAB
ANION GAP SERPL CALC-SCNC: 11 MMOL/L — SIGNIFICANT CHANGE UP (ref 5–17)
ANION GAP SERPL CALC-SCNC: 12 MMOL/L — SIGNIFICANT CHANGE UP (ref 5–17)
BUN SERPL-MCNC: 50 MG/DL — HIGH (ref 7–23)
BUN SERPL-MCNC: 50 MG/DL — HIGH (ref 7–23)
CALCIUM SERPL-MCNC: 8.5 MG/DL — SIGNIFICANT CHANGE UP (ref 8.4–10.5)
CALCIUM SERPL-MCNC: 8.6 MG/DL — SIGNIFICANT CHANGE UP (ref 8.4–10.5)
CHLORIDE SERPL-SCNC: 102 MMOL/L — SIGNIFICANT CHANGE UP (ref 96–108)
CHLORIDE SERPL-SCNC: 103 MMOL/L — SIGNIFICANT CHANGE UP (ref 96–108)
CO2 SERPL-SCNC: 25 MMOL/L — SIGNIFICANT CHANGE UP (ref 22–31)
CO2 SERPL-SCNC: 26 MMOL/L — SIGNIFICANT CHANGE UP (ref 22–31)
CREAT SERPL-MCNC: 5.24 MG/DL — HIGH (ref 0.5–1.3)
CREAT SERPL-MCNC: 5.28 MG/DL — HIGH (ref 0.5–1.3)
GLUCOSE SERPL-MCNC: 112 MG/DL — HIGH (ref 70–99)
GLUCOSE SERPL-MCNC: 176 MG/DL — HIGH (ref 70–99)
HCT VFR BLD CALC: 34.6 % — LOW (ref 39–50)
HGB BLD-MCNC: 9.8 G/DL — LOW (ref 13–17)
MAGNESIUM SERPL-MCNC: 2.5 MG/DL — SIGNIFICANT CHANGE UP (ref 1.6–2.6)
MAGNESIUM SERPL-MCNC: 2.6 MG/DL — SIGNIFICANT CHANGE UP (ref 1.6–2.6)
MCHC RBC-ENTMCNC: 23.3 PG — LOW (ref 27–34)
MCHC RBC-ENTMCNC: 28.3 GM/DL — LOW (ref 32–36)
MCV RBC AUTO: 82.4 FL — SIGNIFICANT CHANGE UP (ref 80–100)
NRBC # BLD: 0 /100 WBCS — SIGNIFICANT CHANGE UP (ref 0–0)
NT-PROBNP SERPL-SCNC: HIGH PG/ML (ref 0–300)
PLATELET # BLD AUTO: 172 K/UL — SIGNIFICANT CHANGE UP (ref 150–400)
POTASSIUM SERPL-MCNC: 4.3 MMOL/L — SIGNIFICANT CHANGE UP (ref 3.5–5.3)
POTASSIUM SERPL-MCNC: 4.3 MMOL/L — SIGNIFICANT CHANGE UP (ref 3.5–5.3)
POTASSIUM SERPL-SCNC: 4.3 MMOL/L — SIGNIFICANT CHANGE UP (ref 3.5–5.3)
POTASSIUM SERPL-SCNC: 4.3 MMOL/L — SIGNIFICANT CHANGE UP (ref 3.5–5.3)
RBC # BLD: 4.2 M/UL — SIGNIFICANT CHANGE UP (ref 4.2–5.8)
RBC # FLD: 18.4 % — HIGH (ref 10.3–14.5)
SODIUM SERPL-SCNC: 139 MMOL/L — SIGNIFICANT CHANGE UP (ref 135–145)
SODIUM SERPL-SCNC: 140 MMOL/L — SIGNIFICANT CHANGE UP (ref 135–145)
WBC # BLD: 5.25 K/UL — SIGNIFICANT CHANGE UP (ref 3.8–10.5)
WBC # FLD AUTO: 5.25 K/UL — SIGNIFICANT CHANGE UP (ref 3.8–10.5)

## 2021-03-07 RX ORDER — FUROSEMIDE 40 MG
10 TABLET ORAL
Qty: 500 | Refills: 0 | Status: DISCONTINUED | OUTPATIENT
Start: 2021-03-07 | End: 2021-03-08

## 2021-03-07 RX ADMIN — HEPARIN SODIUM 5000 UNIT(S): 5000 INJECTION INTRAVENOUS; SUBCUTANEOUS at 17:19

## 2021-03-07 RX ADMIN — HEPARIN SODIUM 5000 UNIT(S): 5000 INJECTION INTRAVENOUS; SUBCUTANEOUS at 06:34

## 2021-03-07 RX ADMIN — Medication 100 MILLIGRAM(S): at 14:08

## 2021-03-07 RX ADMIN — CARVEDILOL PHOSPHATE 25 MILLIGRAM(S): 80 CAPSULE, EXTENDED RELEASE ORAL at 17:20

## 2021-03-07 RX ADMIN — Medication 5 MG/HR: at 12:06

## 2021-03-07 RX ADMIN — TAMSULOSIN HYDROCHLORIDE 0.4 MILLIGRAM(S): 0.4 CAPSULE ORAL at 21:45

## 2021-03-07 RX ADMIN — Medication 100 MILLIGRAM(S): at 06:35

## 2021-03-07 RX ADMIN — Medication 80 MILLIGRAM(S): at 06:34

## 2021-03-07 RX ADMIN — Medication 90 MILLIGRAM(S): at 06:34

## 2021-03-07 RX ADMIN — CARVEDILOL PHOSPHATE 25 MILLIGRAM(S): 80 CAPSULE, EXTENDED RELEASE ORAL at 06:34

## 2021-03-07 RX ADMIN — Medication 100 MILLIGRAM(S): at 21:45

## 2021-03-07 NOTE — PROGRESS NOTE ADULT - SUBJECTIVE AND OBJECTIVE BOX
NEPHROLOGY     Patient seen and examined sitting in chair. Pt reports feeling ok, though states he still has some sob, on 4L NC, no complaints of pain, in no acute distress. No overnight events noted.     MEDICATIONS  (STANDING):  carvedilol 25 milliGRAM(s) Oral every 12 hours  furosemide Infusion 10 mG/Hr (5 mL/Hr) IV Continuous <Continuous>  heparin   Injectable 5000 Unit(s) SubCutaneous every 12 hours  hydrALAZINE 100 milliGRAM(s) Oral every 8 hours  NIFEdipine XL 90 milliGRAM(s) Oral daily  tamsulosin 0.4 milliGRAM(s) Oral at bedtime    VITALS:  T(C): , Max: 36.7 (21 @ 20:55)  T(F): , Max: 98.1 (21 @ 04:22)  HR: 68 (21 @ 14:07)  BP: 148/78 (21 @ 14:07)  RR: 18 (21 @ 14:07)  SpO2: 95% (21 @ 14:07)    I and O's:     @ 07:01  -   @ 07:00  --------------------------------------------------------  IN: 780 mL / OUT: 1050 mL / NET: -270 mL     @ 07:01  -   @ 14:48  --------------------------------------------------------  IN: 560 mL / OUT: 200 mL / NET: 360 mL    PHYSICAL EXAM:  Constitutional: NAD  HEENT: EOMI  Neck:  No JVD, supple   Respiratory: CTA B/L  Cardiovascular: S1 and S2, RRR  Gastrointestinal: + BS, soft, NT, ND  Extremities: +3 peripheral edema, + peripheral pulses  Neurological: A/O x 3, CN2-12 intact  Psychiatric: Normal mood, normal affect  : No Aguilar  Skin: No rashes, C/D/I    LABS:                        9.8    5.25  )-----------( 172      ( 07 Mar 2021 06:45 )             34.6         140  |  103  |  50<H>  ----------------------------<  112<H>  4.3   |  26  |  5.24<H>    Ca    8.6      07 Mar 2021 06:44  Mg     2.6         TPro  6.1  /  Alb  3.4  /  TBili  0.2  /  DBili  x   /  AST  46<H>  /  ALT  55<H>  /  AlkPhos  99      Urine Studies:  Urinalysis Basic - ( 05 Mar 2021 22:56 )    Color: Light Yellow / Appearance: Clear / S.009 / pH: x  Gluc: x / Ketone: Negative  / Bili: Negative / Urobili: Negative   Blood: x / Protein: 100 / Nitrite: Negative   Leuk Esterase: Moderate / RBC: 2 /hpf / WBC 3 /HPF   Sq Epi: x / Non Sq Epi: 1 / Bacteria: Negative    Creatinine, Random Urine: 61 mg/dL ( @ 22:56)  Protein/Creatinine Ratio Calculation: 2.0 Ratio ( @ 22:56)

## 2021-03-07 NOTE — PROGRESS NOTE ADULT - ASSESSMENT
ASSESSMENT/RECOMMENDATION:   80M w/ hx of CKD4, dCHF, difficult to control HTN, DM?? p/w SOB and CHF  Suspect he has diastolic CHF at present   CKD stage 4-5   Hyperkalemia - improved sp Lokelma 5g po x 1  TTE- EF 60% with mid inferolateral wall and basal inferolateral wall hypokinetic  Wt 181.2lbs today (180.5lbs yesterday)     1 Renal-Agree with the diuretics as ordered, Lasix gtt ;  Outs>> ins   Continue to monitor the creatinine which hopefully will stabilize  2 CVS-  may need ischemic eval; NM stress test vs cath, plan per cards; BP meds as ordered  3 -Renal sono for prognostication.    4 Vasc-As outpt will need avf ..HD will be with in a yr     D/w Dr. Juan Boss, NP-C  [facility]  [phone]

## 2021-03-07 NOTE — PROGRESS NOTE ADULT - SUBJECTIVE AND OBJECTIVE BOX
Patient is a 80y old  Male who presents with a chief complaint of Shortness of breath (06 Mar 2021 19:04)      INTERVAL HISTORY: sitting up in bed, denies chest pain, shortness of breath    TELEMETRY Personally reviewed:NSR 60-90s PVC    REVIEW OF SYSTEMS:   CONSTITUTIONAL: No weakness  EYES/ENT: No visual changes; No throat pain  Neck: No pain or stiffness  Respiratory: No cough, wheezing, No shortness of breath  CARDIOVASCULAR: no chest pain or palpitations  GASTROINTESTINAL: No abdominal pain, no nausea, vomiting or hematemesis  GENITOURINARY: No dysuria, frequency or hematuria  NEUROLOGICAL: No stroke like symptoms  SKIN: No rashes  	  MEDICATIONS:  carvedilol 25 milliGRAM(s) Oral every 12 hours  furosemide Infusion 10 mG/Hr IV Continuous <Continuous>  hydrALAZINE 100 milliGRAM(s) Oral every 8 hours  NIFEdipine XL 90 milliGRAM(s) Oral daily  tamsulosin 0.4 milliGRAM(s) Oral at bedtime    PHYSICAL EXAM:  T(C): 36.7 (03-07-21 @ 11:35), Max: 36.7 (03-06-21 @ 20:55)  HR: 70 (03-07-21 @ 11:35) (70 - 78)  BP: 169/77 (03-07-21 @ 11:35) (137/53 - 169/77)  RR: 18 (03-07-21 @ 11:35) (18 - 19)  SpO2: 95% (03-07-21 @ 11:35) (90% - 96%)  Wt(kg): --  I&O's Summary    06 Mar 2021 07:01  -  07 Mar 2021 07:00  --------------------------------------------------------  IN: 780 mL / OUT: 1050 mL / NET: -270 mL    07 Mar 2021 07:01  -  07 Mar 2021 12:45  --------------------------------------------------------  IN: 320 mL / OUT: 0 mL / NET: 320 mL    Appearance: In no distress	  HEENT:    PERRL, EOMI	  Cardiovascular:  S1 S2, No JVD  Respiratory: Lungs clear to auscultation	  Gastrointestinal:  Soft, Non-tender, + BS	  Vascularature:  No edema of LE  Psychiatric: Appropriate affect   Neuro: no acute focal deficits                         9.8    5.25  )-----------( 172      ( 07 Mar 2021 06:45 )             34.6     03-07    140  |  103  |  50<H>  ----------------------------<  112<H>  4.3   |  26  |  5.24<H>    Ca    8.6      07 Mar 2021 06:44  Mg     2.6     03-07    TPro  6.1  /  Alb  3.4  /  TBili  0.2  /  DBili  x   /  AST  46<H>  /  ALT  55<H>  /  AlkPhos  99  03-06  Labs personally reviewed    ASSESSMENT/PLAN: 	  80M w/ hx of CKD4, dCHF, difficult to control HTN, DM?? p/w SOB and like acute on chronic congestive heart failure    Problem/Plan - 1:  ·  Problem: Acute on chronic congestive heart failure, unspecified heart failure type.  Plan: grossly fluid overloaded on exam and elevated BNP. Hx of severe LVH on prior TTE. Would suspect dCHF in setting of uncontrolled HTN.    -Will cont. lasix 80mv IV BID    -TTE- EF 60% with mid inferolateral wall and basal inferolateral wall hypokinetic, will need ischemic eval: NM stress test vs cath ( likely diagnostic only if Cr doesn't improve) .. continue to diurese for now until euvolemic    Problem/Plan - 2:  ·  Problem: Acute respiratory failure with hypoxia.  Plan: Hypoxic to <80 on RA initially on arrival. Improved on 02. Suspect CHF related given clinical exam and CXR   -lasix gtt @10mG/hr, BMP q40pcxd, strict i/o, keep net negative, daily standing weights. Replace lytes prn for goal K>4, Mg>2, repeat probnp  -LE doppler neg DVT    Problem/Plan - 3:  ·  Problem: Essential hypertension.  Plan: BPs very elevated on arrival. Will try not to lower too rapidly given unclear baseline as pt states they are poorly controlled.  -c/w hydralazine 100mg TID   -Stagger Coreg 25mg Q12  -Nifedipine 90mg daily in AM  - may add Clonidine 0.1mg BID for better blood pressure control    Problem/Plan - 4:  ·  Problem: Chronic kidney disease (CKD), stage IV (severe).  Plan:  VIRGEN on CKD,  - avoid nephrotoxic agents  - Aporeciate Renal reccs- continue with diuresing, will need eventual AVF for dialysis   - Renal Sono- chronic medical renal disease, b/l cysts    Problem/Plan - 5:  ·  Problem: Hyperlipidemia, unspecified hyperlipidemia type.  Plan: -check lipids.     Problem/Plan - 6:  Problem: Benign prostatic hyperplasia, unspecified whether lower urinary tract symptoms present. Plan: -Cont. flomax.    Problem/Plan - 7:  ·  Problem: Prophylactic measure.  Plan: DVT PPX  -SCDs.                 Betzaida FARRELLC  Sridhar Carrillo DO [facility]  Cardiovascular Medicine  [address] [place] [address], [address]  Office: [phone]  Cell: [phone]

## 2021-03-08 LAB
ANION GAP SERPL CALC-SCNC: 12 MMOL/L — SIGNIFICANT CHANGE UP (ref 5–17)
ANION GAP SERPL CALC-SCNC: 14 MMOL/L — SIGNIFICANT CHANGE UP (ref 5–17)
BASE EXCESS BLDA CALC-SCNC: 1.3 MMOL/L — SIGNIFICANT CHANGE UP (ref -2–2)
BUN SERPL-MCNC: 49 MG/DL — HIGH (ref 7–23)
BUN SERPL-MCNC: 52 MG/DL — HIGH (ref 7–23)
CALCIUM SERPL-MCNC: 8.7 MG/DL — SIGNIFICANT CHANGE UP (ref 8.4–10.5)
CALCIUM SERPL-MCNC: 8.8 MG/DL — SIGNIFICANT CHANGE UP (ref 8.4–10.5)
CHLORIDE SERPL-SCNC: 102 MMOL/L — SIGNIFICANT CHANGE UP (ref 96–108)
CHLORIDE SERPL-SCNC: 102 MMOL/L — SIGNIFICANT CHANGE UP (ref 96–108)
CO2 BLDA-SCNC: 31 MMOL/L — HIGH (ref 22–30)
CO2 SERPL-SCNC: 26 MMOL/L — SIGNIFICANT CHANGE UP (ref 22–31)
CO2 SERPL-SCNC: 28 MMOL/L — SIGNIFICANT CHANGE UP (ref 22–31)
CREAT SERPL-MCNC: 5.32 MG/DL — HIGH (ref 0.5–1.3)
CREAT SERPL-MCNC: 5.41 MG/DL — HIGH (ref 0.5–1.3)
GAS PNL BLDA: SIGNIFICANT CHANGE UP
GLUCOSE SERPL-MCNC: 114 MG/DL — HIGH (ref 70–99)
GLUCOSE SERPL-MCNC: 128 MG/DL — HIGH (ref 70–99)
HCO3 BLDA-SCNC: 29 MMOL/L — SIGNIFICANT CHANGE UP (ref 21–29)
HCT VFR BLD CALC: 34.1 % — LOW (ref 39–50)
HGB BLD-MCNC: 9.8 G/DL — LOW (ref 13–17)
MAGNESIUM SERPL-MCNC: 2.4 MG/DL — SIGNIFICANT CHANGE UP (ref 1.6–2.6)
MAGNESIUM SERPL-MCNC: 2.6 MG/DL — SIGNIFICANT CHANGE UP (ref 1.6–2.6)
MCHC RBC-ENTMCNC: 23.2 PG — LOW (ref 27–34)
MCHC RBC-ENTMCNC: 28.7 GM/DL — LOW (ref 32–36)
MCV RBC AUTO: 80.6 FL — SIGNIFICANT CHANGE UP (ref 80–100)
NRBC # BLD: 0 /100 WBCS — SIGNIFICANT CHANGE UP (ref 0–0)
PCO2 BLDA: 67 MMHG — HIGH (ref 32–46)
PH BLDA: 7.26 — LOW (ref 7.35–7.45)
PHOSPHATE SERPL-MCNC: 5.3 MG/DL — HIGH (ref 2.5–4.5)
PLATELET # BLD AUTO: 187 K/UL — SIGNIFICANT CHANGE UP (ref 150–400)
PO2 BLDA: 78 MMHG — SIGNIFICANT CHANGE UP (ref 74–108)
POTASSIUM SERPL-MCNC: 4.2 MMOL/L — SIGNIFICANT CHANGE UP (ref 3.5–5.3)
POTASSIUM SERPL-MCNC: 4.8 MMOL/L — SIGNIFICANT CHANGE UP (ref 3.5–5.3)
POTASSIUM SERPL-SCNC: 4.2 MMOL/L — SIGNIFICANT CHANGE UP (ref 3.5–5.3)
POTASSIUM SERPL-SCNC: 4.8 MMOL/L — SIGNIFICANT CHANGE UP (ref 3.5–5.3)
RBC # BLD: 4.23 M/UL — SIGNIFICANT CHANGE UP (ref 4.2–5.8)
RBC # FLD: 17.8 % — HIGH (ref 10.3–14.5)
SAO2 % BLDA: 96 % — SIGNIFICANT CHANGE UP (ref 92–96)
SODIUM SERPL-SCNC: 142 MMOL/L — SIGNIFICANT CHANGE UP (ref 135–145)
SODIUM SERPL-SCNC: 142 MMOL/L — SIGNIFICANT CHANGE UP (ref 135–145)
WBC # BLD: 4.93 K/UL — SIGNIFICANT CHANGE UP (ref 3.8–10.5)
WBC # FLD AUTO: 4.93 K/UL — SIGNIFICANT CHANGE UP (ref 3.8–10.5)

## 2021-03-08 PROCEDURE — 99222 1ST HOSP IP/OBS MODERATE 55: CPT | Mod: GC

## 2021-03-08 RX ORDER — POLYMYXIN B SULF/TRIMETHOPRIM 10000-1/ML
1 DROPS OPHTHALMIC (EYE) THREE TIMES A DAY
Refills: 0 | Status: COMPLETED | OUTPATIENT
Start: 2021-03-08 | End: 2021-03-15

## 2021-03-08 RX ORDER — BUMETANIDE 0.25 MG/ML
1 INJECTION INTRAMUSCULAR; INTRAVENOUS
Qty: 20 | Refills: 0 | Status: DISCONTINUED | OUTPATIENT
Start: 2021-03-08 | End: 2021-03-08

## 2021-03-08 RX ORDER — BUMETANIDE 0.25 MG/ML
2 INJECTION INTRAMUSCULAR; INTRAVENOUS
Qty: 20 | Refills: 0 | Status: DISCONTINUED | OUTPATIENT
Start: 2021-03-08 | End: 2021-03-09

## 2021-03-08 RX ADMIN — CARVEDILOL PHOSPHATE 25 MILLIGRAM(S): 80 CAPSULE, EXTENDED RELEASE ORAL at 18:25

## 2021-03-08 RX ADMIN — Medication 1 APPLICATION(S): at 23:11

## 2021-03-08 RX ADMIN — Medication 90 MILLIGRAM(S): at 05:30

## 2021-03-08 RX ADMIN — BUMETANIDE 10 MG/HR: 0.25 INJECTION INTRAMUSCULAR; INTRAVENOUS at 21:52

## 2021-03-08 RX ADMIN — HEPARIN SODIUM 5000 UNIT(S): 5000 INJECTION INTRAVENOUS; SUBCUTANEOUS at 17:04

## 2021-03-08 RX ADMIN — Medication 100 MILLIGRAM(S): at 14:00

## 2021-03-08 RX ADMIN — Medication 100 MILLIGRAM(S): at 21:23

## 2021-03-08 RX ADMIN — Medication 1 DROP(S): at 21:23

## 2021-03-08 RX ADMIN — TAMSULOSIN HYDROCHLORIDE 0.4 MILLIGRAM(S): 0.4 CAPSULE ORAL at 21:23

## 2021-03-08 RX ADMIN — BUMETANIDE 5 MG/HR: 0.25 INJECTION INTRAMUSCULAR; INTRAVENOUS at 20:03

## 2021-03-08 RX ADMIN — HEPARIN SODIUM 5000 UNIT(S): 5000 INJECTION INTRAVENOUS; SUBCUTANEOUS at 05:30

## 2021-03-08 RX ADMIN — Medication 100 MILLIGRAM(S): at 05:30

## 2021-03-08 RX ADMIN — Medication 1 APPLICATION(S): at 18:25

## 2021-03-08 RX ADMIN — CARVEDILOL PHOSPHATE 25 MILLIGRAM(S): 80 CAPSULE, EXTENDED RELEASE ORAL at 05:30

## 2021-03-08 NOTE — CHART NOTE - NSCHARTNOTEFT_GEN_A_CORE
CHIEF COMPLAINT: SOB     HPI:  80M w/ hx of CKD4, dCHF, difficult to control HTN, DM?? p/w SOB and CHF. Pt states he has been in [facility] since Dec 27th 2020 and just returned home from [facility] today. Pt states he started to get worsening SOB over the past 2-3 weeks in [facility]. He states he discussed this with his doctor in [facility] and was advised to come back to the [place] for further evaluation and treatment. Pt endorses worsening orthopnea and LE edema. Denies any chest pain, palpitations, fevers or chills. Endorses cough relatively unchanged. Endorses okay medication compliance although did not take any medications today. Denies headache. Occasional dizziness but no syncope.   In ER: Given lasix 80IV, nitro patch (04 Mar 2021 21:08)      FAMILY HISTORY:  Family history of diabetes mellitus (DM) (Mother, Sibling)        SOCIAL HISTORY:  Smoking: __ packs x ___ years  EtOH Use:  Marital Status:  Occupation:  Recent Travel:  Country of Birth:  Advance Directives:    Allergies    grass, pollen (Rhinitis)  No Known Drug Allergies    Intolerances        HOME MEDICATIONS:    REVIEW OF SYSTEMS:  CONSTITUTIONAL: No fever/chills.   HEENT: No cough. No runny nose.   RESPIRATORY: + shortness of breath, stable from 1 week ago   CARDIOVASCULAR: No chest pain  GASTROINTESTINAL: No abdominal pain . No nausea/ vomiting. No diarrhea. No melena/hematochezia.  GENITOURINARY: No dysuria, frequency or hematuria  NEUROLOGICAL: No headache. No blurry vision.   SKIN: No rashes      OBJECTIVE:  ICU Vital Signs Last 24 Hrs  T(C): 36.8 (08 Mar 2021 04:14), Max: 36.8 (08 Mar 2021 04:14)  T(F): 98.2 (08 Mar 2021 04:14), Max: 98.2 (08 Mar 2021 04:14)  HR: 67 (08 Mar 2021 11:31) (67 - 86)  BP: 136/73 (08 Mar 2021 11:31) (133/63 - 145/68)  BP(mean): --  ABP: --  ABP(mean): --  RR: 18 (08 Mar 2021 11:31) (18 - 20)  SpO2: 93% (08 Mar 2021 11:31) (93% - 95%)        03-07 @ 07:01  -  03-08 @ 07:00  --------------------------------------------------------  IN: 920 mL / OUT: 800 mL / NET: 120 mL    03-08 @ 07:01  - 03-08 @ 20:22  --------------------------------------------------------  IN: 60 mL / OUT: 250 mL / NET: -190 mL      CAPILLARY BLOOD GLUCOSE          PHYSICAL EXAM:  GENERAL: Sitting up in chair, in no acute distress   HEAD:  Atraumatic, Normocephalic  EYES: EOMI, PERRLA  NECK: Supple, No JVD  CHEST/LUNG: + expiratory wheezes diffusely   HEART: Regular rate and rhythm; No murmurs, rubs, or gallops  ABDOMEN: Soft, Nontender, Nondistended; Bowel sounds present  EXTREMITIES:  2+ pitting edema bilaterally up to knees   PSYCH: Alert, oriented x3, able to tell recent history, understands why he is in the hospital, follows commands   NEUROLOGY: symmetric facial expressions, moves 4 extremities spontaneously   SKIN: No rashes or lesions      HOSPITAL MEDICATIONS:  MEDICATIONS  (STANDING):  buMETAnide Infusion 1 mG/Hr (5 mL/Hr) IV Continuous <Continuous>  carvedilol 25 milliGRAM(s) Oral every 12 hours  heparin   Injectable 5000 Unit(s) SubCutaneous every 12 hours  hydrALAZINE 100 milliGRAM(s) Oral every 8 hours  NIFEdipine XL 90 milliGRAM(s) Oral daily  petrolatum Ophthalmic Ointment 1 Application(s) Both EYES four times a day  tamsulosin 0.4 milliGRAM(s) Oral at bedtime  trimethoprim/polymyxin Solution 1 Drop(s) Both EYES three times a day    MEDICATIONS  (PRN):      LABS:                        9.8    4.93  )-----------( 187      ( 08 Mar 2021 06:11 )             34.1     03-08    142  |  102  |  52<H>  ----------------------------<  114<H>  4.8   |  28  |  5.41<H>    Ca    8.8      08 Mar 2021 18:42  Phos  5.3     03-08  Mg     2.6     03-08      MICROBIOLOGY:         Assessment and plan:  80M w/ hx of CKD4, diastolic CHF, difficult to control HTN, p/w SOB.   MICU consulted for urgent dialysis    Assessed patient. Alert, oriented x3, on NC only, breaths comfortably, speaking in full sentences, made urine right before assessment. Electrolytes acceptable, K 4.8, Phos 5.3, BUN 52.   Patient does not need urgent dialysis, and is NOT  a MICU candidate.

## 2021-03-08 NOTE — PROGRESS NOTE ADULT - SUBJECTIVE AND OBJECTIVE BOX
CHIEF COMPLAINT:    SUBJECTIVE:     REVIEW OF SYSTEMS:    CONSTITUTIONAL: (  )  weakness,  (  ) fevers or chills  EYES/ENT: (  )visual changes;     NECK: (  ) pain or stiffness  RESPIRATORY:   (  )cough, wheezing, hemoptysis;  (  ) shortness of breath  CARDIOVASCULAR:  (  )chest pain or palpitations  GASTROINTESTINAL:   (  )abdominal or epigastric pain.  (  ) nausea, vomiting, or hematemesis;   (   ) diarrhea or constipation.   GENITOURINARY:   (    ) dysuria, frequency or hematuria  NEUROLOGICAL:  (   ) numbness or weakness   All other review of systems is negative unless indicated above    Vital Signs Last 24 Hrs  T(C): 36.8 (08 Mar 2021 04:14), Max: 36.8 (08 Mar 2021 04:14)  T(F): 98.2 (08 Mar 2021 04:14), Max: 98.2 (08 Mar 2021 04:14)  HR: 67 (08 Mar 2021 11:31) (67 - 86)  BP: 136/73 (08 Mar 2021 11:31) (133/63 - 155/76)  BP(mean): --  RR: 18 (08 Mar 2021 11:31) (18 - 20)  SpO2: 93% (08 Mar 2021 11:31) (93% - 95%)    I&O's Summary    07 Mar 2021 07:01  -  08 Mar 2021 07:00  --------------------------------------------------------  IN: 920 mL / OUT: 800 mL / NET: 120 mL    08 Mar 2021 07:01  -  08 Mar 2021 19:09  --------------------------------------------------------  IN: 60 mL / OUT: 0 mL / NET: 60 mL        CAPILLARY BLOOD GLUCOSE          PHYSICAL EXAM:    Constitutional:  (   ) NAD,   (   )awake and alert  HEENT: PERR, EOMI,    Neck: Soft and supple, No LAD, No JVD  Respiratory:  (    Breath sounds are clear bilaterally,    (   ) wheezing, rales or rhonchi  Cardiovascular:     (   )S1 and S2, regular rate and rhythm, no Murmurs, gallops or rubs  Gastrointestinal:  (   )Bowel Sounds present, soft,   (  )nontender, nondistended,    Extremities:    (  ) peripheral edema  Vascular: 2+ peripheral pulses  Neurological:    (    )A/O x 3,   (  ) focal deficits  Musculoskeletal:    (   )  normal strength b/l upper  (     ) normal  lower extremities  Skin: No rashes    MEDICATIONS:  MEDICATIONS  (STANDING):  buMETAnide Infusion 1 mG/Hr (5 mL/Hr) IV Continuous <Continuous>  carvedilol 25 milliGRAM(s) Oral every 12 hours  heparin   Injectable 5000 Unit(s) SubCutaneous every 12 hours  hydrALAZINE 100 milliGRAM(s) Oral every 8 hours  NIFEdipine XL 90 milliGRAM(s) Oral daily  petrolatum Ophthalmic Ointment 1 Application(s) Both EYES four times a day  tamsulosin 0.4 milliGRAM(s) Oral at bedtime  trimethoprim/polymyxin Solution 1 Drop(s) Both EYES three times a day      LABS: All Labs Reviewed:                        9.8    4.93  )-----------( 187      ( 08 Mar 2021 06:11 )             34.1     03-08    142  |  102  |  49<H>  ----------------------------<  128<H>  4.2   |  26  |  5.32<H>    Ca    8.7      08 Mar 2021 06:11  Phos  5.3     03-08  Mg     2.4     03-08            Blood Culture:   Urine Culture      RADIOLOGY/EKG:    ASSESSMENT AND PLAN:    DVT PPX:    ADVANCED DIRECTIVE:    DISPOSITION: CHIEF COMPLAINT:    patient condition worse today sitting in the chair with  mild sob also he has periorbital edema especially on the left side was seen by cardiology and renal his Lasix was discontinued and started on IV Bumex which had not been arrived to the unit at present time as per nursing discussed with medical team cardiology and nephrology he will benefit from urgent dialysis tonight ICU consult was called    SUBJECTIVE:     REVIEW OF SYSTEMS:    CONSTITUTIONAL: ( x )  weakness,  (  ) fevers or chills  EYES/ENT: (  )visual changes;     NECK: (  ) pain or stiffness  RESPIRATORY:   (  )cough, wheezing, hemoptysis;  (x  ) shortness of breath  CARDIOVASCULAR:  (  )chest pain or palpitations  GASTROINTESTINAL:   (  )abdominal or epigastric pain.  (  ) nausea, vomiting, or hematemesis;   (   ) diarrhea or constipation.   GENITOURINARY:   (    ) dysuria, frequency or hematuria  NEUROLOGICAL:  (   ) numbness or weakness   All other review of systems is negative unless indicated above    Vital Signs Last 24 Hrs  T(C): 36.8 (08 Mar 2021 04:14), Max: 36.8 (08 Mar 2021 04:14)  T(F): 98.2 (08 Mar 2021 04:14), Max: 98.2 (08 Mar 2021 04:14)  HR: 67 (08 Mar 2021 11:31) (67 - 86)  BP: 136/73 (08 Mar 2021 11:31) (133/63 - 155/76)  BP(mean): --  RR: 18 (08 Mar 2021 11:31) (18 - 20)  SpO2: 93% (08 Mar 2021 11:31) (93% - 95%)    I&O's Summary    07 Mar 2021 07:01  -  08 Mar 2021 07:00  --------------------------------------------------------  IN: 920 mL / OUT: 800 mL / NET: 120 mL    08 Mar 2021 07:01  -  08 Mar 2021 19:09  --------------------------------------------------------  IN: 60 mL / OUT: 0 mL / NET: 60 mL        CAPILLARY BLOOD GLUCOSE          PHYSICAL EXAM:    Constitutional:  ( x  ) NAD,   ( x  )awake and alert  HEENT: PERR, EOMI,    Neck: Soft and supple, No LAD, No JVD  Respiratory:  ( decrease   Breath sounds   no, rales or rhonchi  Cardiovascular:     (  x )S1 and S2, regular rate and rhythm, no Murmurs, gallops or rubs  Gastrointestinal:  ( x  )Bowel Sounds present, soft,   (  )nontender, nondistended,    Extremities:    (xxxx  ) peripheral edema  Vascular: 2+ peripheral pulses  Neurological:    ( x   )A/O x 3,   (  ) focal deficits  Musculoskeletal:    ( x  )  normal strength b/l upper  (     ) normal  lower extremities  Skin: No rashes    MEDICATIONS:  MEDICATIONS  (STANDING):  buMETAnide Infusion 1 mG/Hr (5 mL/Hr) IV Continuous <Continuous>  carvedilol 25 milliGRAM(s) Oral every 12 hours  heparin   Injectable 5000 Unit(s) SubCutaneous every 12 hours  hydrALAZINE 100 milliGRAM(s) Oral every 8 hours  NIFEdipine XL 90 milliGRAM(s) Oral daily  petrolatum Ophthalmic Ointment 1 Application(s) Both EYES four times a day  tamsulosin 0.4 milliGRAM(s) Oral at bedtime  trimethoprim/polymyxin Solution 1 Drop(s) Both EYES three times a day      LABS: All Labs Reviewed:                        9.8    4.93  )-----------( 187      ( 08 Mar 2021 06:11 )             34.1     03-08    142  |  102  |  49<H>  ----------------------------<  128<H>  4.2   |  26  |  5.32<H>    Ca    8.7      08 Mar 2021 06:11  Phos  5.3     03-08  Mg     2.4     03-08      BNP on 3/7/2021       [id number]         Blood Culture:   Urine Culture      RADIOLOGY/EKG:    ASSESSMENT AND PLAN:  80M w/ hx of CKD4, dCHF, difficult to control HTN, DM?? p/w SOB and like acute on chronic congestive heart failure    Problem/Plan - 1:  ·  Problem: Acute on chronic congestive heart failure, unspecified heart failure type.  Plan: grossly fluid overloaded on exam and elevated BNP. Hx of severe LVH on prior TTE. Would suspect dCHF in setting of uncontrolled HTN. Minimal urine output after initial dose of 80IV lasix, possibly in setting of CKD.  -Will cont. lasix 80mv IV BID for now  -Strict I/Os and daily weights  -Patient started on IV Bumex 1 cc/h but due to his worsening condition he needed urgent dialysis discussed with the renal and cardiology cardiology spoke with his son and his wife for consent and patient agree with the dialysis MICU consult was called       Problem/Plan - 2:  ·  Problem: Acute respiratory failure with hypoxia.  Plan: Hypoxic to <80 on RA initially on arrival. Improved on 02. Suspect CHF related given clinical exam and CXR  -Cont. lasix 80IV Q12 for now  -Supplemental 02  -F/u LE dopplers.     Problem/Plan - 3:  ·  Problem: Essential hypertension.  Plan: BPs very elevated on arrival. Will try not to lower too rapidly given unclear baseline as pt states they are poorly controlled.  -Resume hydralazine 100mg TID tonight  -Stagger Coreg 25mg Q12  -Nifedipine 90mg daily in AM  -Trend vital signs.     Problem/Plan - 4:  ·  Problem: Chronic kidney disease (CKD), stage IV (severe).  Plan: Crt increased compared to 2018 but unclear if new baseline. HTN related?  -Trend BMP, mg  -Renal dose medications  -A1c.   3/8/2021 he needs urgent dialysis discussed with renal MICU consult was called    Problem/Plan - 5:  ·  Problem: Hyperlipidemia, unspecified hyperlipidemia type.  Plan: -check lipids.     Problem/Plan - 6:  Problem: Benign prostatic hyperplasia, unspecified whether lower urinary tract symptoms present. Plan: -Cont. flomax.    Problem/Plan - 7:  ·  Problem: Prophylactic measure.  Plan: DVT PPX  -SCDs.   DVT PPX:    ADVANCED DIRECTIVE:    DISPOSITION:transfer to MICU discussed with the medical team renal and cardiology

## 2021-03-08 NOTE — PROGRESS NOTE ADULT - SUBJECTIVE AND OBJECTIVE BOX
Patient is a 80y old  Male who presents with a chief complaint of Shortness of breath (07 Mar 2021 14:48)      INTERVAL HISTORY: sitting up in chair , denies chest pain, palpitations + slight sob     TELEMETRY Personally reviewed: NSR 60-80s, PVC, PAC    REVIEW OF SYSTEMS:   CONSTITUTIONAL: No weakness  EYES/ENT: No visual changes; No throat pain  Neck: No pain or stiffness  Respiratory: No cough, wheezing, No shortness of breath  CARDIOVASCULAR: no chest pain or palpitations  GASTROINTESTINAL: No abdominal pain, no nausea, vomiting or hematemesis  GENITOURINARY: No dysuria, frequency or hematuria  NEUROLOGICAL: No stroke like symptoms  SKIN: No rashes    MEDICATIONS:  carvedilol 25 milliGRAM(s) Oral every 12 hours  furosemide Infusion 10 mG/Hr IV Continuous <Continuous>  hydrALAZINE 100 milliGRAM(s) Oral every 8 hours  NIFEdipine XL 90 milliGRAM(s) Oral daily  tamsulosin 0.4 milliGRAM(s) Oral at bedtime    PHYSICAL EXAM:  T(C): 36.8 (03-08-21 @ 04:14), Max: 36.8 (03-08-21 @ 04:14)  HR: 67 (03-08-21 @ 11:31) (67 - 86)  BP: 136/73 (03-08-21 @ 11:31) (133/63 - 155/76)  RR: 18 (03-08-21 @ 11:31) (18 - 20)  SpO2: 93% (03-08-21 @ 11:31) (93% - 95%)  Wt(kg): --  I&O's Summary    07 Mar 2021 07:01  -  08 Mar 2021 07:00  --------------------------------------------------------  IN: 920 mL / OUT: 800 mL / NET: 120 mL    Appearance: In no distress	  HEENT:    PERRL, EOMI	  Cardiovascular:  S1 S2, No JVD  Respiratory: Lungs clear to auscultation	  Gastrointestinal:  Soft, Non-tender, + BS	  Vascularature:  No edema of LE  Psychiatric: Appropriate affect   Neuro: no acute focal deficits                    9.8    4.93  )-----------( 187      ( 08 Mar 2021 06:11 )             34.1     03-08    142  |  102  |  49<H>  ----------------------------<  128<H>  4.2   |  26  |  5.32<H>    Ca    8.7      08 Mar 2021 06:11  Phos  5.3     03-08  Mg     2.4     03-08    Labs personally reviewed    ASSESSMENT/PLAN: 	  80M w/ hx of CKD4, dCHF, difficult to control HTN, DM?? p/w SOB and like acute on chronic congestive heart failure    Problem/Plan - 1:  ·  Problem: Acute on chronic congestive heart failure, unspecified heart failure type.  Plan: grossly fluid overloaded on exam and elevated BNP. Hx of severe LVH on prior TTE. Would suspect dCHF in setting of uncontrolled HTN.    -TTE- EF 60% with mid inferolateral wall and basal inferolateral wall hypokinetic, will need ischemic eval: continue to diurese for now until euvolemic  - c/w lasix gtt 10mg/hr, BMP q12hrm check proBNP 3/9am.  - f/u with renal reccs- will likely need dialysis to assist with fluid overload      Problem/Plan - 2:  ·  Problem: Acute respiratory failure with hypoxia.  Plan: Hypoxic to <80 on RA initially on arrival. Improved on 02. Suspect CHF related given clinical exam and CXR   -lasix gtt @10mG/hr, BMP [id number], strict i/o, keep net negative, daily standing weights. Replace lytes prn for goal K>4, Mg>2,   -LE doppler neg DVT    Problem/Plan - 3:  ·  Problem: Essential hypertension.  Plan: BPs very elevated on arrival. Will try not to lower too rapidly given unclear baseline as pt states they are poorly controlled.  -c/w hydralazine 100mg TID   -Stagger Coreg 25mg Q12  -Nifedipine 90mg daily in AM  - may add Clonidine 0.1mg BID for better blood pressure control    Problem/Plan - 4:  ·  Problem: Chronic kidney disease (CKD), stage IV (severe).  Plan:  VIRGEN on CKD,  - avoid nephrotoxic agents  - Aporeciate Renal reccs- continue with diuresing, will need eventual AVF for dialysis   - Renal Sono- chronic medical renal disease, b/l cysts    Problem/Plan - 5:  ·  Problem: Hyperlipidemia, unspecified hyperlipidemia type.  Plan: -check lipids.     Problem/Plan - 6:  Problem: Benign prostatic hyperplasia, unspecified whether lower urinary tract symptoms present. Plan: -Cont. flomax.    Problem/Plan - 7:  ·  Problem: Prophylactic measure.  Plan: DVT PPX  -SCDs.         Betzaida Maharaj ANP-C  Sridhar Carrillo DO [facility]  Cardiovascular Medicine  [address] [place] [address], [address]  Office: [phone]  Cell: [phone]

## 2021-03-08 NOTE — PROGRESS NOTE ADULT - ASSESSMENT
ASSESSMENT/RECOMMENDATION:   80M w/ hx of CKD4, dCHF, difficult to control HTN, DM?? p/w SOB and CHF  Suspect he has diastolic CHF at present   CKD stage 4-5   Hyperkalemia - improved sp Lokelma 5g po x 1  TTE- EF 60% with mid inferolateral wall and basal inferolateral wall hypokinetic  Wt 181.2lbs today (180.5lbs yesterday)     1 Renal-Change to Bumex gtt for increase urine outpt;  I have discussed HD with the pt and then spoke with the family as well and they will try to convince him.  At present he is refusing;  2 CVS-  may need ischemic eval; NM stress test vs cath, plan per cards; BP meds as ordered  3 -Renal sono for prognostication.    4 Vasc- HD will be soon           [facility]  [phone]       DW Dr Jaramillo who wants HD tonight...MICU called

## 2021-03-08 NOTE — PROGRESS NOTE ADULT - SUBJECTIVE AND OBJECTIVE BOX
NEPHROLOGY-[facility] [phone]        Patient seen and examined in bed.  He was about the same         MEDICATIONS  (STANDING):  carvedilol 25 milliGRAM(s) Oral every 12 hours  furosemide Infusion 10 mG/Hr (5 mL/Hr) IV Continuous <Continuous>  heparin   Injectable 5000 Unit(s) SubCutaneous every 12 hours  hydrALAZINE 100 milliGRAM(s) Oral every 8 hours  NIFEdipine XL 90 milliGRAM(s) Oral daily  tamsulosin 0.4 milliGRAM(s) Oral at bedtime      VITAL:  T(C): , Max: 36.8 (03-08-21 @ 04:14)  T(F): , Max: 98.2 (03-08-21 @ 04:14)  HR: 69 (03-08-21 @ 10:25)  BP: 135/71 (03-08-21 @ 10:25)  BP(mean): --  RR: 20 (03-08-21 @ 04:42)  SpO2: 95% (03-08-21 @ 10:25)  Wt(kg): --    I and O's:    03-07 @ 07:01  -  03-08 @ 07:00  --------------------------------------------------------  IN: 920 mL / OUT: 800 mL / NET: 120 mL          PHYSICAL EXAM:    Constitutional: NAD  Neck:  + JVD  Respiratory: CTAB/L  Cardiovascular: S1 and S2  Gastrointestinal: BS+, soft, NT/ND  Extremities: + peripheral edema  Neurological: A/O x 3, no focal deficits  Psychiatric: Normal mood, normal affect  : No Aguilar  Skin: No rashes  Access: Not applicable    LABS:                        9.8    4.93  )-----------( 187      ( 08 Mar 2021 06:11 )             34.1     03-08    142  |  102  |  49<H>  ----------------------------<  128<H>  4.2   |  26  |  5.32<H>    Ca    8.7      08 Mar 2021 06:11  Phos  5.3     03-08  Mg     2.4     03-08            Urine Studies:          RADIOLOGY & ADDITIONAL STUDIES:          < from: US Kidney and Bladder (03.05.21 @ 21:01) >    EXAM:  US KIDNEYS AND BLADDER                            PROCEDURE DATE:  03/05/2021            INTERPRETATION:  CLINICAL INFORMATION: Renal insufficiency. Evaluate for hydronephrosis.    COMPARISON: 11/13/2018.    TECHNIQUE: Sonography of the kidneys and bladder.    FINDINGS: Bilateral renal parenchymal thinning. Increased echogenicity of the renal parenchyma suggests chronic medical renal disease.    Right kidney: 12.3 cm. No hydronephrosis or renal calculi. Multiple cysts identified measuring up to 3.1 cm.    Left kidney: 11.7 cm. No hydronephrosis or renal calculi. Multiple cysts identified measuring up to 3.4 cm.    Urinary bladder: Not well distended limiting assessment; an element of bladder wall thickening/trabeculation is suggested. No intraluminal mass or bladder calculi noted.    The prostate is markedly enlarged with volume of 130 cc.    IMPRESSION:    No evidence for bilateral hydronephrosis. Bilateral renal parenchymal thinning. Increased echogenicity of the renal parenchyma suggests chronic medical renal disease. Bilateral renal cysts. See full discussion.                      BASILIO ESTRADA MD; Attending Radiologist  This document has been electronically signed. Mar  6 2021 11:21AM    < end of copied text >

## 2021-03-08 NOTE — CONSULT NOTE ADULT - SUBJECTIVE AND OBJECTIVE BOX
HPI:  80M w/ hx of CKD4, dCHF, difficult to control HTN, DM?? p/w SOB and CHF. Pt states he has been in [facility] since Dec 27th 2020 and just returned home from [facility] today. Pt states he started to get worsening SOB over the past 2-3 weeks in [facility]. He states he discussed this with his doctor in [facility] and was advised to [place] and [place]. Pt endorses worsening orthopnea and LE edema. Denies any chest pain, palpitations, fevers or chills. Endorses cough relatively unchanged. Endorses okay medication compliance although did not take any medications today. Denies headache. Occasional dizziness but no syncope.   In ER: Given lasix 80IV, nitro patch (04 Mar 2021 21:08)      FAMILY HISTORY:  Family history of diabetes mellitus (DM) (Mother, Sibling)        SOCIAL HISTORY:  Smoking: __ packs x ___ years  EtOH Use:  Marital Status:  Occupation:  Recent Travel:  Country of Birth:  Advance Directives:    Allergies    grass, pollen (Rhinitis)  No Known Drug Allergies    Intolerances        HOME MEDICATIONS:    REVIEW OF SYSTEMS:  CONSTITUTIONAL: No fever/chills.   HEENT: No cough. No runny nose.   RESPIRATORY: + shortness of breath, stable from 1 week ago   CARDIOVASCULAR: No chest pain  GASTROINTESTINAL: No abdominal pain . No nausea/ vomiting. No diarrhea. No melena/hematochezia.  GENITOURINARY: No dysuria, frequency or hematuria  NEUROLOGICAL: No headache. No blurry vision.   SKIN: No rashes      OBJECTIVE:  ICU Vital Signs Last 24 Hrs  T(C): 36.8 (08 Mar 2021 04:14), Max: 36.8 (08 Mar 2021 04:14)  T(F): 98.2 (08 Mar 2021 04:14), Max: 98.2 (08 Mar 2021 04:14)  HR: 67 (08 Mar 2021 11:31) (67 - 86)  BP: 136/73 (08 Mar 2021 11:31) (133/63 - 145/68)  BP(mean): --  ABP: --  ABP(mean): --  RR: 18 (08 Mar 2021 11:31) (18 - 20)  SpO2: 93% (08 Mar 2021 11:31) (93% - 95%)        03-07 @ 07:01  -  03-08 @ 07:00  --------------------------------------------------------  IN: 920 mL / OUT: 800 mL / NET: 120 mL    03-08 @ 07:01  - 03-08 @ 20:22  --------------------------------------------------------  IN: 60 mL / OUT: 250 mL / NET: -190 mL      CAPILLARY BLOOD GLUCOSE          PHYSICAL EXAM:  GENERAL: Sitting up in chair, in no acute distress   HEAD:  Atraumatic, Normocephalic  EYES: EOMI, PERRLA  NECK: Supple, No JVD  CHEST/LUNG: + expiratory wheezes diffusely   HEART: Regular rate and rhythm; No murmurs, rubs, or gallops  ABDOMEN: Soft, Nontender, Nondistended; Bowel sounds present  EXTREMITIES:  2+ pitting edema bilaterally up to knees   PSYCH: Alert, oriented x3, able to tell recent history, understands why he is in the hospital, follows commands   NEUROLOGY: symmetric facial expressions, moves 4 extremities spontaneously   SKIN: No rashes or lesions      HOSPITAL MEDICATIONS:  MEDICATIONS  (STANDING):  buMETAnide Infusion 1 mG/Hr (5 mL/Hr) IV Continuous <Continuous>  carvedilol 25 milliGRAM(s) Oral every 12 hours  heparin   Injectable 5000 Unit(s) SubCutaneous every 12 hours  hydrALAZINE 100 milliGRAM(s) Oral every 8 hours  NIFEdipine XL 90 milliGRAM(s) Oral daily  petrolatum Ophthalmic Ointment 1 Application(s) Both EYES four times a day  tamsulosin 0.4 milliGRAM(s) Oral at bedtime  trimethoprim/polymyxin Solution 1 Drop(s) Both EYES three times a day    MEDICATIONS  (PRN):      LABS:                        9.8    4.93  )-----------( 187      ( 08 Mar 2021 06:11 )             34.1     03-08    142  |  102  |  52<H>  ----------------------------<  114<H>  4.8   |  28  |  5.41<H>    Ca    8.8      08 Mar 2021 18:42  Phos  5.3     03-08  Mg     2.6     03-08      MICROBIOLOGY:

## 2021-03-08 NOTE — CONSULT NOTE ADULT - ASSESSMENT
80M w/ hx of CKD4, diastolic CHF, difficult to control HTN, p/w SOB.   MICU consulted for urgent dialysis    Assessed patient. Alert, oriented x3, on NC only, breaths comfortably, speaking in full sentences, made urine right before assessment. Electrolytes acceptable, K 4.8, Phos 5.3, BUN 52.   Patient does not need urgent dialysis, and is NOT  a MICU candidate.

## 2021-03-09 LAB
ALBUMIN SERPL ELPH-MCNC: 3.2 G/DL — LOW (ref 3.3–5)
ALP SERPL-CCNC: 71 U/L — SIGNIFICANT CHANGE UP (ref 40–120)
ALT FLD-CCNC: 24 U/L — SIGNIFICANT CHANGE UP (ref 10–45)
ANION GAP SERPL CALC-SCNC: 13 MMOL/L — SIGNIFICANT CHANGE UP (ref 5–17)
ANION GAP SERPL CALC-SCNC: 14 MMOL/L — SIGNIFICANT CHANGE UP (ref 5–17)
APTT BLD: 27.4 SEC — LOW (ref 27.5–35.5)
AST SERPL-CCNC: 16 U/L — SIGNIFICANT CHANGE UP (ref 10–40)
BASE EXCESS BLDA CALC-SCNC: 3.1 MMOL/L — HIGH (ref -2–2)
BASE EXCESS BLDA CALC-SCNC: 3.1 MMOL/L — HIGH (ref -2–2)
BASE EXCESS BLDV CALC-SCNC: 5 MMOL/L — HIGH (ref -2–2)
BASOPHILS # BLD AUTO: 0.03 K/UL — SIGNIFICANT CHANGE UP (ref 0–0.2)
BASOPHILS NFR BLD AUTO: 0.7 % — SIGNIFICANT CHANGE UP (ref 0–2)
BILIRUB SERPL-MCNC: 0.3 MG/DL — SIGNIFICANT CHANGE UP (ref 0.2–1.2)
BLD GP AB SCN SERPL QL: NEGATIVE — SIGNIFICANT CHANGE UP
BUN SERPL-MCNC: 53 MG/DL — HIGH (ref 7–23)
BUN SERPL-MCNC: 54 MG/DL — HIGH (ref 7–23)
CA-I SERPL-SCNC: 1.15 MMOL/L — SIGNIFICANT CHANGE UP (ref 1.12–1.3)
CALCIUM SERPL-MCNC: 8.5 MG/DL — SIGNIFICANT CHANGE UP (ref 8.4–10.5)
CALCIUM SERPL-MCNC: 8.5 MG/DL — SIGNIFICANT CHANGE UP (ref 8.4–10.5)
CHLORIDE BLDV-SCNC: 105 MMOL/L — SIGNIFICANT CHANGE UP (ref 96–108)
CHLORIDE SERPL-SCNC: 102 MMOL/L — SIGNIFICANT CHANGE UP (ref 96–108)
CHLORIDE SERPL-SCNC: 102 MMOL/L — SIGNIFICANT CHANGE UP (ref 96–108)
CO2 BLDA-SCNC: 30 MMOL/L — SIGNIFICANT CHANGE UP (ref 22–30)
CO2 BLDA-SCNC: 34 MMOL/L — HIGH (ref 22–30)
CO2 BLDV-SCNC: 34 MMOL/L — HIGH (ref 22–30)
CO2 SERPL-SCNC: 24 MMOL/L — SIGNIFICANT CHANGE UP (ref 22–31)
CO2 SERPL-SCNC: 27 MMOL/L — SIGNIFICANT CHANGE UP (ref 22–31)
CREAT SERPL-MCNC: 5.23 MG/DL — HIGH (ref 0.5–1.3)
CREAT SERPL-MCNC: 5.49 MG/DL — HIGH (ref 0.5–1.3)
EOSINOPHIL # BLD AUTO: 0.19 K/UL — SIGNIFICANT CHANGE UP (ref 0–0.5)
EOSINOPHIL NFR BLD AUTO: 4.7 % — SIGNIFICANT CHANGE UP (ref 0–6)
GAS PNL BLDA: SIGNIFICANT CHANGE UP
GAS PNL BLDV: 142 MMOL/L — SIGNIFICANT CHANGE UP (ref 135–145)
GAS PNL BLDV: SIGNIFICANT CHANGE UP
GAS PNL BLDV: SIGNIFICANT CHANGE UP
GLUCOSE BLDC GLUCOMTR-MCNC: 107 MG/DL — HIGH (ref 70–99)
GLUCOSE BLDV-MCNC: 92 MG/DL — SIGNIFICANT CHANGE UP (ref 70–99)
GLUCOSE SERPL-MCNC: 106 MG/DL — HIGH (ref 70–99)
GLUCOSE SERPL-MCNC: 120 MG/DL — HIGH (ref 70–99)
HAV IGM SER-ACNC: SIGNIFICANT CHANGE UP
HBV CORE IGM SER-ACNC: SIGNIFICANT CHANGE UP
HBV SURFACE AG SER-ACNC: SIGNIFICANT CHANGE UP
HCO3 BLDA-SCNC: 28 MMOL/L — SIGNIFICANT CHANGE UP (ref 21–29)
HCO3 BLDA-SCNC: 32 MMOL/L — HIGH (ref 21–29)
HCO3 BLDV-SCNC: 32 MMOL/L — HIGH (ref 21–29)
HCT VFR BLD CALC: 33 % — LOW (ref 39–50)
HCT VFR BLD CALC: 34.9 % — LOW (ref 39–50)
HCT VFR BLDA CALC: 31 % — LOW (ref 39–50)
HCV AB S/CO SERPL IA: 0.42 S/CO — SIGNIFICANT CHANGE UP (ref 0–0.99)
HCV AB SERPL-IMP: SIGNIFICANT CHANGE UP
HGB BLD CALC-MCNC: 10 G/DL — LOW (ref 13–17)
HGB BLD-MCNC: 9.4 G/DL — LOW (ref 13–17)
HGB BLD-MCNC: 9.9 G/DL — LOW (ref 13–17)
HOROWITZ INDEX BLDA+IHG-RTO: 70 — SIGNIFICANT CHANGE UP
IMM GRANULOCYTES NFR BLD AUTO: 0.5 % — SIGNIFICANT CHANGE UP (ref 0–1.5)
INR BLD: 1.02 RATIO — SIGNIFICANT CHANGE UP (ref 0.88–1.16)
LACTATE BLDV-MCNC: 0.7 MMOL/L — SIGNIFICANT CHANGE UP (ref 0.7–2)
LYMPHOCYTES # BLD AUTO: 0.6 K/UL — LOW (ref 1–3.3)
LYMPHOCYTES # BLD AUTO: 14.9 % — SIGNIFICANT CHANGE UP (ref 13–44)
MAGNESIUM SERPL-MCNC: 2.4 MG/DL — SIGNIFICANT CHANGE UP (ref 1.6–2.6)
MAGNESIUM SERPL-MCNC: 2.7 MG/DL — HIGH (ref 1.6–2.6)
MCHC RBC-ENTMCNC: 22.9 PG — LOW (ref 27–34)
MCHC RBC-ENTMCNC: 23.1 PG — LOW (ref 27–34)
MCHC RBC-ENTMCNC: 28.4 GM/DL — LOW (ref 32–36)
MCHC RBC-ENTMCNC: 28.5 GM/DL — LOW (ref 32–36)
MCV RBC AUTO: 80.3 FL — SIGNIFICANT CHANGE UP (ref 80–100)
MCV RBC AUTO: 81.4 FL — SIGNIFICANT CHANGE UP (ref 80–100)
MONOCYTES # BLD AUTO: 0.6 K/UL — SIGNIFICANT CHANGE UP (ref 0–0.9)
MONOCYTES NFR BLD AUTO: 14.9 % — HIGH (ref 2–14)
NEUTROPHILS # BLD AUTO: 2.6 K/UL — SIGNIFICANT CHANGE UP (ref 1.8–7.4)
NEUTROPHILS NFR BLD AUTO: 64.3 % — SIGNIFICANT CHANGE UP (ref 43–77)
NRBC # BLD: 0 /100 WBCS — SIGNIFICANT CHANGE UP (ref 0–0)
NRBC # BLD: 2 /100 WBCS — HIGH (ref 0–0)
OTHER CELLS CSF MANUAL: 8 ML/DL — LOW (ref 18–22)
PCO2 BLDA: 48 MMHG — HIGH (ref 32–46)
PCO2 BLDA: 77 MMHG — CRITICAL HIGH (ref 32–46)
PCO2 BLDV: 66 MMHG — HIGH (ref 35–50)
PH BLDA: 7.24 — LOW (ref 7.35–7.45)
PH BLDA: 7.38 — SIGNIFICANT CHANGE UP (ref 7.35–7.45)
PH BLDV: 7.31 — LOW (ref 7.35–7.45)
PHOSPHATE SERPL-MCNC: 6.1 MG/DL — HIGH (ref 2.5–4.5)
PHOSPHATE SERPL-MCNC: 6.3 MG/DL — HIGH (ref 2.5–4.5)
PLATELET # BLD AUTO: 163 K/UL — SIGNIFICANT CHANGE UP (ref 150–400)
PLATELET # BLD AUTO: 185 K/UL — SIGNIFICANT CHANGE UP (ref 150–400)
PO2 BLDA: 109 MMHG — HIGH (ref 74–108)
PO2 BLDA: 287 MMHG — HIGH (ref 74–108)
PO2 BLDV: 36 MMHG — SIGNIFICANT CHANGE UP (ref 25–45)
POTASSIUM BLDV-SCNC: 3.7 MMOL/L — SIGNIFICANT CHANGE UP (ref 3.5–5.3)
POTASSIUM SERPL-MCNC: 4.4 MMOL/L — SIGNIFICANT CHANGE UP (ref 3.5–5.3)
POTASSIUM SERPL-MCNC: 4.6 MMOL/L — SIGNIFICANT CHANGE UP (ref 3.5–5.3)
POTASSIUM SERPL-SCNC: 4.4 MMOL/L — SIGNIFICANT CHANGE UP (ref 3.5–5.3)
POTASSIUM SERPL-SCNC: 4.6 MMOL/L — SIGNIFICANT CHANGE UP (ref 3.5–5.3)
PROT SERPL-MCNC: 5.8 G/DL — LOW (ref 6–8.3)
PROTHROM AB SERPL-ACNC: 12.2 SEC — SIGNIFICANT CHANGE UP (ref 10.6–13.6)
RBC # BLD: 4.11 M/UL — LOW (ref 4.2–5.8)
RBC # BLD: 4.29 M/UL — SIGNIFICANT CHANGE UP (ref 4.2–5.8)
RBC # FLD: 17.5 % — HIGH (ref 10.3–14.5)
RBC # FLD: 17.8 % — HIGH (ref 10.3–14.5)
RH IG SCN BLD-IMP: POSITIVE — SIGNIFICANT CHANGE UP
SAO2 % BLDA: 100 % — HIGH (ref 92–96)
SAO2 % BLDA: 99 % — HIGH (ref 92–96)
SAO2 % BLDV: 59 % — LOW (ref 67–88)
SODIUM SERPL-SCNC: 140 MMOL/L — SIGNIFICANT CHANGE UP (ref 135–145)
SODIUM SERPL-SCNC: 142 MMOL/L — SIGNIFICANT CHANGE UP (ref 135–145)
WBC # BLD: 4.04 K/UL — SIGNIFICANT CHANGE UP (ref 3.8–10.5)
WBC # BLD: 4.73 K/UL — SIGNIFICANT CHANGE UP (ref 3.8–10.5)
WBC # FLD AUTO: 4.04 K/UL — SIGNIFICANT CHANGE UP (ref 3.8–10.5)
WBC # FLD AUTO: 4.73 K/UL — SIGNIFICANT CHANGE UP (ref 3.8–10.5)

## 2021-03-09 PROCEDURE — 99291 CRITICAL CARE FIRST HOUR: CPT | Mod: 25

## 2021-03-09 PROCEDURE — 36800 INSERTION OF CANNULA: CPT | Mod: GC,59

## 2021-03-09 PROCEDURE — 99222 1ST HOSP IP/OBS MODERATE 55: CPT

## 2021-03-09 PROCEDURE — 71045 X-RAY EXAM CHEST 1 VIEW: CPT | Mod: 26

## 2021-03-09 PROCEDURE — 93308 TTE F-UP OR LMTD: CPT | Mod: 26,GC

## 2021-03-09 PROCEDURE — 70450 CT HEAD/BRAIN W/O DYE: CPT | Mod: 26

## 2021-03-09 PROCEDURE — 76604 US EXAM CHEST: CPT | Mod: 26,GC

## 2021-03-09 RX ORDER — HEPARIN SODIUM 5000 [USP'U]/ML
5000 INJECTION INTRAVENOUS; SUBCUTANEOUS EVERY 12 HOURS
Refills: 0 | Status: COMPLETED | OUTPATIENT
Start: 2021-03-09 | End: 2021-03-15

## 2021-03-09 RX ORDER — SODIUM CHLORIDE 9 MG/ML
10 INJECTION INTRAMUSCULAR; INTRAVENOUS; SUBCUTANEOUS
Refills: 0 | Status: DISCONTINUED | OUTPATIENT
Start: 2021-03-09 | End: 2021-03-30

## 2021-03-09 RX ORDER — FENTANYL CITRATE 50 UG/ML
100 INJECTION INTRAVENOUS ONCE
Refills: 0 | Status: DISCONTINUED | OUTPATIENT
Start: 2021-03-09 | End: 2021-03-09

## 2021-03-09 RX ORDER — BUMETANIDE 0.25 MG/ML
2 INJECTION INTRAMUSCULAR; INTRAVENOUS
Qty: 20 | Refills: 0 | Status: DISCONTINUED | OUTPATIENT
Start: 2021-03-09 | End: 2021-03-09

## 2021-03-09 RX ORDER — CHLORHEXIDINE GLUCONATE 213 G/1000ML
15 SOLUTION TOPICAL EVERY 12 HOURS
Refills: 0 | Status: DISCONTINUED | OUTPATIENT
Start: 2021-03-09 | End: 2021-03-11

## 2021-03-09 RX ORDER — TAMSULOSIN HYDROCHLORIDE 0.4 MG/1
0.4 CAPSULE ORAL AT BEDTIME
Refills: 0 | Status: DISCONTINUED | OUTPATIENT
Start: 2021-03-09 | End: 2021-03-09

## 2021-03-09 RX ORDER — PROPOFOL 10 MG/ML
50 INJECTION, EMULSION INTRAVENOUS
Qty: 1000 | Refills: 0 | Status: DISCONTINUED | OUTPATIENT
Start: 2021-03-09 | End: 2021-03-11

## 2021-03-09 RX ORDER — MIDAZOLAM HYDROCHLORIDE 1 MG/ML
2 INJECTION, SOLUTION INTRAMUSCULAR; INTRAVENOUS ONCE
Refills: 0 | Status: DISCONTINUED | OUTPATIENT
Start: 2021-03-09 | End: 2021-03-09

## 2021-03-09 RX ORDER — CHLORHEXIDINE GLUCONATE 213 G/1000ML
1 SOLUTION TOPICAL
Refills: 0 | Status: DISCONTINUED | OUTPATIENT
Start: 2021-03-09 | End: 2021-03-13

## 2021-03-09 RX ADMIN — Medication 100 MILLIGRAM(S): at 05:07

## 2021-03-09 RX ADMIN — CHLORHEXIDINE GLUCONATE 15 MILLILITER(S): 213 SOLUTION TOPICAL at 17:43

## 2021-03-09 RX ADMIN — Medication 1 DROP(S): at 17:42

## 2021-03-09 RX ADMIN — Medication 1 DROP(S): at 05:11

## 2021-03-09 RX ADMIN — CARVEDILOL PHOSPHATE 25 MILLIGRAM(S): 80 CAPSULE, EXTENDED RELEASE ORAL at 05:07

## 2021-03-09 RX ADMIN — HEPARIN SODIUM 5000 UNIT(S): 5000 INJECTION INTRAVENOUS; SUBCUTANEOUS at 05:07

## 2021-03-09 RX ADMIN — PROPOFOL 20 MICROGRAM(S)/KG/MIN: 10 INJECTION, EMULSION INTRAVENOUS at 22:09

## 2021-03-09 RX ADMIN — HEPARIN SODIUM 5000 UNIT(S): 5000 INJECTION INTRAVENOUS; SUBCUTANEOUS at 17:43

## 2021-03-09 RX ADMIN — Medication 90 MILLIGRAM(S): at 05:07

## 2021-03-09 RX ADMIN — MIDAZOLAM HYDROCHLORIDE 2 MILLIGRAM(S): 1 INJECTION, SOLUTION INTRAMUSCULAR; INTRAVENOUS at 08:45

## 2021-03-09 RX ADMIN — FENTANYL CITRATE 100 MICROGRAM(S): 50 INJECTION INTRAVENOUS at 08:45

## 2021-03-09 RX ADMIN — MIDAZOLAM HYDROCHLORIDE 2 MILLIGRAM(S): 1 INJECTION, SOLUTION INTRAMUSCULAR; INTRAVENOUS at 15:30

## 2021-03-09 RX ADMIN — FENTANYL CITRATE 100 MICROGRAM(S): 50 INJECTION INTRAVENOUS at 08:35

## 2021-03-09 RX ADMIN — Medication 1 DROP(S): at 22:08

## 2021-03-09 NOTE — CONSULT NOTE ADULT - SUBJECTIVE AND OBJECTIVE BOX
HPI:  Starr Stevenson is a 80 year old male with unknown handedness with a past medical history of CKD4, dCHF, difficult to control HTN, DM?? p/w SOB and CHF. Pt states he has been in [facility] since Dec 27th 2020 and just returned home from [facility] recently. Pt states he started to get worsening SOB over the past 2-3 weeks in [facility]. He states he discussed this with his doctor in [facility] and was advised to [place] and [place]. Here patient has been treated for acute decompensated heart failure, acute respiratory failure with hypoxia, CKD. Last night, patient was evaluated by MICU for worsening renal failure and evaluation for dialysis. At the time, the patient was alert and oriented to person, place, and time and was doing well respiratory wise on 4L NC. His PCo2 last night was 67. This morning, the patient's oxygen saturation dropped to the high 70s-low 80s. Morning PCO2 was 77. Patient was reevaluated by MICU and providers requested a rapid response and stroke code be called. Reportedly, providers noticed unequal pupils.  The patient was seen and evaluated at the bedside. The patient was responsive to noxious stimuli to sternal rub and noxious stimuli at all 4 extremities. He was otherwise not responsive to verbal stimuli and could not participate in ROS.     (Stroke only)  NIHSS: 21  MRS: 1    REVIEW OF SYSTEMS    Unable to obtain full ROS due to patient's altered mental status    PAST MEDICAL & SURGICAL HISTORY:  Diabetes    Hypertension    HTN (hypertension)    BPH (Benign Prostatic Hypertrophy)    Glaucoma (Increased Eye Pressure)    HTN - Hypertension    No significant past surgical history    Laser Surgery :Right  ?  regarding procedure ; 12/07 ; secondary to glaucoma    Laser Surgery Left  ? regarding procedure ; secondary to glaucoma 12/07    Excision of Sinus Polyp  2006      FAMILY HISTORY:  Family history of diabetes mellitus (DM) (Mother, Sibling)    MEDICATIONS (HOME):  Home Medications:  aspirin 81 mg oral delayed release tablet: 1 tab(s) orally once a day (04 Mar 2021 21:15)  Lasix 20 mg oral tablet: 1 tab(s) orally once a day (04 Mar 2021 21:15)  NIFEdipine 90 mg oral tablet, extended release: 1 tab(s) orally once a day (04 Mar 2021 21:15)  tamsulosin 0.4 mg oral capsule: 1 cap(s) orally once a day (at bedtime) (04 Mar 2021 21:15)    MEDICATIONS  (STANDING):  buMETAnide Infusion 2 mG/Hr (10 mL/Hr) IV Continuous <Continuous>  carvedilol 25 milliGRAM(s) Oral every 12 hours  heparin   Injectable 5000 Unit(s) SubCutaneous every 12 hours  hydrALAZINE 100 milliGRAM(s) Oral every 8 hours  NIFEdipine XL 90 milliGRAM(s) Oral daily  petrolatum Ophthalmic Ointment 1 Application(s) Both EYES four times a day  tamsulosin 0.4 milliGRAM(s) Oral at bedtime  trimethoprim/polymyxin Solution 1 Drop(s) Both EYES three times a day    MEDICATIONS  (PRN):    ALLERGIES/INTOLERANCES:  Allergies  grass, pollen (Rhinitis)  No Known Drug Allergies    Intolerances    VITALS & EXAMINATION:  Vital Signs Last 24 Hrs  T(C): 36.6 (09 Mar 2021 04:14), Max: 36.8 (08 Mar 2021 20:44)  T(F): 97.8 (09 Mar 2021 04:14), Max: 98.3 (08 Mar 2021 20:44)  HR: 79 (09 Mar 2021 04:14) (67 - 84)  BP: 170/77 (09 Mar 2021 04:14) (135/71 - 170/77)  BP(mean): --  RR: 19 (09 Mar 2021 04:14) (18 - 19)  SpO2: 100% (09 Mar 2021 04:14) (91% - 100%)    General:  Constitutional: Appears stated age, uncomfortable appearing  Respiratory: On Bipap, utilizing accessory and abdominal muscles to breathe.     Neurological (>12):  MS: Awakens to noxious stimli, otherwise is unable to follow commands.     Language: No spontaneous speech    CNs: R surgical pupil unreactive, L pupil with some reactivity. VF intact to blink to threat bilaterally. No appreciated facial asymmetry.     Motor:   Miminal movement bilaterally in UE to noxious stimuli.  Equal withdrawal to noxious stimuli in the bilateral lower extremities.     Sensation: Grimace to noxious stimuli in all 4 extremities      Coordination: unable to comply with    Gait: Deferred    LABORATORY:  CBC                       9.9    4.73  )-----------( 185      ( 09 Mar 2021 06:14 )             34.9     Chem 03-09    142  |  102  |  53<H>  ----------------------------<  120<H>  4.6   |  27  |  5.23<H>    Ca    8.5      09 Mar 2021 06:14  Phos  6.3     03-09  Mg     2.7     03-09      LFTs   Coagulopathy   Lipid Panel 03-05 Chol 157 LDL -- HDL 60 Trig 134    STUDIES & IMAGING:    Radiology (XR, CT, MR, U/S, TTE/MABLE): HPI:  tSarr Stevenson is a 80 year old male with unknown handedness with a past medical history of CKD4, dCHF, difficult to control HTN, DM?? p/w SOB and CHF. Pt states he has been in [facility] since Dec 27th 2020 and just returned home from [facility] recently. Pt states he started to get worsening SOB over the past 2-3 weeks in [facility]. He states he discussed this with his doctor in [facility] and was advised [place] and [place]. Here patient has been treated for acute decompensated heart failure, acute respiratory failure with hypoxia, CKD. Last night, patient was evaluated by MICU for worsening renal failure and evaluation for dialysis. At the time, the patient was alert and oriented to person, place, and time and was doing well respiratory wise on 4L NC. His PCo2 last night was 67. This morning, the patient's oxygen saturation dropped to the high 70s-low 80s. Morning PCO2 was 77. Patient was reevaluated by MICU and providers requested a rapid response and stroke code be called. Reportedly, providers noticed unequal pupils.  The patient was seen and evaluated at the bedside. The patient was responsive to noxious stimuli to sternal rub and noxious stimuli at all 4 extremities. He was otherwise not responsive to verbal stimuli and could not participate in ROS.     (Stroke only)  NIHSS: 21  MRS: 1    REVIEW OF SYSTEMS    Unable to obtain full ROS due to patient's altered mental status    PAST MEDICAL & SURGICAL HISTORY:  Diabetes    Hypertension    HTN (hypertension)    BPH (Benign Prostatic Hypertrophy)    Glaucoma (Increased Eye Pressure)    HTN - Hypertension    No significant past surgical history    Laser Surgery :Right  ?  regarding procedure ; 12/07 ; secondary to glaucoma    Laser Surgery Left  ? regarding procedure ; secondary to glaucoma 12/07    Excision of Sinus Polyp  2006      FAMILY HISTORY:  Family history of diabetes mellitus (DM) (Mother, Sibling)    MEDICATIONS (HOME):  Home Medications:  aspirin 81 mg oral delayed release tablet: 1 tab(s) orally once a day (04 Mar 2021 21:15)  Lasix 20 mg oral tablet: 1 tab(s) orally once a day (04 Mar 2021 21:15)  NIFEdipine 90 mg oral tablet, extended release: 1 tab(s) orally once a day (04 Mar 2021 21:15)  tamsulosin 0.4 mg oral capsule: 1 cap(s) orally once a day (at bedtime) (04 Mar 2021 21:15)    MEDICATIONS  (STANDING):  buMETAnide Infusion 2 mG/Hr (10 mL/Hr) IV Continuous <Continuous>  carvedilol 25 milliGRAM(s) Oral every 12 hours  heparin   Injectable 5000 Unit(s) SubCutaneous every 12 hours  hydrALAZINE 100 milliGRAM(s) Oral every 8 hours  NIFEdipine XL 90 milliGRAM(s) Oral daily  petrolatum Ophthalmic Ointment 1 Application(s) Both EYES four times a day  tamsulosin 0.4 milliGRAM(s) Oral at bedtime  trimethoprim/polymyxin Solution 1 Drop(s) Both EYES three times a day    MEDICATIONS  (PRN):    ALLERGIES/INTOLERANCES:  Allergies  grass, pollen (Rhinitis)  No Known Drug Allergies    Intolerances    VITALS & EXAMINATION:  Vital Signs Last 24 Hrs  T(C): 36.6 (09 Mar 2021 04:14), Max: 36.8 (08 Mar 2021 20:44)  T(F): 97.8 (09 Mar 2021 04:14), Max: 98.3 (08 Mar 2021 20:44)  HR: 79 (09 Mar 2021 04:14) (67 - 84)  BP: 170/77 (09 Mar 2021 04:14) (135/71 - 170/77)  BP(mean): --  RR: 19 (09 Mar 2021 04:14) (18 - 19)  SpO2: 100% (09 Mar 2021 04:14) (91% - 100%)    General:  Constitutional: Appears stated age, uncomfortable appearing  Respiratory: On Bipap, utilizing accessory and abdominal muscles to breathe.     Neurological (>12):  MS: Awakens to noxious stimli, otherwise is unable to follow commands.     Language: No spontaneous speech    CNs: R surgical pupil unreactive, L pupil with some reactivity. VF intact to blink to threat bilaterally. No appreciated facial asymmetry.     Motor:   Miminal movement bilaterally in UE to noxious stimuli.  Equal withdrawal to noxious stimuli in the bilateral lower extremities.     Sensation: Grimace to noxious stimuli in all 4 extremities      Coordination: unable to comply with    Gait: Deferred    LABORATORY:  CBC                       9.9    4.73  )-----------( 185      ( 09 Mar 2021 06:14 )             34.9     Chem 03-09    142  |  102  |  53<H>  ----------------------------<  120<H>  4.6   |  27  |  5.23<H>    Ca    8.5      09 Mar 2021 06:14  Phos  6.3     03-09  Mg     2.7     03-09      LFTs   Coagulopathy   Lipid Panel 03-05 Chol 157 LDL -- HDL 60 Trig 134    STUDIES & IMAGING:    Radiology (XR, CT, MR, U/S, TTE/MABLE):    < from: CT Head No Cont (03.09.21 @ 08:19) >  Parenchymal volume loss and chronic microvascular ischemic changes are identified    There is no evidenceacute hemorrhage mass mass effect seen.    Evaluation of the osseous structures with the appropriate window appears normal    There is evidence of nasal polyps identified bilaterally. These findings have increased when compared prior exam. There is also near complete opacification of both maxillary ethmoid and right sphenoid sinus complete opacification of the left sphenoid by lateral frontal sinuses. These findings have increased when compared prior exam as well. There is some high attenuation identified in the bilateral ethmoid sinuses which are likely compatible with areas of ossification. These findings were present on in the prior study.    IMPRESSION: No acute hemorrhage mass or mass effect.    Increase sinus disease as described above.    < end of copied text >

## 2021-03-09 NOTE — PROGRESS NOTE ADULT - SUBJECTIVE AND OBJECTIVE BOX
Patient is a 80y old  Male who presents with a chief complaint of Shortness of breath (09 Mar 2021 10:00)      INTERVAL HISTORY:  in MICU         PHYSICAL EXAM:  T(C): 37.5 (03-10-21 @ 00:00), Max: 37.5 (03-10-21 @ 00:00)  HR: 71 (03-10-21 @ 00:00) (65 - 84)  BP: 122/67 (03-10-21 @ 00:00) (87/56 - 170/77)  RR: 18 (03-10-21 @ 00:00) (14 - 19)  SpO2: 94% (03-10-21 @ 00:00) (94% - 100%)  Wt(kg): --  I&O's Summary    08 Mar 2021 07:01  -  09 Mar 2021 07:00  --------------------------------------------------------  IN: 285 mL / OUT: 730 mL / NET: -445 mL    09 Mar 2021 07:01  -  10 Mar 2021 00:51  --------------------------------------------------------  IN: 437.3 mL / OUT: 3130 mL / NET: -2692.7 mL      Height (cm): 160 (03-09 @ 08:30)  Weight (kg): 81.8 (03-09 @ 08:30)  BMI (kg/m2): 32 (03-09 @ 08:30)  BSA (m2): 1.85 (03-09 @ 08:30)    Appearance: In no distress	  HEENT:    PERRL, EOMI	  Cardiovascular:  S1 S2, No JVD  Respiratory: Lungs clear to auscultation	  Gastrointestinal:  Soft, Non-tender, + BS	  Vascularature:  No edema of LE  Psychiatric: Appropriate affect   Neuro: no acute focal deficits                               10.4   8.25  )-----------( 163      ( 10 Mar 2021 00:24 )             34.9     03-09    140  |  102  |  54<H>  ----------------------------<  106<H>  4.4   |  24  |  5.49<H>    Ca    8.5      09 Mar 2021 10:44  Phos  6.1     03-09  Mg     2.4     03-09    TPro  5.8<L>  /  Alb  3.2<L>  /  TBili  0.3  /  DBili  x   /  AST  16  /  ALT  24  /  AlkPhos  71  03-09        Labs personally reviewed      ASSESSMENT/PLAN: 	  80M w/ hx of CKD4, dCHF, difficult to control HTN, DM?? p/w SOB and like acute on chronic congestive heart failure    Problem/Plan - 1:  ·  Problem: Acute on chronic congestive heart failure, unspecified heart failure type.  Plan: grossly fluid overloaded on exam and elevated BNP. Hx of severe LVH on prior TTE. Would suspect dCHF in setting of uncontrolled HTN.    -TTE- EF 60% with mid inferolateral wall and basal inferolateral wall hypokinetic, will need ischemic eval: continue to diurese for now until euvolemic  - c/w lasix gtt 10mg/hr, BMP q12hrm check proBNP 3/9am.  - f/u with renal reccs- will likely need dialysis to assist with fluid overload      Problem/Plan - 2:  ·  Problem: Acute respiratory failure with hypoxia.  Plan: Hypoxic to <80 on RA initially on arrival. Improved on 02. Suspect CHF related given clinical exam and CXR   - volume removal with HD    Problem/Plan - 3:  ·  Problem: Essential hypertension.  Plan: BPs very elevated on arrival. Will try not to lower too rapidly given unclear baseline as pt states they are poorly controlled.  -c/w hydralazine 100mg TID   -Stagger Coreg 25mg Q12  -Nifedipine 90mg daily in AM  - may add Clonidine 0.1mg BID for better blood pressure control    Problem/Plan - 4:  ·  Problem: Chronic kidney disease (CKD), stage IV (severe).  Plan:  VIRGEN on CKD,  - volume removal with HD    Problem/Plan - 5:  ·  Problem: Hyperlipidemia, unspecified hyperlipidemia type.  Plan: -check lipids.              Sridhar Carrillo DO [facility]  Cardiovascular Medicine  [address], [address]  Office: [phone]  Cell: [phone]

## 2021-03-09 NOTE — PROGRESS NOTE ADULT - SUBJECTIVE AND OBJECTIVE BOX
Overnight events noted      VITAL:  T(C): , Max: 36.8 (03-08-21 @ 20:44)  T(F): , Max: 98.3 (03-08-21 @ 20:44)  HR: 67 (03-09-21 @ 09:00)  BP: 113/56 (03-09-21 @ 09:00)  BP(mean): 81 (03-09-21 @ 09:00)  RR: 16 (03-09-21 @ 09:00)  SpO2: 95% (03-09-21 @ 09:00)      PHYSICAL EXAM:  GENERAL: intubated/sedated  HEENT: (+)ETT-vent  Neck: supple  CHEST/LUNG: coarse BS b/l  HEART: RRR s1s2  ABDOMEN: Soft, NTND  EXTREMITIES:  2+ pitting edema   Neuro: Tone WNL  Access: None    LABS:                        9.9    4.73  )-----------( 185      ( 09 Mar 2021 06:14 )             34.9     Na(142)/K(4.6)/Cl(102)/HCO3(27)/BUN(53)/Cr(5.23)Glu(120)/Ca(8.5)/Mg(2.7)/PO4(6.3)    03-09 @ 06:14  Na(142)/K(4.8)/Cl(102)/HCO3(28)/BUN(52)/Cr(5.41)Glu(114)/Ca(8.8)/Mg(2.6)/PO4(--)    03-08 @ 18:42  Na(142)/K(4.2)/Cl(102)/HCO3(26)/BUN(49)/Cr(5.32)Glu(128)/Ca(8.7)/Mg(2.4)/PO4(5.3)    03-08 @ 06:11  Na(139)/K(4.3)/Cl(102)/HCO3(25)/BUN(50)/Cr(5.28)Glu(176)/Ca(8.5)/Mg(2.5)/PO4(--)    03-07 @ 19:25  Na(140)/K(4.3)/Cl(103)/HCO3(26)/BUN(50)/Cr(5.24)Glu(112)/Ca(8.6)/Mg(2.6)/PO4(--)    03-07 @ 06:44        IMPRESSION: 80M w/ HTN, DM2, CKD4-5, and HFpEF, 3/7/21 a/w acute on chronic HFpEF    (1)Renal - CKD5 -   (2)Hyperphosphatemia -   (3)CV - acute on chronic HFpEF - now intubated for respiratory failure - needing HD at this point to offload volume      RECOMMEND:  (1)Shiley placement  (2)HD today + tomorrow -will attempt 2.5L UF with each session  (3)HBV/HCV studies (in anticipation of potential need for chronic outpatient HD)  (4)Nepro, if tube feeds initiated  (5)Dose new meds for GFR<10/HD      Alejandro Arroyo MD  [facility]  Office: [phone]  Cell: [phone]           Intubated/sedated on Propofol On Fio2 100%  VITAL: T(C): , Max: 36.8 (03-08-21 @ 20:44) T(F): , Max: 98.3 (03-08-21 @ 20:44) HR: 67 (03-09-21 @ 09:00) BP: 113/56 (03-09-21 @ 09:00) BP(mean): 81 (03-09-21 @ 09:00) RR: 16 (03-09-21 @ 09:00) SpO2: 95% (03-09-21 @ 09:00)   PHYSICAL EXAM: GENERAL: intubated/sedated HEENT: (+)ETT-vent; (+)OGT Neck: supple CHEST/LUNG: coarse BS b/l HEART: RRR s1s2 ABDOMEN: Soft, NTND : (+)an EXTREMITIES:  2+ pitting edema  Neuro: Tone WNL Access: None  LABS:                      9.9   4.73  )-----------( 185      ( 09 Mar 2021 06:14 )            34.9   Na(142)/K(4.6)/Cl(102)/HCO3(27)/BUN(53)/Cr(5.23)Glu(120)/Ca(8.5)/Mg(2.7)/PO4(6.3)    03-09 @ 06:14 Na(142)/K(4.8)/Cl(102)/HCO3(28)/BUN(52)/Cr(5.41)Glu(114)/Ca(8.8)/Mg(2.6)/PO4(--)    03-08 @ 18:42 Na(142)/K(4.2)/Cl(102)/HCO3(26)/BUN(49)/Cr(5.32)Glu(128)/Ca(8.7)/Mg(2.4)/PO4(5.3)    03-08 @ 06:11 Na(139)/K(4.3)/Cl(102)/HCO3(25)/BUN(50)/Cr(5.28)Glu(176)/Ca(8.5)/Mg(2.5)/PO4(--)    03-07 @ 19:25 Na(140)/K(4.3)/Cl(103)/HCO3(26)/BUN(50)/Cr(5.24)Glu(112)/Ca(8.6)/Mg(2.6)/PO4(--)    03-07 @ 06:44    IMPRESSION: 80M w/ HTN, DM2, CKD4-5, and HFpEF, 3/7/21 a/w acute on chronic HFpEF  (1)Renal - CKD4-5; superimposed VIRGEN. Warranting HD at this point, in setting of fluid overload refractory to medical therapy. Will he be able to eventually come off dialysis? Unclear for now; appears most likely that he will remain chronically dialysis-dependent from this point forward   (2)Hyperphosphatemia - renally mediated  (3)CV - acute on chronic HFpEF - now intubated for respiratory failure - needing HD at this point to offload volume   RECOMMEND: (1)Shiley placement (2)HD today + tomorrow -will attempt 2.5L UF with each session (3)HBV/HCV studies (in anticipation of potential need for chronic outpatient HD) (4)Nepro, if tube feeds initiated (5)Dose new meds for GFR<10/HD  counseled wife and daughter by phone ([phone]). Explained what hemodialysis entails; answered all questions to their satisfaction. They consent to hemodialysis on the patient's behalf  Alejandro Arroyo MD [facility] Office: [phone] Cell: [phone]

## 2021-03-09 NOTE — PROGRESS NOTE ADULT - SUBJECTIVE AND OBJECTIVE BOX
Called to patient's bedside for intubation.  Therapy initiated by primary medical team.    Patient Assessment:     Baseline Vital Signs:  BP: 164/80  HR: 78  RR: 32  SpO2: 99    Medications Administered: Etomidate 20mg, Succinylcholine 100mg    Intubation:      [X ] Direct Laryngoscopy    [ ] Video-Assisted Laryngoscopy   [ ] Fiberoptic Intubation    Blade:   Cormack-Lehane View: [X ] 1     [ ] 2A     [ ] 2B     [ ] 3     [ ]  4  ETT Size: 8.0 mm  Marking at Teeth: 23cm    Post-Intubation Vital Signs:  BP: 158/70  HR:84  RR: controlled  SpO2: 99    Positive end-tidal carbon dioxide via EasyCap, positive bilateral breath sounds.  CXR to be reviewed by primary team.  Atraumatic intubation with no complications.

## 2021-03-09 NOTE — CHART NOTE - NSCHARTNOTEFT_GEN_A_CORE
MICU Accept Note    CHIEF COMPLAINT:     HPI / INTERVAL HISTORY:    PAST MEDICAL & SURGICAL HISTORY:  Diabetes    Hypertension    HTN (hypertension)    BPH (Benign Prostatic Hypertrophy)    Glaucoma (Increased Eye Pressure)    HTN - Hypertension    No significant past surgical history    Laser Surgery :Right  ?  regarding procedure ; 12/07 ; secondary to glaucoma    Laser Surgery Left  ? regarding procedure ; secondary to glaucoma 12/07    Excision of Sinus Polyp  2006        FAMILY HISTORY:  Family history of diabetes mellitus (DM) (Mother, Sibling)        SOCIAL HISTORY:  Smoking: [  ] Never Smoked  [  ] Former Smoker (# packs x # years)  [  ] Current Smoker (# packs x # years)  Substance Use:   EtOH Use:   Marital Status: [  ] Single  [  ]   [  ]   [  ]   Sexual History:   Occupation:  Recent Travel:  Country of Birth:   Advance Directives:     HOME MEDICATIONS:      Allergies    grass, pollen (Rhinitis)  No Known Drug Allergies    Intolerances          REVIEW OF SYSTEMS:  Constitutional: No fevers, chills, weight loss, weight gain  HEENT: No vision problems, eye pain, nasal congestion, rhinorrhea, sore throat, dysphagia  CV: No chest pain, orthopnea, palpitations  Resp: No cough, dyspnea, wheezing, hemoptysis  GI: No nausea, vomiting, diarrhea, constipation, abdominal pain  : [ ] dysuria [ ] nocturia [ ] hematuria [ ] increased urinary frequency  Musculoskeletal: [ ] back pain [ ] myalgias [ ] arthralgias [ ] fracture  Skin: [ ] rash [ ] itch  Neurological: [ ] headache [ ] dizziness [ ] syncope [ ] weakness [ ] numbness  Psychiatric: [ ] anxiety [ ] depression  Endocrine: [ ] diabetes [ ] thyroid problem  Hematologic/Lymphatic: [ ] anemia [ ] bleeding problem  Allergic/Immunologic: [ ] itchy eyes [ ] nasal discharge [ ] hives [ ] angioedema  [ ] All other systems negative  [ ] Unable to assess ROS because ________    OBJECTIVE:  ICU Vital Signs Last 24 Hrs  T(C): 35.9 (09 Mar 2021 08:30), Max: 36.8 (08 Mar 2021 20:44)  T(F): 96.6 (09 Mar 2021 08:30), Max: 98.3 (08 Mar 2021 20:44)  HR: 65 (09 Mar 2021 08:36) (65 - 84)  BP: 124/60 (09 Mar 2021 08:30) (124/60 - 170/77)  BP(mean): 85 (09 Mar 2021 08:30) (85 - 85)  ABP: --  ABP(mean): --  RR: 17 (09 Mar 2021 08:30) (17 - 19)  SpO2: 99% (09 Mar 2021 08:36) (53% - 100%)    Mode: AC/ CMV (Assist Control/ Continuous Mandatory Ventilation), RR (machine): 16, TV (machine): 450, FiO2: 100, PEEP: 5, ITime: 1, MAP: 12, PIP: 36    03-08 @ 07:01  -  03-09 @ 07:00  --------------------------------------------------------  IN: 285 mL / OUT: 730 mL / NET: -445 mL    03-09 @ 07:01  - 03-09 @ 08:52  --------------------------------------------------------  IN: 0 mL / OUT: 350 mL / NET: -350 mL      CAPILLARY BLOOD GLUCOSE  107 (09 Mar 2021 07:32)      POCT Blood Glucose.: 107 mg/dL (09 Mar 2021 07:25)      PHYSICAL EXAM:  General:   HEENT:   Neck:   Chest/Lungs:  Heart:  Abdomen:   Extremities:   Skin:   Neuro:   Psych:     LINES:     HOSPITAL MEDICATIONS:  MEDICATIONS  (STANDING):  buMETAnide Infusion 2 mG/Hr (10 mL/Hr) IV Continuous <Continuous>  chlorhexidine 0.12% Liquid 15 milliLiter(s) Oral Mucosa every 12 hours  chlorhexidine 4% Liquid 1 Application(s) Topical <User Schedule>  fentaNYL    Injectable 100 MICROGram(s) IV Push once  heparin   Injectable 5000 Unit(s) SubCutaneous every 12 hours  propofol Infusion 50 MICROgram(s)/kG/Min (20 mL/Hr) IV Continuous <Continuous>  trimethoprim/polymyxin Solution 1 Drop(s) Both EYES three times a day    MEDICATIONS  (PRN):      LABS:                        9.9    4.73  )-----------( 185      ( 09 Mar 2021 06:14 )             34.9     Hgb Trend: 9.9<--, 9.8<--, 9.8<--, 10.6<--, 10.6<--  03-09    142  |  102  |  53<H>  ----------------------------<  120<H>  4.6   |  27  |  5.23<H>    Ca    8.5      09 Mar 2021 06:14  Phos  6.3     03-09  Mg     2.7     03-09      Creatinine Trend: 5.23<--, 5.41<--, 5.32<--, 5.28<--, 5.24<--, 4.91<--      Arterial Blood Gas:  03-09 @ 07:43  7.22/80/117/32/99/2.6  ABG lactate: --  Arterial Blood Gas:  03-09 @ 06:25  7.24/77/287/32/100/3.1  ABG lactate: --  Arterial Blood Gas:  03-08 @ 22:03  7.26/67/78/29/96/1.3  ABG lactate: --        MICROBIOLOGY:     RADIOLOGY & ADDITIONAL TESTS:    Assessment and Plan:     # Neuro    # CV    # Pulm      - s/p intubation 3/9, RR 16, TV 450cc, PEEP 5, FiO2 100%      - f/u CXR to assess endotracheal tube placement     # GI      - s/p OG tube placement     # Renal    # Endo     # DVT prophylaxis: MICU Accept Note    CHIEF COMPLAINT:     HPI / INTERVAL HISTORY:    PAST MEDICAL & SURGICAL HISTORY:  Diabetes    Hypertension    HTN (hypertension)    BPH (Benign Prostatic Hypertrophy)    Glaucoma (Increased Eye Pressure)    HTN - Hypertension    No significant past surgical history    Laser Surgery :Right  ?  regarding procedure ; 12/07 ; secondary to glaucoma    Laser Surgery Left  ? regarding procedure ; secondary to glaucoma 12/07    Excision of Sinus Polyp  2006        FAMILY HISTORY:  Family history of diabetes mellitus (DM) (Mother, Sibling)        SOCIAL HISTORY:  Smoking: [  ] Never Smoked  [  ] Former Smoker (# packs x # years)  [  ] Current Smoker (# packs x # years)  Substance Use:   EtOH Use:   Marital Status: [  ] Single  [  ]   [  ]   [  ]   Sexual History:   Occupation:  Recent Travel:  Country of Birth:   Advance Directives:     HOME MEDICATIONS:      Allergies    grass, pollen (Rhinitis)  No Known Drug Allergies    Intolerances          REVIEW OF SYSTEMS:  Constitutional: No fevers, chills, weight loss, weight gain  HEENT: No vision problems, eye pain, nasal congestion, rhinorrhea, sore throat, dysphagia  CV: No chest pain, orthopnea, palpitations  Resp: No cough, dyspnea, wheezing, hemoptysis  GI: No nausea, vomiting, diarrhea, constipation, abdominal pain  : [ ] dysuria [ ] nocturia [ ] hematuria [ ] increased urinary frequency  Musculoskeletal: [ ] back pain [ ] myalgias [ ] arthralgias [ ] fracture  Skin: [ ] rash [ ] itch  Neurological: [ ] headache [ ] dizziness [ ] syncope [ ] weakness [ ] numbness  Psychiatric: [ ] anxiety [ ] depression  Endocrine: [ ] diabetes [ ] thyroid problem  Hematologic/Lymphatic: [ ] anemia [ ] bleeding problem  Allergic/Immunologic: [ ] itchy eyes [ ] nasal discharge [ ] hives [ ] angioedema  [ ] All other systems negative  [ ] Unable to assess ROS because ________    OBJECTIVE:  ICU Vital Signs Last 24 Hrs  T(C): 35.9 (09 Mar 2021 08:30), Max: 36.8 (08 Mar 2021 20:44)  T(F): 96.6 (09 Mar 2021 08:30), Max: 98.3 (08 Mar 2021 20:44)  HR: 65 (09 Mar 2021 08:36) (65 - 84)  BP: 124/60 (09 Mar 2021 08:30) (124/60 - 170/77)  BP(mean): 85 (09 Mar 2021 08:30) (85 - 85)  ABP: --  ABP(mean): --  RR: 17 (09 Mar 2021 08:30) (17 - 19)  SpO2: 99% (09 Mar 2021 08:36) (53% - 100%)    Mode: AC/ CMV (Assist Control/ Continuous Mandatory Ventilation), RR (machine): 16, TV (machine): 450, FiO2: 100, PEEP: 5, ITime: 1, MAP: 12, PIP: 36    03-08 @ 07:01  -  03-09 @ 07:00  --------------------------------------------------------  IN: 285 mL / OUT: 730 mL / NET: -445 mL    03-09 @ 07:01  - 03-09 @ 08:52  --------------------------------------------------------  IN: 0 mL / OUT: 350 mL / NET: -350 mL      CAPILLARY BLOOD GLUCOSE  107 (09 Mar 2021 07:32)      POCT Blood Glucose.: 107 mg/dL (09 Mar 2021 07:25)      PHYSICAL EXAM:  General:   HEENT:   Neck:   Chest/Lungs:  Heart:  Abdomen:   Extremities:   Skin:   Neuro:   Psych:     LINES:     HOSPITAL MEDICATIONS:  MEDICATIONS  (STANDING):  buMETAnide Infusion 2 mG/Hr (10 mL/Hr) IV Continuous <Continuous>  chlorhexidine 0.12% Liquid 15 milliLiter(s) Oral Mucosa every 12 hours  chlorhexidine 4% Liquid 1 Application(s) Topical <User Schedule>  fentaNYL    Injectable 100 MICROGram(s) IV Push once  heparin   Injectable 5000 Unit(s) SubCutaneous every 12 hours  propofol Infusion 50 MICROgram(s)/kG/Min (20 mL/Hr) IV Continuous <Continuous>  trimethoprim/polymyxin Solution 1 Drop(s) Both EYES three times a day    MEDICATIONS  (PRN):      LABS:                        9.9    4.73  )-----------( 185      ( 09 Mar 2021 06:14 )             34.9     Hgb Trend: 9.9<--, 9.8<--, 9.8<--, 10.6<--, 10.6<--  03-09    142  |  102  |  53<H>  ----------------------------<  120<H>  4.6   |  27  |  5.23<H>    Ca    8.5      09 Mar 2021 06:14  Phos  6.3     03-09  Mg     2.7     03-09      Creatinine Trend: 5.23<--, 5.41<--, 5.32<--, 5.28<--, 5.24<--, 4.91<--      Arterial Blood Gas:  03-09 @ 07:43  7.22/80/117/32/99/2.6  ABG lactate: --  Arterial Blood Gas:  03-09 @ 06:25  7.24/77/287/32/100/3.1  ABG lactate: --  Arterial Blood Gas:  03-08 @ 22:03  7.26/67/78/29/96/1.3  ABG lactate: --        MICROBIOLOGY:     RADIOLOGY & ADDITIONAL TESTS:  < from: Transthoracic Echocardiogram (03.05.21 @ 08:41) >    EF (Visual Estimate): 60 %  Doppler Peak Velocity (m/sec): AoV=1.9  ------------------------------------------------------------------------  Observations:  Mitral Valve: Normal mitral valve. Mean transmitral valve  gradient equals 2 mm Hg, consistent with mild mitral  stenosis.  Aortic Valve/Aorta: Calcified trileaflet aortic valve with  mildly  decreased opening. Peak transaortic valve gradient  equals 14 mm Hg, mean transaortic valve gradient equals 4  mm Hg, aortic valve velocity time integral equals 38 cm.  Peak left ventricular outflow tract gradient equals 52 mm  Hg, consistent with moderately severe LVOT obstruction.HR  70  Aortic Root: 3.3 cm.  Left Atrium: Moderately dilated left atrium.  LA volume  index = 48 cc/m2.  Left Ventricle: Mild segmental left ventricular systolic  dysfunction. The mid inferolateral wall, and the basal  inferolateral wall are hypokinetic. The basal inferior wall  is akinetic.  Moderate concentric left ventricular  hypertrophy. Indeterminate diastolic function.  Right Heart: Right atrium not well visualized. Normal right  ventricular size with decreased right ventricular systolic  function. Tricuspid valve not well visualized. Normal  pulmonic valve. Minimal pulmonic regurgitation.  Pericardium/Pleura: Small pericardial effusion.  Left pleural effusion.  Hemodynamic: Estimated right atrial pressure is 8 mm Hg.  Unableto estimate RVSP.  ------------------------------------------------------------------------  Conclusions:  1. Calcified trileaflet aortic valve with mildly  decreased  opening.  2. Moderate concentric left ventricular hypertrophy.  3. Mild segmental left ventricular systolic dysfunction.  Peak left ventricular outflow tract gradient equals 52 mm  Hg, consistent with moderately severe LVOT obstruction.HR  70 The mid inferolateral wall, and the basal  inferolateral  wall are hypokinetic. The basal inferior wall is akinetic.  4. Normal right ventricular size with decreased right  ventricular systolic function.  ------------------------------------------------------------------------  Confirmed on  3/5/2021 - 21:19:28 by REYNALDO Gardner  ------------------------------------------------------------------------    < end of copied text >      Assessment and Plan:     # Neuro    # CV    # Pulm      - s/p intubation 3/9, RR 16, TV 450cc, PEEP 5, FiO2 100%      - f/u CXR to assess endotracheal tube placement     # GI      - s/p OG tube placement     # Renal    # Endo     # DVT prophylaxis: MICU Accept Note    CHIEF COMPLAINT:     HPI / INTERVAL HISTORY:    PAST MEDICAL & SURGICAL HISTORY:  Diabetes    Hypertension    HTN (hypertension)    BPH (Benign Prostatic Hypertrophy)    Glaucoma (Increased Eye Pressure)    HTN - Hypertension    No significant past surgical history    Laser Surgery :Right  ?  regarding procedure ; 12/07 ; secondary to glaucoma    Laser Surgery Left  ? regarding procedure ; secondary to glaucoma 12/07    Excision of Sinus Polyp  2006        FAMILY HISTORY:  Family history of diabetes mellitus (DM) (Mother, Sibling)        SOCIAL HISTORY:  Smoking: [  ] Never Smoked  [  ] Former Smoker (# packs x # years)  [  ] Current Smoker (# packs x # years)  Substance Use:   EtOH Use:   Marital Status: [  ] Single  [  ]   [  ]   [  ]   Sexual History:   Occupation:  Recent Travel:  Country of Birth:   Advance Directives:     HOME MEDICATIONS:      Allergies    grass, pollen (Rhinitis)  No Known Drug Allergies    Intolerances          REVIEW OF SYSTEMS:  Constitutional: No fevers, chills, weight loss, weight gain  HEENT: No vision problems, eye pain, nasal congestion, rhinorrhea, sore throat, dysphagia  CV: No chest pain, orthopnea, palpitations  Resp: No cough, dyspnea, wheezing, hemoptysis  GI: No nausea, vomiting, diarrhea, constipation, abdominal pain  : [ ] dysuria [ ] nocturia [ ] hematuria [ ] increased urinary frequency  Musculoskeletal: [ ] back pain [ ] myalgias [ ] arthralgias [ ] fracture  Skin: [ ] rash [ ] itch  Neurological: [ ] headache [ ] dizziness [ ] syncope [ ] weakness [ ] numbness  Psychiatric: [ ] anxiety [ ] depression  Endocrine: [ ] diabetes [ ] thyroid problem  Hematologic/Lymphatic: [ ] anemia [ ] bleeding problem  Allergic/Immunologic: [ ] itchy eyes [ ] nasal discharge [ ] hives [ ] angioedema  [ ] All other systems negative  [ ] Unable to assess ROS because ________    OBJECTIVE:  ICU Vital Signs Last 24 Hrs  T(C): 35.9 (09 Mar 2021 08:30), Max: 36.8 (08 Mar 2021 20:44)  T(F): 96.6 (09 Mar 2021 08:30), Max: 98.3 (08 Mar 2021 20:44)  HR: 65 (09 Mar 2021 08:36) (65 - 84)  BP: 124/60 (09 Mar 2021 08:30) (124/60 - 170/77)  BP(mean): 85 (09 Mar 2021 08:30) (85 - 85)  ABP: --  ABP(mean): --  RR: 17 (09 Mar 2021 08:30) (17 - 19)  SpO2: 99% (09 Mar 2021 08:36) (53% - 100%)    Mode: AC/ CMV (Assist Control/ Continuous Mandatory Ventilation), RR (machine): 16, TV (machine): 450, FiO2: 100, PEEP: 5, ITime: 1, MAP: 12, PIP: 36    03-08 @ 07:01  -  03-09 @ 07:00  --------------------------------------------------------  IN: 285 mL / OUT: 730 mL / NET: -445 mL    03-09 @ 07:01  - 03-09 @ 08:52  --------------------------------------------------------  IN: 0 mL / OUT: 350 mL / NET: -350 mL      CAPILLARY BLOOD GLUCOSE  107 (09 Mar 2021 07:32)      POCT Blood Glucose.: 107 mg/dL (09 Mar 2021 07:25)      PHYSICAL EXAM:  General:   HEENT:   Neck:   Chest/Lungs:  Heart:  Abdomen:   Extremities:   Skin:   Neuro:   Psych:     LINES:     HOSPITAL MEDICATIONS:  MEDICATIONS  (STANDING):  buMETAnide Infusion 2 mG/Hr (10 mL/Hr) IV Continuous <Continuous>  chlorhexidine 0.12% Liquid 15 milliLiter(s) Oral Mucosa every 12 hours  chlorhexidine 4% Liquid 1 Application(s) Topical <User Schedule>  fentaNYL    Injectable 100 MICROGram(s) IV Push once  heparin   Injectable 5000 Unit(s) SubCutaneous every 12 hours  propofol Infusion 50 MICROgram(s)/kG/Min (20 mL/Hr) IV Continuous <Continuous>  trimethoprim/polymyxin Solution 1 Drop(s) Both EYES three times a day    MEDICATIONS  (PRN):      LABS:                        9.9    4.73  )-----------( 185      ( 09 Mar 2021 06:14 )             34.9     Hgb Trend: 9.9<--, 9.8<--, 9.8<--, 10.6<--, 10.6<--  03-09    142  |  102  |  53<H>  ----------------------------<  120<H>  4.6   |  27  |  5.23<H>    Ca    8.5      09 Mar 2021 06:14  Phos  6.3     03-09  Mg     2.7     03-09      Creatinine Trend: 5.23<--, 5.41<--, 5.32<--, 5.28<--, 5.24<--, 4.91<--      Arterial Blood Gas:  03-09 @ 07:43  7.22/80/117/32/99/2.6  ABG lactate: --  Arterial Blood Gas:  03-09 @ 06:25  7.24/77/287/32/100/3.1  ABG lactate: --  Arterial Blood Gas:  03-08 @ 22:03  7.26/67/78/29/96/1.3  ABG lactate: --        MICROBIOLOGY:     RADIOLOGY & ADDITIONAL TESTS:  < from: Transthoracic Echocardiogram (03.05.21 @ 08:41) >    EF (Visual Estimate): 60 %  Doppler Peak Velocity (m/sec): AoV=1.9  ------------------------------------------------------------------------  Observations:  Mitral Valve: Normal mitral valve. Mean transmitral valve  gradient equals 2 mm Hg, consistent with mild mitral  stenosis.  Aortic Valve/Aorta: Calcified trileaflet aortic valve with  mildly  decreased opening. Peak transaortic valve gradient  equals 14 mm Hg, mean transaortic valve gradient equals 4  mm Hg, aortic valve velocity time integral equals 38 cm.  Peak left ventricular outflow tract gradient equals 52 mm  Hg, consistent with moderately severe LVOT obstruction.HR  70  Aortic Root: 3.3 cm.  Left Atrium: Moderately dilated left atrium.  LA volume  index = 48 cc/m2.  Left Ventricle: Mild segmental left ventricular systolic  dysfunction. The mid inferolateral wall, and the basal  inferolateral wall are hypokinetic. The basal inferior wall  is akinetic.  Moderate concentric left ventricular  hypertrophy. Indeterminate diastolic function.  Right Heart: Right atrium not well visualized. Normal right  ventricular size with decreased right ventricular systolic  function. Tricuspid valve not well visualized. Normal  pulmonic valve. Minimal pulmonic regurgitation.  Pericardium/Pleura: Small pericardial effusion.  Left pleural effusion.  Hemodynamic: Estimated right atrial pressure is 8 mm Hg.  Unableto estimate RVSP.  ------------------------------------------------------------------------  Conclusions:  1. Calcified trileaflet aortic valve with mildly  decreased  opening.  2. Moderate concentric left ventricular hypertrophy.  3. Mild segmental left ventricular systolic dysfunction.  Peak left ventricular outflow tract gradient equals 52 mm  Hg, consistent with moderately severe LVOT obstruction.HR  70 The mid inferolateral wall, and the basal  inferolateral  wall are hypokinetic. The basal inferior wall is akinetic.  4. Normal right ventricular size with decreased right  ventricular systolic function.  ------------------------------------------------------------------------  Confirmed on  3/5/2021 - 21:19:28 by REYNALDO Gardner  ------------------------------------------------------------------------    < end of copied text >      Assessment and Plan:     # Neuro    # CV       # HTN          - currently holding nifedipine 90mg PO daily, coreg 25mg PO BID, Hydralazine 100mg q8h--- will reinitiate as tolerated        # HF          - Echo from 3/5/2021: EF 60%, moderate severe LVOT obstruction, mild segmental LV systolic dysfunction           - will discontinue bumex gtt, initiate dialysis as below     # Pulm      - s/p intubation 3/9, RR 16, TV 450cc, PEEP 5, FiO2 100%      - f/u CXR to assess endotracheal tube placement     # GI      - s/p OG tube placement     # Renal    # Endo     # DVT prophylaxis: MICU Accept Note    CHIEF COMPLAINT:     HPI  80M w/ hx of CKD4, dCHF, difficult to control HTN, DM?? p/w SOB and CHF. Pt states he has been in [facility] since Dec 27th 2020 and just returned home from [facility] today. Pt states he started to get worsening SOB over the past 2-3 weeks in [facility]. He states he discussed this with his doctor in [facility] and was advised to come back to the [place] for further evaluation and treatment. Pt endorses worsening orthopnea and LE edema. Denies any chest pain, palpitations, fevers or chills. Endorses cough relatively unchanged. Endorses okay medication compliance although did not take any medications today. Denies headache. Occasional dizziness but no syncope.     Home Medications:  aspirin 81 mg oral delayed release tablet: 1 tab(s) orally once a day (04 Mar 2021 21:15)  Lasix 20 mg oral tablet: 1 tab(s) orally once a day (04 Mar 2021 21:15)  NIFEdipine 90 mg oral tablet, extended release: 1 tab(s) orally once a day (04 Mar 2021 21:15)  tamsulosin 0.4 mg oral capsule: 1 cap(s) orally once a day (at bedtime) (04 Mar 2021 21:15)    MEDICINE FLOOR COURSE:   Pt was fluid overloaded on exam, hypoxic to <80% spo2 on RA on arrival, improved on NC, thought to be from acute CHF exacerbation. BNP elevated to 27,657. TTE done: EF 60%, ____. Started on lasiv 80mg IV BID. BP was __ on arrival to floor, on hydral 100mg TID, started coreg 25mg BID, nifedipine 90mg QD. Has hx CKD stage IV, unclear cause (HTN vs DM?) not on dialysis. On SCD for DVT ppx.   On 3/8, discontinued lasix, started on bumex drip for worsening edema/sob, concern for need for urgent dialysis, MICU called 3/8 pm for dialysis initiation.   MICU assessed patient. Pt has hx T2DM?, Bilateral Multiple Renal Cysts, worsening Acute Renal Failure superimposed on CKD IV with Chronic HFpEF 60%, admitted 3/4/21 for Oliguric Renal Failure.   - Sitting OOB to Chair, A&Ox 3 and FCx 4 not in acute distress   - NCO2 4Li SpO2 95% and stable HR/BP   - CXR c/w Acute Pulmonary Congestion and basilar Effusion   - Azotemia 52/5.4 with K 4.8, PO4 5.3   - IV Bumex 1 mg/Hr with  cc noted   - Agreeable with impending Hemodialysis but no indication for Emergent HD tonight   - Increase IV Bumex to 2-3 mg/Hr             PAST MEDICAL & SURGICAL HISTORY:  Diabetes    Hypertension    HTN (hypertension)    BPH (Benign Prostatic Hypertrophy)    Glaucoma (Increased Eye Pressure)    HTN - Hypertension    No significant past surgical history    Laser Surgery :Right  ?  regarding procedure ; 12/07 ; secondary to glaucoma    Laser Surgery Left  ? regarding procedure ; secondary to glaucoma 12/07    Excision of Sinus Polyp  2006        FAMILY HISTORY:  Family history of diabetes mellitus (DM) (Mother, Sibling)        SOCIAL HISTORY:  Smoking: [  ] Never Smoked  [  ] Former Smoker (# packs x # years)  [  ] Current Smoker (# packs x # years)  Substance Use:   EtOH Use:   Marital Status: [  ] Single  [  ]   [  ]   [  ]   Sexual History:   Occupation:  Recent Travel:  Country of Birth:   Advance Directives:     HOME MEDICATIONS:      Allergies    grass, pollen (Rhinitis)  No Known Drug Allergies    Intolerances          REVIEW OF SYSTEMS:  Constitutional: No fevers, chills, weight loss, weight gain  HEENT: No vision problems, eye pain, nasal congestion, rhinorrhea, sore throat, dysphagia  CV: No chest pain, orthopnea, palpitations  Resp: No cough, dyspnea, wheezing, hemoptysis  GI: No nausea, vomiting, diarrhea, constipation, abdominal pain  : [ ] dysuria [ ] nocturia [ ] hematuria [ ] increased urinary frequency  Musculoskeletal: [ ] back pain [ ] myalgias [ ] arthralgias [ ] fracture  Skin: [ ] rash [ ] itch  Neurological: [ ] headache [ ] dizziness [ ] syncope [ ] weakness [ ] numbness  Psychiatric: [ ] anxiety [ ] depression  Endocrine: [ ] diabetes [ ] thyroid problem  Hematologic/Lymphatic: [ ] anemia [ ] bleeding problem  Allergic/Immunologic: [ ] itchy eyes [ ] nasal discharge [ ] hives [ ] angioedema  [ ] All other systems negative  [ ] Unable to assess ROS because ________    OBJECTIVE:  ICU Vital Signs Last 24 Hrs  T(C): 35.9 (09 Mar 2021 08:30), Max: 36.8 (08 Mar 2021 20:44)  T(F): 96.6 (09 Mar 2021 08:30), Max: 98.3 (08 Mar 2021 20:44)  HR: 65 (09 Mar 2021 08:36) (65 - 84)  BP: 124/60 (09 Mar 2021 08:30) (124/60 - 170/77)  BP(mean): 85 (09 Mar 2021 08:30) (85 - 85)  ABP: --  ABP(mean): --  RR: 17 (09 Mar 2021 08:30) (17 - 19)  SpO2: 99% (09 Mar 2021 08:36) (53% - 100%)    Mode: AC/ CMV (Assist Control/ Continuous Mandatory Ventilation), RR (machine): 16, TV (machine): 450, FiO2: 100, PEEP: 5, ITime: 1, MAP: 12, PIP: 36    03-08 @ 07:01  -  03-09 @ 07:00  --------------------------------------------------------  IN: 285 mL / OUT: 730 mL / NET: -445 mL    03-09 @ 07:01  - 03-09 @ 08:52  --------------------------------------------------------  IN: 0 mL / OUT: 350 mL / NET: -350 mL      CAPILLARY BLOOD GLUCOSE  107 (09 Mar 2021 07:32)      POCT Blood Glucose.: 107 mg/dL (09 Mar 2021 07:25)      PHYSICAL EXAM:  General:   HEENT:   Neck:   Chest/Lungs:  Heart:  Abdomen:   Extremities:   Skin:   Neuro:   Psych:     LINES:     HOSPITAL MEDICATIONS:  MEDICATIONS  (STANDING):  buMETAnide Infusion 2 mG/Hr (10 mL/Hr) IV Continuous <Continuous>  chlorhexidine 0.12% Liquid 15 milliLiter(s) Oral Mucosa every 12 hours  chlorhexidine 4% Liquid 1 Application(s) Topical <User Schedule>  fentaNYL    Injectable 100 MICROGram(s) IV Push once  heparin   Injectable 5000 Unit(s) SubCutaneous every 12 hours  propofol Infusion 50 MICROgram(s)/kG/Min (20 mL/Hr) IV Continuous <Continuous>  trimethoprim/polymyxin Solution 1 Drop(s) Both EYES three times a day    MEDICATIONS  (PRN):      LABS:                        9.9    4.73  )-----------( 185      ( 09 Mar 2021 06:14 )             34.9     Hgb Trend: 9.9<--, 9.8<--, 9.8<--, 10.6<--, 10.6<--  03-09    142  |  102  |  53<H>  ----------------------------<  120<H>  4.6   |  27  |  5.23<H>    Ca    8.5      09 Mar 2021 06:14  Phos  6.3     03-09  Mg     2.7     03-09      Creatinine Trend: 5.23<--, 5.41<--, 5.32<--, 5.28<--, 5.24<--, 4.91<--      Arterial Blood Gas:  03-09 @ 07:43  7.22/80/117/32/99/2.6  ABG lactate: --  Arterial Blood Gas:  03-09 @ 06:25  7.24/77/287/32/100/3.1  ABG lactate: --  Arterial Blood Gas:  03-08 @ 22:03  7.26/67/78/29/96/1.3  ABG lactate: --        MICROBIOLOGY:     RADIOLOGY & ADDITIONAL TESTS:  < from: Transthoracic Echocardiogram (03.05.21 @ 08:41) >    EF (Visual Estimate): 60 %  Doppler Peak Velocity (m/sec): AoV=1.9  ------------------------------------------------------------------------  Observations:  Mitral Valve: Normal mitral valve. Mean transmitral valve  gradient equals 2 mm Hg, consistent with mild mitral  stenosis.  Aortic Valve/Aorta: Calcified trileaflet aortic valve with  mildly  decreased opening. Peak transaortic valve gradient  equals 14 mm Hg, mean transaortic valve gradient equals 4  mm Hg, aortic valve velocity time integral equals 38 cm.  Peak left ventricular outflow tract gradient equals 52 mm  Hg, consistent with moderately severe LVOT obstruction.HR  70  Aortic Root: 3.3 cm.  Left Atrium: Moderately dilated left atrium.  LA volume  index = 48 cc/m2.  Left Ventricle: Mild segmental left ventricular systolic  dysfunction. The mid inferolateral wall, and the basal  inferolateral wall are hypokinetic. The basal inferior wall  is akinetic.  Moderate concentric left ventricular  hypertrophy. Indeterminate diastolic function.  Right Heart: Right atrium not well visualized. Normal right  ventricular size with decreased right ventricular systolic  function. Tricuspid valve not well visualized. Normal  pulmonic valve. Minimal pulmonic regurgitation.  Pericardium/Pleura: Small pericardial effusion.  Left pleural effusion.  Hemodynamic: Estimated right atrial pressure is 8 mm Hg.  Unableto estimate RVSP.  ------------------------------------------------------------------------  Conclusions:  1. Calcified trileaflet aortic valve with mildly  decreased  opening.  2. Moderate concentric left ventricular hypertrophy.  3. Mild segmental left ventricular systolic dysfunction.  Peak left ventricular outflow tract gradient equals 52 mm  Hg, consistent with moderately severe LVOT obstruction.HR  70 The mid inferolateral wall, and the basal  inferolateral  wall are hypokinetic. The basal inferior wall is akinetic.  4. Normal right ventricular size with decreased right  ventricular systolic function.  ------------------------------------------------------------------------  Confirmed on  3/5/2021 - 21:19:28 by REYNALDO Gardner  ------------------------------------------------------------------------    < end of copied text >      Assessment and Plan:     # Neuro    # CV       # HTN          - currently holding nifedipine 90mg PO daily, coreg 25mg PO BID, Hydralazine 100mg q8h--- will reinitiate as tolerated        # HF          - Echo from 3/5/2021: EF 60%, moderate severe LVOT obstruction, mild segmental LV systolic dysfunction           - will discontinue bumex gtt, initiate dialysis as below     # Pulm      - s/p intubation 3/9, RR 16, TV 450cc, PEEP 5, FiO2 100%      - f/u CXR to assess endotracheal tube placement     # GI      - s/p OG tube placement     # Renal    # Endo     # DVT prophylaxis: MICU Accept Note    CHIEF COMPLAINT:     PRESENTATION TO [facility]:   80M w/ hx of CKD4, dCHF, difficult to control HTN, DM?? p/w SOB and CHF. Pt states he has been in [facility] since Dec 27th 2020 and just returned home from [facility] today. Pt states he started to get worsening SOB over the past 2-3 weeks in [facility]. He states he discussed this with his doctor in [facility] and was advised [place] and [place]. Pt endorses worsening orthopnea and LE edema. Denies any chest pain, palpitations, fevers or chills. Endorses cough relatively unchanged. Endorses okay medication compliance although did not take any medications today. Denies headache. Occasional dizziness but no syncope.     Home Medications:  aspirin 81 mg oral delayed release tablet: 1 tab(s) orally once a day (04 Mar 2021 21:15)  Lasix 20 mg oral tablet: 1 tab(s) orally once a day (04 Mar 2021 21:15)  NIFEdipine 90 mg oral tablet, extended release: 1 tab(s) orally once a day (04 Mar 2021 21:15)  tamsulosin 0.4 mg oral capsule: 1 cap(s) orally once a day (at bedtime) (04 Mar 2021 21:15)      MEDICINE FLOOR COURSE:   Pt was fluid overloaded on exam, hypoxic to <80% spo2 on RA on arrival, improved on NC, thought to be from acute CHF exacerbation. BNP elevated to 27,657. TTE done: EF 60%, ____. Started on lasiv 80mg IV BID. BP was __ on arrival to floor, on hydral 100mg TID, started coreg 25mg BID, nifedipine 90mg QD. Has hx CKD stage IV, unclear cause (HTN vs DM?) not on dialysis. On SCD for DVT ppx.   On 3/8, discontinued lasix, started on bumex drip for worsening edema/sob, concern for need for urgent dialysis, MICU called 3/8 pm for dialysis initiation.   MICU assessed patient 3/8 pm. Pt has hx T2DM?, Bilateral Multiple Renal Cysts, worsening Acute Renal Failure superimposed on CKD IV with Chronic HFpEF 60%, admitted 3/4/21 for Oliguric Renal Failure. Pt A&O x3, no distress, spo2 95% on NC 4L. Lytes wnl. On bumex drip. Agree with dialysis need but no need for emergent HD - deemed not candidate for MICU. Recommended increase IV bumex to 2-3mg/hr.     3/9 am RRT called for AMS - pt was lethargic, increased work of breathing on BIPAP, ABG with pCO2 80. Pt was intubated and sedated with etominate and succinylcholine, propofol started. Concern for stroke - stroke code, CTH without acute changes, neuro not concerned for stroke. Currently patient lethargic with increased work of breathing on BIPAP.  FS is 107. Family called and patient full code. ABG performed and pCO2 80.  Patient intubated and sedated with etomidate and succinylcholine and propofol started.      TRANSFERRED TO MICU FOR FURTHER CARE.           PAST MEDICAL & SURGICAL HISTORY:  Diabetes    Hypertension    HTN (hypertension)    BPH (Benign Prostatic Hypertrophy)    Glaucoma (Increased Eye Pressure)    HTN - Hypertension    No significant past surgical history    Laser Surgery :Right  ?  regarding procedure ; 12/07 ; secondary to glaucoma    Laser Surgery Left  ? regarding procedure ; secondary to glaucoma 12/07    Excision of Sinus Polyp  2006        FAMILY HISTORY:  Family history of diabetes mellitus (DM) (Mother, Sibling)        SOCIAL HISTORY:  Smoking: [  ] Never Smoked  [  ] Former Smoker (# packs x # years)  [  ] Current Smoker (# packs x # years)  Substance Use:   EtOH Use:   Marital Status: [  ] Single  [  ]   [  ]   [  ]   Sexual History:   Occupation:  Recent Travel:  Country of Birth:   Advance Directives:     HOME MEDICATIONS:      Allergies    grass, pollen (Rhinitis)  No Known Drug Allergies    Intolerances          REVIEW OF SYSTEMS:  Constitutional: No fevers, chills, weight loss, weight gain  HEENT: No vision problems, eye pain, nasal congestion, rhinorrhea, sore throat, dysphagia  CV: No chest pain, orthopnea, palpitations  Resp: No cough, dyspnea, wheezing, hemoptysis  GI: No nausea, vomiting, diarrhea, constipation, abdominal pain  : [ ] dysuria [ ] nocturia [ ] hematuria [ ] increased urinary frequency  Musculoskeletal: [ ] back pain [ ] myalgias [ ] arthralgias [ ] fracture  Skin: [ ] rash [ ] itch  Neurological: [ ] headache [ ] dizziness [ ] syncope [ ] weakness [ ] numbness  Psychiatric: [ ] anxiety [ ] depression  Endocrine: [ ] diabetes [ ] thyroid problem  Hematologic/Lymphatic: [ ] anemia [ ] bleeding problem  Allergic/Immunologic: [ ] itchy eyes [ ] nasal discharge [ ] hives [ ] angioedema  [ ] All other systems negative  [ ] Unable to assess ROS because ________    OBJECTIVE:  ICU Vital Signs Last 24 Hrs  T(C): 35.9 (09 Mar 2021 08:30), Max: 36.8 (08 Mar 2021 20:44)  T(F): 96.6 (09 Mar 2021 08:30), Max: 98.3 (08 Mar 2021 20:44)  HR: 65 (09 Mar 2021 08:36) (65 - 84)  BP: 124/60 (09 Mar 2021 08:30) (124/60 - 170/77)  BP(mean): 85 (09 Mar 2021 08:30) (85 - 85)  ABP: --  ABP(mean): --  RR: 17 (09 Mar 2021 08:30) (17 - 19)  SpO2: 99% (09 Mar 2021 08:36) (53% - 100%)    Mode: AC/ CMV (Assist Control/ Continuous Mandatory Ventilation), RR (machine): 16, TV (machine): 450, FiO2: 100, PEEP: 5, ITime: 1, MAP: 12, PIP: 36    03-08 @ 07:01 - 03-09 @ 07:00  --------------------------------------------------------  IN: 285 mL / OUT: 730 mL / NET: -445 mL    03-09 @ 07:01  -  03-09 @ 08:52  --------------------------------------------------------  IN: 0 mL / OUT: 350 mL / NET: -350 mL      CAPILLARY BLOOD GLUCOSE  107 (09 Mar 2021 07:32)      POCT Blood Glucose.: 107 mg/dL (09 Mar 2021 07:25)      PHYSICAL EXAM:  General:   HEENT:   Neck:   Chest/Lungs:  Heart:  Abdomen:   Extremities:   Skin:   Neuro:   Psych:     LINES:     HOSPITAL MEDICATIONS:  MEDICATIONS  (STANDING):  buMETAnide Infusion 2 mG/Hr (10 mL/Hr) IV Continuous <Continuous>  chlorhexidine 0.12% Liquid 15 milliLiter(s) Oral Mucosa every 12 hours  chlorhexidine 4% Liquid 1 Application(s) Topical <User Schedule>  fentaNYL    Injectable 100 MICROGram(s) IV Push once  heparin   Injectable 5000 Unit(s) SubCutaneous every 12 hours  propofol Infusion 50 MICROgram(s)/kG/Min (20 mL/Hr) IV Continuous <Continuous>  trimethoprim/polymyxin Solution 1 Drop(s) Both EYES three times a day    MEDICATIONS  (PRN):      LABS:                        9.9    4.73  )-----------( 185      ( 09 Mar 2021 06:14 )             34.9     Hgb Trend: 9.9<--, 9.8<--, 9.8<--, 10.6<--, 10.6<--  03-09    142  |  102  |  53<H>  ----------------------------<  120<H>  4.6   |  27  |  5.23<H>    Ca    8.5      09 Mar 2021 06:14  Phos  6.3     03-09  Mg     2.7     03-09      Creatinine Trend: 5.23<--, 5.41<--, 5.32<--, 5.28<--, 5.24<--, 4.91<--      Arterial Blood Gas:  03-09 @ 07:43  7.22/80/117/32/99/2.6  ABG lactate: --  Arterial Blood Gas:  03-09 @ 06:25  7.24/77/287/32/100/3.1  ABG lactate: --  Arterial Blood Gas:  03-08 @ 22:03  7.26/67/78/29/96/1.3  ABG lactate: --        MICROBIOLOGY:     RADIOLOGY & ADDITIONAL TESTS:  < from: Transthoracic Echocardiogram (03.05.21 @ 08:41) >    EF (Visual Estimate): 60 %  Doppler Peak Velocity (m/sec): AoV=1.9  ------------------------------------------------------------------------  Observations:  Mitral Valve: Normal mitral valve. Mean transmitral valve  gradient equals 2 mm Hg, consistent with mild mitral  stenosis.  Aortic Valve/Aorta: Calcified trileaflet aortic valve with  mildly  decreased opening. Peak transaortic valve gradient  equals 14 mm Hg, mean transaortic valve gradient equals 4  mm Hg, aortic valve velocity time integral equals 38 cm.  Peak left ventricular outflow tract gradient equals 52 mm  Hg, consistent with moderately severe LVOT obstruction.HR  70  Aortic Root: 3.3 cm.  Left Atrium: Moderately dilated left atrium.  LA volume  index = 48 cc/m2.  Left Ventricle: Mild segmental left ventricular systolic  dysfunction. The mid inferolateral wall, and the basal  inferolateral wall are hypokinetic. The basal inferior wall  is akinetic.  Moderate concentric left ventricular  hypertrophy. Indeterminate diastolic function.  Right Heart: Right atrium not well visualized. Normal right  ventricular size with decreased right ventricular systolic  function. Tricuspid valve not well visualized. Normal  pulmonic valve. Minimal pulmonic regurgitation.  Pericardium/Pleura: Small pericardial effusion.  Left pleural effusion.  Hemodynamic: Estimated right atrial pressure is 8 mm Hg.  Unableto estimate RVSP.  ------------------------------------------------------------------------  Conclusions:  1. Calcified trileaflet aortic valve with mildly  decreased  opening.  2. Moderate concentric left ventricular hypertrophy.  3. Mild segmental left ventricular systolic dysfunction.  Peak left ventricular outflow tract gradient equals 52 mm  Hg, consistent with moderately severe LVOT obstruction.HR  70 The mid inferolateral wall, and the basal  inferolateral  wall are hypokinetic. The basal inferior wall is akinetic.  4. Normal right ventricular size with decreased right  ventricular systolic function.  ------------------------------------------------------------------------  Confirmed on  3/5/2021 - 21:19:28 by REYNALDO Gardner  ------------------------------------------------------------------------    < end of copied text >      Assessment and Plan:     # Neuro    # CV       # HTN          - currently holding nifedipine 90mg PO daily, coreg 25mg PO BID, Hydralazine 100mg q8h--- will reinitiate as tolerated        # HF          - Echo from 3/5/2021: EF 60%, moderate severe LVOT obstruction, mild segmental LV systolic dysfunction           - will discontinue bumex gtt, initiate dialysis as below     # Pulm      - s/p intubation 3/9, RR 16, TV 450cc, PEEP 5, FiO2 100%      - f/u CXR to assess endotracheal tube placement     # GI      - s/p OG tube placement     # Renal    # Endo     # DVT prophylaxis: MICU Accept Note    CHIEF COMPLAINT:     PRESENTATION TO [facility]:   80M w/ hx of CKD4, dCHF, difficult to control HTN, DM?? p/w SOB and CHF. Pt states he has been in [facility] since Dec 27th 2020 and just returned home from [facility] today. Pt states he started to get worsening SOB over the past 2-3 weeks in [facility]. He states he discussed this with his doctor in [facility] and was advised to come [place] and [place]. Pt endorses worsening orthopnea and LE edema. Denies any chest pain, palpitations, fevers or chills. Endorses cough relatively unchanged. Endorses okay medication compliance although did not take any medications today. Denies headache. Occasional dizziness but no syncope.     Home Medications:  aspirin 81 mg oral delayed release tablet: 1 tab(s) orally once a day (04 Mar 2021 21:15)  Lasix 20 mg oral tablet: 1 tab(s) orally once a day (04 Mar 2021 21:15)  NIFEdipine 90 mg oral tablet, extended release: 1 tab(s) orally once a day (04 Mar 2021 21:15)  tamsulosin 0.4 mg oral capsule: 1 cap(s) orally once a day (at bedtime) (04 Mar 2021 21:15)      MEDICINE FLOOR COURSE:   Pt was fluid overloaded on exam, hypoxic to <80% spo2 on RA on arrival, improved on NC, thought to be from acute CHF exacerbation. BNP elevated to 27,657. TTE done: EF 60%, ____. Started on lasiv 80mg IV BID. BP was __ on arrival to floor, on hydral 100mg TID, started coreg 25mg BID, nifedipine 90mg QD. Has hx CKD stage IV, unclear cause (HTN vs DM?) not on dialysis. On SCD for DVT ppx.   On 3/8, discontinued lasix, started on bumex drip for worsening edema/sob, concern for need for urgent dialysis, MICU called 3/8 pm for dialysis initiation.   MICU assessed patient 3/8 pm. Pt has hx T2DM?, Bilateral Multiple Renal Cysts, worsening Acute Renal Failure superimposed on CKD IV with Chronic HFpEF 60%, admitted 3/4/21 for Oliguric Renal Failure. Pt A&O x3, no distress, spo2 95% on NC 4L. Lytes wnl. On bumex drip. Agree with dialysis need but no need for emergent HD - deemed not candidate for MICU. Recommended increase IV bumex to 2-3mg/hr.     3/9 am RRT called for AMS - pt was lethargic, increased work of breathing on BIPAP, ABG with pCO2 80. Pt was intubated and sedated with etominate and succinylcholine, propofol started. Concern for stroke - stroke code, CTH without acute changes, neuro not concerned for stroke.     TRANSFERRED TO MICU FOR FURTHER CARE.           PAST MEDICAL & SURGICAL HISTORY:  Diabetes    Hypertension    HTN (hypertension)    BPH (Benign Prostatic Hypertrophy)    Glaucoma (Increased Eye Pressure)    HTN - Hypertension    No significant past surgical history    Laser Surgery :Right  ?  regarding procedure ; 12/07 ; secondary to glaucoma    Laser Surgery Left  ? regarding procedure ; secondary to glaucoma 12/07    Excision of Sinus Polyp  2006        FAMILY HISTORY:  Family history of diabetes mellitus (DM) (Mother, Sibling)        SOCIAL HISTORY:  Smoking: [  ] Never Smoked  [  ] Former Smoker (# packs x # years)  [  ] Current Smoker (# packs x # years)  Substance Use:   EtOH Use:   Marital Status: [  ] Single  [  ]   [  ]   [  ]   Sexual History:   Occupation:  Recent Travel:  Country of Birth:   Advance Directives:     HOME MEDICATIONS:      Allergies    grass, pollen (Rhinitis)  No Known Drug Allergies    Intolerances          REVIEW OF SYSTEMS:  Constitutional: No fevers, chills, weight loss, weight gain  HEENT: No vision problems, eye pain, nasal congestion, rhinorrhea, sore throat, dysphagia  CV: No chest pain, orthopnea, palpitations  Resp: No cough, dyspnea, wheezing, hemoptysis  GI: No nausea, vomiting, diarrhea, constipation, abdominal pain  : [ ] dysuria [ ] nocturia [ ] hematuria [ ] increased urinary frequency  Musculoskeletal: [ ] back pain [ ] myalgias [ ] arthralgias [ ] fracture  Skin: [ ] rash [ ] itch  Neurological: [ ] headache [ ] dizziness [ ] syncope [ ] weakness [ ] numbness  Psychiatric: [ ] anxiety [ ] depression  Endocrine: [ ] diabetes [ ] thyroid problem  Hematologic/Lymphatic: [ ] anemia [ ] bleeding problem  Allergic/Immunologic: [ ] itchy eyes [ ] nasal discharge [ ] hives [ ] angioedema  [ ] All other systems negative  [ ] Unable to assess ROS because ________    OBJECTIVE:  ICU Vital Signs Last 24 Hrs  T(C): 35.9 (09 Mar 2021 08:30), Max: 36.8 (08 Mar 2021 20:44)  T(F): 96.6 (09 Mar 2021 08:30), Max: 98.3 (08 Mar 2021 20:44)  HR: 65 (09 Mar 2021 08:36) (65 - 84)  BP: 124/60 (09 Mar 2021 08:30) (124/60 - 170/77)  BP(mean): 85 (09 Mar 2021 08:30) (85 - 85)  ABP: --  ABP(mean): --  RR: 17 (09 Mar 2021 08:30) (17 - 19)  SpO2: 99% (09 Mar 2021 08:36) (53% - 100%)    Mode: AC/ CMV (Assist Control/ Continuous Mandatory Ventilation), RR (machine): 16, TV (machine): 450, FiO2: 100, PEEP: 5, ITime: 1, MAP: 12, PIP: 36    03-08 @ 07:01 - 03-09 @ 07:00  --------------------------------------------------------  IN: 285 mL / OUT: 730 mL / NET: -445 mL    03-09 @ 07:01 - 03-09 @ 08:52  --------------------------------------------------------  IN: 0 mL / OUT: 350 mL / NET: -350 mL      CAPILLARY BLOOD GLUCOSE  107 (09 Mar 2021 07:32)      POCT Blood Glucose.: 107 mg/dL (09 Mar 2021 07:25)      PHYSICAL EXAM:  General:   HEENT:   Neck:   Chest/Lungs:  Heart:  Abdomen:   Extremities:   Skin:   Neuro:   Psych:     LINES:     HOSPITAL MEDICATIONS:  MEDICATIONS  (STANDING):  buMETAnide Infusion 2 mG/Hr (10 mL/Hr) IV Continuous <Continuous>  chlorhexidine 0.12% Liquid 15 milliLiter(s) Oral Mucosa every 12 hours  chlorhexidine 4% Liquid 1 Application(s) Topical <User Schedule>  fentaNYL    Injectable 100 MICROGram(s) IV Push once  heparin   Injectable 5000 Unit(s) SubCutaneous every 12 hours  propofol Infusion 50 MICROgram(s)/kG/Min (20 mL/Hr) IV Continuous <Continuous>  trimethoprim/polymyxin Solution 1 Drop(s) Both EYES three times a day    MEDICATIONS  (PRN):      LABS:                        9.9    4.73  )-----------( 185      ( 09 Mar 2021 06:14 )             34.9     Hgb Trend: 9.9<--, 9.8<--, 9.8<--, 10.6<--, 10.6<--  03-09    142  |  102  |  53<H>  ----------------------------<  120<H>  4.6   |  27  |  5.23<H>    Ca    8.5      09 Mar 2021 06:14  Phos  6.3     03-09  Mg     2.7     03-09      Creatinine Trend: 5.23<--, 5.41<--, 5.32<--, 5.28<--, 5.24<--, 4.91<--      Arterial Blood Gas:  03-09 @ 07:43  7.22/80/117/32/99/2.6  ABG lactate: --  Arterial Blood Gas:  03-09 @ 06:25  7.24/77/287/32/100/3.1  ABG lactate: --  Arterial Blood Gas:  03-08 @ 22:03  7.26/67/78/29/96/1.3  ABG lactate: --        MICROBIOLOGY:     RADIOLOGY & ADDITIONAL TESTS:  < from: Transthoracic Echocardiogram (03.05.21 @ 08:41) >    EF (Visual Estimate): 60 %  Doppler Peak Velocity (m/sec): AoV=1.9  ------------------------------------------------------------------------  Observations:  Mitral Valve: Normal mitral valve. Mean transmitral valve  gradient equals 2 mm Hg, consistent with mild mitral  stenosis.  Aortic Valve/Aorta: Calcified trileaflet aortic valve with  mildly  decreased opening. Peak transaortic valve gradient  equals 14 mm Hg, mean transaortic valve gradient equals 4  mm Hg, aortic valve velocity time integral equals 38 cm.  Peak left ventricular outflow tract gradient equals 52 mm  Hg, consistent with moderately severe LVOT obstruction.HR  70  Aortic Root: 3.3 cm.  Left Atrium: Moderately dilated left atrium.  LA volume  index = 48 cc/m2.  Left Ventricle: Mild segmental left ventricular systolic  dysfunction. The mid inferolateral wall, and the basal  inferolateral wall are hypokinetic. The basal inferior wall  is akinetic.  Moderate concentric left ventricular  hypertrophy. Indeterminate diastolic function.  Right Heart: Right atrium not well visualized. Normal right  ventricular size with decreased right ventricular systolic  function. Tricuspid valve not well visualized. Normal  pulmonic valve. Minimal pulmonic regurgitation.  Pericardium/Pleura: Small pericardial effusion.  Left pleural effusion.  Hemodynamic: Estimated right atrial pressure is 8 mm Hg.  Unableto estimate RVSP.  ------------------------------------------------------------------------  Conclusions:  1. Calcified trileaflet aortic valve with mildly  decreased  opening.  2. Moderate concentric left ventricular hypertrophy.  3. Mild segmental left ventricular systolic dysfunction.  Peak left ventricular outflow tract gradient equals 52 mm  Hg, consistent with moderately severe LVOT obstruction.HR  70 The mid inferolateral wall, and the basal  inferolateral  wall are hypokinetic. The basal inferior wall is akinetic.  4. Normal right ventricular size with decreased right  ventricular systolic function.  ------------------------------------------------------------------------  Confirmed on  3/5/2021 - 21:19:28 by REYNALDO Gardner  ------------------------------------------------------------------------    < end of copied text >      Assessment and Plan:     # Neuro    # CV       # HTN          - currently holding nifedipine 90mg PO daily, coreg 25mg PO BID, Hydralazine 100mg q8h--- will reinitiate as tolerated        # HF          - Echo from 3/5/2021: EF 60%, moderate severe LVOT obstruction, mild segmental LV systolic dysfunction           - will discontinue bumex gtt, initiate dialysis as below     # Pulm      - s/p intubation 3/9, RR 16, TV 450cc, PEEP 5, FiO2 100%      - f/u CXR to assess endotracheal tube placement     # GI      - s/p OG tube placement     # Renal    # Endo     # DVT prophylaxis: MICU Accept Note    CHIEF COMPLAINT:     PRESENTATION TO [facility]:   80M w/ hx of CKD4, dCHF, difficult to control HTN, DM?? p/w SOB and CHF. Pt states he has been in [facility] since Dec 27th 2020 and just returned home from [facility] today. Pt states he started to get worsening SOB over the past 2-3 weeks in [facility]. He states he discussed this with his doctor in [facility] and was advised [place] and [place]. Pt endorses worsening orthopnea and LE edema. Denies any chest pain, palpitations, fevers or chills. Endorses cough relatively unchanged. Endorses okay medication compliance although did not take any medications today. Denies headache. Occasional dizziness but no syncope.     Home Medications:  aspirin 81 mg oral delayed release tablet: 1 tab(s) orally once a day (04 Mar 2021 21:15)  Lasix 20 mg oral tablet: 1 tab(s) orally once a day (04 Mar 2021 21:15)  NIFEdipine 90 mg oral tablet, extended release: 1 tab(s) orally once a day (04 Mar 2021 21:15)  tamsulosin 0.4 mg oral capsule: 1 cap(s) orally once a day (at bedtime) (04 Mar 2021 21:15)      MEDICINE FLOOR COURSE:   Pt was fluid overloaded on exam, hypoxic to <80% spo2 on RA on arrival, improved on NC, thought to be from acute CHF exacerbation. BNP elevated to 27,657. TTE done: EF 60%, ____. Started on lasiv 80mg IV BID. BP was __ on arrival to floor, on hydral 100mg TID, started coreg 25mg BID, nifedipine 90mg QD. Has hx CKD stage IV, unclear cause (HTN vs DM?) not on dialysis. On SCD for DVT ppx.   On 3/8, discontinued lasix, started on bumex drip for worsening edema/sob, concern for need for urgent dialysis, MICU called 3/8 pm for dialysis initiation.   MICU assessed patient 3/8 pm. Pt has hx T2DM?, Bilateral Multiple Renal Cysts, worsening Acute Renal Failure superimposed on CKD IV with Chronic HFpEF 60%, admitted 3/4/21 for Oliguric Renal Failure. Pt A&O x3, no distress, spo2 95% on NC 4L. Lytes wnl. On bumex drip. Agree with dialysis need but no need for emergent HD - deemed not candidate for MICU. Recommended increase IV bumex to 2-3mg/hr.     3/9 am RRT called for AMS - pt was lethargic, increased work of breathing on BIPAP, ABG with pCO2 80. Pt was intubated and sedated with etominate and succinylcholine, propofol started. Concern for stroke - stroke code, CTH without acute changes, neuro not concerned for stroke.     TRANSFERRED TO MICU FOR FURTHER CARE.           PAST MEDICAL & SURGICAL HISTORY:  Diabetes    Hypertension    HTN (hypertension)    BPH (Benign Prostatic Hypertrophy)    Glaucoma (Increased Eye Pressure)    HTN - Hypertension    No significant past surgical history    Laser Surgery :Right  ?  regarding procedure ; 12/07 ; secondary to glaucoma    Laser Surgery Left  ? regarding procedure ; secondary to glaucoma 12/07    Excision of Sinus Polyp  2006        FAMILY HISTORY:  Family history of diabetes mellitus (DM) (Mother, Sibling)        SOCIAL HISTORY:  Smoking: [  ] Never Smoked  [  ] Former Smoker (# packs x # years)  [  ] Current Smoker (# packs x # years)  Substance Use:   EtOH Use:   Marital Status: [  ] Single  [  ]   [  ]   [  ]   Sexual History:   Occupation:  Recent Travel:  Country of Birth:   Advance Directives:     HOME MEDICATIONS:      Allergies    grass, pollen (Rhinitis)  No Known Drug Allergies    Intolerances          REVIEW OF SYSTEMS:  Constitutional: No fevers, chills, weight loss, weight gain  HEENT: No vision problems, eye pain, nasal congestion, rhinorrhea, sore throat, dysphagia  CV: No chest pain, orthopnea, palpitations  Resp: No cough, dyspnea, wheezing, hemoptysis  GI: No nausea, vomiting, diarrhea, constipation, abdominal pain  : [ ] dysuria [ ] nocturia [ ] hematuria [ ] increased urinary frequency  Musculoskeletal: [ ] back pain [ ] myalgias [ ] arthralgias [ ] fracture  Skin: [ ] rash [ ] itch  Neurological: [ ] headache [ ] dizziness [ ] syncope [ ] weakness [ ] numbness  Psychiatric: [ ] anxiety [ ] depression  Endocrine: [ ] diabetes [ ] thyroid problem  Hematologic/Lymphatic: [ ] anemia [ ] bleeding problem  Allergic/Immunologic: [ ] itchy eyes [ ] nasal discharge [ ] hives [ ] angioedema  [ ] All other systems negative  [ ] Unable to assess ROS because ________    OBJECTIVE:  ICU Vital Signs Last 24 Hrs  T(C): 35.9 (09 Mar 2021 08:30), Max: 36.8 (08 Mar 2021 20:44)  T(F): 96.6 (09 Mar 2021 08:30), Max: 98.3 (08 Mar 2021 20:44)  HR: 65 (09 Mar 2021 08:36) (65 - 84)  BP: 124/60 (09 Mar 2021 08:30) (124/60 - 170/77)  BP(mean): 85 (09 Mar 2021 08:30) (85 - 85)  ABP: --  ABP(mean): --  RR: 17 (09 Mar 2021 08:30) (17 - 19)  SpO2: 99% (09 Mar 2021 08:36) (53% - 100%)    Mode: AC/ CMV (Assist Control/ Continuous Mandatory Ventilation), RR (machine): 16, TV (machine): 450, FiO2: 100, PEEP: 5, ITime: 1, MAP: 12, PIP: 36    03-08 @ 07:01 - 03-09 @ 07:00  --------------------------------------------------------  IN: 285 mL / OUT: 730 mL / NET: -445 mL    03-09 @ 07:01 - 03-09 @ 08:52  --------------------------------------------------------  IN: 0 mL / OUT: 350 mL / NET: -350 mL      CAPILLARY BLOOD GLUCOSE  107 (09 Mar 2021 07:32)      POCT Blood Glucose.: 107 mg/dL (09 Mar 2021 07:25)      PHYSICAL EXAM:  General:   HEENT:   Neck:   Chest/Lungs:  Heart:  Abdomen:   Extremities:   Skin:   Neuro:   Psych:     LINES:     HOSPITAL MEDICATIONS:  MEDICATIONS  (STANDING):  buMETAnide Infusion 2 mG/Hr (10 mL/Hr) IV Continuous <Continuous>  chlorhexidine 0.12% Liquid 15 milliLiter(s) Oral Mucosa every 12 hours  chlorhexidine 4% Liquid 1 Application(s) Topical <User Schedule>  fentaNYL    Injectable 100 MICROGram(s) IV Push once  heparin   Injectable 5000 Unit(s) SubCutaneous every 12 hours  propofol Infusion 50 MICROgram(s)/kG/Min (20 mL/Hr) IV Continuous <Continuous>  trimethoprim/polymyxin Solution 1 Drop(s) Both EYES three times a day    MEDICATIONS  (PRN):      LABS:                        9.9    4.73  )-----------( 185      ( 09 Mar 2021 06:14 )             34.9     Hgb Trend: 9.9<--, 9.8<--, 9.8<--, 10.6<--, 10.6<--  03-09    142  |  102  |  53<H>  ----------------------------<  120<H>  4.6   |  27  |  5.23<H>    Ca    8.5      09 Mar 2021 06:14  Phos  6.3     03-09  Mg     2.7     03-09      Creatinine Trend: 5.23<--, 5.41<--, 5.32<--, 5.28<--, 5.24<--, 4.91<--      Arterial Blood Gas:  03-09 @ 07:43  7.22/80/117/32/99/2.6  ABG lactate: --  Arterial Blood Gas:  03-09 @ 06:25  7.24/77/287/32/100/3.1  ABG lactate: --  Arterial Blood Gas:  03-08 @ 22:03  7.26/67/78/29/96/1.3  ABG lactate: --        MICROBIOLOGY:     RADIOLOGY & ADDITIONAL TESTS:  < from: Transthoracic Echocardiogram (03.05.21 @ 08:41) >    EF (Visual Estimate): 60 %  Doppler Peak Velocity (m/sec): AoV=1.9  ------------------------------------------------------------------------  Observations:  Mitral Valve: Normal mitral valve. Mean transmitral valve  gradient equals 2 mm Hg, consistent with mild mitral  stenosis.  Aortic Valve/Aorta: Calcified trileaflet aortic valve with  mildly  decreased opening. Peak transaortic valve gradient  equals 14 mm Hg, mean transaortic valve gradient equals 4  mm Hg, aortic valve velocity time integral equals 38 cm.  Peak left ventricular outflow tract gradient equals 52 mm  Hg, consistent with moderately severe LVOT obstruction.HR  70  Aortic Root: 3.3 cm.  Left Atrium: Moderately dilated left atrium.  LA volume  index = 48 cc/m2.  Left Ventricle: Mild segmental left ventricular systolic  dysfunction. The mid inferolateral wall, and the basal  inferolateral wall are hypokinetic. The basal inferior wall  is akinetic.  Moderate concentric left ventricular  hypertrophy. Indeterminate diastolic function.  Right Heart: Right atrium not well visualized. Normal right  ventricular size with decreased right ventricular systolic  function. Tricuspid valve not well visualized. Normal  pulmonic valve. Minimal pulmonic regurgitation.  Pericardium/Pleura: Small pericardial effusion.  Left pleural effusion.  Hemodynamic: Estimated right atrial pressure is 8 mm Hg.  Unableto estimate RVSP.  ------------------------------------------------------------------------  Conclusions:  1. Calcified trileaflet aortic valve with mildly  decreased  opening.  2. Moderate concentric left ventricular hypertrophy.  3. Mild segmental left ventricular systolic dysfunction.  Peak left ventricular outflow tract gradient equals 52 mm  Hg, consistent with moderately severe LVOT obstruction.HR  70 The mid inferolateral wall, and the basal  inferolateral  wall are hypokinetic. The basal inferior wall is akinetic.  4. Normal right ventricular size with decreased right  ventricular systolic function.  ------------------------------------------------------------------------  Confirmed on  3/5/2021 - 21:19:28 by REYNALDO Gardner  ------------------------------------------------------------------------    < end of copied text >      Assessment and Plan: Pt is a 79 y/o with hx T2DM? (last a1c 5.8), worsening Acute Renal Failure superimposed on CKD IV with Chronic HFpEF 60%, admitted 3/4/21 for oliguric renal failure and CHF exacerbation.     NEURO  - pt is intubated since 3/9, sedated on propofol       CARDIOVASCULAR  - HTN          - currently holding nifedipine 90mg PO daily, coreg 25mg PO BID, Hydralazine 100mg q8h--- will reinitiate as tolerated        # HF          - Echo from 3/5/2021: EF 60%, moderate severe LVOT obstruction, mild segmental LV systolic dysfunction           - will discontinue bumex gtt, initiate dialysis as below     PULMONARY  - s/p intubation 3/9 for increased WOB on BIPAP and lethargy, concern for airway protection  Mode: AC/ CMV (Assist Control/ Continuous Mandatory Ventilation)  RR (machine): 16  TV (machine): 450  FiO2: 100  PEEP: 5  ITime: 1  MAP: 12  PIP: 36    INFECTIOUS DISEASE       GASTROINTESTINAL      - s/p OG tube placement       RENAL  - worsening renal failure on CKD stage IV  - on bumex 2mg/hr drip    ENDOCRINOLOGY    HEMATOLOGY    ETHICS MICU Accept Note    CHIEF COMPLAINT:     PRESENTATION TO [facility]:   80M w/ hx of CKD4, dCHF, difficult to control HTN, DM?? p/w SOB and CHF. Pt states he has been in [facility] since Dec 27th 2020 and just returned home from [facility] today. Pt states he started to get worsening SOB over the past 2-3 weeks in [facility]. He states he discussed this with his doctor in [facility] and was advised to [place] and [place]. Pt endorses worsening orthopnea and LE edema. Denies any chest pain, palpitations, fevers or chills. Endorses cough relatively unchanged. Endorses okay medication compliance although did not take any medications today. Denies headache. Occasional dizziness but no syncope.     Home Medications:  aspirin 81 mg oral delayed release tablet: 1 tab(s) orally once a day (04 Mar 2021 21:15)  Lasix 20 mg oral tablet: 1 tab(s) orally once a day (04 Mar 2021 21:15)  NIFEdipine 90 mg oral tablet, extended release: 1 tab(s) orally once a day (04 Mar 2021 21:15)  tamsulosin 0.4 mg oral capsule: 1 cap(s) orally once a day (at bedtime) (04 Mar 2021 21:15)      MEDICINE FLOOR COURSE:   Pt was fluid overloaded on exam, hypoxic to <80% spo2 on RA on arrival, improved on NC, thought to be from acute CHF exacerbation. BNP elevated to 27,657. TTE done: EF 60%, ____. Started on lasiv 80mg IV BID. BP was __ on arrival to floor, on hydral 100mg TID, started coreg 25mg BID, nifedipine 90mg QD. Has hx CKD stage IV, unclear cause (HTN vs DM?) not on dialysis. On SCD for DVT ppx.   On 3/8, discontinued lasix, started on bumex drip for worsening edema/sob, concern for need for urgent dialysis, MICU called 3/8 pm for dialysis initiation.   MICU assessed patient 3/8 pm. Pt has hx T2DM?, Bilateral Multiple Renal Cysts, worsening Acute Renal Failure superimposed on CKD IV with Chronic HFpEF 60%, admitted 3/4/21 for Oliguric Renal Failure. Pt A&O x3, no distress, spo2 95% on NC 4L. Lytes wnl. On bumex drip. Agree with dialysis need but no need for emergent HD - deemed not candidate for MICU. Recommended increase IV bumex to 2-3mg/hr.     3/9 am RRT called for AMS - pt was lethargic, increased work of breathing on BIPAP, ABG with pCO2 80. Pt was intubated and sedated with etominate and succinylcholine, propofol started. Concern for stroke - stroke code, CTH without acute changes, neuro not concerned for stroke.     TRANSFERRED TO MICU FOR FURTHER CARE.           PAST MEDICAL & SURGICAL HISTORY:  Diabetes    Hypertension    HTN (hypertension)    BPH (Benign Prostatic Hypertrophy)    Glaucoma (Increased Eye Pressure)    HTN - Hypertension    No significant past surgical history    Laser Surgery :Right  ?  regarding procedure ; 12/07 ; secondary to glaucoma    Laser Surgery Left  ? regarding procedure ; secondary to glaucoma 12/07    Excision of Sinus Polyp  2006        FAMILY HISTORY:  Family history of diabetes mellitus (DM) (Mother, Sibling)        SOCIAL HISTORY:  Smoking: [  ] Never Smoked  [  ] Former Smoker (# packs x # years)  [  ] Current Smoker (# packs x # years)  Substance Use:   EtOH Use:   Marital Status: [  ] Single  [  ]   [  ]   [  ]   Sexual History:   Occupation:  Recent Travel:  Country of Birth:   Advance Directives:     HOME MEDICATIONS:      Allergies    grass, pollen (Rhinitis)  No Known Drug Allergies    Intolerances          REVIEW OF SYSTEMS:  Constitutional: No fevers, chills, weight loss, weight gain  HEENT: No vision problems, eye pain, nasal congestion, rhinorrhea, sore throat, dysphagia  CV: No chest pain, orthopnea, palpitations  Resp: No cough, dyspnea, wheezing, hemoptysis  GI: No nausea, vomiting, diarrhea, constipation, abdominal pain  : [ ] dysuria [ ] nocturia [ ] hematuria [ ] increased urinary frequency  Musculoskeletal: [ ] back pain [ ] myalgias [ ] arthralgias [ ] fracture  Skin: [ ] rash [ ] itch  Neurological: [ ] headache [ ] dizziness [ ] syncope [ ] weakness [ ] numbness  Psychiatric: [ ] anxiety [ ] depression  Endocrine: [ ] diabetes [ ] thyroid problem  Hematologic/Lymphatic: [ ] anemia [ ] bleeding problem  Allergic/Immunologic: [ ] itchy eyes [ ] nasal discharge [ ] hives [ ] angioedema  [ ] All other systems negative  [ ] Unable to assess ROS because ________    OBJECTIVE:  ICU Vital Signs Last 24 Hrs  T(C): 35.9 (09 Mar 2021 08:30), Max: 36.8 (08 Mar 2021 20:44)  T(F): 96.6 (09 Mar 2021 08:30), Max: 98.3 (08 Mar 2021 20:44)  HR: 65 (09 Mar 2021 08:36) (65 - 84)  BP: 124/60 (09 Mar 2021 08:30) (124/60 - 170/77)  BP(mean): 85 (09 Mar 2021 08:30) (85 - 85)  ABP: --  ABP(mean): --  RR: 17 (09 Mar 2021 08:30) (17 - 19)  SpO2: 99% (09 Mar 2021 08:36) (53% - 100%)    Mode: AC/ CMV (Assist Control/ Continuous Mandatory Ventilation), RR (machine): 16, TV (machine): 450, FiO2: 100, PEEP: 5, ITime: 1, MAP: 12, PIP: 36    03-08 @ 07:01 - 03-09 @ 07:00  --------------------------------------------------------  IN: 285 mL / OUT: 730 mL / NET: -445 mL    03-09 @ 07:01 - 03-09 @ 08:52  --------------------------------------------------------  IN: 0 mL / OUT: 350 mL / NET: -350 mL      CAPILLARY BLOOD GLUCOSE  107 (09 Mar 2021 07:32)      POCT Blood Glucose.: 107 mg/dL (09 Mar 2021 07:25)      PHYSICAL EXAM:  General:   HEENT:   Neck:   Chest/Lungs:  Heart:  Abdomen:   Extremities:   Skin:   Neuro:   Psych:     LINES:     HOSPITAL MEDICATIONS:  MEDICATIONS  (STANDING):  buMETAnide Infusion 2 mG/Hr (10 mL/Hr) IV Continuous <Continuous>  chlorhexidine 0.12% Liquid 15 milliLiter(s) Oral Mucosa every 12 hours  chlorhexidine 4% Liquid 1 Application(s) Topical <User Schedule>  fentaNYL    Injectable 100 MICROGram(s) IV Push once  heparin   Injectable 5000 Unit(s) SubCutaneous every 12 hours  propofol Infusion 50 MICROgram(s)/kG/Min (20 mL/Hr) IV Continuous <Continuous>  trimethoprim/polymyxin Solution 1 Drop(s) Both EYES three times a day    MEDICATIONS  (PRN):      LABS:                        9.9    4.73  )-----------( 185      ( 09 Mar 2021 06:14 )             34.9     Hgb Trend: 9.9<--, 9.8<--, 9.8<--, 10.6<--, 10.6<--  03-09    142  |  102  |  53<H>  ----------------------------<  120<H>  4.6   |  27  |  5.23<H>    Ca    8.5      09 Mar 2021 06:14  Phos  6.3     03-09  Mg     2.7     03-09      Creatinine Trend: 5.23<--, 5.41<--, 5.32<--, 5.28<--, 5.24<--, 4.91<--      Arterial Blood Gas:  03-09 @ 07:43  7.22/80/117/32/99/2.6  ABG lactate: --  Arterial Blood Gas:  03-09 @ 06:25  7.24/77/287/32/100/3.1  ABG lactate: --  Arterial Blood Gas:  03-08 @ 22:03  7.26/67/78/29/96/1.3  ABG lactate: --        MICROBIOLOGY:     RADIOLOGY & ADDITIONAL TESTS:  < from: Transthoracic Echocardiogram (03.05.21 @ 08:41) >    EF (Visual Estimate): 60 %  Doppler Peak Velocity (m/sec): AoV=1.9  ------------------------------------------------------------------------  Observations:  Mitral Valve: Normal mitral valve. Mean transmitral valve  gradient equals 2 mm Hg, consistent with mild mitral  stenosis.  Aortic Valve/Aorta: Calcified trileaflet aortic valve with  mildly  decreased opening. Peak transaortic valve gradient  equals 14 mm Hg, mean transaortic valve gradient equals 4  mm Hg, aortic valve velocity time integral equals 38 cm.  Peak left ventricular outflow tract gradient equals 52 mm  Hg, consistent with moderately severe LVOT obstruction.HR  70  Aortic Root: 3.3 cm.  Left Atrium: Moderately dilated left atrium.  LA volume  index = 48 cc/m2.  Left Ventricle: Mild segmental left ventricular systolic  dysfunction. The mid inferolateral wall, and the basal  inferolateral wall are hypokinetic. The basal inferior wall  is akinetic.  Moderate concentric left ventricular  hypertrophy. Indeterminate diastolic function.  Right Heart: Right atrium not well visualized. Normal right  ventricular size with decreased right ventricular systolic  function. Tricuspid valve not well visualized. Normal  pulmonic valve. Minimal pulmonic regurgitation.  Pericardium/Pleura: Small pericardial effusion.  Left pleural effusion.  Hemodynamic: Estimated right atrial pressure is 8 mm Hg.  Unableto estimate RVSP.  ------------------------------------------------------------------------  Conclusions:  1. Calcified trileaflet aortic valve with mildly  decreased  opening.  2. Moderate concentric left ventricular hypertrophy.  3. Mild segmental left ventricular systolic dysfunction.  Peak left ventricular outflow tract gradient equals 52 mm  Hg, consistent with moderately severe LVOT obstruction.HR  70 The mid inferolateral wall, and the basal  inferolateral  wall are hypokinetic. The basal inferior wall is akinetic.  4. Normal right ventricular size with decreased right  ventricular systolic function.  ------------------------------------------------------------------------  Confirmed on  3/5/2021 - 21:19:28 by REYNALDO Gardner  ------------------------------------------------------------------------    < end of copied text >      Assessment and Plan: Pt is a 79 y/o with hx T2DM? (last a1c 5.8), worsening Acute Renal Failure superimposed on CKD IV with Chronic HFpEF 60%, admitted 3/4/21 for oliguric renal failure and CHF exacerbation.     NEURO  - pt is intubated since 3/9, sedated on propofol       CARDIOVASCULAR  - CHF exacerbation: Echo from 3/5/2021: EF 60%, moderate severe LVOT obstruction, mild segmental LV systolic dysfunction.   - will discontinue bumex gtt, initiate dialysis as below   - HTN- currently holding nifedipine 90mg PO daily, coreg 25mg PO BID, Hydralazine 100mg q8h--- will reinitiate as tolerated       PULMONARY  - s/p intubation 3/9 for increased WOB on BIPAP and lethargy, concern for airway protection  Mode: AC/ CMV (Assist Control/ Continuous Mandatory Ventilation)  RR (machine): 16  TV (machine): 450  FiO2: 100  PEEP: 5  ITime: 1  MAP: 12  PIP: 36      INFECTIOUS DISEASE   - afebrile, no leukocytosis  - LINES: an, peripheral IV access.       GASTROINTESTINAL  - s/p OG tube placement   - XR to confirm placement: f/u   - nutrition consult for tube feeds: f/u      RENAL  - worsening renal failure on CKD stage IV. Cr 5.49 (4.46 on admission march 4; 3.07 a year ago); BUN 54  - monitor electrolytes: K 4.4; phos 6.1; BUN 54   - on bumex 2mg/hr drip - will discontinue for dialysis  - private nephrology contacted - consent obtained, will do dialysis today. Will place shiley for access.       ENDOCRINOLOGY  - unclear if hx DM.     HEMATOLOGY    ETHICS MICU Accept Note    CHIEF COMPLAINT:     PRESENTATION TO [facility]:   80M w/ hx of CKD4, dCHF, difficult to control HTN, DM?? p/w SOB and CHF. Pt states he has been in [facility] since Dec 27th 2020 and just returned home from [facility] today. Pt states he started to get worsening SOB over the past 2-3 weeks in [facility]. He states he discussed this with his doctor in [facility] and was advised to [place] and [place]. Pt endorses worsening orthopnea and LE edema. Denies any chest pain, palpitations, fevers or chills. Endorses cough relatively unchanged. Endorses okay medication compliance although did not take any medications today. Denies headache. Occasional dizziness but no syncope.     Home Medications:  aspirin 81 mg oral delayed release tablet: 1 tab(s) orally once a day (04 Mar 2021 21:15)  Lasix 20 mg oral tablet: 1 tab(s) orally once a day (04 Mar 2021 21:15)  NIFEdipine 90 mg oral tablet, extended release: 1 tab(s) orally once a day (04 Mar 2021 21:15)  tamsulosin 0.4 mg oral capsule: 1 cap(s) orally once a day (at bedtime) (04 Mar 2021 21:15)      MEDICINE FLOOR COURSE:   Pt was fluid overloaded on exam, hypoxic to <80% spo2 on RA on arrival, improved on NC, thought to be from acute CHF exacerbation. BNP elevated to 27,657. TTE done: EF 60%, ____. Started on lasiv 80mg IV BID. BP was __ on arrival to floor, on hydral 100mg TID, started coreg 25mg BID, nifedipine 90mg QD. Has hx CKD stage IV, unclear cause (HTN vs DM?) not on dialysis. On SCD for DVT ppx.   On 3/8, discontinued lasix, started on bumex drip for worsening edema/sob, concern for need for urgent dialysis, MICU called 3/8 pm for dialysis initiation.   MICU assessed patient 3/8 pm. Pt has hx T2DM?, Bilateral Multiple Renal Cysts, worsening Acute Renal Failure superimposed on CKD IV with Chronic HFpEF 60%, admitted 3/4/21 for Oliguric Renal Failure. Pt A&O x3, no distress, spo2 95% on NC 4L. Lytes wnl. On bumex drip. Agree with dialysis need but no need for emergent HD - deemed not candidate for MICU. Recommended increase IV bumex to 2-3mg/hr.     3/9 am RRT called for AMS - pt was lethargic, increased work of breathing on BIPAP, ABG with pCO2 80. Pt was intubated and sedated with etominate and succinylcholine, propofol started. Concern for stroke - stroke code, CTH without acute changes, neuro not concerned for stroke.     TRANSFERRED TO MICU FOR FURTHER CARE.           PAST MEDICAL & SURGICAL HISTORY:  Diabetes    Hypertension    HTN (hypertension)    BPH (Benign Prostatic Hypertrophy)    Glaucoma (Increased Eye Pressure)    HTN - Hypertension    No significant past surgical history    Laser Surgery :Right  ?  regarding procedure ; 12/07 ; secondary to glaucoma    Laser Surgery Left  ? regarding procedure ; secondary to glaucoma 12/07    Excision of Sinus Polyp  2006        FAMILY HISTORY:  Family history of diabetes mellitus (DM) (Mother, Sibling)        SOCIAL HISTORY:  Smoking: [  ] Never Smoked  [  ] Former Smoker (# packs x # years)  [  ] Current Smoker (# packs x # years)  Substance Use:   EtOH Use:   Marital Status: [  ] Single  [  ]   [  ]   [  ]   Sexual History:   Occupation:  Recent Travel:  Country of Birth:   Advance Directives:     HOME MEDICATIONS:      Allergies    grass, pollen (Rhinitis)  No Known Drug Allergies    Intolerances          REVIEW OF SYSTEMS:  Constitutional: No fevers, chills, weight loss, weight gain  HEENT: No vision problems, eye pain, nasal congestion, rhinorrhea, sore throat, dysphagia  CV: No chest pain, orthopnea, palpitations  Resp: No cough, dyspnea, wheezing, hemoptysis  GI: No nausea, vomiting, diarrhea, constipation, abdominal pain  : [ ] dysuria [ ] nocturia [ ] hematuria [ ] increased urinary frequency  Musculoskeletal: [ ] back pain [ ] myalgias [ ] arthralgias [ ] fracture  Skin: [ ] rash [ ] itch  Neurological: [ ] headache [ ] dizziness [ ] syncope [ ] weakness [ ] numbness  Psychiatric: [ ] anxiety [ ] depression  Endocrine: [ ] diabetes [ ] thyroid problem  Hematologic/Lymphatic: [ ] anemia [ ] bleeding problem  Allergic/Immunologic: [ ] itchy eyes [ ] nasal discharge [ ] hives [ ] angioedema  [ ] All other systems negative  [ ] Unable to assess ROS because ________    OBJECTIVE:  ICU Vital Signs Last 24 Hrs  T(C): 35.9 (09 Mar 2021 08:30), Max: 36.8 (08 Mar 2021 20:44)  T(F): 96.6 (09 Mar 2021 08:30), Max: 98.3 (08 Mar 2021 20:44)  HR: 65 (09 Mar 2021 08:36) (65 - 84)  BP: 124/60 (09 Mar 2021 08:30) (124/60 - 170/77)  BP(mean): 85 (09 Mar 2021 08:30) (85 - 85)  ABP: --  ABP(mean): --  RR: 17 (09 Mar 2021 08:30) (17 - 19)  SpO2: 99% (09 Mar 2021 08:36) (53% - 100%)    Mode: AC/ CMV (Assist Control/ Continuous Mandatory Ventilation), RR (machine): 16, TV (machine): 450, FiO2: 100, PEEP: 5, ITime: 1, MAP: 12, PIP: 36    03-08 @ 07:01 - 03-09 @ 07:00  --------------------------------------------------------  IN: 285 mL / OUT: 730 mL / NET: -445 mL    03-09 @ 07:01 - 03-09 @ 08:52  --------------------------------------------------------  IN: 0 mL / OUT: 350 mL / NET: -350 mL      CAPILLARY BLOOD GLUCOSE  107 (09 Mar 2021 07:32)      POCT Blood Glucose.: 107 mg/dL (09 Mar 2021 07:25)      PHYSICAL EXAM:  General:   HEENT:   Neck:   Chest/Lungs:  Heart:  Abdomen:   Extremities:   Skin:   Neuro:   Psych:     LINES:     HOSPITAL MEDICATIONS:  MEDICATIONS  (STANDING):  buMETAnide Infusion 2 mG/Hr (10 mL/Hr) IV Continuous <Continuous>  chlorhexidine 0.12% Liquid 15 milliLiter(s) Oral Mucosa every 12 hours  chlorhexidine 4% Liquid 1 Application(s) Topical <User Schedule>  fentaNYL    Injectable 100 MICROGram(s) IV Push once  heparin   Injectable 5000 Unit(s) SubCutaneous every 12 hours  propofol Infusion 50 MICROgram(s)/kG/Min (20 mL/Hr) IV Continuous <Continuous>  trimethoprim/polymyxin Solution 1 Drop(s) Both EYES three times a day    MEDICATIONS  (PRN):      LABS:                        9.9    4.73  )-----------( 185      ( 09 Mar 2021 06:14 )             34.9     Hgb Trend: 9.9<--, 9.8<--, 9.8<--, 10.6<--, 10.6<--  03-09    142  |  102  |  53<H>  ----------------------------<  120<H>  4.6   |  27  |  5.23<H>    Ca    8.5      09 Mar 2021 06:14  Phos  6.3     03-09  Mg     2.7     03-09      Creatinine Trend: 5.23<--, 5.41<--, 5.32<--, 5.28<--, 5.24<--, 4.91<--      Arterial Blood Gas:  03-09 @ 07:43  7.22/80/117/32/99/2.6  ABG lactate: --  Arterial Blood Gas:  03-09 @ 06:25  7.24/77/287/32/100/3.1  ABG lactate: --  Arterial Blood Gas:  03-08 @ 22:03  7.26/67/78/29/96/1.3  ABG lactate: --        MICROBIOLOGY:     RADIOLOGY & ADDITIONAL TESTS:  < from: Transthoracic Echocardiogram (03.05.21 @ 08:41) >    EF (Visual Estimate): 60 %  Doppler Peak Velocity (m/sec): AoV=1.9  ------------------------------------------------------------------------  Observations:  Mitral Valve: Normal mitral valve. Mean transmitral valve  gradient equals 2 mm Hg, consistent with mild mitral  stenosis.  Aortic Valve/Aorta: Calcified trileaflet aortic valve with  mildly  decreased opening. Peak transaortic valve gradient  equals 14 mm Hg, mean transaortic valve gradient equals 4  mm Hg, aortic valve velocity time integral equals 38 cm.  Peak left ventricular outflow tract gradient equals 52 mm  Hg, consistent with moderately severe LVOT obstruction.HR  70  Aortic Root: 3.3 cm.  Left Atrium: Moderately dilated left atrium.  LA volume  index = 48 cc/m2.  Left Ventricle: Mild segmental left ventricular systolic  dysfunction. The mid inferolateral wall, and the basal  inferolateral wall are hypokinetic. The basal inferior wall  is akinetic.  Moderate concentric left ventricular  hypertrophy. Indeterminate diastolic function.  Right Heart: Right atrium not well visualized. Normal right  ventricular size with decreased right ventricular systolic  function. Tricuspid valve not well visualized. Normal  pulmonic valve. Minimal pulmonic regurgitation.  Pericardium/Pleura: Small pericardial effusion.  Left pleural effusion.  Hemodynamic: Estimated right atrial pressure is 8 mm Hg.  Unableto estimate RVSP.  ------------------------------------------------------------------------  Conclusions:  1. Calcified trileaflet aortic valve with mildly  decreased  opening.  2. Moderate concentric left ventricular hypertrophy.  3. Mild segmental left ventricular systolic dysfunction.  Peak left ventricular outflow tract gradient equals 52 mm  Hg, consistent with moderately severe LVOT obstruction.HR  70 The mid inferolateral wall, and the basal  inferolateral  wall are hypokinetic. The basal inferior wall is akinetic.  4. Normal right ventricular size with decreased right  ventricular systolic function.  ------------------------------------------------------------------------  Confirmed on  3/5/2021 - 21:19:28 by REYNALDO Gardner  ------------------------------------------------------------------------    < end of copied text >      Assessment and Plan: Pt is a 79 y/o with hx T2DM? (last a1c 5.8), worsening Acute Renal Failure superimposed on CKD IV with Chronic HFpEF 60%, admitted 3/4/21 for oliguric renal failure and CHF exacerbation.     NEURO  - pt is intubated since 3/9, sedated on propofol   - RASS -5  - goal to wean off sedation as tolerated.       CARDIOVASCULAR  - CHF exacerbation: Echo from 3/5/2021: EF 60%, moderate severe LVOT obstruction, mild segmental LV systolic dysfunction.   - will discontinue bumex gtt, initiate dialysis as below   - HTN- currently holding nifedipine 90mg PO daily, coreg 25mg PO BID, Hydralazine 100mg q8h--- will reinitiate as tolerated       PULMONARY  - s/p intubation 3/9 for increased WOB on BIPAP and lethargy, concern for airway protection  Mode: AC/ CMV (Assist Control/ Continuous Mandatory Ventilation)  RR (machine): 16  TV (machine): 450  FiO2: 100  PEEP: 5  ITime: 1  MAP: 12  PIP: 36      INFECTIOUS DISEASE   - afebrile, no leukocytosis  - LINES: an, peripheral IV access.       GASTROINTESTINAL  - s/p OG tube placement   - XR to confirm placement: f/u   - nutrition consult for tube feeds: f/u      RENAL  - worsening renal failure on CKD stage IV. Cr 5.49 (4.46 on admission march 4; 3.07 a year ago); BUN 54  - monitor electrolytes: K 4.4; phos 6.1; BUN 54   - on bumex 2mg/hr drip - will discontinue for dialysis  - private nephrology contacted - consent obtained, will do dialysis today. Will place shiley for access.       ENDOCRINOLOGY  - unclear if hx DM.   - f/u a1c  - [facility]     HEMATOLOGY  -     ETHICS MICU Accept Note    CHIEF COMPLAINT:     PRESENTATION TO [facility]:   80M w/ hx of CKD4, dCHF, difficult to control HTN, DM?? p/w SOB and CHF. Pt states he has been in [facility] since Dec 27th 2020 and just returned home from [facility] today. Pt states he started to get worsening SOB over the past 2-3 weeks in [facility]. He states he discussed this with his doctor in [facility] and was advised to come [place] the [place] and treatment. Pt endorses worsening orthopnea and LE edema. Denies any chest pain, palpitations, fevers or chills. Endorses cough relatively unchanged. Endorses okay medication compliance although did not take any medications today. Denies headache. Occasional dizziness but no syncope.     Home Medications:  aspirin 81 mg oral delayed release tablet: 1 tab(s) orally once a day (04 Mar 2021 21:15)  Lasix 20 mg oral tablet: 1 tab(s) orally once a day (04 Mar 2021 21:15)  NIFEdipine 90 mg oral tablet, extended release: 1 tab(s) orally once a day (04 Mar 2021 21:15)  tamsulosin 0.4 mg oral capsule: 1 cap(s) orally once a day (at bedtime) (04 Mar 2021 21:15)      MEDICINE FLOOR COURSE:   Pt was fluid overloaded on exam, hypoxic to <80% spo2 on RA on arrival, improved on NC, thought to be from acute CHF exacerbation. BNP elevated to 27,657. TTE done: EF 60%, ____. Started on lasiv 80mg IV BID. BP was __ on arrival to floor, on hydral 100mg TID, started coreg 25mg BID, nifedipine 90mg QD. Has hx CKD stage IV, unclear cause (HTN vs DM?) not on dialysis. On SCD for DVT ppx.   On 3/8, discontinued lasix, started on bumex drip for worsening edema/sob, concern for need for urgent dialysis, MICU called 3/8 pm for dialysis initiation.   MICU assessed patient 3/8 pm. Pt has hx T2DM?, Bilateral Multiple Renal Cysts, worsening Acute Renal Failure superimposed on CKD IV with Chronic HFpEF 60%, admitted 3/4/21 for Oliguric Renal Failure. Pt A&O x3, no distress, spo2 95% on NC 4L. Lytes wnl. On bumex drip. Agree with dialysis need but no need for emergent HD - deemed not candidate for MICU. Recommended increase IV bumex to 2-3mg/hr.     3/9 am RRT called for AMS - pt was lethargic, increased work of breathing on BIPAP, ABG with pCO2 80. Pt was intubated and sedated with etominate and succinylcholine, propofol started. Concern for stroke - stroke code, CTH without acute changes, neuro not concerned for stroke.     TRANSFERRED TO MICU FOR FURTHER CARE.           PAST MEDICAL & SURGICAL HISTORY:  Diabetes    Hypertension    HTN (hypertension)    BPH (Benign Prostatic Hypertrophy)    Glaucoma (Increased Eye Pressure)    HTN - Hypertension    No significant past surgical history    Laser Surgery :Right  ?  regarding procedure ; 12/07 ; secondary to glaucoma    Laser Surgery Left  ? regarding procedure ; secondary to glaucoma 12/07    Excision of Sinus Polyp  2006        FAMILY HISTORY:  Family history of diabetes mellitus (DM) (Mother, Sibling)        SOCIAL HISTORY:  Smoking: [  ] Never Smoked  [  ] Former Smoker (# packs x # years)  [  ] Current Smoker (# packs x # years)  Substance Use:   EtOH Use:   Marital Status: [  ] Single  [  ]   [  ]   [  ]   Sexual History:   Occupation:  Recent Travel:  Country of Birth:   Advance Directives:     HOME MEDICATIONS:      Allergies    grass, pollen (Rhinitis)  No Known Drug Allergies    Intolerances          REVIEW OF SYSTEMS:  Constitutional: No fevers, chills, weight loss, weight gain  HEENT: No vision problems, eye pain, nasal congestion, rhinorrhea, sore throat, dysphagia  CV: No chest pain, orthopnea, palpitations  Resp: No cough, dyspnea, wheezing, hemoptysis  GI: No nausea, vomiting, diarrhea, constipation, abdominal pain  : [ ] dysuria [ ] nocturia [ ] hematuria [ ] increased urinary frequency  Musculoskeletal: [ ] back pain [ ] myalgias [ ] arthralgias [ ] fracture  Skin: [ ] rash [ ] itch  Neurological: [ ] headache [ ] dizziness [ ] syncope [ ] weakness [ ] numbness  Psychiatric: [ ] anxiety [ ] depression  Endocrine: [ ] diabetes [ ] thyroid problem  Hematologic/Lymphatic: [ ] anemia [ ] bleeding problem  Allergic/Immunologic: [ ] itchy eyes [ ] nasal discharge [ ] hives [ ] angioedema  [ ] All other systems negative  [ ] Unable to assess ROS because ________    OBJECTIVE:  ICU Vital Signs Last 24 Hrs  T(C): 35.9 (09 Mar 2021 08:30), Max: 36.8 (08 Mar 2021 20:44)  T(F): 96.6 (09 Mar 2021 08:30), Max: 98.3 (08 Mar 2021 20:44)  HR: 65 (09 Mar 2021 08:36) (65 - 84)  BP: 124/60 (09 Mar 2021 08:30) (124/60 - 170/77)  BP(mean): 85 (09 Mar 2021 08:30) (85 - 85)  ABP: --  ABP(mean): --  RR: 17 (09 Mar 2021 08:30) (17 - 19)  SpO2: 99% (09 Mar 2021 08:36) (53% - 100%)    Mode: AC/ CMV (Assist Control/ Continuous Mandatory Ventilation), RR (machine): 16, TV (machine): 450, FiO2: 100, PEEP: 5, ITime: 1, MAP: 12, PIP: 36    03-08 @ 07:01 - 03-09 @ 07:00  --------------------------------------------------------  IN: 285 mL / OUT: 730 mL / NET: -445 mL    03-09 @ 07:01 - 03-09 @ 08:52  --------------------------------------------------------  IN: 0 mL / OUT: 350 mL / NET: -350 mL      CAPILLARY BLOOD GLUCOSE  107 (09 Mar 2021 07:32)      POCT Blood Glucose.: 107 mg/dL (09 Mar 2021 07:25)      PHYSICAL EXAM:  General:   HEENT:   Neck:   Chest/Lungs:  Heart:  Abdomen:   Extremities:   Skin:   Neuro:   Psych:     LINES:     HOSPITAL MEDICATIONS:  MEDICATIONS  (STANDING):  buMETAnide Infusion 2 mG/Hr (10 mL/Hr) IV Continuous <Continuous>  chlorhexidine 0.12% Liquid 15 milliLiter(s) Oral Mucosa every 12 hours  chlorhexidine 4% Liquid 1 Application(s) Topical <User Schedule>  fentaNYL    Injectable 100 MICROGram(s) IV Push once  heparin   Injectable 5000 Unit(s) SubCutaneous every 12 hours  propofol Infusion 50 MICROgram(s)/kG/Min (20 mL/Hr) IV Continuous <Continuous>  trimethoprim/polymyxin Solution 1 Drop(s) Both EYES three times a day    MEDICATIONS  (PRN):      LABS:                        9.9    4.73  )-----------( 185      ( 09 Mar 2021 06:14 )             34.9     Hgb Trend: 9.9<--, 9.8<--, 9.8<--, 10.6<--, 10.6<--  03-09    142  |  102  |  53<H>  ----------------------------<  120<H>  4.6   |  27  |  5.23<H>    Ca    8.5      09 Mar 2021 06:14  Phos  6.3     03-09  Mg     2.7     03-09      Creatinine Trend: 5.23<--, 5.41<--, 5.32<--, 5.28<--, 5.24<--, 4.91<--      Arterial Blood Gas:  03-09 @ 07:43  7.22/80/117/32/99/2.6  ABG lactate: --  Arterial Blood Gas:  03-09 @ 06:25  7.24/77/287/32/100/3.1  ABG lactate: --  Arterial Blood Gas:  03-08 @ 22:03  7.26/67/78/29/96/1.3  ABG lactate: --        MICROBIOLOGY:     RADIOLOGY & ADDITIONAL TESTS:  < from: Transthoracic Echocardiogram (03.05.21 @ 08:41) >    EF (Visual Estimate): 60 %  Doppler Peak Velocity (m/sec): AoV=1.9  ------------------------------------------------------------------------  Observations:  Mitral Valve: Normal mitral valve. Mean transmitral valve  gradient equals 2 mm Hg, consistent with mild mitral  stenosis.  Aortic Valve/Aorta: Calcified trileaflet aortic valve with  mildly  decreased opening. Peak transaortic valve gradient  equals 14 mm Hg, mean transaortic valve gradient equals 4  mm Hg, aortic valve velocity time integral equals 38 cm.  Peak left ventricular outflow tract gradient equals 52 mm  Hg, consistent with moderately severe LVOT obstruction.HR  70  Aortic Root: 3.3 cm.  Left Atrium: Moderately dilated left atrium.  LA volume  index = 48 cc/m2.  Left Ventricle: Mild segmental left ventricular systolic  dysfunction. The mid inferolateral wall, and the basal  inferolateral wall are hypokinetic. The basal inferior wall  is akinetic.  Moderate concentric left ventricular  hypertrophy. Indeterminate diastolic function.  Right Heart: Right atrium not well visualized. Normal right  ventricular size with decreased right ventricular systolic  function. Tricuspid valve not well visualized. Normal  pulmonic valve. Minimal pulmonic regurgitation.  Pericardium/Pleura: Small pericardial effusion.  Left pleural effusion.  Hemodynamic: Estimated right atrial pressure is 8 mm Hg.  Unableto estimate RVSP.  ------------------------------------------------------------------------  Conclusions:  1. Calcified trileaflet aortic valve with mildly  decreased  opening.  2. Moderate concentric left ventricular hypertrophy.  3. Mild segmental left ventricular systolic dysfunction.  Peak left ventricular outflow tract gradient equals 52 mm  Hg, consistent with moderately severe LVOT obstruction.HR  70 The mid inferolateral wall, and the basal  inferolateral  wall are hypokinetic. The basal inferior wall is akinetic.  4. Normal right ventricular size with decreased right  ventricular systolic function.  ------------------------------------------------------------------------  Confirmed on  3/5/2021 - 21:19:28 by REYNALDO Gardner  ------------------------------------------------------------------------    < end of copied text >      Assessment and Plan: Pt is a 79 y/o with hx T2DM? (last a1c 5.8), worsening Acute Renal Failure superimposed on CKD IV with Chronic HFpEF 60%, admitted 3/4/21 for oliguric renal failure and CHF exacerbation.     NEURO  - pt is intubated since 3/9, sedated on propofol. baseline A&O x3.   - goal to wean off sedation as tolerated.       CARDIOVASCULAR  - CHF exacerbation: Echo from 3/5/2021: EF 60%, moderate severe LVOT obstruction, mild segmental LV systolic dysfunction.   - will discontinue bumex gtt, initiate dialysis as below   - HTN- currently holding nifedipine 90mg PO daily, coreg 25mg PO BID, Hydralazine 100mg q8h--- will reinitiate as tolerated       PULMONARY  - s/p intubation 3/9 for increased WOB on BIPAP and lethargy, concern for airway protection  Mode: AC/ CMV (Assist Control/ Continuous Mandatory Ventilation)  RR (machine): 16  TV (machine): 450  FiO2: 100  PEEP: 5  ITime: 1  MAP: 12  PIP: 36      INFECTIOUS DISEASE   - afebrile, no leukocytosis  - LINES: an, peripheral IV access.       GASTROINTESTINAL  - s/p OG tube placement   - XR to confirm placement: f/u   - nutrition consult for tube feeds: f/u      RENAL  - worsening renal failure on CKD stage IV. Cr 5.49 (4.46 on admission march 4; 3.07 a year ago); BUN 54  - monitor electrolytes: K 4.4; phos 6.1; BUN 54   - on bumex 2mg/hr drip - will discontinue for dialysis  - private nephrology contacted - consent obtained, will do dialysis today. Will place shiley for access.       ENDOCRINOLOGY  - unclear if hx DM.   - f/u a1c  - [facility]     HEMATOLOGY  -     ETHICS MICU Accept Note    CHIEF COMPLAINT:     PRESENTATION TO [facility]:   80M w/ hx of CKD4, dCHF, difficult to control HTN, DM?? p/w SOB and CHF. Pt states he has been in [facility] since Dec 27th 2020 and just returned home from [facility] today. Pt states he started to get worsening SOB over the past 2-3 weeks in [facility]. He states he discussed this with his doctor in [facility] and was advised to [place] and [place]. Pt endorses worsening orthopnea and LE edema. Denies any chest pain, palpitations, fevers or chills. Endorses cough relatively unchanged. Endorses okay medication compliance although did not take any medications today. Denies headache. Occasional dizziness but no syncope.     Home Medications:  aspirin 81 mg oral delayed release tablet: 1 tab(s) orally once a day (04 Mar 2021 21:15)  Lasix 20 mg oral tablet: 1 tab(s) orally once a day (04 Mar 2021 21:15)  NIFEdipine 90 mg oral tablet, extended release: 1 tab(s) orally once a day (04 Mar 2021 21:15)  tamsulosin 0.4 mg oral capsule: 1 cap(s) orally once a day (at bedtime) (04 Mar 2021 21:15)      MEDICINE FLOOR COURSE:   Pt was fluid overloaded on exam, hypoxic to <80% spo2 on RA on arrival, improved on NC, thought to be from acute CHF exacerbation. BNP elevated to 27,657. TTE done: EF 60%, moderate LVH, mild segmental LV systolic dysfunction, moderate severe LVOT obstruction, mid inferolateral wall, basal inferolateral wall are hypokinetic, basal inferior wall akinetic. Started on lasiv 80mg IV BID. Was hypertensive on arrival to floor, on hydral 100mg TID, started coreg 25mg BID, nifedipine 90mg QD. Has hx CKD stage IV, unclear cause (HTN vs DM?) not on dialysis. On SCD for DVT ppx.   On 3/8, discontinued lasix, started on bumex drip for worsening edema/sob, concern for need for urgent dialysis, MICU called 3/8 pm for dialysis initiation.   MICU assessed patient 3/8 pm. Pt has hx T2DM?, Bilateral Multiple Renal Cysts, worsening Acute Renal Failure superimposed on CKD IV with Chronic HFpEF 60%, admitted 3/4/21 for Oliguric Renal Failure. Pt A&O x3, no distress, spo2 95% on NC 4L. Lytes wnl. On bumex drip. Agree with dialysis need but no need for emergent HD - deemed not candidate for MICU. Recommended increase IV bumex to 2-3mg/hr.     3/9 am RRT called for AMS - pt was lethargic, increased work of breathing on BIPAP, ABG with pCO2 80. Pt was intubated and sedated with etominate and succinylcholine, propofol started. Concern for stroke - stroke code, CTH without acute changes, neuro not concerned for stroke.     TRANSFERRED TO MICU FOR FURTHER CARE.         PAST MEDICAL & SURGICAL HISTORY:  Diabetes    Hypertension    HTN (hypertension)    BPH (Benign Prostatic Hypertrophy)    Glaucoma (Increased Eye Pressure)    HTN - Hypertension    No significant past surgical history    Laser Surgery :Right  ?  regarding procedure ; 12/07 ; secondary to glaucoma    Laser Surgery Left  ? regarding procedure ; secondary to glaucoma 12/07    Excision of Sinus Polyp  2006        FAMILY HISTORY:  Family history of diabetes mellitus (DM) (Mother, Sibling)        SOCIAL HISTORY:  Smoking: [  ] Never Smoked  [  ] Former Smoker (# packs x # years)  [  ] Current Smoker (# packs x # years)  Substance Use:   EtOH Use:   Marital Status: [  ] Single  [  ]   [  ]   [  ]   Sexual History:   Occupation:  Recent Travel:  Country of Birth:   Advance Directives:     HOME MEDICATIONS:      Allergies    grass, pollen (Rhinitis)  No Known Drug Allergies    Intolerances          REVIEW OF SYSTEMS:  Constitutional: No fevers, chills, weight loss, weight gain  HEENT: No vision problems, eye pain, nasal congestion, rhinorrhea, sore throat, dysphagia  CV: No chest pain, orthopnea, palpitations  Resp: No cough, dyspnea, wheezing, hemoptysis  GI: No nausea, vomiting, diarrhea, constipation, abdominal pain  : [ ] dysuria [ ] nocturia [ ] hematuria [ ] increased urinary frequency  Musculoskeletal: [ ] back pain [ ] myalgias [ ] arthralgias [ ] fracture  Skin: [ ] rash [ ] itch  Neurological: [ ] headache [ ] dizziness [ ] syncope [ ] weakness [ ] numbness  Psychiatric: [ ] anxiety [ ] depression  Endocrine: [ ] diabetes [ ] thyroid problem  Hematologic/Lymphatic: [ ] anemia [ ] bleeding problem  Allergic/Immunologic: [ ] itchy eyes [ ] nasal discharge [ ] hives [ ] angioedema  [ ] All other systems negative  [ ] Unable to assess ROS because ________    OBJECTIVE:  ICU Vital Signs Last 24 Hrs  T(C): 35.9 (09 Mar 2021 08:30), Max: 36.8 (08 Mar 2021 20:44)  T(F): 96.6 (09 Mar 2021 08:30), Max: 98.3 (08 Mar 2021 20:44)  HR: 65 (09 Mar 2021 08:36) (65 - 84)  BP: 124/60 (09 Mar 2021 08:30) (124/60 - 170/77)  BP(mean): 85 (09 Mar 2021 08:30) (85 - 85)  ABP: --  ABP(mean): --  RR: 17 (09 Mar 2021 08:30) (17 - 19)  SpO2: 99% (09 Mar 2021 08:36) (53% - 100%)    Mode: AC/ CMV (Assist Control/ Continuous Mandatory Ventilation), RR (machine): 16, TV (machine): 450, FiO2: 100, PEEP: 5, ITime: 1, MAP: 12, PIP: 36    03-08 @ 07:01 - 03-09 @ 07:00  --------------------------------------------------------  IN: 285 mL / OUT: 730 mL / NET: -445 mL    03-09 @ 07:01 - 03-09 @ 08:52  --------------------------------------------------------  IN: 0 mL / OUT: 350 mL / NET: -350 mL      CAPILLARY BLOOD GLUCOSE  107 (09 Mar 2021 07:32)      POCT Blood Glucose.: 107 mg/dL (09 Mar 2021 07:25)      PHYSICAL EXAM:  General:   HEENT:   Neck:   Chest/Lungs:  Heart:  Abdomen:   Extremities:   Skin:   Neuro:   Psych:     LINES:     HOSPITAL MEDICATIONS:  MEDICATIONS  (STANDING):  buMETAnide Infusion 2 mG/Hr (10 mL/Hr) IV Continuous <Continuous>  chlorhexidine 0.12% Liquid 15 milliLiter(s) Oral Mucosa every 12 hours  chlorhexidine 4% Liquid 1 Application(s) Topical <User Schedule>  fentaNYL    Injectable 100 MICROGram(s) IV Push once  heparin   Injectable 5000 Unit(s) SubCutaneous every 12 hours  propofol Infusion 50 MICROgram(s)/kG/Min (20 mL/Hr) IV Continuous <Continuous>  trimethoprim/polymyxin Solution 1 Drop(s) Both EYES three times a day    MEDICATIONS  (PRN):      LABS:                        9.9    4.73  )-----------( 185      ( 09 Mar 2021 06:14 )             34.9     Hgb Trend: 9.9<--, 9.8<--, 9.8<--, 10.6<--, 10.6<--  03-09    142  |  102  |  53<H>  ----------------------------<  120<H>  4.6   |  27  |  5.23<H>    Ca    8.5      09 Mar 2021 06:14  Phos  6.3     03-09  Mg     2.7     03-09      Creatinine Trend: 5.23<--, 5.41<--, 5.32<--, 5.28<--, 5.24<--, 4.91<--      Arterial Blood Gas:  03-09 @ 07:43  7.22/80/117/32/99/2.6  ABG lactate: --  Arterial Blood Gas:  03-09 @ 06:25  7.24/77/287/32/100/3.1  ABG lactate: --  Arterial Blood Gas:  03-08 @ 22:03  7.26/67/78/29/96/1.3  ABG lactate: --        MICROBIOLOGY:     RADIOLOGY & ADDITIONAL TESTS:  < from: Transthoracic Echocardiogram (03.05.21 @ 08:41) >    EF (Visual Estimate): 60 %  Doppler Peak Velocity (m/sec): AoV=1.9  ------------------------------------------------------------------------  Observations:  Mitral Valve: Normal mitral valve. Mean transmitral valve  gradient equals 2 mm Hg, consistent with mild mitral  stenosis.  Aortic Valve/Aorta: Calcified trileaflet aortic valve with  mildly  decreased opening. Peak transaortic valve gradient  equals 14 mm Hg, mean transaortic valve gradient equals 4  mm Hg, aortic valve velocity time integral equals 38 cm.  Peak left ventricular outflow tract gradient equals 52 mm  Hg, consistent with moderately severe LVOT obstruction.HR  70  Aortic Root: 3.3 cm.  Left Atrium: Moderately dilated left atrium.  LA volume  index = 48 cc/m2.  Left Ventricle: Mild segmental left ventricular systolic  dysfunction. The mid inferolateral wall, and the basal  inferolateral wall are hypokinetic. The basal inferior wall  is akinetic.  Moderate concentric left ventricular  hypertrophy. Indeterminate diastolic function.  Right Heart: Right atrium not well visualized. Normal right  ventricular size with decreased right ventricular systolic  function. Tricuspid valve not well visualized. Normal  pulmonic valve. Minimal pulmonic regurgitation.  Pericardium/Pleura: Small pericardial effusion.  Left pleural effusion.  Hemodynamic: Estimated right atrial pressure is 8 mm Hg.  Unableto estimate RVSP.  ------------------------------------------------------------------------  Conclusions:  1. Calcified trileaflet aortic valve with mildly  decreased  opening.  2. Moderate concentric left ventricular hypertrophy.  3. Mild segmental left ventricular systolic dysfunction.  Peak left ventricular outflow tract gradient equals 52 mm  Hg, consistent with moderately severe LVOT obstruction.HR  70 The mid inferolateral wall, and the basal  inferolateral  wall are hypokinetic. The basal inferior wall is akinetic.  4. Normal right ventricular size with decreased right  ventricular systolic function.  ------------------------------------------------------------------------  Confirmed on  3/5/2021 - 21:19:28 by REYNALDO Gardner  ------------------------------------------------------------------------    < end of copied text >      Assessment and Plan: Pt is a 79 y/o with hx T2DM? (last a1c 5.8), worsening Acute Renal Failure superimposed on CKD IV with Chronic HFpEF 60%, admitted 3/4/21 for oliguric renal failure and CHF exacerbation.     NEURO  - pt is intubated since 3/9, sedated on propofol. baseline A&O x3.   - goal to wean off sedation as tolerated.       CARDIOVASCULAR  - CHF exacerbation: Echo from 3/5/2021: EF 60%, moderate severe LVOT obstruction, mild segmental LV systolic dysfunction.   - will discontinue bumex gtt, initiate dialysis as below   - HTN- currently holding nifedipine 90mg PO daily, coreg 25mg PO BID, Hydralazine 100mg q8h--- will reinitiate as tolerated       PULMONARY  - s/p intubation 3/9 for increased WOB on BIPAP and lethargy, concern for airway protection  Mode: AC/ CMV (Assist Control/ Continuous Mandatory Ventilation)  RR (machine): 16  TV (machine): 450  FiO2: 100  PEEP: 5  ITime: 1  MAP: 12  PIP: 36      INFECTIOUS DISEASE   - afebrile, no leukocytosis  - LINES: an, peripheral IV access.       GASTROINTESTINAL  - s/p OG tube placement   - XR to confirm placement: f/u   - nutrition consult for tube feeds: f/u      RENAL  - worsening renal failure on CKD stage IV. Cr 5.49 (4.46 on admission march 4; 3.07 a year ago); BUN 54  - monitor electrolytes: K 4.4; phos 6.1; BUN 54   - on bumex 2mg/hr drip - will discontinue for dialysis  - private nephrology contacted - consent obtained, will do dialysis today. Will place shiley for access.       ENDOCRINOLOGY  - unclear if hx DM.   - f/u a1c  - [facility]     HEMATOLOGY  -     ETHICS MICU Accept Note    CHIEF COMPLAINT:     PRESENTATION TO [facility]:   80M w/ hx of CKD4, dCHF, difficult to control HTN, DM?? p/w SOB and CHF. Pt states he has been in [facility] since Dec 27th 2020 and just returned home from [facility] today. Pt states he started to get worsening SOB over the past 2-3 weeks in [facility]. He states he discussed this with his doctor in [facility] and was [place] and [place]. Pt endorses worsening orthopnea and LE edema. Denies any chest pain, palpitations, fevers or chills. Endorses cough relatively unchanged. Endorses okay medication compliance although did not take any medications today. Denies headache. Occasional dizziness but no syncope.     Home Medications:  aspirin 81 mg oral delayed release tablet: 1 tab(s) orally once a day (04 Mar 2021 21:15)  Lasix 20 mg oral tablet: 1 tab(s) orally once a day (04 Mar 2021 21:15)  NIFEdipine 90 mg oral tablet, extended release: 1 tab(s) orally once a day (04 Mar 2021 21:15)  tamsulosin 0.4 mg oral capsule: 1 cap(s) orally once a day (at bedtime) (04 Mar 2021 21:15)      MEDICINE FLOOR COURSE:   Pt was fluid overloaded on exam, hypoxic to <80% spo2 on RA on arrival, improved on NC, thought to be from acute CHF exacerbation. BNP elevated to 27,657. TTE done: EF 60%, moderate LVH, mild segmental LV systolic dysfunction, moderate severe LVOT obstruction, mid inferolateral wall, basal inferolateral wall are hypokinetic, basal inferior wall akinetic. Started on lasiv 80mg IV BID. Was hypertensive on arrival to floor, on hydral 100mg TID, started coreg 25mg BID, nifedipine 90mg QD. Has hx CKD stage IV, unclear cause (HTN vs DM?) not on dialysis. On SCD for DVT ppx.   On 3/8, discontinued lasix, started on bumex drip for worsening edema/sob, concern for need for urgent dialysis, MICU called 3/8 pm for dialysis initiation.   MICU assessed patient 3/8 pm. Pt has hx T2DM?, Bilateral Multiple Renal Cysts, worsening Acute Renal Failure superimposed on CKD IV with Chronic HFpEF 60%, admitted 3/4/21 for Oliguric Renal Failure. Pt A&O x3, no distress, spo2 95% on NC 4L. Lytes wnl. On bumex drip. Agree with dialysis need but no need for emergent HD - deemed not candidate for MICU. Recommended increase IV bumex to 2-3mg/hr.     3/9 am RRT called for AMS - pt was lethargic, increased work of breathing on BIPAP, ABG with pCO2 80. Pt was intubated and sedated with etominate and succinylcholine, propofol started. Concern for stroke - stroke code, CTH without acute changes, neuro not concerned for stroke.     TRANSFERRED TO MICU FOR FURTHER CARE.         PAST MEDICAL & SURGICAL HISTORY:  Diabetes    Hypertension    HTN (hypertension)    BPH (Benign Prostatic Hypertrophy)    Glaucoma (Increased Eye Pressure)    HTN - Hypertension    No significant past surgical history    Laser Surgery :Right  ?  regarding procedure ; 12/07 ; secondary to glaucoma    Laser Surgery Left  ? regarding procedure ; secondary to glaucoma 12/07    Excision of Sinus Polyp  2006        FAMILY HISTORY:  Family history of diabetes mellitus (DM) (Mother, Sibling)        SOCIAL HISTORY:  Smoking: [  ] Never Smoked  [  ] Former Smoker (# packs x # years)  [  ] Current Smoker (# packs x # years)  Substance Use:   EtOH Use:   Marital Status: [  ] Single  [  ]   [  ]   [  ]   Sexual History:   Occupation:  Recent Travel:  Country of Birth:   Advance Directives:     HOME MEDICATIONS:      Allergies    grass, pollen (Rhinitis)  No Known Drug Allergies    Intolerances          REVIEW OF SYSTEMS:  Constitutional: No fevers, chills, weight loss, weight gain  HEENT: No vision problems, eye pain, nasal congestion, rhinorrhea, sore throat, dysphagia  CV: No chest pain, orthopnea, palpitations  Resp: No cough, dyspnea, wheezing, hemoptysis  GI: No nausea, vomiting, diarrhea, constipation, abdominal pain  : [ ] dysuria [ ] nocturia [ ] hematuria [ ] increased urinary frequency  Musculoskeletal: [ ] back pain [ ] myalgias [ ] arthralgias [ ] fracture  Skin: [ ] rash [ ] itch  Neurological: [ ] headache [ ] dizziness [ ] syncope [ ] weakness [ ] numbness  Psychiatric: [ ] anxiety [ ] depression  Endocrine: [ ] diabetes [ ] thyroid problem  Hematologic/Lymphatic: [ ] anemia [ ] bleeding problem  Allergic/Immunologic: [ ] itchy eyes [ ] nasal discharge [ ] hives [ ] angioedema  [ ] All other systems negative  [ ] Unable to assess ROS because ________    OBJECTIVE:  ICU Vital Signs Last 24 Hrs  T(C): 35.9 (09 Mar 2021 08:30), Max: 36.8 (08 Mar 2021 20:44)  T(F): 96.6 (09 Mar 2021 08:30), Max: 98.3 (08 Mar 2021 20:44)  HR: 65 (09 Mar 2021 08:36) (65 - 84)  BP: 124/60 (09 Mar 2021 08:30) (124/60 - 170/77)  BP(mean): 85 (09 Mar 2021 08:30) (85 - 85)  ABP: --  ABP(mean): --  RR: 17 (09 Mar 2021 08:30) (17 - 19)  SpO2: 99% (09 Mar 2021 08:36) (53% - 100%)    Mode: AC/ CMV (Assist Control/ Continuous Mandatory Ventilation), RR (machine): 16, TV (machine): 450, FiO2: 100, PEEP: 5, ITime: 1, MAP: 12, PIP: 36    03-08 @ 07:01 - 03-09 @ 07:00  --------------------------------------------------------  IN: 285 mL / OUT: 730 mL / NET: -445 mL    03-09 @ 07:01 - 03-09 @ 08:52  --------------------------------------------------------  IN: 0 mL / OUT: 350 mL / NET: -350 mL      CAPILLARY BLOOD GLUCOSE  107 (09 Mar 2021 07:32)      POCT Blood Glucose.: 107 mg/dL (09 Mar 2021 07:25)      PHYSICAL EXAM:  General:   HEENT:   Neck:   Chest/Lungs:  Heart:  Abdomen:   Extremities:   Skin:   Neuro:   Psych:     LINES:     HOSPITAL MEDICATIONS:  MEDICATIONS  (STANDING):  buMETAnide Infusion 2 mG/Hr (10 mL/Hr) IV Continuous <Continuous>  chlorhexidine 0.12% Liquid 15 milliLiter(s) Oral Mucosa every 12 hours  chlorhexidine 4% Liquid 1 Application(s) Topical <User Schedule>  fentaNYL    Injectable 100 MICROGram(s) IV Push once  heparin   Injectable 5000 Unit(s) SubCutaneous every 12 hours  propofol Infusion 50 MICROgram(s)/kG/Min (20 mL/Hr) IV Continuous <Continuous>  trimethoprim/polymyxin Solution 1 Drop(s) Both EYES three times a day    MEDICATIONS  (PRN):      LABS:                        9.9    4.73  )-----------( 185      ( 09 Mar 2021 06:14 )             34.9     Hgb Trend: 9.9<--, 9.8<--, 9.8<--, 10.6<--, 10.6<--  03-09    142  |  102  |  53<H>  ----------------------------<  120<H>  4.6   |  27  |  5.23<H>    Ca    8.5      09 Mar 2021 06:14  Phos  6.3     03-09  Mg     2.7     03-09      Creatinine Trend: 5.23<--, 5.41<--, 5.32<--, 5.28<--, 5.24<--, 4.91<--      Arterial Blood Gas:  03-09 @ 07:43  7.22/80/117/32/99/2.6  ABG lactate: --  Arterial Blood Gas:  03-09 @ 06:25  7.24/77/287/32/100/3.1  ABG lactate: --  Arterial Blood Gas:  03-08 @ 22:03  7.26/67/78/29/96/1.3  ABG lactate: --        MICROBIOLOGY:     RADIOLOGY & ADDITIONAL TESTS:  < from: Transthoracic Echocardiogram (03.05.21 @ 08:41) >    EF (Visual Estimate): 60 %  Doppler Peak Velocity (m/sec): AoV=1.9  ------------------------------------------------------------------------  Observations:  Mitral Valve: Normal mitral valve. Mean transmitral valve  gradient equals 2 mm Hg, consistent with mild mitral  stenosis.  Aortic Valve/Aorta: Calcified trileaflet aortic valve with  mildly  decreased opening. Peak transaortic valve gradient  equals 14 mm Hg, mean transaortic valve gradient equals 4  mm Hg, aortic valve velocity time integral equals 38 cm.  Peak left ventricular outflow tract gradient equals 52 mm  Hg, consistent with moderately severe LVOT obstruction.HR  70  Aortic Root: 3.3 cm.  Left Atrium: Moderately dilated left atrium.  LA volume  index = 48 cc/m2.  Left Ventricle: Mild segmental left ventricular systolic  dysfunction. The mid inferolateral wall, and the basal  inferolateral wall are hypokinetic. The basal inferior wall  is akinetic.  Moderate concentric left ventricular  hypertrophy. Indeterminate diastolic function.  Right Heart: Right atrium not well visualized. Normal right  ventricular size with decreased right ventricular systolic  function. Tricuspid valve not well visualized. Normal  pulmonic valve. Minimal pulmonic regurgitation.  Pericardium/Pleura: Small pericardial effusion.  Left pleural effusion.  Hemodynamic: Estimated right atrial pressure is 8 mm Hg.  Unableto estimate RVSP.  ------------------------------------------------------------------------  Conclusions:  1. Calcified trileaflet aortic valve with mildly  decreased  opening.  2. Moderate concentric left ventricular hypertrophy.  3. Mild segmental left ventricular systolic dysfunction.  Peak left ventricular outflow tract gradient equals 52 mm  Hg, consistent with moderately severe LVOT obstruction.HR  70 The mid inferolateral wall, and the basal  inferolateral  wall are hypokinetic. The basal inferior wall is akinetic.  4. Normal right ventricular size with decreased right  ventricular systolic function.  ------------------------------------------------------------------------  Confirmed on  3/5/2021 - 21:19:28 by REYNALDO Gardner  ------------------------------------------------------------------------    < end of copied text >      Assessment and Plan: Pt is a 79 y/o with hx T2DM? (last a1c 5.8), worsening Acute Renal Failure superimposed on CKD IV with Chronic HFpEF 60%, admitted 3/4/21 for oliguric renal failure and CHF exacerbation.     NEURO  - pt is intubated since 3/9, sedated on propofol. baseline A&O x3.   - goal to wean off sedation as tolerated.       CARDIOVASCULAR  - admitted to hospital with concern for CHF exacerbation (elevated BNP ~30,000, volume overloaded on exam): Echo from 3/5/2021: EF 60%, moderate severe LVOT obstruction, mild segmental LV systolic dysfunction.   - was on bumex gtt on medicine floor; will initiate dialysis as below and discontinue bumex gtt  - HTN- currently holding nifedipine 90mg PO daily, coreg 25mg PO BID, Hydralazine 100mg q8h--- will reinitiate as tolerated       PULMONARY  - s/p intubation 3/9 for increased WOB on BIPAP and lethargy, concern for airway protection  Mode: AC/ CMV (Assist Control/ Continuous Mandatory Ventilation)  RR (machine): 16  TV (machine): 450  FiO2: 100  PEEP: 5  ITime: 1  MAP: 12  PIP: 36      INFECTIOUS DISEASE   - afebrile, no leukocytosis  - LINES: an, peripheral IV access.       GASTROINTESTINAL  - s/p OG tube placement   - XR to confirm placement: f/u   - nutrition consult for tube feeds: f/u      RENAL  - worsening renal failure on CKD stage IV. Cr 5.49 (4.46 on admission march 4; 3.07 a year ago); BUN 54  - monitor electrolytes: K 4.4; phos 6.1; BUN 54   - on bumex 2mg/hr drip - will discontinue for dialysis  - private nephrology contacted - consent obtained, will do dialysis today. Will place shiley for access.       ENDOCRINOLOGY  - unclear if hx DM.   - f/u a1c  - [facility]SI     HEMATOLOGY  - hb stable at 9-10; plt stable at 160s.   - no active signs of bleeding    ETHICS MICU Accept Note    CHIEF COMPLAINT:     PRESENTATION TO [facility]:   80M w/ hx of CKD4, dCHF, difficult to control HTN, DM?? p/w SOB and CHF. Pt states he has been in [facility] since Dec 27th 2020 and just returned home from [facility] today. Pt states he started to get worsening SOB over the past 2-3 weeks in [facility]. He states he discussed this with his doctor in [facility] and was advised to [place] the [place] and [place]. Pt endorses worsening orthopnea and LE edema. Denies any chest pain, palpitations, fevers or chills. Endorses cough relatively unchanged. Endorses okay medication compliance although did not take any medications today. Denies headache. Occasional dizziness but no syncope.     Home Medications:  aspirin 81 mg oral delayed release tablet: 1 tab(s) orally once a day (04 Mar 2021 21:15)  Lasix 20 mg oral tablet: 1 tab(s) orally once a day (04 Mar 2021 21:15)  NIFEdipine 90 mg oral tablet, extended release: 1 tab(s) orally once a day (04 Mar 2021 21:15)  tamsulosin 0.4 mg oral capsule: 1 cap(s) orally once a day (at bedtime) (04 Mar 2021 21:15)      MEDICINE FLOOR COURSE:   Pt was fluid overloaded on exam, hypoxic to <80% spo2 on RA on arrival, improved on NC, thought to be from acute CHF exacerbation. BNP elevated to 27,657. TTE done: EF 60%, moderate LVH, mild segmental LV systolic dysfunction, moderate severe LVOT obstruction, mid inferolateral wall, basal inferolateral wall are hypokinetic, basal inferior wall akinetic. Started on lasiv 80mg IV BID. Was hypertensive on arrival to floor, on hydral 100mg TID, started coreg 25mg BID, nifedipine 90mg QD. Has hx CKD stage IV, unclear cause (HTN vs DM?) not on dialysis. On SCD for DVT ppx.   On 3/8, discontinued lasix, started on bumex drip for worsening edema/sob, concern for need for urgent dialysis, MICU called 3/8 pm for dialysis initiation.   MICU assessed patient 3/8 pm. Pt has hx T2DM?, Bilateral Multiple Renal Cysts, worsening Acute Renal Failure superimposed on CKD IV with Chronic HFpEF 60%, admitted 3/4/21 for Oliguric Renal Failure. Pt A&O x3, no distress, spo2 95% on NC 4L. Lytes wnl. On bumex drip. Agree with dialysis need but no need for emergent HD - deemed not candidate for MICU. Recommended increase IV bumex to 2-3mg/hr.     3/9 am RRT called for AMS - pt was lethargic, increased work of breathing on BIPAP, ABG with pCO2 80. Pt was intubated and sedated with etominate and succinylcholine, propofol started. Concern for stroke - stroke code, CTH without acute changes, neuro not concerned for stroke.     TRANSFERRED TO MICU FOR FURTHER CARE.         PAST MEDICAL & SURGICAL HISTORY:  Diabetes    Hypertension    HTN (hypertension)    BPH (Benign Prostatic Hypertrophy)    Glaucoma (Increased Eye Pressure)    HTN - Hypertension    No significant past surgical history    Laser Surgery :Right  ?  regarding procedure ; 12/07 ; secondary to glaucoma    Laser Surgery Left  ? regarding procedure ; secondary to glaucoma 12/07    Excision of Sinus Polyp  2006        FAMILY HISTORY:  Family history of diabetes mellitus (DM) (Mother, Sibling)      HOME MEDICATIONS:  Home Medications:  aspirin 81 mg oral delayed release tablet: 1 tab(s) orally once a day (04 Mar 2021 21:15)  Lasix 20 mg oral tablet: 1 tab(s) orally once a day (04 Mar 2021 21:15)  NIFEdipine 90 mg oral tablet, extended release: 1 tab(s) orally once a day (04 Mar 2021 21:15)  tamsulosin 0.4 mg oral capsule: 1 cap(s) orally once a day (at bedtime) (04 Mar 2021 21:15)      Allergies    grass, pollen (Rhinitis)  No Known Drug Allergies    Intolerances        OBJECTIVE:  ICU Vital Signs Last 24 Hrs  T(C): 35.9 (09 Mar 2021 08:30), Max: 36.8 (08 Mar 2021 20:44)  T(F): 96.6 (09 Mar 2021 08:30), Max: 98.3 (08 Mar 2021 20:44)  HR: 65 (09 Mar 2021 08:36) (65 - 84)  BP: 124/60 (09 Mar 2021 08:30) (124/60 - 170/77)  BP(mean): 85 (09 Mar 2021 08:30) (85 - 85)  ABP: --  ABP(mean): --  RR: 17 (09 Mar 2021 08:30) (17 - 19)  SpO2: 99% (09 Mar 2021 08:36) (53% - 100%)    Mode: AC/ CMV (Assist Control/ Continuous Mandatory Ventilation), RR (machine): 16, TV (machine): 450, FiO2: 100, PEEP: 5, ITime: 1, MAP: 12, PIP: 36    03-08 @ 07:01  -  03-09 @ 07:00  --------------------------------------------------------  IN: 285 mL / OUT: 730 mL / NET: -445 mL    03-09 @ 07:01 - 03-09 @ 08:52  --------------------------------------------------------  IN: 0 mL / OUT: 350 mL / NET: -350 mL      CAPILLARY BLOOD GLUCOSE  107 (09 Mar 2021 07:32)      POCT Blood Glucose.: 107 mg/dL (09 Mar 2021 07:25)      PHYSICAL EXAM:  General: sedated, intubated. RASS -4  HEENT: endotracheal intubation   Chest/Lungs: coarse breath sounds bilaterally   Heart: regular rate and rhythm  Abdomen: no rxn to palpation of abdomen  Extremities: 3+ pitting edema UE and LE bilaterally  Neuro: RASS -4, grimaces to pain      HOSPITAL MEDICATIONS:  MEDICATIONS  (STANDING):  buMETAnide Infusion 2 mG/Hr (10 mL/Hr) IV Continuous <Continuous>  chlorhexidine 0.12% Liquid 15 milliLiter(s) Oral Mucosa every 12 hours  chlorhexidine 4% Liquid 1 Application(s) Topical <User Schedule>  fentaNYL    Injectable 100 MICROGram(s) IV Push once  heparin   Injectable 5000 Unit(s) SubCutaneous every 12 hours  propofol Infusion 50 MICROgram(s)/kG/Min (20 mL/Hr) IV Continuous <Continuous>  trimethoprim/polymyxin Solution 1 Drop(s) Both EYES three times a day    MEDICATIONS  (PRN):      LABS:                        9.9    4.73  )-----------( 185      ( 09 Mar 2021 06:14 )             34.9     Hgb Trend: 9.9<--, 9.8<--, 9.8<--, 10.6<--, 10.6<--  03-09    142  |  102  |  53<H>  ----------------------------<  120<H>  4.6   |  27  |  5.23<H>    Ca    8.5      09 Mar 2021 06:14  Phos  6.3     03-09  Mg     2.7     03-09      Creatinine Trend: 5.23<--, 5.41<--, 5.32<--, 5.28<--, 5.24<--, 4.91<--      Arterial Blood Gas:  03-09 @ 07:43  7.22/80/117/32/99/2.6  ABG lactate: --  Arterial Blood Gas:  03-09 @ 06:25  7.24/77/287/32/100/3.1  ABG lactate: --  Arterial Blood Gas:  03-08 @ 22:03  7.26/67/78/29/96/1.3  ABG lactate: --              Assessment and Plan: Pt is a 81 y/o with hx T2DM? (last a1c 5.8), worsening Acute Renal Failure superimposed on CKD IV with Chronic HFpEF 60%, admitted 3/4/21 for oliguric renal failure and CHF exacerbation.     NEURO  - pt is intubated since 3/9, sedated on propofol. baseline A&O x3.   - goal to wean off sedation as tolerated.       CARDIOVASCULAR  - admitted to hospital with concern for CHF exacerbation (elevated BNP ~30,000, volume overloaded on exam): Echo from 3/5/2021: EF 60%, moderate severe LVOT obstruction, mild segmental LV systolic dysfunction.   - was on bumex gtt on medicine floor; will initiate dialysis as below and discontinue bumex gtt  - HTN- currently holding nifedipine 90mg PO daily, coreg 25mg PO BID, Hydralazine 100mg q8h--- will reinitiate as tolerated       PULMONARY  - s/p intubation 3/9 for increased WOB on BIPAP and lethargy, concern for airway protection  - on bedside US, has bilateral pleural effusion  Mode: AC/ CMV (Assist Control/ Continuous Mandatory Ventilation)  RR (machine): 16  TV (machine): 450  FiO2: 100  PEEP: 5  ITime: 1  MAP: 12  PIP: 36      INFECTIOUS DISEASE   - afebrile, no leukocytosis  - LINES: an, peripheral IV access      GASTROINTESTINAL  - s/p OG tube placement   - XR to confirm placement: f/u   - nutrition consult for tube feeds: f/u      RENAL  - worsening renal failure on CKD stage IV. Cr 5.49 (4.46 on admission march 4; 3.07 a year ago); BUN 54  - monitor electrolytes: K 4.4; phos 6.1; BUN 54   - on bumex 2mg/hr drip - will discontinue for dialysis  - private nephrology contacted - consent obtained, after shiley placed, will do dialysis today and tomorrow with fluid removal.       ENDOCRINOLOGY  - unclear if hx DM.   - f/u a1c  - SSI     HEMATOLOGY  - hb stable at 9-10; plt stable at 160s.   - no active signs of bleeding    ETHICS  - discussion with spouse 3/9: pt is FULL CODE.       Beckie Rendon PGY1 MICU Accept Note    CHIEF COMPLAINT:     PRESENTATION TO [facility]:   80M w/ hx of CKD4, dCHF, difficult to control HTN, DM?? p/w SOB and CHF. Pt states he has been in [facility] since Dec 27th 2020 and just returned home from [facility] today. Pt states he started to get worsening SOB over the past 2-3 weeks in [facility]. He states he discussed this with his doctor in [facility] and was advised [place] and [place]. Pt endorses worsening orthopnea and LE edema. Denies any chest pain, palpitations, fevers or chills. Endorses cough relatively unchanged. Endorses okay medication compliance although did not take any medications today. Denies headache. Occasional dizziness but no syncope.     Home Medications:  aspirin 81 mg oral delayed release tablet: 1 tab(s) orally once a day (04 Mar 2021 21:15)  Lasix 20 mg oral tablet: 1 tab(s) orally once a day (04 Mar 2021 21:15)  NIFEdipine 90 mg oral tablet, extended release: 1 tab(s) orally once a day (04 Mar 2021 21:15)  tamsulosin 0.4 mg oral capsule: 1 cap(s) orally once a day (at bedtime) (04 Mar 2021 21:15)      MEDICINE FLOOR COURSE:   Pt was fluid overloaded on exam, hypoxic to <80% spo2 on RA on arrival, improved on NC, thought to be from acute CHF exacerbation. BNP elevated to 27,657. TTE done: EF 60%, moderate LVH, mild segmental LV systolic dysfunction, moderate severe LVOT obstruction, mid inferolateral wall, basal inferolateral wall are hypokinetic, basal inferior wall akinetic. Started on lasiv 80mg IV BID. Was hypertensive on arrival to floor, on hydral 100mg TID, started coreg 25mg BID, nifedipine 90mg QD. Has hx CKD stage IV, unclear cause (HTN vs DM?) not on dialysis. On SCD for DVT ppx.   On 3/8, discontinued lasix, started on bumex drip for worsening edema/sob, concern for need for urgent dialysis, MICU called 3/8 pm for dialysis initiation.   MICU assessed patient 3/8 pm. Pt has hx T2DM?, Bilateral Multiple Renal Cysts, worsening Acute Renal Failure superimposed on CKD IV with Chronic HFpEF 60%, admitted 3/4/21 for Oliguric Renal Failure. Pt A&O x3, no distress, spo2 95% on NC 4L. Lytes wnl. On bumex drip. Agree with dialysis need but no need for emergent HD - deemed not candidate for MICU. Recommended increase IV bumex to 2-3mg/hr.     3/9 am RRT called for AMS - pt was lethargic, increased work of breathing on BIPAP, ABG with pCO2 80. Pt was intubated and sedated with etominate and succinylcholine, propofol started. Concern for stroke - stroke code, CTH without acute changes, neuro not concerned for stroke.     TRANSFERRED TO MICU FOR FURTHER CARE.         PAST MEDICAL & SURGICAL HISTORY:  Diabetes    Hypertension    HTN (hypertension)    BPH (Benign Prostatic Hypertrophy)    Glaucoma (Increased Eye Pressure)    HTN - Hypertension    No significant past surgical history    Laser Surgery :Right  ?  regarding procedure ; 12/07 ; secondary to glaucoma    Laser Surgery Left  ? regarding procedure ; secondary to glaucoma 12/07    Excision of Sinus Polyp  2006        FAMILY HISTORY:  Family history of diabetes mellitus (DM) (Mother, Sibling)      HOME MEDICATIONS:  Home Medications:  aspirin 81 mg oral delayed release tablet: 1 tab(s) orally once a day (04 Mar 2021 21:15)  Lasix 20 mg oral tablet: 1 tab(s) orally once a day (04 Mar 2021 21:15)  NIFEdipine 90 mg oral tablet, extended release: 1 tab(s) orally once a day (04 Mar 2021 21:15)  tamsulosin 0.4 mg oral capsule: 1 cap(s) orally once a day (at bedtime) (04 Mar 2021 21:15)      Allergies    grass, pollen (Rhinitis)  No Known Drug Allergies    Intolerances        OBJECTIVE:  ICU Vital Signs Last 24 Hrs  T(C): 35.9 (09 Mar 2021 08:30), Max: 36.8 (08 Mar 2021 20:44)  T(F): 96.6 (09 Mar 2021 08:30), Max: 98.3 (08 Mar 2021 20:44)  HR: 65 (09 Mar 2021 08:36) (65 - 84)  BP: 124/60 (09 Mar 2021 08:30) (124/60 - 170/77)  BP(mean): 85 (09 Mar 2021 08:30) (85 - 85)  ABP: --  ABP(mean): --  RR: 17 (09 Mar 2021 08:30) (17 - 19)  SpO2: 99% (09 Mar 2021 08:36) (53% - 100%)    Mode: AC/ CMV (Assist Control/ Continuous Mandatory Ventilation), RR (machine): 16, TV (machine): 450, FiO2: 100, PEEP: 5, ITime: 1, MAP: 12, PIP: 36    03-08 @ 07:01  -  03-09 @ 07:00  --------------------------------------------------------  IN: 285 mL / OUT: 730 mL / NET: -445 mL    03-09 @ 07:01 - 03-09 @ 08:52  --------------------------------------------------------  IN: 0 mL / OUT: 350 mL / NET: -350 mL      CAPILLARY BLOOD GLUCOSE  107 (09 Mar 2021 07:32)      POCT Blood Glucose.: 107 mg/dL (09 Mar 2021 07:25)      PHYSICAL EXAM:  General: sedated, intubated. RASS -4  HEENT: endotracheal intubation   Chest/Lungs: coarse breath sounds bilaterally   Heart: regular rate and rhythm  Abdomen: no rxn to palpation of abdomen  Extremities: 3+ pitting edema UE and LE bilaterally  Neuro: RASS -4, grimaces to pain      HOSPITAL MEDICATIONS:  MEDICATIONS  (STANDING):  buMETAnide Infusion 2 mG/Hr (10 mL/Hr) IV Continuous <Continuous>  chlorhexidine 0.12% Liquid 15 milliLiter(s) Oral Mucosa every 12 hours  chlorhexidine 4% Liquid 1 Application(s) Topical <User Schedule>  fentaNYL    Injectable 100 MICROGram(s) IV Push once  heparin   Injectable 5000 Unit(s) SubCutaneous every 12 hours  propofol Infusion 50 MICROgram(s)/kG/Min (20 mL/Hr) IV Continuous <Continuous>  trimethoprim/polymyxin Solution 1 Drop(s) Both EYES three times a day    MEDICATIONS  (PRN):      LABS:                        9.9    4.73  )-----------( 185      ( 09 Mar 2021 06:14 )             34.9     Hgb Trend: 9.9<--, 9.8<--, 9.8<--, 10.6<--, 10.6<--  03-09    142  |  102  |  53<H>  ----------------------------<  120<H>  4.6   |  27  |  5.23<H>    Ca    8.5      09 Mar 2021 06:14  Phos  6.3     03-09  Mg     2.7     03-09      Creatinine Trend: 5.23<--, 5.41<--, 5.32<--, 5.28<--, 5.24<--, 4.91<--      Arterial Blood Gas:  03-09 @ 07:43  7.22/80/117/32/99/2.6  ABG lactate: --  Arterial Blood Gas:  03-09 @ 06:25  7.24/77/287/32/100/3.1  ABG lactate: --  Arterial Blood Gas:  03-08 @ 22:03  7.26/67/78/29/96/1.3  ABG lactate: --              Assessment and Plan: Pt is a 81 y/o with hx T2DM? (last a1c 5.8), worsening Acute Renal Failure superimposed on CKD IV with Chronic HFpEF 60%, admitted 3/4/21 for oliguric renal failure and CHF exacerbation.     NEURO  - pt is intubated since 3/9, sedated on propofol. baseline A&O x3.   - goal to wean off sedation as tolerated.       CARDIOVASCULAR  - admitted to hospital with concern for CHF exacerbation (elevated BNP ~30,000, volume overloaded on exam): Echo from 3/5/2021: EF 60%, moderate severe LVOT obstruction, mild segmental LV systolic dysfunction.   - was on bumex gtt on medicine floor; will initiate dialysis as below and discontinue bumex gtt  - HTN- currently holding nifedipine 90mg PO daily, coreg 25mg PO BID, Hydralazine 100mg q8h--- will reinitiate as tolerated       PULMONARY  - s/p intubation 3/9 for increased WOB on BIPAP and lethargy, concern for airway protection  - on bedside US, has bilateral pleural effusion  Mode: AC/ CMV (Assist Control/ Continuous Mandatory Ventilation)  RR (machine): 16  TV (machine): 450  FiO2: 100  PEEP: 5  ITime: 1  MAP: 12  PIP: 36      INFECTIOUS DISEASE   - afebrile, no leukocytosis  - LINES: an, peripheral IV access      GASTROINTESTINAL  - s/p OG tube placement   - XR to confirm placement: f/u   - nutrition consult for tube feeds: f/u      RENAL  - worsening renal failure on CKD stage IV. Cr 5.49 (4.46 on admission march 4; 3.07 a year ago); BUN 54  - monitor electrolytes: K 4.4; phos 6.1; BUN 54   - on bumex 2mg/hr drip - will discontinue for dialysis  - private nephrology contacted - consent obtained, after shiley placed, will do dialysis today and tomorrow with fluid removal.       ENDOCRINOLOGY  - unclear if hx DM.   - f/u a1c  - SSI     HEMATOLOGY  - hb stable at 9-10; plt stable at 160s.   - no active signs of bleeding    ETHICS  - discussion with spouse 3/9: pt is FULL CODE.       Beckie Rendon PGY1    CCM Attending: I have examine pt and agree with above exam and plan above.  1.81 yo male intubated for acute on chronic hypercapnic  and hypoxemic respiratory failure. Pt with body habitus suggestive od ARIELLE. Continue current AC vent settings. Check ABG.  2.Acute on chronic  renal failure (CKD5) now requiring urgent dialysis for fluid overload.  Will place hemodialysis catheter for HD today with fluid removal.  3. Cardiac. 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 Initially pt on 4 liters NC , however pt with  worsening pulmonary edema requiring intubation. Stop Bumex drip. For HD today.    I have spent 35 min cc  not including procedures.    Bharat Sousa MD

## 2021-03-09 NOTE — CONSULT NOTE ADULT - ASSESSMENT
Starr Stevenson is a 80 year old male with unknown handedness with a past medical history of CKD4, dCHF, difficult to control HTN, DM?? p/w SOB and CHF treated here for CHF decompensation and worsening renal function. Neurology consulted via code stroke for unequal pupils and altered mental status.    Impression: patient with document history of R and L pupil surgery and surgical pupil found on examination. Patient currently has agonal breathing and with documented episodes of hypoxia and increased CO2 retention. Highly considering more of a respiratory/metabolic cause of the patient's acute change in mental status. Lower suspicion of neurovascular cause of the patient's condition.    No tpa as suspicion of stroke is low  No endovascular as low suspicion of LVO  Start aspirin if no contraindications    Recs:  [] patient to be stabilized from a respiratory standpoint before imaging is obtained  [] CT head non contrast when patient stable  [] CTA head and neck coordinated with dialysis when patient stable vs MRA head and neck  [] can obtain MR head if MRA route of vessel imaging is pursued  [] can start on aspirin 81mg if no contraindications, and if no infarct is visualized, may discontinue  [x] A1c 5.8, LDL 70  [] further workup to be guided by imaging obtained  [] all other care by primary team/MICU    Discussed with MICU team.  Case discussed with stroke fellow, Dr. Mei. Further recommendations to follow upon review by neurology attending, Dr. Wallace         Starr Stevenson is a 80 year old male with unknown handedness with a past medical history of CKD4, dCHF, difficult to control HTN, DM?? p/w SOB and CHF treated here for CHF decompensation and worsening renal function. Neurology consulted via code stroke for unequal pupils and altered mental status.    Impression: patient with document history of R and L pupil surgery and surgical pupil found on examination. Patient currently has agonal breathing and with documented episodes of hypoxia and increased CO2 retention. Highly considering more of a respiratory/metabolic cause of the patient's acute change in mental status. Lower suspicion of neurovascular cause of the patient's condition.    No tpa as suspicion of stroke is low  No endovascular as low suspicion of LVO  Start aspirin if no contraindications    Recs:  [] patient to be stabilized from a respiratory standpoint before imaging is obtained  [x] CT head non contrast without acute findings  [] CTA head and neck coordinated with dialysis when patient stable vs MRA head and neck  [] can obtain MR head if MRA route of vessel imaging is pursued  [] can start on aspirin 81mg if no contraindications, and if no infarct is visualized, may discontinue  [x] A1c 5.8, LDL 70  [] further workup to be guided by imaging obtained  [] all other care by primary team/MICU    Discussed with MICU team.  Case discussed with stroke fellow, Dr. Mei. Further recommendations to follow upon review by neurology attending, Dr. Wallace

## 2021-03-09 NOTE — RAPID RESPONSE TEAM SUMMARY - NSSITUATIONBACKGROUNDRRT_GEN_ALL_CORE
80M w/ hx of CKD4, dCHF, difficult to control HTN, DM?? p/w acute hypercapneic respiratory failure and oliguric renal failure.  At baseline, patient AOx3.  Currently patient lethargic with increased work of breathing on BIPAP.  FS is 107. Family called and patient full code. ABG performed and pCO2 80.  Patient intubated and sedated with etomidate and succinylcholine and propofol started.  Concern for stroke so obtained CT head prior to transferring patient to MICU.

## 2021-03-09 NOTE — CHART NOTE - NSCHARTNOTEFT_GEN_A_CORE
Medicine NP Episodic Note    Notified by RN, pt's pulse oximetry on tele notable for hypoxia (SPO2 78-mid 80's while on 4L nasal canula). Pt. seen and examined at bedside, A+Ox3, in NAD.  Denies c/o CP, SOB, or palpitations.   At time of my assessment, pt. observed laying flat in bed.  Pt. repositioned w/ HOB ~ 45 degrees, SPO2 improved to 94% while on 4L nasal canula.      Vital Signs Last 24 Hrs  T(C): 36.6 (09 Mar 2021 04:14), Max: 36.8 (08 Mar 2021 20:44)  T(F): 97.8 (09 Mar 2021 04:14), Max: 98.3 (08 Mar 2021 20:44)  HR: 79 (09 Mar 2021 04:14) (67 - 84)  BP: 170/77 (09 Mar 2021 04:14) (135/71 - 170/77)  BP(mean): --  RR: 19 (09 Mar 2021 04:14) (18 - 19)  SpO2: 100% (09 Mar 2021 04:14) (91% - 100%)      Labs:                          9.8    4.93  )-----------( 187      ( 08 Mar 2021 06:11 )             34.1     03-08    142  |  102  |  52<H>  ----------------------------<  114<H>  4.8   |  28  |  5.41<H>    Ca    8.8      08 Mar 2021 18:42  Phos  5.3     03-08  Mg     2.6     03-08      ABG - ( 08 Mar 2021 22:03 )  pH, Arterial: 7.26  pH, Blood: x     /  pCO2: 67    /  pO2: 78    / HCO3: 29    / Base Excess: 1.3   /  SaO2: 96            MEDICATIONS  (STANDING):  buMETAnide Infusion 2 mG/Hr (10 mL/Hr) IV Continuous <Continuous>  carvedilol 25 milliGRAM(s) Oral every 12 hours  heparin   Injectable 5000 Unit(s) SubCutaneous every 12 hours  hydrALAZINE 100 milliGRAM(s) Oral every 8 hours  NIFEdipine XL 90 milliGRAM(s) Oral daily  petrolatum Ophthalmic Ointment 1 Application(s) Both EYES four times a day  tamsulosin 0.4 milliGRAM(s) Oral at bedtime  trimethoprim/polymyxin Solution 1 Drop(s) Both EYES three times a day      Physical Exam:  Gen/Neuro: A+Ox3, in NAD  Resp: Diminished B/L  CV: RRR, S1S2  Abd: Soft, obese  MSK: 3+ pitting edema BLE    Assessment & Plan:  HPI:  80M w/ hx of CKD4, dCHF, difficult to control HTN, DM?? p/w SOB and likely acute on chronic CHF.  Now w/ episode of hypoxia while on supplemental O2.    # Acute Respiratory Failure w/ Hypoxia  > Pt. placed on BiPAP (prior ABG: pH 7.26, pCO2 67)  > F/u ABG in am  > Continue w/ Bumex 2 mg/hr  > Continue tele  > Monitor vital signs   > F/u am labs   > Strict I&Os   > RN advised to keep pt's HOB elevated   > Renal following, pt. will likely need dialysis to assist w/ fluid overload (pt. was evaluated earlier, deemed not MICU candidate for urgent dialysis)   > If pt. decompensates (persistent hypoxia) will reconsult MICU    Will endorse to primary team.  Attending to follow.    Mallorie Balderas, P-BC  [phone] Medicine NP Episodic Note    Notified by RN, pt's pulse oximetry on tele notable for hypoxia (SPO2 78-mid 80's while on 4L nasal canula). Pt. seen and examined at bedside, A+Ox3, in NAD.  Denies c/o CP, SOB, or palpitations.   At time of my assessment, pt. observed laying flat in bed.  Pt. repositioned w/ HOB ~ 45 degrees, SPO2 improved to 94% while on 4L nasal canula.      Vital Signs Last 24 Hrs  T(C): 36.6 (09 Mar 2021 04:14), Max: 36.8 (08 Mar 2021 20:44)  T(F): 97.8 (09 Mar 2021 04:14), Max: 98.3 (08 Mar 2021 20:44)  HR: 79 (09 Mar 2021 04:14) (67 - 84)  BP: 170/77 (09 Mar 2021 04:14) (135/71 - 170/77)  BP(mean): --  RR: 19 (09 Mar 2021 04:14) (18 - 19)  SpO2: 100% (09 Mar 2021 04:14) (91% - 100%)      Labs:                          9.8    4.93  )-----------( 187      ( 08 Mar 2021 06:11 )             34.1     03-08    142  |  102  |  52<H>  ----------------------------<  114<H>  4.8   |  28  |  5.41<H>    Ca    8.8      08 Mar 2021 18:42  Phos  5.3     03-08  Mg     2.6     03-08      ABG - ( 08 Mar 2021 22:03 )  pH, Arterial: 7.26  pH, Blood: x     /  pCO2: 67    /  pO2: 78    / HCO3: 29    / Base Excess: 1.3   /  SaO2: 96            MEDICATIONS  (STANDING):  buMETAnide Infusion 2 mG/Hr (10 mL/Hr) IV Continuous <Continuous>  carvedilol 25 milliGRAM(s) Oral every 12 hours  heparin   Injectable 5000 Unit(s) SubCutaneous every 12 hours  hydrALAZINE 100 milliGRAM(s) Oral every 8 hours  NIFEdipine XL 90 milliGRAM(s) Oral daily  petrolatum Ophthalmic Ointment 1 Application(s) Both EYES four times a day  tamsulosin 0.4 milliGRAM(s) Oral at bedtime  trimethoprim/polymyxin Solution 1 Drop(s) Both EYES three times a day      Physical Exam:  Gen/Neuro: A+Ox3, in NAD  Resp: Diminished B/L  CV: RRR, S1S2  Abd: Soft, obese  MSK: 3+ pitting edema BLE    Assessment & Plan:  HPI:  80M w/ hx of CKD4, dCHF, difficult to control HTN, DM?? p/w SOB and likely acute on chronic CHF.  Now w/ episode of hypoxia while on supplemental O2.    # Acute Respiratory Failure w/ Hypoxia  > Pt. placed on BiPAP - 12/5/100% (prior ABG: pH 7.26, pCO2 67)  > F/u ABG in am  > Continue w/ Bumex 2 mg/hr  > Continue tele  > Monitor vital signs   > F/u am labs   > Strict I&Os   > RN advised to keep pt's HOB elevated   > Renal following, pt. will likely need dialysis to assist w/ fluid overload (pt. was evaluated earlier, deemed not MICU candidate for urgent dialysis)   > If pt. decompensates (persistent hypoxia) will reconsult MICU    Will endorse to primary team.  Attending to follow.    Mallorie Balderas, P-BC  [phone]

## 2021-03-09 NOTE — PROGRESS NOTE ADULT - SUBJECTIVE AND OBJECTIVE BOX
CHIEF COMPLAINT:    SUBJECTIVE:     REVIEW OF SYSTEMS:    CONSTITUTIONAL: (  )  weakness,  (  ) fevers or chills  EYES/ENT: (  )visual changes;     NECK: (  ) pain or stiffness  RESPIRATORY:   (  )cough, wheezing, hemoptysis;  (  ) shortness of breath  CARDIOVASCULAR:  (  )chest pain or palpitations  GASTROINTESTINAL:   (  )abdominal or epigastric pain.  (  ) nausea, vomiting, or hematemesis;   (   ) diarrhea or constipation.   GENITOURINARY:   (    ) dysuria, frequency or hematuria  NEUROLOGICAL:  (   ) numbness or weakness   All other review of systems is negative unless indicated above    Vital Signs Last 24 Hrs  T(C): 36.6 (09 Mar 2021 04:14), Max: 36.8 (08 Mar 2021 20:44)  T(F): 97.8 (09 Mar 2021 04:14), Max: 98.3 (08 Mar 2021 20:44)  HR: 79 (09 Mar 2021 04:14) (67 - 84)  BP: 170/77 (09 Mar 2021 04:14) (135/71 - 170/77)  BP(mean): --  RR: 19 (09 Mar 2021 04:14) (18 - 19)  SpO2: 100% (09 Mar 2021 04:14) (91% - 100%)    I&O's Summary    08 Mar 2021 07:01  -  09 Mar 2021 07:00  --------------------------------------------------------  IN: 285 mL / OUT: 730 mL / NET: -445 mL        CAPILLARY BLOOD GLUCOSE  107 (09 Mar 2021 07:32)      POCT Blood Glucose.: 107 mg/dL (09 Mar 2021 07:25)      PHYSICAL EXAM:    Constitutional:  (   ) NAD,   (   )awake and alert  HEENT: PERR, EOMI,    Neck: Soft and supple, No LAD, No JVD  Respiratory:  (    Breath sounds are clear bilaterally,    (   ) wheezing, rales or rhonchi  Cardiovascular:     (   )S1 and S2, regular rate and rhythm, no Murmurs, gallops or rubs  Gastrointestinal:  (   )Bowel Sounds present, soft,   (  )nontender, nondistended,    Extremities:    (  ) peripheral edema  Vascular: 2+ peripheral pulses  Neurological:    (    )A/O x 3,   (  ) focal deficits  Musculoskeletal:    (   )  normal strength b/l upper  (     ) normal  lower extremities  Skin: No rashes    MEDICATIONS:  MEDICATIONS  (STANDING):  buMETAnide Infusion 2 mG/Hr (10 mL/Hr) IV Continuous <Continuous>  carvedilol 25 milliGRAM(s) Oral every 12 hours  heparin   Injectable 5000 Unit(s) SubCutaneous every 12 hours  hydrALAZINE 100 milliGRAM(s) Oral every 8 hours  NIFEdipine XL 90 milliGRAM(s) Oral daily  petrolatum Ophthalmic Ointment 1 Application(s) Both EYES four times a day  tamsulosin 0.4 milliGRAM(s) Oral at bedtime  trimethoprim/polymyxin Solution 1 Drop(s) Both EYES three times a day      LABS: All Labs Reviewed:                        9.9    4.73  )-----------( 185      ( 09 Mar 2021 06:14 )             34.9     03-09    142  |  102  |  53<H>  ----------------------------<  120<H>  4.6   |  27  |  5.23<H>    Ca    8.5      09 Mar 2021 06:14  Phos  6.3     03-09  Mg     2.7     03-09            Blood Culture:   Urine Culture      RADIOLOGY/EKG:    ASSESSMENT AND PLAN:    DVT PPX:    ADVANCED DIRECTIVE:    DISPOSITION: CHIEF COMPLAINT: patient was intubated due to respiratory distress he had RRT about half an hour ago  80M w/ hx of CKD4, dCHF, difficult to control HTN, DM?? p/w SOB and CHF. Pt states [facility] since Dec 27th 2020 and just returned home from [facility] today. Pt states he started to get worsening SOB over the past 2-3 weeks in [facility]. He states he discussed this with his doctor in [facility] and was advised to [place] and [place]. Pt endorses worsening orthopnea and LE edema. Denies any chest pain, palpitations, fevers or chills. Endorses cough relatively unchanged. Endorses okay medication compliance although did not take any medications today. Denies headache. Occasional dizziness but no syncope.     SUBJECTIVE:     REVIEW OF SYSTEMS: patient orally intubated    CONSTITUTIONAL: (  )  weakness,  (  ) fevers or chills  EYES/ENT: (  )visual changes;     NECK: (  ) pain or stiffness  RESPIRATORY:   (  )cough, wheezing, hemoptysis;  (  ) shortness of breath  CARDIOVASCULAR:  (  )chest pain or palpitations  GASTROINTESTINAL:   (  )abdominal or epigastric pain.  (  ) nausea, vomiting, or hematemesis;   (   ) diarrhea or constipation.   GENITOURINARY:   (    ) dysuria, frequency or hematuria  NEUROLOGICAL:  (   ) numbness or weakness   All other review of systems is negative unless indicated above    Vital Signs Last 24 Hrs  T(C): 36.6 (09 Mar 2021 04:14), Max: 36.8 (08 Mar 2021 20:44)  T(F): 97.8 (09 Mar 2021 04:14), Max: 98.3 (08 Mar 2021 20:44)  HR: 79 (09 Mar 2021 04:14) (67 - 84)  BP: 170/77 (09 Mar 2021 04:14) (135/71 - 170/77)  BP(mean): --  RR: 19 (09 Mar 2021 04:14) (18 - 19)  SpO2: 100% (09 Mar 2021 04:14) (91% - 100%)    I&O's Summary    08 Mar 2021 07:01  -  09 Mar 2021 07:00  --------------------------------------------------------  IN: 285 mL / OUT: 730 mL / NET: -445 mL        CAPILLARY BLOOD GLUCOSE  107 (09 Mar 2021 07:32)      POCT Blood Glucose.: 107 mg/dL (09 Mar 2021 07:25)      PHYSICAL EXAM:  General: sedated, intubated.   HEENT: endotracheal intubation   Chest/Lungs: coarse breath sounds bilaterally   Heart: regular rate and rhythm  Abdomen: no rxn to palpation of abdomen  Extremities: 3+ pitting edema UE and LE bilaterally  Neuro: sedated     MEDICATIONS:  MEDICATIONS  (STANDING):  buMETAnide Infusion 2 mG/Hr (10 mL/Hr) IV Continuous <Continuous>  carvedilol 25 milliGRAM(s) Oral every 12 hours  heparin   Injectable 5000 Unit(s) SubCutaneous every 12 hours  hydrALAZINE 100 milliGRAM(s) Oral every 8 hours  NIFEdipine XL 90 milliGRAM(s) Oral daily  petrolatum Ophthalmic Ointment 1 Application(s) Both EYES four times a day  tamsulosin 0.4 milliGRAM(s) Oral at bedtime  trimethoprim/polymyxin Solution 1 Drop(s) Both EYES three times a day      LABS: All Labs Reviewed:                        9.9    4.73  )-----------( 185      ( 09 Mar 2021 06:14 )             34.9     03-09    142  |  102  |  53<H>  ----------------------------<  120<H>  4.6   |  27  |  5.23<H>    Ca    8.5      09 Mar 2021 06:14  Phos  6.3     03-09  Mg     2.7     03-09            Blood Culture:   Urine Culture      RADIOLOGY/EKG:    ASSESSMENT AND PLAN:  Pt is a 81 y/o with hx T2DM? (last a1c 5.8), worsening Acute Renal Failure superimposed on CKD IV with Chronic HFpEF 60%, admitted 3/4/21 for oliguric renal failure and CHF exacerbation.      #CHF exacerbation  - admitted to hospital with concern for CHF exacerbation (elevated BNP ~30,000, volume overloaded on exam): Echo from 3/5/2021: EF 60%, moderate severe LVOT obstruction, mild segmental LV systolic dysfunction.   -   on bumex  needs dialysis discussed with renal team      #respiratory failure  - s/p intubation 3/9 for increased WOB on BIPAP and lethargy, concern for airway protection   continue ICU monitoring       # GASTROINTESTINAL  - s/p OG tube placement   - XR to confirm placement: f/u   - nutrition consult for tube feeds: f/u    # RENAL  - worsening renal failure on CKD stage IV. Cr 5.49 (4.46 on admission march 4; 3.07 a year ago); BUN 54  - monitor electrolytes: K 4.4; phos 6.1; BUN 54   - on bumex 2mg/hr drip - renal follow-up for dialysis today     # NEURO  - pt is intubated since 3/9, sedated on propofol. baseline A&O x3.   - goal to wean off sedation as tolerated.      ADVANCED DIRECTIVE:  FULL CODE.

## 2021-03-09 NOTE — CHART NOTE - NSCHARTNOTEFT_GEN_A_CORE
: MD Bogdan and MD Lars    INDICATION: Hypoxic Respiratory Failure    PROCEDURE:  [x] LIMITED ECHO  [x] LIMITED CHEST    FINDINGS:  Thoracic: Bilateral pleural effusions, R>L. A-line predominant anteriorly with lung sliding.  Cardiac: Grossly normal LV systolic function. LV hypertrophy. Small pericardial effusion.    INTERPRETATION:  Bilateral pleural effusions, R>L. Small pericardial effusion.    Images stored in [employer]

## 2021-03-10 LAB
ALBUMIN SERPL ELPH-MCNC: 3.4 G/DL — SIGNIFICANT CHANGE UP (ref 3.3–5)
ALP SERPL-CCNC: 77 U/L — SIGNIFICANT CHANGE UP (ref 40–120)
ALT FLD-CCNC: 24 U/L — SIGNIFICANT CHANGE UP (ref 10–45)
ANION GAP SERPL CALC-SCNC: 15 MMOL/L — SIGNIFICANT CHANGE UP (ref 5–17)
APPEARANCE UR: CLEAR — SIGNIFICANT CHANGE UP
APTT BLD: 30.7 SEC — SIGNIFICANT CHANGE UP (ref 27.5–35.5)
AST SERPL-CCNC: 22 U/L — SIGNIFICANT CHANGE UP (ref 10–40)
BILIRUB SERPL-MCNC: 0.3 MG/DL — SIGNIFICANT CHANGE UP (ref 0.2–1.2)
BILIRUB UR-MCNC: ABNORMAL
BUN SERPL-MCNC: 40 MG/DL — HIGH (ref 7–23)
CALCIUM SERPL-MCNC: 8.8 MG/DL — SIGNIFICANT CHANGE UP (ref 8.4–10.5)
CHLORIDE SERPL-SCNC: 98 MMOL/L — SIGNIFICANT CHANGE UP (ref 96–108)
CO2 SERPL-SCNC: 24 MMOL/L — SIGNIFICANT CHANGE UP (ref 22–31)
COLOR SPEC: YELLOW — SIGNIFICANT CHANGE UP
CREAT SERPL-MCNC: 4.45 MG/DL — HIGH (ref 0.5–1.3)
DIFF PNL FLD: NEGATIVE — SIGNIFICANT CHANGE UP
GAS PNL BLDA: SIGNIFICANT CHANGE UP
GLUCOSE BLDC GLUCOMTR-MCNC: 128 MG/DL — HIGH (ref 70–99)
GLUCOSE BLDC GLUCOMTR-MCNC: 91 MG/DL — SIGNIFICANT CHANGE UP (ref 70–99)
GLUCOSE SERPL-MCNC: 118 MG/DL — HIGH (ref 70–99)
GLUCOSE UR QL: NEGATIVE — SIGNIFICANT CHANGE UP
GRAM STN FLD: SIGNIFICANT CHANGE UP
HBV CORE IGM SER-ACNC: SIGNIFICANT CHANGE UP
HBV SURFACE AB SER-ACNC: 18.5 MIU/ML — SIGNIFICANT CHANGE UP
HBV SURFACE AG SER-ACNC: SIGNIFICANT CHANGE UP
HCT VFR BLD CALC: 34.9 % — LOW (ref 39–50)
HCV AB S/CO SERPL IA: 0.41 S/CO — SIGNIFICANT CHANGE UP (ref 0–0.99)
HCV AB SERPL-IMP: SIGNIFICANT CHANGE UP
HGB BLD-MCNC: 10.4 G/DL — LOW (ref 13–17)
INR BLD: 1.04 RATIO — SIGNIFICANT CHANGE UP (ref 0.88–1.16)
KETONES UR-MCNC: NEGATIVE — SIGNIFICANT CHANGE UP
LEUKOCYTE ESTERASE UR-ACNC: SIGNIFICANT CHANGE UP
MAGNESIUM SERPL-MCNC: 2.3 MG/DL — SIGNIFICANT CHANGE UP (ref 1.6–2.6)
MCHC RBC-ENTMCNC: 23.1 PG — LOW (ref 27–34)
MCHC RBC-ENTMCNC: 29.8 GM/DL — LOW (ref 32–36)
MCV RBC AUTO: 77.6 FL — LOW (ref 80–100)
NITRITE UR-MCNC: NEGATIVE — SIGNIFICANT CHANGE UP
NRBC # BLD: 2 /100 WBCS — HIGH (ref 0–0)
PH UR: 5.5 — SIGNIFICANT CHANGE UP (ref 5–8)
PHOSPHATE SERPL-MCNC: 4.1 MG/DL — SIGNIFICANT CHANGE UP (ref 2.5–4.5)
PLATELET # BLD AUTO: 163 K/UL — SIGNIFICANT CHANGE UP (ref 150–400)
POTASSIUM SERPL-MCNC: 4.1 MMOL/L — SIGNIFICANT CHANGE UP (ref 3.5–5.3)
POTASSIUM SERPL-SCNC: 4.1 MMOL/L — SIGNIFICANT CHANGE UP (ref 3.5–5.3)
PROT SERPL-MCNC: 6.3 G/DL — SIGNIFICANT CHANGE UP (ref 6–8.3)
PROT UR-MCNC: ABNORMAL
PROTHROM AB SERPL-ACNC: 12.5 SEC — SIGNIFICANT CHANGE UP (ref 10.6–13.6)
RBC # BLD: 4.5 M/UL — SIGNIFICANT CHANGE UP (ref 4.2–5.8)
RBC # FLD: 17 % — HIGH (ref 10.3–14.5)
SODIUM SERPL-SCNC: 137 MMOL/L — SIGNIFICANT CHANGE UP (ref 135–145)
SP GR SPEC: 1.02 — SIGNIFICANT CHANGE UP (ref 1.01–1.02)
SPECIMEN SOURCE: SIGNIFICANT CHANGE UP
UROBILINOGEN FLD QL: NEGATIVE — SIGNIFICANT CHANGE UP
WBC # BLD: 8.25 K/UL — SIGNIFICANT CHANGE UP (ref 3.8–10.5)
WBC # FLD AUTO: 8.25 K/UL — SIGNIFICANT CHANGE UP (ref 3.8–10.5)

## 2021-03-10 PROCEDURE — 71045 X-RAY EXAM CHEST 1 VIEW: CPT | Mod: 26

## 2021-03-10 PROCEDURE — 99291 CRITICAL CARE FIRST HOUR: CPT

## 2021-03-10 RX ORDER — ACETAMINOPHEN 500 MG
650 TABLET ORAL EVERY 6 HOURS
Refills: 0 | Status: DISCONTINUED | OUTPATIENT
Start: 2021-03-10 | End: 2021-03-10

## 2021-03-10 RX ORDER — ASPIRIN/CALCIUM CARB/MAGNESIUM 324 MG
81 TABLET ORAL DAILY
Refills: 0 | Status: DISCONTINUED | OUTPATIENT
Start: 2021-03-10 | End: 2021-03-13

## 2021-03-10 RX ORDER — VANCOMYCIN HCL 1 G
1000 VIAL (EA) INTRAVENOUS ONCE
Refills: 0 | Status: COMPLETED | OUTPATIENT
Start: 2021-03-10 | End: 2021-03-10

## 2021-03-10 RX ORDER — PIPERACILLIN AND TAZOBACTAM 4; .5 G/20ML; G/20ML
3.38 INJECTION, POWDER, LYOPHILIZED, FOR SOLUTION INTRAVENOUS EVERY 12 HOURS
Refills: 0 | Status: DISCONTINUED | OUTPATIENT
Start: 2021-03-11 | End: 2021-03-11

## 2021-03-10 RX ORDER — ACETAMINOPHEN 500 MG
650 TABLET ORAL EVERY 6 HOURS
Refills: 0 | Status: DISCONTINUED | OUTPATIENT
Start: 2021-03-10 | End: 2021-03-30

## 2021-03-10 RX ORDER — PIPERACILLIN AND TAZOBACTAM 4; .5 G/20ML; G/20ML
3.38 INJECTION, POWDER, LYOPHILIZED, FOR SOLUTION INTRAVENOUS ONCE
Refills: 0 | Status: COMPLETED | OUTPATIENT
Start: 2021-03-10 | End: 2021-03-10

## 2021-03-10 RX ORDER — ACETAMINOPHEN 500 MG
820 TABLET ORAL EVERY 6 HOURS
Refills: 0 | Status: DISCONTINUED | OUTPATIENT
Start: 2021-03-10 | End: 2021-03-10

## 2021-03-10 RX ADMIN — Medication 1 DROP(S): at 21:18

## 2021-03-10 RX ADMIN — Medication 650 MILLIGRAM(S): at 17:03

## 2021-03-10 RX ADMIN — Medication 250 MILLIGRAM(S): at 17:20

## 2021-03-10 RX ADMIN — PROPOFOL 20 MICROGRAM(S)/KG/MIN: 10 INJECTION, EMULSION INTRAVENOUS at 09:17

## 2021-03-10 RX ADMIN — Medication 81 MILLIGRAM(S): at 14:34

## 2021-03-10 RX ADMIN — CHLORHEXIDINE GLUCONATE 15 MILLILITER(S): 213 SOLUTION TOPICAL at 05:59

## 2021-03-10 RX ADMIN — Medication 1 DROP(S): at 13:00

## 2021-03-10 RX ADMIN — Medication 1 DROP(S): at 05:58

## 2021-03-10 RX ADMIN — PROPOFOL 20 MICROGRAM(S)/KG/MIN: 10 INJECTION, EMULSION INTRAVENOUS at 21:18

## 2021-03-10 RX ADMIN — Medication 650 MILLIGRAM(S): at 16:12

## 2021-03-10 RX ADMIN — CHLORHEXIDINE GLUCONATE 15 MILLILITER(S): 213 SOLUTION TOPICAL at 17:03

## 2021-03-10 RX ADMIN — HEPARIN SODIUM 5000 UNIT(S): 5000 INJECTION INTRAVENOUS; SUBCUTANEOUS at 17:03

## 2021-03-10 RX ADMIN — HEPARIN SODIUM 5000 UNIT(S): 5000 INJECTION INTRAVENOUS; SUBCUTANEOUS at 05:59

## 2021-03-10 RX ADMIN — PIPERACILLIN AND TAZOBACTAM 200 GRAM(S): 4; .5 INJECTION, POWDER, LYOPHILIZED, FOR SOLUTION INTRAVENOUS at 18:27

## 2021-03-10 RX ADMIN — CHLORHEXIDINE GLUCONATE 1 APPLICATION(S): 213 SOLUTION TOPICAL at 05:59

## 2021-03-10 NOTE — DIETITIAN INITIAL EVALUATION ADULT. - OTHER CALCULATIONS
The modified [employer] equation (CALLE) 2010 equation was used to calculator resting energy expenditure: 1443kcal. (consideration of BMI > 30 and intubated status; receiving HD).

## 2021-03-10 NOTE — PROGRESS NOTE ADULT - ASSESSMENT
Assessment and Plan: Pt is a 81 y/o with hx T2DM? (last a1c 5.8), worsening Acute Renal Failure superimposed on CKD IV with Chronic HFpEF 60%, admitted 3/4/21 for oliguric renal failure and CHF exacerbation.     NEURO  - pt is intubated since 3/9, sedated on propofol. baseline A&O x3.   - sedation: propofol       CARDIOVASCULAR  - admitted to hospital with concern for CHF exacerbation (elevated BNP ~30,000, volume overloaded on exam): Echo from 3/5/2021: EF 60%, moderate severe LVOT obstruction, mild segmental LV systolic dysfunction.   - was on bumex gtt on medicine floor; will initiate dialysis as below and discontinue bumex gtt  - HTN- currently holding nifedipine 90mg PO daily, coreg 25mg PO BID, Hydralazine 100mg q8h--- will reinitiate as tolerated       PULMONARY  - s/p intubation 3/9 for increased WOB on BIPAP and lethargy, concern for airway protection  - on bedside US, has bilateral pleural effusion  Mode: AC/ CMV (Assist Control/ Continuous Mandatory Ventilation)  RR (machine): 16  TV (machine): 450  FiO2: 100  PEEP: 5  ITime: 1  MAP: 12  PIP: 36      INFECTIOUS DISEASE   - afebrile, no leukocytosis  - LINES: an, peripheral IV access      GASTROINTESTINAL  - s/p OG tube placement, nutrition consulted, started nepro tube feed   - hold stress ulcer ppx while tube feed  - last bm: overnight      RENAL  - worsening renal failure on CKD stage IV. Cr 5.49 (4.46 on admission march 4; 3.07 a year ago); BUN 54.  - volume status:   - monitor electrolytes: K 4.1; phos 4.1; BUN 40  - nephrology following, boone placed 3/9, s/p HD 3/9 with 2500cc taken off.   - plan for HD today   - an in place       ENDOCRINOLOGY  - unclear if hx DM.   - f/u a1c  - SSI     HEMATOLOGY  - hb stable at 9-10; plt stable at 160s.   - no active signs of bleeding    ETHICS  - discussion with spouse 3/9: pt is FULL CODE.       Beckie Rendon PGY1.     Assessment and Plan: Pt is a 79 y/o with hx T2DM? (last a1c 5.8), worsening Acute Renal Failure superimposed on CKD IV with Chronic HFpEF 60%, admitted 3/4/21 for oliguric renal failure and CHF exacerbation.     NEURO  - pt is intubated since 3/9, sedated on propofol. baseline A&O x3.   - sedation: propofol       CARDIOVASCULAR  - admitted to hospital with concern for CHF exacerbation (elevated BNP ~30,000, volume overloaded on exam): Echo from 3/5/2021: EF 60%, moderate severe LVOT obstruction, mild segmental LV systolic dysfunction.   - was on bumex gtt on medicine floor; will initiate dialysis as below and discontinue bumex gtt  - HTN, HF- currently holding nifedipine 90mg PO daily, coreg 25mg PO BID, Hydralazine 100mg q8h--- will reinitiate as tolerated       PULMONARY  - s/p intubation 3/9 for increased WOB on BIPAP and lethargy, concern for airway protection  - on bedside US, has bilateral pleural effusion    Mode: AC/ CMV (Assist Control/ Continuous Mandatory Ventilation)  RR (machine): 14  TV (machine): 450  FiO2: 70  PEEP: 5  ITime: 1  MAP: 7  PIP: 22        INFECTIOUS DISEASE   - afebrile, no leukocytosis  - LINES: an, peripheral IV access      GASTROINTESTINAL  - s/p OG tube placement, nutrition consulted, started nepro tube feed (trickle feed) overnight  - hold stress ulcer ppx while tube feed  - last bm: overnight      RENAL  - worsening renal failure on CKD stage IV. Cr 5.49 (4.46 on admission march 4; 3.07 a year ago); BUN 54.  - volume status:   - monitor electrolytes: K 4.1; phos 4.1; BUN 40  - nephrology following, boone placed 3/9, s/p HD 3/9 with 2500cc taken off.   - plan for HD today to remove more fluid  - an in place       ENDOCRINOLOGY  - unclear if hx DM.   - f/u a1c  - SSI     HEMATOLOGY  - hb stable at 9-10; plt stable at 160s.   - no active signs of bleeding    ETHICS  - discussion with spouse 3/9: pt is FULL CODE.       Beckie Rendon PGY1.     Assessment and Plan: Pt is a 81 y/o with hx HTN, T2DM? (last a1c 5.8), worsening Acute Renal Failure superimposed on CKD IV with Chronic HFpEF 60%, admitted 3/4/21 for oliguric renal failure and CHF exacerbation.     NEURO  - pt is intubated since 3/9, sedated on propofol, RASS -4. baseline A&O x3.   - sedation: propofol       CARDIOVASCULAR  - admitted to hospital with concern for CHF exacerbation (elevated BNP ~30,000, volume overloaded on exam): Echo from 3/5/2021: EF 60%, moderate severe LVOT obstruction, mild segmental LV systolic dysfunction.   - was on bumex gtt on medicine floor; will initiate dialysis as below and discontinue bumex gtt  - HTN, HF- currently holding nifedipine 90mg PO daily, coreg 25mg PO BID, Hydralazine 100mg q8h--- will reinitiate as tolerated       PULMONARY  - s/p intubation 3/9 for increased WOB on BIPAP and lethargy, concern for airway protection. Based on ABG today, pCO2 53 improved from 80 prior to intubation.   - on bedside US, has bilateral pleural effusion (R>L)    Mode: AC/ CMV (Assist Control/ Continuous Mandatory Ventilation)  RR (machine): 14  TV (machine): 450  FiO2: 70  PEEP: 5  ITime: 1  MAP: 7  PIP: 22    ABG - ( 10 Mar 2021 00:18 )  pH, Arterial: 7.36  pH, Blood: x     /  pCO2: 53    /  pO2: 72    / HCO3: 29    / Base Excess: 3.5   /  SaO2: 94            INFECTIOUS DISEASE   - afebrile, no leukocytosis  - LINES: R femoral shiley placed 3/9, an, peripheral IV access      GASTROINTESTINAL  - s/p OG tube placement, nutrition consulted, started nepro tube feed (trickle feed) overnight  - hold stress ulcer ppx while tube feed  - last bm: overnight      RENAL  - worsening renal failure on CKD stage IV. Cr 5.49 (4.46 on admission march 4; 3.07 a year ago); BUN 54.  - volume status:   - monitor electrolytes: K 4.1; phos 4.1; BUN 40  - nephrology following, shiley placed 3/9, s/p HD 3/9 with 2500cc taken off.   - plan for HD today to remove more fluid  - an in place       ENDOCRINOLOGY  - unclear if hx DM. glc stable at 118. on tube feeds, CTM.   - f/u a1c  - SSI     HEMATOLOGY  - hb stable at 9-10; plt stable at 160s.   - no active signs of bleeding    ETHICS  - discussion with spouse 3/9: pt is FULL CODE.       Beckie Rendon PGY1.     Assessment and Plan: Pt is a 81 y/o with hx HTN, T2DM? (last a1c 5.8), worsening Acute Renal Failure superimposed on CKD IV with Chronic HFpEF 60%, admitted 3/4/21 for oliguric renal failure and CHF exacerbation.     NEURO  - pt is intubated since 3/9, sedated on propofol, RASS -4. baseline A&O x3.   - sedation: propofol       CARDIOVASCULAR  - admitted to hospital with concern for CHF exacerbation (elevated BNP ~30,000, volume overloaded on exam): Echo from 3/5/2021: EF 60%, moderate severe LVOT obstruction, mild segmental LV systolic dysfunction.   - was on bumex gtt on medicine floor; will initiate dialysis as below and discontinue bumex gtt  - HTN, HF- currently holding nifedipine 90mg PO daily, coreg 25mg PO BID, Hydralazine 100mg q8h--- will reinitiate as tolerated       PULMONARY  - s/p intubation 3/9 for increased WOB on BIPAP and lethargy, concern for airway protection. Based on ABG today, pCO2 53 improved from 80 prior to intubation.   - suspect ARIELLE given hypercapnia on admission, body habitus, recommend sleep study outpt  - on bedside US, has bilateral pleural effusion (R>L)    Mode: AC/ CMV (Assist Control/ Continuous Mandatory Ventilation)  RR (machine): 14  TV (machine): 450  FiO2: 70  PEEP: 5  ITime: 1  MAP: 7  PIP: 22    ABG - ( 10 Mar 2021 00:18 )  pH, Arterial: 7.36  pH, Blood: x     /  pCO2: 53    /  pO2: 72    / HCO3: 29    / Base Excess: 3.5   /  SaO2: 94            INFECTIOUS DISEASE   - afebrile, no leukocytosis  - LINES: R femoral shiley placed 3/9, an, peripheral IV access      GASTROINTESTINAL  - s/p OG tube placement, nutrition consulted, started nepro tube feed (trickle feed) overnight  - hold stress ulcer ppx while tube feed  - last bm: overnight      RENAL  - worsening renal failure on CKD stage IV. Cr 5.49 (4.46 on admission march 4; 3.07 a year ago); BUN 54.  - volume status:   - monitor electrolytes: K 4.1; phos 4.1; BUN 40  - nephrology following, shiley placed 3/9, s/p HD 3/9 with 2500cc taken off.   - plan for HD today to remove more fluid  - an in place       ENDOCRINOLOGY  - unclear if hx DM. glc stable at 118. on tube feeds, CTM.   - f/u a1c  - SSI     HEMATOLOGY  - hb stable at 9-10; plt stable at 160s.   - no active signs of bleeding    ETHICS  - discussion with spouse 3/9: pt is FULL CODE.       Beckie Rendon PGY1.

## 2021-03-10 NOTE — DIETITIAN INITIAL EVALUATION ADULT. - ORAL INTAKE PTA/DIET HISTORY
Pt intubated/sedated; unable to participate in nutrition evaluation at this time. Previously prescribed a low sodium/regular consistency diet in-house (between 3/4-3/8); noted with ~% PO intake most days, however 0% on 3/8 (as noted pt "didn't feel well enough to eat"). Baseline provision of energy/nutrient intake unclear. Baseline tolerance to chewing/swallowing unknown. Pt noted with hx of taking Calcitriol as per review of H&P.

## 2021-03-10 NOTE — DIETITIAN INITIAL EVALUATION ADULT. - PERTINENT LABORATORY DATA
(3/10): Hgb 10.4, Hct 34.9, BUN 40, Cr 4.45, Glu 118, GFR 12; Blood Gas Arterial: Glu 120, Hgb 35, Hct 15  (3/5): A1c 5.8%, Estimated Average Glucose 120 (3/10): Hgb 10.4, Hct 34.9, BUN 40, Cr 4.45, Glu 118, GFR 14; Blood Gas Arterial: Glu 120, Hgb 35, Hct 15  (3/5): A1c 5.8%, Estimated Average Glucose 120

## 2021-03-10 NOTE — PROGRESS NOTE ADULT - SUBJECTIVE AND OBJECTIVE BOX
[facility] [phone]        Patient seen and examined in bed intubated and sedated         MEDICATIONS  (STANDING):  chlorhexidine 0.12% Liquid 15 milliLiter(s) Oral Mucosa every 12 hours  chlorhexidine 4% Liquid 1 Application(s) Topical <User Schedule>  heparin   Injectable 5000 Unit(s) SubCutaneous every 12 hours  propofol Infusion 50 MICROgram(s)/kG/Min (20 mL/Hr) IV Continuous <Continuous>  trimethoprim/polymyxin Solution 1 Drop(s) Both EYES three times a day      VITAL:  T(C): , Max: 37.5 (03-10-21 @ 00:00)  T(F): , Max: 99.5 (03-10-21 @ 00:00)  HR: 63 (03-10-21 @ 10:00)  BP: 114/56 (03-10-21 @ 10:00)  BP(mean): 79 (03-10-21 @ 10:00)  RR: 14 (03-10-21 @ 10:00)  SpO2: 100% (03-10-21 @ 10:00)  Wt(kg): --    I and O's:    03-09 @ 07:01  -  03-10 @ 07:00  --------------------------------------------------------  IN: 640.2 mL / OUT: 3160 mL / NET: -2519.8 mL    03-10 @ 07:01  -  03-10 @ 11:04  --------------------------------------------------------  IN: 44.1 mL / OUT: 40 mL / NET: 4.1 mL          PHYSICAL EXAM:    Constitutional: intubated   Neck:  No JVD  Respiratory: reduced   Cardiovascular: S1 and S2  Gastrointestinal: BS+, soft, NT/ND  Extremities: No peripheral edema  Neurological:   : +  Aguilar  Skin: No rashes  Access: Not applicable    LABS:                        10.4   8.25  )-----------( 163      ( 10 Mar 2021 00:24 )             34.9     03-10    137  |  98  |  40<H>  ----------------------------<  118<H>  4.1   |  24  |  4.45<H>    Ca    8.8      10 Mar 2021 00:24  Phos  4.1     03-10  Mg     2.3     03-10    TPro  6.3  /  Alb  3.4  /  TBili  0.3  /  DBili  x   /  AST  22  /  ALT  24  /  AlkPhos  77  03-10          Urine Studies:          RADIOLOGY & ADDITIONAL STUDIES:

## 2021-03-10 NOTE — PROGRESS NOTE ADULT - SUBJECTIVE AND OBJECTIVE BOX
Patient is a 80y old  Male who presents with a chief complaint of Shortness of breath (10 Mar 2021 11:04)      INTERVAL HISTORY: intubated/ sedated  MEDICATIONS:    PHYSICAL EXAM:  T(C): 37.6 (03-10-21 @ 12:00), Max: 37.6 (03-10-21 @ 12:00)  HR: 74 (03-10-21 @ 13:00) (63 - 76)  BP: 119/59 (03-10-21 @ 13:00) (87/56 - 157/78)  RR: 11 (03-10-21 @ 13:00) (11 - 20)  SpO2: 100% (03-10-21 @ 13:00) (94% - 100%)  Wt(kg): --  I&O's Summary    09 Mar 2021 07:01  -  10 Mar 2021 07:00  --------------------------------------------------------  IN: 640.2 mL / OUT: 3160 mL / NET: -2519.8 mL    10 Mar 2021 07:01  -  10 Mar 2021 13:46  --------------------------------------------------------  IN: 101.9 mL / OUT: 60 mL / NET: 41.9 mL    Appearance: In no distress	  HEENT:    PERRL, EOMI	  Cardiovascular:  S1 S2, No JVD  Respiratory: Lungs clear to auscultation	  Gastrointestinal:  Soft, Non-tender, + BS	  Vascularature:  No edema of LE  Psychiatric: Appropriate affect   Neuro: no acute focal deficits                10.4   8.25  )-----------( 163      ( 10 Mar 2021 00:24 )             34.9     03-10    137  |  98  |  40<H>  ----------------------------<  118<H>  4.1   |  24  |  4.45<H>    Ca    8.8      10 Mar 2021 00:24  Phos  4.1     03-10  Mg     2.3     03-10    TPro  6.3  /  Alb  3.4  /  TBili  0.3  /  DBili  x   /  AST  22  /  ALT  24  /  AlkPhos  77  03-10  Labs personally reviewed    ASSESSMENT/PLAN: 	  80M w/ hx of CKD4, dCHF, difficult to control HTN, DM?? p/w SOB and like acute on chronic congestive heart failure    Problem/Plan - 1:  ·  Problem: Acute on chronic congestive heart failure, unspecified heart failure type.  Plan: grossly fluid overloaded on exam and elevated BNP. Hx of severe LVH on prior TTE. Would suspect dCHF in setting of uncontrolled HTN.    -TTE- EF 60% with mid inferolateral wall and basal inferolateral wall hypokinetic, will need ischemic eval: continue to diurese for now until euvolemic  - 3/9 intubated in MICU- HD per ICU     Problem/Plan - 2:  ·  Problem: Acute respiratory failure with hypoxia.  Plan: Hypoxic to <80 on RA initially on arrival. Improved on 02. Suspect CHF related given clinical exam and CXR   - volume removal with HD    Problem/Plan - 3:  ·  Problem: Essential hypertension.  Plan: BPs very elevated on arrival. Will try not to lower too rapidly given unclear baseline as pt states they are poorly controlled.   hydralazine 100mg TID, Coreg 25mg Q12, Nifedipine 90mg daily - on hold - care per ICU     Problem/Plan - 4:  ·  Problem: Chronic kidney disease (CKD), stage IV (severe).  Plan:  VIRGEN on CKD,  - volume removal with HD    Problem/Plan - 5:  ·  Problem: Hyperlipidemia, unspecified hyperlipidemia type.  Plan: -             Betzaida Maharaj ANPUCHE Carrillo DO [facility]  Cardiovascular Medicine  [address], [address]  Office: [phone]  Cell: [phone]

## 2021-03-10 NOTE — PROGRESS NOTE ADULT - ASSESSMENT
ASSESSMENT/RECOMMENDATION:   80M w/ hx of CKD4, dCHF, difficult to control HTN, DM?? p/w SOB and CHF  Suspect he has diastolic CHF at present   CKD stage 4-5        1 Renal-HD today and fluid removal as tolerated by BP;  He will cont with HD at present   2 CVS-  may need ischemic eval; NM stress test vs cath, plan per cards; BP meds as ordered  3 Pulm-Leave intubated and CPAP in am     Standard DVT prophylaxis       Sayed [facility]  [phone]

## 2021-03-10 NOTE — PROGRESS NOTE ADULT - SUBJECTIVE AND OBJECTIVE BOX
Contact Information:  Beckie Rendon  PGY-1, Internal Medicine pager [phone]    BAKARI CHARLES, MRN-[id number]    Patient is a 80y old  Male who presents with a chief complaint of Shortness of breath (09 Mar 2021 17:50)      INTERVAL/OVERNIGHT EVENTS:    SUBJECTIVE:    CONSTITUTIONAL: No weakness. No fatigue. No fever.  HEAD: No head trauma.   EYES: No vision changes.  ENT: No hearing changes or tinnitus. No ear pain. No changes in smell. No nasal congestion or discharge. No sore throat. No voice hoarseness.   NECK: No neck pain or stiffness. No lumps.  RESPIRATORY: No cough. No SOB. No wheezing. No hemoptysis.   CARDIOVASCULAR: No chest pain. No palpitations.   GASTROINTESTINAL: No dysphagia. No ABD pain. No distension. No constipation. No diarrhea. No pain with defecation. No hematemesis. No hematochezia or melena.  BACK: No back pain.  GENITOURINARY: No dysuria. No frequency or urgency. No hesitancy. No incontinence. No urinary retention. No suprapubic pain. No hematuria.  EXTREMITY: No swelling.  MUSCULOSKELETAL: No joint pain or swelling. No fractures. No stiffness.    SKIN: No rashes. No itching. No skin, hair, or nail changes.  NEUROLOGICAL: No weakness or paralysis. No lightheadedness or dizziness. No HA. No numbness or tingling.   PSYCHIATRIC: No depression.       OBJECTIVE:  ICU Vital Signs Last 24 Hrs  T(C): 37.3 (10 Mar 2021 04:00), Max: 37.5 (10 Mar 2021 00:00)  T(F): 99.1 (10 Mar 2021 04:00), Max: 99.5 (10 Mar 2021 00:00)  HR: 67 (10 Mar 2021 07:00) (65 - 76)  BP: 123/60 (10 Mar 2021 07:00) (87/56 - 157/78)  BP(mean): 84 (10 Mar 2021 07:00) (66 - 109)  ABP: --  ABP(mean): --  RR: 14 (10 Mar 2021 07:00) (14 - 20)  SpO2: 100% (10 Mar 2021 07:00) (94% - 100%)    Daily Height in cm: 160.02 (09 Mar 2021 08:30)    Daily   I&O's Summary    09 Mar 2021 07:01  -  10 Mar 2021 07:00  --------------------------------------------------------  IN: 640.2 mL / OUT: 3160 mL / NET: -2519.8 mL      Adult Advanced Hemodynamics Last 24 Hrs  CVP(mm Hg): --  CVP(cm H2O): --  CO: --  CI: --  PA: --  PA(mean): --  PCWP: --  SVR: --  SVRI: --  PVR: --  PVRI: --  Mode: AC/ CMV (Assist Control/ Continuous Mandatory Ventilation)  RR (machine): 14  TV (machine): 450  FiO2: 70  PEEP: 5  ITime: 1  MAP: 7  PIP: 22      MEDICATIONS  (STANDING):  chlorhexidine 0.12% Liquid 15 milliLiter(s) Oral Mucosa every 12 hours  chlorhexidine 4% Liquid 1 Application(s) Topical <User Schedule>  heparin   Injectable 5000 Unit(s) SubCutaneous every 12 hours  propofol Infusion 50 MICROgram(s)/kG/Min (20 mL/Hr) IV Continuous <Continuous>  trimethoprim/polymyxin Solution 1 Drop(s) Both EYES three times a day    MEDICATIONS  (PRN):  sodium chloride 0.9% lock flush 10 milliLiter(s) IV Push every 1 hour PRN Pre/post blood products, medications, blood draw, and to maintain line patency    Allergies    grass, pollen (Rhinitis)  No Known Drug Allergies    Intolerances        CONSTITUTIONAL: No acute distress. Awake and alert.  HEAD: No evidence of trauma. Structures WNL.  EYES: +PERRL. +EOMI. No scleral icterus. No conjunctival injection.  ENT: Moist oral mucosa. No erythema. No pharyngeal exudates.   NECK: Supple. Appropriate ROM. No stiffness. No masses or lymphadenopathy.  RESPIRATORY: CTAB. No wheezes, rales, or rhonchi. No accessory muscle use. No apparent respiratory distress.  CARDIOVASCULAR: +S1/S2. No audible S3/S4. Regular rate and rhythm. No murmurs, rubs, or gallops. 2+ radial pulses x b/l UE; 2+ DP pulses x b/l LE.   GASTROINTESTINAL: Soft, nontender, nondistended. +BS. No rebound or guarding.   BACK: No spinal or paraspinal tenderness. No CVA tenderness.  EXTREMITY: No LE swelling or edema. EXTs warm to touch.  MUSCULOSKELETAL: Spontaneous movement in all extremities.  DERMATOLOGICAL: No abnormal rashes or lesions.  NEUROLOGICAL: CN 2-12 grossly intact. No focal deficits. Sensation intact x 4EXT. A&Ox3 (oriented to person, place, and time).  PSYCHIATRIC: Appropriate affect.                            10.4   8.25  )-----------( 163      ( 10 Mar 2021 00:24 )             34.9     PT/INR - ( 10 Mar 2021 00:24 )   PT: 12.5 sec;   INR: 1.04 ratio         PTT - ( 10 Mar 2021 00:24 )  PTT:30.7 sec  03-10    137  |  98  |  40<H>  ----------------------------<  118<H>  4.1   |  24  |  4.45<H>    Ca    8.8      10 Mar 2021 00:24  Phos  4.1     03-10  Mg     2.3     03-10    TPro  6.3  /  Alb  3.4  /  TBili  0.3  /  DBili  x   /  AST  22  /  ALT  24  /  AlkPhos  77  03-10    CAPILLARY BLOOD GLUCOSE  107 (09 Mar 2021 07:32)      POCT Blood Glucose.: 107 mg/dL (09 Mar 2021 07:25)    LIVER FUNCTIONS - ( 10 Mar 2021 00:24 )  Alb: 3.4 g/dL / Pro: 6.3 g/dL / ALK PHOS: 77 U/L / ALT: 24 U/L / AST: 22 U/L / GGT: x                   ABG - ( 10 Mar 2021 00:18 )  pH, Arterial: 7.36  pH, Blood: x     /  pCO2: 53    /  pO2: 72    / HCO3: 29    / Base Excess: 3.5   /  SaO2: 94                  RADIOLOGY AND ADDITIONAL TESTS:    CONSULTANT NOTES REVIEWED:    CARE DISCUSSED WITH THE FOLLOWING CONSULTANTS/PROVIDERS: Contact Information:  Beckie Rendon  PGY-1, Internal Medicine pager [phone]    BAKARI CHARLES, MRN-[id number]    Patient is a 80y old  Male who presents with a chief complaint of Shortness of breath (09 Mar 2021 17:50)      INTERVAL/OVERNIGHT EVENTS:  - overnight - s/p HD, taken off 2500cc, trickle feeds started.     SUBJECTIVE:  - am -       OBJECTIVE:  ICU Vital Signs Last 24 Hrs  T(C): 37.3 (10 Mar 2021 04:00), Max: 37.5 (10 Mar 2021 00:00)  T(F): 99.1 (10 Mar 2021 04:00), Max: 99.5 (10 Mar 2021 00:00)  HR: 67 (10 Mar 2021 07:00) (65 - 76)  BP: 123/60 (10 Mar 2021 07:00) (87/56 - 157/78)  BP(mean): 84 (10 Mar 2021 07:00) (66 - 109)  ABP: --  ABP(mean): --  RR: 14 (10 Mar 2021 07:00) (14 - 20)  SpO2: 100% (10 Mar 2021 07:00) (94% - 100%)    Daily Height in cm: 160.02 (09 Mar 2021 08:30)    Daily   I&O's Summary    09 Mar 2021 07:01  -  10 Mar 2021 07:00  --------------------------------------------------------  IN: 640.2 mL / OUT: 3160 mL / NET: -2519.8 mL      Adult Advanced Hemodynamics Last 24 Hrs  CVP(mm Hg): --  CVP(cm H2O): --  CO: --  CI: --  PA: --  PA(mean): --  PCWP: --  SVR: --  SVRI: --  PVR: --  PVRI: --  Mode: AC/ CMV (Assist Control/ Continuous Mandatory Ventilation)  RR (machine): 14  TV (machine): 450  FiO2: 70  PEEP: 5  ITime: 1  MAP: 7  PIP: 22      MEDICATIONS  (STANDING):  chlorhexidine 0.12% Liquid 15 milliLiter(s) Oral Mucosa every 12 hours  chlorhexidine 4% Liquid 1 Application(s) Topical <User Schedule>  heparin   Injectable 5000 Unit(s) SubCutaneous every 12 hours  propofol Infusion 50 MICROgram(s)/kG/Min (20 mL/Hr) IV Continuous <Continuous>  trimethoprim/polymyxin Solution 1 Drop(s) Both EYES three times a day    MEDICATIONS  (PRN):  sodium chloride 0.9% lock flush 10 milliLiter(s) IV Push every 1 hour PRN Pre/post blood products, medications, blood draw, and to maintain line patency    Allergies    grass, pollen (Rhinitis)  No Known Drug Allergies    Intolerances        CONSTITUTIONAL: No acute distress. Awake and alert.  HEAD: No evidence of trauma. Structures WNL.  EYES: +PERRL. +EOMI. No scleral icterus. No conjunctival injection.  ENT: Moist oral mucosa. No erythema. No pharyngeal exudates.   NECK: Supple. Appropriate ROM. No stiffness. No masses or lymphadenopathy.  RESPIRATORY: CTAB. No wheezes, rales, or rhonchi. No accessory muscle use. No apparent respiratory distress.  CARDIOVASCULAR: +S1/S2. No audible S3/S4. Regular rate and rhythm. No murmurs, rubs, or gallops. 2+ radial pulses x b/l UE; 2+ DP pulses x b/l LE.   GASTROINTESTINAL: Soft, nontender, nondistended. +BS. No rebound or guarding.   BACK: No spinal or paraspinal tenderness. No CVA tenderness.  EXTREMITY: No LE swelling or edema. EXTs warm to touch.  MUSCULOSKELETAL: Spontaneous movement in all extremities.  DERMATOLOGICAL: No abnormal rashes or lesions.  NEUROLOGICAL: CN 2-12 grossly intact. No focal deficits. Sensation intact x 4EXT. A&Ox3 (oriented to person, place, and time).  PSYCHIATRIC: Appropriate affect.                            10.4   8.25  )-----------( 163      ( 10 Mar 2021 00:24 )             34.9     PT/INR - ( 10 Mar 2021 00:24 )   PT: 12.5 sec;   INR: 1.04 ratio         PTT - ( 10 Mar 2021 00:24 )  PTT:30.7 sec  03-10    137  |  98  |  40<H>  ----------------------------<  118<H>  4.1   |  24  |  4.45<H>    Ca    8.8      10 Mar 2021 00:24  Phos  4.1     03-10  Mg     2.3     03-10    TPro  6.3  /  Alb  3.4  /  TBili  0.3  /  DBili  x   /  AST  22  /  ALT  24  /  AlkPhos  77  03-10    CAPILLARY BLOOD GLUCOSE  107 (09 Mar 2021 07:32)      POCT Blood Glucose.: 107 mg/dL (09 Mar 2021 07:25)    LIVER FUNCTIONS - ( 10 Mar 2021 00:24 )  Alb: 3.4 g/dL / Pro: 6.3 g/dL / ALK PHOS: 77 U/L / ALT: 24 U/L / AST: 22 U/L / GGT: x                   ABG - ( 10 Mar 2021 00:18 )  pH, Arterial: 7.36  pH, Blood: x     /  pCO2: 53    /  pO2: 72    / HCO3: 29    / Base Excess: 3.5   /  SaO2: 94                  RADIOLOGY AND ADDITIONAL TESTS:    CONSULTANT NOTES REVIEWED:    CARE DISCUSSED WITH THE FOLLOWING CONSULTANTS/PROVIDERS: Contact Information:  Beckie Rendon  PGY-1, Internal Medicine pager [phone]    BAKARI CHARLES, MRN-[id number]    Patient is a 80y old  Male who presents with a chief complaint of Shortness of breath (09 Mar 2021 17:50)      INTERVAL/OVERNIGHT EVENTS:  - overnight - s/p HD, taken off 2500cc, trickle feeds started.     SUBJECTIVE:  - am - raises eyebrows to verbal stimuli, does not follow any commands. Unable to open eyes on command. Sedated on propofol, intubated.       OBJECTIVE:  ICU Vital Signs Last 24 Hrs  T(C): 37.3 (10 Mar 2021 04:00), Max: 37.5 (10 Mar 2021 00:00)  T(F): 99.1 (10 Mar 2021 04:00), Max: 99.5 (10 Mar 2021 00:00)  HR: 67 (10 Mar 2021 07:00) (65 - 76)  BP: 123/60 (10 Mar 2021 07:00) (87/56 - 157/78)  BP(mean): 84 (10 Mar 2021 07:00) (66 - 109)  ABP: --  ABP(mean): --  RR: 14 (10 Mar 2021 07:00) (14 - 20)  SpO2: 100% (10 Mar 2021 07:00) (94% - 100%)    Daily Height in cm: 160.02 (09 Mar 2021 08:30)    Daily   I&O's Summary    09 Mar 2021 07:01  -  10 Mar 2021 07:00  --------------------------------------------------------  IN: 640.2 mL / OUT: 3160 mL / NET: -2519.8 mL      Adult Advanced Hemodynamics Last 24 Hrs  CVP(mm Hg): --  CVP(cm H2O): --  CO: --  CI: --  PA: --  PA(mean): --  PCWP: --  SVR: --  SVRI: --  PVR: --  PVRI: --  Mode: AC/ CMV (Assist Control/ Continuous Mandatory Ventilation)  RR (machine): 14  TV (machine): 450  FiO2: 70  PEEP: 5  ITime: 1  MAP: 7  PIP: 22      MEDICATIONS  (STANDING):  chlorhexidine 0.12% Liquid 15 milliLiter(s) Oral Mucosa every 12 hours  chlorhexidine 4% Liquid 1 Application(s) Topical <User Schedule>  heparin   Injectable 5000 Unit(s) SubCutaneous every 12 hours  propofol Infusion 50 MICROgram(s)/kG/Min (20 mL/Hr) IV Continuous <Continuous>  trimethoprim/polymyxin Solution 1 Drop(s) Both EYES three times a day    MEDICATIONS  (PRN):  sodium chloride 0.9% lock flush 10 milliLiter(s) IV Push every 1 hour PRN Pre/post blood products, medications, blood draw, and to maintain line patency    Allergies    grass, pollen (Rhinitis)  No Known Drug Allergies    Intolerances        General: sedated on propofol, intubated. RASS -4  HEENT: endotracheal intubation  Chest/Lungs: coarse breath sounds bilaterally   Heart: regular rate and rhythm  Abdomen: no rxn to palpation of abdomen  Extremities: 3+ pitting edema UE and LE bilaterally  Neuro: RASS -4, grimaces to pain  LINES: R femoral shiley in place                            10.4   8.25  )-----------( 163      ( 10 Mar 2021 00:24 )             34.9     PT/INR - ( 10 Mar 2021 00:24 )   PT: 12.5 sec;   INR: 1.04 ratio         PTT - ( 10 Mar 2021 00:24 )  PTT:30.7 sec  03-10    137  |  98  |  40<H>  ----------------------------<  118<H>  4.1   |  24  |  4.45<H>    Ca    8.8      10 Mar 2021 00:24  Phos  4.1     03-10  Mg     2.3     03-10    TPro  6.3  /  Alb  3.4  /  TBili  0.3  /  DBili  x   /  AST  22  /  ALT  24  /  AlkPhos  77  03-10    CAPILLARY BLOOD GLUCOSE  107 (09 Mar 2021 07:32)      POCT Blood Glucose.: 107 mg/dL (09 Mar 2021 07:25)    LIVER FUNCTIONS - ( 10 Mar 2021 00:24 )  Alb: 3.4 g/dL / Pro: 6.3 g/dL / ALK PHOS: 77 U/L / ALT: 24 U/L / AST: 22 U/L / GGT: x                   ABG - ( 10 Mar 2021 00:18 )  pH, Arterial: 7.36  pH, Blood: x     /  pCO2: 53    /  pO2: 72    / HCO3: 29    / Base Excess: 3.5   /  SaO2: 94                  RADIOLOGY AND ADDITIONAL TESTS:    CONSULTANT NOTES REVIEWED:    CARE DISCUSSED WITH THE FOLLOWING CONSULTANTS/PROVIDERS: Contact Information:  Beckie Rendon  PGY-1, Internal Medicine pager [phone]    BAKARI CHARLES, MRN-[id number]    Patient is a 80y old  Male who presents with a chief complaint of Shortness of breath (09 Mar 2021 17:50)      INTERVAL/OVERNIGHT EVENTS:  - overnight - s/p HD, taken off 2500cc, trickle feeds started.     SUBJECTIVE:  - am - raises eyebrows to verbal stimuli, does not follow any commands. Unable to open eyes on command. Sedated on propofol, intubated.       OBJECTIVE:  ICU Vital Signs Last 24 Hrs  T(C): 37.3 (10 Mar 2021 04:00), Max: 37.5 (10 Mar 2021 00:00)  T(F): 99.1 (10 Mar 2021 04:00), Max: 99.5 (10 Mar 2021 00:00)  HR: 67 (10 Mar 2021 07:00) (65 - 76)  BP: 123/60 (10 Mar 2021 07:00) (87/56 - 157/78)  BP(mean): 84 (10 Mar 2021 07:00) (66 - 109)  ABP: --  ABP(mean): --  RR: 14 (10 Mar 2021 07:00) (14 - 20)  SpO2: 100% (10 Mar 2021 07:00) (94% - 100%)    Daily Height in cm: 160.02 (09 Mar 2021 08:30)    Daily   I&O's Summary    09 Mar 2021 07:01  -  10 Mar 2021 07:00  --------------------------------------------------------  IN: 640.2 mL / OUT: 3160 mL / NET: -2519.8 mL      Mode: AC/ CMV (Assist Control/ Continuous Mandatory Ventilation)  RR (machine): 14  TV (machine): 450  FiO2: 70  PEEP: 5  ITime: 1  MAP: 7  PIP: 22      MEDICATIONS  (STANDING):  chlorhexidine 0.12% Liquid 15 milliLiter(s) Oral Mucosa every 12 hours  chlorhexidine 4% Liquid 1 Application(s) Topical <User Schedule>  heparin   Injectable 5000 Unit(s) SubCutaneous every 12 hours  propofol Infusion 50 MICROgram(s)/kG/Min (20 mL/Hr) IV Continuous <Continuous>  trimethoprim/polymyxin Solution 1 Drop(s) Both EYES three times a day    MEDICATIONS  (PRN):  sodium chloride 0.9% lock flush 10 milliLiter(s) IV Push every 1 hour PRN Pre/post blood products, medications, blood draw, and to maintain line patency    Allergies    grass, pollen (Rhinitis)  No Known Drug Allergies    Intolerances        General: sedated on propofol, intubated. RASS -4  HEENT: endotracheal intubation  Chest/Lungs: coarse breath sounds bilaterally   Heart: regular rate and rhythm  Abdomen: no rxn to palpation of abdomen  Extremities: 3+ pitting edema UE and LE bilaterally  Neuro: RASS -4, grimaces to pain  LINES: R femoral shiley in place                            10.4   8.25  )-----------( 163      ( 10 Mar 2021 00:24 )             34.9     PT/INR - ( 10 Mar 2021 00:24 )   PT: 12.5 sec;   INR: 1.04 ratio         PTT - ( 10 Mar 2021 00:24 )  PTT:30.7 sec  03-10    137  |  98  |  40<H>  ----------------------------<  118<H>  4.1   |  24  |  4.45<H>    Ca    8.8      10 Mar 2021 00:24  Phos  4.1     03-10  Mg     2.3     03-10    TPro  6.3  /  Alb  3.4  /  TBili  0.3  /  DBili  x   /  AST  22  /  ALT  24  /  AlkPhos  77  03-10    CAPILLARY BLOOD GLUCOSE  107 (09 Mar 2021 07:32)      POCT Blood Glucose.: 107 mg/dL (09 Mar 2021 07:25)    LIVER FUNCTIONS - ( 10 Mar 2021 00:24 )  Alb: 3.4 g/dL / Pro: 6.3 g/dL / ALK PHOS: 77 U/L / ALT: 24 U/L / AST: 22 U/L / GGT: x                   ABG - ( 10 Mar 2021 00:18 )  pH, Arterial: 7.36  pH, Blood: x     /  pCO2: 53    /  pO2: 72    / HCO3: 29    / Base Excess: 3.5   /  SaO2: 94                  RADIOLOGY AND ADDITIONAL TESTS:    CONSULTANT NOTES REVIEWED:    CARE DISCUSSED WITH THE FOLLOWING CONSULTANTS/PROVIDERS:

## 2021-03-10 NOTE — DIETITIAN INITIAL EVALUATION ADULT. - ADD RECOMMEND
1) RD to obtain further subjective wt/diet history from pt/family PRN. 2) Monitor GI tolerance. RD to remain available to adjust EN formulary, volume/rate PRN.; provision of propofol. 3) Monitor wt trends, nutrition related labs, BM regularity and hydration status. 4) Consider Nephro-any if no medication contraindications. 5) Defer initiation of PO diet to medical team.

## 2021-03-10 NOTE — DIETITIAN INITIAL EVALUATION ADULT. - CHIEF COMPLAINT
The patient is a 80y M with PMH: T2DM; worsening acute renal failure superimposed on CKD IV with chronic HFpEF 60%. Admitted for oliguric renal failure and CHF exacerbation. Pt intubated 3/9; sedated on propofol. Continues to be followed by nephrology, with plan for HD.

## 2021-03-10 NOTE — DIETITIAN INITIAL EVALUATION ADULT. - ENTERAL
1) Recommend increase EN feeds: Nepro at 35ml/hr x 18 hrs + No Carb Prosource 2x daily. To provide [with provision of propofol +388kcal daily] (based on IBW 56.2kg): 630ml, 1642kcal (29.2kcal/kg) and 81g protein (1.4g protein/kg).

## 2021-03-10 NOTE — DIETITIAN INITIAL EVALUATION ADULT. - OTHER INFO
Pt s/p intubation 3/9 and subsequently initiated on EN feeds via OGT. Prescribed Nepro at 40ml/hr x 18 hrs. Initiated 3/10; has received a total of 80ml thus far today.    Pt prescribed propofol; infusing at 14.7ml/hr. If to continue x 24 hrs, will provide      Edema: 3+ right leg; left leg; left ankle; right ankle; left foot; right foot; left hand; right hand  Skin: no pressure injuries noted Pt s/p intubation 3/9 and subsequently initiated on EN feeds via OGT. Prescribed Nepro at 40ml/hr x 18 hrs. Initiated 3/10; has received a total of 80ml thus far today.    Pt prescribed propofol; infusing at 14.7ml/hr. If to continue x 24 hrs, will provide 388 kcal daily.     Edema: 3+ right leg; left leg; left ankle; right ankle; left foot; right foot; left hand; right hand  Skin: no pressure injuries noted    GI: Last BM (3/10): x 2. Not on bowel regimen at this time.    Current wt (3/9): 180.3 lbs; previous wts: (3/7): 181.2 lbs; (3/6): 180.5 lbs; (3/5): 178.1 lbs . UBW unknown at this time. Pt noted with 3+ edema and receiving HD; at risk for wt fluctuations/shifts. Per review of RD note 6/28/18, wt 168 lbs. Will continue to monitor.    Pt s/p HD on 3/9 with removal of 2500ml fluids.

## 2021-03-11 LAB
ALBUMIN SERPL ELPH-MCNC: 2.8 G/DL — LOW (ref 3.3–5)
ALP SERPL-CCNC: 64 U/L — SIGNIFICANT CHANGE UP (ref 40–120)
ALT FLD-CCNC: 22 U/L — SIGNIFICANT CHANGE UP (ref 10–45)
ANION GAP SERPL CALC-SCNC: 12 MMOL/L — SIGNIFICANT CHANGE UP (ref 5–17)
APTT BLD: 30 SEC — SIGNIFICANT CHANGE UP (ref 27.5–35.5)
AST SERPL-CCNC: 22 U/L — SIGNIFICANT CHANGE UP (ref 10–40)
BILIRUB SERPL-MCNC: 0.2 MG/DL — SIGNIFICANT CHANGE UP (ref 0.2–1.2)
BUN SERPL-MCNC: 31 MG/DL — HIGH (ref 7–23)
CALCIUM SERPL-MCNC: 8.3 MG/DL — LOW (ref 8.4–10.5)
CHLORIDE SERPL-SCNC: 100 MMOL/L — SIGNIFICANT CHANGE UP (ref 96–108)
CO2 SERPL-SCNC: 27 MMOL/L — SIGNIFICANT CHANGE UP (ref 22–31)
CREAT SERPL-MCNC: 4.19 MG/DL — HIGH (ref 0.5–1.3)
GAS PNL BLDA: SIGNIFICANT CHANGE UP
GLUCOSE BLDC GLUCOMTR-MCNC: 102 MG/DL — HIGH (ref 70–99)
GLUCOSE SERPL-MCNC: 127 MG/DL — HIGH (ref 70–99)
HCT VFR BLD CALC: 30.3 % — LOW (ref 39–50)
HGB BLD-MCNC: 9.4 G/DL — LOW (ref 13–17)
INR BLD: 1.11 RATIO — SIGNIFICANT CHANGE UP (ref 0.88–1.16)
MAGNESIUM SERPL-MCNC: 2.2 MG/DL — SIGNIFICANT CHANGE UP (ref 1.6–2.6)
MCHC RBC-ENTMCNC: 23.2 PG — LOW (ref 27–34)
MCHC RBC-ENTMCNC: 31 GM/DL — LOW (ref 32–36)
MCV RBC AUTO: 74.6 FL — LOW (ref 80–100)
MRSA PCR RESULT.: SIGNIFICANT CHANGE UP
NRBC # BLD: 0 /100 WBCS — SIGNIFICANT CHANGE UP (ref 0–0)
PHOSPHATE SERPL-MCNC: 3.3 MG/DL — SIGNIFICANT CHANGE UP (ref 2.5–4.5)
PLATELET # BLD AUTO: 125 K/UL — LOW (ref 150–400)
POTASSIUM SERPL-MCNC: 3.3 MMOL/L — LOW (ref 3.5–5.3)
POTASSIUM SERPL-SCNC: 3.3 MMOL/L — LOW (ref 3.5–5.3)
PROT SERPL-MCNC: 5.4 G/DL — LOW (ref 6–8.3)
PROTHROM AB SERPL-ACNC: 13.3 SEC — SIGNIFICANT CHANGE UP (ref 10.6–13.6)
RBC # BLD: 4.06 M/UL — LOW (ref 4.2–5.8)
RBC # FLD: 16.2 % — HIGH (ref 10.3–14.5)
S AUREUS DNA NOSE QL NAA+PROBE: DETECTED
SODIUM SERPL-SCNC: 139 MMOL/L — SIGNIFICANT CHANGE UP (ref 135–145)
VANCOMYCIN FLD-MCNC: 10.8 UG/ML — SIGNIFICANT CHANGE UP
WBC # BLD: 6.37 K/UL — SIGNIFICANT CHANGE UP (ref 3.8–10.5)
WBC # FLD AUTO: 6.37 K/UL — SIGNIFICANT CHANGE UP (ref 3.8–10.5)

## 2021-03-11 PROCEDURE — 99291 CRITICAL CARE FIRST HOUR: CPT

## 2021-03-11 RX ORDER — IPRATROPIUM/ALBUTEROL SULFATE 18-103MCG
3 AEROSOL WITH ADAPTER (GRAM) INHALATION ONCE
Refills: 0 | Status: COMPLETED | OUTPATIENT
Start: 2021-03-11 | End: 2021-03-25

## 2021-03-11 RX ORDER — IPRATROPIUM/ALBUTEROL SULFATE 18-103MCG
3 AEROSOL WITH ADAPTER (GRAM) INHALATION EVERY 6 HOURS
Refills: 0 | Status: DISCONTINUED | OUTPATIENT
Start: 2021-03-11 | End: 2021-03-30

## 2021-03-11 RX ORDER — ACETAMINOPHEN 500 MG
1000 TABLET ORAL ONCE
Refills: 0 | Status: COMPLETED | OUTPATIENT
Start: 2021-03-11 | End: 2021-03-11

## 2021-03-11 RX ORDER — PIPERACILLIN AND TAZOBACTAM 4; .5 G/20ML; G/20ML
3.38 INJECTION, POWDER, LYOPHILIZED, FOR SOLUTION INTRAVENOUS EVERY 12 HOURS
Refills: 0 | Status: COMPLETED | OUTPATIENT
Start: 2021-03-11 | End: 2021-03-18

## 2021-03-11 RX ORDER — VANCOMYCIN HCL 1 G
1000 VIAL (EA) INTRAVENOUS ONCE
Refills: 0 | Status: COMPLETED | OUTPATIENT
Start: 2021-03-11 | End: 2021-03-11

## 2021-03-11 RX ORDER — DEXMEDETOMIDINE HYDROCHLORIDE IN 0.9% SODIUM CHLORIDE 4 UG/ML
0.2 INJECTION INTRAVENOUS
Qty: 200 | Refills: 0 | Status: DISCONTINUED | OUTPATIENT
Start: 2021-03-11 | End: 2021-03-11

## 2021-03-11 RX ORDER — POTASSIUM CHLORIDE 20 MEQ
40 PACKET (EA) ORAL ONCE
Refills: 0 | Status: COMPLETED | OUTPATIENT
Start: 2021-03-11 | End: 2021-03-11

## 2021-03-11 RX ADMIN — Medication 81 MILLIGRAM(S): at 11:35

## 2021-03-11 RX ADMIN — Medication 650 MILLIGRAM(S): at 08:18

## 2021-03-11 RX ADMIN — Medication 650 MILLIGRAM(S): at 02:00

## 2021-03-11 RX ADMIN — Medication 1 DROP(S): at 05:20

## 2021-03-11 RX ADMIN — Medication 650 MILLIGRAM(S): at 00:46

## 2021-03-11 RX ADMIN — PIPERACILLIN AND TAZOBACTAM 25 GRAM(S): 4; .5 INJECTION, POWDER, LYOPHILIZED, FOR SOLUTION INTRAVENOUS at 01:25

## 2021-03-11 RX ADMIN — Medication 650 MILLIGRAM(S): at 07:11

## 2021-03-11 RX ADMIN — DEXMEDETOMIDINE HYDROCHLORIDE IN 0.9% SODIUM CHLORIDE 4.09 MICROGRAM(S)/KG/HR: 4 INJECTION INTRAVENOUS at 11:25

## 2021-03-11 RX ADMIN — Medication 1 DROP(S): at 21:21

## 2021-03-11 RX ADMIN — HEPARIN SODIUM 5000 UNIT(S): 5000 INJECTION INTRAVENOUS; SUBCUTANEOUS at 05:20

## 2021-03-11 RX ADMIN — CHLORHEXIDINE GLUCONATE 1 APPLICATION(S): 213 SOLUTION TOPICAL at 05:20

## 2021-03-11 RX ADMIN — PIPERACILLIN AND TAZOBACTAM 25 GRAM(S): 4; .5 INJECTION, POWDER, LYOPHILIZED, FOR SOLUTION INTRAVENOUS at 14:51

## 2021-03-11 RX ADMIN — Medication 400 MILLIGRAM(S): at 23:15

## 2021-03-11 RX ADMIN — Medication 3 MILLILITER(S): at 20:36

## 2021-03-11 RX ADMIN — PROPOFOL 20 MICROGRAM(S)/KG/MIN: 10 INJECTION, EMULSION INTRAVENOUS at 07:00

## 2021-03-11 RX ADMIN — Medication 40 MILLIEQUIVALENT(S): at 05:20

## 2021-03-11 RX ADMIN — Medication 1 DROP(S): at 14:51

## 2021-03-11 RX ADMIN — CHLORHEXIDINE GLUCONATE 15 MILLILITER(S): 213 SOLUTION TOPICAL at 05:20

## 2021-03-11 RX ADMIN — Medication 250 MILLIGRAM(S): at 14:51

## 2021-03-11 RX ADMIN — CHLORHEXIDINE GLUCONATE 15 MILLILITER(S): 213 SOLUTION TOPICAL at 17:49

## 2021-03-11 RX ADMIN — HEPARIN SODIUM 5000 UNIT(S): 5000 INJECTION INTRAVENOUS; SUBCUTANEOUS at 17:49

## 2021-03-11 NOTE — PROGRESS NOTE ADULT - ASSESSMENT
ASSESSMENT/RECOMMENDATION:   80M w/ hx of CKD4, dCHF, difficult to control HTN, DM?? p/w SOB and CHF  Suspect he has diastolic CHF at present   CKD stage 4-5        1 Renal-HD today and fluid removal as tolerated by BP;  He will cont with HD at present   2 CVS-    cath likely on monday?  Severe LVOT so avoid excessive fluid removal   3 Pulm CPAP and hopeful extubation today      Standard DVT prophylaxis       Sayed agapito   [facility]  [phone]

## 2021-03-11 NOTE — AIRWAY REMOVAL NOTE  ADULT & PEDS - ARTIFICAL AIRWAY REMOVAL COMMENTS
Written order for extubation verified. The patient was identified by full name and birth date compared to the identification band. Present during the procedure was NAVEED Ryan.

## 2021-03-11 NOTE — PROGRESS NOTE ADULT - SUBJECTIVE AND OBJECTIVE BOX
[facility] [phone]        Patient seen and examined in bed.  He was still intubated and on sedation   febrile         MEDICATIONS  (STANDING):  aspirin  chewable 81 milliGRAM(s) Oral daily  chlorhexidine 0.12% Liquid 15 milliLiter(s) Oral Mucosa every 12 hours  chlorhexidine 4% Liquid 1 Application(s) Topical <User Schedule>  dexMEDEtomidine Infusion 0.2 MICROgram(s)/kG/Hr (4.09 mL/Hr) IV Continuous <Continuous>  heparin   Injectable 5000 Unit(s) SubCutaneous every 12 hours  piperacillin/tazobactam IVPB.. 3.375 Gram(s) IV Intermittent every 12 hours  propofol Infusion 50 MICROgram(s)/kG/Min (20 mL/Hr) IV Continuous <Continuous>  trimethoprim/polymyxin Solution 1 Drop(s) Both EYES three times a day      VITAL:  T(C): , Max: 38.2 (03-10-21 @ 16:00)  T(F): , Max: 100.8 (03-10-21 @ 16:00)  HR: 55 (21 @ 10:33)  BP: 116/59 (21 @ 10:00)  BP(mean): 82 (21 @ 10:00)  RR: 14 (21 @ 10:33)  SpO2: 100% (21 @ 10:33)  Wt(kg): --    I and O's:    03-10 @ 07:  -   @ 07:00  --------------------------------------------------------  IN: 1475.1 mL / OUT: 2610 mL / NET: -1134.9 mL     @ 07:01  -   @ 11:58  --------------------------------------------------------  IN: 27 mL / OUT: 30 mL / NET: -3 mL          PHYSICAL EXAM:    Constitutional: intubated   Neck:  No JVD  Respiratory: CTAB/L  Cardiovascular: S1 and S2  Gastrointestinal: BS+, soft, NT/ND  Extremities: No peripheral edema  Neurological: unable   : +  Aguilar  Skin: No rashes  Access: Not applicable    LABS:                        9.4    6.37  )-----------( 125      ( 11 Mar 2021 00:21 )             30.3     03-11    139  |  100  |  31<H>  ----------------------------<  127<H>  3.3<L>   |  27  |  4.19<H>    Ca    8.3<L>      11 Mar 2021 00:21  Phos  3.3     03-11  Mg     2.2     03-11    TPro  5.4<L>  /  Alb  2.8<L>  /  TBili  0.2  /  DBili  x   /  AST  22  /  ALT  22  /  AlkPhos  64  03-11          Urine Studies:  Urinalysis Basic - ( 10 Mar 2021 16:14 )    Color: Yellow / Appearance: Clear / S.019 / pH: x  Gluc: x / Ketone: Negative  / Bili: Small / Urobili: Negative   Blood: x / Protein: 300 mg/dL / Nitrite: Negative   Leuk Esterase: Trace / RBC: x / WBC x   Sq Epi: x / Non Sq Epi: x / Bacteria: x            RADIOLOGY & ADDITIONAL STUDIES:

## 2021-03-11 NOTE — PROGRESS NOTE ADULT - SUBJECTIVE AND OBJECTIVE BOX
Contact Information:  Beckie Rendon  PGY-1, Internal Medicine pager [phone]    BAKARI CHARLES, MRN-[id number]    Patient is a 80y old  Male who presents with a chief complaint of Shortness of breath (10 Mar 2021 13:46)      INTERVAL/OVERNIGHT EVENTS:  - vent settings decreased FiO2 and rate    SUBJECTIVE:  -       OBJECTIVE:  ICU Vital Signs Last 24 Hrs  T(C): 37.6 (11 Mar 2021 04:00), Max: 38.2 (10 Mar 2021 16:00)  T(F): 99.7 (11 Mar 2021 04:00), Max: 100.8 (10 Mar 2021 16:00)  HR: 67 (11 Mar 2021 06:00) (62 - 77)  BP: 131/74 (11 Mar 2021 06:00) (85/51 - 149/75)  BP(mean): 97 (11 Mar 2021 06:00) (62 - 105)  ABP: --  ABP(mean): --  RR: 14 (11 Mar 2021 06:00) (11 - 17)  SpO2: 99% (11 Mar 2021 06:00) (98% - 100%)    Daily     Daily Weight in k.3 (10 Mar 2021 14:15)  I&O's Summary    10 Mar 2021 07:01  -  11 Mar 2021 07:00  --------------------------------------------------------  IN: 1475.1 mL / OUT: 2610 mL / NET: -1134.9 mL      Mode: AC/ CMV (Assist Control/ Continuous Mandatory Ventilation)  RR (machine): 14  TV (machine): 450  FiO2: 40  PEEP: 5  ITime: 1  MAP: 11  PIP: 27      MEDICATIONS  (STANDING):  aspirin  chewable 81 milliGRAM(s) Oral daily  chlorhexidine 0.12% Liquid 15 milliLiter(s) Oral Mucosa every 12 hours  chlorhexidine 4% Liquid 1 Application(s) Topical <User Schedule>  heparin   Injectable 5000 Unit(s) SubCutaneous every 12 hours  piperacillin/tazobactam IVPB.. 3.375 Gram(s) IV Intermittent every 12 hours  propofol Infusion 50 MICROgram(s)/kG/Min (20 mL/Hr) IV Continuous <Continuous>  trimethoprim/polymyxin Solution 1 Drop(s) Both EYES three times a day    MEDICATIONS  (PRN):  acetaminophen    Suspension .. 650 milliGRAM(s) Oral every 6 hours PRN Temp greater or equal to 38C (100.4F), Mild Pain (1 - 3), Moderate Pain (4 - 6)  sodium chloride 0.9% lock flush 10 milliLiter(s) IV Push every 1 hour PRN Pre/post blood products, medications, blood draw, and to maintain line patency    Allergies    grass, pollen (Rhinitis)  No Known Drug Allergies    Intolerances        CONSTITUTIONAL: No acute distress. Awake and alert.  HEAD: No evidence of trauma. Structures WNL.  EYES: +PERRL. +EOMI. No scleral icterus. No conjunctival injection.  ENT: Moist oral mucosa. No erythema. No pharyngeal exudates.   NECK: Supple. Appropriate ROM. No stiffness. No masses or lymphadenopathy.  RESPIRATORY: CTAB. No wheezes, rales, or rhonchi. No accessory muscle use. No apparent respiratory distress.  CARDIOVASCULAR: +S1/S2. No audible S3/S4. Regular rate and rhythm. No murmurs, rubs, or gallops. 2+ radial pulses x b/l UE; 2+ DP pulses x b/l LE.   GASTROINTESTINAL: Soft, nontender, nondistended. +BS. No rebound or guarding.   BACK: No spinal or paraspinal tenderness. No CVA tenderness.  EXTREMITY: No LE swelling or edema. EXTs warm to touch.  MUSCULOSKELETAL: Spontaneous movement in all extremities.  DERMATOLOGICAL: No abnormal rashes or lesions.  NEUROLOGICAL: CN 2-12 grossly intact. No focal deficits. Sensation intact x 4EXT. A&Ox3 (oriented to person, place, and time).  PSYCHIATRIC: Appropriate affect.                            9.4    6.37  )-----------( 125      ( 11 Mar 2021 00:21 )             30.3     PT/INR - ( 11 Mar 2021 00:21 )   PT: 13.3 sec;   INR: 1.11 ratio         PTT - ( 11 Mar 2021 00:21 )  PTT:30.0 sec  03-11    139  |  100  |  31<H>  ----------------------------<  127<H>  3.3<L>   |  27  |  4.19<H>    Ca    8.3<L>      11 Mar 2021 00:21  Phos  3.3     03-11  Mg     2.2     03-11    TPro  5.4<L>  /  Alb  2.8<L>  /  TBili  0.2  /  DBili  x   /  AST  22  /  ALT  22  /  AlkPhos  64  03-11    CAPILLARY BLOOD GLUCOSE      POCT Blood Glucose.: 128 mg/dL (10 Mar 2021 17:53)  POCT Blood Glucose.: 91 mg/dL (10 Mar 2021 11:05)    LIVER FUNCTIONS - ( 11 Mar 2021 00:21 )  Alb: 2.8 g/dL / Pro: 5.4 g/dL / ALK PHOS: 64 U/L / ALT: 22 U/L / AST: 22 U/L / GGT: x               Urinalysis Basic - ( 10 Mar 2021 16:14 )    Color: Yellow / Appearance: Clear / S.019 / pH: x  Gluc: x / Ketone: Negative  / Bili: Small / Urobili: Negative   Blood: x / Protein: 300 mg/dL / Nitrite: Negative   Leuk Esterase: Trace / RBC: x / WBC x   Sq Epi: x / Non Sq Epi: x / Bacteria: x        Culture - Sputum (collected 10 Mar 2021 18:31)  Source: .Sputum Sputum  Gram Stain (10 Mar 2021 21:29):    Rare Squamous epithelial cells per low power field    Few polymorphonuclear leukocytes    Moderate Gram Variable Rods per oil power field    Numerous Gram positive cocci in pairs per oil power field      ABG - ( 11 Mar 2021 00:23 )  pH, Arterial: 7.46  pH, Blood: x     /  pCO2: 43    /  pO2: 154   / HCO3: 30    / Base Excess: 6.1   /  SaO2: 100                 RADIOLOGY AND ADDITIONAL TESTS:    CONSULTANT NOTES REVIEWED:    CARE DISCUSSED WITH THE FOLLOWING CONSULTANTS/PROVIDERS: Contact Information:  Beckie Rendon  PGY-1, Internal Medicine pager [phone]    BAKARI CHARLES, MRN-[id number]    Patient is a 80y old  Male who presents with a chief complaint of Shortness of breath (10 Mar 2021 13:46)      INTERVAL/OVERNIGHT EVENTS:  - vent settings decreased (FiO2 and rate)    SUBJECTIVE:  - propofol being weaned off  - pt raises eyebrows to verbal stimuli, attempts to open eyes on command       OBJECTIVE:  ICU Vital Signs Last 24 Hrs  T(C): 37.6 (11 Mar 2021 04:00), Max: 38.2 (10 Mar 2021 16:00)  T(F): 99.7 (11 Mar 2021 04:00), Max: 100.8 (10 Mar 2021 16:00)  HR: 67 (11 Mar 2021 06:00) (62 - 77)  BP: 131/74 (11 Mar 2021 06:00) (85/51 - 149/75)  BP(mean): 97 (11 Mar 2021 06:00) (62 - 105)  ABP: --  ABP(mean): --  RR: 14 (11 Mar 2021 06:00) (11 - 17)  SpO2: 99% (11 Mar 2021 06:00) (98% - 100%)    Daily     Daily Weight in k.3 (10 Mar 2021 14:15)  I&O's Summary    10 Mar 2021 07:01  -  11 Mar 2021 07:00  --------------------------------------------------------  IN: 1475.1 mL / OUT: 2610 mL / NET: -1134.9 mL      Mode: AC/ CMV (Assist Control/ Continuous Mandatory Ventilation)  RR (machine): 14  TV (machine): 450  FiO2: 40  PEEP: 5  ITime: 1  MAP: 11  PIP: 27      MEDICATIONS  (STANDING):  aspirin  chewable 81 milliGRAM(s) Oral daily  chlorhexidine 0.12% Liquid 15 milliLiter(s) Oral Mucosa every 12 hours  chlorhexidine 4% Liquid 1 Application(s) Topical <User Schedule>  heparin   Injectable 5000 Unit(s) SubCutaneous every 12 hours  piperacillin/tazobactam IVPB.. 3.375 Gram(s) IV Intermittent every 12 hours  propofol Infusion 50 MICROgram(s)/kG/Min (20 mL/Hr) IV Continuous <Continuous>  trimethoprim/polymyxin Solution 1 Drop(s) Both EYES three times a day    MEDICATIONS  (PRN):  acetaminophen    Suspension .. 650 milliGRAM(s) Oral every 6 hours PRN Temp greater or equal to 38C (100.4F), Mild Pain (1 - 3), Moderate Pain (4 - 6)  sodium chloride 0.9% lock flush 10 milliLiter(s) IV Push every 1 hour PRN Pre/post blood products, medications, blood draw, and to maintain line patency    Allergies    grass, pollen (Rhinitis)  No Known Drug Allergies    Intolerances        General: sedated on propofol, intubated. RASS -3, raises eyebrows to verbal command, attempts to open eyes to verbal command   HEENT: endotracheal intubation  Chest/Lungs: coarse breath sounds bilaterally   Heart: regular rate and rhythm  Abdomen: no rxn to palpation of abdomen  Extremities: improving edema bilateral UE and LE 2+ today   LINES: R femoral shiley in place                            9.4    6.37  )-----------( 125      ( 11 Mar 2021 00:21 )             30.3     PT/INR - ( 11 Mar 2021 00:21 )   PT: 13.3 sec;   INR: 1.11 ratio         PTT - ( 11 Mar 2021 00:21 )  PTT:30.0 sec      139  |  100  |  31<H>  ----------------------------<  127<H>  3.3<L>   |  27  |  4.19<H>    Ca    8.3<L>      11 Mar 2021 00:21  Phos  3.3     03-11  Mg     2.2     03-11    TPro  5.4<L>  /  Alb  2.8<L>  /  TBili  0.2  /  DBili  x   /  AST  22  /  ALT  22  /  AlkPhos  64  03-11    CAPILLARY BLOOD GLUCOSE      POCT Blood Glucose.: 128 mg/dL (10 Mar 2021 17:53)  POCT Blood Glucose.: 91 mg/dL (10 Mar 2021 11:05)    LIVER FUNCTIONS - ( 11 Mar 2021 00:21 )  Alb: 2.8 g/dL / Pro: 5.4 g/dL / ALK PHOS: 64 U/L / ALT: 22 U/L / AST: 22 U/L / GGT: x               Urinalysis Basic - ( 10 Mar 2021 16:14 )    Color: Yellow / Appearance: Clear / S.019 / pH: x  Gluc: x / Ketone: Negative  / Bili: Small / Urobili: Negative   Blood: x / Protein: 300 mg/dL / Nitrite: Negative   Leuk Esterase: Trace / RBC: x / WBC x   Sq Epi: x / Non Sq Epi: x / Bacteria: x        Culture - Sputum (collected 10 Mar 2021 18:31)  Source: .Sputum Sputum  Gram Stain (10 Mar 2021 21:29):    Rare Squamous epithelial cells per low power field    Few polymorphonuclear leukocytes    Moderate Gram Variable Rods per oil power field    Numerous Gram positive cocci in pairs per oil power field      ABG - ( 11 Mar 2021 00:23 )  pH, Arterial: 7.46  pH, Blood: x     /  pCO2: 43    /  pO2: 154   / HCO3: 30    / Base Excess: 6.1   /  SaO2: 100                 RADIOLOGY AND ADDITIONAL TESTS:    CONSULTANT NOTES REVIEWED:    CARE DISCUSSED WITH THE FOLLOWING CONSULTANTS/PROVIDERS: Contact Information:  Beckie Rendon  PGY-1, Internal Medicine pager [phone]    BAKARI CHARLES, MRN-[id number]      INTERVAL/OVERNIGHT EVENTS:  - vent settings decreased (FiO2 and rate)    SUBJECTIVE:  - propofol being weaned off  - pt raises eyebrows to verbal stimuli, attempts to open eyes on command       OBJECTIVE:  ICU Vital Signs Last 24 Hrs  T(C): 37.6 (11 Mar 2021 04:00), Max: 38.2 (10 Mar 2021 16:00)  T(F): 99.7 (11 Mar 2021 04:00), Max: 100.8 (10 Mar 2021 16:00)  HR: 67 (11 Mar 2021 06:00) (62 - 77)  BP: 131/74 (11 Mar 2021 06:00) (85/51 - 149/75)  BP(mean): 97 (11 Mar 2021 06:00) (62 - 105)  ABP: --  ABP(mean): --  RR: 14 (11 Mar 2021 06:00) (11 - 17)  SpO2: 99% (11 Mar 2021 06:00) (98% - 100%)    Daily     Daily Weight in k.3 (10 Mar 2021 14:15)  I&O's Summary    10 Mar 2021 07:01  -  11 Mar 2021 07:00  --------------------------------------------------------  IN: 1475.1 mL / OUT: 2610 mL / NET: -1134.9 mL      Mode: AC/ CMV (Assist Control/ Continuous Mandatory Ventilation)  RR (machine): 14  TV (machine): 450  FiO2: 40  PEEP: 5  ITime: 1  MAP: 11  PIP: 27      MEDICATIONS  (STANDING):  aspirin  chewable 81 milliGRAM(s) Oral daily  chlorhexidine 0.12% Liquid 15 milliLiter(s) Oral Mucosa every 12 hours  chlorhexidine 4% Liquid 1 Application(s) Topical <User Schedule>  heparin   Injectable 5000 Unit(s) SubCutaneous every 12 hours  piperacillin/tazobactam IVPB.. 3.375 Gram(s) IV Intermittent every 12 hours  propofol Infusion 50 MICROgram(s)/kG/Min (20 mL/Hr) IV Continuous <Continuous>  trimethoprim/polymyxin Solution 1 Drop(s) Both EYES three times a day    MEDICATIONS  (PRN):  acetaminophen    Suspension .. 650 milliGRAM(s) Oral every 6 hours PRN Temp greater or equal to 38C (100.4F), Mild Pain (1 - 3), Moderate Pain (4 - 6)  sodium chloride 0.9% lock flush 10 milliLiter(s) IV Push every 1 hour PRN Pre/post blood products, medications, blood draw, and to maintain line patency    Allergies    grass, pollen (Rhinitis)  No Known Drug Allergies    Intolerances        General: sedated on propofol, intubated. RASS -3, raises eyebrows to verbal command, attempts to open eyes to verbal command   HEENT: endotracheal intubation  Chest/Lungs: coarse breath sounds bilaterally   Heart: regular rate and rhythm  Abdomen: no rxn to palpation of abdomen  Extremities: improving pitting edema bilateral UE and LE 2+ today   LINES: R femoral shiley in place                            9.4    6.37  )-----------( 125      ( 11 Mar 2021 00:21 )             30.3     PT/INR - ( 11 Mar 2021 00:21 )   PT: 13.3 sec;   INR: 1.11 ratio         PTT - ( 11 Mar 2021 00:21 )  PTT:30.0 sec      139  |  100  |  31<H>  ----------------------------<  127<H>  3.3<L>   |  27  |  4.19<H>    Ca    8.3<L>      11 Mar 2021 00:21  Phos  3.3     03-11  Mg     2.2     -11    TPro  5.4<L>  /  Alb  2.8<L>  /  TBili  0.2  /  DBili  x   /  AST  22  /  ALT  22  /  AlkPhos  64  03-11    CAPILLARY BLOOD GLUCOSE      POCT Blood Glucose.: 128 mg/dL (10 Mar 2021 17:53)  POCT Blood Glucose.: 91 mg/dL (10 Mar 2021 11:05)    LIVER FUNCTIONS - ( 11 Mar 2021 00:21 )  Alb: 2.8 g/dL / Pro: 5.4 g/dL / ALK PHOS: 64 U/L / ALT: 22 U/L / AST: 22 U/L / GGT: x               Urinalysis Basic - ( 10 Mar 2021 16:14 )    Color: Yellow / Appearance: Clear / S.019 / pH: x  Gluc: x / Ketone: Negative  / Bili: Small / Urobili: Negative   Blood: x / Protein: 300 mg/dL / Nitrite: Negative   Leuk Esterase: Trace / RBC: x / WBC x   Sq Epi: x / Non Sq Epi: x / Bacteria: x        Culture - Sputum (collected 10 Mar 2021 18:31)  Source: .Sputum Sputum  Gram Stain (10 Mar 2021 21:29):    Rare Squamous epithelial cells per low power field    Few polymorphonuclear leukocytes    Moderate Gram Variable Rods per oil power field    Numerous Gram positive cocci in pairs per oil power field      ABG - ( 11 Mar 2021 00:23 )  pH, Arterial: 7.46  pH, Blood: x     /  pCO2: 43    /  pO2: 154   / HCO3: 30    / Base Excess: 6.1   /  SaO2: 100                 RADIOLOGY AND ADDITIONAL TESTS:    CONSULTANT NOTES REVIEWED:    CARE DISCUSSED WITH THE FOLLOWING CONSULTANTS/PROVIDERS:

## 2021-03-11 NOTE — PROGRESS NOTE ADULT - SUBJECTIVE AND OBJECTIVE BOX
Patient is a 80y old  Male who presents with a chief complaint of Shortness of breath (11 Mar 2021 11:57)      INTERVAL HISTORY:   intubated/ sedated  MEDICATIONS:    PHYSICAL EXAM:  T(C): 37.2 (03-11-21 @ 07:00), Max: 38.2 (03-10-21 @ 16:00)  HR: 53 (03-11-21 @ 13:00) (53 - 77)  BP: 108/60 (03-11-21 @ 12:00) (85/51 - 149/75)  RR: 14 (03-11-21 @ 13:00) (8 - 17)  SpO2: 100% (03-11-21 @ 13:00) (99% - 100%)  Wt(kg): --  I&O's Summary    10 Mar 2021 07:01  -  11 Mar 2021 07:00  --------------------------------------------------------  IN: 1475.1 mL / OUT: 2610 mL / NET: -1134.9 mL    11 Mar 2021 07:01  -  11 Mar 2021 13:21  --------------------------------------------------------  IN: 37.2 mL / OUT: 30 mL / NET: 7.2 mL    Appearance: In no distress	  HEENT:    PERRL, EOMI	  Cardiovascular:  S1 S2, No JVD  Respiratory: Lungs clear to auscultation	  Gastrointestinal:  Soft, Non-tender, + BS	  Vascularature:  No edema of LE  Psychiatric: Appropriate affect   Neuro: no acute focal deficits                         9.4    6.37  )-----------( 125      ( 11 Mar 2021 00:21 )             30.3     03-11    139  |  100  |  31<H>  ----------------------------<  127<H>  3.3<L>   |  27  |  4.19<H>    Ca    8.3<L>      11 Mar 2021 00:21  Phos  3.3     03-11  Mg     2.2     03-11    TPro  5.4<L>  /  Alb  2.8<L>  /  TBili  0.2  /  DBili  x   /  AST  22  /  ALT  22  /  AlkPhos  64  03-11  Labs personally reviewed    ASSESSMENT/PLAN: 	    80M w/ hx of CKD4, dCHF, difficult to control HTN, DM?? p/w SOB and like acute on chronic congestive heart failure    Problem/Plan - 1:  ·  Problem: Acute on chronic congestive heart failure, unspecified heart failure type.  Plan: grossly fluid overloaded on exam and elevated BNP. Hx of severe LVH on prior TTE. Would suspect dCHF in setting of uncontrolled HTN.    -TTE- EF 60% with mid inferolateral wall and basal inferolateral wall hypokinetic, will need ischemic eval: continue to diurese for now until euvolemic  - 3/9 intubated in MICU- HD per ICU     Problem/Plan - 2:  ·  Problem: Acute respiratory failure with hypoxia.  Plan: Hypoxic to <80 on RA initially on arrival. Improved on 02. Suspect CHF related given clinical exam and CXR   - volume removal with HD    Problem/Plan - 3:  ·  Problem: Essential hypertension.  Plan: BPs very elevated on arrival. Will try not to lower too rapidly given unclear baseline as pt states they are poorly controlled.   hydralazine 100mg TID, Coreg 25mg Q12, Nifedipine 90mg daily - on hold - care per ICU     Problem/Plan - 4:  ·  Problem: Chronic kidney disease (CKD), stage IV (severe).  Plan:  VIRGEN on CKD,  - shiley placed 3/9   - volume removal with HD per ICU           Betzaida Maharaj ANP-C  Sridhar Carrillo DO [facility]  Cardiovascular Medicine  [address], [address]  Office: [phone]  Cell: [phone]

## 2021-03-11 NOTE — PROGRESS NOTE ADULT - ASSESSMENT
Assessment and Plan: Pt is a 79 y/o with hx HTN, T2DM? (last a1c 5.8), worsening Acute Renal Failure superimposed on CKD IV with Chronic HFpEF 60%, admitted 3/4/21 for oliguric renal failure and CHF exacerbation.     NEURO  - pt is intubated since 3/9, sedated on propofol, RASS -4. baseline A&O x3.   - sedation: propofol       CARDIOVASCULAR  - admitted to hospital with concern for CHF exacerbation (elevated BNP ~30,000, volume overloaded on exam): Echo from 3/5/2021: EF 60%, moderate severe LVOT obstruction, mild segmental LV systolic dysfunction.   - was on bumex gtt on medicine floor; will initiate dialysis as below and discontinue bumex gtt  - HTN, HF- currently holding nifedipine 90mg PO daily, coreg 25mg PO BID, Hydralazine 100mg q8h--- will reinitiate as tolerated       PULMONARY  - s/p intubation 3/9 for increased WOB on BIPAP and lethargy, concern for airway protection. Based on ABG today, pCO2 53 improved from 80 prior to intubation.   - suspect ARIELLE given hypercapnia on admission, body habitus, recommend sleep study outpt  - on bedside US, has bilateral pleural effusion (R>L)    Mode: AC/ CMV (Assist Control/ Continuous Mandatory Ventilation)  RR (machine): 14  TV (machine): 450  FiO2: 70  PEEP: 5  ITime: 1  MAP: 7  PIP: 22    ABG - ( 10 Mar 2021 00:18 )  pH, Arterial: 7.36  pH, Blood: x     /  pCO2: 53    /  pO2: 72    / HCO3: 29    / Base Excess: 3.5   /  SaO2: 94            INFECTIOUS DISEASE   - afebrile, no leukocytosis  - LINES: R femoral shiley placed 3/9, an, peripheral IV access      GASTROINTESTINAL  - s/p OG tube placement, nutrition consulted, started nepro tube feed (trickle feed) overnight  - hold stress ulcer ppx while tube feed  - last bm: overnight      RENAL  - worsening renal failure on CKD stage IV. Cr 5.49 (4.46 on admission march 4; 3.07 a year ago); BUN 54.  - volume status:   - monitor electrolytes: K 4.1; phos 4.1; BUN 40  - nephrology following, shiley placed 3/9, s/p HD 3/9 with 2500cc taken off.   - plan for HD today to remove more fluid  - an in place       ENDOCRINOLOGY  - unclear if hx DM. glc stable at 118. on tube feeds, CTM.   - f/u a1c  - SSI     HEMATOLOGY  - hb stable at 9-10; plt stable at 160s.   - no active signs of bleeding    ETHICS  - discussion with spouse 3/9: pt is FULL CODE.       Beckie Rendon PGY1.     Assessment and Plan: Pt is a 79 y/o with hx HTN, T2DM? (last a1c 5.8), worsening Acute Renal Failure superimposed on CKD IV with Chronic HFpEF 60%, admitted 3/4/21 for oliguric renal failure and CHF exacerbation.     NEURO  - pt is intubated since 3/9, sedated on propofol, RASS -3 (raises eyebrows to verbal stimuli, attempt to open eyes to command but unable to, unable to follow any other commands; overnight per nursing, was replying yes and no to questions). baseline A&O x3.   - sedation: propofol       CARDIOVASCULAR  - admitted to hospital with concern for CHF exacerbation (elevated BNP ~30,000, volume overloaded on exam): Echo from 3/5/2021: EF 60%, moderate severe LVOT obstruction, mild segmental LV systolic dysfunction.   - was on bumex gtt on medicine floor; will initiate dialysis as below and discontinue bumex gtt  - HTN, HF- currently holding nifedipine 90mg PO daily, coreg 25mg PO BID, Hydralazine 100mg q8h--- will reinitiate as tolerated       PULMONARY  - s/p intubation 3/9 for increased WOB on BIPAP and lethargy, concern for airway protection. Based on ABG today, pCO2 53 improved from 80 prior to intubation.   - suspect ARIELLE given hypercapnia on admission, body habitus, recommend sleep study outpt  - on bedside US, has bilateral pleural effusion (R>L)    Mode: AC/ CMV (Assist Control/ Continuous Mandatory Ventilation)  RR (machine): 14  TV (machine): 450  FiO2: 40  PEEP: 5  ITime: 1  MAP: 11  PIP: 27    ABG - ( 11 Mar 2021 00:23 )  pH, Arterial: 7.46  pH, Blood: x     /  pCO2: 43    /  pO2: 154   / HCO3: 30    / Base Excess: 6.1   /  SaO2: 100           INFECTIOUS DISEASE   - febrile 3/10, CXR with LLL consolidation concerning for pneumonia (CXR on admission to hospital did not have this). started on vanc by level and zosyn for empiric coverage. F/u BCx, sputum culture, urine culture (UA not concerning for UTI).   - LINES: R femoral shiley placed 3/9, an, peripheral IV access      GASTROINTESTINAL  - s/p OG tube placement, nutrition consulted, started nepro tube feed (trickle feed) overnight  - hold stress ulcer ppx while tube feed  - last bm: overnight      RENAL  - worsening renal failure on CKD stage IV. Cr 5.49 (4.46 on admission march 4; 3.07 a year ago); BUN 54.  - volume status:   - monitor electrolytes: K 4.1; phos 4.1; BUN 40  - nephrology following, shiley placed 3/9, s/p HD 3/9 with 2500cc taken off.   - plan for HD today to remove more fluid  - an in place       ENDOCRINOLOGY  - unclear if hx DM. glc stable at 118. on tube feeds, CTM.   - f/u a1c  - SSI     HEMATOLOGY  - hb stable at 9-10; plt stable at 160s.   - no active signs of bleeding    ETHICS  - discussion with spouse 3/9: pt is FULL CODE.       Beckie Rendon PGY1.     Assessment and Plan: Pt is a 79 y/o with hx HTN, T2DM? (last a1c 5.8), worsening Acute Renal Failure superimposed on CKD IV with Chronic HFpEF 60%, admitted 3/4/21 for oliguric renal failure and CHF exacerbation.     NEURO  - pt is intubated since 3/9, sedated on propofol, RASS -3 (raises eyebrows to verbal stimuli, attempt to open eyes to command but unable to follow any other commands; overnight per nursing, was replying yes and no to questions). baseline A&O x3.   - sedation: propofol, weaning off, will start precedex prior to extubation      CARDIOVASCULAR  - admitted to hospital with concern for CHF exacerbation (elevated BNP ~30,000, volume overloaded on exam): Echo from 3/5/2021: EF 60%, moderate severe LVOT obstruction, mild segmental LV systolic dysfunction.   - was on bumex gtt on medicine floor; now getting HD via shiley, off diuretics.   - will avoid reducing preload too much with HD given LVOT obstruction  - HTN, HF- currently holding nifedipine 90mg PO daily, coreg 25mg PO BID, Hydralazine 100mg q8h--- will reinitiate as tolerated       PULMONARY  - s/p intubation 3/9 for hypercapnic respiratory failure, lethargy, airway protection. Based on ABG today, pCO2 43 improved.   - suspect ARIELLE given hypercapnia on admission, body habitus, recommend sleep study outpt  - on bedside US, has bilateral pleural effusion (R>L)    Mode: AC/ CMV (Assist Control/ Continuous Mandatory Ventilation)  RR (machine): 14  TV (machine): 450  FiO2: 30  PEEP: 5  ITime: 1  MAP: 11  PIP: 30    ABG - ( 11 Mar 2021 00:23 )  pH, Arterial: 7.46  pH, Blood: x     /  pCO2: 43    /  pO2: 154   / HCO3: 30    / Base Excess: 6.1   /  SaO2: 100           INFECTIOUS DISEASE   - febrile 3/10, CXR with LLL consolidation concerning for pneumonia (CXR on admission to hospital did not have this). started on vanc by level and zosyn for empiric coverage 3/10. F/u BCx, sputum culture, urine culture (UA not concerning for UTI) and discontinue abx if negative.   - LINES: R femoral shiley placed 3/9, an, peripheral IV access      GASTROINTESTINAL  - s/p OG tube placement, on nepro feeds  - hold stress ulcer ppx while tube feed  - last bm: today am      RENAL  - worsening renal failure on CKD stage IV. Cr 5.49 (4.46 on admission march 4; 3.07 a year ago); BUN improved to 31.  - volume status: volume overloaded with bilateral UE and LE pitting edema, improving after 2 sessions HD with 5L total removed up to date    - monitor electrolytes  - nephrology following, shiley placed 3/9, s/p HD 3/9 and 3/10 with 2.5L removed each session. Plan for HD.   - an in place       ENDOCRINOLOGY  - unclear if hx DM. glc stable at 118. on tube feeds, CTM.   - f/u a1c  - SSI     HEMATOLOGY  - hb stable at 9-10; plt stable at 160s.   - no active signs of bleeding    ETHICS  - discussion with spouse 3/9: pt is FULL CODE.       Beckie Rendon PGY1.

## 2021-03-12 LAB
-  AMIKACIN: SIGNIFICANT CHANGE UP
-  AZTREONAM: SIGNIFICANT CHANGE UP
-  CEFEPIME: SIGNIFICANT CHANGE UP
-  CEFTAZIDIME: SIGNIFICANT CHANGE UP
-  CIPROFLOXACIN: SIGNIFICANT CHANGE UP
-  GENTAMICIN: SIGNIFICANT CHANGE UP
-  IMIPENEM: SIGNIFICANT CHANGE UP
-  LEVOFLOXACIN: SIGNIFICANT CHANGE UP
-  MEROPENEM: SIGNIFICANT CHANGE UP
-  PIPERACILLIN/TAZOBACTAM: SIGNIFICANT CHANGE UP
-  TOBRAMYCIN: SIGNIFICANT CHANGE UP
ALBUMIN SERPL ELPH-MCNC: 3.1 G/DL — LOW (ref 3.3–5)
ALP SERPL-CCNC: 66 U/L — SIGNIFICANT CHANGE UP (ref 40–120)
ALT FLD-CCNC: 23 U/L — SIGNIFICANT CHANGE UP (ref 10–45)
ANION GAP SERPL CALC-SCNC: 15 MMOL/L — SIGNIFICANT CHANGE UP (ref 5–17)
APTT BLD: 29.8 SEC — SIGNIFICANT CHANGE UP (ref 27.5–35.5)
AST SERPL-CCNC: 34 U/L — SIGNIFICANT CHANGE UP (ref 10–40)
BILIRUB SERPL-MCNC: 0.4 MG/DL — SIGNIFICANT CHANGE UP (ref 0.2–1.2)
BUN SERPL-MCNC: 23 MG/DL — SIGNIFICANT CHANGE UP (ref 7–23)
CALCIUM SERPL-MCNC: 8.7 MG/DL — SIGNIFICANT CHANGE UP (ref 8.4–10.5)
CHLORIDE SERPL-SCNC: 97 MMOL/L — SIGNIFICANT CHANGE UP (ref 96–108)
CO2 SERPL-SCNC: 24 MMOL/L — SIGNIFICANT CHANGE UP (ref 22–31)
CREAT SERPL-MCNC: 3.68 MG/DL — HIGH (ref 0.5–1.3)
CULTURE RESULTS: SIGNIFICANT CHANGE UP
GAS PNL BLDA: SIGNIFICANT CHANGE UP
GLUCOSE BLDC GLUCOMTR-MCNC: 101 MG/DL — HIGH (ref 70–99)
GLUCOSE BLDC GLUCOMTR-MCNC: 179 MG/DL — HIGH (ref 70–99)
GLUCOSE BLDC GLUCOMTR-MCNC: 87 MG/DL — SIGNIFICANT CHANGE UP (ref 70–99)
GLUCOSE SERPL-MCNC: 95 MG/DL — SIGNIFICANT CHANGE UP (ref 70–99)
HCT VFR BLD CALC: 31.4 % — LOW (ref 39–50)
HGB BLD-MCNC: 9.7 G/DL — LOW (ref 13–17)
INR BLD: 1.11 RATIO — SIGNIFICANT CHANGE UP (ref 0.88–1.16)
MAGNESIUM SERPL-MCNC: 2 MG/DL — SIGNIFICANT CHANGE UP (ref 1.6–2.6)
MCHC RBC-ENTMCNC: 23.1 PG — LOW (ref 27–34)
MCHC RBC-ENTMCNC: 30.9 GM/DL — LOW (ref 32–36)
MCV RBC AUTO: 74.8 FL — LOW (ref 80–100)
METHOD TYPE: SIGNIFICANT CHANGE UP
MRSA PCR RESULT.: SIGNIFICANT CHANGE UP
NRBC # BLD: 0 /100 WBCS — SIGNIFICANT CHANGE UP (ref 0–0)
ORGANISM # SPEC MICROSCOPIC CNT: SIGNIFICANT CHANGE UP
ORGANISM # SPEC MICROSCOPIC CNT: SIGNIFICANT CHANGE UP
PHOSPHATE SERPL-MCNC: 3.3 MG/DL — SIGNIFICANT CHANGE UP (ref 2.5–4.5)
PLATELET # BLD AUTO: 100 K/UL — LOW (ref 150–400)
POTASSIUM SERPL-MCNC: 3.6 MMOL/L — SIGNIFICANT CHANGE UP (ref 3.5–5.3)
POTASSIUM SERPL-SCNC: 3.6 MMOL/L — SIGNIFICANT CHANGE UP (ref 3.5–5.3)
PROT SERPL-MCNC: 6.2 G/DL — SIGNIFICANT CHANGE UP (ref 6–8.3)
PROTHROM AB SERPL-ACNC: 13.3 SEC — SIGNIFICANT CHANGE UP (ref 10.6–13.6)
RBC # BLD: 4.2 M/UL — SIGNIFICANT CHANGE UP (ref 4.2–5.8)
RBC # FLD: 16.4 % — HIGH (ref 10.3–14.5)
S AUREUS DNA NOSE QL NAA+PROBE: DETECTED
SODIUM SERPL-SCNC: 136 MMOL/L — SIGNIFICANT CHANGE UP (ref 135–145)
SPECIMEN SOURCE: SIGNIFICANT CHANGE UP
WBC # BLD: 8.6 K/UL — SIGNIFICANT CHANGE UP (ref 3.8–10.5)
WBC # FLD AUTO: 8.6 K/UL — SIGNIFICANT CHANGE UP (ref 3.8–10.5)

## 2021-03-12 PROCEDURE — 93306 TTE W/DOPPLER COMPLETE: CPT | Mod: 26

## 2021-03-12 PROCEDURE — 99233 SBSQ HOSP IP/OBS HIGH 50: CPT | Mod: GC

## 2021-03-12 RX ORDER — TAMSULOSIN HYDROCHLORIDE 0.4 MG/1
0.4 CAPSULE ORAL AT BEDTIME
Refills: 0 | Status: DISCONTINUED | OUTPATIENT
Start: 2021-03-12 | End: 2021-03-12

## 2021-03-12 RX ORDER — TAMSULOSIN HYDROCHLORIDE 0.4 MG/1
0.4 CAPSULE ORAL AT BEDTIME
Refills: 0 | Status: DISCONTINUED | OUTPATIENT
Start: 2021-03-12 | End: 2021-03-30

## 2021-03-12 RX ORDER — ERYTHROPOIETIN 10000 [IU]/ML
10000 INJECTION, SOLUTION INTRAVENOUS; SUBCUTANEOUS ONCE
Refills: 0 | Status: COMPLETED | OUTPATIENT
Start: 2021-03-13 | End: 2021-03-13

## 2021-03-12 RX ADMIN — Medication 81 MILLIGRAM(S): at 11:08

## 2021-03-12 RX ADMIN — PIPERACILLIN AND TAZOBACTAM 25 GRAM(S): 4; .5 INJECTION, POWDER, LYOPHILIZED, FOR SOLUTION INTRAVENOUS at 17:25

## 2021-03-12 RX ADMIN — Medication 1000 MILLIGRAM(S): at 00:01

## 2021-03-12 RX ADMIN — Medication 1 DROP(S): at 13:42

## 2021-03-12 RX ADMIN — CHLORHEXIDINE GLUCONATE 1 APPLICATION(S): 213 SOLUTION TOPICAL at 05:05

## 2021-03-12 RX ADMIN — PIPERACILLIN AND TAZOBACTAM 25 GRAM(S): 4; .5 INJECTION, POWDER, LYOPHILIZED, FOR SOLUTION INTRAVENOUS at 05:05

## 2021-03-12 RX ADMIN — Medication 1 DROP(S): at 21:17

## 2021-03-12 RX ADMIN — Medication 1 DROP(S): at 05:04

## 2021-03-12 RX ADMIN — HEPARIN SODIUM 5000 UNIT(S): 5000 INJECTION INTRAVENOUS; SUBCUTANEOUS at 17:25

## 2021-03-12 RX ADMIN — HEPARIN SODIUM 5000 UNIT(S): 5000 INJECTION INTRAVENOUS; SUBCUTANEOUS at 05:05

## 2021-03-12 RX ADMIN — TAMSULOSIN HYDROCHLORIDE 0.4 MILLIGRAM(S): 0.4 CAPSULE ORAL at 21:17

## 2021-03-12 NOTE — PROGRESS NOTE ADULT - SUBJECTIVE AND OBJECTIVE BOX
CHIEF COMPLAINT: I was called by MICU @ his transfer  Pt is a 79 y/o with hx HTN, T2DM? (last a1c 5.8), worsening Acute Renal Failure superimposed on CKD IV with Chronic HFpEF 60%, admitted 3/4/21 for oliguric renal failure and CHF exacerbation intubated 3/9 for lethargy/airway protection and transferred to MICU.       He was intubated 3/9 for lethargy/airway protection on floor during RRT. CTH without acute changes. He was on sedation. Volume overload thought to be 2/2 renal failure and contributing HF exacerbation. Bedside POCUS without worsening systolic function but shows LVOT obstruction. He was on bumex gtt on floors for volume overload. Diuretics discontinued, R femoral shiley placed, and s/p 3 sessions HD (2.5L removed first 2 sessions, 2L removed 3rd session) - being careful with fluid removal to avoid too much preload reduction with LVOT obstruction. Home HF and HTN meds can be restarted. Nephrology following for future HD sessions.   He has a lot of secretions, needs frequent suctioning. POCUS with bilateral pleural effusion. He became febrile and given LLL consolidation on CXR, thought to be 2/2 pneumonia. BCx NGTD x2 3/10 and sputum culture 3/10 with few pseudomonas. On vanc zosyn, vanc d/c as MRSA nasal swab negative. Will continue zosyn for 5 days.   He was extubated 3/11 pm successfully, A&O x3 and responding coherently. OG tube removed 3/11 pm and diet started. Having daily bm.   SUBJECTIVE:     REVIEW OF SYSTEMS:    CONSTITUTIONAL: (  )  weakness,  (  ) fevers or chills  EYES/ENT: (  )visual changes;     NECK: (  ) pain or stiffness  RESPIRATORY:   (  )cough, wheezing, hemoptysis;  (  ) shortness of breath  CARDIOVASCULAR:  (  )chest pain or palpitations  GASTROINTESTINAL:   (  )abdominal or epigastric pain.  (  ) nausea, vomiting, or hematemesis;   (   ) diarrhea or constipation.   GENITOURINARY:   (    ) dysuria, frequency or hematuria  NEUROLOGICAL:  (   ) numbness or weakness   All other review of systems is negative unless indicated above    Vital Signs Last 24 Hrs  T(C): 37.1 (12 Mar 2021 16:00), Max: 38.3 (11 Mar 2021 23:00)  T(F): 98.7 (12 Mar 2021 16:00), Max: 100.9 (11 Mar 2021 23:00)  HR: 74 (12 Mar 2021 17:00) (68 - 84)  BP: 131/60 (12 Mar 2021 17:00) (119/59 - 175/79)  BP(mean): 87 (12 Mar 2021 17:00) (84 - 114)  RR: 19 (12 Mar 2021 17:00) (15 - 32)  SpO2: 98% (12 Mar 2021 17:00) (93% - 100%)    I&O's Summary    11 Mar 2021 07:01  -  12 Mar 2021 07:00  --------------------------------------------------------  IN: 665 mL / OUT: 2125 mL / NET: -1460 mL    12 Mar 2021 07:01  -  12 Mar 2021 17:49  --------------------------------------------------------  IN: 1037 mL / OUT: 30 mL / NET: 1007 mL        CAPILLARY BLOOD GLUCOSE      POCT Blood Glucose.: 179 mg/dL (12 Mar 2021 11:24)  POCT Blood Glucose.: 101 mg/dL (12 Mar 2021 06:35)  POCT Blood Glucose.: 87 mg/dL (12 Mar 2021 05:44)      PHYSICAL EXAM:    Constitutional:  (   ) NAD,   (   )awake and alert  HEENT: PERR, EOMI,    Neck: Soft and supple, No LAD, No JVD  Respiratory:  (    Breath sounds are clear bilaterally,    (   ) wheezing, rales or rhonchi  Cardiovascular:     (   )S1 and S2, regular rate and rhythm, no Murmurs, gallops or rubs  Gastrointestinal:  (   )Bowel Sounds present, soft,   (  )nontender, nondistended,    Extremities:    (  ) peripheral edema  Vascular: 2+ peripheral pulses  Neurological:    (    )A/O x 3,   (  ) focal deficits  Musculoskeletal:    (   )  normal strength b/l upper  (     ) normal  lower extremities  Skin: No rashes    MEDICATIONS:  MEDICATIONS  (STANDING):  albuterol/ipratropium for Nebulization. 3 milliLiter(s) Nebulizer once  aspirin  chewable 81 milliGRAM(s) Oral daily  chlorhexidine 4% Liquid 1 Application(s) Topical <User Schedule>  heparin   Injectable 5000 Unit(s) SubCutaneous every 12 hours  piperacillin/tazobactam IVPB.. 3.375 Gram(s) IV Intermittent every 12 hours  tamsulosin 0.4 milliGRAM(s) Oral at bedtime  trimethoprim/polymyxin Solution 1 Drop(s) Both EYES three times a day      LABS: All Labs Reviewed:                        9.7    8.60  )-----------( 100      ( 12 Mar 2021 00:30 )             31.4     03-12    136  |  97  |  23  ----------------------------<  95  3.6   |  24  |  3.68<H>    Ca    8.7      12 Mar 2021 00:30  Phos  3.3     03-12  Mg     2.0     03-12    TPro  6.2  /  Alb  3.1<L>  /  TBili  0.4  /  DBili  x   /  AST  34  /  ALT  23  /  AlkPhos  66  03-12    PT/INR - ( 12 Mar 2021 00:30 )   PT: 13.3 sec;   INR: 1.11 ratio         PTT - ( 12 Mar 2021 00:30 )  PTT:29.8 sec      Blood Culture: 03-10 @ 23:00  Organism --  Gram Stain Blood -- Gram Stain --  Specimen Source .Blood Blood-Peripheral  Culture-Blood --    03-10 @ 22:58  Organism --  Gram Stain Blood -- Gram Stain --  Specimen Source .Blood Blood-Peripheral  Culture-Blood --    03-10 @ 18:31  Organism Pseudomonas aeruginosa  Gram Stain Blood -- Gram Stain   Rare Squamous epithelial cells per low power field  Few polymorphonuclear leukocytes  Moderate Gram Variable Rods per oil power field  Numerous Gram positive cocci in pairs per oil power field  Specimen Source .Sputum Sputum  Culture-Blood --      Urine Culture      RADIOLOGY/EKG:    ASSESSMENT AND PLAN:  Pt is a 79 y/o with hx HTN, T2DM? (last a1c 5.8), worsening Acute Renal Failure superimposed on CKD IV with Chronic HFpEF 60%, admitted 3/4/21 for oliguric renal failure and CHF exacerbation intubated 3/9 for lethargy/airway protection and transferred to MICU s/p 3 sessions HD, extubated 3/11.     NEURO  - pt is intubated since 3/9 for lethargy/airway protection -> extubated 3/11 pm.   - sedation: off sedation  - mental status: A&O x3, responds coherently to all questions  - CTH 3/9: no acute changes      CARDIOVASCULAR  - admitted to hospital with concern for CHF exacerbation (elevated BNP ~30,000, volume overloaded on exam): Echo from 3/5/2021: EF 60%, moderate severe LVOT obstruction, mild segmental LV systolic dysfunction.   - was on bumex gtt on medicine floor; now getting HD via shiley, off diuretics.   - will avoid reducing preload too much with HD given LVOT obstruction  - at home for HTN and heart failure: nifedipine 90mg PO daily, coreg 25mg PO BID, Hydralazine 100mg q8h--- will restart home meds as tolerated.       PULMONARY  - s/p intubation 3/9 for hypercapnic respiratory failure, lethargy, airway protection -> extubated 3/11 pm  - suspect ARIELLE given hypercapnia on admission, body habitus, recommend sleep study outpt  - on bedside US, has bilateral pleural effusion (R>L)  - CXR with consolidation LLL - atelectasis vs pneumonia. bilateral pleural effusions.     ABG - ( 12 Mar 2021 00:29 )  pH, Arterial: 7.42  pH, Blood: x     /  pCO2: 46    /  pO2: 106   / HCO3: 30    / Base Excess: 4.9   /  SaO2: 98            INFECTIOUS DISEASE   - intermittent fevers since 3/10 pm, CXR with LLL consolidation concerning for pneumonia (CXR on admission to hospital did not have this). started on vanc by level and zosyn for empiric coverage 3/10 plan to continue zosyn for 5 days. vanc discontinued as MRSA negative on nasal swab.   - cultures: BCx 3/10 x2 NGTD; sputum culture 3/10 - few pseudomonas; MRSA PCR negative but staph positive.   - LINES: R femoral shiley placed 3/9, an, peripheral IV access      GASTROINTESTINAL  - OG tube removed, pt on diet  - last bm: today am      RENAL  - worsening renal failure on CKD stage IV. Cr 5.49 (4.46 on admission march 4; 3.07 a year ago); BUN improved to 31.  - volume status: volume overloaded with bilateral UE and LE pitting edema, improving after 3 sessions HD with 5L total removed up to date    - monitor electrolytes  - nephrology following, shiley placed 3/9, s/p HD 3/9 (2.5L removed) and 3/10 (2.5L removed) and 3/11 (2L removed). f/u recs - per 3/12 nephro recs plan to remove shiley and consult vascular surgery for permcath placement    ENDOCRINOLOGY  - glc stable at 95. on tube feeds, CTM.   - f/u a1c  - SSI as was concern of potential DM    HEMATOLOGY  - hb stable at 9-10; plt stable at 160s.   - no active signs of bleeding    ETHICS    DVT PPX:    ADVANCED DIRECTIVE:    DISPOSITION: CHIEF COMPLAINT: I was called by MICU @ his transfer  Pt is a 81 y/o with hx HTN, T2DM? (last a1c 5.8), worsening Acute Renal Failure superimposed on CKD IV with Chronic HFpEF 60%, admitted 3/4/21 for oliguric renal failure and CHF exacerbation intubated 3/9 for lethargy/airway protection and transferred to MICU.   He was intubated 3/9 for lethargy/airway protection on floor during RRT. CTH without acute changes. He was on sedation. Volume overload thought to be 2/2 renal failure and contributing HF exacerbation. Bedside POCUS without worsening systolic function but shows LVOT obstruction. He was on bumex gtt on floors for volume overload. Diuretics discontinued, R femoral shiley placed, and s/p 3 sessions HD (2.5L removed first 2 sessions, 2L removed 3rd session) - being careful with fluid removal to avoid too much preload reduction with LVOT obstruction. Home HF and HTN meds can be restarted. Nephrology following for future HD sessions.   He has a lot of secretions, needs frequent suctioning. POCUS with bilateral pleural effusion. He became febrile and given LLL consolidation on CXR, thought to be 2/2 pneumonia. BCx NGTD x2 3/10 and sputum culture 3/10 with few pseudomonas. On vanc zosyn, vanc d/c as MRSA nasal swab negative. Will continue zosyn for 5 days.   He was extubated 3/11 pm successfully, A&O x3 and responding coherently. OG tube removed 3/11 pm and diet started. Having daily bm.   SUBJECTIVE:     REVIEW OF SYSTEMS:Awake and verbal without complaint    CONSTITUTIONAL: (  )  weakness,  (  ) fevers or chills  EYES/ENT: (  )visual changes;     NECK: (  ) pain or stiffness  RESPIRATORY:   (  )cough, wheezing, hemoptysis;  (  ) shortness of breath  CARDIOVASCULAR:  (  )chest pain or palpitations  GASTROINTESTINAL:   (  )abdominal or epigastric pain.  (  ) nausea, vomiting, or hematemesis;   (   ) diarrhea or constipation.   GENITOURINARY:   (    ) dysuria, frequency or hematuria  NEUROLOGICAL:  (   ) numbness or weakness   All other review of systems is negative unless indicated above    Vital Signs Last 24 Hrs  T(C): 37.1 (12 Mar 2021 16:00), Max: 38.3 (11 Mar 2021 23:00)  T(F): 98.7 (12 Mar 2021 16:00), Max: 100.9 (11 Mar 2021 23:00)  HR: 74 (12 Mar 2021 17:00) (68 - 84)  BP: 131/60 (12 Mar 2021 17:00) (119/59 - 175/79)  BP(mean): 87 (12 Mar 2021 17:00) (84 - 114)  RR: 19 (12 Mar 2021 17:00) (15 - 32)  SpO2: 98% (12 Mar 2021 17:00) (93% - 100%)    I&O's Summary    11 Mar 2021 07:01  -  12 Mar 2021 07:00  --------------------------------------------------------  IN: 665 mL / OUT: 2125 mL / NET: -1460 mL    12 Mar 2021 07:01  -  12 Mar 2021 17:49  --------------------------------------------------------  IN: 1037 mL / OUT: 30 mL / NET: 1007 mL        CAPILLARY BLOOD GLUCOSE      POCT Blood Glucose.: 179 mg/dL (12 Mar 2021 11:24)  POCT Blood Glucose.: 101 mg/dL (12 Mar 2021 06:35)  POCT Blood Glucose.: 87 mg/dL (12 Mar 2021 05:44)      PHYSICAL EXAM:    Constitutional:  (  x ) NAD,   ( x  )awake and alert  HEENT: PERR, EOMI,    Neck: Soft and supple, No LAD, No JVD  Respiratory:  (  x  Breath sounds are clear bilaterally,    (   ) wheezing, rales or rhonchi  Cardiovascular:     (  x )S1 and S2, regular rate and rhythm, no Murmurs, gallops or rubs  Gastrointestinal:  ( x  )Bowel Sounds present, soft,   (  )nontender, nondistended,    Extremities:    (  ) peripheral edema  Vascular: 2+ peripheral pulses  Neurological:    (  x  )A/O x 3,   (  ) focal deficits  Musculoskeletal:    ( x  )  normal strength b/l upper  (     ) normal  lower extremities  Skin: No rashes    MEDICATIONS:  MEDICATIONS  (STANDING):  albuterol/ipratropium for Nebulization. 3 milliLiter(s) Nebulizer once  aspirin  chewable 81 milliGRAM(s) Oral daily  chlorhexidine 4% Liquid 1 Application(s) Topical <User Schedule>  heparin   Injectable 5000 Unit(s) SubCutaneous every 12 hours  piperacillin/tazobactam IVPB.. 3.375 Gram(s) IV Intermittent every 12 hours  tamsulosin 0.4 milliGRAM(s) Oral at bedtime  trimethoprim/polymyxin Solution 1 Drop(s) Both EYES three times a day      LABS: All Labs Reviewed:                        9.7    8.60  )-----------( 100      ( 12 Mar 2021 00:30 )             31.4     03-12    136  |  97  |  23  ----------------------------<  95  3.6   |  24  |  3.68<H>    Ca    8.7      12 Mar 2021 00:30  Phos  3.3     03-12  Mg     2.0     03-12    TPro  6.2  /  Alb  3.1<L>  /  TBili  0.4  /  DBili  x   /  AST  34  /  ALT  23  /  AlkPhos  66  03-12    PT/INR - ( 12 Mar 2021 00:30 )   PT: 13.3 sec;   INR: 1.11 ratio         PTT - ( 12 Mar 2021 00:30 )  PTT:29.8 sec      Blood Culture: 03-10 @ 23:00  Organism --  Gram Stain Blood -- Gram Stain --  Specimen Source .Blood Blood-Peripheral  Culture-Blood --    03-10 @ 22:58  Organism --  Gram Stain Blood -- Gram Stain --  Specimen Source .Blood Blood-Peripheral  Culture-Blood --    03-10 @ 18:31  Organism Pseudomonas aeruginosa  Gram Stain Blood -- Gram Stain   Rare Squamous epithelial cells per low power field  Few polymorphonuclear leukocytes  Moderate Gram Variable Rods per oil power field  Numerous Gram positive cocci in pairs per oil power field  Specimen Source .Sputum Sputum  Culture-Blood --      Urine Culture      RADIOLOGY/EKG:    ASSESSMENT AND PLAN:  Pt is a 81 y/o with hx HTN, T2DM? (last a1c 5.8), worsening Acute Renal Failure superimposed on CKD IV with Chronic HFpEF 60%, admitted 3/4/21 for oliguric renal failure and CHF exacerbation intubated 3/9 for lethargy/airway protection and transferred to MICU s/p 3 sessions HD, extubated 3/11.     NEURO  - pt is intubated since 3/9 for lethargy/airway protection -> extubated 3/11 pm.   - sedation: off sedation  - mental status: A&O x3, responds coherently to all questions         CARDIOVASCULAR  - admitted to hospital with concern for CHF exacerbation (elevated BNP ~30,000, volume overloaded on exam): Echo from 3/5/2021: EF 60%, moderate severe LVOT obstruction, mild segmental LV systolic dysfunction.   - was on bumex gtt on medicine floor; now getting HD via shiley, off diuretics.   - will avoid reducing preload too much with HD given LVOT obstruction  - at home for HTN and heart failure: nifedipine 90mg PO daily, coreg 25mg PO BID, Hydralazine 100mg q8h--- will restart home meds as tolerated.       PULMONARY  - s/p intubation 3/9 for hypercapnic respiratory failure, lethargy, airway protection -> extubated 3/11 pm  - suspect ARIELLE given hypercapnia on admission, body habitus, recommend sleep study outpt  - on bedside US, has bilateral pleural effusion (R>L)  - CXR with consolidation LLL - atelectasis vs pneumonia. bilateral pleural effusions.     ABG - ( 12 Mar 2021 00:29 )  pH, Arterial: 7.42  pH, Blood: x     /  pCO2: 46    /  pO2: 106   / HCO3: 30    / Base Excess: 4.9   /  SaO2: 98            INFECTIOUS DISEASE   - intermittent fevers since 3/10 pm, CXR with LLL consolidation concerning for pneumonia (CXR on admission to hospital did not have this). started on vanc by level and zosyn for empiric coverage 3/10 plan to continue zosyn for 5 days. vanc discontinued as MRSA negative on nasal swab.   - cultures: BCx 3/10 x2 NGTD; sputum culture 3/10 - few pseudomonas; MRSA PCR negative but staph positive.   - LINES: R femoral shiley placed 3/9, an, peripheral IV access      GASTROINTESTINAL  - OG tube removed, pt on diet  - last bm: today am      RENAL  - worsening renal failure on CKD stage IV. Cr 5.49 (4.46 on admission march 4; 3.07 a year ago); BUN improved to 31.  - volume status: volume overloaded with bilateral UE and LE pitting edema, improving after 3 sessions HD with 5L total removed up to date    - monitor electrolytes  - nephrology following, shiley placed 3/9, s/p HD 3/9 (2.5L removed) and 3/10 (2.5L removed) and 3/11 (2L removed). f/u recs - per 3/12 nephro recs plan to remove shiley and consult vascular surgery for permcath placement    ENDOCRINOLOGY  - glc stable at 95. on tube feeds, CTM.   - f/u a1c  - SSI as was concern of potential DM    HEMATOLOGY  - hb stable at 9-10; plt stable at 160s.   - no active signs of bleeding    ETHICS    DVT PPX:    ADVANCED DIRECTIVE:    DISPOSITION: discharge to F when bed available discussed with ICU resident

## 2021-03-12 NOTE — PROGRESS NOTE ADULT - SUBJECTIVE AND OBJECTIVE BOX
Patient is a 80y old  Male who presents with a chief complaint of Shortness of breath (12 Mar 2021 09:50)      INTERVAL HISTORY: n nasal cannula  REVIEW OF SYSTEMS:   CONSTITUTIONAL: No weakness  EYES/ENT: No visual changes; No throat pain  Neck: No pain or stiffness  Respiratory: No cough, wheezing, No shortness of breath  CARDIOVASCULAR: no chest pain or palpitations  GASTROINTESTINAL: No abdominal pain, no nausea, vomiting or hematemesis  GENITOURINARY: No dysuria, frequency or hematuria  NEUROLOGICAL: No stroke like symptoms  SKIN: No rashes    	  MEDICATIONS:  tamsulosin 0.4 milliGRAM(s) Oral at bedtime        PHYSICAL EXAM:  T(C): 37.1 (03-12-21 @ 12:00), Max: 38.3 (03-11-21 @ 23:00)  HR: 77 (03-12-21 @ 13:00) (56 - 84)  BP: 159/59 (03-12-21 @ 13:06) (118/58 - 186/89)  RR: 30 (03-12-21 @ 13:00) (14 - 32)  SpO2: 97% (03-12-21 @ 13:00) (93% - 100%)  Wt(kg): --  I&O's Summary    11 Mar 2021 07:01  -  12 Mar 2021 07:00  --------------------------------------------------------  IN: 665 mL / OUT: 2125 mL / NET: -1460 mL    12 Mar 2021 07:01  -  12 Mar 2021 13:58  --------------------------------------------------------  IN: 637 mL / OUT: 30 mL / NET: 607 mL      Appearance: In no distress	  HEENT:    PERRL, EOMI	  Cardiovascular:  S1 S2, No JVD  Respiratory: Lungs clear to auscultation	  Gastrointestinal:  Soft, Non-tender, + BS	  Vascularature:  No edema of LE  Psychiatric: Appropriate affect   Neuro: no acute focal deficits                         9.7    8.60  )-----------( 100      ( 12 Mar 2021 00:30 )             31.4     03-12    136  |  97  |  23  ----------------------------<  95  3.6   |  24  |  3.68<H>    Ca    8.7      12 Mar 2021 00:30  Phos  3.3     03-12  Mg     2.0     03-12    TPro  6.2  /  Alb  3.1<L>  /  TBili  0.4  /  DBili  x   /  AST  34  /  ALT  23  /  AlkPhos  66  03-12  Labs personally reviewed    ASSESSMENT/PLAN: 	  80M w/ hx of CKD4, dCHF, difficult to control HTN, DM?? p/w SOB and like acute on chronic congestive heart failure    Problem/Plan - 1:  ·  Problem: Acute on chronic congestive heart failure, unspecified heart failure type.  Plan: grossly fluid overloaded on exam and elevated BNP. Hx of severe LVH on prior TTE. Would suspect dCHF in setting of uncontrolled HTN.    -TTE- EF 60% with mid inferolateral wall and basal inferolateral wall hypokinetic, will need ischemic eval: continue to diurese for now until euvolemic  - 3/9 intubated in MICU-s/p extubation last night 3/11    Problem/Plan - 2:  ·  Problem: Acute respiratory failure with hypoxia.  Plan: Hypoxic to <80 on RA initially on arrival. Improved on 02. Suspect CHF related given clinical exam and CXR   - volume removal with HD  - on iv abx for pna    Problem/Plan - 3:  ·  Problem: Essential hypertension.  Plan: BPs very elevated on arrival. Will try not to lower too rapidly given unclear baseline as pt states they are poorly controlled.   hydralazine 100mg TID, Coreg 25mg Q12, Nifedipine 90mg daily - slowly restart as tolerated    Problem/Plan - 4:  ·  Problem: Chronic kidney disease (CKD), stage IV (severe).  Plan:  VIRGEN on CKD,  - shiley placed 3/9   - plan for permacath placement          Betzaida Carrillo DO [facility]  Cardiovascular Medicine  [address], [address]  Office: [phone]  Cell: [phone]

## 2021-03-12 NOTE — PROGRESS NOTE ADULT - ASSESSMENT
Assessment and Plan: Pt is a 81 y/o with hx HTN, T2DM? (last a1c 5.8), worsening Acute Renal Failure superimposed on CKD IV with Chronic HFpEF 60%, admitted 3/4/21 for oliguric renal failure and CHF exacerbation.     NEURO  - pt is intubated since 3/9 for lethargy/airway protection -> extubated 3/11 pm.   - sedation: off sedation  - mental status:   - CTH 3/9: no acute changes      CARDIOVASCULAR  - admitted to hospital with concern for CHF exacerbation (elevated BNP ~30,000, volume overloaded on exam): Echo from 3/5/2021: EF 60%, moderate severe LVOT obstruction, mild segmental LV systolic dysfunction.   - was on bumex gtt on medicine floor; now getting HD via shiley, off diuretics.   - will avoid reducing preload too much with HD given LVOT obstruction  - afterload reduction -   - at home for HTN and heart failure: nifedipine 90mg PO daily, coreg 25mg PO BID, Hydralazine 100mg q8h--- will reinitiate as tolerated       PULMONARY  - s/p intubation 3/9 for hypercapnic respiratory failure, lethargy, airway protection -> extubated 3/11 pm  - suspect ARIELLE given hypercapnia on admission, body habitus, recommend sleep study outpt  - on bedside US, has bilateral pleural effusion (R>L)  - CXR with consolidation LLL - atelectasis vs pneumonia    ABG - ( 12 Mar 2021 00:29 )  pH, Arterial: 7.42  pH, Blood: x     /  pCO2: 46    /  pO2: 106   / HCO3: 30    / Base Excess: 4.9   /  SaO2: 98            INFECTIOUS DISEASE   - intermittent fevers since 3/10 pm, CXR with LLL consolidation concerning for pneumonia (CXR on admission to hospital did not have this). started on vanc by level and zosyn for empiric coverage 3/10.  - cultures: BCx 3/10 x2 NGTD; sputum culture 3/10 - few pseudomonas; MRSA PCR negative but staph positive.   - low concern infection - will discontinue abx  - LINES: R femoral shiley placed 3/9, an, peripheral IV access      GASTROINTESTINAL  - s/p OG tube placement, on nepro feeds  - hold stress ulcer ppx while tube feed  - last bm: today am      RENAL  - worsening renal failure on CKD stage IV. Cr 5.49 (4.46 on admission march 4; 3.07 a year ago); BUN improved to 31.  - volume status: volume overloaded with bilateral UE and LE pitting edema, improving after 2 sessions HD with 5L total removed up to date    - monitor electrolytes  - nephrology following, boone placed 3/9, s/p HD 3/9 and 3/10 with 2.5L removed each session. Plan for HD.   - an in place       ENDOCRINOLOGY  - unclear if hx DM. glc stable at 118. on tube feeds, CTM.   - f/u a1c  - SSI     HEMATOLOGY  - hb stable at 9-10; plt stable at 160s.   - no active signs of bleeding    ETHICS  - discussion with spouse 3/9: pt is FULL CODE.       Beckie Rendon PGY1.     Assessment and Plan: Pt is a 81 y/o with hx HTN, T2DM? (last a1c 5.8), worsening Acute Renal Failure superimposed on CKD IV with Chronic HFpEF 60%, admitted 3/4/21 for oliguric renal failure and CHF exacerbation intubated 3/9 for lethargy/airway protection and transferred to MICU s/p 3 sessions HD, extubated 3/11.     NEURO  - pt is intubated since 3/9 for lethargy/airway protection -> extubated 3/11 pm.   - sedation: off sedation  - mental status: A&O x3, responds coherently to all questions  - CTH 3/9: no acute changes      CARDIOVASCULAR  - admitted to hospital with concern for CHF exacerbation (elevated BNP ~30,000, volume overloaded on exam): Echo from 3/5/2021: EF 60%, moderate severe LVOT obstruction, mild segmental LV systolic dysfunction.   - was on bumex gtt on medicine floor; now getting HD via shiley, off diuretics.   - will avoid reducing preload too much with HD given LVOT obstruction  - afterload reduction -   - at home for HTN and heart failure: nifedipine 90mg PO daily, coreg 25mg PO BID, Hydralazine 100mg q8h--- will reinitiate as tolerated       PULMONARY  - s/p intubation 3/9 for hypercapnic respiratory failure, lethargy, airway protection -> extubated 3/11 pm  - suspect ARIELLE given hypercapnia on admission, body habitus, recommend sleep study outpt  - on bedside US, has bilateral pleural effusion (R>L)  - CXR with consolidation LLL - atelectasis vs pneumonia. bilateral pleural effusions.     ABG - ( 12 Mar 2021 00:29 )  pH, Arterial: 7.42  pH, Blood: x     /  pCO2: 46    /  pO2: 106   / HCO3: 30    / Base Excess: 4.9   /  SaO2: 98            INFECTIOUS DISEASE   - intermittent fevers since 3/10 pm, CXR with LLL consolidation concerning for pneumonia (CXR on admission to hospital did not have this). started on vanc by level and zosyn for empiric coverage 3/10.  - cultures: BCx 3/10 x2 NGTD; sputum culture 3/10 - few pseudomonas; MRSA PCR negative but staph positive.   - LINES: R femoral shiley placed 3/9, an, peripheral IV access      GASTROINTESTINAL  - OG tube removed, pt on diet  - hold stress ulcer ppx while tube feed  - last bm: today am      RENAL  - worsening renal failure on CKD stage IV. Cr 5.49 (4.46 on admission march 4; 3.07 a year ago); BUN improved to 31.  - volume status: volume overloaded with bilateral UE and LE pitting edema, improving after 3 sessions HD with 5L total removed up to date    - monitor electrolytes  - nephrology following, boone placed 3/9, s/p HD 3/9 (2.5L removed) and 3/10 (2.5L removed) and 3/11 (2L removed)      ENDOCRINOLOGY  - glc stable at 95. on tube feeds, CTM.   - f/u a1c  - SSI as was concern of potential DM    HEMATOLOGY  - hb stable at 9-10; plt stable at 160s.   - no active signs of bleeding    ETHICS  - discussion with spouse 3/9: pt is FULL CODE.       Beckie Rendon PGY1.

## 2021-03-12 NOTE — PROGRESS NOTE ADULT - ASSESSMENT
ASSESSMENT/RECOMMENDATION:   80M w/ hx of CKD4, dCHF, difficult to control HTN, DM?? p/w SOB and CHF  Suspect he has diastolic CHF at present   CKD stage 4-5        1 Renal-HD on sat and fluid removal as tolerated by BP;  He will cont with HD at present   DC an  Remove shiley and make arrangements for perm cath   2 [facility]-    cath likely on monday?  Severe LVOT so avoid excessive fluid removal at HD  3 Vasc-vasc consult p cath      Standard DVT prophylaxis       Sayed agapito   [facility]  [phone]

## 2021-03-12 NOTE — PROGRESS NOTE ADULT - SUBJECTIVE AND OBJECTIVE BOX
[facility] [phone]        Patient seen and examined in chair.  He was in good spirits         MEDICATIONS  (STANDING):  albuterol/ipratropium for Nebulization. 3 milliLiter(s) Nebulizer once  aspirin  chewable 81 milliGRAM(s) Oral daily  chlorhexidine 4% Liquid 1 Application(s) Topical <User Schedule>  heparin   Injectable 5000 Unit(s) SubCutaneous every 12 hours  piperacillin/tazobactam IVPB.. 3.375 Gram(s) IV Intermittent every 12 hours  trimethoprim/polymyxin Solution 1 Drop(s) Both EYES three times a day      VITAL:  T(C): , Max: 38.3 (21 @ 23:00)  T(F): , Max: 100.9 (21 @ 23:00)  HR: 79 (21 @ 09:10)  BP: 147/67 (21 @ 09:00)  BP(mean): 97 (21 @ 09:00)  RR: 29 (21 @ 09:10)  SpO2: 98% (21 @ 09:10)  Wt(kg): --    I and O's:     @ 07:01  -   @ 07:00  --------------------------------------------------------  IN: 665 mL / OUT: 2125 mL / NET: -1460 mL     @ 07:01  -   @ 09:50  --------------------------------------------------------  IN: 400 mL / OUT: 0 mL / NET: 400 mL          PHYSICAL EXAM:    Constitutional: NAD  Neck:  No JVD  Respiratory: CTAB/L  Cardiovascular: S1 and S2  Gastrointestinal: BS+, soft, NT/ND  Extremities: +  peripheral edema  Neurological: A/O x 3, no focal deficits  Psychiatric: Normal mood, normal affect  : No Aguilar  Skin: No rashes  Access: [facility]     LABS:                        9.7    8.60  )-----------( 100      ( 12 Mar 2021 00:30 )             31.4     03-12    136  |  97  |  23  ----------------------------<  95  3.6   |  24  |  3.68<H>    Ca    8.7      12 Mar 2021 00:30  Phos  3.3     03-12  Mg     2.0     -12    TPro  6.2  /  Alb  3.1<L>  /  TBili  0.4  /  DBili  x   /  AST  34  /  ALT  23  /  AlkPhos  66  03-12          Urine Studies:  Urinalysis Basic - ( 10 Mar 2021 16:14 )    Color: Yellow / Appearance: Clear / S.019 / pH: x  Gluc: x / Ketone: Negative  / Bili: Small / Urobili: Negative   Blood: x / Protein: 300 mg/dL / Nitrite: Negative   Leuk Esterase: Trace / RBC: x / WBC x   Sq Epi: x / Non Sq Epi: x / Bacteria: x            RADIOLOGY & ADDITIONAL STUDIES:

## 2021-03-12 NOTE — PROGRESS NOTE ADULT - SUBJECTIVE AND OBJECTIVE BOX
Contact Information:  Beckie Rendon  PGY-1, Internal Medicine pager [phone]    BAKARI CHARLES, [id number]    Patient is a 80y old  Male who presents with a chief complaint of Shortness of breath (11 Mar 2021 13:20)      INTERVAL/OVERNIGHT EVENTS:  - extubated around 10pm 3/11, ABG post extubation as below.  ABG - ( 12 Mar 2021 00:29 )  pH, Arterial: 7.42  pH, Blood: x     /  pCO2: 46    /  pO2: 106   / HCO3: 30    / Base Excess: 4.9   /  SaO2: 98        - off sedation since yesterday   - febrile to 100.8 overnight (temp site bladder)    SUJECTIVE:  -       OBJECTIVE:  ICU Vital Signs Last 24 Hrs  T(C): 37.6 (12 Mar 2021 04:00), Max: 38.3 (11 Mar 2021 23:00)  T(F): 99.7 (12 Mar 2021 04:00), Max: 100.9 (11 Mar 2021 23:00)  HR: 78 (12 Mar 2021 07:00) (53 - 84)  BP: 127/60 (12 Mar 2021 07:00) (106/59 - 186/89)  BP(mean): 86 (12 Mar 2021 07:00) (78 - 128)  ABP: --  ABP(mean): --  RR: 24 (12 Mar 2021 07:00) (8 - 32)  SpO2: 97% (12 Mar 2021 07:00) (93% - 100%)    Daily     Daily Weight in k.3 (11 Mar 2021 18:40)  I&O's Summary    11 Mar 2021 07:01  -  12 Mar 2021 07:00  --------------------------------------------------------  IN: 665 mL / OUT: 2125 mL / NET: -1460 mL      Mode: CPAP with PS  FiO2: 30  PEEP: 5  MAP: 8  PC: 5  PIP: 14      MEDICATIONS  (STANDING):  albuterol/ipratropium for Nebulization. 3 milliLiter(s) Nebulizer once  aspirin  chewable 81 milliGRAM(s) Oral daily  chlorhexidine 4% Liquid 1 Application(s) Topical <User Schedule>  heparin   Injectable 5000 Unit(s) SubCutaneous every 12 hours  piperacillin/tazobactam IVPB.. 3.375 Gram(s) IV Intermittent every 12 hours  trimethoprim/polymyxin Solution 1 Drop(s) Both EYES three times a day    MEDICATIONS  (PRN):  acetaminophen    Suspension .. 650 milliGRAM(s) Oral every 6 hours PRN Temp greater or equal to 38C (100.4F), Mild Pain (1 - 3), Moderate Pain (4 - 6)  albuterol/ipratropium for Nebulization 3 milliLiter(s) Nebulizer every 6 hours PRN Shortness of Breath and/or Wheezing  sodium chloride 0.9% lock flush 10 milliLiter(s) IV Push every 1 hour PRN Pre/post blood products, medications, blood draw, and to maintain line patency    Allergies    grass, pollen (Rhinitis)  No Known Drug Allergies    Intolerances        General: sedated on propofol, intubated. RASS -4  HEENT: endotracheal intubation  Chest/Lungs: coarse breath sounds bilaterally   Heart: regular rate and rhythm  Abdomen: no rxn to palpation of abdomen  Extremities: 3+ pitting edema UE and LE bilaterally  Neuro: RASS -4, grimaces to pain  LINES: R femoral shiley in place                          9.7    8.60  )-----------( 100      ( 12 Mar 2021 00:30 )             31.4     PT/INR - ( 12 Mar 2021 00:30 )   PT: 13.3 sec;   INR: 1.11 ratio         PTT - ( 12 Mar 2021 00:30 )  PTT:29.8 sec      136  |  97  |  23  ----------------------------<  95  3.6   |  24  |  3.68<H>    Ca    8.7      12 Mar 2021 00:30  Phos  3.3     03-12  Mg     2.0     -12    TPro  6.2  /  Alb  3.1<L>  /  TBili  0.4  /  DBili  x   /  AST  34  /  ALT  23  /  AlkPhos  66  03-12    CAPILLARY BLOOD GLUCOSE      POCT Blood Glucose.: 101 mg/dL (12 Mar 2021 06:35)  POCT Blood Glucose.: 87 mg/dL (12 Mar 2021 05:44)  POCT Blood Glucose.: 102 mg/dL (11 Mar 2021 17:11)    LIVER FUNCTIONS - ( 12 Mar 2021 00:30 )  Alb: 3.1 g/dL / Pro: 6.2 g/dL / ALK PHOS: 66 U/L / ALT: 23 U/L / AST: 34 U/L / GGT: x               Urinalysis Basic - ( 10 Mar 2021 16:14 )    Color: Yellow / Appearance: Clear / S.019 / pH: x  Gluc: x / Ketone: Negative  / Bili: Small / Urobili: Negative   Blood: x / Protein: 300 mg/dL / Nitrite: Negative   Leuk Esterase: Trace / RBC: x / WBC x   Sq Epi: x / Non Sq Epi: x / Bacteria: x        Culture - Blood (collected 10 Mar 2021 23:00)  Source: .Blood Blood-Peripheral  Preliminary Report (12 Mar 2021 01:01):    No growth to date.    Culture - Blood (collected 10 Mar 2021 22:58)  Source: .Blood Blood-Peripheral  Preliminary Report (11 Mar 2021 23:01):    No growth to date.    Culture - Sputum (collected 10 Mar 2021 18:31)  Source: .Sputum Sputum  Gram Stain (10 Mar 2021 21:29):    Rare Squamous epithelial cells per low power field    Few polymorphonuclear leukocytes    Moderate Gram Variable Rods per oil power field    Numerous Gram positive cocci in pairs per oil power field  Preliminary Report (11 Mar 2021 19:17):    Few Pseudomonas aeruginosa    Normal Respiratory Aranza present      ABG - ( 12 Mar 2021 00:29 )  pH, Arterial: 7.42  pH, Blood: x     /  pCO2: 46    /  pO2: 106   / HCO3: 30    / Base Excess: 4.9   /  SaO2: 98                  RADIOLOGY AND ADDITIONAL TESTS:    CONSULTANT NOTES REVIEWED:    CARE DISCUSSED WITH THE FOLLOWING CONSULTANTS/PROVIDERS: Contact Information:  Beckie Rendon  PGY-1, Internal Medicine pager [phone]    BAKARI CHARLES, [id number]    Patient is a 80y old  Male who presents with a chief complaint of Shortness of breath (11 Mar 2021 13:20)      INTERVAL/OVERNIGHT EVENTS:  - extubated around 10pm 3/11, ABG post extubation as below.  ABG - ( 12 Mar 2021 00:29 )  pH, Arterial: 7.42  pH, Blood: x     /  pCO2: 46    /  pO2: 106   / HCO3: 30    / Base Excess: 4.9   /  SaO2: 98        - off sedation since yesterday   - febrile to 100.8 overnight (temp site bladder)    SUJECTIVE:  - sitting in chair on NC oxygen, appears comfortable. responds coherently to questions. Denies pain, denies difficulty breathing. Wants to be cleaned up.       OBJECTIVE:  ICU Vital Signs Last 24 Hrs  T(C): 37.6 (12 Mar 2021 04:00), Max: 38.3 (11 Mar 2021 23:00)  T(F): 99.7 (12 Mar 2021 04:00), Max: 100.9 (11 Mar 2021 23:00)  HR: 78 (12 Mar 2021 07:00) (53 - 84)  BP: 127/60 (12 Mar 2021 07:00) (106/59 - 186/89)  BP(mean): 86 (12 Mar 2021 07:00) (78 - 128)  ABP: --  ABP(mean): --  RR: 24 (12 Mar 2021 07:00) (8 - 32)  SpO2: 97% (12 Mar 2021 07:00) (93% - 100%)    Daily     Daily Weight in k.3 (11 Mar 2021 18:40)  I&O's Summary    11 Mar 2021 07:01  -  12 Mar 2021 07:00  --------------------------------------------------------  IN: 665 mL / OUT: 2125 mL / NET: -1460 mL      Mode: CPAP with PS  FiO2: 30  PEEP: 5  MAP: 8  PC: 5  PIP: 14      MEDICATIONS  (STANDING):  albuterol/ipratropium for Nebulization. 3 milliLiter(s) Nebulizer once  aspirin  chewable 81 milliGRAM(s) Oral daily  chlorhexidine 4% Liquid 1 Application(s) Topical <User Schedule>  heparin   Injectable 5000 Unit(s) SubCutaneous every 12 hours  piperacillin/tazobactam IVPB.. 3.375 Gram(s) IV Intermittent every 12 hours  trimethoprim/polymyxin Solution 1 Drop(s) Both EYES three times a day    MEDICATIONS  (PRN):  acetaminophen    Suspension .. 650 milliGRAM(s) Oral every 6 hours PRN Temp greater or equal to 38C (100.4F), Mild Pain (1 - 3), Moderate Pain (4 - 6)  albuterol/ipratropium for Nebulization 3 milliLiter(s) Nebulizer every 6 hours PRN Shortness of Breath and/or Wheezing  sodium chloride 0.9% lock flush 10 milliLiter(s) IV Push every 1 hour PRN Pre/post blood products, medications, blood draw, and to maintain line patency    Allergies    grass, pollen (Rhinitis)  No Known Drug Allergies    Intolerances        General: sitting up in chair, on NC oxygen, appears comfortable (extubated 3/11 pm)  Chest/Lungs: anteriorly clear lung sounds  Heart: regular rate and rhythm  Abdomen: soft, nontender to palpation  Extremities: 3+ pitting edema UE and LE bilaterally, improved slightly from prior  Neuro: A&O x3, responds coherently to questions  LINES: R femoral imeldaley in place                          9.7    8.60  )-----------( 100      ( 12 Mar 2021 00:30 )             31.4     PT/INR - ( 12 Mar 2021 00:30 )   PT: 13.3 sec;   INR: 1.11 ratio         PTT - ( 12 Mar 2021 00:30 )  PTT:29.8 sec      136  |  97  |  23  ----------------------------<  95  3.6   |  24  |  3.68<H>    Ca    8.7      12 Mar 2021 00:30  Phos  3.3     03-12  Mg     2.0     03-12    TPro  6.2  /  Alb  3.1<L>  /  TBili  0.4  /  DBili  x   /  AST  34  /  ALT  23  /  AlkPhos  66  03-12    CAPILLARY BLOOD GLUCOSE      POCT Blood Glucose.: 101 mg/dL (12 Mar 2021 06:35)  POCT Blood Glucose.: 87 mg/dL (12 Mar 2021 05:44)  POCT Blood Glucose.: 102 mg/dL (11 Mar 2021 17:11)    LIVER FUNCTIONS - ( 12 Mar 2021 00:30 )  Alb: 3.1 g/dL / Pro: 6.2 g/dL / ALK PHOS: 66 U/L / ALT: 23 U/L / AST: 34 U/L / GGT: x               Urinalysis Basic - ( 10 Mar 2021 16:14 )    Color: Yellow / Appearance: Clear / S.019 / pH: x  Gluc: x / Ketone: Negative  / Bili: Small / Urobili: Negative   Blood: x / Protein: 300 mg/dL / Nitrite: Negative   Leuk Esterase: Trace / RBC: x / WBC x   Sq Epi: x / Non Sq Epi: x / Bacteria: x        Culture - Blood (collected 10 Mar 2021 23:00)  Source: .Blood Blood-Peripheral  Preliminary Report (12 Mar 2021 01:01):    No growth to date.    Culture - Blood (collected 10 Mar 2021 22:58)  Source: .Blood Blood-Peripheral  Preliminary Report (11 Mar 2021 23:01):    No growth to date.    Culture - Sputum (collected 10 Mar 2021 18:31)  Source: .Sputum Sputum  Gram Stain (10 Mar 2021 21:29):    Rare Squamous epithelial cells per low power field    Few polymorphonuclear leukocytes    Moderate Gram Variable Rods per oil power field    Numerous Gram positive cocci in pairs per oil power field  Preliminary Report (11 Mar 2021 19:17):    Few Pseudomonas aeruginosa    Normal Respiratory Aranza present      ABG - ( 12 Mar 2021 00:29 )  pH, Arterial: 7.42  pH, Blood: x     /  pCO2: 46    /  pO2: 106   / HCO3: 30    / Base Excess: 4.9   /  SaO2: 98                  RADIOLOGY AND ADDITIONAL TESTS:    CONSULTANT NOTES REVIEWED:    CARE DISCUSSED WITH THE FOLLOWING CONSULTANTS/PROVIDERS:

## 2021-03-13 LAB
ALBUMIN SERPL ELPH-MCNC: 3.1 G/DL — LOW (ref 3.3–5)
ALP SERPL-CCNC: 68 U/L — SIGNIFICANT CHANGE UP (ref 40–120)
ALT FLD-CCNC: 23 U/L — SIGNIFICANT CHANGE UP (ref 10–45)
ANION GAP SERPL CALC-SCNC: 12 MMOL/L — SIGNIFICANT CHANGE UP (ref 5–17)
APTT BLD: 39 SEC — HIGH (ref 27.5–35.5)
AST SERPL-CCNC: 38 U/L — SIGNIFICANT CHANGE UP (ref 10–40)
BILIRUB SERPL-MCNC: 0.2 MG/DL — SIGNIFICANT CHANGE UP (ref 0.2–1.2)
BUN SERPL-MCNC: 40 MG/DL — HIGH (ref 7–23)
CALCIUM SERPL-MCNC: 8.8 MG/DL — SIGNIFICANT CHANGE UP (ref 8.4–10.5)
CHLORIDE SERPL-SCNC: 101 MMOL/L — SIGNIFICANT CHANGE UP (ref 96–108)
CO2 SERPL-SCNC: 24 MMOL/L — SIGNIFICANT CHANGE UP (ref 22–31)
CREAT SERPL-MCNC: 5.35 MG/DL — HIGH (ref 0.5–1.3)
GAS PNL BLDA: SIGNIFICANT CHANGE UP
GLUCOSE SERPL-MCNC: 105 MG/DL — HIGH (ref 70–99)
HCT VFR BLD CALC: 29.8 % — LOW (ref 39–50)
HGB BLD-MCNC: 9 G/DL — LOW (ref 13–17)
INR BLD: 1.08 RATIO — SIGNIFICANT CHANGE UP (ref 0.88–1.16)
MAGNESIUM SERPL-MCNC: 2.2 MG/DL — SIGNIFICANT CHANGE UP (ref 1.6–2.6)
MCHC RBC-ENTMCNC: 22.9 PG — LOW (ref 27–34)
MCHC RBC-ENTMCNC: 30.2 GM/DL — LOW (ref 32–36)
MCV RBC AUTO: 75.8 FL — LOW (ref 80–100)
NRBC # BLD: 0 /100 WBCS — SIGNIFICANT CHANGE UP (ref 0–0)
PHOSPHATE SERPL-MCNC: 4.2 MG/DL — SIGNIFICANT CHANGE UP (ref 2.5–4.5)
PLATELET # BLD AUTO: 92 K/UL — LOW (ref 150–400)
POTASSIUM SERPL-MCNC: 3.5 MMOL/L — SIGNIFICANT CHANGE UP (ref 3.5–5.3)
POTASSIUM SERPL-SCNC: 3.5 MMOL/L — SIGNIFICANT CHANGE UP (ref 3.5–5.3)
PROT SERPL-MCNC: 6 G/DL — SIGNIFICANT CHANGE UP (ref 6–8.3)
PROTHROM AB SERPL-ACNC: 12.9 SEC — SIGNIFICANT CHANGE UP (ref 10.6–13.6)
RBC # BLD: 3.93 M/UL — LOW (ref 4.2–5.8)
RBC # FLD: 16.5 % — HIGH (ref 10.3–14.5)
SODIUM SERPL-SCNC: 137 MMOL/L — SIGNIFICANT CHANGE UP (ref 135–145)
WBC # BLD: 7.39 K/UL — SIGNIFICANT CHANGE UP (ref 3.8–10.5)
WBC # FLD AUTO: 7.39 K/UL — SIGNIFICANT CHANGE UP (ref 3.8–10.5)

## 2021-03-13 PROCEDURE — 99446 NTRPROF PH1/NTRNET/EHR 5-10: CPT

## 2021-03-13 PROCEDURE — 99233 SBSQ HOSP IP/OBS HIGH 50: CPT

## 2021-03-13 RX ORDER — CARVEDILOL PHOSPHATE 80 MG/1
25 CAPSULE, EXTENDED RELEASE ORAL EVERY 12 HOURS
Refills: 0 | Status: DISCONTINUED | OUTPATIENT
Start: 2021-03-13 | End: 2021-03-30

## 2021-03-13 RX ORDER — ASPIRIN/CALCIUM CARB/MAGNESIUM 324 MG
81 TABLET ORAL DAILY
Refills: 0 | Status: DISCONTINUED | OUTPATIENT
Start: 2021-03-13 | End: 2021-03-30

## 2021-03-13 RX ADMIN — HEPARIN SODIUM 5000 UNIT(S): 5000 INJECTION INTRAVENOUS; SUBCUTANEOUS at 05:01

## 2021-03-13 RX ADMIN — CARVEDILOL PHOSPHATE 25 MILLIGRAM(S): 80 CAPSULE, EXTENDED RELEASE ORAL at 18:02

## 2021-03-13 RX ADMIN — Medication 1 DROP(S): at 05:02

## 2021-03-13 RX ADMIN — HEPARIN SODIUM 5000 UNIT(S): 5000 INJECTION INTRAVENOUS; SUBCUTANEOUS at 18:02

## 2021-03-13 RX ADMIN — ERYTHROPOIETIN 10000 UNIT(S): 10000 INJECTION, SOLUTION INTRAVENOUS; SUBCUTANEOUS at 22:38

## 2021-03-13 RX ADMIN — CHLORHEXIDINE GLUCONATE 1 APPLICATION(S): 213 SOLUTION TOPICAL at 05:02

## 2021-03-13 RX ADMIN — Medication 1 DROP(S): at 13:56

## 2021-03-13 RX ADMIN — PIPERACILLIN AND TAZOBACTAM 25 GRAM(S): 4; .5 INJECTION, POWDER, LYOPHILIZED, FOR SOLUTION INTRAVENOUS at 18:02

## 2021-03-13 RX ADMIN — Medication 81 MILLIGRAM(S): at 13:55

## 2021-03-13 RX ADMIN — PIPERACILLIN AND TAZOBACTAM 25 GRAM(S): 4; .5 INJECTION, POWDER, LYOPHILIZED, FOR SOLUTION INTRAVENOUS at 05:01

## 2021-03-13 NOTE — PROGRESS NOTE ADULT - SUBJECTIVE AND OBJECTIVE BOX
CHIEF COMPLAINT:  patient is still in a  MICU  524 waiting for placement awake and verbal sitting in the chair 1+ edema noted due to no dialysis done yet  SUBJECTIVE:     REVIEW OF SYSTEMS: without complaint    CONSTITUTIONAL: (  )  weakness,  (  ) fevers or chills  EYES/ENT: (  )visual changes;     NECK: (  ) pain or stiffness  RESPIRATORY:   (  )cough, wheezing, hemoptysis;  (  ) shortness of breath  CARDIOVASCULAR:  (  )chest pain or palpitations  GASTROINTESTINAL:   (  )abdominal or epigastric pain.  (  ) nausea, vomiting, or hematemesis;   (   ) diarrhea or constipation.   GENITOURINARY:   (    ) dysuria, frequency or hematuria  NEUROLOGICAL:  (   ) numbness or weakness   All other review of systems is negative unless indicated above    Vital Signs Last 24 Hrs  T(C): 37 (13 Mar 2021 17:15), Max: 37.1 (13 Mar 2021 00:00)  T(F): 98.6 (13 Mar 2021 17:15), Max: 98.7 (13 Mar 2021 00:00)  HR: 73 (13 Mar 2021 17:15) (70 - 86)  BP: 147/76 (13 Mar 2021 17:15) (106/55 - 172/78)  BP(mean): 107 (13 Mar 2021 15:00) (78 - 122)  RR: 18 (13 Mar 2021 17:15) (15 - 26)  SpO2: 98% (13 Mar 2021 17:15) (95% - 100%)    I&O's Summary    12 Mar 2021 07:01  -  13 Mar 2021 07:00  --------------------------------------------------------  IN: 1307 mL / OUT: 30 mL / NET: 1277 mL        CAPILLARY BLOOD GLUCOSE          PHYSICAL EXAM:    Constitutional:  (x   ) NAD,   (  x )awake and alert  HEENT: PERR, EOMI,    Neck: Soft and supple, No LAD, No JVD  Respiratory:  (  x  Breath sounds are clear bilaterally,    (   ) wheezing, rales or rhonchi  Cardiovascular:     (   x)S1 and S2, regular rate and rhythm, no Murmurs, gallops or rubs  Gastrointestinal:  ( x  )Bowel Sounds present, soft,   (  )nontender, nondistended,    Extremities:    ( x ) peripheral edema  Vascular: 2+ peripheral pulses  Neurological:    (  x  )A/O x 3,   (  ) focal deficits  Musculoskeletal:    ( x  )  normal strength b/l upper  (   x  ) normal  lower extremities  Skin: No rashes    MEDICATIONS:  MEDICATIONS  (STANDING):  albuterol/ipratropium for Nebulization. 3 milliLiter(s) Nebulizer once  aspirin enteric coated 81 milliGRAM(s) Oral daily  carvedilol 25 milliGRAM(s) Oral every 12 hours  epoetin hiro-epbx (RETACRIT) Injectable 32186 Unit(s) IV Push once  heparin   Injectable 5000 Unit(s) SubCutaneous every 12 hours  piperacillin/tazobactam IVPB.. 3.375 Gram(s) IV Intermittent every 12 hours  tamsulosin 0.4 milliGRAM(s) Oral at bedtime  trimethoprim/polymyxin Solution 1 Drop(s) Both EYES three times a day      LABS: All Labs Reviewed:                        9.0    7.39  )-----------( 92       ( 13 Mar 2021 00:30 )             29.8     03-13    137  |  101  |  40<H>  ----------------------------<  105<H>  3.5   |  24  |  5.35<H>    Ca    8.8      13 Mar 2021 00:31  Phos  4.2     03-13  Mg     2.2     03-13    TPro  6.0  /  Alb  3.1<L>  /  TBili  0.2  /  DBili  x   /  AST  38  /  ALT  23  /  AlkPhos  68  03-13    PT/INR - ( 13 Mar 2021 00:30 )   PT: 12.9 sec;   INR: 1.08 ratio         PTT - ( 13 Mar 2021 00:30 )  PTT:39.0 sec      Blood Culture: 03-10 @ 23:00  Organism --  Gram Stain Blood -- Gram Stain --  Specimen Source .Blood Blood-Peripheral  Culture-Blood --    03-10 @ 22:58  Organism --  Gram Stain Blood -- Gram Stain --  Specimen Source .Blood Blood-Peripheral  Culture-Blood --    03-10 @ 18:31  Organism Pseudomonas aeruginosa  Gram Stain Blood -- Gram Stain   Rare Squamous epithelial cells per low power field  Few polymorphonuclear leukocytes  Moderate Gram Variable Rods per oil power field  Numerous Gram positive cocci in pairs per oil power field  Specimen Source .Sputum Sputum  Culture-Blood --      Urine Culture      RADIOLOGY/EKG:    ASSESSMENT AND PLAN:  Pt is a 79 y/o with hx HTN, T2DM? (last a1c 5.8), worsening Acute Renal Failure superimposed on CKD IV with Chronic HFpEF 60%, admitted 3/4/21 for oliguric renal failure and CHF exacerbation intubated 3/9 for lethargy/airway protection and transferred to MICU s/p 3 sessions HD, extubated 3/11.     NEURO  - pt is intubated since 3/9 for lethargy/airway protection -> extubated 3/11 pm.   - sedation: off sedation  - mental status: A&O x3, responds coherently to all questions  - CTH 3/9: no acute changes        CARDIOVASCULAR  - admitted to hospital with concern for CHF exacerbation (elevated BNP ~30,000, volume overloaded on exam): Echo from 3/5/2021: EF 60%, moderate severe LVOT obstruction, mild segmental LV systolic dysfunction.   - was on bumex gtt on medicine floor; now getting HD via shiley, off diuretics.   - will avoid reducing preload too much with HD given LVOT obstruction  - at home for HTN and heart failure: nifedipine 90mg PO daily, coreg 25mg PO BID, Hydralazine 100mg q8h--- will reinitiate as tolerated       PULMONARY  - s/p intubation 3/9 for hypercapnic respiratory failure, lethargy, airway protection -> extubated 3/11 pm  - started on bipap overnight for abg below, on NC during day.   ABG - ( 13 Mar 2021 00:32 )  pH, Arterial: 7.35  pH, Blood: x     /  pCO2: 51    /  pO2: 114   / HCO3: 28    / Base Excess: 2.1   /  SaO2: 99        - suspect ARIELLE given hypercapnia on admission, body habitus, recommend sleep study outpt  - on bedside US, has bilateral pleural effusion (R>L)  - CXR with consolidation LLL - atelectasis vs pneumonia. bilateral pleural effusions.           INFECTIOUS DISEASE   - intermittent fevers since 3/10 pm, CXR with LLL consolidation concerning for pneumonia (CXR on admission to hospital did not have this). started zosyn for empiric coverage 3/10, planned to continue for 5 days. (vanc discontinued as mrsa swab negative).   - cultures: BCx 3/10 x2 NGTD; sputum culture 3/10 - few pseudomonas; MRSA PCR negative but staph positive.   - LINES: R femoral shiley placed 3/9, an, peripheral IV access      GASTROINTESTINAL  - OG tube removed, pt on diet  - having regular bm       RENAL  - worsening renal failure on CKD stage IV. Cr 5.49 (4.46 on admission march 4; 3.07 a year ago); BUN improved.   - nephrology following, shiley placed 3/9, s/p HD 3/9 (2.5L removed) and 3/10 (2.5L removed) and 3/11 (2L removed). Will get dialysis today.   - per nephrology, recommend permacath - IR consult placed   - volume status: volume overloaded with bilateral UE and LE pitting edema, improving after 3 sessions HD    - monitor electrolytes  -3/13/2021 patient 1+ edema was not dialyzed yet         HEMATOLOGY  - hb stable at 9-10; plt stable at 160s.   - no active signs of bleeding    ETHICS  - discussion with spouse 3/9: pt is FULL CODE.       DVT PPX:    ADVANCED DIRECTIVE:    DISPOSITION:transferred to [facility] discussed with ICU resident

## 2021-03-13 NOTE — PROGRESS NOTE ADULT - SUBJECTIVE AND OBJECTIVE BOX
Contact Information:  Beckie Rendon  PGY-1, Internal Medicine pager [phone]    BAKARI CHARLES, [id number]    Patient is a 80y old  Male who presents with a chief complaint of Shortness of breath (12 Mar 2021 17:49)      INTERVAL/OVERNIGHT EVENTS:  - overnight - ABG:  ABG - ( 13 Mar 2021 00:32 )  pH, Arterial: 7.35  pH, Blood: x     /  pCO2: 51    /  pO2: 114   / HCO3: 28    / Base Excess: 2.1   /  SaO2: 99        - started on bipap 10/5  - epo ordered overnight - wanted to clarify with nephrology in am prior to giving it     SUBJECTIVE:        OBJECTIVE:  ICU Vital Signs Last 24 Hrs  T(C): 36.8 (13 Mar 2021 04:00), Max: 37.4 (12 Mar 2021 08:00)  T(F): 98.3 (13 Mar 2021 04:00), Max: 99.4 (12 Mar 2021 08:00)  HR: 76 (13 Mar 2021 06:00) (70 - 86)  BP: 152/74 (13 Mar 2021 06:00) (106/55 - 168/73)  BP(mean): 106 (13 Mar 2021 06:00) (78 - 106)  ABP: --  ABP(mean): --  RR: 18 (13 Mar 2021 06:00) (16 - 34)  SpO2: 98% (13 Mar 2021 06:00) (74% - 100%)    Daily     Daily   I&O's Summary    12 Mar 2021 07:01  -  13 Mar 2021 07:00  --------------------------------------------------------  IN: 1307 mL / OUT: 30 mL / NET: 1277 mL        MEDICATIONS  (STANDING):  albuterol/ipratropium for Nebulization. 3 milliLiter(s) Nebulizer once  aspirin  chewable 81 milliGRAM(s) Oral daily  chlorhexidine 4% Liquid 1 Application(s) Topical <User Schedule>  epoetin hiro-epbx (RETACRIT) Injectable 46217 Unit(s) IV Push once  heparin   Injectable 5000 Unit(s) SubCutaneous every 12 hours  piperacillin/tazobactam IVPB.. 3.375 Gram(s) IV Intermittent every 12 hours  tamsulosin 0.4 milliGRAM(s) Oral at bedtime  trimethoprim/polymyxin Solution 1 Drop(s) Both EYES three times a day    MEDICATIONS  (PRN):  acetaminophen    Suspension .. 650 milliGRAM(s) Oral every 6 hours PRN Temp greater or equal to 38C (100.4F), Mild Pain (1 - 3), Moderate Pain (4 - 6)  albuterol/ipratropium for Nebulization 3 milliLiter(s) Nebulizer every 6 hours PRN Shortness of Breath and/or Wheezing  sodium chloride 0.9% lock flush 10 milliLiter(s) IV Push every 1 hour PRN Pre/post blood products, medications, blood draw, and to maintain line patency    Allergies    grass, pollen (Rhinitis)  No Known Drug Allergies    Intolerances        General: sitting up in chair, on NC oxygen, appears comfortable (extubated 3/11 pm)  Chest/Lungs: anteriorly clear lung sounds  Heart: regular rate and rhythm  Abdomen: soft, nontender to palpation  Extremities: 3+ pitting edema UE and LE bilaterally, improved slightly from prior  Neuro: A&O x3, responds coherently to questions  LINES: R femoral shiley in place                            9.0    7.39  )-----------( 92       ( 13 Mar 2021 00:30 )             29.8     PT/INR - ( 13 Mar 2021 00:30 )   PT: 12.9 sec;   INR: 1.08 ratio         PTT - ( 13 Mar 2021 00:30 )  PTT:39.0 sec  03-13    137  |  101  |  40<H>  ----------------------------<  105<H>  3.5   |  24  |  5.35<H>    Ca    8.8      13 Mar 2021 00:31  Phos  4.2     03-13  Mg     2.2     03-13    TPro  6.0  /  Alb  3.1<L>  /  TBili  0.2  /  DBili  x   /  AST  38  /  ALT  23  /  AlkPhos  68  03-13    CAPILLARY BLOOD GLUCOSE      POCT Blood Glucose.: 179 mg/dL (12 Mar 2021 11:24)    LIVER FUNCTIONS - ( 13 Mar 2021 00:31 )  Alb: 3.1 g/dL / Pro: 6.0 g/dL / ALK PHOS: 68 U/L / ALT: 23 U/L / AST: 38 U/L / GGT: x                   Culture - Blood (collected 10 Mar 2021 23:00)  Source: .Blood Blood-Peripheral  Preliminary Report (12 Mar 2021 01:01):    No growth to date.    Culture - Blood (collected 10 Mar 2021 22:58)  Source: .Blood Blood-Peripheral  Preliminary Report (11 Mar 2021 23:01):    No growth to date.    Culture - Sputum (collected 10 Mar 2021 18:31)  Source: .Sputum Sputum  Gram Stain (10 Mar 2021 21:29):    Rare Squamous epithelial cells per low power field    Few polymorphonuclear leukocytes    Moderate Gram Variable Rods per oil power field    Numerous Gram positive cocci in pairs per oil power field  Final Report (12 Mar 2021 17:34):    Few Pseudomonas aeruginosa    Normal Respiratory Aranza present  Organism: Pseudomonas aeruginosa (12 Mar 2021 17:34)  Organism: Pseudomonas aeruginosa (12 Mar 2021 17:34)      ABG - ( 13 Mar 2021 00:32 )  pH, Arterial: 7.35  pH, Blood: x     /  pCO2: 51    /  pO2: 114   / HCO3: 28    / Base Excess: 2.1   /  SaO2: 99                  RADIOLOGY AND ADDITIONAL TESTS:    CONSULTANT NOTES REVIEWED:    CARE DISCUSSED WITH THE FOLLOWING CONSULTANTS/PROVIDERS: Contact Information:  Beckie Rendon  PGY-1, Internal Medicine pager [phone]    BAKARI CHARLES, MRN-[id number]    Patient is a 80y old  Male who presents with a chief complaint of Shortness of breath (12 Mar 2021 17:49)      INTERVAL/OVERNIGHT EVENTS:  - overnight - ABG: (likely his baseline)  ABG - ( 13 Mar 2021 00:32 )  pH, Arterial: 7.35  pH, Blood: x     /  pCO2: 51    /  pO2: 114   / HCO3: 28    / Base Excess: 2.1   /  SaO2: 99        - started on bipap 10/5  - epo ordered overnight - wanted to clarify with nephrology in am prior to giving it     SUBJECTIVE:  - on 3L NC oxygen  - pt is A&O x3, responds coherently to questions. denies pain, no sob.       OBJECTIVE:  ICU Vital Signs Last 24 Hrs  T(C): 36.8 (13 Mar 2021 04:00), Max: 37.4 (12 Mar 2021 08:00)  T(F): 98.3 (13 Mar 2021 04:00), Max: 99.4 (12 Mar 2021 08:00)  HR: 76 (13 Mar 2021 06:00) (70 - 86)  BP: 152/74 (13 Mar 2021 06:00) (106/55 - 168/73)  BP(mean): 106 (13 Mar 2021 06:00) (78 - 106)  ABP: --  ABP(mean): --  RR: 18 (13 Mar 2021 06:00) (16 - 34)  SpO2: 98% (13 Mar 2021 06:00) (74% - 100%)    Daily     Daily   I&O's Summary    12 Mar 2021 07:01  -  13 Mar 2021 07:00  --------------------------------------------------------  IN: 1307 mL / OUT: 30 mL / NET: 1277 mL        MEDICATIONS  (STANDING):  albuterol/ipratropium for Nebulization. 3 milliLiter(s) Nebulizer once  aspirin  chewable 81 milliGRAM(s) Oral daily  chlorhexidine 4% Liquid 1 Application(s) Topical <User Schedule>  epoetin hiro-epbx (RETACRIT) Injectable 50803 Unit(s) IV Push once  heparin   Injectable 5000 Unit(s) SubCutaneous every 12 hours  piperacillin/tazobactam IVPB.. 3.375 Gram(s) IV Intermittent every 12 hours  tamsulosin 0.4 milliGRAM(s) Oral at bedtime  trimethoprim/polymyxin Solution 1 Drop(s) Both EYES three times a day    MEDICATIONS  (PRN):  acetaminophen    Suspension .. 650 milliGRAM(s) Oral every 6 hours PRN Temp greater or equal to 38C (100.4F), Mild Pain (1 - 3), Moderate Pain (4 - 6)  albuterol/ipratropium for Nebulization 3 milliLiter(s) Nebulizer every 6 hours PRN Shortness of Breath and/or Wheezing  sodium chloride 0.9% lock flush 10 milliLiter(s) IV Push every 1 hour PRN Pre/post blood products, medications, blood draw, and to maintain line patency    Allergies    grass, pollen (Rhinitis)  No Known Drug Allergies    Intolerances        General: sitting up in chair, on NC oxygen, appears comfortable (extubated 3/11 pm)  Chest/Lungs: anteriorly clear lung sounds  Heart: regular rate and rhythm  Abdomen: soft, nontender to palpation  Extremities: 3+ pitting edema UE and LE bilaterally, improved from prior  Neuro: A&O x3, responds coherently to questions  LINES: ALEX femoral boone in place                            9.0    7.39  )-----------( 92       ( 13 Mar 2021 00:30 )             29.8     PT/INR - ( 13 Mar 2021 00:30 )   PT: 12.9 sec;   INR: 1.08 ratio         PTT - ( 13 Mar 2021 00:30 )  PTT:39.0 sec  03-13    137  |  101  |  40<H>  ----------------------------<  105<H>  3.5   |  24  |  5.35<H>    Ca    8.8      13 Mar 2021 00:31  Phos  4.2     03-13  Mg     2.2     03-13    TPro  6.0  /  Alb  3.1<L>  /  TBili  0.2  /  DBili  x   /  AST  38  /  ALT  23  /  AlkPhos  68  03-13    CAPILLARY BLOOD GLUCOSE      POCT Blood Glucose.: 179 mg/dL (12 Mar 2021 11:24)    LIVER FUNCTIONS - ( 13 Mar 2021 00:31 )  Alb: 3.1 g/dL / Pro: 6.0 g/dL / ALK PHOS: 68 U/L / ALT: 23 U/L / AST: 38 U/L / GGT: x                   Culture - Blood (collected 10 Mar 2021 23:00)  Source: .Blood Blood-Peripheral  Preliminary Report (12 Mar 2021 01:01):    No growth to date.    Culture - Blood (collected 10 Mar 2021 22:58)  Source: .Blood Blood-Peripheral  Preliminary Report (11 Mar 2021 23:01):    No growth to date.    Culture - Sputum (collected 10 Mar 2021 18:31)  Source: .Sputum Sputum  Gram Stain (10 Mar 2021 21:29):    Rare Squamous epithelial cells per low power field    Few polymorphonuclear leukocytes    Moderate Gram Variable Rods per oil power field    Numerous Gram positive cocci in pairs per oil power field  Final Report (12 Mar 2021 17:34):    Few Pseudomonas aeruginosa    Normal Respiratory Aranza present  Organism: Pseudomonas aeruginosa (12 Mar 2021 17:34)  Organism: Pseudomonas aeruginosa (12 Mar 2021 17:34)      ABG - ( 13 Mar 2021 00:32 )  pH, Arterial: 7.35  pH, Blood: x     /  pCO2: 51    /  pO2: 114   / HCO3: 28    / Base Excess: 2.1   /  SaO2: 99                  RADIOLOGY AND ADDITIONAL TESTS:    CONSULTANT NOTES REVIEWED:    CARE DISCUSSED WITH THE FOLLOWING CONSULTANTS/PROVIDERS:

## 2021-03-13 NOTE — PROGRESS NOTE ADULT - ASSESSMENT
Assessment and Plan: Pt is a 79 y/o with hx HTN, T2DM? (last a1c 5.8), worsening Acute Renal Failure superimposed on CKD IV with Chronic HFpEF 60%, admitted 3/4/21 for oliguric renal failure and CHF exacerbation intubated 3/9 for lethargy/airway protection and transferred to MICU s/p 3 sessions HD, extubated 3/11.     NEURO  - pt is intubated since 3/9 for lethargy/airway protection -> extubated 3/11 pm.   - sedation: off sedation  - mental status: A&O x3, responds coherently to all questions  - CTH 3/9: no acute changes        CARDIOVASCULAR  - admitted to hospital with concern for CHF exacerbation (elevated BNP ~30,000, volume overloaded on exam): Echo from 3/5/2021: EF 60%, moderate severe LVOT obstruction, mild segmental LV systolic dysfunction.   - was on bumex gtt on medicine floor; now getting HD via shiley, off diuretics.   - will avoid reducing preload too much with HD given LVOT obstruction  - at home for HTN and heart failure: nifedipine 90mg PO daily, coreg 25mg PO BID, Hydralazine 100mg q8h--- will reinitiate as tolerated       PULMONARY  - s/p intubation 3/9 for hypercapnic respiratory failure, lethargy, airway protection -> extubated 3/11 pm  - started on bipap overnight for abg below:   ABG - ( 13 Mar 2021 00:32 )  pH, Arterial: 7.35  pH, Blood: x     /  pCO2: 51    /  pO2: 114   / HCO3: 28    / Base Excess: 2.1   /  SaO2: 99        - suspect ARIELLE given hypercapnia on admission, body habitus, recommend sleep study outpt  - on bedside US, has bilateral pleural effusion (R>L)  - CXR with consolidation LLL - atelectasis vs pneumonia. bilateral pleural effusions.           INFECTIOUS DISEASE   - intermittent fevers since 3/10 pm, CXR with LLL consolidation concerning for pneumonia (CXR on admission to hospital did not have this). started zosyn for empiric coverage 3/10. (vanc discontinued as mrsa swab negative).   - cultures: BCx 3/10 x2 NGTD; sputum culture 3/10 - few pseudomonas; MRSA PCR negative but staph positive.   - LINES: R femoral shiley placed 3/9, an, peripheral IV access      GASTROINTESTINAL  - OG tube removed, pt on diet  - having regular bm       RENAL  - worsening renal failure on CKD stage IV. Cr 5.49 (4.46 on admission march 4; 3.07 a year ago); BUN improved to 31.  - nephrology following, boone placed 3/9, s/p HD 3/9 (2.5L removed) and 3/10 (2.5L removed) and 3/11 (2L removed)  - per nephrology, will get permacath - IR consult placed   - volume status: volume overloaded with bilateral UE and LE pitting edema, improving after 3 sessions HD    - monitor electrolytes        ENDOCRINOLOGY  - glc stable at 95. on tube feeds, CTM.   - SSI as was concern of potential DM (but a1c 5.8)    HEMATOLOGY  - hb stable at 9-10; plt stable at 160s.   - no active signs of bleeding    ETHICS  - discussion with spouse 3/9: pt is FULL CODE.       Beckie Rendon PGY1.     Assessment and Plan: Pt is a 79 y/o with hx HTN, T2DM? (last a1c 5.8), worsening Acute Renal Failure superimposed on CKD IV with Chronic HFpEF 60%, admitted 3/4/21 for oliguric renal failure and CHF exacerbation intubated 3/9 for lethargy/airway protection and transferred to MICU s/p 3 sessions HD, extubated 3/11.     NEURO  - pt is intubated since 3/9 for lethargy/airway protection -> extubated 3/11 pm.   - sedation: off sedation  - mental status: A&O x3, responds coherently to all questions  - CTH 3/9: no acute changes        CARDIOVASCULAR  - admitted to hospital with concern for CHF exacerbation (elevated BNP ~30,000, volume overloaded on exam): Echo from 3/5/2021: EF 60%, moderate severe LVOT obstruction, mild segmental LV systolic dysfunction.   - was on bumex gtt on medicine floor; now getting HD via shiley, off diuretics.   - will avoid reducing preload too much with HD given LVOT obstruction  - at home for HTN and heart failure: nifedipine 90mg PO daily, coreg 25mg PO BID, Hydralazine 100mg q8h--- will reinitiate as tolerated       PULMONARY  - s/p intubation 3/9 for hypercapnic respiratory failure, lethargy, airway protection -> extubated 3/11 pm  - started on bipap overnight for abg below, on NC during day.   ABG - ( 13 Mar 2021 00:32 )  pH, Arterial: 7.35  pH, Blood: x     /  pCO2: 51    /  pO2: 114   / HCO3: 28    / Base Excess: 2.1   /  SaO2: 99        - suspect ARIELLE given hypercapnia on admission, body habitus, recommend sleep study outpt  - on bedside US, has bilateral pleural effusion (R>L)  - CXR with consolidation LLL - atelectasis vs pneumonia. bilateral pleural effusions.           INFECTIOUS DISEASE   - intermittent fevers since 3/10 pm, CXR with LLL consolidation concerning for pneumonia (CXR on admission to hospital did not have this). started zosyn for empiric coverage 3/10, planned to continue for 5 days. (vanc discontinued as mrsa swab negative).   - cultures: BCx 3/10 x2 NGTD; sputum culture 3/10 - few pseudomonas; MRSA PCR negative but staph positive.   - LINES: R femoral shiley placed 3/9, an, peripheral IV access      GASTROINTESTINAL  - OG tube removed, pt on diet  - having regular bm       RENAL  - worsening renal failure on CKD stage IV. Cr 5.49 (4.46 on admission march 4; 3.07 a year ago); BUN improved.   - nephrology following, shiley placed 3/9, s/p HD 3/9 (2.5L removed) and 3/10 (2.5L removed) and 3/11 (2L removed). Will get dialysis today.   - per nephrology, recommend permacath - IR consult placed   - volume status: volume overloaded with bilateral UE and LE pitting edema, improving after 3 sessions HD    - monitor electrolytes        ENDOCRINOLOGY  - glc stable at 95. on tube feeds, CTM.   - SSI as was concern of potential DM (but a1c 5.8)    HEMATOLOGY  - hb stable at 9-10; plt stable at 160s.   - no active signs of bleeding    ETHICS  - discussion with spouse 3/9: pt is FULL CODE.       Beckie Rendon PGY1.

## 2021-03-13 NOTE — PROGRESS NOTE ADULT - ASSESSMENT
80M w/ hx of CKD4, dCHF, difficult to control HTN, DM?? p/w SOB and CHF  Suspect he has diastolic CHF at present   CKD stage 4-5        1 Renal-HD, HD today fluid removal as tolerated by BP  DC an  Remove shiley and make arrangements for perm cath   2 [facility]-  cath likely on monday?  Severe LVOT so avoid excessive fluid removal at HD  3 Vasc-vasc consult p cath      Standard DVT prophylaxis     Rekha Faith NP  [facility]  [phone]

## 2021-03-13 NOTE — PROGRESS NOTE ADULT - SUBJECTIVE AND OBJECTIVE BOX
Nephrology Progress Note    Patient is a 80y male OOB to chair, on 2 L NC.    Allergies:  grass, pollen (Rhinitis)  No Known Drug Allergies    Hospital Medications:   MEDICATIONS  (STANDING):  albuterol/ipratropium for Nebulization. 3 milliLiter(s) Nebulizer once  aspirin enteric coated 81 milliGRAM(s) Oral daily  carvedilol 25 milliGRAM(s) Oral every 12 hours  epoetin hiro-epbx (RETACRIT) Injectable 82353 Unit(s) IV Push once  heparin   Injectable 5000 Unit(s) SubCutaneous every 12 hours  piperacillin/tazobactam IVPB.. 3.375 Gram(s) IV Intermittent every 12 hours  tamsulosin 0.4 milliGRAM(s) Oral at bedtime  trimethoprim/polymyxin Solution 1 Drop(s) Both EYES three times a day      VITALS:  T(F): 98.5 (21 @ 12:00), Max: 98.7 (21 @ 16:00)  HR: 74 (21 @ 15:00)  BP: 165/74 (21 @ 15:00)  RR: 25 (21 @ 15:00)  SpO2: 96% (21 @ 15:00)       @ 07:  -   @ 07:00  --------------------------------------------------------  IN: 665 mL / OUT: 2125 mL / NET: -1460 mL     @ 07:01  -   @ 07:00  --------------------------------------------------------  IN: 1307 mL / OUT: 30 mL / NET: 1277 mL        PHYSICAL EXAM:        LABS:      137  |  101  |  40<H>  ----------------------------<  105<H>  3.5   |  24  |  5.35<H>    Ca    8.8      13 Mar 2021 00:31  Phos  4.2     03-13  Mg     2.2     03-13    TPro  6.0  /  Alb  3.1<L>  /  TBili  0.2  /  DBili      /  AST  38  /  ALT  23  /  AlkPhos  68  03-13                          9.0    7.39  )-----------( 92       ( 13 Mar 2021 00:30 )             29.8       Urine Studies:  Urinalysis Basic - ( 10 Mar 2021 16:14 )    Color: Yellow / Appearance: Clear / S.019 / pH:   Gluc:  / Ketone: Negative  / Bili: Small / Urobili: Negative   Blood:  / Protein: 300 mg/dL / Nitrite: Negative   Leuk Esterase: Trace / RBC:  / WBC    Sq Epi:  / Non Sq Epi:  / Bacteria:          Nephrology Progress Note    Patient is a 80y male OOB to chair, on 2 L NC.    Allergies:  grass, pollen (Rhinitis)  No Known Drug Allergies    Hospital Medications:   MEDICATIONS  (STANDING):  albuterol/ipratropium for Nebulization. 3 milliLiter(s) Nebulizer once  aspirin enteric coated 81 milliGRAM(s) Oral daily  carvedilol 25 milliGRAM(s) Oral every 12 hours  epoetin hiro-epbx (RETACRIT) Injectable 04010 Unit(s) IV Push once  heparin   Injectable 5000 Unit(s) SubCutaneous every 12 hours  piperacillin/tazobactam IVPB.. 3.375 Gram(s) IV Intermittent every 12 hours  tamsulosin 0.4 milliGRAM(s) Oral at bedtime  trimethoprim/polymyxin Solution 1 Drop(s) Both EYES three times a day      VITALS:  T(F): 98.5 (21 @ 12:00), Max: 98.7 (21 @ 16:00)  HR: 74 (21 @ 15:00)  BP: 165/74 (21 @ 15:00)  RR: 25 (21 @ 15:00)  SpO2: 96% (21 @ 15:00)       @ 07:  -   @ 07:00  --------------------------------------------------------  IN: 665 mL / OUT: 2125 mL / NET: -1460 mL     @ 07:01  -   @ 07:00  --------------------------------------------------------  IN: 1307 mL / OUT: 30 mL / NET: 1277 mL        PHYSICAL EXAM:  Constitutional: NAD  Neck:  No JVD  Respiratory: CTAB/L  Cardiovascular: S1 and S2  Gastrointestinal: BS+, soft, NT/ND  Extremities: +  peripheral edema  Neurological: A/O x 3, no focal deficits  Psychiatric: Normal mood, normal affect  : +Aguilar  Skin: No rashes  Access: [facility]       LABS:      137  |  101  |  40<H>  ----------------------------<  105<H>  3.5   |  24  |  5.35<H>    Ca    8.8      13 Mar 2021 00:31  Phos  4.2       Mg     2.2         TPro  6.0  /  Alb  3.1<L>  /  TBili  0.2  /  DBili      /  AST  38  /  ALT  23  /  AlkPhos  68                            9.0    7.39  )-----------( 92       ( 13 Mar 2021 00:30 )             29.8       Urine Studies:  Urinalysis Basic - ( 10 Mar 2021 16:14 )    Color: Yellow / Appearance: Clear / S.019 / pH:   Gluc:  / Ketone: Negative  / Bili: Small / Urobili: Negative   Blood:  / Protein: 300 mg/dL / Nitrite: Negative   Leuk Esterase: Trace / RBC:  / WBC    Sq Epi:  / Non Sq Epi:  / Bacteria:

## 2021-03-14 LAB
ALBUMIN SERPL ELPH-MCNC: 3 G/DL — LOW (ref 3.3–5)
ALP SERPL-CCNC: 63 U/L — SIGNIFICANT CHANGE UP (ref 40–120)
ALT FLD-CCNC: 30 U/L — SIGNIFICANT CHANGE UP (ref 10–45)
ANION GAP SERPL CALC-SCNC: 9 MMOL/L — SIGNIFICANT CHANGE UP (ref 5–17)
AST SERPL-CCNC: 43 U/L — HIGH (ref 10–40)
BASOPHILS # BLD AUTO: 0 K/UL — SIGNIFICANT CHANGE UP (ref 0–0.2)
BASOPHILS NFR BLD AUTO: 0 % — SIGNIFICANT CHANGE UP (ref 0–2)
BILIRUB SERPL-MCNC: 0.1 MG/DL — LOW (ref 0.2–1.2)
BLD GP AB SCN SERPL QL: NEGATIVE — SIGNIFICANT CHANGE UP
BUN SERPL-MCNC: 22 MG/DL — SIGNIFICANT CHANGE UP (ref 7–23)
CALCIUM SERPL-MCNC: 8.6 MG/DL — SIGNIFICANT CHANGE UP (ref 8.4–10.5)
CHLORIDE SERPL-SCNC: 99 MMOL/L — SIGNIFICANT CHANGE UP (ref 96–108)
CO2 SERPL-SCNC: 29 MMOL/L — SIGNIFICANT CHANGE UP (ref 22–31)
CREAT SERPL-MCNC: 4.15 MG/DL — HIGH (ref 0.5–1.3)
EOSINOPHIL # BLD AUTO: 0.37 K/UL — SIGNIFICANT CHANGE UP (ref 0–0.5)
EOSINOPHIL NFR BLD AUTO: 7 % — HIGH (ref 0–6)
GLUCOSE SERPL-MCNC: 185 MG/DL — HIGH (ref 70–99)
HCT VFR BLD CALC: 28.5 % — LOW (ref 39–50)
HGB BLD-MCNC: 8.8 G/DL — LOW (ref 13–17)
LYMPHOCYTES # BLD AUTO: 0.9 K/UL — LOW (ref 1–3.3)
LYMPHOCYTES # BLD AUTO: 17 % — SIGNIFICANT CHANGE UP (ref 13–44)
MAGNESIUM SERPL-MCNC: 2 MG/DL — SIGNIFICANT CHANGE UP (ref 1.6–2.6)
MCHC RBC-ENTMCNC: 23.3 PG — LOW (ref 27–34)
MCHC RBC-ENTMCNC: 30.9 GM/DL — LOW (ref 32–36)
MCV RBC AUTO: 75.6 FL — LOW (ref 80–100)
MONOCYTES # BLD AUTO: 0.53 K/UL — SIGNIFICANT CHANGE UP (ref 0–0.9)
MONOCYTES NFR BLD AUTO: 10 % — SIGNIFICANT CHANGE UP (ref 2–14)
NEUTROPHILS # BLD AUTO: 3.27 K/UL — SIGNIFICANT CHANGE UP (ref 1.8–7.4)
NEUTROPHILS NFR BLD AUTO: 61 % — SIGNIFICANT CHANGE UP (ref 43–77)
PHOSPHATE SERPL-MCNC: 2.7 MG/DL — SIGNIFICANT CHANGE UP (ref 2.5–4.5)
PLATELET # BLD AUTO: 94 K/UL — LOW (ref 150–400)
POTASSIUM SERPL-MCNC: 3 MMOL/L — LOW (ref 3.5–5.3)
POTASSIUM SERPL-SCNC: 3 MMOL/L — LOW (ref 3.5–5.3)
PROT SERPL-MCNC: 5.9 G/DL — LOW (ref 6–8.3)
RBC # BLD: 3.77 M/UL — LOW (ref 4.2–5.8)
RBC # FLD: 16.6 % — HIGH (ref 10.3–14.5)
RH IG SCN BLD-IMP: POSITIVE — SIGNIFICANT CHANGE UP
SODIUM SERPL-SCNC: 137 MMOL/L — SIGNIFICANT CHANGE UP (ref 135–145)
WBC # BLD: 5.28 K/UL — SIGNIFICANT CHANGE UP (ref 3.8–10.5)
WBC # FLD AUTO: 5.28 K/UL — SIGNIFICANT CHANGE UP (ref 3.8–10.5)

## 2021-03-14 RX ORDER — POTASSIUM CHLORIDE 20 MEQ
40 PACKET (EA) ORAL ONCE
Refills: 0 | Status: COMPLETED | OUTPATIENT
Start: 2021-03-14 | End: 2021-03-14

## 2021-03-14 RX ADMIN — Medication 1 DROP(S): at 11:18

## 2021-03-14 RX ADMIN — CARVEDILOL PHOSPHATE 25 MILLIGRAM(S): 80 CAPSULE, EXTENDED RELEASE ORAL at 17:43

## 2021-03-14 RX ADMIN — Medication 1 DROP(S): at 00:29

## 2021-03-14 RX ADMIN — TAMSULOSIN HYDROCHLORIDE 0.4 MILLIGRAM(S): 0.4 CAPSULE ORAL at 21:37

## 2021-03-14 RX ADMIN — TAMSULOSIN HYDROCHLORIDE 0.4 MILLIGRAM(S): 0.4 CAPSULE ORAL at 00:28

## 2021-03-14 RX ADMIN — HEPARIN SODIUM 5000 UNIT(S): 5000 INJECTION INTRAVENOUS; SUBCUTANEOUS at 06:34

## 2021-03-14 RX ADMIN — Medication 81 MILLIGRAM(S): at 11:18

## 2021-03-14 RX ADMIN — Medication 40 MILLIEQUIVALENT(S): at 11:18

## 2021-03-14 RX ADMIN — Medication 1 DROP(S): at 06:41

## 2021-03-14 RX ADMIN — HEPARIN SODIUM 5000 UNIT(S): 5000 INJECTION INTRAVENOUS; SUBCUTANEOUS at 17:42

## 2021-03-14 RX ADMIN — PIPERACILLIN AND TAZOBACTAM 25 GRAM(S): 4; .5 INJECTION, POWDER, LYOPHILIZED, FOR SOLUTION INTRAVENOUS at 17:43

## 2021-03-14 RX ADMIN — PIPERACILLIN AND TAZOBACTAM 25 GRAM(S): 4; .5 INJECTION, POWDER, LYOPHILIZED, FOR SOLUTION INTRAVENOUS at 06:34

## 2021-03-14 RX ADMIN — Medication 1 DROP(S): at 21:36

## 2021-03-14 RX ADMIN — CARVEDILOL PHOSPHATE 25 MILLIGRAM(S): 80 CAPSULE, EXTENDED RELEASE ORAL at 07:17

## 2021-03-14 NOTE — PROGRESS NOTE ADULT - SUBJECTIVE AND OBJECTIVE BOX
Nephrology Progress note    Patient is a 80y male Sitting up in bed , no new complaints or concern.    Allergies:  grass, pollen (Rhinitis)  No Known Drug Allergies    Hospital Medications:   MEDICATIONS  (STANDING):  albuterol/ipratropium for Nebulization. 3 milliLiter(s) Nebulizer once  aspirin enteric coated 81 milliGRAM(s) Oral daily  carvedilol 25 milliGRAM(s) Oral every 12 hours  heparin   Injectable 5000 Unit(s) SubCutaneous every 12 hours  piperacillin/tazobactam IVPB.. 3.375 Gram(s) IV Intermittent every 12 hours  tamsulosin 0.4 milliGRAM(s) Oral at bedtime  trimethoprim/polymyxin Solution 1 Drop(s) Both EYES three times a day      VITALS:  T(F): 98.8 (21 @ 10:00), Max: 99.5 (21 @ 06:06)  HR: 64 (21 @ 12:10)  BP: 158/77 (21 @ 10:00)  RR: 18 (21 @ 10:00)  SpO2: 97% (21 @ 12:10)       @ 07:01  -   @ 07:00  --------------------------------------------------------  IN: 1307 mL / OUT: 30 mL / NET: 1277 mL     @ 06:01  -   @ 07:00  --------------------------------------------------------  IN: 863 mL / OUT: 2800 mL / NET: -1937 mL        PHYSICAL EXAM:      LABS:      137  |  99  |  22  ----------------------------<  185<H>  3.0<L>   |  29  |  4.15<H>    Ca    8.6      14 Mar 2021 07:10  Phos  2.7     03-14  Mg     2.0     -14    TPro  5.9<L>  /  Alb  3.0<L>  /  TBili  0.1<L>  /  DBili      /  AST  43<H>  /  ALT  30  /  AlkPhos  63  03-14                          8.8    5.28  )-----------( 94       ( 14 Mar 2021 07:10 )             28.5       Urine Studies:  Urinalysis Basic - ( 10 Mar 2021 16:14 )    Color: Yellow / Appearance: Clear / S.019 / pH:   Gluc:  / Ketone: Negative  / Bili: Small / Urobili: Negative   Blood:  / Protein: 300 mg/dL / Nitrite: Negative   Leuk Esterase: Trace / RBC:  / WBC    Sq Epi:  / Non Sq Epi:  / Bacteria:

## 2021-03-14 NOTE — CONSULT NOTE ADULT - ASSESSMENT
Assessment: 80y Male with ESRD requiring long term dialysis referred for tunneled hemodialysis catheter placement    Plan: tunneled HD catheter placement 3/15 in IR  -Please place order for IR Procedure, approving attending Dr. Tovar  -GRIFFIN past midnight Sunday night  -hold therpaeutic and prophylactic anticoagulants Monday AM  -maintain active type and screen x 2    Sohan Wakefield MD, RPVI  Chief Resident, Interventional Radiology  [facility]: (x) [phone] ([phone]  [facility]: [phone]

## 2021-03-14 NOTE — CONSULT NOTE ADULT - SUBJECTIVE AND OBJECTIVE BOX
History of Present Illness: 80yMale with VIRGEN on CKD now requiring long-term hemodialysis is referred for tunneled hemodialysis catheter placement.    Allergies:   grass, pollen (Rhinitis)  No Known Drug Allergies    Medications (Antibiotics/Cardiovascular/Anticoagulants/Blood Products):   aspirin  chewable: 81 milliGRAM(s) Oral (03-13-21 @ 13:55)  carvedilol: 25 milliGRAM(s) Oral (03-13-21 @ 18:02)  heparin   Injectable: 5000 Unit(s) SubCutaneous (03-13-21 @ 18:02)  piperacillin/tazobactam IVPB..: 25 mL/Hr IV Intermittent (03-13-21 @ 18:02)  tamsulosin: 0.4 milliGRAM(s) Oral (03-14-21 @ 00:28)    Vital Signs:  T(F): 98.2 (03-14-21 @ 00:25), Max: 98.8 (03-13-21 @ 20:42)  HR: 62 (03-14-21 @ 00:25) (62 - 88)  BP: 150/64 (03-14-21 @ 00:25) (106/55 - 172/78)  RR: 18 (03-14-21 @ 00:25) (15 - 26)  SpO2: 100% (03-14-21 @ 00:25) (95% - 100%)    Relevant Lab Results:            9.0  7.39)-----(92     (03-13-21 @ 00:30)         29.8     137 | 101 | 40  --------------------< 105     (03-13-21 @ 00:31)  3.5 | 24 | 5.35       PT: 12.9 03-13-21 @ 00:30  aPTT: 39.0<H> 03-13-21 @ 00:30   INR: 1.08 03-13-21 @ 00:30

## 2021-03-14 NOTE — PROGRESS NOTE ADULT - SUBJECTIVE AND OBJECTIVE BOX
CHIEF COMPLAINT:    SUBJECTIVE:     REVIEW OF SYSTEMS:    CONSTITUTIONAL: (  )  weakness,  (  ) fevers or chills  EYES/ENT: (  )visual changes;     NECK: (  ) pain or stiffness  RESPIRATORY:   (  )cough, wheezing, hemoptysis;  (  ) shortness of breath  CARDIOVASCULAR:  (  )chest pain or palpitations  GASTROINTESTINAL:   (  )abdominal or epigastric pain.  (  ) nausea, vomiting, or hematemesis;   (   ) diarrhea or constipation.   GENITOURINARY:   (    ) dysuria, frequency or hematuria  NEUROLOGICAL:  (   ) numbness or weakness   All other review of systems is negative unless indicated above    Vital Signs Last 24 Hrs  T(C): 37.1 (14 Mar 2021 10:00), Max: 37.5 (14 Mar 2021 06:06)  T(F): 98.8 (14 Mar 2021 10:00), Max: 99.5 (14 Mar 2021 06:06)  HR: 71 (14 Mar 2021 10:00) (59 - 88)  BP: 158/77 (14 Mar 2021 10:00) (141/60 - 168/72)  BP(mean): 107 (13 Mar 2021 15:00) (86 - 122)  RR: 18 (14 Mar 2021 10:00) (15 - 25)  SpO2: 98% (14 Mar 2021 10:00) (96% - 100%)    I&O's Summary    13 Mar 2021 06:01  -  14 Mar 2021 07:00  --------------------------------------------------------  IN: 863 mL / OUT: 2800 mL / NET: -1937 mL        CAPILLARY BLOOD GLUCOSE          PHYSICAL EXAM:    Constitutional:  (   ) NAD,   (   )awake and alert  HEENT: PERR, EOMI,    Neck: Soft and supple, No LAD, No JVD  Respiratory:  (    Breath sounds are clear bilaterally,    (   ) wheezing, rales or rhonchi  Cardiovascular:     (   )S1 and S2, regular rate and rhythm, no Murmurs, gallops or rubs  Gastrointestinal:  (   )Bowel Sounds present, soft,   (  )nontender, nondistended,    Extremities:    (  ) peripheral edema  Vascular: 2+ peripheral pulses  Neurological:    (    )A/O x 3,   (  ) focal deficits  Musculoskeletal:    (   )  normal strength b/l upper  (     ) normal  lower extremities  Skin: No rashes    MEDICATIONS:  MEDICATIONS  (STANDING):  albuterol/ipratropium for Nebulization. 3 milliLiter(s) Nebulizer once  aspirin enteric coated 81 milliGRAM(s) Oral daily  carvedilol 25 milliGRAM(s) Oral every 12 hours  heparin   Injectable 5000 Unit(s) SubCutaneous every 12 hours  piperacillin/tazobactam IVPB.. 3.375 Gram(s) IV Intermittent every 12 hours  potassium chloride    Tablet ER 40 milliEquivalent(s) Oral once  tamsulosin 0.4 milliGRAM(s) Oral at bedtime  trimethoprim/polymyxin Solution 1 Drop(s) Both EYES three times a day      LABS: All Labs Reviewed:                        8.8    5.28  )-----------( 94       ( 14 Mar 2021 07:10 )             28.5     03-14    137  |  99  |  22  ----------------------------<  185<H>  3.0<L>   |  29  |  4.15<H>    Ca    8.6      14 Mar 2021 07:10  Phos  2.7     03-14  Mg     2.0     03-14    TPro  5.9<L>  /  Alb  3.0<L>  /  TBili  0.1<L>  /  DBili  x   /  AST  43<H>  /  ALT  30  /  AlkPhos  63  03-14    PT/INR - ( 13 Mar 2021 00:30 )   PT: 12.9 sec;   INR: 1.08 ratio         PTT - ( 13 Mar 2021 00:30 )  PTT:39.0 sec      Blood Culture: 03-10 @ 23:00  Organism --  Gram Stain Blood -- Gram Stain --  Specimen Source .Blood Blood-Peripheral  Culture-Blood --    03-10 @ 22:58  Organism --  Gram Stain Blood -- Gram Stain --  Specimen Source .Blood Blood-Peripheral  Culture-Blood --    03-10 @ 18:31  Organism Pseudomonas aeruginosa  Gram Stain Blood -- Gram Stain   Rare Squamous epithelial cells per low power field  Few polymorphonuclear leukocytes  Moderate Gram Variable Rods per oil power field  Numerous Gram positive cocci in pairs per oil power field  Specimen Source .Sputum Sputum  Culture-Blood --      Urine Culture      RADIOLOGY/EKG:    ASSESSMENT AND PLAN:    DVT PPX:    ADVANCED DIRECTIVE:    DISPOSITION: CHIEF COMPLAINT: Patient was transferred to [address] [place] [place] [address] events overnight He was dialyzed last night    SUBJECTIVE:     REVIEW OF SYSTEMS: Awake and verbal without complaint    CONSTITUTIONAL: (  )  weakness,  (  ) fevers or chills  EYES/ENT: (  )visual changes;     NECK: (  ) pain or stiffness  RESPIRATORY:   (  )cough, wheezing, hemoptysis;  (  ) shortness of breath  CARDIOVASCULAR:  (  )chest pain or palpitations  GASTROINTESTINAL:   (  )abdominal or epigastric pain.  (  ) nausea, vomiting, or hematemesis;   (   ) diarrhea or constipation.   GENITOURINARY:   (    ) dysuria, frequency or hematuria  NEUROLOGICAL:  (   ) numbness or weakness   All other review of systems is negative unless indicated above    Vital Signs Last 24 Hrs  T(C): 37.1 (14 Mar 2021 10:00), Max: 37.5 (14 Mar 2021 06:06)  T(F): 98.8 (14 Mar 2021 10:00), Max: 99.5 (14 Mar 2021 06:06)  HR: 71 (14 Mar 2021 10:00) (59 - 88)  BP: 158/77 (14 Mar 2021 10:00) (141/60 - 168/72)  BP(mean): 107 (13 Mar 2021 15:00) (86 - 122)  RR: 18 (14 Mar 2021 10:00) (15 - 25)  SpO2: 98% (14 Mar 2021 10:00) (96% - 100%)    I&O's Summary    13 Mar 2021 06:01  -  14 Mar 2021 07:00  --------------------------------------------------------  IN: 863 mL / OUT: 2800 mL / NET: -1937 mL        CAPILLARY BLOOD GLUCOSE          PHYSICAL EXAM:    Constitutional:  (  x ) NAD,   ( x  )awake and alert  HEENT: PERR, EOMI,    Neck: Soft and supple, No LAD, No JVD  Respiratory:  (  x  Breath sounds are clear bilaterally,    (   ) wheezing, rales or rhonchi  Cardiovascular:     ( x  )S1 and S2, regular rate and rhythm, no Murmurs, gallops or rubs  Gastrointestinal:  (   )Bowel Sounds present, soft,   (  )nontender, nondistended,    Extremities:    (x  ) peripheral edema  Vascular: 2+ peripheral pulses  Neurological:    (  x  )A/O x 3,   (  ) focal deficits  Musculoskeletal:    ( x  )  normal strength b/l upper  (     ) normal  lower extremities  Skin: No rashes    MEDICATIONS:  MEDICATIONS  (STANDING):  albuterol/ipratropium for Nebulization. 3 milliLiter(s) Nebulizer once  aspirin enteric coated 81 milliGRAM(s) Oral daily  carvedilol 25 milliGRAM(s) Oral every 12 hours  heparin   Injectable 5000 Unit(s) SubCutaneous every 12 hours  piperacillin/tazobactam IVPB.. 3.375 Gram(s) IV Intermittent every 12 hours  potassium chloride    Tablet ER 40 milliEquivalent(s) Oral once  tamsulosin 0.4 milliGRAM(s) Oral at bedtime  trimethoprim/polymyxin Solution 1 Drop(s) Both EYES three times a day      LABS: All Labs Reviewed:                        8.8    5.28  )-----------( 94       ( 14 Mar 2021 07:10 )             28.5     03-14    137  |  99  |  22  ----------------------------<  185<H>  3.0<L>   |  29  |  4.15<H>    Ca    8.6      14 Mar 2021 07:10  Phos  2.7     03-14  Mg     2.0     03-14    TPro  5.9<L>  /  Alb  3.0<L>  /  TBili  0.1<L>  /  DBili  x   /  AST  43<H>  /  ALT  30  /  AlkPhos  63  03-14    PT/INR - ( 13 Mar 2021 00:30 )   PT: 12.9 sec;   INR: 1.08 ratio         PTT - ( 13 Mar 2021 00:30 )  PTT:39.0 sec      Blood Culture: 03-10 @ 23:00  Organism --  Gram Stain Blood -- Gram Stain --  Specimen Source .Blood Blood-Peripheral  Culture-Blood --    03-10 @ 22:58  Organism --  Gram Stain Blood -- Gram Stain --  Specimen Source .Blood Blood-Peripheral  Culture-Blood --    03-10 @ 18:31  Organism Pseudomonas aeruginosa  Gram Stain Blood -- Gram Stain   Rare Squamous epithelial cells per low power field  Few polymorphonuclear leukocytes  Moderate Gram Variable Rods per oil power field  Numerous Gram positive cocci in pairs per oil power field  Specimen Source .Sputum Sputum  Culture-Blood --      Urine Culture      RADIOLOGY/EKG:    ASSESSMENT AND PLAN:  10M w/ hx of CKD4, dCHF, difficult to control HTN, DM?? p/w SOB and CHF  Suspect he has diastolic CHF at present   CKD stage 4-5, crt trending down       # Renal- Continue hemodialysis at one yesterday 2 L was removed feeling better     #CVS-  Stable at present time with follow-up cardiology for possible cardiac cath due to  Severe LVOT    . He is scheduled for Tunnel cath  Placement by IR in the morning   #ID on Zosyn Stable  DVT PPX:    ADVANCED DIRECTIVE:    DISPOSITION:

## 2021-03-14 NOTE — PROGRESS NOTE ADULT - ASSESSMENT
80M w/ hx of CKD4, dCHF, difficult to control HTN, DM?? p/w SOB and CHF  Suspect he has diastolic CHF at present   CKD stage 4-5, crt trending down       1 Renal-HD, s/p HD yesterday, 2L removed    2 CVS-  cath, TBD, Severe LVOT so avoid excessive fluid removal at HD    3. Tunnel cath scheduled for tomorrow, by IR    4. Give Potassium chloride x40 meq x1 dose      Standard DVT prophylaxis     Rekha Faith NP  [facility]  [phone]

## 2021-03-15 ENCOUNTER — TRANSCRIPTION ENCOUNTER (OUTPATIENT)
Age: 81
End: 2021-03-15

## 2021-03-15 LAB
ALBUMIN SERPL ELPH-MCNC: 3 G/DL — LOW (ref 3.3–5)
ALP SERPL-CCNC: 59 U/L — SIGNIFICANT CHANGE UP (ref 40–120)
ALT FLD-CCNC: 26 U/L — SIGNIFICANT CHANGE UP (ref 10–45)
ANION GAP SERPL CALC-SCNC: 13 MMOL/L — SIGNIFICANT CHANGE UP (ref 5–17)
ANISOCYTOSIS BLD QL: SLIGHT — SIGNIFICANT CHANGE UP
APTT BLD: 28 SEC — SIGNIFICANT CHANGE UP (ref 27.5–35.5)
AST SERPL-CCNC: 33 U/L — SIGNIFICANT CHANGE UP (ref 10–40)
BASOPHILS # BLD AUTO: 0 K/UL — SIGNIFICANT CHANGE UP (ref 0–0.2)
BASOPHILS NFR BLD AUTO: 0 % — SIGNIFICANT CHANGE UP (ref 0–2)
BILIRUB SERPL-MCNC: <0.1 MG/DL — LOW (ref 0.2–1.2)
BUN SERPL-MCNC: 36 MG/DL — HIGH (ref 7–23)
CALCIUM SERPL-MCNC: 8.9 MG/DL — SIGNIFICANT CHANGE UP (ref 8.4–10.5)
CHLORIDE SERPL-SCNC: 101 MMOL/L — SIGNIFICANT CHANGE UP (ref 96–108)
CO2 SERPL-SCNC: 25 MMOL/L — SIGNIFICANT CHANGE UP (ref 22–31)
CREAT SERPL-MCNC: 5.92 MG/DL — HIGH (ref 0.5–1.3)
DACRYOCYTES BLD QL SMEAR: SLIGHT — SIGNIFICANT CHANGE UP
EOSINOPHIL # BLD AUTO: 0.79 K/UL — HIGH (ref 0–0.5)
EOSINOPHIL NFR BLD AUTO: 13 % — HIGH (ref 0–6)
GLUCOSE SERPL-MCNC: 90 MG/DL — SIGNIFICANT CHANGE UP (ref 70–99)
HCT VFR BLD CALC: 25 % — LOW (ref 39–50)
HCT VFR BLD CALC: 28.7 % — LOW (ref 39–50)
HGB BLD-MCNC: 7.5 G/DL — LOW (ref 13–17)
HGB BLD-MCNC: 8.7 G/DL — LOW (ref 13–17)
HYPOCHROMIA BLD QL: SIGNIFICANT CHANGE UP
INR BLD: 0.95 RATIO — SIGNIFICANT CHANGE UP (ref 0.88–1.16)
LG PLATELETS BLD QL AUTO: SLIGHT — SIGNIFICANT CHANGE UP
LYMPHOCYTES # BLD AUTO: 2.11 K/UL — SIGNIFICANT CHANGE UP (ref 1–3.3)
LYMPHOCYTES # BLD AUTO: 35 % — SIGNIFICANT CHANGE UP (ref 13–44)
MANUAL SMEAR VERIFICATION: SIGNIFICANT CHANGE UP
MCHC RBC-ENTMCNC: 23.2 PG — LOW (ref 27–34)
MCHC RBC-ENTMCNC: 23.3 PG — LOW (ref 27–34)
MCHC RBC-ENTMCNC: 30 GM/DL — LOW (ref 32–36)
MCHC RBC-ENTMCNC: 30.3 GM/DL — LOW (ref 32–36)
MCV RBC AUTO: 76.9 FL — LOW (ref 80–100)
MCV RBC AUTO: 77.4 FL — LOW (ref 80–100)
MICROCYTES BLD QL: SLIGHT — SIGNIFICANT CHANGE UP
MONOCYTES # BLD AUTO: 1.27 K/UL — HIGH (ref 0–0.9)
MONOCYTES NFR BLD AUTO: 21 % — HIGH (ref 2–14)
NEUTROPHILS # BLD AUTO: 1.87 K/UL — SIGNIFICANT CHANGE UP (ref 1.8–7.4)
NEUTROPHILS NFR BLD AUTO: 31 % — LOW (ref 43–77)
NRBC # BLD: 0 /100 WBCS — SIGNIFICANT CHANGE UP (ref 0–0)
NRBC # BLD: 0 /100 — SIGNIFICANT CHANGE UP (ref 0–0)
OVALOCYTES BLD QL SMEAR: SLIGHT — SIGNIFICANT CHANGE UP
PLAT MORPH BLD: NORMAL — SIGNIFICANT CHANGE UP
PLATELET # BLD AUTO: 118 K/UL — LOW (ref 150–400)
PLATELET # BLD AUTO: 124 K/UL — LOW (ref 150–400)
POIKILOCYTOSIS BLD QL AUTO: SLIGHT — SIGNIFICANT CHANGE UP
POTASSIUM SERPL-MCNC: 3.7 MMOL/L — SIGNIFICANT CHANGE UP (ref 3.5–5.3)
POTASSIUM SERPL-SCNC: 3.7 MMOL/L — SIGNIFICANT CHANGE UP (ref 3.5–5.3)
PROT SERPL-MCNC: 5.8 G/DL — LOW (ref 6–8.3)
PROTHROM AB SERPL-ACNC: 11.4 SEC — SIGNIFICANT CHANGE UP (ref 10.6–13.6)
RBC # BLD: 3.23 M/UL — LOW (ref 4.2–5.8)
RBC # BLD: 3.73 M/UL — LOW (ref 4.2–5.8)
RBC # FLD: 16.7 % — HIGH (ref 10.3–14.5)
RBC # FLD: 16.9 % — HIGH (ref 10.3–14.5)
RBC BLD AUTO: ABNORMAL
SARS-COV-2 RNA SPEC QL NAA+PROBE: SIGNIFICANT CHANGE UP
SODIUM SERPL-SCNC: 139 MMOL/L — SIGNIFICANT CHANGE UP (ref 135–145)
TARGETS BLD QL SMEAR: SLIGHT — SIGNIFICANT CHANGE UP
WBC # BLD: 5.14 K/UL — SIGNIFICANT CHANGE UP (ref 3.8–10.5)
WBC # BLD: 6.04 K/UL — SIGNIFICANT CHANGE UP (ref 3.8–10.5)
WBC # FLD AUTO: 5.14 K/UL — SIGNIFICANT CHANGE UP (ref 3.8–10.5)
WBC # FLD AUTO: 6.04 K/UL — SIGNIFICANT CHANGE UP (ref 3.8–10.5)

## 2021-03-15 PROCEDURE — 77001 FLUOROGUIDE FOR VEIN DEVICE: CPT | Mod: 26

## 2021-03-15 PROCEDURE — 76937 US GUIDE VASCULAR ACCESS: CPT | Mod: 26

## 2021-03-15 PROCEDURE — 36558 INSERT TUNNELED CV CATH: CPT

## 2021-03-15 RX ORDER — CHLORHEXIDINE GLUCONATE 213 G/1000ML
1 SOLUTION TOPICAL
Refills: 0 | Status: DISCONTINUED | OUTPATIENT
Start: 2021-03-15 | End: 2021-03-30

## 2021-03-15 RX ORDER — SODIUM CHLORIDE 9 MG/ML
1000 INJECTION, SOLUTION INTRAVENOUS
Refills: 0 | Status: DISCONTINUED | OUTPATIENT
Start: 2021-03-15 | End: 2021-03-16

## 2021-03-15 RX ORDER — HEPARIN SODIUM 5000 [USP'U]/ML
5000 INJECTION INTRAVENOUS; SUBCUTANEOUS EVERY 12 HOURS
Refills: 0 | Status: DISCONTINUED | OUTPATIENT
Start: 2021-03-15 | End: 2021-03-30

## 2021-03-15 RX ORDER — ERYTHROPOIETIN 10000 [IU]/ML
10000 INJECTION, SOLUTION INTRAVENOUS; SUBCUTANEOUS ONCE
Refills: 0 | Status: COMPLETED | OUTPATIENT
Start: 2021-03-16 | End: 2021-03-16

## 2021-03-15 RX ORDER — SODIUM CHLORIDE 9 MG/ML
250 INJECTION INTRAMUSCULAR; INTRAVENOUS; SUBCUTANEOUS ONCE
Refills: 0 | Status: COMPLETED | OUTPATIENT
Start: 2021-03-15 | End: 2021-03-15

## 2021-03-15 RX ORDER — CHLORHEXIDINE GLUCONATE 213 G/1000ML
1 SOLUTION TOPICAL
Refills: 0 | Status: DISCONTINUED | OUTPATIENT
Start: 2021-03-15 | End: 2021-03-29

## 2021-03-15 RX ADMIN — CARVEDILOL PHOSPHATE 25 MILLIGRAM(S): 80 CAPSULE, EXTENDED RELEASE ORAL at 05:34

## 2021-03-15 RX ADMIN — PIPERACILLIN AND TAZOBACTAM 25 GRAM(S): 4; .5 INJECTION, POWDER, LYOPHILIZED, FOR SOLUTION INTRAVENOUS at 18:30

## 2021-03-15 RX ADMIN — Medication 1 DROP(S): at 11:44

## 2021-03-15 RX ADMIN — Medication 81 MILLIGRAM(S): at 11:44

## 2021-03-15 RX ADMIN — Medication 1 DROP(S): at 05:34

## 2021-03-15 RX ADMIN — PIPERACILLIN AND TAZOBACTAM 25 GRAM(S): 4; .5 INJECTION, POWDER, LYOPHILIZED, FOR SOLUTION INTRAVENOUS at 05:32

## 2021-03-15 RX ADMIN — TAMSULOSIN HYDROCHLORIDE 0.4 MILLIGRAM(S): 0.4 CAPSULE ORAL at 21:50

## 2021-03-15 RX ADMIN — SODIUM CHLORIDE 500 MILLILITER(S): 9 INJECTION INTRAMUSCULAR; INTRAVENOUS; SUBCUTANEOUS at 18:15

## 2021-03-15 NOTE — PROGRESS NOTE ADULT - SUBJECTIVE AND OBJECTIVE BOX
Patient is a 80y old  Male who presents with a chief complaint of Shortness of breath (15 Mar 2021 09:14)      INTERVAL HISTORY: feels ok on 2LNC     REVIEW OF SYSTEMS:   CONSTITUTIONAL: No weakness  EYES/ENT: No visual changes; No throat pain  Neck: No pain or stiffness  Respiratory: No cough, wheezing, No shortness of breath  CARDIOVASCULAR: no chest pain or palpitations  GASTROINTESTINAL: No abdominal pain, no nausea, vomiting or hematemesis  GENITOURINARY: No dysuria, frequency or hematuria  NEUROLOGICAL: No stroke like symptoms  SKIN: No rashes    	  MEDICATIONS:  carvedilol 25 milliGRAM(s) Oral every 12 hours  tamsulosin 0.4 milliGRAM(s) Oral at bedtime        PHYSICAL EXAM:  T(C): 36.8 (03-15-21 @ 13:25), Max: 37.3 (03-14-21 @ 21:10)  HR: 58 (03-15-21 @ 13:25) (58 - 81)  BP: 163/69 (03-15-21 @ 13:25) (125/66 - 163/69)  RR: 18 (03-15-21 @ 13:25) (18 - 18)  SpO2: 95% (03-15-21 @ 13:25) (95% - 100%)  Wt(kg): --  I&O's Summary    14 Mar 2021 07:01  -  15 Mar 2021 07:00  --------------------------------------------------------  IN: 840 mL / OUT: 50 mL / NET: 790 mL      Height (cm): 160 (03-15 @ 13:25)  Weight (kg): 81.8 (03-15 @ 13:25)  BMI (kg/m2): 32 (03-15 @ 13:25)  BSA (m2): 1.85 (03-15 @ 13:25)    Appearance: In no distress	  HEENT:    PERRL, EOMI	  Cardiovascular:  S1 S2, No JVD  Respiratory: Lungs clear to auscultation	  Gastrointestinal:  Soft, Non-tender, + BS	  Vascularature:  No edema of LE  Psychiatric: Appropriate affect   Neuro: no acute focal deficit                         8.7    5.14  )-----------( 124      ( 15 Mar 2021 07:06 )             28.7     03-15    139  |  101  |  36<H>  ----------------------------<  90  3.7   |  25  |  5.92<H>    Ca    8.9      15 Mar 2021 07:09  Phos  2.7     03-14  Mg     2.0     03-14    TPro  5.8<L>  /  Alb  3.0<L>  /  TBili  <0.1<L>  /  DBili  x   /  AST  33  /  ALT  26  /  AlkPhos  59  03-15  Labs personally reviewed  < from: Transthoracic Echocardiogram (03.12.21 @ 10:08) >  Conclusions:  1. Normal mitral valve with chordal systolic anterior  motion. Minimal mitral regurgitation.  2. Calcified trileaflet aortic valve with normal opening.  No aortic valve regurgitation seen.  3. Hyperdynamic left ventricular systolic function. EF>75%.  Markedly increased intraventricular gradient: peak gradient  98 mm Hg.  4. Normal right ventricular size and function.  5.Small posterior pericardial effusion.  No obvious vegetations seen. This does not exclude the  presence of endocarditis. Consider MABLE to evaluate for the  presence of endocarditis if clinically indicated.  *** Compared with echocardiogram of 3/5/2021, results are  similar on today's study. Measured intraventricular  gradient is higher on today's study.  ------------------------------------------------------------------------    < end of copied text >        ASSESSMENT/PLAN: 	  80M w/ hx of CKD4, dCHF, difficult to control HTN, DM?? p/w SOB and like acute on chronic congestive heart failure    Problem/Plan - 1:  ·  Problem: Acute on chronic congestive heart failure, unspecified heart failure type.  Plan: grossly fluid overloaded on exam and elevated BNP. Hx of severe LVH on prior TTE. Would suspect dCHF in setting of uncontrolled HTN.    -TTE- EF 60% with mid inferolateral wall and basal inferolateral wall hypokinetic, will need ischemic eval: continue to diurese for now until euvolemic  - TTE 3/12- hyperdynamic LV EF 75% , severe LVOT - avoid excessive fluid removal   - fluid removal with HD- plan for tunneled catheter today     Problem/Plan - 2:  ·  Problem: Acute respiratory failure with hypoxia.  Plan: Hypoxic to <80 on RA initially on arrival. Improved on 02. Suspect CHF related given clinical exam and CXR   - volume removal with HD  - on iv abx for pna    Problem/Plan - 3:  ·  Problem: Essential hypertension.  Plan: Better controlled now with hd  Coreg 25mg Q12     Problem/Plan - 4:  ·  Problem: Chronic kidney disease (CKD), stage IV (severe).  Plan:  VIRGEN on CKD,  - tunneled catheter placement today 3/15              Betzaida Carrillo DO [facility]  Cardiovascular Medicine  [address] [place] [address], [address]  Office: [phone]  Cell: [phone] Patient is a 80y old  Male who presents with a chief complaint of Shortness of breath (15 Mar 2021 09:14)      INTERVAL HISTORY: feels ok on 2LNC     REVIEW OF SYSTEMS:   CONSTITUTIONAL: No weakness  EYES/ENT: No visual changes; No throat pain  Neck: No pain or stiffness  Respiratory: No cough, wheezing, No shortness of breath  CARDIOVASCULAR: no chest pain or palpitations  GASTROINTESTINAL: No abdominal pain, no nausea, vomiting or hematemesis  GENITOURINARY: No dysuria, frequency or hematuria  NEUROLOGICAL: No stroke like symptoms  SKIN: No rashes    	  MEDICATIONS:  carvedilol 25 milliGRAM(s) Oral every 12 hours  tamsulosin 0.4 milliGRAM(s) Oral at bedtime        PHYSICAL EXAM:  T(C): 36.8 (03-15-21 @ 13:25), Max: 37.3 (03-14-21 @ 21:10)  HR: 58 (03-15-21 @ 13:25) (58 - 81)  BP: 163/69 (03-15-21 @ 13:25) (125/66 - 163/69)  RR: 18 (03-15-21 @ 13:25) (18 - 18)  SpO2: 95% (03-15-21 @ 13:25) (95% - 100%)  Wt(kg): --  I&O's Summary    14 Mar 2021 07:01  -  15 Mar 2021 07:00  --------------------------------------------------------  IN: 840 mL / OUT: 50 mL / NET: 790 mL      Height (cm): 160 (03-15 @ 13:25)  Weight (kg): 81.8 (03-15 @ 13:25)  BMI (kg/m2): 32 (03-15 @ 13:25)  BSA (m2): 1.85 (03-15 @ 13:25)    Appearance: In no distress	  HEENT:    PERRL, EOMI	  Cardiovascular:  S1 S2, No JVD  Respiratory: Lungs clear to auscultation	  Gastrointestinal:  Soft, Non-tender, + BS	  Vascularature:  No edema of LE  Psychiatric: Appropriate affect   Neuro: no acute focal deficit                         8.7    5.14  )-----------( 124      ( 15 Mar 2021 07:06 )             28.7     03-15    139  |  101  |  36<H>  ----------------------------<  90  3.7   |  25  |  5.92<H>    Ca    8.9      15 Mar 2021 07:09  Phos  2.7     03-14  Mg     2.0     03-14    TPro  5.8<L>  /  Alb  3.0<L>  /  TBili  <0.1<L>  /  DBili  x   /  AST  33  /  ALT  26  /  AlkPhos  59  03-15  Labs personally reviewed  < from: Transthoracic Echocardiogram (03.12.21 @ 10:08) >  Conclusions:  1. Normal mitral valve with chordal systolic anterior  motion. Minimal mitral regurgitation.  2. Calcified trileaflet aortic valve with normal opening.  No aortic valve regurgitation seen.  3. Hyperdynamic left ventricular systolic function. EF>75%.  Markedly increased intraventricular gradient: peak gradient  98 mm Hg.  4. Normal right ventricular size and function.  5.Small posterior pericardial effusion.  No obvious vegetations seen. This does not exclude the  presence of endocarditis. Consider MABLE to evaluate for the  presence of endocarditis if clinically indicated.  *** Compared with echocardiogram of 3/5/2021, results are  similar on today's study. Measured intraventricular  gradient is higher on today's study.  ------------------------------------------------------------------------    < end of copied text >        ASSESSMENT/PLAN: 	  80M w/ hx of CKD4, dCHF, difficult to control HTN, DM?? p/w SOB and like acute on chronic congestive heart failure    Problem/Plan - 1:  ·  Problem: Acute on chronic congestive heart failure, unspecified heart failure type.  Plan: grossly fluid overloaded on exam and elevated BNP. Hx of severe LVH on prior TTE. Would suspect dCHF in setting of uncontrolled HTN.    -TTE- EF 60% with mid inferolateral wall and basal inferolateral wall hypokinetic, will need ischemic eval: continue to diurese for now until euvolemic  - TTE 3/12- hyperdynamic LV EF 75% , severe LVOT - avoid excessive fluid removal   - fluid removal with HD  - plan for cardiac cath later this week once euvolemic    Problem/Plan - 2:  ·  Problem: Acute respiratory failure with hypoxia.  Plan: Hypoxic to <80 on RA initially on arrival. Improved on 02. Suspect CHF related given clinical exam and CXR   - volume removal with HD  - on iv abx for pna    Problem/Plan - 3:  ·  Problem: Essential hypertension.  Plan: Better controlled now with hd  Coreg 25mg Q12     Problem/Plan - 4:  ·  Problem: Chronic kidney disease (CKD), stage IV (severe).  Plan:  VIRGEN on CKD,  - tunneled catheter placement today 3/15              Betzaida Carrillo DO [facility]  Cardiovascular Medicine  [address], [address]  Office: [phone]  Cell: [phone]

## 2021-03-15 NOTE — PROVIDER CONTACT NOTE (OTHER) - ASSESSMENT
pt asymtpomatic no complaints of chest pain, sob, or discomfort. aox4. 154/74; 91% 4LNC; HR 70.
/98, VSS otherwise. Patient assymptomatic.
Pt alert and oriented x4. Denies chest pain, SOB, dizziness.  C/o fatigue

## 2021-03-15 NOTE — PROGRESS NOTE ADULT - SUBJECTIVE AND OBJECTIVE BOX
NEPHROLOGY-[facility] [phone]        Patient seen and examined in bed.  He had an uneventful night         MEDICATIONS  (STANDING):  albuterol/ipratropium for Nebulization. 3 milliLiter(s) Nebulizer once  aspirin enteric coated 81 milliGRAM(s) Oral daily  carvedilol 25 milliGRAM(s) Oral every 12 hours  chlorhexidine 2% Cloths 1 Application(s) Topical <User Schedule>  chlorhexidine 4% Liquid 1 Application(s) Topical <User Schedule>  lactated ringers. 1000 milliLiter(s) (75 mL/Hr) IV Continuous <Continuous>  piperacillin/tazobactam IVPB.. 3.375 Gram(s) IV Intermittent every 12 hours  tamsulosin 0.4 milliGRAM(s) Oral at bedtime      VITAL:  T(C): , Max: 37.3 (03-14-21 @ 21:10)  T(F): , Max: 99.2 (03-14-21 @ 21:10)  HR: 65 (03-15-21 @ 15:05)  BP: 149/64 (03-15-21 @ 15:05)  BP(mean): --  RR: 12 (03-15-21 @ 15:05)  SpO2: 95% (03-15-21 @ 15:05)  Wt(kg): --    I and O's:    03-14 @ 07:01  -  03-15 @ 07:00  --------------------------------------------------------  IN: 840 mL / OUT: 50 mL / NET: 790 mL      Height (cm): 160 (03-15 @ 13:25)  Weight (kg): 81.8 (03-15 @ 13:25)  BMI (kg/m2): 32 (03-15 @ 13:25)  BSA (m2): 1.85 (03-15 @ 13:25)    PHYSICAL EXAM:    Constitutional: NAD  Neck:  No JVD  Respiratory: CTAB/L  Cardiovascular: S1 and S2  Gastrointestinal: BS+, soft, NT/ND  Extremities: No peripheral edema  Neurological: A/O x 3, no focal deficits  Psychiatric: Normal mood, normal affect  : No Aguilar  Skin: No rashes  Access: perm cath     LABS:                        8.7    5.14  )-----------( 124      ( 15 Mar 2021 07:06 )             28.7     03-15    139  |  101  |  36<H>  ----------------------------<  90  3.7   |  25  |  5.92<H>    Ca    8.9      15 Mar 2021 07:09  Phos  2.7     03-14  Mg     2.0     03-14    TPro  5.8<L>  /  Alb  3.0<L>  /  TBili  <0.1<L>  /  DBili  x   /  AST  33  /  ALT  26  /  AlkPhos  59  03-15          Urine Studies:          RADIOLOGY & ADDITIONAL STUDIES:

## 2021-03-15 NOTE — DISCHARGE NOTE PROVIDER - NSDCMRMEDTOKEN_GEN_ALL_CORE_FT
aspirin 81 mg oral delayed release tablet: 1 tab(s) orally once a day  calcitriol 0.25 mcg oral capsule: 1 cap(s) orally Monday, Wednesday, and Friday   carvedilol 25 mg oral tablet: 1 tab(s) orally every 12 hours  hydrALAZINE 100 mg oral tablet: 1 tab(s) orally every 8 hours  Lasix 20 mg oral tablet: 1 tab(s) orally once a day  NIFEdipine 90 mg oral tablet, extended release: 1 tab(s) orally once a day  tamsulosin 0.4 mg oral capsule: 1 cap(s) orally once a day (at bedtime)   aspirin 81 mg oral delayed release tablet: 1 tab(s) orally once a day  calcitriol 0.25 mcg oral capsule: 1 cap(s) orally Monday, Wednesday, and Friday   carvedilol 25 mg oral tablet: 1 tab(s) orally every 12 hours  hydrALAZINE 100 mg oral tablet: 1 tab(s) orally every 8 hours  Lasix 20 mg oral tablet: 1 tab(s) orally once a day  NIFEdipine 90 mg oral tablet, extended release: 1 tab(s) orally once a day  Rolling Walker: Cassandra Peters     #1  tamsulosin 0.4 mg oral capsule: 1 cap(s) orally once a day (at bedtime)   aspirin 81 mg oral delayed release tablet: 1 tab(s) orally once a day  carvedilol 25 mg oral tablet: 1 tab(s) orally every 12 hours  Cassandra Peters: Cassandra Peters     #1  sevelamer carbonate 800 mg oral tablet: 1 tab(s) orally 3 times a day  tamsulosin 0.4 mg oral capsule: 1 cap(s) orally once a day (at bedtime)   Aspirin Enteric Coated 81 mg oral delayed release tablet: 1 tab(s) orally once a day  carvedilol 25 mg oral tablet: 1 tab(s) orally every 12 hours  Cassandra Peters: Cassandra Peters     #1  sevelamer carbonate 800 mg oral tablet: 1 tab(s) orally 3 times a day  tamsulosin 0.4 mg oral capsule: 1 cap(s) orally once a day (at bedtime)   Aspirin Enteric Coated 81 mg oral delayed release tablet: 1 tab(s) orally once a day  carvedilol 25 mg oral tablet: 1 tab(s) orally every 12 hours  Cassandra Peters DX CHF I 50.9 :   sevelamer carbonate 800 mg oral tablet: 1 tab(s) orally 3 times a day  tamsulosin 0.4 mg oral capsule: 1 cap(s) orally once a day (at bedtime)

## 2021-03-15 NOTE — CHART NOTE - NSCHARTNOTEFT_GEN_A_CORE
After labs reviewed procedure was explained to the patient, dressing and sutures were removed and the patient was placed in supine position.  Catheter was gently discontinued without difficulty. No bleeding was noted at the site and direct pressure was applied for 10 minutes. After hemostasis was achieved a nonocclusive sterile dressing was applied to the insertion site and patient was in no distress. Patient told to remain in bed for one hour and to inform RN if noted any bleeding  or moisture in the groin. RN instructed to check the site in 5 minutes. Upon checking patient found to have copious amounts of blood in the bed. Direct pressure was held for another 15-20 minutes. Patient was noted to be more lethargic. At that time an RRT was called. 250ml IVF bolus was given and CBC sent stat which showed a drop to 7.6. Patient vital signs were stable. RRT was ended. Spoke to nephology who advised patient can receive blood with no issue regarding volume status. One unit this evening and then if needed another unit with HD tomorrow. After labs reviewed procedure was explained to the patient, dressing and sutures were removed and the patient was placed in supine position.  Catheter was gently discontinued without difficulty. No bleeding was noted at the site and direct pressure was applied for 10 minutes. After hemostasis was achieved and no bleeding was noted a nonocclusive sterile dressing was applied to the insertion site and patient was in no distress. Patient was monitored for another 2-3 minutes and again no bleeding appreciated. Patient and son told to have patient remain in bed for one hour and to inform RN if noted any bleeding or moisture felt in the groin. RN instructed to check the site in 5 minutes. Upon checking, the patient found to have copious amounts of blood in the bed. Direct pressure was held for another 15-20 minutes. Patient was noted to be more lethargic. At that time an  RRT was called. 250ml IVF bolus was given and CBC sent stat which showed a drop to 7.6. Patient vital signs were stable. RRT was ended. Spoke to nephology who advised patient can receive blood with no issue regarding volume status. One unit this evening and then if needed another unit with HD tomorrow. Attending made aware.

## 2021-03-15 NOTE — RAPID RESPONSE TEAM SUMMARY - NSSITUATIONBACKGROUNDRRT_GEN_ALL_CORE
80M w/ hx of ESRD on HD, dCHF, difficult to control HTN, DM?? p/w SOB and like acute on chronic congestive heart failure. RRT called from R fem after line removed. Pressure held and bleeding resolved. CBC repeated and primary team told to follow up and transfuse if necessary( discuss with nephro given HD). BP and other VS wnl

## 2021-03-15 NOTE — DISCHARGE NOTE PROVIDER - CARE PROVIDER_API CALL
Sridhar Carrillo (DO)  Cardiovascular Disease; Internal Medicine; Nuclear Cardiology  [address] [facility], [address]  [place] [address]  Phone: [phone]  Fax: [phone]  Follow Up Time: 1 week    Minerva Martinez)  Internal Medicine; Nephrology  [address], [address]  [place] [address]  Phone: [phone]  Fax: [phone]  Follow Up Time: 1 week    pulmonary Medicine,   Phone: [phone]  Fax: (   )    -  Follow Up Time: 2 weeks   Sridhar Carrillo (DO)  Cardiovascular Disease; Internal Medicine; Nuclear Cardiology  [address] [facility] [address], [address]  [place] [address]  Phone: [phone]  Fax: [phone]  Follow Up Time: 1 week    Minerva Martinez)  Internal Medicine; Nephrology  [address], [address]  [place] [address]  Phone: [phone]  Fax: [phone]  Follow Up Time: 1 week    pulmonary Medicine,   Phone: [phone]  Fax: (   )    -  Follow Up Time: 2 weeks    [facility],   Phone: (   )    -  Fax: (   )    -  Follow Up Time:    Sridhar Carrillo (DO)  Cardiovascular Disease; Internal Medicine; Nuclear Cardiology  [address] [facility], [address]  [place] [address]  Phone: [phone]  Fax: [phone]  Follow Up Time: 1 week    Minerva Martinez)  Internal Medicine; Nephrology  [address] [facility], [address]  [place] [address]  Phone: [phone]  Fax: [phone]  Follow Up Time: 1 week    pulmonary Medicine,   Phone: [phone]  Fax: (   )    -  Follow Up Time: 2 weeks    [facility],   Phone: (   )    -  Fax: (   )    -  Scheduled Appointment: 04/01/2021 02:00 PM

## 2021-03-15 NOTE — DISCHARGE NOTE PROVIDER - HOSPITAL COURSE
80M w/ hx of CKD4, dCHF, difficult to control HTN, DM p/w SOB and CHF. Pt states he has been in [facility] since Dec 27th 2020 and just returned home from [facility]. Pt states he started to get worsening SOB over the past 2-3 weeks in [facility]. He states he discussed this with his doctor in [facility] and was [place] and [place]. On 3/4: Lasix 80 IV BID, on 4L NC with increased work of breathing.  3/6 renal US: no hydro, LE dopplers negative, CXR: Pulmonary edema with b/l pleural effusion   3/7: Remains very edematous, Lasix gtt started. 3/8 Not making urine---> bladder scan 160ml-->175 Changed to bumex gtt, zaroxyln added   3/9: acute respiratory failure. hypercarbia noted. RRT. accepted to MICU. intubated. CTH w/o acute hemorrhage. stop bumex gtt. shiley placed, initiate dilaysis.   3/10: Enteral feeds -> regular feed. FiO2 50 -> 40%. Febrile LLL consolidation, vanc and started on zosyn, BCx and sputum culture in lab.   Wean propofol, wean fi02, start precedex --attempt extubate PM, hold tube feeds.   3/11 HD tx UF1.5 L Extubated to NC/NIPPV .   3/12: awake on NC. stopped vanc as mrsa swab neg. self-suctioning. can't cough out mucus. states patient snores and does not sleep too much for unclear reasons. c/w zosyn. no telemetry needs - Transferred out of MICU. home bp meds to be started. Aguilar discontinued, flomax restarted. Sputum Cx 03/10 pseudomonas. TTE 03/12 EF > 75%, markedly increased intraventricular gradient. MRSA negative   3/13: ABG showed increased pCO2, placed on BiPAP 10/5 o/n. Zosyn x 5d. Monitor platelet.  3/13 HD via femerol hd catheter  4/14 Plan for IR on 3/15 for tunneled hemodialysis catheter placement.  4/15 Renal-HD in 4/16, CVS-Severe LVOT so avoid excessive fluid removal at HD;  Will need cardiac cath- Cardiology following.   Vasc- Tunnel perm cath placed.  DC femerol line 4/15.  Pulm-Needs outpt sleep study, Continue on iv abx for pna.  Now s/p IR permacath placement. Restart standard DVT prophylaxis.      80M w/ hx of CKD4, dCHF, difficult to control HTN, DM p/w SOB and CHF. Pt states he has been in [facility] since Dec 27th 2020 and just returned home from [facility]. Pt states he started to get worsening SOB over the past 2-3 weeks in [facility]. He states he discussed this with his doctor in [facility] and was advised [place]. On 3/4: Lasix 80 IV BID, on 4L NC with increased work of breathing.  3/6 renal US: no hydro, LE dopplers negative, CXR: Pulmonary edema with b/l pleural effusion   3/7: Remains very edematous, Lasix gtt started. 3/8 Not making urine---> bladder scan 160ml-->175 Changed to bumex gtt, zaroxyln added   3/9: acute respiratory failure. hypercarbia noted. RRT. accepted to MICU. intubated. CTH w/o acute hemorrhage. stop bumex gtt. shiley placed, initiate dilaysis.   3/10: Enteral feeds -> regular feed. FiO2 50 -> 40%. Febrile LLL consolidation, vanc and started on zosyn, BCx and sputum culture in lab.   Wean propofol, wean fi02, start precedex --attempt extubate PM, hold tube feeds.   3/11 HD tx UF1.5 L Extubated to NC/NIPPV .   3/12: awake on NC. stopped vanc as mrsa swab neg. self-suctioning. can't cough out mucus. states patient snores and does not sleep too much for unclear reasons. c/w zosyn. no telemetry needs - Transferred out of MICU. home bp meds to be started. Aguilar discontinued, flomax restarted. Sputum Cx 03/10 pseudomonas. TTE 03/12 EF > 75%, markedly increased intraventricular gradient. MRSA negative   3/13: ABG showed increased pCO2, placed on BiPAP 10/5 o/n. Zosyn x 5d. Monitor platelet.  3/13 HD via femerol hd catheter  3/14 Plan for IR on 3/15 for tunneled hemodialysis catheter placement.  3/15 Renal-HD in 4/16, CVS-Severe LVOT so avoid excessive fluid removal at HD;  Will need cardiac cath- Cardiology following.   Vasc- Tunnel perm cath placed.  DC femerol line 4/15.  Pulm-Needs outpt sleep study, Continue on iv abx for pna.  Now s/p IR permacath placement. Restart standard DVT prophylaxis.   3/16 HD, 1u PRBC, US no pseudoaneurysm     80M w/ hx of CKD4, dCHF, difficult to control HTN, DM p/w SOB and CHF. Pt states he has been in [facility] since Dec 27th 2020 and just returned home from [facility]. Pt states he started to get worsening SOB over the past 2-3 weeks in [facility]. He states he discussed this with his doctor in [facility] and was advised [place]. On 3/4: Lasix 80 IV BID, on 4L NC with increased work of breathing.      # Cardiology:   patient presented with acute on chronic diastolic heart failure   found to be in hypertensive emergency  attributed to pulmonary edema   received aggressive diuresis     # Hypoxia:   attributed to           3/6 renal US: no hydro, LE dopplers negative, CXR: Pulmonary edema with b/l pleural effusion   3/7: Remains very edematous, Lasix gtt started. 3/8 Not making urine---> bladder scan 160ml-->175 Changed to bumex gtt, zaroxyln added   3/9: acute respiratory failure. hypercarbia noted. RRT. accepted to MICU. intubated. CTH w/o acute hemorrhage. stop bumex gtt. shiley placed, initiate dilaysis.   3/10: Enteral feeds -> regular feed. FiO2 50 -> 40%. Febrile LLL consolidation, vanc and started on zosyn, BCx and sputum culture in lab.   Wean propofol, wean fi02, start precedex --attempt extubate PM, hold tube feeds.   3/11 HD tx UF1.5 L Extubated to NC/NIPPV .   3/12: awake on NC. stopped vanc as mrsa swab neg. self-suctioning. can't cough out mucus. states patient snores and does not sleep too much for unclear reasons. c/w zosyn. no telemetry needs - Transferred out of MICU. home bp meds to be started. Aguilar discontinued, flomax restarted. Sputum Cx 03/10 pseudomonas. TTE 03/12 EF > 75%, markedly increased intraventricular gradient. MRSA negative   3/13: ABG showed increased pCO2, placed on BiPAP 10/5 o/n. Zosyn x 5d. Monitor platelet.  3/13 HD via femerol hd catheter  3/14 Plan for IR on 3/15 for tunneled hemodialysis catheter placement.  3/15 Renal-HD in 4/16, CVS-Severe LVOT so avoid excessive fluid removal at HD;  Will need cardiac cath- Cardiology following.   Vasc- Tunnel perm cath placed.  DC femerol line 4/15.  Pulm-Needs outpt sleep study, Continue on iv abx for pna.  Now s/p IR permacath placement. Restart standard DVT prophylaxis.   3/16 HD, 1u PRBC, US no pseudoaneurysm     80M w/ hx of CKD4, dCHF, difficult to control HTN, DM p/w SOB and CHF. Pt states he has been in [facility] since Dec 27th 2020 and just returned home from [facility]. Pt states he started to get worsening SOB over the past 2-3 weeks in [facility]. He states he discussed this with his doctor in [facility] and was advised [place]. On 3/4: Lasix 80 IV BID, on 4L NC with increased work of breathing.      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   s/p RRT and s/p Endotracheal intubation, MICU stay  aggressive diuresis with Bumex gtt and lasix gtt       # Altered mental status:   s/p stroke code, Head CT (-) for acute intracranial hemorrhage   no TPA was given.     # Acute on chronic CKD   At baseline patient has CKD stage 5   with urinary retention , s/p an catheter placement  Patient was started on HD for fluid retention.   found to have Obstructive uropathy with perinephric stranding,   Right hydronephrosis secondary to ureteral stone  3/13 s/p Femoral Hemodialysis catheter placement  3/15 s/p tunnelled catheter placement  3/24 s/p AV fistula placement    # Infectious disease:   Chlamydia pneumoniae Pneumonia   Community acquired Pneumonia   seen by infectious disease  treated with Azithromycin.            80M w/ hx of CKD4, dCHF, difficult to control HTN, DM p/w SOB and CHF. Pt states he has been in [facility] since Dec 27th 2020 and just returned home from [facility]. Pt states he started to get worsening SOB over the past 2-3 weeks in [facility]. He states he discussed this with his doctor in [facility] and was advised [place]. On 3/4: Lasix 80 IV BID, on 4L NC with increased work of breathing.    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   s/p RRT and s/p Endotracheal intubation, MICU stay  aggressive diuresis with Bumex gtt and Lasix gtt, both discontinued at present      # Altered mental status:   s/p stroke code, Head CT (-) for acute intracranial hemorrhage   no TPA was given.     # Acute on chronic CKD   At baseline patient has CKD stage 5   with urinary retention , s/p an catheter placement  Patient was started on HD for fluid retention.   found to have Obstructive uropathy with perinephric stranding,   Right hydronephrosis secondary to ureteral stone  3/13 s/p Femoral Hemodialysis catheter placement  3/15 s/p tunnelled catheter placement  3/25 s/p AV fistula placement    # Infectious disease:   Chlamydia pneumoniae Pneumonia   Community acquired Pneumonia   seen by infectious disease  treated with Azithromycin.            80M w/ hx of CKD4, dCHF, difficult to control HTN, DM p/w SOB and CHF. Pt states he has been in [facility] since Dec 27th 2020 and just returned home from [facility]. Pt states he started to get worsening SOB over the past 2-3 weeks in [facility]. He states he discussed this with his doctor in [facility] and was [place]. On 3/4: Lasix 80 IV BID, on 4L NC with increased work of breathing.  Pt had hypoxic respiratory failure with hypercarbia, likely from cardio pulmonary etiology: Pt  presented with acute on chronic diastolic heart failure and found to be in hypertensive emergency  His Pro BNP was >30,000. Pt had echo on 3/5/21 revealed moderate to severe LVOT obstruction  and mild segmental LV systolic dysfunction.   Pt s/p RRT and s/p Endotracheal intubation and MICU stay initially. He is s/p aggressive diuresis with Bumex gtt and Lasix gtt, both discontinued at present.     He was found to have  altered mental status, s/p stroke code, Head CT (-) for acute intracranial hemorrhage.  Pt was treated for acute on chronic CKD.  His  has baseline  CKD stage 5 with urinary retention , s/p an catheter placement.   Patient was started on HD for fluid retention.  He was found to have Obstructive uropathy with perinephric stranding, right hydronephrosis secondary to ureteral stone  3/13 s/p Femoral Hemodialysis catheter placement on 3/13; s/p  tunnelled catheter placement on 3/15 and s/p AV fistula placement on 3/25.   During the hospital stay, he was seen by infectious disease specialist and treated for Chlamydia pneumoniae Pneumonia.   Pt is clinically stable to be discharged  home with outpatient dialysis set up.

## 2021-03-15 NOTE — PROCEDURE NOTE - PROCEDURE FINDINGS AND DETAILS
right IJ tunneled dialysis catheter placed with no complications. catheter can be used for dialysis. right IJ 14 [place], 19 cm tunneled dialysis catheter placed with no complications. catheter can be used for dialysis.

## 2021-03-15 NOTE — PROVIDER CONTACT NOTE (OTHER) - BACKGROUND
Pt s/p femoral venous catheter removal, s/p RRT for excessive bleeding at site.
admitted with HTN urgency, CHF
pt is 81 y/o M admitted for heart failure. complains of increase sob in [place]. came to us for further workup. pmh dm, htn,

## 2021-03-15 NOTE — PROGRESS NOTE ADULT - ASSESSMENT
80M w/ hx of CKD4, dCHF, difficult to control HTN, DM?? p/w SOB and CHF  Suspect he has diastolic CHF at present   CKD stage 4-5, crt trending down       1 Renal-HD in am     2 CVS-   Severe LVOT so avoid excessive fluid removal at HD;  Will need cardiac cath     3.Vasc- Tunnel perm cath placed.  DC femerol      4 Pulm-Needs outpt sleep study        Standard DVT prophylaxis     Sayed [facility]  [phone]

## 2021-03-15 NOTE — PROGRESS NOTE ADULT - SUBJECTIVE AND OBJECTIVE BOX
CHIEF COMPLAINT:    SUBJECTIVE:     REVIEW OF SYSTEMS:    CONSTITUTIONAL: (  )  weakness,  (  ) fevers or chills  EYES/ENT: (  )visual changes;     NECK: (  ) pain or stiffness  RESPIRATORY:   (  )cough, wheezing, hemoptysis;  (  ) shortness of breath  CARDIOVASCULAR:  (  )chest pain or palpitations  GASTROINTESTINAL:   (  )abdominal or epigastric pain.  (  ) nausea, vomiting, or hematemesis;   (   ) diarrhea or constipation.   GENITOURINARY:   (    ) dysuria, frequency or hematuria  NEUROLOGICAL:  (   ) numbness or weakness   All other review of systems is negative unless indicated above    Vital Signs Last 24 Hrs  T(C): 36.8 (15 Mar 2021 05:15), Max: 37.3 (14 Mar 2021 21:10)  T(F): 98.3 (15 Mar 2021 05:15), Max: 99.2 (14 Mar 2021 21:10)  HR: 67 (15 Mar 2021 05:15) (64 - 81)  BP: 156/77 (15 Mar 2021 05:15) (125/66 - 158/77)  BP(mean): --  RR: 18 (15 Mar 2021 05:15) (18 - 18)  SpO2: 95% (15 Mar 2021 05:15) (95% - 100%)    I&O's Summary    14 Mar 2021 07:01  -  15 Mar 2021 07:00  --------------------------------------------------------  IN: 840 mL / OUT: 50 mL / NET: 790 mL        CAPILLARY BLOOD GLUCOSE          PHYSICAL EXAM:    Constitutional:  (   ) NAD,   (   )awake and alert  HEENT: PERR, EOMI,    Neck: Soft and supple, No LAD, No JVD  Respiratory:  (    Breath sounds are clear bilaterally,    (   ) wheezing, rales or rhonchi  Cardiovascular:     (   )S1 and S2, regular rate and rhythm, no Murmurs, gallops or rubs  Gastrointestinal:  (   )Bowel Sounds present, soft,   (  )nontender, nondistended,    Extremities:    (  ) peripheral edema  Vascular: 2+ peripheral pulses  Neurological:    (    )A/O x 3,   (  ) focal deficits  Musculoskeletal:    (   )  normal strength b/l upper  (     ) normal  lower extremities  Skin: No rashes    MEDICATIONS:  MEDICATIONS  (STANDING):  albuterol/ipratropium for Nebulization. 3 milliLiter(s) Nebulizer once  aspirin enteric coated 81 milliGRAM(s) Oral daily  carvedilol 25 milliGRAM(s) Oral every 12 hours  piperacillin/tazobactam IVPB.. 3.375 Gram(s) IV Intermittent every 12 hours  tamsulosin 0.4 milliGRAM(s) Oral at bedtime  trimethoprim/polymyxin Solution 1 Drop(s) Both EYES three times a day      LABS: All Labs Reviewed:                        8.7    5.14  )-----------( 124      ( 15 Mar 2021 07:06 )             28.7     03-15    139  |  101  |  36<H>  ----------------------------<  90  3.7   |  25  |  5.92<H>    Ca    8.9      15 Mar 2021 07:09  Phos  2.7     03-14  Mg     2.0     03-14    TPro  5.8<L>  /  Alb  3.0<L>  /  TBili  <0.1<L>  /  DBili  x   /  AST  33  /  ALT  26  /  AlkPhos  59  03-15          Blood Culture: 03-10 @ 23:00  Organism --  Gram Stain Blood -- Gram Stain --  Specimen Source .Blood Blood-Peripheral  Culture-Blood --    03-10 @ 22:58  Organism --  Gram Stain Blood -- Gram Stain --  Specimen Source .Blood Blood-Peripheral  Culture-Blood --    03-10 @ 18:31  Organism Pseudomonas aeruginosa  Gram Stain Blood -- Gram Stain   Rare Squamous epithelial cells per low power field  Few polymorphonuclear leukocytes  Moderate Gram Variable Rods per oil power field  Numerous Gram positive cocci in pairs per oil power field  Specimen Source .Sputum Sputum  Culture-Blood --      Urine Culture      RADIOLOGY/EKG:    ASSESSMENT AND PLAN:    DVT PPX:    ADVANCED DIRECTIVE:    DISPOSITION: CHIEF COMPLAINT:     SUBJECTIVE:     REVIEW OF SYSTEMS:    CONSTITUTIONAL: (  )  weakness,  (  ) fevers or chills  EYES/ENT: (  )visual changes;     NECK: (  ) pain or stiffness  RESPIRATORY:   (  )cough, wheezing, hemoptysis;  (  ) shortness of breath  CARDIOVASCULAR:  (  )chest pain or palpitations  GASTROINTESTINAL:   (  )abdominal or epigastric pain.  (  ) nausea, vomiting, or hematemesis;   (   ) diarrhea or constipation.   GENITOURINARY:   (    ) dysuria, frequency or hematuria  NEUROLOGICAL:  (   ) numbness or weakness   All other review of systems is negative unless indicated above    Vital Signs Last 24 Hrs  T(C): 36.8 (15 Mar 2021 05:15), Max: 37.3 (14 Mar 2021 21:10)  T(F): 98.3 (15 Mar 2021 05:15), Max: 99.2 (14 Mar 2021 21:10)  HR: 67 (15 Mar 2021 05:15) (64 - 81)  BP: 156/77 (15 Mar 2021 05:15) (125/66 - 158/77)  BP(mean): --  RR: 18 (15 Mar 2021 05:15) (18 - 18)  SpO2: 95% (15 Mar 2021 05:15) (95% - 100%)    I&O's Summary    14 Mar 2021 07:01  -  15 Mar 2021 07:00  --------------------------------------------------------  IN: 840 mL / OUT: 50 mL / NET: 790 mL        CAPILLARY BLOOD GLUCOSE          PHYSICAL EXAM:    Constitutional:  (   ) NAD,   (   )awake and alert  HEENT: PERR, EOMI,    Neck: Soft and supple, No LAD, No JVD  Respiratory:  (    Breath sounds are clear bilaterally,    (   ) wheezing, rales or rhonchi  Cardiovascular:     (   )S1 and S2, regular rate and rhythm, no Murmurs, gallops or rubs  Gastrointestinal:  (   )Bowel Sounds present, soft,   (  )nontender, nondistended,    Extremities:    (  ) peripheral edema  Vascular: 2+ peripheral pulses  Neurological:    (    )A/O x 3,   (  ) focal deficits  Musculoskeletal:    (   )  normal strength b/l upper  (     ) normal  lower extremities  Skin: No rashes    MEDICATIONS:  MEDICATIONS  (STANDING):  albuterol/ipratropium for Nebulization. 3 milliLiter(s) Nebulizer once  aspirin enteric coated 81 milliGRAM(s) Oral daily  carvedilol 25 milliGRAM(s) Oral every 12 hours  piperacillin/tazobactam IVPB.. 3.375 Gram(s) IV Intermittent every 12 hours  tamsulosin 0.4 milliGRAM(s) Oral at bedtime  trimethoprim/polymyxin Solution 1 Drop(s) Both EYES three times a day      LABS: All Labs Reviewed:                        8.7    5.14  )-----------( 124      ( 15 Mar 2021 07:06 )             28.7     03-15    139  |  101  |  36<H>  ----------------------------<  90  3.7   |  25  |  5.92<H>    Ca    8.9      15 Mar 2021 07:09  Phos  2.7     03-14  Mg     2.0     03-14    TPro  5.8<L>  /  Alb  3.0<L>  /  TBili  <0.1<L>  /  DBili  x   /  AST  33  /  ALT  26  /  AlkPhos  59  03-15          Blood Culture: 03-10 @ 23:00  Organism --  Gram Stain Blood -- Gram Stain --  Specimen Source .Blood Blood-Peripheral  Culture-Blood --    03-10 @ 22:58  Organism --  Gram Stain Blood -- Gram Stain --  Specimen Source .Blood Blood-Peripheral  Culture-Blood --    03-10 @ 18:31  Organism Pseudomonas aeruginosa  Gram Stain Blood -- Gram Stain   Rare Squamous epithelial cells per low power field  Few polymorphonuclear leukocytes  Moderate Gram Variable Rods per oil power field  Numerous Gram positive cocci in pairs per oil power field  Specimen Source .Sputum Sputum  Culture-Blood --      Urine Culture      RADIOLOGY/EKG:    ASSESSMENT AND PLAN:    DVT PPX:    ADVANCED DIRECTIVE:    DISPOSITION: CHIEF COMPLAINT: patient condition remained stable no event overnight    SUBJECTIVE:     REVIEW OF SYSTEMS:    CONSTITUTIONAL: ( x )  weakness,  (  ) fevers or chills  EYES/ENT: (  )visual changes;     NECK: (  ) pain or stiffness  RESPIRATORY:   (  )cough, wheezing, hemoptysis;  (  ) shortness of breath  CARDIOVASCULAR:  (  )chest pain or palpitations  GASTROINTESTINAL:   (  )abdominal or epigastric pain.  (  ) nausea, vomiting, or hematemesis;   (   ) diarrhea or constipation.   GENITOURINARY:   (    ) dysuria, frequency or hematuria  NEUROLOGICAL:  (   ) numbness or weakness   All other review of systems is negative unless indicated above    Vital Signs Last 24 Hrs  T(C): 36.8 (15 Mar 2021 05:15), Max: 37.3 (14 Mar 2021 21:10)  T(F): 98.3 (15 Mar 2021 05:15), Max: 99.2 (14 Mar 2021 21:10)  HR: 67 (15 Mar 2021 05:15) (64 - 81)  BP: 156/77 (15 Mar 2021 05:15) (125/66 - 158/77)  BP(mean): --  RR: 18 (15 Mar 2021 05:15) (18 - 18)  SpO2: 95% (15 Mar 2021 05:15) (95% - 100%)    I&O's Summary    14 Mar 2021 07:01  -  15 Mar 2021 07:00  --------------------------------------------------------  IN: 840 mL / OUT: 50 mL / NET: 790 mL        CAPILLARY BLOOD GLUCOSE          PHYSICAL EXAM:    Constitutional:  ( x  ) NAD,   (  x )awake and alert  HEENT: PERR, EOMI,    Neck: Soft and supple, No LAD, No JVD  Respiratory:  (   x Breath sounds are clear bilaterally,    (   ) wheezing, rales or rhonchi  Cardiovascular:     ( x  )S1 and S2, regular rate and rhythm, no Murmurs, gallops or rubs  Gastrointestinal:  (  x )Bowel Sounds present, soft,   (  )nontender, nondistended,    Extremities:    ( x ) peripheral edema  Vascular: 2+ peripheral pulses  Neurological:    (  x  )A/O x 3,   (  ) focal deficits  Musculoskeletal:    ( x  )  normal strength b/l upper  (     ) normal  lower extremities  Skin: No rashes    MEDICATIONS:  MEDICATIONS  (STANDING):  albuterol/ipratropium for Nebulization. 3 milliLiter(s) Nebulizer once  aspirin enteric coated 81 milliGRAM(s) Oral daily  carvedilol 25 milliGRAM(s) Oral every 12 hours  piperacillin/tazobactam IVPB.. 3.375 Gram(s) IV Intermittent every 12 hours  tamsulosin 0.4 milliGRAM(s) Oral at bedtime  trimethoprim/polymyxin Solution 1 Drop(s) Both EYES three times a day      LABS: All Labs Reviewed:                        8.7    5.14  )-----------( 124      ( 15 Mar 2021 07:06 )             28.7     03-15    139  |  101  |  36<H>  ----------------------------<  90  3.7   |  25  |  5.92<H>    Ca    8.9      15 Mar 2021 07:09  Phos  2.7     03-14  Mg     2.0     03-14    TPro  5.8<L>  /  Alb  3.0<L>  /  TBili  <0.1<L>  /  DBili  x   /  AST  33  /  ALT  26  /  AlkPhos  59  03-15          Blood Culture: 03-10 @ 23:00  Organism --  Gram Stain Blood -- Gram Stain --  Specimen Source .Blood Blood-Peripheral  Culture-Blood --    03-10 @ 22:58  Organism --  Gram Stain Blood -- Gram Stain --  Specimen Source .Blood Blood-Peripheral  Culture-Blood --    03-10 @ 18:31  Organism Pseudomonas aeruginosa  Gram Stain Blood -- Gram Stain   Rare Squamous epithelial cells per low power field  Few polymorphonuclear leukocytes  Moderate Gram Variable Rods per oil power field  Numerous Gram positive cocci in pairs per oil power field  Specimen Source .Sputum Sputum  Culture-Blood --      Urine Culture      RADIOLOGY/EKG:    ASSESSMENT AND PLAN:  patient waiting for line placement for hemodialysis access discussed with him at length about his long-term dialysis duration not clear and his condition discussed the case management for placement near his home for hemodialysis  DVT PPX:    ADVANCED DIRECTIVE:    DISPOSITION:

## 2021-03-15 NOTE — DISCHARGE NOTE PROVIDER - PROVIDER TOKENS
PROVIDER:[TOKEN:[[id number]],FOLLOWUP:[1 week]],PROVIDER:[TOKEN:[[id number]],FOLLOWUP:[1 week]],FREE:[LAST:[pulmonary Medicine],PHONE:[phone]],FAX:[(   )    -],FOLLOWUP:[2 weeks]] PROVIDER:[TOKEN:[[id number]],FOLLOWUP:[1 week]],PROVIDER:[TOKEN:[[id number]],FOLLOWUP:[1 week]],FREE:[LAST:[pulmonary Medicine],PHONE:[phone]],FAX:[(   )    -],FOLLOWUP:[2 weeks]],FREE:[LAST:[[facility]],PHONE:[(   )    -],FAX:[(   )    -]] PROVIDER:[TOKEN:[[id number]],FOLLOWUP:[1 week]],PROVIDER:[TOKEN:[[id number]],FOLLOWUP:[1 week]],FREE:[LAST:[pulmonary Medicine],PHONE:[phone]],FAX:[(   )    -],FOLLOWUP:[2 weeks]],FREE:[LAST:[[facility]],PHONE:[(   )    -],FAX:[(   )    -],SCHEDULEDAPPT:[04/01/2021],SCHEDULEDAPPTTIME:[02:00 PM]]

## 2021-03-15 NOTE — DISCHARGE NOTE PROVIDER - NSDCCPCAREPLAN_GEN_ALL_CORE_FT
PRINCIPAL DISCHARGE DIAGNOSIS  Diagnosis: CHF (congestive heart failure)  Assessment and Plan of Treatment: monitor for fluid overload, chest pain, shortness of breath. Notify doctor if having these symptoms       PRINCIPAL DISCHARGE DIAGNOSIS  Diagnosis: CHF (congestive heart failure)  Assessment and Plan of Treatment: monitor for fluid overload, chest pain, shortness of breath. Notify doctor if having these symptoms      SECONDARY DISCHARGE DIAGNOSES  Diagnosis: VIRGEN (acute kidney injury)  Assessment and Plan of Treatment: To dialysis center for dialysis as scheduled   Monitor  & report signs & symptoms of dialysis  catheter & or fistula infection   Monitor  & report signs & symptoms dialysis effect     PRINCIPAL DISCHARGE DIAGNOSIS  Diagnosis: CHF (congestive heart failure)  Assessment and Plan of Treatment: monitor for fluid overload, chest pain, shortness of breath. Notify doctor if having these symptoms      SECONDARY DISCHARGE DIAGNOSES  Diagnosis: VIRGEN (acute kidney injury)  Assessment and Plan of Treatment: To dialysis center for dialysis as scheduled   Monitor  & report signs & symptoms of dialysis  catheter & or fistula infection   Monitor  & report signs & symptoms dialysis effect  To HD on Thursday 4/1 at 2pm

## 2021-03-15 NOTE — PRE PROCEDURE NOTE - PRE PROCEDURE EVALUATION
Interventional Radiology    HPI: 80y Male with CKD4, dCHF, difficult to control HTN, requiring long-term HD access.     Allergies: grass, pollen (Rhinitis)    Medications (Abx/Cardiac/Anticoagulation/Blood Products)  aspirin  chewable: 81 milliGRAM(s) Oral (03-13 @ 13:55)  aspirin enteric coated: 81 milliGRAM(s) Oral (03-15 @ 11:44)  carvedilol: 25 milliGRAM(s) Oral (03-15 @ 05:34)  heparin   Injectable: 5000 Unit(s) SubCutaneous (03-14 @ 17:42)  piperacillin/tazobactam IVPB..: 25 mL/Hr IV Intermittent (03-15 @ 05:32)  tamsulosin: 0.4 milliGRAM(s) Oral (03-14 @ 21:37)    Data:    T(C): 36.8  HR: 58  BP: 163/69  RR: 18  SpO2: 95%    Exam  General: No acute distress  Chest: Non labored breathing  Abdomen: Non-distended  Extremities: No swelling, warm    -WBC 5.14 / HgB 8.7 / Hct 28.7 / Plt 124  -Na 139 / Cl 101 / BUN 36 / Glucose 90  -K 3.7 / CO2 25 / Cr 5.92  -ALT 26 / Alk Phos 59 / T.Bili <0.1  -INR1.08    Plan: 80y Male with CKD4, dCHF, difficult to control HTN, requiring long-term HD access.   -labs and imaging reviewed  -Risks/Benefits/alternatives explained with the patient and/or healthcare proxy and witnessed informed consent obtained.    Interventional Radiology    HPI: 80y Male with CKD4, dCHF, difficult to control HTN, requiring long-term HD access.     Allergies: grass, pollen (Rhinitis)    Medications (Abx/Cardiac/Anticoagulation/Blood Products)  aspirin  chewable: 81 milliGRAM(s) Oral (03-13 @ 13:55)  aspirin enteric coated: 81 milliGRAM(s) Oral (03-15 @ 11:44)  carvedilol: 25 milliGRAM(s) Oral (03-15 @ 05:34)  heparin   Injectable: 5000 Unit(s) SubCutaneous (03-14 @ 17:42)  piperacillin/tazobactam IVPB..: 25 mL/Hr IV Intermittent (03-15 @ 05:32)  tamsulosin: 0.4 milliGRAM(s) Oral (03-14 @ 21:37)    Data:    T(C): 36.8  HR: 58  BP: 163/69  RR: 18  SpO2: 95%    Exam  General: No acute distress  Chest: Non labored breathing  Abdomen: Non-distended  Extremities: No swelling, warm    -WBC 5.14 / HgB 8.7 / Hct 28.7 / Plt 124  -Na 139 / Cl 101 / BUN 36 / Glucose 90  -K 3.7 / CO2 25 / Cr 5.92  -ALT 26 / Alk Phos 59 / T.Bili <0.1  -INR1.08    Plan: 80y Male with CKD4, dCHF, difficult to control HTN, requiring long-term HD access.   -labs and imaging reviewed  -Risks/Benefits/alternatives explained with the patient and witnessed informed consent obtained.

## 2021-03-15 NOTE — PROVIDER CONTACT NOTE (OTHER) - ACTION/TREATMENT ORDERED:
continue to monitor.
250 cc bolus over 30 minutes  1U PRBCs
Provider notified. Will administer IV hydralazine as per order. Will continue to monitor.

## 2021-03-15 NOTE — PROVIDER CONTACT NOTE (OTHER) - SITUATION
Patient hypertensive bp 209/98, PM cardiac meds given.
pt had 5 beats wct on tele. first time for pt.
BP 88/51

## 2021-03-16 LAB
ANION GAP SERPL CALC-SCNC: 13 MMOL/L — SIGNIFICANT CHANGE UP (ref 5–17)
BUN SERPL-MCNC: 46 MG/DL — HIGH (ref 7–23)
CALCIUM SERPL-MCNC: 8.7 MG/DL — SIGNIFICANT CHANGE UP (ref 8.4–10.5)
CHLORIDE SERPL-SCNC: 103 MMOL/L — SIGNIFICANT CHANGE UP (ref 96–108)
CO2 SERPL-SCNC: 24 MMOL/L — SIGNIFICANT CHANGE UP (ref 22–31)
CREAT SERPL-MCNC: 7.68 MG/DL — HIGH (ref 0.5–1.3)
CULTURE RESULTS: SIGNIFICANT CHANGE UP
CULTURE RESULTS: SIGNIFICANT CHANGE UP
GLUCOSE SERPL-MCNC: 107 MG/DL — HIGH (ref 70–99)
HCT VFR BLD CALC: 22.8 % — LOW (ref 39–50)
HCT VFR BLD CALC: 26.2 % — LOW (ref 39–50)
HGB BLD-MCNC: 7.1 G/DL — LOW (ref 13–17)
HGB BLD-MCNC: 7.9 G/DL — LOW (ref 13–17)
MCHC RBC-ENTMCNC: 23.9 PG — LOW (ref 27–34)
MCHC RBC-ENTMCNC: 24.3 PG — LOW (ref 27–34)
MCHC RBC-ENTMCNC: 30.2 GM/DL — LOW (ref 32–36)
MCHC RBC-ENTMCNC: 31.1 GM/DL — LOW (ref 32–36)
MCV RBC AUTO: 78.1 FL — LOW (ref 80–100)
MCV RBC AUTO: 79.4 FL — LOW (ref 80–100)
NRBC # BLD: 0 /100 WBCS — SIGNIFICANT CHANGE UP (ref 0–0)
NRBC # BLD: 0 /100 WBCS — SIGNIFICANT CHANGE UP (ref 0–0)
PLATELET # BLD AUTO: 106 K/UL — LOW (ref 150–400)
PLATELET # BLD AUTO: 111 K/UL — LOW (ref 150–400)
POTASSIUM SERPL-MCNC: 3.7 MMOL/L — SIGNIFICANT CHANGE UP (ref 3.5–5.3)
POTASSIUM SERPL-SCNC: 3.7 MMOL/L — SIGNIFICANT CHANGE UP (ref 3.5–5.3)
RBC # BLD: 2.92 M/UL — LOW (ref 4.2–5.8)
RBC # BLD: 3.3 M/UL — LOW (ref 4.2–5.8)
RBC # FLD: 16.9 % — HIGH (ref 10.3–14.5)
RBC # FLD: 17.2 % — HIGH (ref 10.3–14.5)
SODIUM SERPL-SCNC: 140 MMOL/L — SIGNIFICANT CHANGE UP (ref 135–145)
SPECIMEN SOURCE: SIGNIFICANT CHANGE UP
SPECIMEN SOURCE: SIGNIFICANT CHANGE UP
WBC # BLD: 6.98 K/UL — SIGNIFICANT CHANGE UP (ref 3.8–10.5)
WBC # BLD: 7.59 K/UL — SIGNIFICANT CHANGE UP (ref 3.8–10.5)
WBC # FLD AUTO: 6.98 K/UL — SIGNIFICANT CHANGE UP (ref 3.8–10.5)
WBC # FLD AUTO: 7.59 K/UL — SIGNIFICANT CHANGE UP (ref 3.8–10.5)

## 2021-03-16 PROCEDURE — 93926 LOWER EXTREMITY STUDY: CPT | Mod: 26,RT

## 2021-03-16 RX ADMIN — ERYTHROPOIETIN 10000 UNIT(S): 10000 INJECTION, SOLUTION INTRAVENOUS; SUBCUTANEOUS at 11:01

## 2021-03-16 RX ADMIN — Medication 3 MILLILITER(S): at 18:24

## 2021-03-16 RX ADMIN — PIPERACILLIN AND TAZOBACTAM 25 GRAM(S): 4; .5 INJECTION, POWDER, LYOPHILIZED, FOR SOLUTION INTRAVENOUS at 05:29

## 2021-03-16 RX ADMIN — CARVEDILOL PHOSPHATE 25 MILLIGRAM(S): 80 CAPSULE, EXTENDED RELEASE ORAL at 18:12

## 2021-03-16 RX ADMIN — HEPARIN SODIUM 5000 UNIT(S): 5000 INJECTION INTRAVENOUS; SUBCUTANEOUS at 18:13

## 2021-03-16 RX ADMIN — PIPERACILLIN AND TAZOBACTAM 25 GRAM(S): 4; .5 INJECTION, POWDER, LYOPHILIZED, FOR SOLUTION INTRAVENOUS at 18:13

## 2021-03-16 RX ADMIN — TAMSULOSIN HYDROCHLORIDE 0.4 MILLIGRAM(S): 0.4 CAPSULE ORAL at 22:20

## 2021-03-16 RX ADMIN — Medication 81 MILLIGRAM(S): at 18:12

## 2021-03-16 RX ADMIN — CHLORHEXIDINE GLUCONATE 1 APPLICATION(S): 213 SOLUTION TOPICAL at 05:29

## 2021-03-16 NOTE — PROGRESS NOTE ADULT - SUBJECTIVE AND OBJECTIVE BOX
DATE OF SERVICE: 03-17-21 @ 00:33    Patient is a 80y old  Male who presents with a chief complaint of Shortness of breath (16 Mar 2021 09:37)      INTERVAL HISTORY: feels ok    MEDICATIONS:  carvedilol 25 milliGRAM(s) Oral every 12 hours  tamsulosin 0.4 milliGRAM(s) Oral at bedtime        PHYSICAL EXAM:  T(C): 36.7 (03-16-21 @ 21:10), Max: 36.9 (03-16-21 @ 08:50)  HR: 66 (03-16-21 @ 23:09) (62 - 78)  BP: 131/52 (03-16-21 @ 21:10) (122/65 - 167/80)  RR: 18 (03-16-21 @ 21:10) (18 - 20)  SpO2: 96% (03-16-21 @ 23:09) (96% - 100%)  Wt(kg): --  I&O's Summary    15 Mar 2021 07:01  -  16 Mar 2021 07:00  --------------------------------------------------------  IN: 1000 mL / OUT: 100 mL / NET: 900 mL    16 Mar 2021 07:01  -  17 Mar 2021 00:33  --------------------------------------------------------  IN: 1100 mL / OUT: 2800 mL / NET: -1700 mL          Appearance: In no distress	  HEENT:    PERRL, EOMI	  Cardiovascular:  S1 S2, No JVD  Respiratory: Lungs clear to auscultation	  Gastrointestinal:  Soft, Non-tender, + BS	  Vascularature:  No edema of LE  Psychiatric: Appropriate affect   Neuro: no acute focal deficits                               7.1    7.59  )-----------( 111      ( 16 Mar 2021 07:17 )             22.8     03-16    140  |  103  |  46<H>  ----------------------------<  107<H>  3.7   |  24  |  7.68<H>    Ca    8.7      16 Mar 2021 07:20    TPro  5.8<L>  /  Alb  3.0<L>  /  TBili  <0.1<L>  /  DBili  x   /  AST  33  /  ALT  26  /  AlkPhos  59  03-15        Labs personally reviewed      ASSESSMENT/PLAN: 	  80M w/ hx of CKD4, dCHF, difficult to control HTN, DM?? p/w SOB and like acute on chronic congestive heart failure    Problem/Plan - 1:  ·  Problem: Acute on chronic congestive heart failure, unspecified heart failure type.  Plan: grossly fluid overloaded on exam and elevated BNP. Hx of severe LVH on prior TTE. Would suspect dCHF in setting of uncontrolled HTN.    -TTE- EF 60% with mid inferolateral wall and basal inferolateral wall hypokinetic, will need ischemic eval: continue to diurese for now until euvolemic  - TTE 3/12- hyperdynamic LV EF 75% , severe LVOT - avoid excessive fluid removal   - fluid removal with HD  - plan for cardiac cath later this week once euvolemic    Problem/Plan - 2:  ·  Problem: Acute respiratory failure with hypoxia.  Plan: Hypoxic to <80 on RA initially on arrival. Improved on 02. Suspect CHF related given clinical exam and CXR   - volume removal with HD  - on iv abx for pna    Problem/Plan - 3:  ·  Problem: Essential hypertension.  Plan: Better controlled now with hd  Coreg 25mg Q12     Problem/Plan - 4:  ·  Problem: Chronic kidney disease (CKD), stage IV (severe).  Plan:  VIRGEN on CKD,  - tunneled catheter placement today 3/15          Sridhar Carrillo DO [facility]  Cardiovascular Medicine  [address], [address]  Office: [phone]  Cell: [phone]

## 2021-03-16 NOTE — PROGRESS NOTE ADULT - SUBJECTIVE AND OBJECTIVE BOX
NEPHROLOGY-[facility] [phone]        Patient seen and examined in bed.  He had bleeding p the shiley was removed  He got a unit of blood and will get a unit of blood in am        MEDICATIONS  (STANDING):  albuterol/ipratropium for Nebulization. 3 milliLiter(s) Nebulizer once  aspirin enteric coated 81 milliGRAM(s) Oral daily  carvedilol 25 milliGRAM(s) Oral every 12 hours  chlorhexidine 2% Cloths 1 Application(s) Topical <User Schedule>  chlorhexidine 4% Liquid 1 Application(s) Topical <User Schedule>  epoetin hiro-epbx (RETACRIT) Injectable 36309 Unit(s) IV Push once  heparin   Injectable 5000 Unit(s) SubCutaneous every 12 hours  piperacillin/tazobactam IVPB.. 3.375 Gram(s) IV Intermittent every 12 hours  tamsulosin 0.4 milliGRAM(s) Oral at bedtime      VITAL:  T(C): , Max: 37.4 (03-15-21 @ 21:45)  T(F): , Max: 99.3 (03-15-21 @ 21:45)  HR: 78 (03-16-21 @ 05:37)  BP: 122/65 (03-16-21 @ 05:37)  BP(mean): --  RR: 18 (03-16-21 @ 05:37)  SpO2: 100% (03-16-21 @ 05:37)  Wt(kg): --    I and O's:    03-15 @ 07:01  -  03-16 @ 07:00  --------------------------------------------------------  IN: 1000 mL / OUT: 100 mL / NET: 900 mL      Height (cm): 160 (03-15 @ 13:25)  Weight (kg): 81.8 (03-15 @ 13:25)  BMI (kg/m2): 32 (03-15 @ 13:25)  BSA (m2): 1.85 (03-15 @ 13:25)    PHYSICAL EXAM:    Constitutional: NAD  Neck:  No JVD  Respiratory: CTAB/L  Cardiovascular: S1 and S2  Gastrointestinal: BS+, soft, NT/ND  Extremities: No peripheral edema  Neurological: A/O x 3, no focal deficits  Psychiatric: Normal mood, normal affect  : No Aguilar  Skin: No rashes  Access: perm cath     LABS:                        7.1    7.59  )-----------( 111      ( 16 Mar 2021 07:17 )             22.8     03-16    140  |  103  |  46<H>  ----------------------------<  107<H>  3.7   |  24  |  7.68<H>    Ca    8.7      16 Mar 2021 07:20    TPro  5.8<L>  /  Alb  3.0<L>  /  TBili  <0.1<L>  /  DBili  x   /  AST  33  /  ALT  26  /  AlkPhos  59  03-15          Urine Studies:          RADIOLOGY & ADDITIONAL STUDIES:          < from: Xray Chest 1 View- PORTABLE-Urgent (Xray Chest 1 View- PORTABLE-Urgent .) (03.10.21 @ 16:32) >    EXAM:  XR CHEST PORTABLE URGENT 1V                            PROCEDURE DATE:  03/10/2021            INTERPRETATION:  CLINICAL INFORMATION: Febrile. Copious secretions.    A frontal view of the chest was obtained.    Comparison: 3/9/2021.    IMPRESSION:  Lines and tubes and support devices are unchanged.  The mediastinal cardiac silhouette is unremarkable.    Small bilateral effusions unchanged.    No acute osseous finding.                    PRIYA SANDERS MD; Attending Radiologist  This document has been electronically signed. Mar 11 2021  9:35AM    < end of copied text >

## 2021-03-16 NOTE — PROGRESS NOTE ADULT - SUBJECTIVE AND OBJECTIVE BOX
CHIEF COMPLAINT:    SUBJECTIVE:     REVIEW OF SYSTEMS:    CONSTITUTIONAL: (  )  weakness,  (  ) fevers or chills  EYES/ENT: (  )visual changes;     NECK: (  ) pain or stiffness  RESPIRATORY:   (  )cough, wheezing, hemoptysis;  (  ) shortness of breath  CARDIOVASCULAR:  (  )chest pain or palpitations  GASTROINTESTINAL:   (  )abdominal or epigastric pain.  (  ) nausea, vomiting, or hematemesis;   (   ) diarrhea or constipation.   GENITOURINARY:   (    ) dysuria, frequency or hematuria  NEUROLOGICAL:  (   ) numbness or weakness   All other review of systems is negative unless indicated above    Vital Signs Last 24 Hrs  T(C): 36.8 (16 Mar 2021 05:37), Max: 37.4 (15 Mar 2021 21:45)  T(F): 98.3 (16 Mar 2021 05:37), Max: 99.3 (15 Mar 2021 21:45)  HR: 78 (16 Mar 2021 05:37) (58 - 88)  BP: 122/65 (16 Mar 2021 05:37) (88/51 - 168/68)  BP(mean): --  RR: 18 (16 Mar 2021 05:37) (12 - 18)  SpO2: 100% (16 Mar 2021 05:37) (93% - 100%)    I&O's Summary    15 Mar 2021 07:01  -  16 Mar 2021 07:00  --------------------------------------------------------  IN: 1000 mL / OUT: 100 mL / NET: 900 mL        CAPILLARY BLOOD GLUCOSE          PHYSICAL EXAM:    Constitutional:  (   ) NAD,   (   )awake and alert  HEENT: PERR, EOMI,    Neck: Soft and supple, No LAD, No JVD  Respiratory:  (    Breath sounds are clear bilaterally,    (   ) wheezing, rales or rhonchi  Cardiovascular:     (   )S1 and S2, regular rate and rhythm, no Murmurs, gallops or rubs  Gastrointestinal:  (   )Bowel Sounds present, soft,   (  )nontender, nondistended,    Extremities:    (  ) peripheral edema  Vascular: 2+ peripheral pulses  Neurological:    (    )A/O x 3,   (  ) focal deficits  Musculoskeletal:    (   )  normal strength b/l upper  (     ) normal  lower extremities  Skin: No rashes    MEDICATIONS:  MEDICATIONS  (STANDING):  albuterol/ipratropium for Nebulization. 3 milliLiter(s) Nebulizer once  aspirin enteric coated 81 milliGRAM(s) Oral daily  carvedilol 25 milliGRAM(s) Oral every 12 hours  chlorhexidine 2% Cloths 1 Application(s) Topical <User Schedule>  chlorhexidine 4% Liquid 1 Application(s) Topical <User Schedule>  epoetin hiro-epbx (RETACRIT) Injectable 78739 Unit(s) IV Push once  heparin   Injectable 5000 Unit(s) SubCutaneous every 12 hours  piperacillin/tazobactam IVPB.. 3.375 Gram(s) IV Intermittent every 12 hours  tamsulosin 0.4 milliGRAM(s) Oral at bedtime      LABS: All Labs Reviewed:                        7.1    7.59  )-----------( 111      ( 16 Mar 2021 07:17 )             22.8     03-16    140  |  103  |  46<H>  ----------------------------<  107<H>  3.7   |  24  |  7.68<H>    Ca    8.7      16 Mar 2021 07:20    TPro  5.8<L>  /  Alb  3.0<L>  /  TBili  <0.1<L>  /  DBili  x   /  AST  33  /  ALT  26  /  AlkPhos  59  03-15    PT/INR - ( 15 Mar 2021 17:18 )   PT: 11.4 sec;   INR: 0.95 ratio         PTT - ( 15 Mar 2021 17:18 )  PTT:28.0 sec      Blood Culture:   Urine Culture      RADIOLOGY/EKG:    ASSESSMENT AND PLAN:    DVT PPX:    ADVANCED DIRECTIVE:    DISPOSITION: CHIEF COMPLAINT: patient overnight noted patient had RRT due to bleeding from removal of his Shiley in the right groin condition is stabilized and he was transfused 1 unit      SUBJECTIVE:     REVIEW OF SYSTEMS: awake and verbal    CONSTITUTIONAL: (  )  weakness,  (  ) fevers or chills  EYES/ENT: (  )visual changes;     NECK: (  ) pain or stiffness  RESPIRATORY:   (  )cough, wheezing, hemoptysis;  (  ) shortness of breath  CARDIOVASCULAR:  (  )chest pain or palpitations  GASTROINTESTINAL:   (  )abdominal or epigastric pain.  (  ) nausea, vomiting, or hematemesis;   (   ) diarrhea or constipation.   GENITOURINARY:   (    ) dysuria, frequency or hematuria  NEUROLOGICAL:  (   ) numbness or weakness   All other review of systems is negative unless indicated above    Vital Signs Last 24 Hrs  T(C): 36.8 (16 Mar 2021 05:37), Max: 37.4 (15 Mar 2021 21:45)  T(F): 98.3 (16 Mar 2021 05:37), Max: 99.3 (15 Mar 2021 21:45)  HR: 78 (16 Mar 2021 05:37) (58 - 88)  BP: 122/65 (16 Mar 2021 05:37) (88/51 - 168/68)  BP(mean): --  RR: 18 (16 Mar 2021 05:37) (12 - 18)  SpO2: 100% (16 Mar 2021 05:37) (93% - 100%)    I&O's Summary    15 Mar 2021 07:01  -  16 Mar 2021 07:00  --------------------------------------------------------  IN: 1000 mL / OUT: 100 mL / NET: 900 mL        CAPILLARY BLOOD GLUCOSE          PHYSICAL EXAM:  Constitutional: NAD  Neck:  No JVD  Respiratory: CTAB/L  Cardiovascular: S1 and S2  Gastrointestinal: BS+, soft, NT/ND  Extremities:  + peripheral edema  Neurological: A/O x 3, no focal deficits  Psychiatric: Normal mood, normal affect  : No Aguilar  Skin: No rashes          MEDICATIONS:  MEDICATIONS  (STANDING):  albuterol/ipratropium for Nebulization. 3 milliLiter(s) Nebulizer once  aspirin enteric coated 81 milliGRAM(s) Oral daily  carvedilol 25 milliGRAM(s) Oral every 12 hours  chlorhexidine 2% Cloths 1 Application(s) Topical <User Schedule>  chlorhexidine 4% Liquid 1 Application(s) Topical <User Schedule>  epoetin hiro-epbx (RETACRIT) Injectable 84186 Unit(s) IV Push once  heparin   Injectable 5000 Unit(s) SubCutaneous every 12 hours  piperacillin/tazobactam IVPB.. 3.375 Gram(s) IV Intermittent every 12 hours  tamsulosin 0.4 milliGRAM(s) Oral at bedtime      LABS: All Labs Reviewed:                        7.1    7.59  )-----------( 111      ( 16 Mar 2021 07:17 )             22.8     03-16    140  |  103  |  46<H>  ----------------------------<  107<H>  3.7   |  24  |  7.68<H>    Ca    8.7      16 Mar 2021 07:20    TPro  5.8<L>  /  Alb  3.0<L>  /  TBili  <0.1<L>  /  DBili  x   /  AST  33  /  ALT  26  /  AlkPhos  59  03-15    PT/INR - ( 15 Mar 2021 17:18 )   PT: 11.4 sec;   INR: 0.95 ratio         PTT - ( 15 Mar 2021 17:18 )  PTT:28.0 sec      Blood Culture:   Urine Culture      RADIOLOGY/EKG:    ASSESSMENT AND PLAN:  80M w/ hx of CKD4, dCHF, difficult to control HTN, DM?? p/w SOB and CHF  Suspect he has diastolic CHF at present   CKD stage 4-5, crt trending down       1 Renal-HD this am;  One unit of blood to be given at HD     2 CVS-   Severe LVOT so avoid excessive fluid removal at HD;  follow-up with the cardiology for further assessment    3.Vasc- Tunnel perm cath placed.       4 Pulm-Needs outpt sleep study      DVT PPX:    ADVANCED DIRECTIVE:    DISPOSITION: home hemodialysis outpatient

## 2021-03-16 NOTE — PROGRESS NOTE ADULT - ASSESSMENT
80M w/ hx of CKD4, dCHF, difficult to control HTN, DM?? p/w SOB and CHF  Suspect he has diastolic CHF at present   CKD stage 4-5, crt trending down       1 Renal-HD this am;  One unit of blood to be given at HD     2 CVS-   Severe LVOT so avoid excessive fluid removal at HD;  Will need cardiac cath     3.Vasc- Tunnel perm cath placed.  Vasc consult to rule out pseudoaneurysm and surveillance ultrasound        4 Pulm-Needs outpt sleep study        Standard DVT prophylaxis     [facility]  [phone]

## 2021-03-17 LAB
ALBUMIN SERPL ELPH-MCNC: 2.9 G/DL — LOW (ref 3.3–5)
ALP SERPL-CCNC: 41 U/L — SIGNIFICANT CHANGE UP (ref 40–120)
ALT FLD-CCNC: 26 U/L — SIGNIFICANT CHANGE UP (ref 10–45)
ANION GAP SERPL CALC-SCNC: 12 MMOL/L — SIGNIFICANT CHANGE UP (ref 5–17)
AST SERPL-CCNC: 24 U/L — SIGNIFICANT CHANGE UP (ref 10–40)
BASOPHILS # BLD AUTO: 0.03 K/UL — SIGNIFICANT CHANGE UP (ref 0–0.2)
BASOPHILS NFR BLD AUTO: 0.4 % — SIGNIFICANT CHANGE UP (ref 0–2)
BILIRUB SERPL-MCNC: 0.2 MG/DL — SIGNIFICANT CHANGE UP (ref 0.2–1.2)
BUN SERPL-MCNC: 30 MG/DL — HIGH (ref 7–23)
CALCIUM SERPL-MCNC: 8.6 MG/DL — SIGNIFICANT CHANGE UP (ref 8.4–10.5)
CHLORIDE SERPL-SCNC: 101 MMOL/L — SIGNIFICANT CHANGE UP (ref 96–108)
CO2 SERPL-SCNC: 24 MMOL/L — SIGNIFICANT CHANGE UP (ref 22–31)
CREAT SERPL-MCNC: 6.49 MG/DL — HIGH (ref 0.5–1.3)
EOSINOPHIL # BLD AUTO: 0.5 K/UL — SIGNIFICANT CHANGE UP (ref 0–0.5)
EOSINOPHIL NFR BLD AUTO: 7 % — HIGH (ref 0–6)
GLUCOSE SERPL-MCNC: 94 MG/DL — SIGNIFICANT CHANGE UP (ref 70–99)
HCT VFR BLD CALC: 22.3 % — LOW (ref 39–50)
HGB BLD-MCNC: 7 G/DL — CRITICAL LOW (ref 13–17)
IMM GRANULOCYTES NFR BLD AUTO: 0.4 % — SIGNIFICANT CHANGE UP (ref 0–1.5)
LYMPHOCYTES # BLD AUTO: 1.16 K/UL — SIGNIFICANT CHANGE UP (ref 1–3.3)
LYMPHOCYTES # BLD AUTO: 16.3 % — SIGNIFICANT CHANGE UP (ref 13–44)
MAGNESIUM SERPL-MCNC: 2 MG/DL — SIGNIFICANT CHANGE UP (ref 1.6–2.6)
MCHC RBC-ENTMCNC: 25.3 PG — LOW (ref 27–34)
MCHC RBC-ENTMCNC: 31.4 GM/DL — LOW (ref 32–36)
MCV RBC AUTO: 80.5 FL — SIGNIFICANT CHANGE UP (ref 80–100)
MONOCYTES # BLD AUTO: 1.15 K/UL — HIGH (ref 0–0.9)
MONOCYTES NFR BLD AUTO: 16.2 % — HIGH (ref 2–14)
NEUTROPHILS # BLD AUTO: 4.24 K/UL — SIGNIFICANT CHANGE UP (ref 1.8–7.4)
NEUTROPHILS NFR BLD AUTO: 59.7 % — SIGNIFICANT CHANGE UP (ref 43–77)
NRBC # BLD: 0 /100 WBCS — SIGNIFICANT CHANGE UP (ref 0–0)
PHOSPHATE SERPL-MCNC: 5.1 MG/DL — HIGH (ref 2.5–4.5)
PLATELET # BLD AUTO: 84 K/UL — LOW (ref 150–400)
POTASSIUM SERPL-MCNC: 3.8 MMOL/L — SIGNIFICANT CHANGE UP (ref 3.5–5.3)
POTASSIUM SERPL-SCNC: 3.8 MMOL/L — SIGNIFICANT CHANGE UP (ref 3.5–5.3)
PROT SERPL-MCNC: 5.3 G/DL — LOW (ref 6–8.3)
RBC # BLD: 2.77 M/UL — LOW (ref 4.2–5.8)
RBC # FLD: 17.4 % — HIGH (ref 10.3–14.5)
SODIUM SERPL-SCNC: 137 MMOL/L — SIGNIFICANT CHANGE UP (ref 135–145)
WBC # BLD: 7.11 K/UL — SIGNIFICANT CHANGE UP (ref 3.8–10.5)
WBC # FLD AUTO: 7.11 K/UL — SIGNIFICANT CHANGE UP (ref 3.8–10.5)

## 2021-03-17 RX ADMIN — HEPARIN SODIUM 5000 UNIT(S): 5000 INJECTION INTRAVENOUS; SUBCUTANEOUS at 05:52

## 2021-03-17 RX ADMIN — CARVEDILOL PHOSPHATE 25 MILLIGRAM(S): 80 CAPSULE, EXTENDED RELEASE ORAL at 05:52

## 2021-03-17 RX ADMIN — PIPERACILLIN AND TAZOBACTAM 25 GRAM(S): 4; .5 INJECTION, POWDER, LYOPHILIZED, FOR SOLUTION INTRAVENOUS at 17:24

## 2021-03-17 RX ADMIN — CHLORHEXIDINE GLUCONATE 1 APPLICATION(S): 213 SOLUTION TOPICAL at 08:04

## 2021-03-17 RX ADMIN — PIPERACILLIN AND TAZOBACTAM 25 GRAM(S): 4; .5 INJECTION, POWDER, LYOPHILIZED, FOR SOLUTION INTRAVENOUS at 05:55

## 2021-03-17 RX ADMIN — TAMSULOSIN HYDROCHLORIDE 0.4 MILLIGRAM(S): 0.4 CAPSULE ORAL at 21:54

## 2021-03-17 RX ADMIN — CARVEDILOL PHOSPHATE 25 MILLIGRAM(S): 80 CAPSULE, EXTENDED RELEASE ORAL at 17:23

## 2021-03-17 RX ADMIN — Medication 81 MILLIGRAM(S): at 13:16

## 2021-03-17 RX ADMIN — HEPARIN SODIUM 5000 UNIT(S): 5000 INJECTION INTRAVENOUS; SUBCUTANEOUS at 17:23

## 2021-03-17 NOTE — PROGRESS NOTE ADULT - SUBJECTIVE AND OBJECTIVE BOX
CHIEF COMPLAINT:    SUBJECTIVE:     REVIEW OF SYSTEMS:    CONSTITUTIONAL: (  )  weakness,  (  ) fevers or chills  EYES/ENT: (  )visual changes;     NECK: (  ) pain or stiffness  RESPIRATORY:   (  )cough, wheezing, hemoptysis;  (  ) shortness of breath  CARDIOVASCULAR:  (  )chest pain or palpitations  GASTROINTESTINAL:   (  )abdominal or epigastric pain.  (  ) nausea, vomiting, or hematemesis;   (   ) diarrhea or constipation.   GENITOURINARY:   (    ) dysuria, frequency or hematuria  NEUROLOGICAL:  (   ) numbness or weakness   All other review of systems is negative unless indicated above    Vital Signs Last 24 Hrs  T(C): 36.6 (17 Mar 2021 05:42), Max: 36.9 (16 Mar 2021 08:50)  T(F): 97.9 (17 Mar 2021 05:42), Max: 98.4 (16 Mar 2021 08:50)  HR: 62 (17 Mar 2021 06:00) (62 - 77)  BP: 124/68 (17 Mar 2021 05:42) (124/68 - 167/80)  BP(mean): --  RR: 18 (17 Mar 2021 05:42) (18 - 20)  SpO2: 95% (17 Mar 2021 06:00) (95% - 100%)    I&O's Summary    16 Mar 2021 07:01  -  17 Mar 2021 07:00  --------------------------------------------------------  IN: 1100 mL / OUT: 2800 mL / NET: -1700 mL        CAPILLARY BLOOD GLUCOSE          PHYSICAL EXAM:    Constitutional:  (   ) NAD,   (   )awake and alert  HEENT: PERR, EOMI,    Neck: Soft and supple, No LAD, No JVD  Respiratory:  (    Breath sounds are clear bilaterally,    (   ) wheezing, rales or rhonchi  Cardiovascular:     (   )S1 and S2, regular rate and rhythm, no Murmurs, gallops or rubs  Gastrointestinal:  (   )Bowel Sounds present, soft,   (  )nontender, nondistended,    Extremities:    (  ) peripheral edema  Vascular: 2+ peripheral pulses  Neurological:    (    )A/O x 3,   (  ) focal deficits  Musculoskeletal:    (   )  normal strength b/l upper  (     ) normal  lower extremities  Skin: No rashes    MEDICATIONS:  MEDICATIONS  (STANDING):  albuterol/ipratropium for Nebulization. 3 milliLiter(s) Nebulizer once  aspirin enteric coated 81 milliGRAM(s) Oral daily  carvedilol 25 milliGRAM(s) Oral every 12 hours  chlorhexidine 2% Cloths 1 Application(s) Topical <User Schedule>  chlorhexidine 4% Liquid 1 Application(s) Topical <User Schedule>  heparin   Injectable 5000 Unit(s) SubCutaneous every 12 hours  piperacillin/tazobactam IVPB.. 3.375 Gram(s) IV Intermittent every 12 hours  tamsulosin 0.4 milliGRAM(s) Oral at bedtime      LABS: All Labs Reviewed:                        7.1    7.59  )-----------( 111      ( 16 Mar 2021 07:17 )             22.8     03-16    140  |  103  |  46<H>  ----------------------------<  107<H>  3.7   |  24  |  7.68<H>    Ca    8.7      16 Mar 2021 07:20      PT/INR - ( 15 Mar 2021 17:18 )   PT: 11.4 sec;   INR: 0.95 ratio         PTT - ( 15 Mar 2021 17:18 )  PTT:28.0 sec      Blood Culture:   Urine Culture      RADIOLOGY/EKG:    ASSESSMENT AND PLAN:    DVT PPX:    ADVANCED DIRECTIVE:    DISPOSITION: CHIEF COMPLAINT: patient condition remained stable sitting in the bed without distress or shortness of breath    SUBJECTIVE:     REVIEW OF SYSTEMS: without complaint    CONSTITUTIONAL: (  )  weakness,  (  ) fevers or chills  EYES/ENT: (  )visual changes;     NECK: (  ) pain or stiffness  RESPIRATORY:   (  )cough, wheezing, hemoptysis;  (  ) shortness of breath  CARDIOVASCULAR:  (  )chest pain or palpitations  GASTROINTESTINAL:   (  )abdominal or epigastric pain.  (  ) nausea, vomiting, or hematemesis;   (   ) diarrhea or constipation.   GENITOURINARY:   (    ) dysuria, frequency or hematuria  NEUROLOGICAL:  (   ) numbness or weakness   All other review of systems is negative unless indicated above    Vital Signs Last 24 Hrs  T(C): 36.6 (17 Mar 2021 05:42), Max: 36.9 (16 Mar 2021 08:50)  T(F): 97.9 (17 Mar 2021 05:42), Max: 98.4 (16 Mar 2021 08:50)  HR: 62 (17 Mar 2021 06:00) (62 - 77)  BP: 124/68 (17 Mar 2021 05:42) (124/68 - 167/80)  BP(mean): --  RR: 18 (17 Mar 2021 05:42) (18 - 20)  SpO2: 95% (17 Mar 2021 06:00) (95% - 100%)    I&O's Summary    16 Mar 2021 07:01  -  17 Mar 2021 07:00  --------------------------------------------------------  IN: 1100 mL / OUT: 2800 mL / NET: -1700 mL        CAPILLARY BLOOD GLUCOSE          PHYSICAL EXAM:  Constitutional: NAD  Neck:  No JVD  Respiratory: CTAB/L  Cardiovascular: S1 and S2  Gastrointestinal: BS+, soft, NT/ND  Extremities: No peripheral edema  Neurological: A/O x 3, no focal deficits  Psychiatric: Normal mood, normal affect  : No Aguilar          MEDICATIONS:  MEDICATIONS  (STANDING):  albuterol/ipratropium for Nebulization. 3 milliLiter(s) Nebulizer once  aspirin enteric coated 81 milliGRAM(s) Oral daily  carvedilol 25 milliGRAM(s) Oral every 12 hours  chlorhexidine 2% Cloths 1 Application(s) Topical <User Schedule>  chlorhexidine 4% Liquid 1 Application(s) Topical <User Schedule>  heparin   Injectable 5000 Unit(s) SubCutaneous every 12 hours  piperacillin/tazobactam IVPB.. 3.375 Gram(s) IV Intermittent every 12 hours  tamsulosin 0.4 milliGRAM(s) Oral at bedtime      LABS: All Labs Reviewed:                        7.1    7.59  )-----------( 111      ( 16 Mar 2021 07:17 )             22.8     03-16    140  |  103  |  46<H>  ----------------------------<  107<H>  3.7   |  24  |  7.68<H>    Ca    8.7      16 Mar 2021 07:20      PT/INR - ( 15 Mar 2021 17:18 )   PT: 11.4 sec;   INR: 0.95 ratio         PTT - ( 15 Mar 2021 17:18 )  PTT:28.0 sec      Blood Culture:   Urine Culture      RADIOLOGY/EKG:    ASSESSMENT AND PLAN:  80M w/ hx of CKD4, dCHF, difficult to control HTN, DM?? p/w SOB and CHF  Suspect he has diastolic CHF at present   CKD stage 4-5, crt trending down   1 Renal he had hemodialysis yesterday and also received 1 unit transfusion CBC repeat pending   2 CVS-   Severe LVOT so avoid excessive fluid removal at HD;  follow-up with the cardiology for further assessment   3.Vasc- Tunnel perm cath placed.       4. Pulm-Needs outpt sleep study     DVT PPX:    ADVANCED DIRECTIVE:    DISPOSITION: CHIEF COMPLAINT: patient condition remained stable sitting in the bed without distress or shortness of breath    SUBJECTIVE:     REVIEW OF SYSTEMS: without complaint    CONSTITUTIONAL: (  )  weakness,  (  ) fevers or chills  EYES/ENT: (  )visual changes;     NECK: (  ) pain or stiffness  RESPIRATORY:   (  )cough, wheezing, hemoptysis;  (  ) shortness of breath  CARDIOVASCULAR:  (  )chest pain or palpitations  GASTROINTESTINAL:   (  )abdominal or epigastric pain.  (  ) nausea, vomiting, or hematemesis;   (   ) diarrhea or constipation.   GENITOURINARY:   (    ) dysuria, frequency or hematuria  NEUROLOGICAL:  (   ) numbness or weakness   All other review of systems is negative unless indicated above    Vital Signs Last 24 Hrs  T(C): 36.6 (17 Mar 2021 05:42), Max: 36.9 (16 Mar 2021 08:50)  T(F): 97.9 (17 Mar 2021 05:42), Max: 98.4 (16 Mar 2021 08:50)  HR: 62 (17 Mar 2021 06:00) (62 - 77)  BP: 124/68 (17 Mar 2021 05:42) (124/68 - 167/80)  BP(mean): --  RR: 18 (17 Mar 2021 05:42) (18 - 20)  SpO2: 95% (17 Mar 2021 06:00) (95% - 100%)    I&O's Summary    16 Mar 2021 07:01  -  17 Mar 2021 07:00  --------------------------------------------------------  IN: 1100 mL / OUT: 2800 mL / NET: -1700 mL        CAPILLARY BLOOD GLUCOSE          PHYSICAL EXAM:  Constitutional: NAD  Neck:  No JVD  Respiratory: CTAB/L  Cardiovascular: S1 and S2  Gastrointestinal: BS+, soft, NT/ND  Extremities: No peripheral edema  Neurological: A/O x 3, no focal deficits  Psychiatric: Normal mood, normal affect  : No Aguilar          MEDICATIONS:  MEDICATIONS  (STANDING):  albuterol/ipratropium for Nebulization. 3 milliLiter(s) Nebulizer once  aspirin enteric coated 81 milliGRAM(s) Oral daily  carvedilol 25 milliGRAM(s) Oral every 12 hours  chlorhexidine 2% Cloths 1 Application(s) Topical <User Schedule>  chlorhexidine 4% Liquid 1 Application(s) Topical <User Schedule>  heparin   Injectable 5000 Unit(s) SubCutaneous every 12 hours  piperacillin/tazobactam IVPB.. 3.375 Gram(s) IV Intermittent every 12 hours  tamsulosin 0.4 milliGRAM(s) Oral at bedtime      LABS: All Labs Reviewed:                        7.1    7.59  )-----------( 111      ( 16 Mar 2021 07:17 )             22.8     03-16    140  |  103  |  46<H>  ----------------------------<  107<H>  3.7   |  24  |  7.68<H>    Ca    8.7      16 Mar 2021 07:20      PT/INR - ( 15 Mar 2021 17:18 )   PT: 11.4 sec;   INR: 0.95 ratio         PTT - ( 15 Mar 2021 17:18 )  PTT:28.0 sec      Blood Culture:   Urine Culture      RADIOLOGY/EKG:    ASSESSMENT AND PLAN:  80M w/ hx of CKD4, dCHF, difficult to control HTN, DM?? p/w SOB and CHF  Suspect he has diastolic CHF at present   CKD stage 4-5, crt trending down   1 Renal he had hemodialysis yesterday and also received 1 unit transfusion CBC repeat pending   2 CVS-   Severe LVOT so avoid excessive fluid removal at HD;  follow-up with the cardiology for further assessment.         as per cardiology requested cardiac cath to be done in the next 2 days   3.Vasc- Tunnel perm cath placed.       4. Pulm-Needs outpt sleep study     DVT PPX:    ADVANCED DIRECTIVE:    DISPOSITION:

## 2021-03-17 NOTE — PROGRESS NOTE ADULT - SUBJECTIVE AND OBJECTIVE BOX
NEPHROLOGY-[facility] [phone]        Patient seen and examined in bed.  He was about the same and uneventful night         MEDICATIONS  (STANDING):  albuterol/ipratropium for Nebulization. 3 milliLiter(s) Nebulizer once  aspirin enteric coated 81 milliGRAM(s) Oral daily  carvedilol 25 milliGRAM(s) Oral every 12 hours  chlorhexidine 2% Cloths 1 Application(s) Topical <User Schedule>  chlorhexidine 4% Liquid 1 Application(s) Topical <User Schedule>  heparin   Injectable 5000 Unit(s) SubCutaneous every 12 hours  piperacillin/tazobactam IVPB.. 3.375 Gram(s) IV Intermittent every 12 hours  tamsulosin 0.4 milliGRAM(s) Oral at bedtime      VITAL:  T(C): , Max: 36.7 (03-16-21 @ 13:56)  T(F): , Max: 98 (03-16-21 @ 13:56)  HR: 63 (03-17-21 @ 09:29)  BP: 124/68 (03-17-21 @ 05:42)  BP(mean): --  RR: 20 (03-17-21 @ 09:29)  SpO2: 98% (03-17-21 @ 09:29)  Wt(kg): --    I and O's:    03-16 @ 07:01  -  03-17 @ 07:00  --------------------------------------------------------  IN: 1100 mL / OUT: 2800 mL / NET: -1700 mL          PHYSICAL EXAM:    Constitutional: NAD  Neck:  No JVD  Respiratory: CTAB/L  Cardiovascular: S1 and S2  Gastrointestinal: BS+, soft, NT/ND  Extremities: No peripheral edema  Neurological: A/O x 3, no focal deficits  Psychiatric: Normal mood, normal affect  : No Aguilar  Skin: No rashes  Access: perm cath     LABS:                        7.1    7.59  )-----------( 111      ( 16 Mar 2021 07:17 )             22.8     03-16    140  |  103  |  46<H>  ----------------------------<  107<H>  3.7   |  24  |  7.68<H>    Ca    8.7      16 Mar 2021 07:20            Urine Studies:          RADIOLOGY & ADDITIONAL STUDIES:

## 2021-03-17 NOTE — PROGRESS NOTE ADULT - NSHPATTENDINGPLANDISCUSS_GEN_ALL_CORE
MICU
NP.  Call placed to cards
Implemented All Universal Safety Interventions:  [place] to call system. Call bell, personal items and telephone within reach. Instruct patient to call for assistance. Room bathroom lighting operational. Non-slip footwear when patient is off stretcher. Physically safe environment: no spills, clutter or unnecessary equipment. Stretcher in lowest position, wheels locked, appropriate side rails in place.

## 2021-03-17 NOTE — PROGRESS NOTE ADULT - SUBJECTIVE AND OBJECTIVE BOX
DATE OF SERVICE: 03-17-21 @ 23:45    Patient is a 80y old  Male who presents with a chief complaint of Shortness of breath (17 Mar 2021 09:43)      INTERVAL HISTORY: feels ok    TELEMETRY Personally reviewed: no events  	  MEDICATIONS:  carvedilol 25 milliGRAM(s) Oral every 12 hours  tamsulosin 0.4 milliGRAM(s) Oral at bedtime        PHYSICAL EXAM:  T(C): 37.1 (03-17-21 @ 21:20), Max: 37.1 (03-17-21 @ 21:20)  HR: 97 (03-17-21 @ 21:20) (59 - 97)  BP: 127/63 (03-17-21 @ 21:20) (124/68 - 140/54)  RR: 20 (03-17-21 @ 21:20) (18 - 20)  SpO2: 96% (03-17-21 @ 21:20) (95% - 100%)  Wt(kg): --  I&O's Summary    16 Mar 2021 07:01  -  17 Mar 2021 07:00  --------------------------------------------------------  IN: 1100 mL / OUT: 2800 mL / NET: -1700 mL    17 Mar 2021 07:01  -  17 Mar 2021 23:45  --------------------------------------------------------  IN: 630 mL / OUT: 70 mL / NET: 560 mL          Appearance: In no distress	  HEENT:    PERRL, EOMI	  Cardiovascular:  S1 S2, No JVD  Respiratory: Lungs clear to auscultation	  Gastrointestinal:  Soft, Non-tender, + BS	  Vascularature:  No edema of LE  Psychiatric: Appropriate affect   Neuro: no acute focal deficits                               7.0    7.11  )-----------( 84       ( 17 Mar 2021 09:44 )             22.3     03-17    137  |  101  |  30<H>  ----------------------------<  94  3.8   |  24  |  6.49<H>    Ca    8.6      17 Mar 2021 09:44  Phos  5.1     03-17  Mg     2.0     03-17    TPro  5.3<L>  /  Alb  2.9<L>  /  TBili  0.2  /  DBili  x   /  AST  24  /  ALT  26  /  AlkPhos  41  03-17        Labs personally reviewed      ASSESSMENT/PLAN: 	  80M w/ hx of CKD4, dCHF, difficult to control HTN, DM?? p/w SOB and like acute on chronic congestive heart failure    Problem/Plan - 1:  ·  Problem: Acute on chronic congestive heart failure, unspecified heart failure type.  Plan: grossly fluid overloaded on exam and elevated BNP. Hx of severe LVH on prior TTE. Would suspect dCHF in setting of uncontrolled HTN.    -TTE- EF 60% with mid inferolateral wall and basal inferolateral wall hypokinetic, will need ischemic eval: continue to diurese for now until euvolemic  - TTE 3/12- hyperdynamic LV EF 75% , severe LVOT - avoid excessive fluid removal   - fluid removal with HD  - plan for cardiac cath Friday, pt still requiring 3-4L NC with mild orthopnea  - discussed cath with pt and wife at bedside, agreeable to proceed     Problem/Plan - 2:  ·  Problem: Essential hypertension.  Plan: Better controlled now with hd  Coreg 25mg Q12     Problem/Plan - 4:  ·  Problem: Chronic kidney disease (CKD), stage IV (severe).  Plan:  VIRGEN on CKD,  - tunneled catheter placement today 3/15  - On HD       Sridhar Carrillo DO [facility]  Cardiovascular Medicine  [address] [place] [address], [address]  Office: [phone]  Cell: [phone]

## 2021-03-17 NOTE — PROGRESS NOTE ADULT - ASSESSMENT
80M w/ hx of CKD4, dCHF, difficult to control HTN, DM?? p/w SOB and CHF  Suspect he has diastolic CHF at present   CKD stage 4-5, crt trending down       1 Renal-HD this am;  and sp blood at HD.  Repeat CBC is pending     2 CVS-   Severe LVOT so avoid excessive fluid removal at HD;  Will need cardiac cath     3.Vasc- Tunnel perm cath placed.  Vasc consult to rule out pseudoaneurysm and surveillance ultrasound today please(all before cardiac cath)      4 Pulm-Needs outpt sleep study        Standard DVT prophylaxis     Sayed Juan   [facility]  [phone]   80M w/ hx of CKD4, dCHF, difficult to control HTN, DM?? p/w SOB and CHF  Suspect he has diastolic CHF at present   CKD stage 4-5, crt trending down       1 Renal-HD in am  and sp blood at HD.  Repeat CBC is pending for today     2 CVS-   Severe LVOT so avoid excessive fluid removal at HD;  Will need cardiac cath and based on volume status seems euvolemic     3.Vasc- Tunnel perm cath placed.  Surveillance ultrasound did not have a pseudo-aneurysm      4 Pulm-Needs outpt sleep study        Standard DVT prophylaxis     Sayed [facility]  [phone]

## 2021-03-17 NOTE — CHART NOTE - NSCHARTNOTEFT_GEN_A_CORE
Nutrition Follow Up Note  Patient seen for: Nutrition Follow up on 7MON    Chart reviewed, events noted.  Per chart, 81 y/o male w/ PMhx of CKD4, CHF, difficult to control HTN, DM?? p/w SOB and CHF. Pt has been initiated on HD since inpatient. Pt transferred to MICU on 3/9, intubated for lethargy/airway protection during RRT and started on propofol and then successfully extubated on 3/11 and subsequently transferred to floor on 3/12. Noted pt will need outpatient HD upon d/c. During HD yesterday, 2L fluid removed.    Source: patient, medical record    Diet: Regular:   For patients receiving Renal Replacement - No Protein Restr, No Conc K, No Conc Phos, Low Sodium (RENAL)  Low Sodium (21 @ 05:50) [Active]    Nutrition Events:  - pt initially started on PO diet 3/4-3/9  - pt was intubated and stated on enteral nutrition from 3/9-3/11 receiving Nepro via OGT at 35 ml/hr x 18 hours  - pt transitioned to PO diet on 3/12    Patient reports: Upon RD visit, pt reports intact appetite at this time with no complaints of nausea, vomiting, constipation or diarrhea at this time. Last bowel movement yesterday 3/16 per flow sheets. RD observed pt consumed 100% of breakfast meal this morning. Pt feels he is eating enough and declines ONS at this time. During visit, pt's phone rang and pt answered and no longer wanted to speak with RD. RD unable to provide nutrition education at this time.     PO intake: >75% PO intake per pt reports    Daily Weight in k.4 (), Weight in k.4 (), Weight in k.6 (), Weight in k.3 (), Weight in k.8 (), Weight in k.3 (03-10)  --> Noted pt with weight fluctuations likely secondary to fluid shifts from HD; will continue to monitor trends as able    Pertinent Medications: MEDICATIONS  (STANDING):  albuterol/ipratropium for Nebulization. 3 milliLiter(s) Nebulizer once  aspirin enteric coated 81 milliGRAM(s) Oral daily  carvedilol 25 milliGRAM(s) Oral every 12 hours  chlorhexidine 2% Cloths 1 Application(s) Topical <User Schedule>  chlorhexidine 4% Liquid 1 Application(s) Topical <User Schedule>  heparin   Injectable 5000 Unit(s) SubCutaneous every 12 hours  piperacillin/tazobactam IVPB.. 3.375 Gram(s) IV Intermittent every 12 hours  tamsulosin 0.4 milliGRAM(s) Oral at bedtime    MEDICATIONS  (PRN):  acetaminophen    Suspension .. 650 milliGRAM(s) Oral every 6 hours PRN Temp greater or equal to 38C (100.4F), Mild Pain (1 - 3), Moderate Pain (4 - 6)  albuterol/ipratropium for Nebulization 3 milliLiter(s) Nebulizer every 6 hours PRN Shortness of Breath and/or Wheezing  sodium chloride 0.9% lock flush 10 milliLiter(s) IV Push every 1 hour PRN Pre/post blood products, medications, blood draw, and to maintain line patency    Pertinent Labs:  @ 09:44: Na 137, BUN 30<H>, Cr 6.49<H>, BG 94, K+ 3.8, Phos 5.1<H>, Mg 2.0, Alk Phos 41, ALT/SGPT 26, AST/SGOT 24    Finger Sticks: none at this time    Skin per nursing documentation: no pressure injuries per flow sheets  Edema: 1+ left ankle, right ankle per flow sheets    Estimated Needs:   [x] recalculated: based on IBW of 56 kg:  Estimated energy needs (30-35 calories/kg): 9908-0591 calories  Estimated protein needs (1.2-1.4 g/kg): 67-78 gm  Defer fluid needs to team    Previous Nutrition Diagnosis: Increased nutrient needs (energy, protein)  Nutrition Diagnosis is: ongoing, being addressed with adequate PO intake at this time    New Nutrition Diagnosis: none at this time     Interventions/ Recommend  1) Continue current diet as tolerated  2) RD to provide HD education as able/accepted by pt    Monitoring and Evaluation:   Continue to monitor Nutritional intake, Tolerance to diet prescription, weights, labs, skin integrity    RD remains available upon request and will follow up per protocol  Nuria Leos, MS, RD, CDN pgr #[phone]

## 2021-03-18 PROCEDURE — 99222 1ST HOSP IP/OBS MODERATE 55: CPT | Mod: GC

## 2021-03-18 RX ADMIN — CARVEDILOL PHOSPHATE 25 MILLIGRAM(S): 80 CAPSULE, EXTENDED RELEASE ORAL at 18:24

## 2021-03-18 RX ADMIN — HEPARIN SODIUM 5000 UNIT(S): 5000 INJECTION INTRAVENOUS; SUBCUTANEOUS at 06:48

## 2021-03-18 RX ADMIN — CARVEDILOL PHOSPHATE 25 MILLIGRAM(S): 80 CAPSULE, EXTENDED RELEASE ORAL at 06:48

## 2021-03-18 RX ADMIN — HEPARIN SODIUM 5000 UNIT(S): 5000 INJECTION INTRAVENOUS; SUBCUTANEOUS at 18:24

## 2021-03-18 RX ADMIN — Medication 81 MILLIGRAM(S): at 12:18

## 2021-03-18 RX ADMIN — TAMSULOSIN HYDROCHLORIDE 0.4 MILLIGRAM(S): 0.4 CAPSULE ORAL at 21:22

## 2021-03-18 RX ADMIN — CHLORHEXIDINE GLUCONATE 1 APPLICATION(S): 213 SOLUTION TOPICAL at 07:55

## 2021-03-18 RX ADMIN — PIPERACILLIN AND TAZOBACTAM 25 GRAM(S): 4; .5 INJECTION, POWDER, LYOPHILIZED, FOR SOLUTION INTRAVENOUS at 18:24

## 2021-03-18 RX ADMIN — PIPERACILLIN AND TAZOBACTAM 25 GRAM(S): 4; .5 INJECTION, POWDER, LYOPHILIZED, FOR SOLUTION INTRAVENOUS at 06:47

## 2021-03-18 NOTE — PROGRESS NOTE ADULT - SUBJECTIVE AND OBJECTIVE BOX
CHIEF COMPLAINT: Patient condition unchanged vascular consult noted for evaluation of LAVF/G      SUBJECTIVE:     REVIEW OF SYSTEMS: asymptomatic    CONSTITUTIONAL: (  )  weakness,  (  ) fevers or chills  EYES/ENT: (  )visual changes;     NECK: (  ) pain or stiffness  RESPIRATORY:   (  )cough, wheezing, hemoptysis;  (  ) shortness of breath  CARDIOVASCULAR:  (  )chest pain or palpitations  GASTROINTESTINAL:   (  )abdominal or epigastric pain.  (  ) nausea, vomiting, or hematemesis;   (   ) diarrhea or constipation.   GENITOURINARY:   (    ) dysuria, frequency or hematuria  NEUROLOGICAL:  (   ) numbness or weakness   All other review of systems is negative unless indicated above    Vital Signs Last 24 Hrs  T(C): 36.7 (18 Mar 2021 16:50), Max: 37.1 (17 Mar 2021 21:20)  T(F): 98.1 (18 Mar 2021 16:50), Max: 98.8 (17 Mar 2021 21:20)  HR: 61 (18 Mar 2021 16:50) (54 - 97)  BP: 157/68 (18 Mar 2021 16:50) (125/69 - 157/68)  BP(mean): --  RR: 18 (18 Mar 2021 16:50) (18 - 20)  SpO2: 100% (18 Mar 2021 16:50) (96% - 100%)    I&O's Summary    17 Mar 2021 07:01  -  18 Mar 2021 07:00  --------------------------------------------------------  IN: 630 mL / OUT: 70 mL / NET: 560 mL    18 Mar 2021 07:01  -  18 Mar 2021 21:13  --------------------------------------------------------  IN: 717 mL / OUT: 800 mL / NET: -83 mL        CAPILLARY BLOOD GLUCOSE          PHYSICAL EXAM:    Constitutional:  ( x  ) NAD,   (   )awake and alert  HEENT: PERR, EOMI,    Neck: Soft and supple, No LAD, No JVD  Respiratory:  (   x Breath sounds are clear bilaterally,    (   ) wheezing, rales or rhonchi  Cardiovascular:     (  x )S1 and S2, regular rate and rhythm, no Murmurs, gallops or rubs  Gastrointestinal:  (  x )Bowel Sounds present, soft,   (  )nontender, nondistended,    Extremities:    (  ) peripheral edema  Vascular: 2+ peripheral pulses  Neurological:    ( x   )A/O x 3,   (  ) focal deficits  Musculoskeletal:    (  x )  normal strength b/l upper  (     ) normal  lower extremities  Skin: No rashes    MEDICATIONS:  MEDICATIONS  (STANDING):  albuterol/ipratropium for Nebulization. 3 milliLiter(s) Nebulizer once  aspirin enteric coated 81 milliGRAM(s) Oral daily  carvedilol 25 milliGRAM(s) Oral every 12 hours  chlorhexidine 2% Cloths 1 Application(s) Topical <User Schedule>  chlorhexidine 4% Liquid 1 Application(s) Topical <User Schedule>  heparin   Injectable 5000 Unit(s) SubCutaneous every 12 hours  tamsulosin 0.4 milliGRAM(s) Oral at bedtime      LABS: All Labs Reviewed:                        7.0    7.11  )-----------( 84       ( 17 Mar 2021 09:44 )             22.3     03-17    137  |  101  |  30<H>  ----------------------------<  94  3.8   |  24  |  6.49<H>    Ca    8.6      17 Mar 2021 09:44  Phos  5.1     03-17  Mg     2.0     03-17    TPro  5.3<L>  /  Alb  2.9<L>  /  TBili  0.2  /  DBili  x   /  AST  24  /  ALT  26  /  AlkPhos  41  03-17          Blood Culture:   Urine Culture      RADIOLOGY/EKG:    ASSESSMENT AND PLAN:  80M w/ hx of CKD4, dCHF, difficult to control HTN, DM?? p/w SOB and CHF  Suspect he has diastolic CHF at present   CKD stage 4-5, crt trending down   1 Renal he had hemodialysis  on 3/16/2021 and also received 1 unit transfusion CBC repeat pending    3/18/2021 for hemodialysis today and transfusion 1 more unit   2 CVS-   Severe LVOT so avoid excessive fluid removal at HD;  follow-up with the cardiology for further assessment.         as per cardiology requested cardiac cath to be done in the next 2 days   3.Vasc- Tunnel perm cath placed.     3/18/2021 vascular consult for  l AV F/G      4. Pulm-Needs outpt sleep study     DVT PPX:    ADVANCED DIRECTIVE:    DISPOSITION:

## 2021-03-18 NOTE — PROGRESS NOTE ADULT - SUBJECTIVE AND OBJECTIVE BOX
Patient is a 80y old  Male who presents with a chief complaint of Shortness of breath (18 Mar 2021 08:59)      INTERVAL HISTORY: sitting up in chair, denies chest pain, sob  REVIEW OF SYSTEMS:   CONSTITUTIONAL: No weakness  EYES/ENT: No visual changes; No throat pain  Neck: No pain or stiffness  Respiratory: No cough, wheezing, No shortness of breath  CARDIOVASCULAR: no chest pain or palpitations  GASTROINTESTINAL: No abdominal pain, no nausea, vomiting or hematemesis  GENITOURINARY: No dysuria, frequency or hematuria  NEUROLOGICAL: No stroke like symptoms  SKIN: No rashes    	  MEDICATIONS:  carvedilol 25 milliGRAM(s) Oral every 12 hours  tamsulosin 0.4 milliGRAM(s) Oral at bedtime        PHYSICAL EXAM:  T(C): 37 (03-18-21 @ 12:55), Max: 37.1 (03-17-21 @ 21:20)  HR: 54 (03-18-21 @ 12:55) (54 - 97)  BP: 125/69 (03-18-21 @ 12:55) (125/69 - 145/65)  RR: 18 (03-18-21 @ 12:55) (18 - 20)  SpO2: 100% (03-18-21 @ 12:55) (96% - 100%)  Wt(kg): --  I&O's Summary    17 Mar 2021 07:01  -  18 Mar 2021 07:00  --------------------------------------------------------  IN: 630 mL / OUT: 70 mL / NET: 560 mL    18 Mar 2021 07:01  -  18 Mar 2021 14:00  --------------------------------------------------------  IN: 390 mL / OUT: 0 mL / NET: 390 mL          Appearance: In no distress	  HEENT:    PERRL, EOMI	  Cardiovascular:  S1 S2, No JVD  Respiratory: Lungs clear to auscultation	  Gastrointestinal:  Soft, Non-tender, + BS	  Vascularature:  No edema of LE  Psychiatric: Appropriate affect   Neuro: no acute focal deficits                         7.0    7.11  )-----------( 84       ( 17 Mar 2021 09:44 )             22.3     03-17    137  |  101  |  30<H>  ----------------------------<  94  3.8   |  24  |  6.49<H>    Ca    8.6      17 Mar 2021 09:44  Phos  5.1     03-17  Mg     2.0     03-17    TPro  5.3<L>  /  Alb  2.9<L>  /  TBili  0.2  /  DBili  x   /  AST  24  /  ALT  26  /  AlkPhos  41  03-17  Labs personally reviewed  ASSESSMENT/PLAN: 	  80M w/ hx of CKD4, dCHF, difficult to control HTN, DM?? p/w SOB and like acute on chronic congestive heart failure    Problem/Plan - 1:  ·  Problem: Acute on chronic congestive heart failure, unspecified heart failure type.  Plan: grossly fluid overloaded on exam and elevated BNP. Hx of severe LVH on prior TTE. Would suspect dCHF in setting of uncontrolled HTN.    -TTE- EF 60% with mid inferolateral wall and basal inferolateral wall hypokinetic, will need ischemic eval: continue to diurese for now until euvolemic  - TTE 3/12- hyperdynamic LV EF 75% , severe LVOT - avoid excessive fluid removal   - fluid removal with HD  - plan for cardiac cath tomorrow, pt still requiring 3-4L NC with mild orthopnea  - discussed cath with pt and wife- agreeable to proceed    Problem/Plan - 2:  ·  Problem: Essential hypertension.  Plan: Better controlled now with hd  Coreg 25mg Q12     Problem/Plan - 4:  ·  Problem: Chronic kidney disease (CKD), stage IV (severe).  Plan:  VIRGEN on CKD,  - tunneled catheter placement 3/15  - On HD per renal  - vascular consult for AVF placement        Betzaida Carrillo DO [facility]  Cardiovascular Medicine  [address], [address]  Office: [phone]  Cell: [phone] Patient is a 80y old  Male who presents with a chief complaint of Shortness of breath (18 Mar 2021 08:59)      INTERVAL HISTORY: sitting up in chair, denies chest pain, sob  REVIEW OF SYSTEMS:   CONSTITUTIONAL: No weakness  EYES/ENT: No visual changes; No throat pain  Neck: No pain or stiffness  Respiratory: No cough, wheezing, No shortness of breath  CARDIOVASCULAR: no chest pain or palpitations  GASTROINTESTINAL: No abdominal pain, no nausea, vomiting or hematemesis  GENITOURINARY: No dysuria, frequency or hematuria  NEUROLOGICAL: No stroke like symptoms  SKIN: No rashes    	  MEDICATIONS:  carvedilol 25 milliGRAM(s) Oral every 12 hours  tamsulosin 0.4 milliGRAM(s) Oral at bedtime        PHYSICAL EXAM:  T(C): 37 (03-18-21 @ 12:55), Max: 37.1 (03-17-21 @ 21:20)  HR: 54 (03-18-21 @ 12:55) (54 - 97)  BP: 125/69 (03-18-21 @ 12:55) (125/69 - 145/65)  RR: 18 (03-18-21 @ 12:55) (18 - 20)  SpO2: 100% (03-18-21 @ 12:55) (96% - 100%)  Wt(kg): --  I&O's Summary    17 Mar 2021 07:01  -  18 Mar 2021 07:00  --------------------------------------------------------  IN: 630 mL / OUT: 70 mL / NET: 560 mL    18 Mar 2021 07:01  -  18 Mar 2021 14:00  --------------------------------------------------------  IN: 390 mL / OUT: 0 mL / NET: 390 mL          Appearance: In no distress	  HEENT:    PERRL, EOMI	  Cardiovascular:  S1 S2, No JVD  Respiratory: Lungs clear to auscultation	  Gastrointestinal:  Soft, Non-tender, + BS	  Vascularature:  No edema of LE  Psychiatric: Appropriate affect   Neuro: no acute focal deficits                         7.0    7.11  )-----------( 84       ( 17 Mar 2021 09:44 )             22.3     03-17    137  |  101  |  30<H>  ----------------------------<  94  3.8   |  24  |  6.49<H>    Ca    8.6      17 Mar 2021 09:44  Phos  5.1     03-17  Mg     2.0     03-17    TPro  5.3<L>  /  Alb  2.9<L>  /  TBili  0.2  /  DBili  x   /  AST  24  /  ALT  26  /  AlkPhos  41  03-17  Labs personally reviewed  ASSESSMENT/PLAN: 	  80M w/ hx of CKD4, dCHF, difficult to control HTN, DM?? p/w SOB and like acute on chronic congestive heart failure    Problem/Plan - 1:  ·  Problem: Acute on chronic congestive heart failure, unspecified heart failure type.  Plan: grossly fluid overloaded on exam and elevated BNP. Hx of severe LVH on prior TTE. Would suspect dCHF in setting of uncontrolled HTN.    -TTE- EF 60% with mid inferolateral wall and basal inferolateral wall hypokinetic, will need ischemic eval: continue to diurese for now until euvolemic  - TTE 3/12- hyperdynamic LV EF 75% , severe LVOT - avoid excessive fluid removal   - fluid removal with HD  - plan for cardiac cath Friday pt still requiring 3L NC  - discussed cath with pt and wife- agreeable to proceed    Problem/Plan - 2:  ·  Problem: Essential hypertension.  Plan: Better controlled now with hd  Coreg 25mg Q12     Problem/Plan - 4:  ·  Problem: Chronic kidney disease (CKD), stage IV (severe).  Plan:  VIRGEN on CKD,  - tunneled catheter placement 3/15  - On HD per renal  - vascular consult for AVF placement        Betzaida Carrillo DO [facility]  Cardiovascular Medicine  [address], [address]  Office: [phone]  Cell: [phone]

## 2021-03-18 NOTE — PROGRESS NOTE ADULT - SUBJECTIVE AND OBJECTIVE BOX
Asked to eval pt for perm HD access.  Seen in HD unit  Currently on HD via R IJ tunneled cath.  R hand dom., No PPM/AICD.  B/L UE's mult puncture sites.  +rad pulses.    Will plan L AVF/G when medically stable.  CAn be done outpt if pt DC'ed.  Please no punctures in L UE.  DC existing L UE IV's.  WIll need v mapping.    Thank you,   MK

## 2021-03-18 NOTE — PROGRESS NOTE ADULT - SUBJECTIVE AND OBJECTIVE BOX
NEPHROLOGY-[facility] [phone]        Patient seen and examined in bed.  He was about the same         MEDICATIONS  (STANDING):  albuterol/ipratropium for Nebulization. 3 milliLiter(s) Nebulizer once  aspirin enteric coated 81 milliGRAM(s) Oral daily  carvedilol 25 milliGRAM(s) Oral every 12 hours  chlorhexidine 2% Cloths 1 Application(s) Topical <User Schedule>  chlorhexidine 4% Liquid 1 Application(s) Topical <User Schedule>  heparin   Injectable 5000 Unit(s) SubCutaneous every 12 hours  piperacillin/tazobactam IVPB.. 3.375 Gram(s) IV Intermittent every 12 hours  tamsulosin 0.4 milliGRAM(s) Oral at bedtime      VITAL:  T(C): , Max: 37.1 (03-17-21 @ 21:20)  T(F): , Max: 98.8 (03-17-21 @ 21:20)  HR: 84 (03-18-21 @ 05:51)  BP: 138/64 (03-18-21 @ 05:51)  BP(mean): --  RR: 20 (03-18-21 @ 05:51)  SpO2: 98% (03-18-21 @ 05:51)  Wt(kg): --    I and O's:    03-17 @ 07:01  -  03-18 @ 07:00  --------------------------------------------------------  IN: 630 mL / OUT: 70 mL / NET: 560 mL    03-18 @ 07:01  -  03-18 @ 08:59  --------------------------------------------------------  IN: 390 mL / OUT: 0 mL / NET: 390 mL          PHYSICAL EXAM:    Constitutional: NAD  Neck:  No JVD  Respiratory: CTAB/L  Cardiovascular: S1 and S2  Gastrointestinal: BS+, soft, NT/ND  Extremities: No peripheral edema  Neurological: A/O x 3, no focal deficits  Psychiatric: Normal mood, normal affect  : No Aguilar  Skin: No rashes  Access: perm cath     LABS:                        7.0    7.11  )-----------( 84       ( 17 Mar 2021 09:44 )             22.3     03-17    137  |  101  |  30<H>  ----------------------------<  94  3.8   |  24  |  6.49<H>    Ca    8.6      17 Mar 2021 09:44  Phos  5.1     03-17  Mg     2.0     03-17    TPro  5.3<L>  /  Alb  2.9<L>  /  TBili  0.2  /  DBili  x   /  AST  24  /  ALT  26  /  AlkPhos  41  03-17          Urine Studies:          RADIOLOGY & ADDITIONAL STUDIES:

## 2021-03-18 NOTE — PROGRESS NOTE ADULT - ASSESSMENT
80M w/ hx of CKD4, dCHF, difficult to control HTN, DM?? p/w SOB and CHF  Suspect he has diastolic CHF at present   CKD stage 4-5, crt trending down       1 Renal-HD today with blood xfusion     2 CVS-   Severe LVOT so avoid excessive fluid removal at HD;   Cardiac cath possible today     3.Vasc- Tunnel perm cath placed.  Surveillance ultrasound did not have a pseudo-aneurysm   Vasc consult -Dr Dunn called for AVF placement      4 Pulm-Needs outpt sleep study        Standard DVT prophylaxis     Sayed [facility]  [phone]

## 2021-03-19 LAB
ALBUMIN SERPL ELPH-MCNC: 2.9 G/DL — LOW (ref 3.3–5)
ALP SERPL-CCNC: 46 U/L — SIGNIFICANT CHANGE UP (ref 40–120)
ALT FLD-CCNC: 19 U/L — SIGNIFICANT CHANGE UP (ref 10–45)
ANION GAP SERPL CALC-SCNC: 12 MMOL/L — SIGNIFICANT CHANGE UP (ref 5–17)
AST SERPL-CCNC: 17 U/L — SIGNIFICANT CHANGE UP (ref 10–40)
BASOPHILS # BLD AUTO: 0.03 K/UL — SIGNIFICANT CHANGE UP (ref 0–0.2)
BASOPHILS NFR BLD AUTO: 0.6 % — SIGNIFICANT CHANGE UP (ref 0–2)
BILIRUB SERPL-MCNC: 0.2 MG/DL — SIGNIFICANT CHANGE UP (ref 0.2–1.2)
BUN SERPL-MCNC: 27 MG/DL — HIGH (ref 7–23)
CALCIUM SERPL-MCNC: 8.7 MG/DL — SIGNIFICANT CHANGE UP (ref 8.4–10.5)
CHLORIDE SERPL-SCNC: 97 MMOL/L — SIGNIFICANT CHANGE UP (ref 96–108)
CO2 SERPL-SCNC: 26 MMOL/L — SIGNIFICANT CHANGE UP (ref 22–31)
CREAT SERPL-MCNC: 5.6 MG/DL — HIGH (ref 0.5–1.3)
EOSINOPHIL # BLD AUTO: 0.44 K/UL — SIGNIFICANT CHANGE UP (ref 0–0.5)
EOSINOPHIL NFR BLD AUTO: 8.4 % — HIGH (ref 0–6)
GLUCOSE SERPL-MCNC: 90 MG/DL — SIGNIFICANT CHANGE UP (ref 70–99)
HCT VFR BLD CALC: 24.7 % — LOW (ref 39–50)
HGB BLD-MCNC: 7.6 G/DL — LOW (ref 13–17)
IMM GRANULOCYTES NFR BLD AUTO: 1 % — SIGNIFICANT CHANGE UP (ref 0–1.5)
LYMPHOCYTES # BLD AUTO: 1.13 K/UL — SIGNIFICANT CHANGE UP (ref 1–3.3)
LYMPHOCYTES # BLD AUTO: 21.7 % — SIGNIFICANT CHANGE UP (ref 13–44)
MCHC RBC-ENTMCNC: 25.4 PG — LOW (ref 27–34)
MCHC RBC-ENTMCNC: 30.8 GM/DL — LOW (ref 32–36)
MCV RBC AUTO: 82.6 FL — SIGNIFICANT CHANGE UP (ref 80–100)
MONOCYTES # BLD AUTO: 0.84 K/UL — SIGNIFICANT CHANGE UP (ref 0–0.9)
MONOCYTES NFR BLD AUTO: 16.1 % — HIGH (ref 2–14)
NEUTROPHILS # BLD AUTO: 2.72 K/UL — SIGNIFICANT CHANGE UP (ref 1.8–7.4)
NEUTROPHILS NFR BLD AUTO: 52.2 % — SIGNIFICANT CHANGE UP (ref 43–77)
NRBC # BLD: 0 /100 WBCS — SIGNIFICANT CHANGE UP (ref 0–0)
PHOSPHATE SERPL-MCNC: 5.1 MG/DL — HIGH (ref 2.5–4.5)
PLATELET # BLD AUTO: 145 K/UL — LOW (ref 150–400)
POTASSIUM SERPL-MCNC: 4.1 MMOL/L — SIGNIFICANT CHANGE UP (ref 3.5–5.3)
POTASSIUM SERPL-SCNC: 4.1 MMOL/L — SIGNIFICANT CHANGE UP (ref 3.5–5.3)
PROT SERPL-MCNC: 5.6 G/DL — LOW (ref 6–8.3)
RBC # BLD: 2.99 M/UL — LOW (ref 4.2–5.8)
RBC # FLD: 17 % — HIGH (ref 10.3–14.5)
SODIUM SERPL-SCNC: 135 MMOL/L — SIGNIFICANT CHANGE UP (ref 135–145)
WBC # BLD: 5.21 K/UL — SIGNIFICANT CHANGE UP (ref 3.8–10.5)
WBC # FLD AUTO: 5.21 K/UL — SIGNIFICANT CHANGE UP (ref 3.8–10.5)

## 2021-03-19 PROCEDURE — 93010 ELECTROCARDIOGRAM REPORT: CPT

## 2021-03-19 PROCEDURE — 99152 MOD SED SAME PHYS/QHP 5/>YRS: CPT

## 2021-03-19 PROCEDURE — 93456 R HRT CORONARY ARTERY ANGIO: CPT | Mod: 26

## 2021-03-19 RX ORDER — ERYTHROPOIETIN 10000 [IU]/ML
10000 INJECTION, SOLUTION INTRAVENOUS; SUBCUTANEOUS ONCE
Refills: 0 | Status: COMPLETED | OUTPATIENT
Start: 2021-03-20 | End: 2021-03-20

## 2021-03-19 RX ADMIN — HEPARIN SODIUM 5000 UNIT(S): 5000 INJECTION INTRAVENOUS; SUBCUTANEOUS at 17:30

## 2021-03-19 RX ADMIN — TAMSULOSIN HYDROCHLORIDE 0.4 MILLIGRAM(S): 0.4 CAPSULE ORAL at 22:11

## 2021-03-19 RX ADMIN — HEPARIN SODIUM 5000 UNIT(S): 5000 INJECTION INTRAVENOUS; SUBCUTANEOUS at 06:02

## 2021-03-19 RX ADMIN — CARVEDILOL PHOSPHATE 25 MILLIGRAM(S): 80 CAPSULE, EXTENDED RELEASE ORAL at 06:12

## 2021-03-19 RX ADMIN — Medication 650 MILLIGRAM(S): at 17:34

## 2021-03-19 RX ADMIN — CARVEDILOL PHOSPHATE 25 MILLIGRAM(S): 80 CAPSULE, EXTENDED RELEASE ORAL at 17:31

## 2021-03-19 RX ADMIN — Medication 81 MILLIGRAM(S): at 13:55

## 2021-03-19 RX ADMIN — CHLORHEXIDINE GLUCONATE 1 APPLICATION(S): 213 SOLUTION TOPICAL at 06:12

## 2021-03-19 NOTE — CONSULT NOTE ADULT - SUBJECTIVE AND OBJECTIVE BOX
VASCULAR SURGERY CONSULT NOTE    Patient is a 80y old  Male who presents with a chief complaint of Shortness of breath (19 Mar 2021 14:31)    HPI:  80M right-hand dominant w/ hx of CKD4, dCHF, difficult to control HTN, DM p/w SOB and CHF. Pt states he has been in [facility] since Dec 27th 2020 and just returned home from [facility] today. Pt states he started to get worsening SOB over the past 2-3 weeks in [facility]. He states he discussed this with his doctor in [facility] and was advised to [place] the [place] and [place]. Pt endorses worsening orthopnea and LE edema. Denies any chest pain, palpitations, fevers or chills. Endorses cough relatively unchanged. Endorses okay medication compliance although did not take any medications today. Denies headache. Occasional dizziness but no syncope. c/b pulmonary edema requiring bumex gtt, intubation for hypercarbic acute respiratory failure, HD, s/p RIJ tunnel HD catheter placement. Vascular surgery consulted for AVF planning    10-points review of system performed with pertinent negative and postive findings documented in the HPI     PAST MEDICAL & SURGICAL HISTORY:  Diabetes    Hypertension    HTN (hypertension)    BPH (Benign Prostatic Hypertrophy)    Glaucoma (Increased Eye Pressure)    HTN - Hypertension    No significant past surgical history    Laser Surgery :Right  ?  regarding procedure ; 12/07 ; secondary to glaucoma    Laser Surgery Left  ? regarding procedure ; secondary to glaucoma 12/07    Excision of Sinus Polyp  2006    FAMILY HISTORY:  Family history of diabetes mellitus (DM) (Mother, Sibling)    : Family history not pertinent as reviewed with the patient and family    SOCIAL HISTORY: No pertinent social history    MEDICATIONS  (STANDING):  albuterol/ipratropium for Nebulization. 3 milliLiter(s) Nebulizer once  aspirin enteric coated 81 milliGRAM(s) Oral daily  carvedilol 25 milliGRAM(s) Oral every 12 hours  chlorhexidine 2% Cloths 1 Application(s) Topical <User Schedule>  chlorhexidine 4% Liquid 1 Application(s) Topical <User Schedule>  heparin   Injectable 5000 Unit(s) SubCutaneous every 12 hours  tamsulosin 0.4 milliGRAM(s) Oral at bedtime    MEDICATIONS  (PRN):  acetaminophen    Suspension .. 650 milliGRAM(s) Oral every 6 hours PRN Temp greater or equal to 38C (100.4F), Mild Pain (1 - 3), Moderate Pain (4 - 6)  albuterol/ipratropium for Nebulization 3 milliLiter(s) Nebulizer every 6 hours PRN Shortness of Breath and/or Wheezing  sodium chloride 0.9% lock flush 10 milliLiter(s) IV Push every 1 hour PRN Pre/post blood products, medications, blood draw, and to maintain line patency    Allergies    grass, pollen (Rhinitis)  No Known Drug Allergies    Intolerances        Vital Signs Last 24 Hrs  T(C): 36.7 (19 Mar 2021 10:00), Max: 37.2 (19 Mar 2021 00:31)  T(F): 98.1 (19 Mar 2021 10:00), Max: 98.9 (19 Mar 2021 00:31)  HR: 59 (19 Mar 2021 13:40) (56 - 69)  BP: 147/67 (19 Mar 2021 13:40) (134/73 - 192/86)  BP(mean): --  RR: 18 (19 Mar 2021 13:40) (12 - 21)  SpO2: 98% (19 Mar 2021 13:40) (98% - 100%)  Daily     Daily     Exam:  General:  HEENT:  Resp:  Chest:   Abd:  Ext:  Neuro:                          7.6    5.21  )-----------( 145      ( 19 Mar 2021 06:50 )             24.7     03-19    135  |  97  |  27<H>  ----------------------------<  90  4.1   |  26  |  5.60<H>    Ca    8.7      19 Mar 2021 06:50  Phos  5.1     03-19    TPro  5.6<L>  /  Alb  2.9<L>  /  TBili  0.2  /  DBili  x   /  AST  17  /  ALT  19  /  AlkPhos  46  03-19          IMAGING STUDIES:             VASCULAR SURGERY CONSULT NOTE    Patient is a 80y old  Male who presents with a chief complaint of Shortness of breath (19 Mar 2021 14:31)    HPI:  80M right-hand dominant w/ hx of CKD4, dCHF, difficult to control HTN, DM p/w SOB and CHF. Pt states he has been in [facility] since Dec 27th 2020 and just returned home from [facility] today. Pt states he started to get worsening SOB over the past 2-3 weeks in [facility]. He states he discussed this with his doctor in [facility] and was advised to [place] and [place]. Pt endorses worsening orthopnea and LE edema. Denies any chest pain, palpitations, fevers or chills. Endorses cough relatively unchanged. Endorses okay medication compliance although did not take any medications today. Denies headache. Occasional dizziness but no syncope. c/b pulmonary edema requiring bumex gtt, intubation for hypercarbic acute respiratory failure, HD, s/p RIJ tunnel HD catheter placement. Vascular surgery consulted for AVF planning    10-points review of system performed with pertinent negative and postive findings documented in the HPI     PAST MEDICAL & SURGICAL HISTORY:  Diabetes    Hypertension    HTN (hypertension)    BPH (Benign Prostatic Hypertrophy)    Glaucoma (Increased Eye Pressure)    HTN - Hypertension    No significant past surgical history    Laser Surgery :Right  ?  regarding procedure ; 12/07 ; secondary to glaucoma    Laser Surgery Left  ? regarding procedure ; secondary to glaucoma 12/07    Excision of Sinus Polyp  2006    FAMILY HISTORY:  Family history of diabetes mellitus (DM) (Mother, Sibling)    : Family history not pertinent as reviewed with the patient and family    SOCIAL HISTORY: No pertinent social history    MEDICATIONS  (STANDING):  albuterol/ipratropium for Nebulization. 3 milliLiter(s) Nebulizer once  aspirin enteric coated 81 milliGRAM(s) Oral daily  carvedilol 25 milliGRAM(s) Oral every 12 hours  chlorhexidine 2% Cloths 1 Application(s) Topical <User Schedule>  chlorhexidine 4% Liquid 1 Application(s) Topical <User Schedule>  heparin   Injectable 5000 Unit(s) SubCutaneous every 12 hours  tamsulosin 0.4 milliGRAM(s) Oral at bedtime    MEDICATIONS  (PRN):  acetaminophen    Suspension .. 650 milliGRAM(s) Oral every 6 hours PRN Temp greater or equal to 38C (100.4F), Mild Pain (1 - 3), Moderate Pain (4 - 6)  albuterol/ipratropium for Nebulization 3 milliLiter(s) Nebulizer every 6 hours PRN Shortness of Breath and/or Wheezing  sodium chloride 0.9% lock flush 10 milliLiter(s) IV Push every 1 hour PRN Pre/post blood products, medications, blood draw, and to maintain line patency    Allergies    grass, pollen (Rhinitis)  No Known Drug Allergies    Intolerances        Vital Signs Last 24 Hrs  T(C): 36.7 (19 Mar 2021 10:00), Max: 37.2 (19 Mar 2021 00:31)  T(F): 98.1 (19 Mar 2021 10:00), Max: 98.9 (19 Mar 2021 00:31)  HR: 59 (19 Mar 2021 13:40) (56 - 69)  BP: 147/67 (19 Mar 2021 13:40) (134/73 - 192/86)  BP(mean): --  RR: 18 (19 Mar 2021 13:40) (12 - 21)  SpO2: 98% (19 Mar 2021 13:40) (98% - 100%)  Daily     Daily     Exam:  General: resting in bed, NAD  Rest: Nonlabored breathing  Ext: palpable radial and ulnar pulses b/l  Neuro: AAOx3                          7.6    5.21  )-----------( 145      ( 19 Mar 2021 06:50 )             24.7     03-19    135  |  97  |  27<H>  ----------------------------<  90  4.1   |  26  |  5.60<H>    Ca    8.7      19 Mar 2021 06:50  Phos  5.1     03-19    TPro  5.6<L>  /  Alb  2.9<L>  /  TBili  0.2  /  DBili  x   /  AST  17  /  ALT  19  /  AlkPhos  46  03-19          IMAGING STUDIES:

## 2021-03-19 NOTE — PROGRESS NOTE ADULT - SUBJECTIVE AND OBJECTIVE BOX
CHIEF COMPLAINT:    SUBJECTIVE:     REVIEW OF SYSTEMS:    CONSTITUTIONAL: (  )  weakness,  (  ) fevers or chills  EYES/ENT: (  )visual changes;     NECK: (  ) pain or stiffness  RESPIRATORY:   (  )cough, wheezing, hemoptysis;  (  ) shortness of breath  CARDIOVASCULAR:  (  )chest pain or palpitations  GASTROINTESTINAL:   (  )abdominal or epigastric pain.  (  ) nausea, vomiting, or hematemesis;   (   ) diarrhea or constipation.   GENITOURINARY:   (    ) dysuria, frequency or hematuria  NEUROLOGICAL:  (   ) numbness or weakness   All other review of systems is negative unless indicated above    Vital Signs Last 24 Hrs  T(C): 37 (19 Mar 2021 06:10), Max: 37.2 (19 Mar 2021 00:31)  T(F): 98.6 (19 Mar 2021 06:10), Max: 98.9 (19 Mar 2021 00:31)  HR: 68 (19 Mar 2021 06:10) (54 - 69)  BP: 149/71 (19 Mar 2021 06:10) (125/69 - 157/75)  BP(mean): --  RR: 18 (19 Mar 2021 06:10) (16 - 18)  SpO2: 99% (19 Mar 2021 06:10) (99% - 100%)    I&O's Summary    18 Mar 2021 07:01  -  19 Mar 2021 07:00  --------------------------------------------------------  IN: 1372 mL / OUT: 800 mL / NET: 572 mL        CAPILLARY BLOOD GLUCOSE          PHYSICAL EXAM:    Constitutional:  (   ) NAD,   (   )awake and alert  HEENT: PERR, EOMI,    Neck: Soft and supple, No LAD, No JVD  Respiratory:  (    Breath sounds are clear bilaterally,    (   ) wheezing, rales or rhonchi  Cardiovascular:     (   )S1 and S2, regular rate and rhythm, no Murmurs, gallops or rubs  Gastrointestinal:  (   )Bowel Sounds present, soft,   (  )nontender, nondistended,    Extremities:    (  ) peripheral edema  Vascular: 2+ peripheral pulses  Neurological:    (    )A/O x 3,   (  ) focal deficits  Musculoskeletal:    (   )  normal strength b/l upper  (     ) normal  lower extremities  Skin: No rashes    MEDICATIONS:  MEDICATIONS  (STANDING):  albuterol/ipratropium for Nebulization. 3 milliLiter(s) Nebulizer once  aspirin enteric coated 81 milliGRAM(s) Oral daily  carvedilol 25 milliGRAM(s) Oral every 12 hours  chlorhexidine 2% Cloths 1 Application(s) Topical <User Schedule>  chlorhexidine 4% Liquid 1 Application(s) Topical <User Schedule>  heparin   Injectable 5000 Unit(s) SubCutaneous every 12 hours  tamsulosin 0.4 milliGRAM(s) Oral at bedtime      LABS: All Labs Reviewed:                        7.6    5.21  )-----------( 145      ( 19 Mar 2021 06:50 )             24.7     03-19    135  |  97  |  27<H>  ----------------------------<  90  4.1   |  26  |  5.60<H>    Ca    8.7      19 Mar 2021 06:50  Phos  5.1     03-19  Mg     2.0     03-17    TPro  5.6<L>  /  Alb  2.9<L>  /  TBili  0.2  /  DBili  x   /  AST  17  /  ALT  19  /  AlkPhos  46  03-19          Blood Culture:   Urine Culture      RADIOLOGY/EKG:    ASSESSMENT AND PLAN:    DVT PPX:    ADVANCED DIRECTIVE:    DISPOSITION: CHIEF COMPLAINT: Patient was seen before going for cardiac caht awake and verbal without pain but concern    SUBJECTIVE:     REVIEW OF SYSTEMS: Without complaint    CONSTITUTIONAL: (  )  weakness,  (  ) fevers or chills  EYES/ENT: (  )visual changes;     NECK: (  ) pain or stiffness  RESPIRATORY:   (  )cough, wheezing, hemoptysis;  (  ) shortness of breath  CARDIOVASCULAR:  (  )chest pain or palpitations  GASTROINTESTINAL:   (  )abdominal or epigastric pain.  (  ) nausea, vomiting, or hematemesis;   (   ) diarrhea or constipation.   GENITOURINARY:   (    ) dysuria, frequency or hematuria  NEUROLOGICAL:  (   ) numbness or weakness   All other review of systems is negative unless indicated above    Vital Signs Last 24 Hrs  T(C): 37 (19 Mar 2021 06:10), Max: 37.2 (19 Mar 2021 00:31)  T(F): 98.6 (19 Mar 2021 06:10), Max: 98.9 (19 Mar 2021 00:31)  HR: 68 (19 Mar 2021 06:10) (54 - 69)  BP: 149/71 (19 Mar 2021 06:10) (125/69 - 157/75)  BP(mean): --  RR: 18 (19 Mar 2021 06:10) (16 - 18)  SpO2: 99% (19 Mar 2021 06:10) (99% - 100%)    I&O's Summary    18 Mar 2021 07:01  -  19 Mar 2021 07:00  --------------------------------------------------------  IN: 1372 mL / OUT: 800 mL / NET: 572 mL        CAPILLARY BLOOD GLUCOSE          PHYSICAL EXAM:    Constitutional:  ( x  ) NAD,   (  x )awake and alert  HEENT: PERR, EOMI,    Neck: Soft and supple, No LAD, No JVD  Respiratory:  (  x  Breath sounds are clear bilaterally,    (   ) wheezing, rales or rhonchi  Cardiovascular:     ( x  )S1 and S2, regular rate and rhythm, no Murmurs, gallops or rubs  Gastrointestinal:  ( x  )Bowel Sounds present, soft,   (  )nontender, nondistended,    Extremities:    ( ) peripheral edema  Vascular: 2+ peripheral pulses  Neurological:    (  x  )A/O x 3,   (  ) focal deficits  Musculoskeletal:    ( x  )  normal strength b/l upper  (     ) normal  lower extremities  Skin: No rashes    MEDICATIONS:  MEDICATIONS  (STANDING):  albuterol/ipratropium for Nebulization. 3 milliLiter(s) Nebulizer once  aspirin enteric coated 81 milliGRAM(s) Oral daily  carvedilol 25 milliGRAM(s) Oral every 12 hours  chlorhexidine 2% Cloths 1 Application(s) Topical <User Schedule>  chlorhexidine 4% Liquid 1 Application(s) Topical <User Schedule>  heparin   Injectable 5000 Unit(s) SubCutaneous every 12 hours  tamsulosin 0.4 milliGRAM(s) Oral at bedtime      LABS: All Labs Reviewed:                        7.6    5.21  )-----------( 145      ( 19 Mar 2021 06:50 )             24.7     03-19    135  |  97  |  27<H>  ----------------------------<  90  4.1   |  26  |  5.60<H>    Ca    8.7      19 Mar 2021 06:50  Phos  5.1     03-19  Mg     2.0     03-17    TPro  5.6<L>  /  Alb  2.9<L>  /  TBili  0.2  /  DBili  x   /  AST  17  /  ALT  19  /  AlkPhos  46  03-19          Blood Culture:   Urine Culture      RADIOLOGY/EKG:    ASSESSMENT AND PLAN:  Patient condition remained stable continue hemodialysis waiting for cardiac CAD before discharge to home [facility]    Continue hemodialysis as an outpatient  DVT PPX:    ADVANCED DIRECTIVE:    DISPOSITION:

## 2021-03-19 NOTE — PROGRESS NOTE ADULT - SUBJECTIVE AND OBJECTIVE BOX
NEPHROLOGY-[facility] [phone]        Patient seen and examined in bed.  He was the same         MEDICATIONS  (STANDING):  albuterol/ipratropium for Nebulization. 3 milliLiter(s) Nebulizer once  aspirin enteric coated 81 milliGRAM(s) Oral daily  carvedilol 25 milliGRAM(s) Oral every 12 hours  chlorhexidine 2% Cloths 1 Application(s) Topical <User Schedule>  chlorhexidine 4% Liquid 1 Application(s) Topical <User Schedule>  heparin   Injectable 5000 Unit(s) SubCutaneous every 12 hours  tamsulosin 0.4 milliGRAM(s) Oral at bedtime      VITAL:  T(C): , Max: 37.2 (03-19-21 @ 00:31)  T(F): , Max: 98.9 (03-19-21 @ 00:31)  HR: 56 (03-19-21 @ 10:00)  BP: 192/86 (03-19-21 @ 10:00)  BP(mean): --  RR: 21 (03-19-21 @ 10:00)  SpO2: 100% (03-19-21 @ 10:00)  Wt(kg): --    I and O's:    03-18 @ 07:01  -  03-19 @ 07:00  --------------------------------------------------------  IN: 1372 mL / OUT: 800 mL / NET: 572 mL          PHYSICAL EXAM:    Constitutional: NAD  Neck:  No JVD  Respiratory: CTAB/L  Cardiovascular: S1 and S2  Gastrointestinal: BS+, soft, NT/ND  Extremities: No peripheral edema  Neurological: A/O x 3, no focal deficits  Psychiatric: Normal mood, normal affect  : No Aguilar  Skin: No rashes  Access: perm cath     LABS:                        7.6    5.21  )-----------( 145      ( 19 Mar 2021 06:50 )             24.7     03-19    135  |  97  |  27<H>  ----------------------------<  90  4.1   |  26  |  5.60<H>    Ca    8.7      19 Mar 2021 06:50  Phos  5.1     03-19    TPro  5.6<L>  /  Alb  2.9<L>  /  TBili  0.2  /  DBili  x   /  AST  17  /  ALT  19  /  AlkPhos  46  03-19          Urine Studies:          RADIOLOGY & ADDITIONAL STUDIES:

## 2021-03-19 NOTE — CONSULT NOTE ADULT - CONSULT REASON
tunneled hemodialysis catheter placement
urgent dialysis
CHF
VIRGEN
altered mental status, unequal pupils
AVF planning

## 2021-03-19 NOTE — PROGRESS NOTE ADULT - ASSESSMENT
80M w/ hx of CKD4, dCHF, difficult to control HTN, DM?? p/w SOB and CHF   diastolic CHF on presentation in addition to ARIELLE   ESRD       1 Renal-HD in am and may need another blood xfusion if the HGB remains under 8    2 CVS-   Severe LVOT so avoid excessive fluid removal at HD;   Cardiac cath possible today     3.Vasc- Tunnel perm cath placed.  Surveillance ultrasound did not have a pseudo-aneurysm   Vasc consult -Dr Dunn called for AVF placement and avf likely wed of next week      4 Pulm-Needs outpt sleep study        Standard DVT prophylaxis     Sayed Juan   [facility]  [phone]

## 2021-03-19 NOTE — CONSULT NOTE ADULT - ASSESSMENT
80M right-hand dominant w/ hx of CKD4, dCHF, HTN, DM p/w CHF exacerbation, c/b pulmonary edema requiring bumex drip and intubation for hypercarbic respiratory failure. Now s/p extubation, on HD via RIJ tunnel HD catheter. Vascular surgery consulted for AVF planning    Recommendation  - Protect left arm - No IVs, blood draws, blood pressure cuff  - Vein mapping  - Medical optimization  - Will plan for AVF  - Plan to be discussed with attending Dr. Castellanos    [id number]

## 2021-03-19 NOTE — PROGRESS NOTE ADULT - SUBJECTIVE AND OBJECTIVE BOX
DATE OF SERVICE: 03-19-21 @ 14:31    Patient is a 80y old  Male who presents with a chief complaint of Shortness of breath (19 Mar 2021 11:07)      INTERVAL HISTORY: s/p cath     	  MEDICATIONS:  carvedilol 25 milliGRAM(s) Oral every 12 hours  tamsulosin 0.4 milliGRAM(s) Oral at bedtime        PHYSICAL EXAM:  T(C): 36.7 (03-19-21 @ 10:00), Max: 37.2 (03-19-21 @ 00:31)  HR: 59 (03-19-21 @ 13:40) (56 - 69)  BP: 147/67 (03-19-21 @ 13:40) (134/73 - 192/86)  RR: 18 (03-19-21 @ 13:40) (12 - 21)  SpO2: 98% (03-19-21 @ 13:40) (98% - 100%)  Wt(kg): --  I&O's Summary    18 Mar 2021 07:01  -  19 Mar 2021 07:00  --------------------------------------------------------  IN: 1372 mL / OUT: 800 mL / NET: 572 mL    19 Mar 2021 07:01  -  19 Mar 2021 14:31  --------------------------------------------------------  IN: 240 mL / OUT: 0 mL / NET: 240 mL          Appearance: In no distress	  HEENT:    PERRL, EOMI	  Cardiovascular:  S1 S2, No JVD  Respiratory: Lungs clear to auscultation	  Gastrointestinal:  Soft, Non-tender, + BS	  Vascularature:  No edema of LE  Psychiatric: Appropriate affect   Neuro: no acute focal deficits                               7.6    5.21  )-----------( 145      ( 19 Mar 2021 06:50 )             24.7     03-19    135  |  97  |  27<H>  ----------------------------<  90  4.1   |  26  |  5.60<H>    Ca    8.7      19 Mar 2021 06:50  Phos  5.1     03-19    TPro  5.6<L>  /  Alb  2.9<L>  /  TBili  0.2  /  DBili  x   /  AST  17  /  ALT  19  /  AlkPhos  46  03-19        Labs personally reviewed      80M w/ hx of CKD4, dCHF, difficult to control HTN, DM?? p/w SOB and like acute on chronic congestive heart failure    Problem/Plan - 1:  ·  Problem: Acute on chronic congestive heart failure, unspecified heart failure type.  Plan: grossly fluid overloaded on exam and elevated BNP.   -TTE- EF 60% with mid inferolateral wall and basal inferolateral wall hypokinetic, will need ischemic eval: continue to diurese for now until euvolemic  - TTE 3/12- hyperdynamic LV EF 75% , severe LVOT - avoid excessive fluid removal   - fluid removal with HD  - s/p cath with minimal Dz  - RHC with normal wedge, euvolemic    Problem/Plan - 2:  ·  Problem: Essential hypertension.  Plan: Better controlled now with hd  Coreg 25mg Q12     Problem/Plan - 4:  ·  Problem: Chronic kidney disease (CKD), stage IV (severe).  Plan:  VIRGEN on CKD,  - tunneled catheter placement 3/15  - On HD per renal  - vascular consult for AVF placement              Sridhar Carrillo DO [facility]  Cardiovascular Medicine  [address] [place] [address], [address]  Office: [phone]  Cell: [phone]

## 2021-03-19 NOTE — CONSULT NOTE ADULT - ATTENDING COMMENTS
80M Hx T2DM, Bilateral Multiple Renal Cysts, worsening Acute Renal Failure superimposed on CKD IV with Chronic HFpEF 60%, admitted 3/4/21 for Oliguric Renal Failure.   - Sitting OOB to Chair, A&Ox 3 and FCx 4 not in acute distress   - NCO2 4Li SpO2 95% and stable HR/BP   - CXR c/w Acute Pulmonary Congestion and basilar Effusion   - Azotemia 52/5.4 with K 4.8, PO4 5.3   - IV Bumex 1 mg/Hr with  cc noted   - Agreeable with impending Hemodialysis but no indication for Emergent HD tonight   - Increase IV Bumex to 2-3 mg/Hr     Patient seen and examined with ICU Resident/Fellow at bedside after lab data, medical records and radiology reports reviewed. I have read and agreeable in general with resident's Documented Note, Assessment and Management Plan which reflected my opinions from bedside round and discussion.
TEntively on OR schedule for next Thursday 3/25 for L AVF/G if pt ok.  CAn be done outpt if pt DC'ed before next Thur,  L UE no punctures please.  Vein mapping please
Starr Stevenson is a 80 year old man with PMHx of CKD4, dCHF, uncontrolled HTN, DM?? p/w SOB and CHF treated here for CHF decompensation and worsening renal function. during recent care patient was noted to have unequal pupils and code stroke weas called without change in his care.  On exam pt is intubated with agonal breathing and episodes of hypoxia with co2 retention.  + pupils, gag and corneal reflexes while on sedation.      Impression: patient with t history of bilateral pupil surgery and surgical pupils impaired evaluation. Altered mental status most likely related to respiratory/metabolic cause.  Less likely CVA.  Pt did not receive tpa and to consider starting asa 81mg.  obtain CR, CTA of head   MR, MRA when available.   aspirin 81mg if no contraindications

## 2021-03-20 LAB
ALBUMIN SERPL ELPH-MCNC: 3 G/DL — LOW (ref 3.3–5)
ALP SERPL-CCNC: 49 U/L — SIGNIFICANT CHANGE UP (ref 40–120)
ALT FLD-CCNC: 16 U/L — SIGNIFICANT CHANGE UP (ref 10–45)
ANION GAP SERPL CALC-SCNC: 14 MMOL/L — SIGNIFICANT CHANGE UP (ref 5–17)
AST SERPL-CCNC: 14 U/L — SIGNIFICANT CHANGE UP (ref 10–40)
BASOPHILS # BLD AUTO: 0.03 K/UL — SIGNIFICANT CHANGE UP (ref 0–0.2)
BASOPHILS NFR BLD AUTO: 0.5 % — SIGNIFICANT CHANGE UP (ref 0–2)
BILIRUB SERPL-MCNC: 0.1 MG/DL — LOW (ref 0.2–1.2)
BUN SERPL-MCNC: 35 MG/DL — HIGH (ref 7–23)
CALCIUM SERPL-MCNC: 8.4 MG/DL — SIGNIFICANT CHANGE UP (ref 8.4–10.5)
CHLORIDE SERPL-SCNC: 100 MMOL/L — SIGNIFICANT CHANGE UP (ref 96–108)
CO2 SERPL-SCNC: 24 MMOL/L — SIGNIFICANT CHANGE UP (ref 22–31)
CREAT SERPL-MCNC: 7.12 MG/DL — HIGH (ref 0.5–1.3)
EOSINOPHIL # BLD AUTO: 0.54 K/UL — HIGH (ref 0–0.5)
EOSINOPHIL NFR BLD AUTO: 9.4 % — HIGH (ref 0–6)
GLUCOSE SERPL-MCNC: 86 MG/DL — SIGNIFICANT CHANGE UP (ref 70–99)
HCT VFR BLD CALC: 25.6 % — LOW (ref 39–50)
HGB BLD-MCNC: 7.9 G/DL — LOW (ref 13–17)
IMM GRANULOCYTES NFR BLD AUTO: 0.7 % — SIGNIFICANT CHANGE UP (ref 0–1.5)
LYMPHOCYTES # BLD AUTO: 1.11 K/UL — SIGNIFICANT CHANGE UP (ref 1–3.3)
LYMPHOCYTES # BLD AUTO: 19.3 % — SIGNIFICANT CHANGE UP (ref 13–44)
MAGNESIUM SERPL-MCNC: 2.1 MG/DL — SIGNIFICANT CHANGE UP (ref 1.6–2.6)
MCHC RBC-ENTMCNC: 25.6 PG — LOW (ref 27–34)
MCHC RBC-ENTMCNC: 30.9 GM/DL — LOW (ref 32–36)
MCV RBC AUTO: 83.1 FL — SIGNIFICANT CHANGE UP (ref 80–100)
MONOCYTES # BLD AUTO: 0.77 K/UL — SIGNIFICANT CHANGE UP (ref 0–0.9)
MONOCYTES NFR BLD AUTO: 13.4 % — SIGNIFICANT CHANGE UP (ref 2–14)
NEUTROPHILS # BLD AUTO: 3.25 K/UL — SIGNIFICANT CHANGE UP (ref 1.8–7.4)
NEUTROPHILS NFR BLD AUTO: 56.7 % — SIGNIFICANT CHANGE UP (ref 43–77)
NRBC # BLD: 0 /100 WBCS — SIGNIFICANT CHANGE UP (ref 0–0)
PHOSPHATE SERPL-MCNC: 7 MG/DL — HIGH (ref 2.5–4.5)
PLATELET # BLD AUTO: 190 K/UL — SIGNIFICANT CHANGE UP (ref 150–400)
POTASSIUM SERPL-MCNC: 4.3 MMOL/L — SIGNIFICANT CHANGE UP (ref 3.5–5.3)
POTASSIUM SERPL-SCNC: 4.3 MMOL/L — SIGNIFICANT CHANGE UP (ref 3.5–5.3)
PROT SERPL-MCNC: 5.4 G/DL — LOW (ref 6–8.3)
RBC # BLD: 3.08 M/UL — LOW (ref 4.2–5.8)
RBC # FLD: 17.9 % — HIGH (ref 10.3–14.5)
SODIUM SERPL-SCNC: 138 MMOL/L — SIGNIFICANT CHANGE UP (ref 135–145)
WBC # BLD: 5.74 K/UL — SIGNIFICANT CHANGE UP (ref 3.8–10.5)
WBC # FLD AUTO: 5.74 K/UL — SIGNIFICANT CHANGE UP (ref 3.8–10.5)

## 2021-03-20 RX ORDER — SEVELAMER CARBONATE 2400 MG/1
800 POWDER, FOR SUSPENSION ORAL THREE TIMES A DAY
Refills: 0 | Status: DISCONTINUED | OUTPATIENT
Start: 2021-03-20 | End: 2021-03-30

## 2021-03-20 RX ADMIN — SEVELAMER CARBONATE 800 MILLIGRAM(S): 2400 POWDER, FOR SUSPENSION ORAL at 21:15

## 2021-03-20 RX ADMIN — HEPARIN SODIUM 5000 UNIT(S): 5000 INJECTION INTRAVENOUS; SUBCUTANEOUS at 17:16

## 2021-03-20 RX ADMIN — ERYTHROPOIETIN 10000 UNIT(S): 10000 INJECTION, SOLUTION INTRAVENOUS; SUBCUTANEOUS at 09:53

## 2021-03-20 RX ADMIN — CHLORHEXIDINE GLUCONATE 1 APPLICATION(S): 213 SOLUTION TOPICAL at 05:31

## 2021-03-20 RX ADMIN — Medication 650 MILLIGRAM(S): at 08:42

## 2021-03-20 RX ADMIN — CARVEDILOL PHOSPHATE 25 MILLIGRAM(S): 80 CAPSULE, EXTENDED RELEASE ORAL at 17:16

## 2021-03-20 RX ADMIN — Medication 650 MILLIGRAM(S): at 09:10

## 2021-03-20 RX ADMIN — Medication 81 MILLIGRAM(S): at 14:01

## 2021-03-20 RX ADMIN — HEPARIN SODIUM 5000 UNIT(S): 5000 INJECTION INTRAVENOUS; SUBCUTANEOUS at 05:31

## 2021-03-20 RX ADMIN — CARVEDILOL PHOSPHATE 25 MILLIGRAM(S): 80 CAPSULE, EXTENDED RELEASE ORAL at 05:31

## 2021-03-20 RX ADMIN — TAMSULOSIN HYDROCHLORIDE 0.4 MILLIGRAM(S): 0.4 CAPSULE ORAL at 21:15

## 2021-03-20 RX ADMIN — SEVELAMER CARBONATE 800 MILLIGRAM(S): 2400 POWDER, FOR SUSPENSION ORAL at 17:01

## 2021-03-20 NOTE — PROGRESS NOTE ADULT - ASSESSMENT
S/p Hd today with 1.5L fluid removed  Denies complaints    acetaminophen    Suspension .. 650 milliGRAM(s) Oral every 6 hours PRN  albuterol/ipratropium for Nebulization 3 milliLiter(s) Nebulizer every 6 hours PRN  albuterol/ipratropium for Nebulization. 3 milliLiter(s) Nebulizer once  aspirin enteric coated 81 milliGRAM(s) Oral daily  carvedilol 25 milliGRAM(s) Oral every 12 hours  chlorhexidine 2% Cloths 1 Application(s) Topical <User Schedule>  chlorhexidine 4% Liquid 1 Application(s) Topical <User Schedule>  heparin   Injectable 5000 Unit(s) SubCutaneous every 12 hours  sodium chloride 0.9% lock flush 10 milliLiter(s) IV Push every 1 hour PRN  tamsulosin 0.4 milliGRAM(s) Oral at bedtime      VITAL:  T(C): , Max: 36.9 (03-20-21 @ 09:25)  T(F): , Max: 98.5 (03-20-21 @ 09:25)  HR: 64 (03-20-21 @ 12:25)  BP: 137/59 (03-20-21 @ 12:25)  BP(mean): --  RR: 18 (03-20-21 @ 12:25)  SpO2: 100% (03-20-21 @ 12:25)  Wt(kg): --    03-19-21 @ 07:01  -  03-20-21 @ 07:00  --------------------------------------------------------  IN: 1095 mL / OUT: 100 mL / NET: 995 mL    03-20-21 @ 07:01  -  03-20-21 @ 13:05  --------------------------------------------------------  IN: 980 mL / OUT: 2300 mL / NET: -1320 mL        PHYSICAL EXAM:  Constitutional: NAD  Neck:  No JVD  Respiratory: CTAB/L  Cardiovascular: S1 and S2  Gastrointestinal: BS+, soft, NT/ND  Extremities: No peripheral edema  Neurological: A/O x 3, no focal deficits  Psychiatric: Normal mood, normal affect  : No Aguilar  Skin: No rashes  Access: perm cath       LABS:                          7.9    5.74  )-----------( 190      ( 20 Mar 2021 07:36 )             25.6     Na(138)/K(4.3)/Cl(100)/HCO3(24)/BUN(35)/Cr(7.12)Glu(86)/Ca(8.4)/Mg(2.1)/PO4(7.0)    03-20 @ 07:24  Na(135)/K(4.1)/Cl(97)/HCO3(26)/BUN(27)/Cr(5.60)Glu(90)/Ca(8.7)/Mg(--)/PO4(5.1)    03-19 @ 06:50            ASSESSMENT/PLAN  80M w/ hx of CKD4, dCHF, difficult to control HTN, DM?? p/w SOB and CHF  Suspect he has diastolic CHF at present   CKD stage 4-5, crt trending up       1 Renal-HD today with 1.5L fluid removed     2 CVS-  BP optimally controlled s/p HD today   Cardiac cath possible Monday    3.Vasc- Tunnel perm cath placed.  Surveillance ultrasound did not have a pseudo-aneurysm   Vasc consult -Dr Dunn called for AVF placement      4 Pulm-Needs outpt sleep study     5 Anemia - hgb improving s/p 1 unit prbcs on 3/18/21    6 Hyperphosphatemia- Renvela 800mg TID with Meals    7 Lytes -controlled     Standard DVT prophylaxis       Harris Dillon NP-BC  [employer]  [phone]    S/p HDtoday with 1.5L fluid removed  +right should pain earlier in the day but much improved as per pt.    acetaminophen    Suspension .. 650 milliGRAM(s) Oral every 6 hours PRN  albuterol/ipratropium for Nebulization 3 milliLiter(s) Nebulizer every 6 hours PRN  albuterol/ipratropium for Nebulization. 3 milliLiter(s) Nebulizer once  aspirin enteric coated 81 milliGRAM(s) Oral daily  carvedilol 25 milliGRAM(s) Oral every 12 hours  chlorhexidine 2% Cloths 1 Application(s) Topical <User Schedule>  chlorhexidine 4% Liquid 1 Application(s) Topical <User Schedule>  heparin   Injectable 5000 Unit(s) SubCutaneous every 12 hours  sodium chloride 0.9% lock flush 10 milliLiter(s) IV Push every 1 hour PRN  tamsulosin 0.4 milliGRAM(s) Oral at bedtime      VITAL:  T(C): , Max: 36.9 (03-20-21 @ 09:25)  T(F): , Max: 98.5 (03-20-21 @ 09:25)  HR: 64 (03-20-21 @ 12:25)  BP: 137/59 (03-20-21 @ 12:25)  BP(mean): --  RR: 18 (03-20-21 @ 12:25)  SpO2: 100% (03-20-21 @ 12:25)  Wt(kg): --    03-19-21 @ 07:01  -  03-20-21 @ 07:00  --------------------------------------------------------  IN: 1095 mL / OUT: 100 mL / NET: 995 mL    03-20-21 @ 07:01  -  03-20-21 @ 13:05  --------------------------------------------------------  IN: 980 mL / OUT: 2300 mL / NET: -1320 mL        PHYSICAL EXAM:  Constitutional: NAD  Neck:  No JVD  Respiratory: CTAB/L  Cardiovascular: S1 and S2  Gastrointestinal: BS+, soft, NT/ND  Extremities: No peripheral edema  Neurological: A/O x 3, no focal deficits  Psychiatric: Normal mood, normal affect  : No Aguilar  Skin: No rashes  Access: perm cath       LABS:                          7.9    5.74  )-----------( 190      ( 20 Mar 2021 07:36 )             25.6     Na(138)/K(4.3)/Cl(100)/HCO3(24)/BUN(35)/Cr(7.12)Glu(86)/Ca(8.4)/Mg(2.1)/PO4(7.0)    03-20 @ 07:24  Na(135)/K(4.1)/Cl(97)/HCO3(26)/BUN(27)/Cr(5.60)Glu(90)/Ca(8.7)/Mg(--)/PO4(5.1)    03-19 @ 06:50            ASSESSMENT/PLAN  80M w/ hx of CKD4, dCHF, difficult to control HTN, DM?? p/w SOB and CHF  Suspect he has diastolic CHF at present   CKD stage 4-5, crt trending up       1 Renal-HD today with 1.5L fluid removed     2 CVS-  BP optimally controlled s/p HD today   Cardiac cath possible Monday    3.Vasc- Tunnel perm cath placed.  Surveillance ultrasound did not have a pseudo-aneurysm   Vasc consult -Dr Dunn called for AVF placement      4 Pulm-Needs outpt sleep study     5 Anemia - hgb improving s/p 1 unit prbcs on 3/18/21    6 Hyperphosphatemia- Renvela 800mg TID with Meals    7 Lytes -controlled     Standard DVT prophylaxis       Harris Dillon NP-BC  [employer]  [phone]    S/p HDtoday with 1.5L fluid removed  +right should pain earlier in the day but much improved as per pt.    acetaminophen    Suspension .. 650 milliGRAM(s) Oral every 6 hours PRN  albuterol/ipratropium for Nebulization 3 milliLiter(s) Nebulizer every 6 hours PRN  albuterol/ipratropium for Nebulization. 3 milliLiter(s) Nebulizer once  aspirin enteric coated 81 milliGRAM(s) Oral daily  carvedilol 25 milliGRAM(s) Oral every 12 hours  chlorhexidine 2% Cloths 1 Application(s) Topical <User Schedule>  chlorhexidine 4% Liquid 1 Application(s) Topical <User Schedule>  heparin   Injectable 5000 Unit(s) SubCutaneous every 12 hours  sodium chloride 0.9% lock flush 10 milliLiter(s) IV Push every 1 hour PRN  tamsulosin 0.4 milliGRAM(s) Oral at bedtime      VITAL:  T(C): , Max: 36.9 (03-20-21 @ 09:25)  T(F): , Max: 98.5 (03-20-21 @ 09:25)  HR: 64 (03-20-21 @ 12:25)  BP: 137/59 (03-20-21 @ 12:25)  BP(mean): --  RR: 18 (03-20-21 @ 12:25)  SpO2: 100% (03-20-21 @ 12:25)  Wt(kg): --    03-19-21 @ 07:01  -  03-20-21 @ 07:00  --------------------------------------------------------  IN: 1095 mL / OUT: 100 mL / NET: 995 mL    03-20-21 @ 07:01  -  03-20-21 @ 13:05  --------------------------------------------------------  IN: 980 mL / OUT: 2300 mL / NET: -1320 mL        PHYSICAL EXAM:  Constitutional: NAD  Neck:  No JVD  Respiratory: CTAB/L  Cardiovascular: S1 and S2  Gastrointestinal: BS+, soft, NT/ND  Extremities: No peripheral edema  Neurological: A/O x 3, no focal deficits  Psychiatric: Normal mood, normal affect  : No Aguilar  Skin: No rashes  Access: perm cath       LABS:                          7.9    5.74  )-----------( 190      ( 20 Mar 2021 07:36 )             25.6     Na(138)/K(4.3)/Cl(100)/HCO3(24)/BUN(35)/Cr(7.12)Glu(86)/Ca(8.4)/Mg(2.1)/PO4(7.0)    03-20 @ 07:24  Na(135)/K(4.1)/Cl(97)/HCO3(26)/BUN(27)/Cr(5.60)Glu(90)/Ca(8.7)/Mg(--)/PO4(5.1)    03-19 @ 06:50            ASSESSMENT/PLAN  80M w/ hx of CKD4, dCHF, difficult to control HTN, DM?? p/w SOB and CHF  Suspect he has diastolic CHF at present   CKD stage 4-5, crt trending up       1 Renal-HD today with 1.5L fluid removed     2 CVS-  BP optimally controlled s/p HD today   Cardiac cath possible Monday    3.Vasc- Tunnel perm cath placed.  AVF placement  to be done by Dr Dunn      4 Pulm-Needs outpt sleep study     5 Anemia - hgb improving s/p 1 unit prbcs on 3/18/21    6 Hyperphosphatemia- Renvela 800mg TID with Meals    7 Lytes -controlled     Standard DVT prophylaxis       Harris Dillon NP-BC  [employer]  [phone]

## 2021-03-20 NOTE — PROGRESS NOTE ADULT - SUBJECTIVE AND OBJECTIVE BOX
CHIEF COMPLAINT: Patient was seen in the bed awake and verbal at hemodialysis today    SUBJECTIVE:     REVIEW OF SYSTEMS: Without complaint    CONSTITUTIONAL: (  )  weakness,  (  ) fevers or chills  EYES/ENT: (  )visual changes;     NECK: (  ) pain or stiffness  RESPIRATORY:   (  )cough, wheezing, hemoptysis;  (  ) shortness of breath  CARDIOVASCULAR:  (  )chest pain or palpitations  GASTROINTESTINAL:   (  )abdominal or epigastric pain.  (  ) nausea, vomiting, or hematemesis;   (   ) diarrhea or constipation.   GENITOURINARY:   (    ) dysuria, frequency or hematuria  NEUROLOGICAL:  (   ) numbness or weakness   All other review of systems is negative unless indicated above    Vital Signs Last 24 Hrs  T(C): 36.6 (20 Mar 2021 22:31), Max: 36.9 (20 Mar 2021 09:25)  T(F): 97.9 (20 Mar 2021 22:31), Max: 98.5 (20 Mar 2021 09:25)  HR: 66 (20 Mar 2021 22:31) (56 - 68)  BP: 143/79 (20 Mar 2021 22:31) (137/59 - 175/75)  BP(mean): --  RR: 18 (20 Mar 2021 22:31) (16 - 18)  SpO2: 100% (20 Mar 2021 22:31) (98% - 100%)    I&O's Summary    19 Mar 2021 07:01  -  20 Mar 2021 07:00  --------------------------------------------------------  IN: 1095 mL / OUT: 100 mL / NET: 995 mL    20 Mar 2021 07:01  -  20 Mar 2021 22:45  --------------------------------------------------------  IN: 1180 mL / OUT: 2300 mL / NET: -1120 mL        CAPILLARY BLOOD GLUCOSE          PHYSICAL EXAM:    Constitutional:  ( x  ) NAD,   (   )awake and alert  HEENT: PERR, EOMI,    Neck: Soft and supple, No LAD, No JVD  Respiratory:  (  x  Breath sounds are clear bilaterally,    (   ) wheezing, rales or rhonchi  Cardiovascular:     (  x )S1 and S2, regular rate and rhythm, no Murmurs, gallops or rubs  Gastrointestinal:  ( x  )Bowel Sounds present, soft,   (  )nontender, nondistended,    Extremities:    (  ) peripheral edema  Vascular: 2+ peripheral pulses  Neurological:    ( x   )A/O x 3,   (  ) focal deficits  Musculoskeletal:    (  x )  normal strength b/l upper  (     ) normal  lower extremities  Skin: No rashes    MEDICATIONS:  MEDICATIONS  (STANDING):  albuterol/ipratropium for Nebulization. 3 milliLiter(s) Nebulizer once  aspirin enteric coated 81 milliGRAM(s) Oral daily  carvedilol 25 milliGRAM(s) Oral every 12 hours  chlorhexidine 2% Cloths 1 Application(s) Topical <User Schedule>  chlorhexidine 4% Liquid 1 Application(s) Topical <User Schedule>  heparin   Injectable 5000 Unit(s) SubCutaneous every 12 hours  sevelamer carbonate 800 milliGRAM(s) Oral three times a day  tamsulosin 0.4 milliGRAM(s) Oral at bedtime      LABS: All Labs Reviewed:                        7.9    5.74  )-----------( 190      ( 20 Mar 2021 07:36 )             25.6     03-20    138  |  100  |  35<H>  ----------------------------<  86  4.3   |  24  |  7.12<H>    Ca    8.4      20 Mar 2021 07:24  Phos  7.0     03-20  Mg     2.1     03-20    TPro  5.4<L>  /  Alb  3.0<L>  /  TBili  0.1<L>  /  DBili  x   /  AST  14  /  ALT  16  /  AlkPhos  49  03-20          Blood Culture:   Urine Culture      RADIOLOGY/EKG:    ASSESSMENT AND PLAN:  80M w/ hx of CKD4, dCHF, difficult to control HTN, DM?? p/w SOB and like acute on chronic congestive heart failure    Problem/Plan - 1:  ·  Problem: Acute on chronic congestive heart failure, unspecified heart failure type.  Plan: grossly fluid overloaded on exam and elevated BNP.   -TTE- EF 60% with mid inferolateral wall and basal inferolateral wall hypokinetic, will need ischemic eval: continue to diurese for now until euvolemic  - TTE 3/12- hyperdynamic LV EF 75% , severe LVOT - avoid excessive fluid removal   - fluid removal with HD  - s/p cath with minimal Dz  - RHC with normal wedge, euvolemic    Problem/Plan - 2:  ·  Problem: Essential hypertension.  Plan: Better controlled now with hd  Coreg 25mg Q12     Problem/Plan - 4:  ·  Problem: Chronic kidney disease (CKD), stage IV (severe).  Plan:  VIRGEN on CKD,  - tunneled catheter placement 3/15  - On HD per renal  - vascular consult for AVF placement        DVT PPX:    ADVANCED DIRECTIVE:    DISPOSITION: Discharge home after vascular follow-up

## 2021-03-21 LAB
ALBUMIN SERPL ELPH-MCNC: 3.1 G/DL — LOW (ref 3.3–5)
ALP SERPL-CCNC: 52 U/L — SIGNIFICANT CHANGE UP (ref 40–120)
ALT FLD-CCNC: 15 U/L — SIGNIFICANT CHANGE UP (ref 10–45)
ANION GAP SERPL CALC-SCNC: 12 MMOL/L — SIGNIFICANT CHANGE UP (ref 5–17)
AST SERPL-CCNC: 15 U/L — SIGNIFICANT CHANGE UP (ref 10–40)
BILIRUB SERPL-MCNC: 0.1 MG/DL — LOW (ref 0.2–1.2)
BUN SERPL-MCNC: 25 MG/DL — HIGH (ref 7–23)
CALCIUM SERPL-MCNC: 8.6 MG/DL — SIGNIFICANT CHANGE UP (ref 8.4–10.5)
CHLORIDE SERPL-SCNC: 102 MMOL/L — SIGNIFICANT CHANGE UP (ref 96–108)
CO2 SERPL-SCNC: 27 MMOL/L — SIGNIFICANT CHANGE UP (ref 22–31)
CREAT SERPL-MCNC: 6.29 MG/DL — HIGH (ref 0.5–1.3)
GLUCOSE SERPL-MCNC: 92 MG/DL — SIGNIFICANT CHANGE UP (ref 70–99)
HCT VFR BLD CALC: 25.6 % — LOW (ref 39–50)
HGB BLD-MCNC: 8 G/DL — LOW (ref 13–17)
MAGNESIUM SERPL-MCNC: 2 MG/DL — SIGNIFICANT CHANGE UP (ref 1.6–2.6)
MCHC RBC-ENTMCNC: 26.1 PG — LOW (ref 27–34)
MCHC RBC-ENTMCNC: 31.3 GM/DL — LOW (ref 32–36)
MCV RBC AUTO: 83.4 FL — SIGNIFICANT CHANGE UP (ref 80–100)
NRBC # BLD: 0 /100 WBCS — SIGNIFICANT CHANGE UP (ref 0–0)
PHOSPHATE SERPL-MCNC: 5 MG/DL — HIGH (ref 2.5–4.5)
PLATELET # BLD AUTO: 189 K/UL — SIGNIFICANT CHANGE UP (ref 150–400)
POTASSIUM SERPL-MCNC: 4.3 MMOL/L — SIGNIFICANT CHANGE UP (ref 3.5–5.3)
POTASSIUM SERPL-SCNC: 4.3 MMOL/L — SIGNIFICANT CHANGE UP (ref 3.5–5.3)
PROT SERPL-MCNC: 5.5 G/DL — LOW (ref 6–8.3)
RBC # BLD: 3.07 M/UL — LOW (ref 4.2–5.8)
RBC # FLD: 18.5 % — HIGH (ref 10.3–14.5)
SODIUM SERPL-SCNC: 141 MMOL/L — SIGNIFICANT CHANGE UP (ref 135–145)
WBC # BLD: 6.3 K/UL — SIGNIFICANT CHANGE UP (ref 3.8–10.5)
WBC # FLD AUTO: 6.3 K/UL — SIGNIFICANT CHANGE UP (ref 3.8–10.5)

## 2021-03-21 RX ADMIN — CHLORHEXIDINE GLUCONATE 1 APPLICATION(S): 213 SOLUTION TOPICAL at 05:27

## 2021-03-21 RX ADMIN — TAMSULOSIN HYDROCHLORIDE 0.4 MILLIGRAM(S): 0.4 CAPSULE ORAL at 22:09

## 2021-03-21 RX ADMIN — Medication 81 MILLIGRAM(S): at 11:19

## 2021-03-21 RX ADMIN — HEPARIN SODIUM 5000 UNIT(S): 5000 INJECTION INTRAVENOUS; SUBCUTANEOUS at 05:27

## 2021-03-21 RX ADMIN — SEVELAMER CARBONATE 800 MILLIGRAM(S): 2400 POWDER, FOR SUSPENSION ORAL at 13:15

## 2021-03-21 RX ADMIN — CARVEDILOL PHOSPHATE 25 MILLIGRAM(S): 80 CAPSULE, EXTENDED RELEASE ORAL at 05:27

## 2021-03-21 RX ADMIN — SEVELAMER CARBONATE 800 MILLIGRAM(S): 2400 POWDER, FOR SUSPENSION ORAL at 05:27

## 2021-03-21 RX ADMIN — CARVEDILOL PHOSPHATE 25 MILLIGRAM(S): 80 CAPSULE, EXTENDED RELEASE ORAL at 17:47

## 2021-03-21 RX ADMIN — HEPARIN SODIUM 5000 UNIT(S): 5000 INJECTION INTRAVENOUS; SUBCUTANEOUS at 17:47

## 2021-03-21 RX ADMIN — SEVELAMER CARBONATE 800 MILLIGRAM(S): 2400 POWDER, FOR SUSPENSION ORAL at 22:03

## 2021-03-21 NOTE — PROGRESS NOTE ADULT - SUBJECTIVE AND OBJECTIVE BOX
CHIEF COMPLAINT:  Patient was seen in the bed awake and verbal  without complain  SUBJECTIVE:     REVIEW OF SYSTEMS:    CONSTITUTIONAL: (  )  weakness,  (  ) fevers or chills  EYES/ENT: (  )visual changes;     NECK: (  ) pain or stiffness  RESPIRATORY:   (  )cough, wheezing, hemoptysis;  (  ) shortness of breath  CARDIOVASCULAR:  (  )chest pain or palpitations  GASTROINTESTINAL:   (  )abdominal or epigastric pain.  (  ) nausea, vomiting, or hematemesis;   (   ) diarrhea or constipation.   GENITOURINARY:   (    ) dysuria, frequency or hematuria  NEUROLOGICAL:  (   ) numbness or weakness   All other review of systems is negative unless indicated above    Vital Signs Last 24 Hrs  T(C): 36.8 (21 Mar 2021 17:16), Max: 37.1 (21 Mar 2021 09:00)  T(F): 98.3 (21 Mar 2021 17:16), Max: 98.8 (21 Mar 2021 09:00)  HR: 72 (21 Mar 2021 17:45) (60 - 91)  BP: 164/75 (21 Mar 2021 17:16) (143/79 - 180/83)  BP(mean): --  RR: 18 (21 Mar 2021 17:16) (18 - 18)  SpO2: 100% (21 Mar 2021 17:16) (95% - 100%)    I&O's Summary    20 Mar 2021 07:01  -  21 Mar 2021 07:00  --------------------------------------------------------  IN: 1180 mL / OUT: 2300 mL / NET: -1120 mL    21 Mar 2021 07:01  -  21 Mar 2021 19:10  --------------------------------------------------------  IN: 360 mL / OUT: 50 mL / NET: 310 mL        CAPILLARY BLOOD GLUCOSE          PHYSICAL EXAM:  Constitutional:  ( x  ) NAD,   (   )awake and alert  HEENT: PERR, EOMI,    Neck: Soft and supple, No LAD, No JVD  Respiratory:  (  x  Breath sounds are clear bilaterally,    (   ) wheezing, rales or rhonchi  Cardiovascular:     (  x )S1 and S2, regular rate and rhythm, no Murmurs, gallops or rubs  Gastrointestinal:  ( x  )Bowel Sounds present, soft,   (  )nontender, nondistended,    Extremities:    (  ) peripheral edema  Vascular: 2+ peripheral pulses  Neurological:    ( x   )A/O x 3,   (  ) focal deficits  Musculoskeletal:    (  x )  normal strength b/l upper  (     ) normal  lower extremities  Skin: No rashes        MEDICATIONS:  MEDICATIONS  (STANDING):  albuterol/ipratropium for Nebulization. 3 milliLiter(s) Nebulizer once  aspirin enteric coated 81 milliGRAM(s) Oral daily  carvedilol 25 milliGRAM(s) Oral every 12 hours  chlorhexidine 2% Cloths 1 Application(s) Topical <User Schedule>  chlorhexidine 4% Liquid 1 Application(s) Topical <User Schedule>  heparin   Injectable 5000 Unit(s) SubCutaneous every 12 hours  sevelamer carbonate 800 milliGRAM(s) Oral three times a day  tamsulosin 0.4 milliGRAM(s) Oral at bedtime      LABS: All Labs Reviewed:                        8.0    6.30  )-----------( 189      ( 21 Mar 2021 07:06 )             25.6     03-21    141  |  102  |  25<H>  ----------------------------<  92  4.3   |  27  |  6.29<H>    Ca    8.6      21 Mar 2021 07:05  Phos  5.0     03-21  Mg     2.0     03-21    TPro  5.5<L>  /  Alb  3.1<L>  /  TBili  0.1<L>  /  DBili  x   /  AST  15  /  ALT  15  /  AlkPhos  52  03-21          Blood Culture:   Urine Culture      RADIOLOGY/EKG:    ASSESSMENT AND PLAN:  80M w/ hx of CKD4, dCHF, difficult to control HTN, DM?? p/w SOB and like acute on chronic congestive heart failure    Problem/Plan - 1:  ·  Problem: Acute on chronic congestive heart failure, unspecified heart failure type.  Plan: grossly fluid overloaded on exam and elevated BNP.   -TTE- EF 60% with mid inferolateral wall and basal inferolateral wall hypokinetic, will need ischemic eval: continue to diurese for now until euvolemic  - TTE 3/12- hyperdynamic LV EF 75% , severe LVOT - avoid excessive fluid removal   - fluid removal with HD  - s/p cath with minimal Dz  - RHC with normal wedge, euvolemic    Problem/Plan - 2:  ·  Problem: Essential hypertension.  Plan: Better controlled now with hd  Coreg 25mg Q12     Problem/Plan - 4:  ·  Problem: Chronic kidney disease (CKD), stage IV (severe).  Plan:  VIRGEN on CKD,  - tunneled catheter placement 3/15  - On HD per renal  - vascular consult for AVF placement noted eor 3/25/21        DVT PPX:    ADVANCED DIRECTIVE:    DISPOSITION:

## 2021-03-22 LAB
ALBUMIN SERPL ELPH-MCNC: 3.1 G/DL — LOW (ref 3.3–5)
ALP SERPL-CCNC: 52 U/L — SIGNIFICANT CHANGE UP (ref 40–120)
ALT FLD-CCNC: 13 U/L — SIGNIFICANT CHANGE UP (ref 10–45)
ANION GAP SERPL CALC-SCNC: 13 MMOL/L — SIGNIFICANT CHANGE UP (ref 5–17)
APTT BLD: 30.5 SEC — SIGNIFICANT CHANGE UP (ref 27.5–35.5)
AST SERPL-CCNC: 14 U/L — SIGNIFICANT CHANGE UP (ref 10–40)
BILIRUB SERPL-MCNC: 0.1 MG/DL — LOW (ref 0.2–1.2)
BUN SERPL-MCNC: 39 MG/DL — HIGH (ref 7–23)
CALCIUM SERPL-MCNC: 8.8 MG/DL — SIGNIFICANT CHANGE UP (ref 8.4–10.5)
CHLORIDE SERPL-SCNC: 102 MMOL/L — SIGNIFICANT CHANGE UP (ref 96–108)
CO2 SERPL-SCNC: 25 MMOL/L — SIGNIFICANT CHANGE UP (ref 22–31)
CREAT SERPL-MCNC: 7.82 MG/DL — HIGH (ref 0.5–1.3)
GLUCOSE SERPL-MCNC: 85 MG/DL — SIGNIFICANT CHANGE UP (ref 70–99)
HCT VFR BLD CALC: 24.6 % — LOW (ref 39–50)
HGB BLD-MCNC: 7.5 G/DL — LOW (ref 13–17)
INR BLD: 0.96 RATIO — SIGNIFICANT CHANGE UP (ref 0.88–1.16)
MAGNESIUM SERPL-MCNC: 2.2 MG/DL — SIGNIFICANT CHANGE UP (ref 1.6–2.6)
MCHC RBC-ENTMCNC: 25.6 PG — LOW (ref 27–34)
MCHC RBC-ENTMCNC: 30.5 GM/DL — LOW (ref 32–36)
MCV RBC AUTO: 84 FL — SIGNIFICANT CHANGE UP (ref 80–100)
NRBC # BLD: 0 /100 WBCS — SIGNIFICANT CHANGE UP (ref 0–0)
PHOSPHATE SERPL-MCNC: 5.7 MG/DL — HIGH (ref 2.5–4.5)
PLATELET # BLD AUTO: 244 K/UL — SIGNIFICANT CHANGE UP (ref 150–400)
POTASSIUM SERPL-MCNC: 4.5 MMOL/L — SIGNIFICANT CHANGE UP (ref 3.5–5.3)
POTASSIUM SERPL-SCNC: 4.5 MMOL/L — SIGNIFICANT CHANGE UP (ref 3.5–5.3)
PROT SERPL-MCNC: 5.6 G/DL — LOW (ref 6–8.3)
PROTHROM AB SERPL-ACNC: 11.4 SEC — SIGNIFICANT CHANGE UP (ref 10.6–13.6)
RBC # BLD: 2.93 M/UL — LOW (ref 4.2–5.8)
RBC # FLD: 18.7 % — HIGH (ref 10.3–14.5)
SARS-COV-2 RNA SPEC QL NAA+PROBE: SIGNIFICANT CHANGE UP
SODIUM SERPL-SCNC: 140 MMOL/L — SIGNIFICANT CHANGE UP (ref 135–145)
WBC # BLD: 6.55 K/UL — SIGNIFICANT CHANGE UP (ref 3.8–10.5)
WBC # FLD AUTO: 6.55 K/UL — SIGNIFICANT CHANGE UP (ref 3.8–10.5)

## 2021-03-22 RX ORDER — ERYTHROPOIETIN 10000 [IU]/ML
14000 INJECTION, SOLUTION INTRAVENOUS; SUBCUTANEOUS ONCE
Refills: 0 | Status: COMPLETED | OUTPATIENT
Start: 2021-03-23 | End: 2021-03-23

## 2021-03-22 RX ADMIN — HEPARIN SODIUM 5000 UNIT(S): 5000 INJECTION INTRAVENOUS; SUBCUTANEOUS at 17:15

## 2021-03-22 RX ADMIN — SEVELAMER CARBONATE 800 MILLIGRAM(S): 2400 POWDER, FOR SUSPENSION ORAL at 12:55

## 2021-03-22 RX ADMIN — CARVEDILOL PHOSPHATE 25 MILLIGRAM(S): 80 CAPSULE, EXTENDED RELEASE ORAL at 05:41

## 2021-03-22 RX ADMIN — Medication 81 MILLIGRAM(S): at 12:55

## 2021-03-22 RX ADMIN — TAMSULOSIN HYDROCHLORIDE 0.4 MILLIGRAM(S): 0.4 CAPSULE ORAL at 22:21

## 2021-03-22 RX ADMIN — SEVELAMER CARBONATE 800 MILLIGRAM(S): 2400 POWDER, FOR SUSPENSION ORAL at 05:41

## 2021-03-22 RX ADMIN — CARVEDILOL PHOSPHATE 25 MILLIGRAM(S): 80 CAPSULE, EXTENDED RELEASE ORAL at 17:15

## 2021-03-22 RX ADMIN — HEPARIN SODIUM 5000 UNIT(S): 5000 INJECTION INTRAVENOUS; SUBCUTANEOUS at 05:41

## 2021-03-22 RX ADMIN — SEVELAMER CARBONATE 800 MILLIGRAM(S): 2400 POWDER, FOR SUSPENSION ORAL at 22:21

## 2021-03-22 NOTE — PROGRESS NOTE ADULT - SUBJECTIVE AND OBJECTIVE BOX
CHIEF COMPLAINT: Patient was seen this morning early laying in the bed awake and verbal without complaint    SUBJECTIVE:     REVIEW OF SYSTEMS:    CONSTITUTIONAL: (  )  weakness,  (  ) fevers or chills  EYES/ENT: (  )visual changes;     NECK: (  ) pain or stiffness  RESPIRATORY:   (  )cough, wheezing, hemoptysis;  (  ) shortness of breath  CARDIOVASCULAR:  (  )chest pain or palpitations  GASTROINTESTINAL:   (  )abdominal or epigastric pain.  (  ) nausea, vomiting, or hematemesis;   (   ) diarrhea or constipation.   GENITOURINARY:   (    ) dysuria, frequency or hematuria  NEUROLOGICAL:  (   ) numbness or weakness   All other review of systems is negative unless indicated above    Vital Signs Last 24 Hrs  T(C): 37 (22 Mar 2021 17:16), Max: 37 (22 Mar 2021 09:20)  T(F): 98.6 (22 Mar 2021 17:16), Max: 98.6 (22 Mar 2021 09:20)  HR: 72 (22 Mar 2021 17:16) (61 - 99)  BP: 189/69 (22 Mar 2021 17:16) (149/64 - 189/69)  BP(mean): --  RR: 18 (22 Mar 2021 17:16) (18 - 18)  SpO2: 99% (22 Mar 2021 17:16) (99% - 99%)    I&O's Summary    21 Mar 2021 07:01  -  22 Mar 2021 07:00  --------------------------------------------------------  IN: 460 mL / OUT: 50 mL / NET: 410 mL    22 Mar 2021 07:01  -  22 Mar 2021 21:36  --------------------------------------------------------  IN: 0 mL / OUT: 300 mL / NET: -300 mL        CAPILLARY BLOOD GLUCOSE          PHYSICAL EXAM:    Constitutional:  (  x ) NAD,   (   x)awake and alert  HEENT: PERR, EOMI,    Neck: Soft and supple, No LAD, No JVD  Respiratory:  (  x  Breath sounds are clear bilaterally,    (   ) wheezing, rales or rhonchi  Cardiovascular:     (x   )S1 and S2, regular rate and rhythm, no Murmurs, gallops or rubs  Gastrointestinal:  ( x  )Bowel Sounds present, soft,   (  )nontender, nondistended,    Extremities:    (  ) peripheral edema  Vascular: 2+ peripheral pulses  Neurological:    (   x )A/O x 3,   (  ) focal deficits  Musculoskeletal:    ( x  )  normal strength b/l upper  (     ) normal  lower extremities  Skin: No rashes    MEDICATIONS:  MEDICATIONS  (STANDING):  albuterol/ipratropium for Nebulization. 3 milliLiter(s) Nebulizer once  aspirin enteric coated 81 milliGRAM(s) Oral daily  carvedilol 25 milliGRAM(s) Oral every 12 hours  chlorhexidine 2% Cloths 1 Application(s) Topical <User Schedule>  chlorhexidine 4% Liquid 1 Application(s) Topical <User Schedule>  heparin   Injectable 5000 Unit(s) SubCutaneous every 12 hours  sevelamer carbonate 800 milliGRAM(s) Oral three times a day  tamsulosin 0.4 milliGRAM(s) Oral at bedtime      LABS: All Labs Reviewed:                        7.5    6.55  )-----------( 244      ( 22 Mar 2021 07:01 )             24.6     03-22    140  |  102  |  39<H>  ----------------------------<  85  4.5   |  25  |  7.82<H>    Ca    8.8      22 Mar 2021 07:00  Phos  5.7     03-22  Mg     2.2     03-22    TPro  5.6<L>  /  Alb  3.1<L>  /  TBili  0.1<L>  /  DBili  x   /  AST  14  /  ALT  13  /  AlkPhos  52  03-22    PT/INR - ( 22 Mar 2021 07:42 )   PT: 11.4 sec;   INR: 0.96 ratio         PTT - ( 22 Mar 2021 07:42 )  PTT:30.5 sec      Blood Culture:   Urine Culture      RADIOLOGY/EKG:    ASSESSMENT AND PLAN:  Patient condition remained stable waiting for AVF and 3/25/2021 by Dr. cardona   DVT PPX:    ADVANCED DIRECTIVE:    DISPOSITION:

## 2021-03-22 NOTE — PROGRESS NOTE ADULT - SUBJECTIVE AND OBJECTIVE BOX
Patient is a 80y old  Male who presents with a chief complaint of Shortness of breath (22 Mar 2021 15:14)      INTERVAL HISTORY: feels ok    REVIEW OF SYSTEMS:   CONSTITUTIONAL: No weakness  EYES/ENT: No visual changes; No throat pain  Neck: No pain or stiffness  Respiratory: No cough, wheezing, No shortness of breath  CARDIOVASCULAR: no chest pain or palpitations  GASTROINTESTINAL: No abdominal pain, no nausea, vomiting or hematemesis  GENITOURINARY: No dysuria, frequency or hematuria  NEUROLOGICAL: No stroke like symptoms  SKIN: No rashes      MEDICATIONS:  carvedilol 25 milliGRAM(s) Oral every 12 hours  tamsulosin 0.4 milliGRAM(s) Oral at bedtime    PHYSICAL EXAM:  T(C): 36.7 (03-22-21 @ 13:20), Max: 37 (03-22-21 @ 09:20)  HR: 62 (03-22-21 @ 13:20) (61 - 99)  BP: 175/79 (03-22-21 @ 13:20) (149/64 - 175/79)  RR: 18 (03-22-21 @ 13:20) (18 - 18)  SpO2: 99% (03-22-21 @ 13:20) (99% - 100%)  Wt(kg): --  I&O's Summary    21 Mar 2021 07:01  -  22 Mar 2021 07:00  --------------------------------------------------------  IN: 460 mL / OUT: 50 mL / NET: 410 mL    22 Mar 2021 07:01  -  22 Mar 2021 15:53  --------------------------------------------------------  IN: 0 mL / OUT: 300 mL / NET: -300 mL    Appearance: In no distress	  HEENT:    PERRL, EOMI	  Cardiovascular:  S1 S2, No JVD  Respiratory: Lungs clear to auscultation	  Gastrointestinal:  Soft, Non-tender, + BS	  Vascularature:  No edema of LE  Psychiatric: Appropriate affect   Neuro: no acute focal deficits                         7.5    6.55  )-----------( 244      ( 22 Mar 2021 07:01 )             24.6     03-22    140  |  102  |  39<H>  ----------------------------<  85  4.5   |  25  |  7.82<H>    Ca    8.8      22 Mar 2021 07:00  Phos  5.7     03-22  Mg     2.2     03-22    TPro  5.6<L>  /  Alb  3.1<L>  /  TBili  0.1<L>  /  DBili  x   /  AST  14  /  ALT  13  /  AlkPhos  52  03-22  Labs personally reviewed    ASSESSMENT/PLAN: 	  80M w/ hx of CKD4, dCHF, difficult to control HTN, DM?? p/w SOB and like acute on chronic congestive heart failure    Problem/Plan - 1:  ·  Problem: Acute on chronic congestive heart failure, unspecified heart failure type.  Plan: grossly fluid overloaded on exam and elevated BNP.   -TTE- EF 60% with mid inferolateral wall and basal inferolateral wall hypokinetic, will need ischemic eval: continue to diurese for now until euvolemic  - TTE 3/12- hyperdynamic LV EF 75% , severe LVOT - avoid excessive fluid removal   - s/p cath with minimal Dz  - RHC with normal wedge, euvolemic  - fluid removal with HD    Problem/Plan - 2:  ·  Problem: Essential hypertension.  Plan: Better controlled now with hd  Coreg 25mg Q12     Problem/Plan - 4:  ·  Problem: Chronic kidney disease (CKD), stage IV (severe).  Plan:  VIRGEN on CKD,  - tunneled catheter placement 3/15  - On HD per renal  - plan for AVF placement on 3/25         Betzaida Carrillo DO [facility]  Cardiovascular Medicine  [address] [place] [address], [address]  Office: [phone]  Cell: [phone]

## 2021-03-22 NOTE — PROGRESS NOTE ADULT - SUBJECTIVE AND OBJECTIVE BOX
NEPHROLOGY-[facility] [phone]        Patient seen and examined         MEDICATIONS  (STANDING):  albuterol/ipratropium for Nebulization. 3 milliLiter(s) Nebulizer once  aspirin enteric coated 81 milliGRAM(s) Oral daily  carvedilol 25 milliGRAM(s) Oral every 12 hours  chlorhexidine 2% Cloths 1 Application(s) Topical <User Schedule>  chlorhexidine 4% Liquid 1 Application(s) Topical <User Schedule>  heparin   Injectable 5000 Unit(s) SubCutaneous every 12 hours  sevelamer carbonate 800 milliGRAM(s) Oral three times a day  tamsulosin 0.4 milliGRAM(s) Oral at bedtime      VITAL:  T(C): , Max: 37 (03-22-21 @ 09:20)  T(F): , Max: 98.6 (03-22-21 @ 09:20)  HR: 62 (03-22-21 @ 13:20)  BP: 175/79 (03-22-21 @ 13:20)  BP(mean): --  RR: 18 (03-22-21 @ 13:20)  SpO2: 99% (03-22-21 @ 13:20)  Wt(kg): --    I and O's:    03-21 @ 07:01  -  03-22 @ 07:00  --------------------------------------------------------  IN: 460 mL / OUT: 50 mL / NET: 410 mL    03-22 @ 07:01  -  03-22 @ 15:15  --------------------------------------------------------  IN: 0 mL / OUT: 300 mL / NET: -300 mL          PHYSICAL EXAM:    Constitutional: NAD  Neck:  No JVD  Respiratory: CTAB/L  Cardiovascular: S1 and S2  Gastrointestinal: BS+, soft, NT/ND  Extremities: No peripheral edema  Neurological: A/O x 3, no focal deficits  Psychiatric: Normal mood, normal affect  : No Aguilar  Skin: No rashes  Access: Not applicable    LABS:                        7.5    6.55  )-----------( 244      ( 22 Mar 2021 07:01 )             24.6     03-22    140  |  102  |  39<H>  ----------------------------<  85  4.5   |  25  |  7.82<H>    Ca    8.8      22 Mar 2021 07:00  Phos  5.7     03-22  Mg     2.2     03-22    TPro  5.6<L>  /  Alb  3.1<L>  /  TBili  0.1<L>  /  DBili  x   /  AST  14  /  ALT  13  /  AlkPhos  52  03-22          Urine Studies:          RADIOLOGY & ADDITIONAL STUDIES:

## 2021-03-22 NOTE — PROGRESS NOTE ADULT - ASSESSMENT
80M w/ hx of CKD4, dCHF, difficult to control HTN, DM?? p/w SOB and CHF  Suspect he has diastolic CHF at present   CKD stage 4-5, crt trending up       1 Renal-HD in am d     2 CVS-  BP optimally controlled;  Add procardia  for heart rate control as well(dw cards)    3.Vasc- Tunnel perm cath placed.  AVF placement  to be done by Dr Dunn on 3/25     4 Pulm-Needs outpt sleep study     5 Anemia - hgb improving s/p 1 unit prbcs on 3/18/21;  retacrit [id number] at hd     6 Hyperphosphatemia- Renvela 800mg TID with Meals         Standard DVT prophylaxis       Sayed [employer]  [phone]

## 2021-03-23 LAB
ALBUMIN SERPL ELPH-MCNC: 3 G/DL — LOW (ref 3.3–5)
ALP SERPL-CCNC: 53 U/L — SIGNIFICANT CHANGE UP (ref 40–120)
ALT FLD-CCNC: 16 U/L — SIGNIFICANT CHANGE UP (ref 10–45)
ANION GAP SERPL CALC-SCNC: 15 MMOL/L — SIGNIFICANT CHANGE UP (ref 5–17)
AST SERPL-CCNC: 19 U/L — SIGNIFICANT CHANGE UP (ref 10–40)
BASOPHILS # BLD AUTO: 0.03 K/UL — SIGNIFICANT CHANGE UP (ref 0–0.2)
BASOPHILS NFR BLD AUTO: 0.5 % — SIGNIFICANT CHANGE UP (ref 0–2)
BILIRUB SERPL-MCNC: 0.1 MG/DL — LOW (ref 0.2–1.2)
BUN SERPL-MCNC: 51 MG/DL — HIGH (ref 7–23)
CALCIUM SERPL-MCNC: 8.7 MG/DL — SIGNIFICANT CHANGE UP (ref 8.4–10.5)
CHLORIDE SERPL-SCNC: 102 MMOL/L — SIGNIFICANT CHANGE UP (ref 96–108)
CO2 SERPL-SCNC: 23 MMOL/L — SIGNIFICANT CHANGE UP (ref 22–31)
CREAT SERPL-MCNC: 7.7 MG/DL — HIGH (ref 0.5–1.3)
EOSINOPHIL # BLD AUTO: 0.53 K/UL — HIGH (ref 0–0.5)
EOSINOPHIL NFR BLD AUTO: 8.2 % — HIGH (ref 0–6)
GLUCOSE SERPL-MCNC: 87 MG/DL — SIGNIFICANT CHANGE UP (ref 70–99)
HCT VFR BLD CALC: 24.4 % — LOW (ref 39–50)
HGB BLD-MCNC: 7.5 G/DL — LOW (ref 13–17)
IMM GRANULOCYTES NFR BLD AUTO: 1.1 % — SIGNIFICANT CHANGE UP (ref 0–1.5)
LYMPHOCYTES # BLD AUTO: 1.35 K/UL — SIGNIFICANT CHANGE UP (ref 1–3.3)
LYMPHOCYTES # BLD AUTO: 21 % — SIGNIFICANT CHANGE UP (ref 13–44)
MAGNESIUM SERPL-MCNC: 2.2 MG/DL — SIGNIFICANT CHANGE UP (ref 1.6–2.6)
MCHC RBC-ENTMCNC: 25.9 PG — LOW (ref 27–34)
MCHC RBC-ENTMCNC: 30.7 GM/DL — LOW (ref 32–36)
MCV RBC AUTO: 84.1 FL — SIGNIFICANT CHANGE UP (ref 80–100)
MONOCYTES # BLD AUTO: 0.44 K/UL — SIGNIFICANT CHANGE UP (ref 0–0.9)
MONOCYTES NFR BLD AUTO: 6.8 % — SIGNIFICANT CHANGE UP (ref 2–14)
NEUTROPHILS # BLD AUTO: 4.02 K/UL — SIGNIFICANT CHANGE UP (ref 1.8–7.4)
NEUTROPHILS NFR BLD AUTO: 62.4 % — SIGNIFICANT CHANGE UP (ref 43–77)
NRBC # BLD: 0 /100 WBCS — SIGNIFICANT CHANGE UP (ref 0–0)
PHOSPHATE SERPL-MCNC: 5.9 MG/DL — HIGH (ref 2.5–4.5)
PLATELET # BLD AUTO: 298 K/UL — SIGNIFICANT CHANGE UP (ref 150–400)
POTASSIUM SERPL-MCNC: 4.3 MMOL/L — SIGNIFICANT CHANGE UP (ref 3.5–5.3)
POTASSIUM SERPL-SCNC: 4.3 MMOL/L — SIGNIFICANT CHANGE UP (ref 3.5–5.3)
PROT SERPL-MCNC: 5.4 G/DL — LOW (ref 6–8.3)
RBC # BLD: 2.9 M/UL — LOW (ref 4.2–5.8)
RBC # FLD: 19 % — HIGH (ref 10.3–14.5)
SODIUM SERPL-SCNC: 140 MMOL/L — SIGNIFICANT CHANGE UP (ref 135–145)
WBC # BLD: 6.44 K/UL — SIGNIFICANT CHANGE UP (ref 3.8–10.5)
WBC # FLD AUTO: 6.44 K/UL — SIGNIFICANT CHANGE UP (ref 3.8–10.5)

## 2021-03-23 PROCEDURE — 93970 EXTREMITY STUDY: CPT | Mod: 26

## 2021-03-23 RX ORDER — NIFEDIPINE 30 MG
30 TABLET, EXTENDED RELEASE 24 HR ORAL DAILY
Refills: 0 | Status: DISCONTINUED | OUTPATIENT
Start: 2021-03-23 | End: 2021-03-24

## 2021-03-23 RX ADMIN — TAMSULOSIN HYDROCHLORIDE 0.4 MILLIGRAM(S): 0.4 CAPSULE ORAL at 21:19

## 2021-03-23 RX ADMIN — Medication 30 MILLIGRAM(S): at 17:20

## 2021-03-23 RX ADMIN — CHLORHEXIDINE GLUCONATE 1 APPLICATION(S): 213 SOLUTION TOPICAL at 08:31

## 2021-03-23 RX ADMIN — ERYTHROPOIETIN 14000 UNIT(S): 10000 INJECTION, SOLUTION INTRAVENOUS; SUBCUTANEOUS at 11:33

## 2021-03-23 RX ADMIN — HEPARIN SODIUM 5000 UNIT(S): 5000 INJECTION INTRAVENOUS; SUBCUTANEOUS at 05:58

## 2021-03-23 RX ADMIN — CHLORHEXIDINE GLUCONATE 1 APPLICATION(S): 213 SOLUTION TOPICAL at 08:30

## 2021-03-23 RX ADMIN — SEVELAMER CARBONATE 800 MILLIGRAM(S): 2400 POWDER, FOR SUSPENSION ORAL at 13:08

## 2021-03-23 RX ADMIN — SEVELAMER CARBONATE 800 MILLIGRAM(S): 2400 POWDER, FOR SUSPENSION ORAL at 21:19

## 2021-03-23 RX ADMIN — Medication 81 MILLIGRAM(S): at 13:08

## 2021-03-23 RX ADMIN — CARVEDILOL PHOSPHATE 25 MILLIGRAM(S): 80 CAPSULE, EXTENDED RELEASE ORAL at 22:46

## 2021-03-23 RX ADMIN — HEPARIN SODIUM 5000 UNIT(S): 5000 INJECTION INTRAVENOUS; SUBCUTANEOUS at 17:12

## 2021-03-23 RX ADMIN — SEVELAMER CARBONATE 800 MILLIGRAM(S): 2400 POWDER, FOR SUSPENSION ORAL at 05:58

## 2021-03-23 RX ADMIN — CARVEDILOL PHOSPHATE 25 MILLIGRAM(S): 80 CAPSULE, EXTENDED RELEASE ORAL at 11:05

## 2021-03-23 NOTE — PROGRESS NOTE ADULT - SUBJECTIVE AND OBJECTIVE BOX
CHIEF COMPLAINT: patient was seen earlier this morning in dialysis unit awake and verbal without complaint    SUBJECTIVE:     REVIEW OF SYSTEMS: without complaint    CONSTITUTIONAL: (  )  weakness,  (  ) fevers or chills  EYES/ENT: (  )visual changes;     NECK: (  ) pain or stiffness  RESPIRATORY:   (  )cough, wheezing, hemoptysis;  (  ) shortness of breath  CARDIOVASCULAR:  (  )chest pain or palpitations  GASTROINTESTINAL:   (  )abdominal or epigastric pain.  (  ) nausea, vomiting, or hematemesis;   (   ) diarrhea or constipation.   GENITOURINARY:   (    ) dysuria, frequency or hematuria  NEUROLOGICAL:  (   ) numbness or weakness   All other review of systems is negative unless indicated above    Vital Signs Last 24 Hrs  T(C): 36.9 (23 Mar 2021 17:15), Max: 37 (23 Mar 2021 01:00)  T(F): 98.4 (23 Mar 2021 17:15), Max: 98.6 (23 Mar 2021 01:00)  HR: 69 (23 Mar 2021 17:15) (63 - 73)  BP: 156/61 (23 Mar 2021 17:15) (120/72 - 188/85)  BP(mean): --  RR: 18 (23 Mar 2021 17:15) (18 - 18)  SpO2: 97% (23 Mar 2021 17:15) (96% - 100%)    I&O's Summary    22 Mar 2021 07:01  -  23 Mar 2021 07:00  --------------------------------------------------------  IN: 600 mL / OUT: 300 mL / NET: 300 mL    23 Mar 2021 07:01  -  23 Mar 2021 21:54  --------------------------------------------------------  IN: 480 mL / OUT: 1500 mL / NET: -1020 mL        CAPILLARY BLOOD GLUCOSE          PHYSICAL EXAM:    Constitutional:  (  x ) NAD,   (  x )awake and alert  HEENT: PERR, EOMI,    Neck: Soft and supple, No LAD, No JVD  Respiratory:  (   x Breath sounds are clear bilaterally,    (   ) wheezing, rales or rhonchi  Cardiovascular:     ( x  )S1 and S2, regular rate and rhythm, no Murmurs, gallops or rubs  Gastrointestinal:  ( x  )Bowel Sounds present, soft,   (  )nontender, nondistended,    Extremities:    (  ) peripheral edemax  Vascular: 2+ peripheral pulses  Neurological:    (   x )A/O x 3,   (  ) focal deficits  Musculoskeletal:    (  x )  normal strength b/l upper  (     ) normal  lower extremities  Skin: No rashes    MEDICATIONS:  MEDICATIONS  (STANDING):  albuterol/ipratropium for Nebulization. 3 milliLiter(s) Nebulizer once  aspirin enteric coated 81 milliGRAM(s) Oral daily  carvedilol 25 milliGRAM(s) Oral every 12 hours  chlorhexidine 2% Cloths 1 Application(s) Topical <User Schedule>  chlorhexidine 4% Liquid 1 Application(s) Topical <User Schedule>  heparin   Injectable 5000 Unit(s) SubCutaneous every 12 hours  NIFEdipine XL 30 milliGRAM(s) Oral daily  sevelamer carbonate 800 milliGRAM(s) Oral three times a day  tamsulosin 0.4 milliGRAM(s) Oral at bedtime      LABS: All Labs Reviewed:                        7.5    6.44  )-----------( 298      ( 23 Mar 2021 07:09 )             24.4     03-23    140  |  102  |  51<H>  ----------------------------<  87  4.3   |  23  |  7.70<H>    Ca    8.7      23 Mar 2021 07:03  Phos  5.9     03-23  Mg     2.2     03-23    TPro  5.4<L>  /  Alb  3.0<L>  /  TBili  0.1<L>  /  DBili  x   /  AST  19  /  ALT  16  /  AlkPhos  53  03-23    PT/INR - ( 22 Mar 2021 07:42 )   PT: 11.4 sec;   INR: 0.96 ratio         PTT - ( 22 Mar 2021 07:42 )  PTT:30.5 sec      Blood Culture:   Urine Culture      RADIOLOGY/EKG:    ASSESSMENT AND PLAN:  80M w/ hx of CKD4, dCHF, difficult to control HTN, DM?? p/w SOB and like acute on chronic congestive heart failure    Problem/Plan - 1:  ·  Problem: Acute on chronic congestive heart failure, unspecified heart failure type.  Plan: grossly fluid overloaded on exam and elevated BNP.   -TTE- EF 60% with mid inferolateral wall and basal inferolateral wall hypokinetic, will need ischemic eval: continue to diurese for now until euvolemic  - TTE 3/12- hyperdynamic LV EF 75% , severe LVOT - avoid excessive fluid removal   - s/p cath with minimal Dz  - RHC with normal wedge, euvolemic  - fluid removal with HD    Problem/Plan - 2:  ·  Problem: Essential hypertension.  Plan: continue Coreg and Procardia 30 mg added for better control today    Problem/Plan - 4:  ·  Problem: Chronic kidney disease (CKD), stage IV (severe).  Plan:  VIRGEN on CKD,  - tunneled catheter placement 3/15  - On HD per renal  -3/23/2021 and hemodialysis today  -  3/23/2021 for AVF placement on 3/25 no medical contraindication for  the procedure      I will be away from 3/24/2021 until 3/31/2021 discussed with NP on the floor  DVT PPX:    ADVANCED DIRECTIVE:    DISPOSITION:

## 2021-03-23 NOTE — PROGRESS NOTE ADULT - SUBJECTIVE AND OBJECTIVE BOX
NEPHROLOGY-[facility] [phone]        Patient seen and examined in bed.  He was about the same   No complaints         MEDICATIONS  (STANDING):  albuterol/ipratropium for Nebulization. 3 milliLiter(s) Nebulizer once  aspirin enteric coated 81 milliGRAM(s) Oral daily  carvedilol 25 milliGRAM(s) Oral every 12 hours  chlorhexidine 2% Cloths 1 Application(s) Topical <User Schedule>  chlorhexidine 4% Liquid 1 Application(s) Topical <User Schedule>  epoetin hiro-epbx (RETACRIT) Injectable 55959 Unit(s) IV Push once  heparin   Injectable 5000 Unit(s) SubCutaneous every 12 hours  sevelamer carbonate 800 milliGRAM(s) Oral three times a day  tamsulosin 0.4 milliGRAM(s) Oral at bedtime      VITAL:  T(C): , Max: 37 (03-22-21 @ 17:16)  T(F): , Max: 98.6 (03-22-21 @ 17:16)  HR: 72 (03-23-21 @ 08:00)  BP: 172/72 (03-23-21 @ 08:00)  BP(mean): --  RR: 18 (03-23-21 @ 08:00)  SpO2: 96% (03-23-21 @ 08:00)  Wt(kg): --    I and O's:    03-22 @ 07:01  -  03-23 @ 07:00  --------------------------------------------------------  IN: 600 mL / OUT: 300 mL / NET: 300 mL    03-23 @ 07:01  -  03-23 @ 10:36  --------------------------------------------------------  IN: 240 mL / OUT: 0 mL / NET: 240 mL          PHYSICAL EXAM:    Constitutional: NAD  Neck:  No JVD  Respiratory: CTAB/L  Cardiovascular: S1 and S2  Gastrointestinal: BS+, soft, NT/ND  Extremities: No peripheral edema  Neurological: A/O x 3, no focal deficits  Psychiatric: Normal mood, normal affect  : No Aguilar  Skin: No rashes  Access: perm cath     LABS:                        7.5    6.44  )-----------( 298      ( 23 Mar 2021 07:09 )             24.4     03-23    140  |  102  |  51<H>  ----------------------------<  87  4.3   |  23  |  7.70<H>    Ca    8.7      23 Mar 2021 07:03  Phos  5.9     03-23  Mg     2.2     03-23    TPro  5.4<L>  /  Alb  3.0<L>  /  TBili  0.1<L>  /  DBili  x   /  AST  19  /  ALT  16  /  AlkPhos  53  03-23          Urine Studies:          RADIOLOGY & ADDITIONAL STUDIES:

## 2021-03-23 NOTE — PROGRESS NOTE ADULT - SUBJECTIVE AND OBJECTIVE BOX
Patient is a 80y old  Male who presents with a chief complaint of Shortness of breath (23 Mar 2021 10:35)      INTERVAL HISTORY: at Dialysis     REVIEW OF SYSTEMS:   CONSTITUTIONAL: No weakness  EYES/ENT: No visual changes; No throat pain  Neck: No pain or stiffness  Respiratory: No cough, wheezing, No shortness of breath  CARDIOVASCULAR: no chest pain or palpitations  GASTROINTESTINAL: No abdominal pain, no nausea, vomiting or hematemesis  GENITOURINARY: No dysuria, frequency or hematuria  NEUROLOGICAL: No stroke like symptoms  SKIN: No rashes    	  MEDICATIONS:  carvedilol 25 milliGRAM(s) Oral every 12 hours  tamsulosin 0.4 milliGRAM(s) Oral at bedtime        PHYSICAL EXAM:  T(C): 36.6 (03-23-21 @ 12:17), Max: 37 (03-22-21 @ 17:16)  HR: 73 (03-23-21 @ 12:17) (60 - 73)  BP: 165/82 (03-23-21 @ 12:17) (151/78 - 189/69)  RR: 18 (03-23-21 @ 12:17) (18 - 18)  SpO2: 99% (03-23-21 @ 12:17) (96% - 100%)  Wt(kg): --  I&O's Summary    22 Mar 2021 07:01  -  23 Mar 2021 07:00  --------------------------------------------------------  IN: 600 mL / OUT: 300 mL / NET: 300 mL    23 Mar 2021 07:01  -  23 Mar 2021 13:46  --------------------------------------------------------  IN: 240 mL / OUT: 1500 mL / NET: -1260 mL      Appearance: In no distress	  HEENT:    PERRL, EOMI	  Cardiovascular:  S1 S2, No JVD  Respiratory: Lungs clear to auscultation	  Gastrointestinal:  Soft, Non-tender, + BS	  Vascularature:  No edema of LE  Psychiatric: Appropriate affect   Neuro: no acute focal deficits                          7.5    6.44  )-----------( 298      ( 23 Mar 2021 07:09 )             24.4     03-23    140  |  102  |  51<H>  ----------------------------<  87  4.3   |  23  |  7.70<H>    Ca    8.7      23 Mar 2021 07:03  Phos  5.9     03-23  Mg     2.2     03-23    TPro  5.4<L>  /  Alb  3.0<L>  /  TBili  0.1<L>  /  DBili  x   /  AST  19  /  ALT  16  /  AlkPhos  53  03-23    Labs personally reviewed  ASSESSMENT/PLAN: 	    80M w/ hx of CKD4, dCHF, difficult to control HTN, DM?? p/w SOB and like acute on chronic congestive heart failure    Problem/Plan - 1:  ·  Problem: Acute on chronic congestive heart failure, unspecified heart failure type.  Plan: grossly fluid overloaded on exam and elevated BNP.   -TTE- EF 60% with mid inferolateral wall and basal inferolateral wall hypokinetic, will need ischemic eval: continue to diurese for now until euvolemic  - TTE 3/12- hyperdynamic LV EF 75% , severe LVOT - avoid excessive fluid removal   - s/p cath with minimal Dz  - RHC with normal wedge, euvolemic  - fluid removal with HD    Problem/Plan - 2:  ·  Problem: Essential hypertension.  Plan: Better controlled now with hd  Coreg 25mg Q12     Problem/Plan - 4:  ·  Problem: Chronic kidney disease (CKD), stage IV (severe).  Plan:  VIRGEN on CKD,  - tunneled catheter placement 3/15  - On HD per renal  - plan for AVF placement on 3/25     Betzaida Carrillo DO [facility]  Cardiovascular Medicine  [address] [place] [address], [address]  Office: [phone]  Cell: [phone] Patient is a 80y old  Male who presents with a chief complaint of Shortness of breath (23 Mar 2021 10:35)      INTERVAL HISTORY: at Dialysis     REVIEW OF SYSTEMS:   CONSTITUTIONAL: No weakness  EYES/ENT: No visual changes; No throat pain  Neck: No pain or stiffness  Respiratory: No cough, wheezing, No shortness of breath  CARDIOVASCULAR: no chest pain or palpitations  GASTROINTESTINAL: No abdominal pain, no nausea, vomiting or hematemesis  GENITOURINARY: No dysuria, frequency or hematuria  NEUROLOGICAL: No stroke like symptoms  SKIN: No rashes    	  MEDICATIONS:  carvedilol 25 milliGRAM(s) Oral every 12 hours  tamsulosin 0.4 milliGRAM(s) Oral at bedtime        PHYSICAL EXAM:  T(C): 36.6 (03-23-21 @ 12:17), Max: 37 (03-22-21 @ 17:16)  HR: 73 (03-23-21 @ 12:17) (60 - 73)  BP: 165/82 (03-23-21 @ 12:17) (151/78 - 189/69)  RR: 18 (03-23-21 @ 12:17) (18 - 18)  SpO2: 99% (03-23-21 @ 12:17) (96% - 100%)  Wt(kg): --  I&O's Summary    22 Mar 2021 07:01  -  23 Mar 2021 07:00  --------------------------------------------------------  IN: 600 mL / OUT: 300 mL / NET: 300 mL    23 Mar 2021 07:01  -  23 Mar 2021 13:46  --------------------------------------------------------  IN: 240 mL / OUT: 1500 mL / NET: -1260 mL      Appearance: In no distress	  HEENT:    PERRL, EOMI	  Cardiovascular:  S1 S2, No JVD  Respiratory: Lungs clear to auscultation	  Gastrointestinal:  Soft, Non-tender, + BS	  Vascularature:  No edema of LE  Psychiatric: Appropriate affect   Neuro: no acute focal deficits                          7.5    6.44  )-----------( 298      ( 23 Mar 2021 07:09 )             24.4     03-23    140  |  102  |  51<H>  ----------------------------<  87  4.3   |  23  |  7.70<H>    Ca    8.7      23 Mar 2021 07:03  Phos  5.9     03-23  Mg     2.2     03-23    TPro  5.4<L>  /  Alb  3.0<L>  /  TBili  0.1<L>  /  DBili  x   /  AST  19  /  ALT  16  /  AlkPhos  53  03-23    Labs personally reviewed  ASSESSMENT/PLAN: 	    80M w/ hx of CKD4, dCHF, difficult to control HTN, DM?? p/w SOB and like acute on chronic congestive heart failure    Problem/Plan - 1:  ·  Problem: Acute on chronic congestive heart failure, unspecified heart failure type.  Plan: grossly fluid overloaded on exam and elevated BNP.   -TTE- EF 60% with mid inferolateral wall and basal inferolateral wall hypokinetic, will need ischemic eval: continue to diurese for now until euvolemic  - TTE 3/12- hyperdynamic LV EF 75% , severe LVOT - avoid excessive fluid removal   - s/p cath with minimal Dz  - RHC with normal wedge, euvolemic  - fluid removal with HD    Problem/Plan - 2:  ·  Problem: Essential hypertension.  Plan: Better controlled now with hd  Coreg 25mg Q12   - add nifedipine 30mg if BP remains elevated      Problem/Plan - 4:  ·  Problem: Chronic kidney disease (CKD), stage IV (severe).  Plan:  VIRGEN on CKD,  - tunneled catheter placement 3/15  - On HD per renal  - plan for AVF placement on 3/25, mod risk for mod risk procedure, no contraindication to proceed     Betzaida Carrillo DO [facility]  Cardiovascular Medicine  [address] [place] [address], [address]  Office: [phone]  Cell: [phone]

## 2021-03-23 NOTE — PROGRESS NOTE ADULT - ASSESSMENT
80M w/ hx of CKD4, dCHF, difficult to control HTN, DM?? p/w SOB and CHF  Suspect he has diastolic CHF at present   CKD stage 4-5, crt trending up       1 Renal-HD today    2 CVS-  Procardia added for Bp control and assess BP p HD     3.Vasc- Tunnel perm cath placed.  AVF placement  to be done by Dr Dunn on 3/25     4 Pulm-Needs outpt sleep study     5 Anemia - hgb improving s/p 1 unit prbcs on 3/18/21;  retacrit [id number] at hd     6 Hyperphosphatemia- Renvela 800mg TID with Meals         Standard DVT prophylaxis       [employer]  [phone]

## 2021-03-24 ENCOUNTER — TRANSCRIPTION ENCOUNTER (OUTPATIENT)
Age: 81
End: 2021-03-24

## 2021-03-24 LAB
ALBUMIN SERPL ELPH-MCNC: 3.2 G/DL — LOW (ref 3.3–5)
ALP SERPL-CCNC: 53 U/L — SIGNIFICANT CHANGE UP (ref 40–120)
ALT FLD-CCNC: 16 U/L — SIGNIFICANT CHANGE UP (ref 10–45)
ANION GAP SERPL CALC-SCNC: 14 MMOL/L — SIGNIFICANT CHANGE UP (ref 5–17)
AST SERPL-CCNC: 20 U/L — SIGNIFICANT CHANGE UP (ref 10–40)
BASOPHILS # BLD AUTO: 0.05 K/UL — SIGNIFICANT CHANGE UP (ref 0–0.2)
BASOPHILS NFR BLD AUTO: 0.7 % — SIGNIFICANT CHANGE UP (ref 0–2)
BILIRUB SERPL-MCNC: 0.1 MG/DL — LOW (ref 0.2–1.2)
BUN SERPL-MCNC: 37 MG/DL — HIGH (ref 7–23)
CALCIUM SERPL-MCNC: 8.5 MG/DL — SIGNIFICANT CHANGE UP (ref 8.4–10.5)
CHLORIDE SERPL-SCNC: 97 MMOL/L — SIGNIFICANT CHANGE UP (ref 96–108)
CO2 SERPL-SCNC: 25 MMOL/L — SIGNIFICANT CHANGE UP (ref 22–31)
CREAT SERPL-MCNC: 5.65 MG/DL — HIGH (ref 0.5–1.3)
EOSINOPHIL # BLD AUTO: 0.56 K/UL — HIGH (ref 0–0.5)
EOSINOPHIL NFR BLD AUTO: 7.5 % — HIGH (ref 0–6)
GLUCOSE SERPL-MCNC: 89 MG/DL — SIGNIFICANT CHANGE UP (ref 70–99)
HCT VFR BLD CALC: 25.5 % — LOW (ref 39–50)
HGB BLD-MCNC: 7.9 G/DL — LOW (ref 13–17)
IMM GRANULOCYTES NFR BLD AUTO: 1.3 % — SIGNIFICANT CHANGE UP (ref 0–1.5)
LYMPHOCYTES # BLD AUTO: 1.6 K/UL — SIGNIFICANT CHANGE UP (ref 1–3.3)
LYMPHOCYTES # BLD AUTO: 21.5 % — SIGNIFICANT CHANGE UP (ref 13–44)
MAGNESIUM SERPL-MCNC: 2 MG/DL — SIGNIFICANT CHANGE UP (ref 1.6–2.6)
MCHC RBC-ENTMCNC: 25.9 PG — LOW (ref 27–34)
MCHC RBC-ENTMCNC: 31 GM/DL — LOW (ref 32–36)
MCV RBC AUTO: 83.6 FL — SIGNIFICANT CHANGE UP (ref 80–100)
MONOCYTES # BLD AUTO: 0.58 K/UL — SIGNIFICANT CHANGE UP (ref 0–0.9)
MONOCYTES NFR BLD AUTO: 7.8 % — SIGNIFICANT CHANGE UP (ref 2–14)
NEUTROPHILS # BLD AUTO: 4.55 K/UL — SIGNIFICANT CHANGE UP (ref 1.8–7.4)
NEUTROPHILS NFR BLD AUTO: 61.2 % — SIGNIFICANT CHANGE UP (ref 43–77)
NRBC # BLD: 0 /100 WBCS — SIGNIFICANT CHANGE UP (ref 0–0)
PHOSPHATE SERPL-MCNC: 4.8 MG/DL — HIGH (ref 2.5–4.5)
PLATELET # BLD AUTO: 276 K/UL — SIGNIFICANT CHANGE UP (ref 150–400)
POTASSIUM SERPL-MCNC: 3.9 MMOL/L — SIGNIFICANT CHANGE UP (ref 3.5–5.3)
POTASSIUM SERPL-SCNC: 3.9 MMOL/L — SIGNIFICANT CHANGE UP (ref 3.5–5.3)
PROT SERPL-MCNC: 5.7 G/DL — LOW (ref 6–8.3)
RBC # BLD: 3.05 M/UL — LOW (ref 4.2–5.8)
RBC # FLD: 19.6 % — HIGH (ref 10.3–14.5)
SARS-COV-2 RNA SPEC QL NAA+PROBE: SIGNIFICANT CHANGE UP
SODIUM SERPL-SCNC: 136 MMOL/L — SIGNIFICANT CHANGE UP (ref 135–145)
WBC # BLD: 7.44 K/UL — SIGNIFICANT CHANGE UP (ref 3.8–10.5)
WBC # FLD AUTO: 7.44 K/UL — SIGNIFICANT CHANGE UP (ref 3.8–10.5)

## 2021-03-24 RX ORDER — ERYTHROPOIETIN 10000 [IU]/ML
14000 INJECTION, SOLUTION INTRAVENOUS; SUBCUTANEOUS ONCE
Refills: 0 | Status: COMPLETED | OUTPATIENT
Start: 2021-03-25 | End: 2021-03-25

## 2021-03-24 RX ADMIN — TAMSULOSIN HYDROCHLORIDE 0.4 MILLIGRAM(S): 0.4 CAPSULE ORAL at 21:00

## 2021-03-24 RX ADMIN — SEVELAMER CARBONATE 800 MILLIGRAM(S): 2400 POWDER, FOR SUSPENSION ORAL at 14:04

## 2021-03-24 RX ADMIN — SEVELAMER CARBONATE 800 MILLIGRAM(S): 2400 POWDER, FOR SUSPENSION ORAL at 05:56

## 2021-03-24 RX ADMIN — HEPARIN SODIUM 5000 UNIT(S): 5000 INJECTION INTRAVENOUS; SUBCUTANEOUS at 05:56

## 2021-03-24 RX ADMIN — Medication 81 MILLIGRAM(S): at 14:04

## 2021-03-24 RX ADMIN — HEPARIN SODIUM 5000 UNIT(S): 5000 INJECTION INTRAVENOUS; SUBCUTANEOUS at 18:09

## 2021-03-24 RX ADMIN — CHLORHEXIDINE GLUCONATE 1 APPLICATION(S): 213 SOLUTION TOPICAL at 07:54

## 2021-03-24 RX ADMIN — CARVEDILOL PHOSPHATE 25 MILLIGRAM(S): 80 CAPSULE, EXTENDED RELEASE ORAL at 18:09

## 2021-03-24 RX ADMIN — SEVELAMER CARBONATE 800 MILLIGRAM(S): 2400 POWDER, FOR SUSPENSION ORAL at 21:00

## 2021-03-24 RX ADMIN — CARVEDILOL PHOSPHATE 25 MILLIGRAM(S): 80 CAPSULE, EXTENDED RELEASE ORAL at 05:56

## 2021-03-24 NOTE — PROGRESS NOTE ADULT - SUBJECTIVE AND OBJECTIVE BOX
DATE OF SERVICE: 03-24-21 @ 22:49    Patient is a 80y old  Male who presents with a chief complaint of Shortness of breath (24 Mar 2021 09:17)      INTERVAL HISTORY: feels ok      MEDICATIONS:  carvedilol 25 milliGRAM(s) Oral every 12 hours  tamsulosin 0.4 milliGRAM(s) Oral at bedtime        PHYSICAL EXAM:  T(C): 36.9 (03-24-21 @ 18:05), Max: 36.9 (03-24-21 @ 01:11)  HR: 71 (03-24-21 @ 18:05) (59 - 77)  BP: 166/72 (03-24-21 @ 18:05) (143/73 - 168/83)  RR: 18 (03-24-21 @ 18:05) (18 - 18)  SpO2: 95% (03-24-21 @ 18:05) (94% - 100%)  Wt(kg): --  I&O's Summary    23 Mar 2021 07:01  -  24 Mar 2021 07:00  --------------------------------------------------------  IN: 660 mL / OUT: 1500 mL / NET: -840 mL    24 Mar 2021 07:01  -  24 Mar 2021 22:49  --------------------------------------------------------  IN: 480 mL / OUT: 500 mL / NET: -20 mL          Appearance: In no distress	  HEENT:    PERRL, EOMI	  Cardiovascular:  S1 S2, No JVD  Respiratory: Lungs clear to auscultation	  Gastrointestinal:  Soft, Non-tender, + BS	  Vascularature:  No edema of LE  Psychiatric: Appropriate affect   Neuro: no acute focal deficits                               7.9    7.44  )-----------( 276      ( 24 Mar 2021 07:21 )             25.5     03-24    136  |  97  |  37<H>  ----------------------------<  89  3.9   |  25  |  5.65<H>    Ca    8.5      24 Mar 2021 07:22  Phos  4.8     03-24  Mg     2.0     03-24    TPro  5.7<L>  /  Alb  3.2<L>  /  TBili  0.1<L>  /  DBili  x   /  AST  20  /  ALT  16  /  AlkPhos  53  03-24        Labs personally reviewed      80M w/ hx of CKD4, dCHF, difficult to control HTN, DM?? p/w SOB and like acute on chronic congestive heart failure    Problem/Plan - 1:  ·  Problem: Acute on chronic congestive heart failure, unspecified heart failure type.  Plan: grossly fluid overloaded on exam and elevated BNP.   -TTE- EF 60% with mid inferolateral wall and basal inferolateral wall hypokinetic, will need ischemic eval: continue to diurese for now until euvolemic  - TTE 3/12- hyperdynamic LV EF 75% , severe LVOT - avoid excessive fluid removal   - s/p cath with minimal Dz  - RHC with normal wedge, euvolemic  - fluid removal with HD    Problem/Plan - 2:  ·  Problem: Essential hypertension.  Plan: Better controlled now with hd  Coreg 25mg Q12   - add nifedipine 30mg if BP remains elevated, likely post op      Problem/Plan - 3:  ·  Problem: Chronic kidney disease (CKD), stage IV (severe).  Plan:  VIRGEN on CKD,  - tunneled catheter placement 3/15  - On HD per renal  - plan for AVF placement on 3/25, mod risk for mod risk procedure, no contraindication to proceed            Sridhar Carrillo DO [facility]  Cardiovascular Medicine  [address] [place] [address], [address]  Office: [phone]  Cell: [phone]

## 2021-03-24 NOTE — PROGRESS NOTE ADULT - ASSESSMENT
80M w/ hx of CKD4, dCHF, difficult to control HTN, DM?? p/w SOB and CHF  Suspect he has diastolic CHF at present   CKD stage 4-5, crt trending up       1 Renal-HD on thurs p the AVF     2 CVS-  Procardia added for Bp control and BP is better     3.Vasc- Tunnel perm cath placed.  AVF placement  to be done by Dr Dunn 1st case in the am      4 Pulm-Needs outpt sleep study     5 Anemia - hgb improving s/p 1 unit prbcs on 3/18/21;  retacrit [id number] at hd     6 Hyperphosphatemia- Renvela 800mg TID with Meals     Standard DVT prophylaxis   DC planning to outpt HD unit     [employer]  [phone]

## 2021-03-24 NOTE — PROGRESS NOTE ADULT - SUBJECTIVE AND OBJECTIVE BOX
[facility] [phone]        Patient seen and examined in bed.  She was in good spirits         MEDICATIONS  (STANDING):  albuterol/ipratropium for Nebulization. 3 milliLiter(s) Nebulizer once  aspirin enteric coated 81 milliGRAM(s) Oral daily  carvedilol 25 milliGRAM(s) Oral every 12 hours  chlorhexidine 2% Cloths 1 Application(s) Topical <User Schedule>  chlorhexidine 4% Liquid 1 Application(s) Topical <User Schedule>  heparin   Injectable 5000 Unit(s) SubCutaneous every 12 hours  sevelamer carbonate 800 milliGRAM(s) Oral three times a day  tamsulosin 0.4 milliGRAM(s) Oral at bedtime      VITAL:  T(C): , Max: 37.4 (03-23-21 @ 21:46)  T(F): , Max: 99.3 (03-23-21 @ 21:46)  HR: 69 (03-24-21 @ 05:22)  BP: 150/74 (03-24-21 @ 05:22)  BP(mean): --  RR: 18 (03-24-21 @ 05:22)  SpO2: 99% (03-24-21 @ 05:22)  Wt(kg): --    I and O's:    03-23 @ 07:01  -  03-24 @ 07:00  --------------------------------------------------------  IN: 660 mL / OUT: 1500 mL / NET: -840 mL          PHYSICAL EXAM:    Constitutional: NAD  Neck:  No JVD  Respiratory: CTAB/L  Cardiovascular: S1 and S2  Gastrointestinal: BS+, soft, NT/ND  Extremities: No peripheral edema  Neurological: A/O x 3, no focal deficits  Psychiatric: Normal mood, normal affect  : No Aguilar  Skin: No rashes  Access: perm cath     LABS:                        7.9    7.44  )-----------( 276      ( 24 Mar 2021 07:21 )             25.5     03-24    136  |  97  |  37<H>  ----------------------------<  89  3.9   |  25  |  5.65<H>    Ca    8.5      24 Mar 2021 07:22  Phos  4.8     03-24  Mg     2.0     03-24    TPro  5.7<L>  /  Alb  3.2<L>  /  TBili  0.1<L>  /  DBili  x   /  AST  20  /  ALT  16  /  AlkPhos  53  03-24          Urine Studies:          RADIOLOGY & ADDITIONAL STUDIES:

## 2021-03-24 NOTE — CHART NOTE - NSCHARTNOTEFT_GEN_A_CORE
Surgery Pre-op Note    Patient is a 80y old  Male who presents with a chief complaint of Shortness of breath (24 Mar 2021 09:17)      Procedure: AVF  Surgeon: Dr. Castellanos    Vitals/Labs:  Vital Signs Last 24 Hrs  T(C): 36.9 (24 Mar 2021 18:05), Max: 37.4 (23 Mar 2021 21:46)  T(F): 98.5 (24 Mar 2021 18:05), Max: 99.3 (23 Mar 2021 21:46)  HR: 71 (24 Mar 2021 18:05) (59 - 77)  BP: 166/72 (24 Mar 2021 18:05) (141/67 - 168/83)  BP(mean): --  RR: 18 (24 Mar 2021 18:05) (18 - 18)  SpO2: 95% (24 Mar 2021 18:05) (94% - 100%)    I&O's Detail    23 Mar 2021 07:01  -  24 Mar 2021 07:00  --------------------------------------------------------  IN:    Oral Fluid: 660 mL  Total IN: 660 mL    OUT:    Other (mL): 1500 mL    Voided (mL): 0 mL  Total OUT: 1500 mL    Total NET: -840 mL      24 Mar 2021 07:01  -  24 Mar 2021 20:23  --------------------------------------------------------  IN:    Oral Fluid: 240 mL  Total IN: 240 mL    OUT:    Voided (mL): 500 mL  Total OUT: 500 mL    Total NET: -260 mL                                7.9    7.44  )-----------( 276      ( 24 Mar 2021 07:21 )             25.5       03-24    136  |  97  |  37<H>  ----------------------------<  89  3.9   |  25  |  5.65<H>    Ca    8.5      24 Mar 2021 07:22  Phos  4.8     03-24  Mg     2.0     03-24    TPro  5.7<L>  /  Alb  3.2<L>  /  TBili  0.1<L>  /  DBili  x   /  AST  20  /  ALT  16  /  AlkPhos  53  03-24      CAPILLARY BLOOD GLUCOSE          LIVER FUNCTIONS - ( 24 Mar 2021 07:22 )  Alb: 3.2 g/dL / Pro: 5.7 g/dL / ALK PHOS: 53 U/L / ALT: 16 U/L / AST: 20 U/L / GGT: x             Imaging:   Chest X RAY:  < from: Xray Chest 1 View- PORTABLE-Urgent (Xray Chest 1 View- PORTABLE-Urgent .) (03.10.21 @ 16:32) >    IMPRESSION:  Lines and tubes and support devices are unchanged.  The mediastinal cardiac silhouette is unremarkable.    Small bilateral effusions unchanged.    No acute osseous finding.    < end of copied text >    EKG:   < from: 12 Lead ECG (03.19.21 @ 10:24) >    Ventricular Rate 57 BPM    Atrial Rate 57 BPM    P-R Interval 194 ms    QRS Duration 98 ms    Q-T Interval 402 ms    QTC Calculation(Bazett) 391 ms    P Axis 63 degrees    R Axis 107 degrees    T Axis 171 degrees    Diagnosis Line SINUS BRADYCARDIA  POSSIBLE LEFT ATRIAL ENLARGEMENT  INCOMPLETE RIGHT BUNDLE BRANCH BLOCK  POSSIBLE RIGHT VENTRICULAR HYPERTROPHY  ST & T WAVE ABNORMALITY, CONSIDER LATERAL ISCHEMIA  ABNORMAL ECG  WHEN COMPARED WITH ECG OF 04-MAR-2021 16:43,  SIGNIFICANT CHANGES HAVE OCCURRED  Confirmed by MD Vega, Rodolfo ([id number]) on 3/20/2021 10:59:41 AM    < end of copied text >    < from: VA Duplex Upper Ext Vein Scan, Bilat (03.23.21 @ 15:30) >    INTERPRETATION:  HISTORY: End-stage renal disease, the patient requires permanent hemodialysis, evaluate upper extremity veins in anticipation of the surgical construction of a dialysis access fistula.    There is edema within the superficial soft tissues of the right upper extremity.    The right and left internal jugular, subclavian, axillary and brachial veins are patent and free of thrombus.    The right basilic vein is not imaged in the forearm.    There is an IV line in the right cephalic vein in the mid upper arm.    The left cephalic vein is THROMBOSED at the level of the antecubital fossa and the distal upper arm.    The diameters of the visualized veins are tabulated below:    Right Basilic Vein  *  prox upper arm  0.30 cm  *  mid   0.29  *  distal   0.29  *  acf  not visualized  *  prox forearm  not visualized  *  mid   not visualized  *  distal   not visualized    Right Cephalic Vein  *  prox upper arm  0.27 cm  *  mid   0.40  *  distal   0.25  *  acf  0.30  *  prox forearm  0.29  *  mid   0.21  *  distal   0.23    Left Basilic Vein  *  prox upper arm  0.37 cm  *  mid   0.42  *  distal   0.39  *  acf  0.25  *  prox forearm  0.19  *  mid   0.15  *  distal   0.16    Left Cephalic Vein  *  prox upper arm  0.24 cm  *  mid   0.21  *  distal   THROMBOSED  *  acf  THROMBOSED  *  prox forearm  0.26  *  mid   0.23  *  distal   0.29        IMPRESSION:  The right basilic vein is not imaged in the forearm.  There is an IV line in the right cephalic vein in the mid upper arm.  The left cephalic vein is THROMBOSED at the level of the antecubital fossa and the distal upper arm.  The diameters of the imaged, nonthrombosed right and left basilic and cephalic veins are entered in the table above.      < end of copied text >      Assessment & Plan:  80M right-hand dominant w/ hx of CKD4, dCHF, HTN, DM p/w CHF exacerbation, c/b pulmonary edema requiring bumex drip and intubation for hypercarbic respiratory failure. Now s/p extubation, on HD via RIJ tunnel HD catheter. Vascular surgery consulted for AVF planning, OR tomorrow.      Plan:  - NPO after midnight  - IVF while NPO  - HD to be done after OR  - medicine without contraindication to OR  - DVT ppx sqh and ASA  - CBC, BMP w/ mag and phos, PTT and PT/INR, type and screen  - covid neg

## 2021-03-25 LAB
ALBUMIN SERPL ELPH-MCNC: 3.2 G/DL — LOW (ref 3.3–5)
ALP SERPL-CCNC: 64 U/L — SIGNIFICANT CHANGE UP (ref 40–120)
ALT FLD-CCNC: 14 U/L — SIGNIFICANT CHANGE UP (ref 10–45)
ANION GAP SERPL CALC-SCNC: 13 MMOL/L — SIGNIFICANT CHANGE UP (ref 5–17)
APTT BLD: 27.2 SEC — LOW (ref 27.5–35.5)
AST SERPL-CCNC: 15 U/L — SIGNIFICANT CHANGE UP (ref 10–40)
BASOPHILS # BLD AUTO: 0.05 K/UL — SIGNIFICANT CHANGE UP (ref 0–0.2)
BASOPHILS NFR BLD AUTO: 0.8 % — SIGNIFICANT CHANGE UP (ref 0–2)
BILIRUB SERPL-MCNC: 0.2 MG/DL — SIGNIFICANT CHANGE UP (ref 0.2–1.2)
BLD GP AB SCN SERPL QL: NEGATIVE — SIGNIFICANT CHANGE UP
BUN SERPL-MCNC: 59 MG/DL — HIGH (ref 7–23)
CALCIUM SERPL-MCNC: 8.6 MG/DL — SIGNIFICANT CHANGE UP (ref 8.4–10.5)
CHLORIDE SERPL-SCNC: 99 MMOL/L — SIGNIFICANT CHANGE UP (ref 96–108)
CO2 SERPL-SCNC: 22 MMOL/L — SIGNIFICANT CHANGE UP (ref 22–31)
CREAT SERPL-MCNC: 6.85 MG/DL — HIGH (ref 0.5–1.3)
EOSINOPHIL # BLD AUTO: 0.52 K/UL — HIGH (ref 0–0.5)
EOSINOPHIL NFR BLD AUTO: 7.8 % — HIGH (ref 0–6)
GLUCOSE SERPL-MCNC: 88 MG/DL — SIGNIFICANT CHANGE UP (ref 70–99)
HCT VFR BLD CALC: 25.9 % — LOW (ref 39–50)
HGB BLD-MCNC: 8 G/DL — LOW (ref 13–17)
IMM GRANULOCYTES NFR BLD AUTO: 1.8 % — HIGH (ref 0–1.5)
INR BLD: 0.93 RATIO — SIGNIFICANT CHANGE UP (ref 0.88–1.16)
LYMPHOCYTES # BLD AUTO: 1.62 K/UL — SIGNIFICANT CHANGE UP (ref 1–3.3)
LYMPHOCYTES # BLD AUTO: 24.4 % — SIGNIFICANT CHANGE UP (ref 13–44)
MAGNESIUM SERPL-MCNC: 2.2 MG/DL — SIGNIFICANT CHANGE UP (ref 1.6–2.6)
MCHC RBC-ENTMCNC: 25.6 PG — LOW (ref 27–34)
MCHC RBC-ENTMCNC: 30.9 GM/DL — LOW (ref 32–36)
MCV RBC AUTO: 83 FL — SIGNIFICANT CHANGE UP (ref 80–100)
MONOCYTES # BLD AUTO: 0.53 K/UL — SIGNIFICANT CHANGE UP (ref 0–0.9)
MONOCYTES NFR BLD AUTO: 8 % — SIGNIFICANT CHANGE UP (ref 2–14)
NEUTROPHILS # BLD AUTO: 3.81 K/UL — SIGNIFICANT CHANGE UP (ref 1.8–7.4)
NEUTROPHILS NFR BLD AUTO: 57.2 % — SIGNIFICANT CHANGE UP (ref 43–77)
NRBC # BLD: 0 /100 WBCS — SIGNIFICANT CHANGE UP (ref 0–0)
PHOSPHATE SERPL-MCNC: 4.6 MG/DL — HIGH (ref 2.5–4.5)
PLATELET # BLD AUTO: 320 K/UL — SIGNIFICANT CHANGE UP (ref 150–400)
POTASSIUM SERPL-MCNC: 4.1 MMOL/L — SIGNIFICANT CHANGE UP (ref 3.5–5.3)
POTASSIUM SERPL-SCNC: 4.1 MMOL/L — SIGNIFICANT CHANGE UP (ref 3.5–5.3)
PROT SERPL-MCNC: 5.7 G/DL — LOW (ref 6–8.3)
PROTHROM AB SERPL-ACNC: 11.2 SEC — SIGNIFICANT CHANGE UP (ref 10.6–13.6)
RBC # BLD: 3.12 M/UL — LOW (ref 4.2–5.8)
RBC # FLD: 19.7 % — HIGH (ref 10.3–14.5)
RH IG SCN BLD-IMP: POSITIVE — SIGNIFICANT CHANGE UP
SODIUM SERPL-SCNC: 134 MMOL/L — LOW (ref 135–145)
WBC # BLD: 6.65 K/UL — SIGNIFICANT CHANGE UP (ref 3.8–10.5)
WBC # FLD AUTO: 6.65 K/UL — SIGNIFICANT CHANGE UP (ref 3.8–10.5)

## 2021-03-25 PROCEDURE — 36820 AV FUSION/FOREARM VEIN: CPT | Mod: GC

## 2021-03-25 RX ORDER — ONDANSETRON 8 MG/1
4 TABLET, FILM COATED ORAL ONCE
Refills: 0 | Status: DISCONTINUED | OUTPATIENT
Start: 2021-03-25 | End: 2021-03-25

## 2021-03-25 RX ORDER — HYDROMORPHONE HYDROCHLORIDE 2 MG/ML
0.25 INJECTION INTRAMUSCULAR; INTRAVENOUS; SUBCUTANEOUS
Refills: 0 | Status: DISCONTINUED | OUTPATIENT
Start: 2021-03-25 | End: 2021-03-25

## 2021-03-25 RX ADMIN — SEVELAMER CARBONATE 800 MILLIGRAM(S): 2400 POWDER, FOR SUSPENSION ORAL at 13:09

## 2021-03-25 RX ADMIN — SEVELAMER CARBONATE 800 MILLIGRAM(S): 2400 POWDER, FOR SUSPENSION ORAL at 22:01

## 2021-03-25 RX ADMIN — Medication 81 MILLIGRAM(S): at 13:09

## 2021-03-25 RX ADMIN — ERYTHROPOIETIN 14000 UNIT(S): 10000 INJECTION, SOLUTION INTRAVENOUS; SUBCUTANEOUS at 14:35

## 2021-03-25 RX ADMIN — CARVEDILOL PHOSPHATE 25 MILLIGRAM(S): 80 CAPSULE, EXTENDED RELEASE ORAL at 05:29

## 2021-03-25 RX ADMIN — CARVEDILOL PHOSPHATE 25 MILLIGRAM(S): 80 CAPSULE, EXTENDED RELEASE ORAL at 17:50

## 2021-03-25 RX ADMIN — HEPARIN SODIUM 5000 UNIT(S): 5000 INJECTION INTRAVENOUS; SUBCUTANEOUS at 17:50

## 2021-03-25 RX ADMIN — TAMSULOSIN HYDROCHLORIDE 0.4 MILLIGRAM(S): 0.4 CAPSULE ORAL at 22:01

## 2021-03-25 RX ADMIN — Medication 3 MILLILITER(S): at 22:02

## 2021-03-25 NOTE — PROGRESS NOTE ADULT - SUBJECTIVE AND OBJECTIVE BOX
Patient is a 80y old  Male who presents with a chief complaint of Shortness of breath (24 Mar 2021 18:49)      INTERVAL HISTORY: at IR     REVIEW OF SYSTEMS:   CONSTITUTIONAL: No weakness  EYES/ENT: No visual changes; No throat pain  Neck: No pain or stiffness  Respiratory: No cough, wheezing, No shortness of breath  CARDIOVASCULAR: no chest pain or palpitations  GASTROINTESTINAL: No abdominal pain, no nausea, vomiting or hematemesis  GENITOURINARY: No dysuria, frequency or hematuria  NEUROLOGICAL: No stroke like symptoms  SKIN: No rashes    	  MEDICATIONS:  carvedilol 25 milliGRAM(s) Oral every 12 hours  tamsulosin 0.4 milliGRAM(s) Oral at bedtime        PHYSICAL EXAM:  T(C): 36.3 (03-25-21 @ 11:30), Max: 36.9 (03-24-21 @ 18:05)  HR: 63 (03-25-21 @ 12:26) (59 - 73)  BP: 141/67 (03-25-21 @ 12:26) (121/56 - 166/72)  RR: 17 (03-25-21 @ 12:26) (16 - 18)  SpO2: 96% (03-25-21 @ 12:26) (92% - 98%)  Wt(kg): --  I&O's Summary    24 Mar 2021 07:01  -  25 Mar 2021 07:00  --------------------------------------------------------  IN: 510 mL / OUT: 740 mL / NET: -230 mL      Height (cm): 160 (03-25 @ 07:11)  Weight (kg): 81.8 (03-25 @ 07:11)  BMI (kg/m2): 32 (03-25 @ 07:11)  BSA (m2): 1.85 (03-25 @ 07:11)    Appearance: In no distress	  HEENT:    PERRL, EOMI	  Cardiovascular:  S1 S2, No JVD  Respiratory: Lungs clear to auscultation	  Gastrointestinal:  Soft, Non-tender, + BS	  Vascularature:  No edema of LE  Psychiatric: Appropriate affect   Neuro: no acute focal deficits                         8.0    6.65  )-----------( 320      ( 25 Mar 2021 06:40 )             25.9     03-25    134<L>  |  99  |  59<H>  ----------------------------<  88  4.1   |  22  |  6.85<H>    Ca    8.6      25 Mar 2021 06:40  Phos  4.6     03-25  Mg     2.2     03-25    TPro  5.7<L>  /  Alb  3.2<L>  /  TBili  0.2  /  DBili  x   /  AST  15  /  ALT  14  /  AlkPhos  64  03-25  Labs personally reviewed    ASSESSMENT/PLAN: 	  80M w/ hx of CKD4, dCHF, difficult to control HTN, DM?? p/w SOB and like acute on chronic congestive heart failure    Problem/Plan - 1:  ·  Problem: Acute on chronic congestive heart failure, unspecified heart failure type.  Plan: grossly fluid overloaded on exam and elevated BNP.   -TTE- EF 60% with mid inferolateral wall and basal inferolateral wall hypokinetic, will need ischemic eval: continue to diurese for now until euvolemic  - TTE 3/12- hyperdynamic LV EF 75% , severe LVOT - avoid excessive fluid removal   - s/p cath with minimal Dz  - RHC with normal wedge, euvolemic  - fluid removal with HD    Problem/Plan - 2:  ·  Problem: Essential hypertension.  Plan: Better controlled now with hd  Coreg 25mg Q12   - add nifedipine 30mg if BP remains elevated, likely post op      Problem/Plan - 3:  ·  Problem: Chronic kidney disease (CKD), stage IV (severe).  Plan:  VIRGEN on CKD,  - tunneled catheter placement 3/15  - On HD per renal  - plan for AVF placement today- mod risk for mod risk procedure, no contraindication to proceed       Betzaida Carrillo DO [facility]  Cardiovascular Medicine  [address] [place] [address], [address]  Office: [phone]  Cell: [phone] Patient is a 80y old  Male who presents with a chief complaint of Shortness of breath (24 Mar 2021 18:49)      INTERVAL HISTORY:feels ok    REVIEW OF SYSTEMS:   CONSTITUTIONAL: No weakness  EYES/ENT: No visual changes; No throat pain  Neck: No pain or stiffness  Respiratory: No cough, wheezing, No shortness of breath  CARDIOVASCULAR: no chest pain or palpitations  GASTROINTESTINAL: No abdominal pain, no nausea, vomiting or hematemesis  GENITOURINARY: No dysuria, frequency or hematuria  NEUROLOGICAL: No stroke like symptoms  SKIN: No rashes    	  MEDICATIONS:  carvedilol 25 milliGRAM(s) Oral every 12 hours  tamsulosin 0.4 milliGRAM(s) Oral at bedtime        PHYSICAL EXAM:  T(C): 36.3 (03-25-21 @ 11:30), Max: 36.9 (03-24-21 @ 18:05)  HR: 63 (03-25-21 @ 12:26) (59 - 73)  BP: 141/67 (03-25-21 @ 12:26) (121/56 - 166/72)  RR: 17 (03-25-21 @ 12:26) (16 - 18)  SpO2: 96% (03-25-21 @ 12:26) (92% - 98%)  Wt(kg): --  I&O's Summary    24 Mar 2021 07:01  -  25 Mar 2021 07:00  --------------------------------------------------------  IN: 510 mL / OUT: 740 mL / NET: -230 mL      Height (cm): 160 (03-25 @ 07:11)  Weight (kg): 81.8 (03-25 @ 07:11)  BMI (kg/m2): 32 (03-25 @ 07:11)  BSA (m2): 1.85 (03-25 @ 07:11)    Appearance: In no distress	  HEENT:    PERRL, EOMI	  Cardiovascular:  S1 S2, No JVD  Respiratory: Lungs clear to auscultation	  Gastrointestinal:  Soft, Non-tender, + BS	  Vascularature:  No edema of LE  Psychiatric: Appropriate affect   Neuro: no acute focal deficits                         8.0    6.65  )-----------( 320      ( 25 Mar 2021 06:40 )             25.9     03-25    134<L>  |  99  |  59<H>  ----------------------------<  88  4.1   |  22  |  6.85<H>    Ca    8.6      25 Mar 2021 06:40  Phos  4.6     03-25  Mg     2.2     03-25    TPro  5.7<L>  /  Alb  3.2<L>  /  TBili  0.2  /  DBili  x   /  AST  15  /  ALT  14  /  AlkPhos  64  03-25  Labs personally reviewed    ASSESSMENT/PLAN: 	  80M w/ hx of CKD4, dCHF, difficult to control HTN, DM?? p/w SOB and like acute on chronic congestive heart failure    Problem/Plan - 1:  ·  Problem: Acute on chronic congestive heart failure, unspecified heart failure type.  Plan: grossly fluid overloaded on exam and elevated BNP.   -TTE- EF 60% with mid inferolateral wall and basal inferolateral wall hypokinetic, will need ischemic eval: continue to diurese for now until euvolemic  - TTE 3/12- hyperdynamic LV EF 75% , severe LVOT - avoid excessive fluid removal   - s/p cath with minimal Dz  - RHC with normal wedge, euvolemic  - fluid removal with HD    Problem/Plan - 2:  ·  Problem: Essential hypertension.  Plan: Better controlled now with hd  Coreg 25mg Q12   - add nifedipine 30mg if BP remains elevated, likely post op      Problem/Plan - 3:  ·  Problem: Chronic kidney disease (CKD), stage IV (severe).  Plan:  VIRGEN on CKD,  - tunneled catheter placement 3/15  - On HD per renal  - plan for AVF placement today- mod risk for mod risk procedure, no contraindication to proceed       Betzaida Carrillo DO [facility]  Cardiovascular Medicine  [address] [place] [address], [address]  Office: [phone]  Cell: [phone]

## 2021-03-25 NOTE — CHART NOTE - NSCHARTNOTEFT_GEN_A_CORE
VASCULAR SURGERY POST-OPERATIVE NOTE    PROCEDURE: left upper extremity radiocephalic fistula     Subjective: Patient seen and examined in dialysis. Denies L arm pain, chest pain, SOB    Vital Signs Last 24 Hrs  T(C): 36.6 (25 Mar 2021 14:10), Max: 36.9 (24 Mar 2021 18:05)  T(F): 97.8 (25 Mar 2021 14:10), Max: 98.5 (24 Mar 2021 18:05)  HR: 65 (25 Mar 2021 14:10) (59 - 73)  BP: 164/74 (25 Mar 2021 14:10) (121/56 - 166/72)  BP(mean): 96 (25 Mar 2021 12:00) (81 - 96)  RR: 17 (25 Mar 2021 14:10) (16 - 18)  SpO2: 94% (25 Mar 2021 14:10) (92% - 98%)  I&O's Detail    24 Mar 2021 07:01  -  25 Mar 2021 07:00  --------------------------------------------------------  IN:    Oral Fluid: 510 mL  Total IN: 510 mL    OUT:    Voided (mL): 740 mL  Total OUT: 740 mL    Total NET: -230 mL      25 Mar 2021 07:01  -  25 Mar 2021 15:47  --------------------------------------------------------  IN:  Total IN: 0 mL    OUT:    Voided (mL): 200 mL  Total OUT: 200 mL    Total NET: -200 mL        aspirin enteric coated 81  carvedilol 25  heparin   Injectable 5000  tamsulosin 0.4    PAST MEDICAL & SURGICAL HISTORY:  Diabetes    Hypertension    HTN (hypertension)    BPH (Benign Prostatic Hypertrophy)    Glaucoma (Increased Eye Pressure)    HTN - Hypertension    No significant past surgical history    Laser Surgery :Right  ?  regarding procedure ; 12/07 ; secondary to glaucoma    Laser Surgery Left  ? regarding procedure ; secondary to glaucoma 12/07    Excision of Sinus Polyp  2006          Physical Exam:  General: NAD, resting comfortably in bed  Pulmonary: Nonlabored breathing, no respiratory distress  Cardiovascular: NSR  Abdominal: soft, NT/ND  Extremities: WWP, LUE dressing  c/d/i, palpable thrill over incision site. + radial/+ulnar palpable pulses. Sensory/motor intact       LABS:                        8.0    6.65  )-----------( 320      ( 25 Mar 2021 06:40 )             25.9     03-25    134<L>  |  99  |  59<H>  ----------------------------<  88  4.1   |  22  |  6.85<H>    Ca    8.6      25 Mar 2021 06:40  Phos  4.6     03-25  Mg     2.2     03-25    TPro  5.7<L>  /  Alb  3.2<L>  /  TBili  0.2  /  DBili  x   /  AST  15  /  ALT  14  /  AlkPhos  64  03-25    PT/INR - ( 25 Mar 2021 06:40 )   PT: 11.2 sec;   INR: 0.93 ratio         PTT - ( 25 Mar 2021 06:40 )  PTT:27.2 sec  CAPILLARY BLOOD GLUCOSE          Radiology and Additional Studies:    Assessment:  The patient is a 80y Male who is now several hours post-op from a left upper extremity radiocephalic fistula.     Plan:  - Pain control as needed  - DVT ppx  - OOB and ambulating as tolerated  - F/u AM labs  - care per primary team    [id number]

## 2021-03-25 NOTE — PROGRESS NOTE ADULT - ASSESSMENT
ASSESSMENT:   80M w/ hx of CKD4, dCHF, difficult to control HTN, DM?? p/w SOB and CHF  Suspect he has diastolic CHF at present   CKD stage 4-5, Scr trending up    1 Renal-HD today     2 CVS-  BP sub optimally controlled, on coreg 25mg bid, per cardio will add nifedipine 30mg if BP remains elevated    3.Vasc- Tunnel perm cath placed.  s/p LUE AVF placement this morning     4 Pulm-Needs outpt sleep study     5 Anemia - hgb slowly improving s/p 1 unit prbcs on 3/18/21;  retacrit [id number] at hd     6 Hyperphosphatemia- started on Renvela 800mg TID with Meals    Standard DVT prophylaxis   DC planning to outpt HD unit    ASSESSMENT:   80M w/ hx of CKD4, dCHF, difficult to control HTN, DM?? p/w SOB and CHF  Suspect he has diastolic CHF at present   CKD stage 4-5, Scr trending up    1 Renal-HD today     2 CVS-  BP sub optimally controlled, on coreg 25mg bid, per cardio will add nifedipine 30mg if BP remains elevated    3.Vasc- Tunnel perm cath placed.  s/p LUE AVF placement this morning     4 Pulm-Needs outpt sleep study     5 Anemia - hgb slowly improving s/p 1 unit prbcs on 3/18/21;  retacrit [id number] at hd     6 Hyperphosphatemia- started on Renvela 800mg TID with Meals    Standard DVT prophylaxis   DC planning to outpt HD unit       RENAL ATTENDING NOTE  Patient seen and examined, on HD with NP. Agree with assessment and plan as above.    RIJ tunneled cath accessed for HD  LUE AVF with good bruit    -f/u with SW regarding outpatient HD    Alejandro Arroyo MD  [facility]  [phone]

## 2021-03-25 NOTE — CHART NOTE - NSCHARTNOTEFT_GEN_A_CORE
Nutrition Follow Up Note  Patient seen for: Nutrition Follow up on 7MON    Chart reviewed, events noted.  81 y/o male w/ PMhx of CKD4, CHF, difficult to control HTN, DM?? p/w SOB and like acute on chronic congestive heart failure. Pt status post creation of Left AV Fistula this morning. Pt getting HD today, d/c planning to outpatient HD unit. Pt started on renvela for hyperphosphatemia.    Source: patient, medical record    Diet, Regular:   For patients receiving Renal Replacement - No Protein Restr, No Conc K, No Conc Phos, Low Sodium (RENAL)  Low Sodium  Supplement Feeding Modality:  Oral  Nepro Cans or Servings Per Day:  3       Frequency:  Daily (21 @ 08:05) [Active]    Patient reports: Upon RD visit, pt is in HD and very lethargic at this time. Noted pt was NPO last night and this morning for AV fistula placement. Pt reports appetite previously has been fair. Reports decreased appetite since inpatient (unable to quantify how much he is consuming at meals) but no complaints of nausea, vomiting, constipation or diarrhea at this time.  Last bowel movement today, 3/25 per flow sheets. Pt reports liking Nepro oral nutrition supplements.     PO intake: ~75% at meals per flow sheets, however less per pt but unable to quantify at this time    Daily Weight in k.9 (), Weight in k.4 (), Weight in k.5 (), Weight in k.2 (), Weight in k.8 (), Weight in k.7 (), Weight in k.6 ()  --> Noted pt with weight fluctuations since inpatient; likely related to fluid shifts at this time as pt on HD and unclear of UBW at this time; of note weights trending downward; will continue to monitor trends as able    Pertinent Medications: MEDICATIONS  (STANDING):  albuterol/ipratropium for Nebulization. 3 milliLiter(s) Nebulizer once  aspirin enteric coated 81 milliGRAM(s) Oral daily  carvedilol 25 milliGRAM(s) Oral every 12 hours  chlorhexidine 2% Cloths 1 Application(s) Topical <User Schedule>  chlorhexidine 4% Liquid 1 Application(s) Topical <User Schedule>  heparin   Injectable 5000 Unit(s) SubCutaneous every 12 hours  sevelamer carbonate 800 milliGRAM(s) Oral three times a day  tamsulosin 0.4 milliGRAM(s) Oral at bedtime    MEDICATIONS  (PRN):  acetaminophen    Suspension .. 650 milliGRAM(s) Oral every 6 hours PRN Temp greater or equal to 38C (100.4F), Mild Pain (1 - 3), Moderate Pain (4 - 6)  albuterol/ipratropium for Nebulization 3 milliLiter(s) Nebulizer every 6 hours PRN Shortness of Breath and/or Wheezing  sodium chloride 0.9% lock flush 10 milliLiter(s) IV Push every 1 hour PRN Pre/post blood products, medications, blood draw, and to maintain line patency    Pertinent Labs:  @ 06:40: Na 134<L>, BUN 59<H>, Cr 6.85<H>, BG 88, K+ 4.1, Phos 4.6<H>, Mg 2.2, Alk Phos 64, ALT/SGPT 14, AST/SGOT 15    Finger Sticks: none at this time    Skin per nursing documentation: no pressure injuries per flow sheets  Edema: none noted per flow sheets    Estimated Needs:   [x] no change since previous assessment based on IBW of 56 kg:  Estimated energy needs (30-35 calories/kg): 0094-2527 calories  Estimated protein needs (1.2-1.4 g/kg): 67-78 gm  Defer fluid needs to team    Previous Nutrition Diagnosis: Increased nutrient needs (energy, protein)  Nutrition Diagnosis is: ongoing, being addressed with PO intake, oral nutrition supplements    New Nutrition Diagnosis: none at this time    Interventions/ Recommend  1) Continue current diet as tolerated  2) Continue with Nepro oral nutrition supplement 3x/day  3) RD deferred additional HD education as pt is very lethargic upon RD interview and in HD; will re-attempt as able    Monitoring and Evaluation:   Continue to monitor Nutritional intake, Tolerance to diet prescription, weights, labs, skin integrity    RD remains available upon request and will follow up per protocol  Nuria Leos MS, RD, CDN pgr #[phone]

## 2021-03-25 NOTE — PROGRESS NOTE ADULT - SUBJECTIVE AND OBJECTIVE BOX
NEPHROLOGY     Patient seen and examined. Pt reports feeling fine, no complaints of pain, no sob, in no acute distress. S/p creation of L AVF this morning. No overnight events noted.     MEDICATIONS  (STANDING):  albuterol/ipratropium for Nebulization. 3 milliLiter(s) Nebulizer once  aspirin enteric coated 81 milliGRAM(s) Oral daily  carvedilol 25 milliGRAM(s) Oral every 12 hours  chlorhexidine 2% Cloths 1 Application(s) Topical <User Schedule>  chlorhexidine 4% Liquid 1 Application(s) Topical <User Schedule>  heparin   Injectable 5000 Unit(s) SubCutaneous every 12 hours  sevelamer carbonate 800 milliGRAM(s) Oral three times a day  tamsulosin 0.4 milliGRAM(s) Oral at bedtime    VITALS:  T(C): , Max: 36.9 (03-24-21 @ 18:05)  T(F): , Max: 98.5 (03-24-21 @ 18:05)  HR: 65 (03-25-21 @ 14:10)  BP: 164/74 (03-25-21 @ 14:10)  BP(mean): 96 (03-25-21 @ 12:00)  RR: 17 (03-25-21 @ 14:10)  SpO2: 94% (03-25-21 @ 14:10)    I and O's:    03-24 @ 07:01  -  03-25 @ 07:00  --------------------------------------------------------  IN: 510 mL / OUT: 740 mL / NET: -230 mL    03-25 @ 07:01  -  03-25 @ 15:20  --------------------------------------------------------  IN: 0 mL / OUT: 200 mL / NET: -200 mL    Height (cm): 160 (03-25 @ 07:11)  Weight (kg): 81.8 (03-25 @ 07:11)  BMI (kg/m2): 32 (03-25 @ 07:11)  BSA (m2): 1.85 (03-25 @ 07:11)    PHYSICAL EXAM:  Constitutional: NAD  Neck:  No JVD  Respiratory: CTAB/L  Cardiovascular: S1 and S2  Gastrointestinal: BS+, soft, NT/ND  Extremities: No peripheral edema  Neurological: A/O x 3, no focal deficits  Psychiatric: Normal mood, normal affect  : No Aguilar  Skin: No rashes  Access: perm cath, LUE AVF (+thrill)     LABS:                        8.0    6.65  )-----------( 320      ( 25 Mar 2021 06:40 )             25.9     03-25    134<L>  |  99  |  59<H>  ----------------------------<  88  4.1   |  22  |  6.85<H>    Ca    8.6      25 Mar 2021 06:40  Phos  4.6     03-25  Mg     2.2     03-25    TPro  5.7<L>  /  Alb  3.2<L>  /  TBili  0.2  /  DBili  x   /  AST  15  /  ALT  14  /  AlkPhos  64  03-25

## 2021-03-26 LAB
ALBUMIN SERPL ELPH-MCNC: 3.3 G/DL — SIGNIFICANT CHANGE UP (ref 3.3–5)
ALP SERPL-CCNC: 53 U/L — SIGNIFICANT CHANGE UP (ref 40–120)
ALT FLD-CCNC: 6 U/L — LOW (ref 10–45)
ANION GAP SERPL CALC-SCNC: 12 MMOL/L — SIGNIFICANT CHANGE UP (ref 5–17)
AST SERPL-CCNC: 15 U/L — SIGNIFICANT CHANGE UP (ref 10–40)
BASOPHILS # BLD AUTO: 0.06 K/UL — SIGNIFICANT CHANGE UP (ref 0–0.2)
BASOPHILS NFR BLD AUTO: 0.7 % — SIGNIFICANT CHANGE UP (ref 0–2)
BILIRUB SERPL-MCNC: 0.2 MG/DL — SIGNIFICANT CHANGE UP (ref 0.2–1.2)
BUN SERPL-MCNC: 41 MG/DL — HIGH (ref 7–23)
CALCIUM SERPL-MCNC: 8.6 MG/DL — SIGNIFICANT CHANGE UP (ref 8.4–10.5)
CHLORIDE SERPL-SCNC: 97 MMOL/L — SIGNIFICANT CHANGE UP (ref 96–108)
CO2 SERPL-SCNC: 26 MMOL/L — SIGNIFICANT CHANGE UP (ref 22–31)
CREAT SERPL-MCNC: 5.68 MG/DL — HIGH (ref 0.5–1.3)
EOSINOPHIL # BLD AUTO: 0.59 K/UL — HIGH (ref 0–0.5)
EOSINOPHIL NFR BLD AUTO: 6.9 % — HIGH (ref 0–6)
GLUCOSE SERPL-MCNC: 78 MG/DL — SIGNIFICANT CHANGE UP (ref 70–99)
HCT VFR BLD CALC: 25.1 % — LOW (ref 39–50)
HGB BLD-MCNC: 7.7 G/DL — LOW (ref 13–17)
IMM GRANULOCYTES NFR BLD AUTO: 1.6 % — HIGH (ref 0–1.5)
INR BLD: 1.02 RATIO — SIGNIFICANT CHANGE UP (ref 0.88–1.16)
LYMPHOCYTES # BLD AUTO: 1.65 K/UL — SIGNIFICANT CHANGE UP (ref 1–3.3)
LYMPHOCYTES # BLD AUTO: 19.4 % — SIGNIFICANT CHANGE UP (ref 13–44)
MAGNESIUM SERPL-MCNC: 2 MG/DL — SIGNIFICANT CHANGE UP (ref 1.6–2.6)
MCHC RBC-ENTMCNC: 25.6 PG — LOW (ref 27–34)
MCHC RBC-ENTMCNC: 30.7 GM/DL — LOW (ref 32–36)
MCV RBC AUTO: 83.4 FL — SIGNIFICANT CHANGE UP (ref 80–100)
MONOCYTES # BLD AUTO: 0.86 K/UL — SIGNIFICANT CHANGE UP (ref 0–0.9)
MONOCYTES NFR BLD AUTO: 10.1 % — SIGNIFICANT CHANGE UP (ref 2–14)
NEUTROPHILS # BLD AUTO: 5.21 K/UL — SIGNIFICANT CHANGE UP (ref 1.8–7.4)
NEUTROPHILS NFR BLD AUTO: 61.3 % — SIGNIFICANT CHANGE UP (ref 43–77)
NRBC # BLD: 0 /100 WBCS — SIGNIFICANT CHANGE UP (ref 0–0)
PHOSPHATE SERPL-MCNC: 4.2 MG/DL — SIGNIFICANT CHANGE UP (ref 2.5–4.5)
PLATELET # BLD AUTO: 246 K/UL — SIGNIFICANT CHANGE UP (ref 150–400)
POTASSIUM SERPL-MCNC: 4.5 MMOL/L — SIGNIFICANT CHANGE UP (ref 3.5–5.3)
POTASSIUM SERPL-SCNC: 4.5 MMOL/L — SIGNIFICANT CHANGE UP (ref 3.5–5.3)
PROT SERPL-MCNC: 5.6 G/DL — LOW (ref 6–8.3)
PROTHROM AB SERPL-ACNC: 12.2 SEC — SIGNIFICANT CHANGE UP (ref 10.6–13.6)
RBC # BLD: 3.01 M/UL — LOW (ref 4.2–5.8)
RBC # FLD: 19.9 % — HIGH (ref 10.3–14.5)
SODIUM SERPL-SCNC: 135 MMOL/L — SIGNIFICANT CHANGE UP (ref 135–145)
WBC # BLD: 8.51 K/UL — SIGNIFICANT CHANGE UP (ref 3.8–10.5)
WBC # FLD AUTO: 8.51 K/UL — SIGNIFICANT CHANGE UP (ref 3.8–10.5)

## 2021-03-26 RX ORDER — ERYTHROPOIETIN 10000 [IU]/ML
14000 INJECTION, SOLUTION INTRAVENOUS; SUBCUTANEOUS ONCE
Refills: 0 | Status: COMPLETED | OUTPATIENT
Start: 2021-03-27 | End: 2021-03-27

## 2021-03-26 RX ADMIN — HEPARIN SODIUM 5000 UNIT(S): 5000 INJECTION INTRAVENOUS; SUBCUTANEOUS at 06:34

## 2021-03-26 RX ADMIN — HEPARIN SODIUM 5000 UNIT(S): 5000 INJECTION INTRAVENOUS; SUBCUTANEOUS at 18:04

## 2021-03-26 RX ADMIN — SEVELAMER CARBONATE 800 MILLIGRAM(S): 2400 POWDER, FOR SUSPENSION ORAL at 13:34

## 2021-03-26 RX ADMIN — TAMSULOSIN HYDROCHLORIDE 0.4 MILLIGRAM(S): 0.4 CAPSULE ORAL at 22:35

## 2021-03-26 RX ADMIN — SEVELAMER CARBONATE 800 MILLIGRAM(S): 2400 POWDER, FOR SUSPENSION ORAL at 22:35

## 2021-03-26 RX ADMIN — Medication 3 MILLILITER(S): at 22:35

## 2021-03-26 RX ADMIN — Medication 3 MILLILITER(S): at 06:34

## 2021-03-26 RX ADMIN — SEVELAMER CARBONATE 800 MILLIGRAM(S): 2400 POWDER, FOR SUSPENSION ORAL at 06:34

## 2021-03-26 RX ADMIN — CHLORHEXIDINE GLUCONATE 1 APPLICATION(S): 213 SOLUTION TOPICAL at 10:57

## 2021-03-26 RX ADMIN — CARVEDILOL PHOSPHATE 25 MILLIGRAM(S): 80 CAPSULE, EXTENDED RELEASE ORAL at 18:04

## 2021-03-26 RX ADMIN — Medication 81 MILLIGRAM(S): at 11:27

## 2021-03-26 RX ADMIN — CARVEDILOL PHOSPHATE 25 MILLIGRAM(S): 80 CAPSULE, EXTENDED RELEASE ORAL at 06:34

## 2021-03-26 NOTE — PROGRESS NOTE ADULT - SUBJECTIVE AND OBJECTIVE BOX
NEPHROLOGY     Patient seen and examined sitting in chair. Pt reports feeling better, no complaints of pain or sob, in no acute distress. HD yesterday removed 1L.     MEDICATIONS  (STANDING):  aspirin enteric coated 81 milliGRAM(s) Oral daily  carvedilol 25 milliGRAM(s) Oral every 12 hours  chlorhexidine 2% Cloths 1 Application(s) Topical <User Schedule>  chlorhexidine 4% Liquid 1 Application(s) Topical <User Schedule>  heparin   Injectable 5000 Unit(s) SubCutaneous every 12 hours  sevelamer carbonate 800 milliGRAM(s) Oral three times a day  tamsulosin 0.4 milliGRAM(s) Oral at bedtime    VITALS:  T(C): , Max: 37.4 (03-26-21 @ 06:00)  T(F): , Max: 99.3 (03-26-21 @ 06:00)  HR: 69 (03-26-21 @ 09:45)  BP: 149/73 (03-26-21 @ 09:45)  BP(mean): 96 (03-25-21 @ 12:00)  RR: 18 (03-26-21 @ 09:45)  SpO2: 94% (03-26-21 @ 09:45)    I and O's:    03-25 @ 07:01  -  03-26 @ 07:00  --------------------------------------------------------  IN: 1180 mL / OUT: 2200 mL / NET: -1020 mL    03-26 @ 07:01  -  03-26 @ 11:57  --------------------------------------------------------  IN: 180 mL / OUT: 100 mL / NET: 80 mL    PHYSICAL EXAM:  Constitutional: NAD  Neck:  No JVD  Respiratory: CTAB/L  Cardiovascular: S1 and S2  Gastrointestinal: BS+, soft, NT/ND  Extremities: tr edema   Neurological: A/O x 3, no focal deficits  Psychiatric: Normal mood, normal affect  : No Aguilar  Skin: No rashes  Access: perm cath, LUE AVF (+bruit)     LABS:                        7.7    8.51  )-----------( 246      ( 26 Mar 2021 07:22 )             25.1     03-26    135  |  97  |  41<H>  ----------------------------<  78  4.5   |  26  |  5.68<H>    Ca    8.6      26 Mar 2021 07:15  Phos  4.2     03-26  Mg     2.0     03-26    TPro  5.6<L>  /  Alb  3.3  /  TBili  0.2  /  DBili  x   /  AST  15  /  ALT  6<L>  /  AlkPhos  53  03-26

## 2021-03-26 NOTE — PROGRESS NOTE ADULT - SUBJECTIVE AND OBJECTIVE BOX
Patient is a 80y old  Male who presents with a chief complaint of Shortness of breath (26 Mar 2021 12:08)      INTERVAL HISTORY: feels well s/[ fistula yesurday     REVIEW OF SYSTEMS:   CONSTITUTIONAL: No weakness  EYES/ENT: No visual changes; No throat pain  Neck: No pain or stiffness  Respiratory: No cough, wheezing, No shortness of breath  CARDIOVASCULAR: no chest pain or palpitations  GASTROINTESTINAL: No abdominal pain, no nausea, vomiting or hematemesis  GENITOURINARY: No dysuria, frequency or hematuria  NEUROLOGICAL: No stroke like symptoms  SKIN: No rashes    	  MEDICATIONS:  carvedilol 25 milliGRAM(s) Oral every 12 hours  tamsulosin 0.4 milliGRAM(s) Oral at bedtime    PHYSICAL EXAM:  T(C): 36.8 (03-26-21 @ 09:45), Max: 37.4 (03-26-21 @ 06:00)  HR: 69 (03-26-21 @ 09:45) (59 - 74)  BP: 149/73 (03-26-21 @ 09:45) (134/65 - 164/74)  RR: 18 (03-26-21 @ 09:45) (16 - 18)  SpO2: 94% (03-26-21 @ 09:45) (93% - 96%)  Wt(kg): --  I&O's Summary    25 Mar 2021 07:01  -  26 Mar 2021 07:00  --------------------------------------------------------  IN: 1180 mL / OUT: 2200 mL / NET: -1020 mL    26 Mar 2021 07:01  -  26 Mar 2021 13:41  --------------------------------------------------------  IN: 180 mL / OUT: 100 mL / NET: 80 mL          Appearance: In no distress	  HEENT:    PERRL, EOMI	  Cardiovascular:  S1 S2, No JVD  Respiratory: Lungs clear to auscultation	  Gastrointestinal:  Soft, Non-tender, + BS	  Vascularature:  No edema of LE  Psychiatric: Appropriate affect   Neuro: no acute focal deficits                         7.7    8.51  )-----------( 246      ( 26 Mar 2021 07:22 )             25.1     03-26    135  |  97  |  41<H>  ----------------------------<  78  4.5   |  26  |  5.68<H>    Ca    8.6      26 Mar 2021 07:15  Phos  4.2     03-26  Mg     2.0     03-26    TPro  5.6<L>  /  Alb  3.3  /  TBili  0.2  /  DBili  x   /  AST  15  /  ALT  6<L>  /  AlkPhos  53  03-2    Labs personally reviewed    ASSESSMENT/PLAN: 	  80M w/ hx of CKD4, dCHF, difficult to control HTN, DM?? p/w SOB and like acute on chronic congestive heart failure    Problem/Plan - 1:  ·  Problem: Acute on chronic congestive heart failure, unspecified heart failure type.  Plan: grossly fluid overloaded on exam and elevated BNP.   -TTE- EF 60% with mid inferolateral wall and basal inferolateral wall hypokinetic, will need ischemic eval: continue to diurese for now until euvolemic  - TTE 3/12- hyperdynamic LV EF 75% , severe LVOT - avoid excessive fluid removal   - s/p cath with minimal Dz  - RHC with normal wedge, euvolemic  - fluid removal with HD    Problem/Plan - 2:  ·  Problem: Essential hypertension.  Plan: Better controlled now with hd  Coreg 25mg Q12   - add nifedipine 30mg if BP remains elevated, likely post op      Problem/Plan - 3:  ·  Problem: Chronic kidney disease (CKD), stage IV (severe).  Plan:  VIRGEN on CKD,  - tunneled catheter placement 3/15  - s/p AVF yesturday- tolerated well from cv standpoint  pending dc to OP hd facility       Dr. Roni Monson will be covering on 3/27, 28,29. He may be contacted at [phone]       Betzaida Carrillo DO [facility]  Cardiovascular Medicine  [address], [address]  Office: [phone]  Cell: [phone] Patient is a 80y old  Male who presents with a chief complaint of Shortness of breath (26 Mar 2021 12:08)      INTERVAL HISTORY: feels well s/[ fistula yesurday     REVIEW OF SYSTEMS:   CONSTITUTIONAL: No weakness  EYES/ENT: No visual changes; No throat pain  Neck: No pain or stiffness  Respiratory: No cough, wheezing, No shortness of breath  CARDIOVASCULAR: no chest pain or palpitations  GASTROINTESTINAL: No abdominal pain, no nausea, vomiting or hematemesis  GENITOURINARY: No dysuria, frequency or hematuria  NEUROLOGICAL: No stroke like symptoms  SKIN: No rashes    	  MEDICATIONS:  carvedilol 25 milliGRAM(s) Oral every 12 hours  tamsulosin 0.4 milliGRAM(s) Oral at bedtime    PHYSICAL EXAM:  T(C): 36.8 (03-26-21 @ 09:45), Max: 37.4 (03-26-21 @ 06:00)  HR: 69 (03-26-21 @ 09:45) (59 - 74)  BP: 149/73 (03-26-21 @ 09:45) (134/65 - 164/74)  RR: 18 (03-26-21 @ 09:45) (16 - 18)  SpO2: 94% (03-26-21 @ 09:45) (93% - 96%)  Wt(kg): --  I&O's Summary    25 Mar 2021 07:01  -  26 Mar 2021 07:00  --------------------------------------------------------  IN: 1180 mL / OUT: 2200 mL / NET: -1020 mL    26 Mar 2021 07:01  -  26 Mar 2021 13:41  --------------------------------------------------------  IN: 180 mL / OUT: 100 mL / NET: 80 mL          Appearance: In no distress	  HEENT:    PERRL, EOMI	  Cardiovascular:  S1 S2, No JVD  Respiratory: Lungs clear to auscultation	  Gastrointestinal:  Soft, Non-tender, + BS	  Vascularature:  No edema of LE  Psychiatric: Appropriate affect   Neuro: no acute focal deficits                         7.7    8.51  )-----------( 246      ( 26 Mar 2021 07:22 )             25.1     03-26    135  |  97  |  41<H>  ----------------------------<  78  4.5   |  26  |  5.68<H>    Ca    8.6      26 Mar 2021 07:15  Phos  4.2     03-26  Mg     2.0     03-26    TPro  5.6<L>  /  Alb  3.3  /  TBili  0.2  /  DBili  x   /  AST  15  /  ALT  6<L>  /  AlkPhos  53  03-2    Labs personally reviewed    ASSESSMENT/PLAN: 	  80M w/ hx of CKD4, dCHF, difficult to control HTN, DM?? p/w SOB and like acute on chronic congestive heart failure    Problem/Plan - 1:  ·  Problem: Acute on chronic congestive heart failure, unspecified heart failure type.  Plan: grossly fluid overloaded on exam and elevated BNP.   -TTE- EF 60% with mid inferolateral wall and basal inferolateral wall hypokinetic, will need ischemic eval: continue to diurese for now until euvolemic  - TTE 3/12- hyperdynamic LV EF 75% , severe LVOT - avoid excessive fluid removal   - s/p cath with minimal Dz  - RHC with normal wedge, euvolemic  - fluid removal with HD    Problem/Plan - 2:  ·  Problem: Essential hypertension.  Plan: Better controlled now with hd  Coreg 25mg Q12   - add nifedipine 30mg if BP remains elevated, likely post op      Problem/Plan - 3:  ·  Problem: Chronic kidney disease (CKD), stage IV (severe).  Plan:  VIRGEN on CKD,  - tunneled catheter placement 3/15  - s/p AVF yesturday- tolerated well from cv standpoint  pending dc to OP hd facility       Dr. Roni Monson will be covering on 3/27, 28,29. He may be contacted at [phone]       Betzaida Carrillo DO [facility]  Cardiovascular Medicine  [address], [address]  Office: [phone]  Cell: [phone]

## 2021-03-26 NOTE — PROGRESS NOTE ADULT - ATTENDING COMMENTS
Continue diuresis with Lasix gtt, Will likely need HD
Pt care and plan discussed and reviewed with NP. Plan as outlined above edited by me to reflect our discussion.
Pt care and plan discussed and reviewed with NP. Plan as outlined above edited by me to reflect our discussion.  Add nifedipine 30mg for HTN if remains elevated
Doing better, outpt follow up. Pt care and plan discussed and reviewed with NP. Plan as outlined above edited by me to reflect our discussion.
Pt care and plan discussed and reviewed with NP. Plan as outlined above edited by me to reflect our discussion.
Pt care and plan discussed and reviewed with NP. Plan as outlined above edited by me to reflect our discussion.
Pt care and plan discussed and reviewed with NP. Plan as outlined above edited by me to reflect our discussion. Dr Joyner to see Saturday 3/6/21
Pt care and plan discussed and reviewed with NP. Plan as outlined above edited by me to reflect our discussion. I personally discussed with nephrologist Dr Martinez 3/8/21 AM and discussed with him that pt needs HD. States will observe and decide. Advised to continue Lasix 10mg/hour an hour for now.
Pt care and plan discussed and reviewed with NP. Plan as outlined above edited by me to reflect our discussion.
Pt care and plan discussed and reviewed with NP. Plan as outlined above edited by me to reflect our discussion.
Acute hypoxemic respiratory failure resolved after 7 liters fluid removed from HD. Pt extubated last night. Tolerating extubation so far. Pt with body habitus suggestive of ARIELLE. May need Bipap or CPAP at night.   2.Acute on chronic  renal failure (CKD5) now with ESRD requiring HD. Pt has had 7 liters of fluid removed in 3 days. Pt maintaining BP without difficulty.. R pleural effusion markedly improved.   3. Cardiac. 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 Bilateral pleural effusions R > L.  4. Fever with thick secretions. Fever has abated. Secretions improving. Continue Zosyn total 5 days . MRSA Swab negative .
Acute hypoxemic respiratory failure resolved after 7 liters fluid removed from HD. Pt extubated last night. Tolerating extubation so far. Pt with body habitus suggestive of ARIELLE. May need Bipap or CPAP at night. Continue AC vent settings.  2.Acute on chronic  renal failure (CKD5) now with ESRD requiring HD. Pt has had 7 liters of fluid removed in 3 days. Pt maintaining BP without difficulty.. R pleural effusion markedly improved.   3. Cardiac. 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 Bilateral pleural effusions R > L.  4. Fever with thick secretions. Continue Zosyn. MRSA Swab negative .
1.79 yo male intubated for acute on chronic hypercapnic  and hypoxemic respiratory failure. Pt with body habitus suggestive of ARIELLE. Continue current AC vent settings. FIO2 decreasing. Pt on minimal Precedex. Not triggering ventilator. D/C Precedex and reevaluate weaning. May need Bipap or CPAP at night. Continue AC vent settings.  2.Acute on chronic  renal failure (CKD5) now with ESRD requiring HD. Pt has had 5 liters of fluid removed. R pleural effusion markedly improved. For HD today with 2 liter fluid removal. Pt with LVOT obstruction. Need to be careful about fluid removal. if pt becomes Hypotensive would give fluid back.   3. Cardiac. 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 Bilateral pleural effusions R > L.  4. Fever with thick secretions. Continue Zosyn.
CCM Attending: I have examine pt and agree with above exam and plan above.  1.81 yo male intubated for acute on chronic hypercapnic  and hypoxemic respiratory failure. Pt with body habitus suggestive od ARIELLE. Continue current AC vent settings. FIO2 decreasing. Will attempt weaning  tomorrow am . Pt will have had 5 liters fluid removed.  2.Acute on chronic  renal failure (CKD5) now requiring urgent dialysis for fluid overload.  Pt had HD yesterday with 2.5 liters removal. For additional 2.5 fluid removal again today.  3. Cardiac. 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 Bilateral pleural effusions R > L.

## 2021-03-26 NOTE — PROGRESS NOTE ADULT - SUBJECTIVE AND OBJECTIVE BOX
POD #: 1 s/p L radiocephalic fistula    Overnight Events:  No acute events overnight    SUBJECTIVE:  Reports no pain  Denies numbness or tingling  Dressing CDI    OBJECTIVE:  Vital Signs Last 24 Hrs  T(C): 36.8 (26 Mar 2021 09:45), Max: 37.4 (26 Mar 2021 06:00)  T(F): 98.2 (26 Mar 2021 09:45), Max: 99.3 (26 Mar 2021 06:00)  HR: 69 (26 Mar 2021 09:45) (59 - 74)  BP: 149/73 (26 Mar 2021 09:45) (134/65 - 164/74)  BP(mean): --  RR: 18 (26 Mar 2021 09:45) (16 - 18)  SpO2: 94% (26 Mar 2021 09:45) (93% - 96%)      Physical Examination:  GEN: NAD, resting quietly  NEURO: AAOx3, CN II-XII grossly intact, no focal deficits  PULM: symmetric chest rise bilaterally, no increased WOB  Extremities: WWP, LUE dressing  c/d/i, palpable thrill over incision site. + radial/+ulnar palpable pulses. Sensory/motor intact     LABS:                        7.7    8.51  )-----------( 246      ( 26 Mar 2021 07:22 )             25.1       03-26    135  |  97  |  41<H>  ----------------------------<  78  4.5   |  26  |  5.68<H>    Ca    8.6      26 Mar 2021 07:15  Phos  4.2     03-26  Mg     2.0     03-26    TPro  5.6<L>  /  Alb  3.3  /  TBili  0.2  /  DBili  x   /  AST  15  /  ALT  6<L>  /  AlkPhos  53  03-26

## 2021-03-26 NOTE — PROGRESS NOTE ADULT - ASSESSMENT
ASSESSMENT/RECOMMEND:   80M w/ hx of CKD4, dCHF, difficult to control HTN, DM?? p/w SOB and CHF  Suspect he has diastolic CHF at present   CKD stage 4-5  s/p AVF placement POD #1     1 Renal- HD tomorrow    2 CVS- BP sub optimally controlled, on coreg 25mg bid, per cardio will add nifedipine 30mg if BP remains elevated    3.Vasc- Tunnel perm cath placed.  s/p LUE AVF placement yesterday     4 Pulm-Needs outpt sleep study     5 Anemia - hgb lower; s/p 1 unit prbcs on 3/18/21 and retacrit yesterday; retacrit [id number] at hd     6 Hyperphosphatemia- improved, on Renvela 800 mg tid     Standard DVT prophylaxis   DC planning to outpt HD unit     Berenice Boss NP-C  [facility]  [phone]

## 2021-03-26 NOTE — PROGRESS NOTE ADULT - ASSESSMENT
80M right-hand dominant w/ hx of CKD4, dCHF, HTN, DM p/w CHF exacerbation, c/b pulmonary edema requiring bumex drip and intubation for hypercarbic respiratory failure. Now s/p L radiocephalic fistula, recovering well    Plan:  - please have patient follow up outpatient  - dressing may come off tomorrow    Vascular Surgery  [phone]

## 2021-03-27 LAB
ALBUMIN SERPL ELPH-MCNC: 3 G/DL — LOW (ref 3.3–5)
ALP SERPL-CCNC: 59 U/L — SIGNIFICANT CHANGE UP (ref 40–120)
ALT FLD-CCNC: <5 U/L — LOW (ref 10–45)
ANION GAP SERPL CALC-SCNC: 12 MMOL/L — SIGNIFICANT CHANGE UP (ref 5–17)
AST SERPL-CCNC: 14 U/L — SIGNIFICANT CHANGE UP (ref 10–40)
BASOPHILS # BLD AUTO: 0.06 K/UL — SIGNIFICANT CHANGE UP (ref 0–0.2)
BASOPHILS NFR BLD AUTO: 0.9 % — SIGNIFICANT CHANGE UP (ref 0–2)
BILIRUB SERPL-MCNC: 0.2 MG/DL — SIGNIFICANT CHANGE UP (ref 0.2–1.2)
BUN SERPL-MCNC: 56 MG/DL — HIGH (ref 7–23)
CALCIUM SERPL-MCNC: 8.2 MG/DL — LOW (ref 8.4–10.5)
CHLORIDE SERPL-SCNC: 98 MMOL/L — SIGNIFICANT CHANGE UP (ref 96–108)
CO2 SERPL-SCNC: 25 MMOL/L — SIGNIFICANT CHANGE UP (ref 22–31)
CREAT SERPL-MCNC: 6.61 MG/DL — HIGH (ref 0.5–1.3)
EOSINOPHIL # BLD AUTO: 0.62 K/UL — HIGH (ref 0–0.5)
EOSINOPHIL NFR BLD AUTO: 9 % — HIGH (ref 0–6)
GLUCOSE SERPL-MCNC: 81 MG/DL — SIGNIFICANT CHANGE UP (ref 70–99)
HCT VFR BLD CALC: 23.9 % — LOW (ref 39–50)
HGB BLD-MCNC: 7.3 G/DL — LOW (ref 13–17)
IMM GRANULOCYTES NFR BLD AUTO: 1.4 % — SIGNIFICANT CHANGE UP (ref 0–1.5)
INR BLD: 0.96 RATIO — SIGNIFICANT CHANGE UP (ref 0.88–1.16)
LYMPHOCYTES # BLD AUTO: 1.4 K/UL — SIGNIFICANT CHANGE UP (ref 1–3.3)
LYMPHOCYTES # BLD AUTO: 20.3 % — SIGNIFICANT CHANGE UP (ref 13–44)
MAGNESIUM SERPL-MCNC: 2.1 MG/DL — SIGNIFICANT CHANGE UP (ref 1.6–2.6)
MCHC RBC-ENTMCNC: 25.1 PG — LOW (ref 27–34)
MCHC RBC-ENTMCNC: 30.5 GM/DL — LOW (ref 32–36)
MCV RBC AUTO: 82.1 FL — SIGNIFICANT CHANGE UP (ref 80–100)
MONOCYTES # BLD AUTO: 0.75 K/UL — SIGNIFICANT CHANGE UP (ref 0–0.9)
MONOCYTES NFR BLD AUTO: 10.9 % — SIGNIFICANT CHANGE UP (ref 2–14)
NEUTROPHILS # BLD AUTO: 3.97 K/UL — SIGNIFICANT CHANGE UP (ref 1.8–7.4)
NEUTROPHILS NFR BLD AUTO: 57.5 % — SIGNIFICANT CHANGE UP (ref 43–77)
NRBC # BLD: 0 /100 WBCS — SIGNIFICANT CHANGE UP (ref 0–0)
PHOSPHATE SERPL-MCNC: 4.7 MG/DL — HIGH (ref 2.5–4.5)
PLATELET # BLD AUTO: 277 K/UL — SIGNIFICANT CHANGE UP (ref 150–400)
POTASSIUM SERPL-MCNC: 4.3 MMOL/L — SIGNIFICANT CHANGE UP (ref 3.5–5.3)
POTASSIUM SERPL-SCNC: 4.3 MMOL/L — SIGNIFICANT CHANGE UP (ref 3.5–5.3)
PROT SERPL-MCNC: 5.2 G/DL — LOW (ref 6–8.3)
PROTHROM AB SERPL-ACNC: 11.5 SEC — SIGNIFICANT CHANGE UP (ref 10.6–13.6)
RBC # BLD: 2.91 M/UL — LOW (ref 4.2–5.8)
RBC # FLD: 19.8 % — HIGH (ref 10.3–14.5)
SODIUM SERPL-SCNC: 135 MMOL/L — SIGNIFICANT CHANGE UP (ref 135–145)
WBC # BLD: 6.9 K/UL — SIGNIFICANT CHANGE UP (ref 3.8–10.5)
WBC # FLD AUTO: 6.9 K/UL — SIGNIFICANT CHANGE UP (ref 3.8–10.5)

## 2021-03-27 RX ADMIN — SEVELAMER CARBONATE 800 MILLIGRAM(S): 2400 POWDER, FOR SUSPENSION ORAL at 22:19

## 2021-03-27 RX ADMIN — ERYTHROPOIETIN 14000 UNIT(S): 10000 INJECTION, SOLUTION INTRAVENOUS; SUBCUTANEOUS at 10:40

## 2021-03-27 RX ADMIN — Medication 3 MILLILITER(S): at 22:19

## 2021-03-27 RX ADMIN — HEPARIN SODIUM 5000 UNIT(S): 5000 INJECTION INTRAVENOUS; SUBCUTANEOUS at 18:09

## 2021-03-27 RX ADMIN — SEVELAMER CARBONATE 800 MILLIGRAM(S): 2400 POWDER, FOR SUSPENSION ORAL at 13:00

## 2021-03-27 RX ADMIN — CARVEDILOL PHOSPHATE 25 MILLIGRAM(S): 80 CAPSULE, EXTENDED RELEASE ORAL at 18:09

## 2021-03-27 RX ADMIN — SEVELAMER CARBONATE 800 MILLIGRAM(S): 2400 POWDER, FOR SUSPENSION ORAL at 05:52

## 2021-03-27 RX ADMIN — TAMSULOSIN HYDROCHLORIDE 0.4 MILLIGRAM(S): 0.4 CAPSULE ORAL at 22:19

## 2021-03-27 RX ADMIN — HEPARIN SODIUM 5000 UNIT(S): 5000 INJECTION INTRAVENOUS; SUBCUTANEOUS at 05:52

## 2021-03-27 RX ADMIN — CHLORHEXIDINE GLUCONATE 1 APPLICATION(S): 213 SOLUTION TOPICAL at 08:12

## 2021-03-27 RX ADMIN — CARVEDILOL PHOSPHATE 25 MILLIGRAM(S): 80 CAPSULE, EXTENDED RELEASE ORAL at 12:25

## 2021-03-27 RX ADMIN — Medication 81 MILLIGRAM(S): at 12:25

## 2021-03-27 NOTE — PROGRESS NOTE ADULT - SUBJECTIVE AND OBJECTIVE BOX
Patient seen and examined.  Resting in flat. denies SOB or pain.  NAD noted  Last HD was done this am -1.5L removed    REVIEW OF SYSTEMS:  As per HPI, otherwise 8 full 10 ROS were unremarkable    MEDICATIONS  (STANDING):  aspirin enteric coated 81 milliGRAM(s) Oral daily  carvedilol 25 milliGRAM(s) Oral every 12 hours  chlorhexidine 2% Cloths 1 Application(s) Topical <User Schedule>  chlorhexidine 4% Liquid 1 Application(s) Topical <User Schedule>  heparin   Injectable 5000 Unit(s) SubCutaneous every 12 hours  sevelamer carbonate 800 milliGRAM(s) Oral three times a day  tamsulosin 0.4 milliGRAM(s) Oral at bedtime      VITAL:  T(C): , Max: 37 (03-27-21 @ 12:00)  T(F): , Max: 98.6 (03-27-21 @ 12:00)  HR: 88 (03-27-21 @ 14:00)  BP: 148/77 (03-27-21 @ 14:00)  BP(mean): --  RR: 18 (03-27-21 @ 14:00)  SpO2: 96% (03-27-21 @ 14:00)  Wt(kg): --    I and O's:    03-26 @ 07:01  -  03-27 @ 07:00  --------------------------------------------------------  IN: 420 mL / OUT: 225 mL / NET: 195 mL    03-27 @ 07:01  -  03-27 @ 17:01  --------------------------------------------------------  IN: 240 mL / OUT: 1500 mL / NET: -1260 mL          PHYSICAL EXAM:    Constitutional: NAD  Neck: No JVD  Respiratory: CTAB  Cardiovascular: S1 and S2  Gastrointestinal: BS+, soft, NT/ND  Extremities: No peripheral edema  Neurological: A/O x 3, no focal deficits  Psychiatric: Normal mood, normal affect  : No Aguilar  Access: perm cath, HECTOR LACKEY (+bruit)     LABS:                        7.3    6.90  )-----------( 277      ( 27 Mar 2021 09:22 )             23.9     03-27    135  |  98  |  56<H>  ----------------------------<  81  4.3   |  25  |  6.61<H>    Ca    8.2<L>      27 Mar 2021 09:22  Phos  4.7     03-27  Mg     2.1     03-27    TPro  5.2<L>  /  Alb  3.0<L>  /  TBili  0.2  /  DBili  x   /  AST  14  /  ALT  <5<L>  /  AlkPhos  59  03-27

## 2021-03-27 NOTE — PROGRESS NOTE ADULT - SUBJECTIVE AND OBJECTIVE BOX
Roni Monson MD  Interventional Cardiology / Advance Heart Failure and Cardiac Transplant Specialist  [place] Office : [address] [place] [address] [place]. [address]  Tel:   [place] Office : [address] [facility] [place] [address]  Tel: [phone]  Cell : [phone]    covering Dr Carrillo    Pt is lying in bed getting HD s/p left arm AVF    PAST MEDICAL & SURGICAL HISTORY:  Diabetes    Hypertension    HTN (hypertension)    BPH (Benign Prostatic Hypertrophy)    Glaucoma (Increased Eye Pressure)    HTN - Hypertension    No significant past surgical history    Laser Surgery :Right  ?  regarding procedure ; 12/07 ; secondary to glaucoma    Laser Surgery Left  ? regarding procedure ; secondary to glaucoma 12/07    Excision of Sinus Polyp  2006        SOCIAL HISTORY: Substance Use (street drugs): ( x ) never used  (  ) other:    FAMILY HISTORY:  Family history of diabetes mellitus (DM) (Mother, Sibling)           MEDICATIONS:  aspirin enteric coated 81 milliGRAM(s) Oral daily  carvedilol 25 milliGRAM(s) Oral every 12 hours  heparin   Injectable 5000 Unit(s) SubCutaneous every 12 hours  tamsulosin 0.4 milliGRAM(s) Oral at bedtime      albuterol/ipratropium for Nebulization 3 milliLiter(s) Nebulizer every 6 hours PRN    acetaminophen    Suspension .. 650 milliGRAM(s) Oral every 6 hours PRN        chlorhexidine 2% Cloths 1 Application(s) Topical <User Schedule>  chlorhexidine 4% Liquid 1 Application(s) Topical <User Schedule>  epoetin hiro-epbx (RETACRIT) Injectable 76499 Unit(s) IV Push once  sodium chloride 0.9% lock flush 10 milliLiter(s) IV Push every 1 hour PRN      FAMILY HISTORY:  Family history of diabetes mellitus (DM) (Mother, Sibling)          Allergies    grass, pollen (Rhinitis)  No Known Drug Allergies    Intolerances    	      PHYSICAL EXAM:  T(C): 36.4 (03-27-21 @ 09:00), Max: 36.9 (03-26-21 @ 21:13)  HR: 64 (03-27-21 @ 09:00) (62 - 83)  BP: 186/66 (03-27-21 @ 09:00) (127/70 - 186/66)  RR: 16 (03-27-21 @ 09:00) (16 - 18)  SpO2: 94% (03-27-21 @ 09:00) (94% - 97%)  Wt(kg): --  I&O's Summary    26 Mar 2021 07:01  -  27 Mar 2021 07:00  --------------------------------------------------------  IN: 420 mL / OUT: 225 mL / NET: 195 mL           EYES: EOMI, PERRLA, conjunctiva and sclera clear  ENMT: No tonsillar erythema, exudates, or enlargement; Moist mucous membranes, Good dentition, No lesions  Cardiovascular: Normal S1 S2, No JVD, No murmurs, No edema  Respiratory: Lungs clear to auscultation	  Gastrointestinal:  Soft, Non-tender, + BS	  Extremities: s/p left arm AVF      LABS:	 	    CARDIAC MARKERS:                                  7.3    6.90  )-----------( 277      ( 27 Mar 2021 09:22 )             23.9     03-27    135  |  98  |  56<H>  ----------------------------<  81  4.3   |  25  |  6.61<H>    Ca    8.2<L>      27 Mar 2021 09:22  Phos  4.7     03-27  Mg     2.1     03-27    TPro  5.2<L>  /  Alb  3.0<L>  /  TBili  0.2  /  DBili  x   /  AST  14  /  ALT  <5<L>  /  AlkPhos  59  03-27    proBNP:   Lipid Profile:   HgA1c:   TSH:     Consultant(s) Notes Reviewed:  [x ] YES  [ ] NO    Care Discussed with Consultants/Other Providers [ x] YES  [ ] NO    Imaging Personally Reviewed independently:  [x] YES  [ ] NO    All labs, radiologic studies, vitals, orders and medications list reviewed. Patient is seen and examined at bedside. Case discussed with medical team.    ASSESSMENT/PLAN:

## 2021-03-27 NOTE — PROGRESS NOTE ADULT - ASSESSMENT
80M w/ hx of CKD4, dCHF, difficult to control HTN, DM?? p/w SOB and like acute on chronic congestive heart failure     Problem/Plan - 1:  ·  Problem: Acute on chronic congestive heart failure, unspecified heart failure type.  Plan: grossly fluid overloaded on exam and elevated BNP.   -TTE- EF 60% with mid inferolateral wall and basal inferolateral wall hypokinetic, will need ischemic eval: continue to diurese for now until euvolemic  - TTE 3/12- hyperdynamic LV EF 75% , severe LVOT - avoid excessive fluid removal   - s/p cath with minimal Dz  - RHC with normal wedge, euvolemic  - fluid removal with HD     Problem/Plan - 2:  ·  Problem: Essential hypertension.  Plan: Better controlled now with hd  Coreg 25mg Q12   - add nifedipine 30mg if BP remains elevated, likely post op       Problem/Plan - 3:  ·  Problem: Chronic kidney disease (CKD), stage IV (severe).  Plan:  VIRGEN on CKD,  - tunneled catheter placement 3/15  - s/p AVF yesturday- tolerated well from cv standpoint  pending dc to OP hd facility

## 2021-03-27 NOTE — PROGRESS NOTE ADULT - ASSESSMENT
80M w/ hx of CKD4, dCHF, difficult to control HTN, DM?? p/w SOB and CHF  Suspect he has diastolic CHF at present   CKD stage 4-5  s/p AVF placement POD #1     1 Renal- HD on TTS: next HD on Tuesday         : hyperphosphoremia on Renvela 800 mg tid         : DC planning to outpt HD unit     2 CVS- BP labile though sub optimally controlled, on coreg 25mg bid.           :  Per cardio will add nifedipine 30mg if BP remains elevated    3.Vasc-  s/p LUE AVF placement 3/25/21     4 Pulm-Needs outpt sleep study     5 Anemia - hgb lower; s/p 1 unit prbcs on 3/18/21 and retacrit 3/25/21.  Hgb Trending down by now. retacrit 140000 x 1 dose given today at HD

## 2021-03-28 LAB
ALBUMIN SERPL ELPH-MCNC: 3.2 G/DL — LOW (ref 3.3–5)
ALP SERPL-CCNC: 60 U/L — SIGNIFICANT CHANGE UP (ref 40–120)
ALT FLD-CCNC: <5 U/L — LOW (ref 10–45)
ANION GAP SERPL CALC-SCNC: 12 MMOL/L — SIGNIFICANT CHANGE UP (ref 5–17)
AST SERPL-CCNC: 13 U/L — SIGNIFICANT CHANGE UP (ref 10–40)
BASOPHILS # BLD AUTO: 0.06 K/UL — SIGNIFICANT CHANGE UP (ref 0–0.2)
BASOPHILS NFR BLD AUTO: 0.8 % — SIGNIFICANT CHANGE UP (ref 0–2)
BILIRUB SERPL-MCNC: 0.2 MG/DL — SIGNIFICANT CHANGE UP (ref 0.2–1.2)
BUN SERPL-MCNC: 44 MG/DL — HIGH (ref 7–23)
CALCIUM SERPL-MCNC: 8.5 MG/DL — SIGNIFICANT CHANGE UP (ref 8.4–10.5)
CHLORIDE SERPL-SCNC: 98 MMOL/L — SIGNIFICANT CHANGE UP (ref 96–108)
CO2 SERPL-SCNC: 25 MMOL/L — SIGNIFICANT CHANGE UP (ref 22–31)
CREAT SERPL-MCNC: 5.17 MG/DL — HIGH (ref 0.5–1.3)
EOSINOPHIL # BLD AUTO: 0.53 K/UL — HIGH (ref 0–0.5)
EOSINOPHIL NFR BLD AUTO: 7.5 % — HIGH (ref 0–6)
GLUCOSE SERPL-MCNC: 86 MG/DL — SIGNIFICANT CHANGE UP (ref 70–99)
HCT VFR BLD CALC: 25.5 % — LOW (ref 39–50)
HGB BLD-MCNC: 7.8 G/DL — LOW (ref 13–17)
IMM GRANULOCYTES NFR BLD AUTO: 1.4 % — SIGNIFICANT CHANGE UP (ref 0–1.5)
INR BLD: 0.96 RATIO — SIGNIFICANT CHANGE UP (ref 0.88–1.16)
LYMPHOCYTES # BLD AUTO: 1.69 K/UL — SIGNIFICANT CHANGE UP (ref 1–3.3)
LYMPHOCYTES # BLD AUTO: 23.8 % — SIGNIFICANT CHANGE UP (ref 13–44)
MAGNESIUM SERPL-MCNC: 2.2 MG/DL — SIGNIFICANT CHANGE UP (ref 1.6–2.6)
MCHC RBC-ENTMCNC: 25.5 PG — LOW (ref 27–34)
MCHC RBC-ENTMCNC: 30.6 GM/DL — LOW (ref 32–36)
MCV RBC AUTO: 83.3 FL — SIGNIFICANT CHANGE UP (ref 80–100)
MONOCYTES # BLD AUTO: 0.7 K/UL — SIGNIFICANT CHANGE UP (ref 0–0.9)
MONOCYTES NFR BLD AUTO: 9.9 % — SIGNIFICANT CHANGE UP (ref 2–14)
NEUTROPHILS # BLD AUTO: 4.01 K/UL — SIGNIFICANT CHANGE UP (ref 1.8–7.4)
NEUTROPHILS NFR BLD AUTO: 56.6 % — SIGNIFICANT CHANGE UP (ref 43–77)
NRBC # BLD: 0 /100 WBCS — SIGNIFICANT CHANGE UP (ref 0–0)
PHOSPHATE SERPL-MCNC: 4.2 MG/DL — SIGNIFICANT CHANGE UP (ref 2.5–4.5)
PLATELET # BLD AUTO: 269 K/UL — SIGNIFICANT CHANGE UP (ref 150–400)
POTASSIUM SERPL-MCNC: 4.1 MMOL/L — SIGNIFICANT CHANGE UP (ref 3.5–5.3)
POTASSIUM SERPL-SCNC: 4.1 MMOL/L — SIGNIFICANT CHANGE UP (ref 3.5–5.3)
PROT SERPL-MCNC: 5.7 G/DL — LOW (ref 6–8.3)
PROTHROM AB SERPL-ACNC: 11.5 SEC — SIGNIFICANT CHANGE UP (ref 10.6–13.6)
RBC # BLD: 3.06 M/UL — LOW (ref 4.2–5.8)
RBC # FLD: 19.7 % — HIGH (ref 10.3–14.5)
SODIUM SERPL-SCNC: 135 MMOL/L — SIGNIFICANT CHANGE UP (ref 135–145)
WBC # BLD: 7.09 K/UL — SIGNIFICANT CHANGE UP (ref 3.8–10.5)
WBC # FLD AUTO: 7.09 K/UL — SIGNIFICANT CHANGE UP (ref 3.8–10.5)

## 2021-03-28 RX ADMIN — CHLORHEXIDINE GLUCONATE 1 APPLICATION(S): 213 SOLUTION TOPICAL at 09:50

## 2021-03-28 RX ADMIN — SEVELAMER CARBONATE 800 MILLIGRAM(S): 2400 POWDER, FOR SUSPENSION ORAL at 06:25

## 2021-03-28 RX ADMIN — TAMSULOSIN HYDROCHLORIDE 0.4 MILLIGRAM(S): 0.4 CAPSULE ORAL at 22:20

## 2021-03-28 RX ADMIN — CARVEDILOL PHOSPHATE 25 MILLIGRAM(S): 80 CAPSULE, EXTENDED RELEASE ORAL at 17:28

## 2021-03-28 RX ADMIN — HEPARIN SODIUM 5000 UNIT(S): 5000 INJECTION INTRAVENOUS; SUBCUTANEOUS at 06:25

## 2021-03-28 RX ADMIN — HEPARIN SODIUM 5000 UNIT(S): 5000 INJECTION INTRAVENOUS; SUBCUTANEOUS at 17:27

## 2021-03-28 RX ADMIN — CARVEDILOL PHOSPHATE 25 MILLIGRAM(S): 80 CAPSULE, EXTENDED RELEASE ORAL at 06:25

## 2021-03-28 RX ADMIN — SEVELAMER CARBONATE 800 MILLIGRAM(S): 2400 POWDER, FOR SUSPENSION ORAL at 22:20

## 2021-03-28 RX ADMIN — SEVELAMER CARBONATE 800 MILLIGRAM(S): 2400 POWDER, FOR SUSPENSION ORAL at 13:23

## 2021-03-28 RX ADMIN — Medication 81 MILLIGRAM(S): at 13:23

## 2021-03-28 NOTE — PROGRESS NOTE ADULT - SUBJECTIVE AND OBJECTIVE BOX
Roni Monson MD  Interventional Cardiology / Advance Heart Failure and Cardiac Transplant Specialist  [place] Office : [address] [place] [address] [place] [address]  Tel:   [place] Office : [address] [facility] [place] [address]  Tel: [phone]  Cell : [phone]    covering Dr Carrillo    Pt is lying in bed comfortable not in distress, no chest pains no SOB no palpitations  	  MEDICATIONS:  aspirin enteric coated 81 milliGRAM(s) Oral daily  carvedilol 25 milliGRAM(s) Oral every 12 hours  heparin   Injectable 5000 Unit(s) SubCutaneous every 12 hours  tamsulosin 0.4 milliGRAM(s) Oral at bedtime      albuterol/ipratropium for Nebulization 3 milliLiter(s) Nebulizer every 6 hours PRN    acetaminophen    Suspension .. 650 milliGRAM(s) Oral every 6 hours PRN        chlorhexidine 2% Cloths 1 Application(s) Topical <User Schedule>  chlorhexidine 4% Liquid 1 Application(s) Topical <User Schedule>  sodium chloride 0.9% lock flush 10 milliLiter(s) IV Push every 1 hour PRN      PAST MEDICAL/SURGICAL HISTORY  PAST MEDICAL & SURGICAL HISTORY:  Diabetes    Hypertension    HTN (hypertension)    BPH (Benign Prostatic Hypertrophy)    Glaucoma (Increased Eye Pressure)    HTN - Hypertension    No significant past surgical history    Laser Surgery :Right  ?  regarding procedure ; 12/07 ; secondary to glaucoma    Laser Surgery Left  ? regarding procedure ; secondary to glaucoma 12/07    Excision of Sinus Polyp  2006        SOCIAL HISTORY: Substance Use (street drugs): ( x ) never used  (  ) other:    FAMILY HISTORY:  Family history of diabetes mellitus (DM) (Mother, Sibling)        REVIEW OF SYSTEMS:  CONSTITUTIONAL: No fever, weight loss, or fatigue  EYES: No eye pain, visual disturbances, or discharge  ENMT:  No difficulty hearing, tinnitus, vertigo; No sinus or throat pain  BREASTS: No pain, masses, or nipple discharge  GASTROINTESTINAL: No abdominal or epigastric pain. No nausea, vomiting, or hematemesis; No diarrhea or constipation. No melena or hematochezia.  GENITOURINARY: No dysuria, frequency, hematuria, or incontinence  NEUROLOGICAL: No headaches, memory loss, loss of strength, numbness, or tremors  ENDOCRINE: No heat or cold intolerance; No hair loss  MUSCULOSKELETAL: No joint pain or swelling; No muscle, back, or extremity pain  PSYCHIATRIC: No depression, anxiety, mood swings, or difficulty sleeping  HEME/LYMPH: No easy bruising, or bleeding gums  All others negative    PHYSICAL EXAM:  T(C): 36.6 (03-28-21 @ 09:10), Max: 37.1 (03-27-21 @ 21:13)  HR: 68 (03-28-21 @ 09:10) (64 - 88)  BP: 140/60 (03-28-21 @ 09:10) (140/60 - 172/67)  RR: 18 (03-28-21 @ 09:10) (18 - 18)  SpO2: 94% (03-28-21 @ 09:10) (93% - 97%)  Wt(kg): --  I&O's Summary    27 Mar 2021 07:01  -  28 Mar 2021 07:00  --------------------------------------------------------  IN: 240 mL / OUT: 1550 mL / NET: -1310 mL    28 Mar 2021 07:01  -  28 Mar 2021 12:37  --------------------------------------------------------  IN: 240 mL / OUT: 200 mL / NET: 40 mL          GENERAL: NAD  EYES: EOMI, PERRLA, conjunctiva and sclera clear  ENMT: No tonsillar erythema, exudates, or enlargement; Moist mucous membranes, Good dentition, No lesions  Cardiovascular: Normal S1 S2, No JVD, No murmurs, No edema  Respiratory: Lungs clear to auscultation	  Gastrointestinal:  Soft, Non-tender, + BS	  Extremities: s/p AVF                                  7.8    7.09  )-----------( 269      ( 28 Mar 2021 08:57 )             25.5     03-28    135  |  98  |  44<H>  ----------------------------<  86  4.1   |  25  |  5.17<H>    Ca    8.5      28 Mar 2021 06:47  Phos  4.2     03-28  Mg     2.2     03-28    TPro  5.7<L>  /  Alb  3.2<L>  /  TBili  0.2  /  DBili  x   /  AST  13  /  ALT  <5<L>  /  AlkPhos  60  03-28    proBNP:   Lipid Profile:   HgA1c:   TSH:     Consultant(s) Notes Reviewed:  [x ] YES  [ ] NO    Care Discussed with Consultants/Other Providers [ x] YES  [ ] NO    Imaging Personally Reviewed independently:  [x] YES  [ ] NO    All labs, radiologic studies, vitals, orders and medications list reviewed. Patient is seen and examined at bedside. Case discussed with medical team.

## 2021-03-29 LAB
ALBUMIN SERPL ELPH-MCNC: 3.3 G/DL — SIGNIFICANT CHANGE UP (ref 3.3–5)
ALP SERPL-CCNC: 66 U/L — SIGNIFICANT CHANGE UP (ref 40–120)
ALT FLD-CCNC: <5 U/L — LOW (ref 10–45)
ANION GAP SERPL CALC-SCNC: 16 MMOL/L — SIGNIFICANT CHANGE UP (ref 5–17)
AST SERPL-CCNC: 15 U/L — SIGNIFICANT CHANGE UP (ref 10–40)
BILIRUB SERPL-MCNC: 0.2 MG/DL — SIGNIFICANT CHANGE UP (ref 0.2–1.2)
BLD GP AB SCN SERPL QL: NEGATIVE — SIGNIFICANT CHANGE UP
BUN SERPL-MCNC: 60 MG/DL — HIGH (ref 7–23)
CALCIUM SERPL-MCNC: 8.5 MG/DL — SIGNIFICANT CHANGE UP (ref 8.4–10.5)
CHLORIDE SERPL-SCNC: 97 MMOL/L — SIGNIFICANT CHANGE UP (ref 96–108)
CO2 SERPL-SCNC: 23 MMOL/L — SIGNIFICANT CHANGE UP (ref 22–31)
CREAT SERPL-MCNC: 5.88 MG/DL — HIGH (ref 0.5–1.3)
GLUCOSE SERPL-MCNC: 94 MG/DL — SIGNIFICANT CHANGE UP (ref 70–99)
HCT VFR BLD CALC: 26.2 % — LOW (ref 39–50)
HGB BLD-MCNC: 7.9 G/DL — LOW (ref 13–17)
MAGNESIUM SERPL-MCNC: 2.4 MG/DL — SIGNIFICANT CHANGE UP (ref 1.6–2.6)
MCHC RBC-ENTMCNC: 25 PG — LOW (ref 27–34)
MCHC RBC-ENTMCNC: 30.2 GM/DL — LOW (ref 32–36)
MCV RBC AUTO: 82.9 FL — SIGNIFICANT CHANGE UP (ref 80–100)
NRBC # BLD: 0 /100 WBCS — SIGNIFICANT CHANGE UP (ref 0–0)
PHOSPHATE SERPL-MCNC: 5.5 MG/DL — HIGH (ref 2.5–4.5)
PLATELET # BLD AUTO: 278 K/UL — SIGNIFICANT CHANGE UP (ref 150–400)
POTASSIUM SERPL-MCNC: 4.2 MMOL/L — SIGNIFICANT CHANGE UP (ref 3.5–5.3)
POTASSIUM SERPL-SCNC: 4.2 MMOL/L — SIGNIFICANT CHANGE UP (ref 3.5–5.3)
PROT SERPL-MCNC: 6 G/DL — SIGNIFICANT CHANGE UP (ref 6–8.3)
RBC # BLD: 3.16 M/UL — LOW (ref 4.2–5.8)
RBC # FLD: 19.5 % — HIGH (ref 10.3–14.5)
RH IG SCN BLD-IMP: POSITIVE — SIGNIFICANT CHANGE UP
SARS-COV-2 RNA SPEC QL NAA+PROBE: SIGNIFICANT CHANGE UP
SODIUM SERPL-SCNC: 136 MMOL/L — SIGNIFICANT CHANGE UP (ref 135–145)
WBC # BLD: 6.52 K/UL — SIGNIFICANT CHANGE UP (ref 3.8–10.5)
WBC # FLD AUTO: 6.52 K/UL — SIGNIFICANT CHANGE UP (ref 3.8–10.5)

## 2021-03-29 RX ORDER — ERYTHROPOIETIN 10000 [IU]/ML
14000 INJECTION, SOLUTION INTRAVENOUS; SUBCUTANEOUS ONCE
Refills: 0 | Status: COMPLETED | OUTPATIENT
Start: 2021-03-30 | End: 2021-03-30

## 2021-03-29 RX ADMIN — TAMSULOSIN HYDROCHLORIDE 0.4 MILLIGRAM(S): 0.4 CAPSULE ORAL at 21:37

## 2021-03-29 RX ADMIN — CHLORHEXIDINE GLUCONATE 1 APPLICATION(S): 213 SOLUTION TOPICAL at 07:45

## 2021-03-29 RX ADMIN — SEVELAMER CARBONATE 800 MILLIGRAM(S): 2400 POWDER, FOR SUSPENSION ORAL at 05:26

## 2021-03-29 RX ADMIN — HEPARIN SODIUM 5000 UNIT(S): 5000 INJECTION INTRAVENOUS; SUBCUTANEOUS at 17:20

## 2021-03-29 RX ADMIN — HEPARIN SODIUM 5000 UNIT(S): 5000 INJECTION INTRAVENOUS; SUBCUTANEOUS at 05:26

## 2021-03-29 RX ADMIN — CARVEDILOL PHOSPHATE 25 MILLIGRAM(S): 80 CAPSULE, EXTENDED RELEASE ORAL at 05:26

## 2021-03-29 RX ADMIN — SEVELAMER CARBONATE 800 MILLIGRAM(S): 2400 POWDER, FOR SUSPENSION ORAL at 21:37

## 2021-03-29 RX ADMIN — CARVEDILOL PHOSPHATE 25 MILLIGRAM(S): 80 CAPSULE, EXTENDED RELEASE ORAL at 17:20

## 2021-03-29 RX ADMIN — SEVELAMER CARBONATE 800 MILLIGRAM(S): 2400 POWDER, FOR SUSPENSION ORAL at 13:08

## 2021-03-29 RX ADMIN — Medication 81 MILLIGRAM(S): at 12:34

## 2021-03-29 NOTE — PROGRESS NOTE ADULT - SUBJECTIVE AND OBJECTIVE BOX
Roni Monson MD  Interventional Cardiology / Endovascular Specialist  [place] Office : [address] [place] [address] [place] [address]  Tel:   [place] Office : [address] [facility] [place] [address]  Tel: [phone]  Cell : [phone]    covering Dr Carrillo    Pt is lying in bed comfortable not in distress, no chest pains no SOB no palpitations  	  	  MEDICATIONS:  aspirin enteric coated 81 milliGRAM(s) Oral daily  carvedilol 25 milliGRAM(s) Oral every 12 hours  heparin   Injectable 5000 Unit(s) SubCutaneous every 12 hours  tamsulosin 0.4 milliGRAM(s) Oral at bedtime      albuterol/ipratropium for Nebulization 3 milliLiter(s) Nebulizer every 6 hours PRN    acetaminophen    Suspension .. 650 milliGRAM(s) Oral every 6 hours PRN        chlorhexidine 2% Cloths 1 Application(s) Topical <User Schedule>  sodium chloride 0.9% lock flush 10 milliLiter(s) IV Push every 1 hour PRN      PAST MEDICAL/SURGICAL HISTORY  PAST MEDICAL & SURGICAL HISTORY:  Diabetes    Hypertension    HTN (hypertension)    BPH (Benign Prostatic Hypertrophy)    Glaucoma (Increased Eye Pressure)    HTN - Hypertension    No significant past surgical history    Laser Surgery :Right  ?  regarding procedure ; 12/07 ; secondary to glaucoma    Laser Surgery Left  ? regarding procedure ; secondary to glaucoma 12/07    Excision of Sinus Polyp  2006        SOCIAL HISTORY: Substance Use (street drugs): ( x ) never used  (  ) other:    FAMILY HISTORY:  Family history of diabetes mellitus (DM) (Mother, Sibling)        REVIEW OF SYSTEMS:  CONSTITUTIONAL: No fever, weight loss, or fatigue  EYES: No eye pain, visual disturbances, or discharge  ENMT:  No difficulty hearing, tinnitus, vertigo; No sinus or throat pain  BREASTS: No pain, masses, or nipple discharge  GASTROINTESTINAL: No abdominal or epigastric pain. No nausea, vomiting, or hematemesis; No diarrhea or constipation. No melena or hematochezia.  GENITOURINARY: No dysuria, frequency, hematuria, or incontinence  NEUROLOGICAL: No headaches, memory loss, loss of strength, numbness, or tremors  ENDOCRINE: No heat or cold intolerance; No hair loss  MUSCULOSKELETAL: No joint pain or swelling; No muscle, back, or extremity pain  PSYCHIATRIC: No depression, anxiety, mood swings, or difficulty sleeping  HEME/LYMPH: No easy bruising, or bleeding gums  All others negative    PHYSICAL EXAM:  T(C): 36.7 (03-29-21 @ 09:45), Max: 36.9 (03-28-21 @ 13:50)  HR: 63 (03-29-21 @ 09:45) (63 - 77)  BP: 157/80 (03-29-21 @ 09:45) (148/75 - 172/69)  RR: 18 (03-29-21 @ 09:45) (18 - 18)  SpO2: 96% (03-29-21 @ 09:45) (93% - 96%)  Wt(kg): --  I&O's Summary    28 Mar 2021 07:01  -  29 Mar 2021 07:00  --------------------------------------------------------  IN: 680 mL / OUT: 300 mL / NET: 380 mL    29 Mar 2021 07:01  -  29 Mar 2021 12:50  --------------------------------------------------------  IN: 180 mL / OUT: 250 mL / NET: -70 mL        GENERAL: NAD  EYES: EOMI, PERRLA, conjunctiva and sclera clear  ENMT: No tonsillar erythema, exudates, or enlargement; Moist mucous membranes, Good dentition, No lesions  Cardiovascular: Normal S1 S2, No JVD, No murmurs, No edema  Respiratory: Lungs clear to auscultation	  Gastrointestinal:  Soft, Non-tender, + BS	  Extremities: s/p AVF                            7.9    6.52  )-----------( 278      ( 29 Mar 2021 06:50 )             26.2     03-29    136  |  97  |  60<H>  ----------------------------<  94  4.2   |  23  |  5.88<H>    Ca    8.5      29 Mar 2021 06:49  Phos  5.5     03-29  Mg     2.4     03-29    TPro  6.0  /  Alb  3.3  /  TBili  0.2  /  DBili  x   /  AST  15  /  ALT  <5<L>  /  AlkPhos  66  03-29    proBNP:   Lipid Profile:   HgA1c:   TSH:     Consultant(s) Notes Reviewed:  [x ] YES  [ ] NO    Care Discussed with Consultants/Other Providers [ x] YES  [ ] NO    Imaging Personally Reviewed independently:  [x] YES  [ ] NO    All labs, radiologic studies, vitals, orders and medications list reviewed. Patient is seen and examined at bedside. Case discussed with medical team.

## 2021-03-29 NOTE — PROGRESS NOTE ADULT - ASSESSMENT
80M w/ hx of CKD4, dCHF, difficult to control HTN, DM?? p/w SOB and CHF  Suspect he has diastolic CHF at present   CKD stage 4-5  s/p AVF placement     1 Renal- HD on TTS: next HD on Tuesday         : hyperphosphoremia on Renvela 800 mg tid         : DC planning to outpt HD unit --Issues with insurance     2 CVS- BP labile though  controlled, on coreg 25mg bid.           :  Per cardio will add nifedipine 30mg if BP remains elevated    3.Vasc-  s/p LUE AVF placement 3/25/21     4 Pulm-Needs outpt sleep study     5 Anemia - Retacrit 140000 x 1 dose given tomorrow at  HD    DW Care coordination       Sayed [facility]   [phone]

## 2021-03-29 NOTE — PROGRESS NOTE ADULT - SUBJECTIVE AND OBJECTIVE BOX
NEPHROLOGY[facility] [phone]        Patient seen and examined in bed.  He was in good spirits         MEDICATIONS  (STANDING):  aspirin enteric coated 81 milliGRAM(s) Oral daily  carvedilol 25 milliGRAM(s) Oral every 12 hours  chlorhexidine 2% Cloths 1 Application(s) Topical <User Schedule>  heparin   Injectable 5000 Unit(s) SubCutaneous every 12 hours  sevelamer carbonate 800 milliGRAM(s) Oral three times a day  tamsulosin 0.4 milliGRAM(s) Oral at bedtime      VITAL:  T(C): , Max: 36.9 (03-28-21 @ 13:50)  T(F): , Max: 98.4 (03-28-21 @ 13:50)  HR: 63 (03-29-21 @ 09:45)  BP: 157/80 (03-29-21 @ 09:45)  BP(mean): --  RR: 18 (03-29-21 @ 09:45)  SpO2: 96% (03-29-21 @ 09:45)  Wt(kg): --    I and O's:    03-28 @ 07:01  -  03-29 @ 07:00  --------------------------------------------------------  IN: 680 mL / OUT: 300 mL / NET: 380 mL    03-29 @ 07:01  -  03-29 @ 12:29  --------------------------------------------------------  IN: 180 mL / OUT: 250 mL / NET: -70 mL          PHYSICAL EXAM:    Constitutional: NAD  Neck:  No JVD  Respiratory: CTAB/L  Cardiovascular: S1 and S2  Gastrointestinal: BS+, soft, NT/ND  Extremities: No peripheral edema  Neurological: A/O x 3, no focal deficits  Psychiatric: Normal mood, normal affect  : No Aguilar  Skin: No rashes  Access: perm cath and left avf     LABS:                        7.9    6.52  )-----------( 278      ( 29 Mar 2021 06:50 )             26.2     03-29    136  |  97  |  60<H>  ----------------------------<  94  4.2   |  23  |  5.88<H>    Ca    8.5      29 Mar 2021 06:49  Phos  5.5     03-29  Mg     2.4     03-29    TPro  6.0  /  Alb  3.3  /  TBili  0.2  /  DBili  x   /  AST  15  /  ALT  <5<L>  /  AlkPhos  66  03-29          Urine Studies:          RADIOLOGY & ADDITIONAL STUDIES:

## 2021-03-30 ENCOUNTER — TRANSCRIPTION ENCOUNTER (OUTPATIENT)
Age: 81
End: 2021-03-30

## 2021-03-30 VITALS
TEMPERATURE: 98 F | OXYGEN SATURATION: 96 % | HEART RATE: 75 BPM | SYSTOLIC BLOOD PRESSURE: 164 MMHG | DIASTOLIC BLOOD PRESSURE: 69 MMHG | RESPIRATION RATE: 16 BRPM

## 2021-03-30 LAB
ALBUMIN SERPL ELPH-MCNC: 3.5 G/DL — SIGNIFICANT CHANGE UP (ref 3.3–5)
ALP SERPL-CCNC: 65 U/L — SIGNIFICANT CHANGE UP (ref 40–120)
ALT FLD-CCNC: <5 U/L — LOW (ref 10–45)
ANION GAP SERPL CALC-SCNC: 15 MMOL/L — SIGNIFICANT CHANGE UP (ref 5–17)
AST SERPL-CCNC: 16 U/L — SIGNIFICANT CHANGE UP (ref 10–40)
BILIRUB SERPL-MCNC: 0.2 MG/DL — SIGNIFICANT CHANGE UP (ref 0.2–1.2)
BUN SERPL-MCNC: 73 MG/DL — HIGH (ref 7–23)
CALCIUM SERPL-MCNC: 9 MG/DL — SIGNIFICANT CHANGE UP (ref 8.4–10.5)
CHLORIDE SERPL-SCNC: 96 MMOL/L — SIGNIFICANT CHANGE UP (ref 96–108)
CO2 SERPL-SCNC: 25 MMOL/L — SIGNIFICANT CHANGE UP (ref 22–31)
CREAT SERPL-MCNC: 6.35 MG/DL — HIGH (ref 0.5–1.3)
GLUCOSE SERPL-MCNC: 88 MG/DL — SIGNIFICANT CHANGE UP (ref 70–99)
HCT VFR BLD CALC: 26.8 % — LOW (ref 39–50)
HGB BLD-MCNC: 8.3 G/DL — LOW (ref 13–17)
MAGNESIUM SERPL-MCNC: 2.5 MG/DL — SIGNIFICANT CHANGE UP (ref 1.6–2.6)
MCHC RBC-ENTMCNC: 25.3 PG — LOW (ref 27–34)
MCHC RBC-ENTMCNC: 31 GM/DL — LOW (ref 32–36)
MCV RBC AUTO: 81.7 FL — SIGNIFICANT CHANGE UP (ref 80–100)
NRBC # BLD: 0 /100 WBCS — SIGNIFICANT CHANGE UP (ref 0–0)
PHOSPHATE SERPL-MCNC: 6.6 MG/DL — HIGH (ref 2.5–4.5)
PLATELET # BLD AUTO: 314 K/UL — SIGNIFICANT CHANGE UP (ref 150–400)
POTASSIUM SERPL-MCNC: 4.4 MMOL/L — SIGNIFICANT CHANGE UP (ref 3.5–5.3)
POTASSIUM SERPL-SCNC: 4.4 MMOL/L — SIGNIFICANT CHANGE UP (ref 3.5–5.3)
PROT SERPL-MCNC: 6.3 G/DL — SIGNIFICANT CHANGE UP (ref 6–8.3)
RBC # BLD: 3.28 M/UL — LOW (ref 4.2–5.8)
RBC # FLD: 19.5 % — HIGH (ref 10.3–14.5)
SODIUM SERPL-SCNC: 136 MMOL/L — SIGNIFICANT CHANGE UP (ref 135–145)
WBC # BLD: 5.89 K/UL — SIGNIFICANT CHANGE UP (ref 3.8–10.5)
WBC # FLD AUTO: 5.89 K/UL — SIGNIFICANT CHANGE UP (ref 3.8–10.5)

## 2021-03-30 PROCEDURE — C1750: CPT

## 2021-03-30 PROCEDURE — 97530 THERAPEUTIC ACTIVITIES: CPT

## 2021-03-30 PROCEDURE — 36600 WITHDRAWAL OF ARTERIAL BLOOD: CPT

## 2021-03-30 PROCEDURE — 36430 TRANSFUSION BLD/BLD COMPNT: CPT

## 2021-03-30 PROCEDURE — 84484 ASSAY OF TROPONIN QUANT: CPT

## 2021-03-30 PROCEDURE — 83880 ASSAY OF NATRIURETIC PEPTIDE: CPT

## 2021-03-30 PROCEDURE — 96374 THER/PROPH/DIAG INJ IV PUSH: CPT

## 2021-03-30 PROCEDURE — 82565 ASSAY OF CREATININE: CPT

## 2021-03-30 PROCEDURE — 86900 BLOOD TYPING SEROLOGIC ABO: CPT

## 2021-03-30 PROCEDURE — 87640 STAPH A DNA AMP PROBE: CPT

## 2021-03-30 PROCEDURE — C1889: CPT

## 2021-03-30 PROCEDURE — 94640 AIRWAY INHALATION TREATMENT: CPT

## 2021-03-30 PROCEDURE — 87641 MR-STAPH DNA AMP PROBE: CPT

## 2021-03-30 PROCEDURE — P9040: CPT

## 2021-03-30 PROCEDURE — 84100 ASSAY OF PHOSPHORUS: CPT

## 2021-03-30 PROCEDURE — 87340 HEPATITIS B SURFACE AG IA: CPT

## 2021-03-30 PROCEDURE — 85027 COMPLETE CBC AUTOMATED: CPT

## 2021-03-30 PROCEDURE — 93456 R HRT CORONARY ARTERY ANGIO: CPT

## 2021-03-30 PROCEDURE — 99285 EMERGENCY DEPT VISIT HI MDM: CPT | Mod: 25

## 2021-03-30 PROCEDURE — 82435 ASSAY OF BLOOD CHLORIDE: CPT

## 2021-03-30 PROCEDURE — 87040 BLOOD CULTURE FOR BACTERIA: CPT

## 2021-03-30 PROCEDURE — C1894: CPT

## 2021-03-30 PROCEDURE — 86705 HEP B CORE ANTIBODY IGM: CPT

## 2021-03-30 PROCEDURE — 93926 LOWER EXTREMITY STUDY: CPT

## 2021-03-30 PROCEDURE — 83605 ASSAY OF LACTIC ACID: CPT

## 2021-03-30 PROCEDURE — 84156 ASSAY OF PROTEIN URINE: CPT

## 2021-03-30 PROCEDURE — 85730 THROMBOPLASTIN TIME PARTIAL: CPT

## 2021-03-30 PROCEDURE — 82570 ASSAY OF URINE CREATININE: CPT

## 2021-03-30 PROCEDURE — 94002 VENT MGMT INPAT INIT DAY: CPT

## 2021-03-30 PROCEDURE — 84132 ASSAY OF SERUM POTASSIUM: CPT

## 2021-03-30 PROCEDURE — 99152 MOD SED SAME PHYS/QHP 5/>YRS: CPT

## 2021-03-30 PROCEDURE — 80202 ASSAY OF VANCOMYCIN: CPT

## 2021-03-30 PROCEDURE — 86706 HEP B SURFACE ANTIBODY: CPT

## 2021-03-30 PROCEDURE — 80061 LIPID PANEL: CPT

## 2021-03-30 PROCEDURE — 82962 GLUCOSE BLOOD TEST: CPT

## 2021-03-30 PROCEDURE — 86850 RBC ANTIBODY SCREEN: CPT

## 2021-03-30 PROCEDURE — 84295 ASSAY OF SERUM SODIUM: CPT

## 2021-03-30 PROCEDURE — 93005 ELECTROCARDIOGRAM TRACING: CPT

## 2021-03-30 PROCEDURE — 93306 TTE W/DOPPLER COMPLETE: CPT

## 2021-03-30 PROCEDURE — 85610 PROTHROMBIN TIME: CPT

## 2021-03-30 PROCEDURE — P9022: CPT

## 2021-03-30 PROCEDURE — 80048 BASIC METABOLIC PNL TOTAL CA: CPT

## 2021-03-30 PROCEDURE — 94660 CPAP INITIATION&MGMT: CPT

## 2021-03-30 PROCEDURE — 86901 BLOOD TYPING SEROLOGIC RH(D): CPT

## 2021-03-30 PROCEDURE — 87186 SC STD MICRODIL/AGAR DIL: CPT

## 2021-03-30 PROCEDURE — 97110 THERAPEUTIC EXERCISES: CPT

## 2021-03-30 PROCEDURE — 83735 ASSAY OF MAGNESIUM: CPT

## 2021-03-30 PROCEDURE — 93970 EXTREMITY STUDY: CPT

## 2021-03-30 PROCEDURE — 85014 HEMATOCRIT: CPT

## 2021-03-30 PROCEDURE — 82947 ASSAY GLUCOSE BLOOD QUANT: CPT

## 2021-03-30 PROCEDURE — 83036 HEMOGLOBIN GLYCOSYLATED A1C: CPT

## 2021-03-30 PROCEDURE — 86769 SARS-COV-2 COVID-19 ANTIBODY: CPT

## 2021-03-30 PROCEDURE — 36558 INSERT TUNNELED CV CATH: CPT

## 2021-03-30 PROCEDURE — 81001 URINALYSIS AUTO W/SCOPE: CPT

## 2021-03-30 PROCEDURE — 94003 VENT MGMT INPAT SUBQ DAY: CPT

## 2021-03-30 PROCEDURE — 99153 MOD SED SAME PHYS/QHP EA: CPT

## 2021-03-30 PROCEDURE — 86803 HEPATITIS C AB TEST: CPT

## 2021-03-30 PROCEDURE — 70450 CT HEAD/BRAIN W/O DYE: CPT

## 2021-03-30 PROCEDURE — U0003: CPT

## 2021-03-30 PROCEDURE — 80074 ACUTE HEPATITIS PANEL: CPT

## 2021-03-30 PROCEDURE — 76770 US EXAM ABDO BACK WALL COMP: CPT

## 2021-03-30 PROCEDURE — 97116 GAIT TRAINING THERAPY: CPT

## 2021-03-30 PROCEDURE — 99261: CPT

## 2021-03-30 PROCEDURE — 76937 US GUIDE VASCULAR ACCESS: CPT

## 2021-03-30 PROCEDURE — C1769: CPT

## 2021-03-30 PROCEDURE — 87070 CULTURE OTHR SPECIMN AEROBIC: CPT

## 2021-03-30 PROCEDURE — 97161 PT EVAL LOW COMPLEX 20 MIN: CPT

## 2021-03-30 PROCEDURE — 85018 HEMOGLOBIN: CPT

## 2021-03-30 PROCEDURE — 82330 ASSAY OF CALCIUM: CPT

## 2021-03-30 PROCEDURE — 80053 COMPREHEN METABOLIC PANEL: CPT

## 2021-03-30 PROCEDURE — 85025 COMPLETE CBC W/AUTO DIFF WBC: CPT

## 2021-03-30 PROCEDURE — 71045 X-RAY EXAM CHEST 1 VIEW: CPT

## 2021-03-30 PROCEDURE — C1887: CPT

## 2021-03-30 PROCEDURE — 86923 COMPATIBILITY TEST ELECTRIC: CPT

## 2021-03-30 PROCEDURE — 82803 BLOOD GASES ANY COMBINATION: CPT

## 2021-03-30 PROCEDURE — 77001 FLUOROGUIDE FOR VEIN DEVICE: CPT

## 2021-03-30 PROCEDURE — U0005: CPT

## 2021-03-30 RX ORDER — FUROSEMIDE 40 MG
1 TABLET ORAL
Qty: 0 | Refills: 0 | DISCHARGE

## 2021-03-30 RX ORDER — SEVELAMER CARBONATE 2400 MG/1
1 POWDER, FOR SUSPENSION ORAL
Qty: 90 | Refills: 0
Start: 2021-03-30 | End: 2021-04-28

## 2021-03-30 RX ORDER — NIFEDIPINE 30 MG
1 TABLET, EXTENDED RELEASE 24 HR ORAL
Qty: 0 | Refills: 0 | DISCHARGE

## 2021-03-30 RX ORDER — SEVELAMER CARBONATE 2400 MG/1
1 POWDER, FOR SUSPENSION ORAL
Qty: 0 | Refills: 0 | DISCHARGE
Start: 2021-03-30

## 2021-03-30 RX ADMIN — SEVELAMER CARBONATE 800 MILLIGRAM(S): 2400 POWDER, FOR SUSPENSION ORAL at 12:35

## 2021-03-30 RX ADMIN — HEPARIN SODIUM 5000 UNIT(S): 5000 INJECTION INTRAVENOUS; SUBCUTANEOUS at 12:36

## 2021-03-30 RX ADMIN — SEVELAMER CARBONATE 800 MILLIGRAM(S): 2400 POWDER, FOR SUSPENSION ORAL at 16:08

## 2021-03-30 RX ADMIN — Medication 81 MILLIGRAM(S): at 12:39

## 2021-03-30 RX ADMIN — CARVEDILOL PHOSPHATE 25 MILLIGRAM(S): 80 CAPSULE, EXTENDED RELEASE ORAL at 12:36

## 2021-03-30 RX ADMIN — ERYTHROPOIETIN 14000 UNIT(S): 10000 INJECTION, SOLUTION INTRAVENOUS; SUBCUTANEOUS at 10:22

## 2021-03-30 RX ADMIN — CHLORHEXIDINE GLUCONATE 1 APPLICATION(S): 213 SOLUTION TOPICAL at 12:38

## 2021-03-30 NOTE — PROGRESS NOTE ADULT - PROVIDER SPECIALTY LIST ADULT
Anesthesia
Cardiology
Internal Medicine
Internal Medicine
MICU
Nephrology
Cardiology
Internal Medicine
MICU
Nephrology
Nephrology
Vascular Surgery
Cardiology
Internal Medicine
MICU
Nephrology
Vascular Surgery
Cardiology
Internal Medicine
MICU
Nephrology

## 2021-03-30 NOTE — PROGRESS NOTE ADULT - REASON FOR ADMISSION

## 2021-03-30 NOTE — PROGRESS NOTE ADULT - SUBJECTIVE AND OBJECTIVE BOX
Patient is a 80y old  Male who presents with a chief complaint of Shortness of breath (30 Mar 2021 11:01)      INTERVAL HISTORY: at [facility]     REVIEW OF SYSTEMS:   CONSTITUTIONAL: No weakness  EYES/ENT: No visual changes; No throat pain  Neck: No pain or stiffness  Respiratory: No cough, wheezing, No shortness of breath  CARDIOVASCULAR: no chest pain or palpitations  GASTROINTESTINAL: No abdominal pain, no nausea, vomiting or hematemesis  GENITOURINARY: No dysuria, frequency or hematuria  NEUROLOGICAL: No stroke like symptoms  SKIN: No rashes      MEDICATIONS:  carvedilol 25 milliGRAM(s) Oral every 12 hours  tamsulosin 0.4 milliGRAM(s) Oral at bedtim      PHYSICAL EXAM:  T(C): 37 (03-30-21 @ 11:40), Max: 37 (03-30-21 @ 11:40)  HR: 69 (03-30-21 @ 08:40) (63 - 69)  BP: 146/63 (03-30-21 @ 11:40) (146/63 - 173/74)  RR: 16 (03-30-21 @ 11:40) (16 - 18)  SpO2: 96% (03-30-21 @ 11:40) (94% - 98%)  Wt(kg): --  I&O's Summary    29 Mar 2021 07:01  -  30 Mar 2021 07:00  --------------------------------------------------------  IN: 640 mL / OUT: 450 mL / NET: 190 mL    30 Mar 2021 07:01  -  30 Mar 2021 14:00  --------------------------------------------------------  IN: 320 mL / OUT: 1600 mL / NET: -1280 mL      Appearance: In no distress	  HEENT:    PERRL, EOMI	  Cardiovascular:  S1 S2, No JVD  Respiratory: Lungs clear to auscultation	  Gastrointestinal:  Soft, Non-tender, + BS	  Vascularature:  No edema of LE  Psychiatric: Appropriate affect   Neuro: no acute focal deficits                        8.3    5.89  )-----------( 314      ( 30 Mar 2021 07:13 )             26.8     03-30    136  |  96  |  73<H>  ----------------------------<  88  4.4   |  25  |  6.35<H>    Ca    9.0      30 Mar 2021 07:11  Phos  6.6     03-30  Mg     2.5     03-30    TPro  6.3  /  Alb  3.5  /  TBili  0.2  /  DBili  x   /  AST  16  /  ALT  <5<L>  /  AlkPhos  65  03-30  Labs personally reviewed    ASSESSMENT/PLAN: 	  80M w/ hx of CKD4, dCHF, difficult to control HTN, DM?? p/w SOB and like acute on chronic congestive heart failure     Problem/Plan - 1:  ·  Problem: Acute on chronic congestive heart failure, unspecified heart failure type.  Plan: grossly fluid overloaded on exam and elevated BNP.   -TTE- EF 60% with mid inferolateral wall and basal inferolateral wall hypokinetic, will need ischemic eval: continue to diurese for now until euvolemic  - TTE 3/12- hyperdynamic LV EF 75% , severe LVOT - avoid excessive fluid removal   - s/p cath with minimal Dz  - RHC with normal wedge, euvolemic  - fluid removal with HD     Problem/Plan - 2:  ·  Problem: Essential hypertension.  Plan: Better controlled now with hd  Coreg 25mg Q12   - add nifedipine 30mg if BP remains elevated, likely post op       Problem/Plan - 3:  ·  Problem: Chronic kidney disease (CKD), stage IV (severe).  Plan:  VIRGEN on CKD,  - tunneled catheter placement 3/15  - s/p AVF 3/25- tolerated well from cv standpoint  pending dc to OP hd facility             Betzaida Carrillo DO [facility]  Cardiovascular Medicine  [address] [place] [address], [address]  Office: [phone]  Cell: [phone]

## 2021-03-30 NOTE — PROGRESS NOTE ADULT - ASSESSMENT
80M w/ hx of CKD4, dCHF, difficult to control HTN, DM?? p/w SOB and CHF  Suspect he has diastolic CHF at present   CKD stage 4-5  s/p AVF placement     1 Renal- HD on TTS: next HD today          : hyperphosphoremia on Renvela 800 mg tid         : DC planning to [facility]     2 CVS- BP labile though  controlled, on coreg 25mg bid.           :  Per cardio will add nifedipine 30mg if BP remains elevated    3.Vasc-  s/p LUE AVF placement 3/25/21     4 Pulm-Needs outpt sleep study     5 Anemia - Retacrit 140000 x 1 dose given tomorrow at  HD    [facility] needs Hgb to be over 9.0 in order for acceptance.  Blood at hd     DW NP     Sayed Ali   [facility]   [phone]

## 2021-03-30 NOTE — PROGRESS NOTE ADULT - SUBJECTIVE AND OBJECTIVE BOX
NEPHROLOGY[facility] [phone]        Patient seen and examined in bed.  He was in good spirits         MEDICATIONS  (STANDING):  aspirin enteric coated 81 milliGRAM(s) Oral daily  carvedilol 25 milliGRAM(s) Oral every 12 hours  chlorhexidine 2% Cloths 1 Application(s) Topical <User Schedule>  heparin   Injectable 5000 Unit(s) SubCutaneous every 12 hours  sevelamer carbonate 800 milliGRAM(s) Oral three times a day  tamsulosin 0.4 milliGRAM(s) Oral at bedtime      VITAL:  T(C): , Max: 36.7 (03-29-21 @ 21:04)  T(F): , Max: 98.1 (03-30-21 @ 08:40)  HR: 69 (03-30-21 @ 08:40)  BP: 167/72 (03-30-21 @ 08:40)  BP(mean): --  RR: 17 (03-30-21 @ 08:40)  SpO2: 96% (03-30-21 @ 08:40)  Wt(kg): --    I and O's:    03-29 @ 07:01  -  03-30 @ 07:00  --------------------------------------------------------  IN: 640 mL / OUT: 450 mL / NET: 190 mL    03-30 @ 07:01  -  03-30 @ 11:01  --------------------------------------------------------  IN: 320 mL / OUT: 200 mL / NET: 120 mL          PHYSICAL EXAM:    Constitutional: NAD  Neck:  No JVD  Respiratory: CTAB/L  Cardiovascular: S1 and S2  Gastrointestinal: BS+, soft, NT/ND  Extremities: No peripheral edema  Neurological: A/O x 3, no focal deficits  Psychiatric: Normal mood, normal affect  : No Aguilar  Skin: No rashes  Access: avf and perm cath     LABS:                        8.3    5.89  )-----------( 314      ( 30 Mar 2021 07:13 )             26.8     03-30    136  |  96  |  73<H>  ----------------------------<  88  4.4   |  25  |  6.35<H>    Ca    9.0      30 Mar 2021 07:11  Phos  6.6     03-30  Mg     2.5     03-30    TPro  6.3  /  Alb  3.5  /  TBili  0.2  /  DBili  x   /  AST  16  /  ALT  <5<L>  /  AlkPhos  65  03-30          Urine Studies:          RADIOLOGY & ADDITIONAL STUDIES:

## 2021-03-30 NOTE — DISCHARGE NOTE NURSING/CASE MANAGEMENT/SOCIAL WORK - PATIENT PORTAL LINK FT
You can access the FollowMyHealth Patient Portal offered by [facility] by registering at the following website: [email or web address]. By joining [email or web address] FollowMyHealth portal, you will also be able to view your health information using other applications (apps) compatible with our system.
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
## 2021-05-21 ENCOUNTER — APPOINTMENT (OUTPATIENT)
Dept: VASCULAR SURGERY | Facility: CLINIC | Age: 81
End: 2021-05-21
Payer: MEDICARE

## 2021-05-21 VITALS
DIASTOLIC BLOOD PRESSURE: 73 MMHG | HEART RATE: 61 BPM | BODY MASS INDEX: 27.29 KG/M2 | SYSTOLIC BLOOD PRESSURE: 164 MMHG | TEMPERATURE: 96.4 F | HEIGHT: 63 IN | WEIGHT: 154 LBS

## 2021-05-21 DIAGNOSIS — Z86.79 PERSONAL HISTORY OF OTHER DISEASES OF THE CIRCULATORY SYSTEM: ICD-10-CM

## 2021-05-21 DIAGNOSIS — Z82.49 FAMILY HISTORY OF ISCHEMIC HEART DISEASE AND OTHER DISEASES OF THE CIRCULATORY SYSTEM: ICD-10-CM

## 2021-05-21 PROCEDURE — 99072 ADDL SUPL MATRL&STAF TM PHE: CPT

## 2021-05-21 PROCEDURE — 99024 POSTOP FOLLOW-UP VISIT: CPT

## 2021-05-21 PROCEDURE — 93990 DOPPLER FLOW TESTING: CPT

## 2021-05-21 RX ORDER — ACETAMINOPHEN 500 MG/1
TABLET, COATED ORAL
Refills: 0 | Status: ACTIVE | COMMUNITY

## 2021-05-21 RX ORDER — SEVELAMER CARBONATE 800 MG/1
800 TABLET, FILM COATED ORAL
Refills: 0 | Status: ACTIVE | COMMUNITY

## 2021-05-21 RX ORDER — TAMSULOSIN HYDROCHLORIDE 0.4 MG/1
CAPSULE ORAL
Refills: 0 | Status: ACTIVE | COMMUNITY

## 2021-05-21 RX ORDER — CETIRIZINE HCL 10 MG
TABLET ORAL
Refills: 0 | Status: ACTIVE | COMMUNITY

## 2021-05-21 RX ORDER — ZINC SULFATE 50(220)MG
CAPSULE ORAL
Refills: 0 | Status: ACTIVE | COMMUNITY

## 2021-05-21 RX ORDER — CARVEDILOL 3.12 MG/1
TABLET, FILM COATED ORAL
Refills: 0 | Status: ACTIVE | COMMUNITY

## 2021-06-25 NOTE — DISCHARGE NOTE NURSING/CASE MANAGEMENT/SOCIAL WORK - NSDPDISTO_GEN_ALL_CORE
[facility]  Pulmonary Rehabilitation  Daily Treatment Log    Name: Migue Alexander : 1951 Date: 2021     Session: 17/ approved: 18          Time In/Out: 1:     COPD Charges:  x1    Physician : JÚNIOR Rojas DO      Dx: J44.9      GOLD: 1 []  2 []  3 []  4 []     Primary Insurance: [place]  Secondary Insurance:VA     /90   Pulse 79   Resp 16   SpO2 95%      Auscultation:  []Clear   [x]Clear and Decreased   []Insp. Wheeze   []Exp. Wheeze     []Rhonchi   []Rales    Target Heart Rate Zone:   Max HR: 150    Exercise Sp02% Blood Pressure Heart Rate JERMAIN Scale/Fatigue   Treadmill: Speed:    Min:   % Grade:        Distance:            Bands:#:    Reps:      Min:   Color Band:             6MWT:Min:   Distance:  MET:             Step-ups:#: 2   reps: 10   min: 10  95  140/90  81  0-1   Cybex/Vision/Schwinn Bike:  Level:   Speed:  RPM:   Min:   Distance:           Seat:             Hand Weights: Pounds: 5 min: 10  Curls# 10     Sets: 1  Presses#  10  Sets: 1  Fly# 10           Sets: 1  Shoulder Shrugs#  10   Sets: 1  Wings#  10     Sets: 1  Wrist Curls# 10  Sets: 1                  95  140/90  79  0-1   NuStep: Level: 5 METS: 2.4 min: 30     SPM:   51 total Steps: 2164  Seat: 13    arms: 11  99  138/90  94  1-2   Arm Ergometer: Level:     Min:   RPM:       Fwd:     Back:   Total Turns:             PLB / DB Spinner: Min: 5  P-Flex: Level:        Min:   IS: Volume:             Min:   Warm-up Stretches: Min: 10  95    79  0      Education and Training: [x]JERMAIN Scale  [x]PLB  [x]DB  []IMT  []IS  [x]Vital Signs   [x]Target HR Zone  [x]Exercise Vital Signs [x] Safe Vital Signs  []Pulmonary Anatomy  []Lung Function  []Lung Disease Process  []Oxygen Use  []Oxygen Safety  []Pulmonary Meds  []Med Instruction  []Cough Technique  []Pulmonary Hygeine  []Infection Prevention  []Signs of Infection  []When to Call MD  []Nutrition  []Weight Management  []Advance Directives  []Posture   []Body Mechanics []Energy Conservation  []Pacing ADL's  []Stress/Anxiety Management  [x]Stretching  []Home Exercise  []Smoking Cessation   [][place]  []Maintenance Exercise Program    []Pulmonary Knowledge Test/Education  []Review PFT  []Review Specific Disease  []Review Home Follow-through Compliance  []Safe Exercise []Exercise Progression Strategies    Progress Report: Patient here for continuation of pulmonary rehabilitation.  Patient has 1 more session until completion of rehab and patient does plan on coming back to continue his rehab and maintenance program.  Patient has done very well with minimal cues for PLB.    Treating Clinicians: [x]Kun Yuen, RRT     MD in Office: []JÚNIOR Mckeon  []RAFITA Jim  []CODEY German  []JR Felix    []ARLIN Zendejas []ABE Roche  []RAFITA Gonsales  []RAFITA Grajeda  [x]CRISTINA Felix  [x]CATRINA Ulrich   []ARLIN Cuenca  []JÚNIOR Rojas    Home

## 2021-07-29 NOTE — PATIENT PROFILE ADULT. - CENTRAL VENOUS CATHETER
R flank pain starting yesterday with n/v. pt states she was here one week ago diagnosed with UTI and ovarian cyst and has not taken the antibiotics prescribed. pt tearful no

## 2021-08-27 ENCOUNTER — APPOINTMENT (OUTPATIENT)
Dept: VASCULAR SURGERY | Facility: CLINIC | Age: 81
End: 2021-08-27
Payer: MEDICARE

## 2021-08-27 VITALS
DIASTOLIC BLOOD PRESSURE: 81 MMHG | HEIGHT: 63 IN | SYSTOLIC BLOOD PRESSURE: 127 MMHG | WEIGHT: 145 LBS | HEART RATE: 68 BPM | TEMPERATURE: 99 F | BODY MASS INDEX: 25.69 KG/M2

## 2021-08-27 PROCEDURE — 99212 OFFICE O/P EST SF 10 MIN: CPT

## 2021-08-27 PROCEDURE — 99072 ADDL SUPL MATRL&STAF TM PHE: CPT

## 2021-08-27 PROCEDURE — 93990 DOPPLER FLOW TESTING: CPT

## 2021-09-01 ENCOUNTER — APPOINTMENT (OUTPATIENT)
Dept: TRANSPLANT | Facility: CLINIC | Age: 81
End: 2021-09-01

## 2021-09-01 ENCOUNTER — APPOINTMENT (OUTPATIENT)
Dept: NEPHROLOGY | Facility: CLINIC | Age: 81
End: 2021-09-01

## 2021-10-04 ENCOUNTER — INPATIENT (INPATIENT)
Facility: HOSPITAL | Age: 81
LOS: 18 days | Discharge: HOME | DRG: 853 | End: 2021-10-23
Attending: INTERNAL MEDICINE | Admitting: INTERNAL MEDICINE
Payer: COMMERCIAL

## 2021-10-04 VITALS
TEMPERATURE: 99 F | DIASTOLIC BLOOD PRESSURE: 62 MMHG | HEART RATE: 75 BPM | OXYGEN SATURATION: 100 % | WEIGHT: 145.06 LBS | RESPIRATION RATE: 19 BRPM | SYSTOLIC BLOOD PRESSURE: 155 MMHG | HEIGHT: 63 IN

## 2021-10-04 LAB
ALBUMIN SERPL ELPH-MCNC: 3.8 G/DL — SIGNIFICANT CHANGE UP (ref 3.3–5)
ALP SERPL-CCNC: 111 U/L — SIGNIFICANT CHANGE UP (ref 40–120)
ALT FLD-CCNC: 16 U/L — SIGNIFICANT CHANGE UP (ref 10–45)
ANION GAP SERPL CALC-SCNC: 17 MMOL/L — SIGNIFICANT CHANGE UP (ref 5–17)
AST SERPL-CCNC: 38 U/L — SIGNIFICANT CHANGE UP (ref 10–40)
BASOPHILS # BLD AUTO: 0 K/UL — SIGNIFICANT CHANGE UP (ref 0–0.2)
BASOPHILS NFR BLD AUTO: 0 % — SIGNIFICANT CHANGE UP (ref 0–2)
BILIRUB SERPL-MCNC: 0.4 MG/DL — SIGNIFICANT CHANGE UP (ref 0.2–1.2)
BUN SERPL-MCNC: 55 MG/DL — HIGH (ref 7–23)
CALCIUM SERPL-MCNC: 9 MG/DL — SIGNIFICANT CHANGE UP (ref 8.4–10.5)
CHLORIDE SERPL-SCNC: 99 MMOL/L — SIGNIFICANT CHANGE UP (ref 96–108)
CO2 SERPL-SCNC: 21 MMOL/L — LOW (ref 22–31)
CREAT SERPL-MCNC: 10.68 MG/DL — HIGH (ref 0.5–1.3)
EOSINOPHIL # BLD AUTO: 0 K/UL — SIGNIFICANT CHANGE UP (ref 0–0.5)
EOSINOPHIL NFR BLD AUTO: 0 % — SIGNIFICANT CHANGE UP (ref 0–6)
GAS PNL BLDV: SIGNIFICANT CHANGE UP
GLUCOSE SERPL-MCNC: 55 MG/DL — LOW (ref 70–99)
HCT VFR BLD CALC: 35.2 % — LOW (ref 39–50)
HGB BLD-MCNC: 10.5 G/DL — LOW (ref 13–17)
LYMPHOCYTES # BLD AUTO: 1.71 K/UL — SIGNIFICANT CHANGE UP (ref 1–3.3)
LYMPHOCYTES # BLD AUTO: 12.2 % — LOW (ref 13–44)
MAGNESIUM SERPL-MCNC: 2.6 MG/DL — SIGNIFICANT CHANGE UP (ref 1.6–2.6)
MCHC RBC-ENTMCNC: 26.6 PG — LOW (ref 27–34)
MCHC RBC-ENTMCNC: 29.8 GM/DL — LOW (ref 32–36)
MCV RBC AUTO: 89.3 FL — SIGNIFICANT CHANGE UP (ref 80–100)
MONOCYTES # BLD AUTO: 0.96 K/UL — HIGH (ref 0–0.9)
MONOCYTES NFR BLD AUTO: 6.9 % — SIGNIFICANT CHANGE UP (ref 2–14)
NEUTROPHILS # BLD AUTO: 11.31 K/UL — HIGH (ref 1.8–7.4)
NEUTROPHILS NFR BLD AUTO: 74.8 % — SIGNIFICANT CHANGE UP (ref 43–77)
PHOSPHATE SERPL-MCNC: 3.7 MG/DL — SIGNIFICANT CHANGE UP (ref 2.5–4.5)
PLATELET # BLD AUTO: 134 K/UL — LOW (ref 150–400)
POTASSIUM SERPL-MCNC: 5.1 MMOL/L — SIGNIFICANT CHANGE UP (ref 3.5–5.3)
POTASSIUM SERPL-SCNC: 5.1 MMOL/L — SIGNIFICANT CHANGE UP (ref 3.5–5.3)
PROT SERPL-MCNC: 7.5 G/DL — SIGNIFICANT CHANGE UP (ref 6–8.3)
RAPID RVP RESULT: SIGNIFICANT CHANGE UP
RBC # BLD: 3.94 M/UL — LOW (ref 4.2–5.8)
RBC # FLD: 17.3 % — HIGH (ref 10.3–14.5)
SARS-COV-2 RNA SPEC QL NAA+PROBE: SIGNIFICANT CHANGE UP
SODIUM SERPL-SCNC: 137 MMOL/L — SIGNIFICANT CHANGE UP (ref 135–145)
WBC # BLD: 13.98 K/UL — HIGH (ref 3.8–10.5)
WBC # FLD AUTO: 13.98 K/UL — HIGH (ref 3.8–10.5)

## 2021-10-04 PROCEDURE — 71045 X-RAY EXAM CHEST 1 VIEW: CPT | Mod: 26

## 2021-10-04 PROCEDURE — 99291 CRITICAL CARE FIRST HOUR: CPT

## 2021-10-04 PROCEDURE — 93010 ELECTROCARDIOGRAM REPORT: CPT

## 2021-10-04 RX ORDER — PIPERACILLIN AND TAZOBACTAM 4; .5 G/20ML; G/20ML
3.38 INJECTION, POWDER, LYOPHILIZED, FOR SOLUTION INTRAVENOUS ONCE
Refills: 0 | Status: COMPLETED | OUTPATIENT
Start: 2021-10-04 | End: 2021-10-04

## 2021-10-04 RX ORDER — VANCOMYCIN HCL 1 G
1000 VIAL (EA) INTRAVENOUS ONCE
Refills: 0 | Status: COMPLETED | OUTPATIENT
Start: 2021-10-04 | End: 2021-10-04

## 2021-10-04 RX ORDER — SODIUM CHLORIDE 9 MG/ML
1000 INJECTION INTRAMUSCULAR; INTRAVENOUS; SUBCUTANEOUS ONCE
Refills: 0 | Status: COMPLETED | OUTPATIENT
Start: 2021-10-04 | End: 2021-10-04

## 2021-10-04 RX ADMIN — SODIUM CHLORIDE 1000 MILLILITER(S): 9 INJECTION INTRAMUSCULAR; INTRAVENOUS; SUBCUTANEOUS at 21:11

## 2021-10-04 RX ADMIN — PIPERACILLIN AND TAZOBACTAM 200 GRAM(S): 4; .5 INJECTION, POWDER, LYOPHILIZED, FOR SOLUTION INTRAVENOUS at 23:51

## 2021-10-04 NOTE — ED PROVIDER NOTE - PHYSICAL EXAMINATION
Physical Exam:  General: NAD, Conversive  Eyes: EOMI, Conjunctiva and sclera clear  Neck: No JVD  Lungs: Clear to auscultation bilaterally, no wheeze, no rhonchi  Heart: Normal S1, S2, no murmurs  Abdomen: Soft, nontender, nondistended, no CVA tenderness  Extremities: 2+ peripheral pulses, no edema  Psych: AAO X3  Neurologic: Non-focal

## 2021-10-04 NOTE — ED PROVIDER NOTE - CLINICAL SUMMARY MEDICAL DECISION MAKING FREE TEXT BOX
80 Y M H/O ESRD, diaysis presenting with fever, chills, concerning for sepsis, will fluid resuscitate, treat with broad spectrum antibiotics, MRSA swab, likely admission in setting of fever of unknown etiology concern for MRSA with tunneled catheter.

## 2021-10-04 NOTE — ED ADULT NURSE NOTE - OBJECTIVE STATEMENT
Pt is a 80 Y A&O x3 M with hx of HTN, dialysis via permacath Tues/Thurs/Sat last session Saturday presenting to ED with c/o fever and body aches since yesterday. Tmax 103, last dose Tylenol yesterday. Pt vaccinated for COVID- last Pfizer vaccine 5/25/21. Pt denies SOB, cough, abd pain, N+V, diarrhea. Pt arrives to ED breathing unlabored on RA. Pt speaking in complete sentences. Skin is warm and dry. No diaphoresis noted. Pt ambulatory with steady gait. IV established. Safety and comfort maintained.

## 2021-10-04 NOTE — ED PROVIDER NOTE - PROGRESS NOTE DETAILS
James Carter MD:  Patient re-assessed, medically stable, improved symptoms, no clear source identified, further concern for occult bacteremia/infected catheter. Will Admit for antibiotic therapy and blood culture directed therapy.

## 2021-10-04 NOTE — ED PROVIDER NOTE - OBJECTIVE STATEMENT
Patient is a 80 year old male who presents to the ED with a self-reported fever of 103. PMH includes HTN and ESRD that is managed by hemodialysis via R. chest  tunneling catheter placed in March of 2021. Dialysis every Tues/Thurs/Sat (last visit was on Saturday 10/02/2021). There is no associated SOB, cough, or chest pain. No associated GI symptoms. He took a tylenol at home yesterday which helped the fever somewhat. Patient is afebrile in triage and on multiple subsequent oral temps. Patient is vaccinated against COVID-19 (2nd dose in May 2021) and against flu (received two weeks ago).

## 2021-10-04 NOTE — ED PROVIDER NOTE - NS ED ROS FT
GENERAL: + fever or chills  EYES: No change in vision  HEENT: No trouble swallowing or speaking  CARDIAC: No chest pain  PULMONARY: No cough or SOB  GI: No abdominal pain, no nausea or no vomiting, no diarrhea or constipation  : No changes in urination  SKIN: No rashes  NEURO: No headache, no numbness  MSK: No joint pain  Otherwise as HPI or negative.

## 2021-10-04 NOTE — ED PROVIDER NOTE - CHIEF COMPLAINT
The patient is a 80y Male complaining of  The patient is a 80y Male complaining of fever and body aches.

## 2021-10-04 NOTE — ED PROVIDER NOTE - ATTENDING CONTRIBUTION TO CARE
80yr M hx of ESRD since march w rt chest wall HDCath w last session Sat p/w fever chills 2 days prior. felt malaised since. dialysis no events.   exam notable for well appearing, no focal findings however given immunosuppresion of ESRD as well as presence of a HD cath, concern for occult sepsis, will do viral swab if neg, will antibiose if evidence of sepsis. has mild lactate as well as leukocyotis. cultures sent. will admit.

## 2021-10-05 DIAGNOSIS — R50.9 FEVER, UNSPECIFIED: ICD-10-CM

## 2021-10-05 DIAGNOSIS — I10 ESSENTIAL (PRIMARY) HYPERTENSION: ICD-10-CM

## 2021-10-05 DIAGNOSIS — I50.32 CHRONIC DIASTOLIC (CONGESTIVE) HEART FAILURE: ICD-10-CM

## 2021-10-05 DIAGNOSIS — N18.6 END STAGE RENAL DISEASE: ICD-10-CM

## 2021-10-05 PROCEDURE — 99223 1ST HOSP IP/OBS HIGH 75: CPT

## 2021-10-05 PROCEDURE — 71250 CT THORAX DX C-: CPT | Mod: 26,MA

## 2021-10-05 RX ORDER — ASPIRIN/CALCIUM CARB/MAGNESIUM 324 MG
81 TABLET ORAL DAILY
Refills: 0 | Status: DISCONTINUED | OUTPATIENT
Start: 2021-10-05 | End: 2021-10-23

## 2021-10-05 RX ORDER — CARVEDILOL PHOSPHATE 80 MG/1
25 CAPSULE, EXTENDED RELEASE ORAL EVERY 12 HOURS
Refills: 0 | Status: DISCONTINUED | OUTPATIENT
Start: 2021-10-05 | End: 2021-10-23

## 2021-10-05 RX ORDER — NAFCILLIN 10 G/100ML
1 INJECTION, POWDER, FOR SOLUTION INTRAVENOUS EVERY 6 HOURS
Refills: 0 | Status: DISCONTINUED | OUTPATIENT
Start: 2021-10-06 | End: 2021-10-07

## 2021-10-05 RX ORDER — NAFCILLIN 10 G/100ML
INJECTION, POWDER, FOR SOLUTION INTRAVENOUS
Refills: 0 | Status: DISCONTINUED | OUTPATIENT
Start: 2021-10-05 | End: 2021-10-05

## 2021-10-05 RX ORDER — BUMETANIDE 0.25 MG/ML
1 INJECTION INTRAMUSCULAR; INTRAVENOUS DAILY
Refills: 0 | Status: DISCONTINUED | OUTPATIENT
Start: 2021-10-05 | End: 2021-10-23

## 2021-10-05 RX ORDER — HEPARIN SODIUM 5000 [USP'U]/ML
5000 INJECTION INTRAVENOUS; SUBCUTANEOUS EVERY 8 HOURS
Refills: 0 | Status: DISCONTINUED | OUTPATIENT
Start: 2021-10-05 | End: 2021-10-23

## 2021-10-05 RX ORDER — ACETAMINOPHEN 500 MG
650 TABLET ORAL EVERY 6 HOURS
Refills: 0 | Status: DISCONTINUED | OUTPATIENT
Start: 2021-10-05 | End: 2021-10-23

## 2021-10-05 RX ORDER — SEVELAMER CARBONATE 2400 MG/1
800 POWDER, FOR SUSPENSION ORAL
Refills: 0 | Status: DISCONTINUED | OUTPATIENT
Start: 2021-10-05 | End: 2021-10-18

## 2021-10-05 RX ORDER — NAFCILLIN 10 G/100ML
1 INJECTION, POWDER, FOR SOLUTION INTRAVENOUS ONCE
Refills: 0 | Status: COMPLETED | OUTPATIENT
Start: 2021-10-05 | End: 2021-10-05

## 2021-10-05 RX ORDER — TAMSULOSIN HYDROCHLORIDE 0.4 MG/1
0.4 CAPSULE ORAL AT BEDTIME
Refills: 0 | Status: DISCONTINUED | OUTPATIENT
Start: 2021-10-05 | End: 2021-10-23

## 2021-10-05 RX ORDER — NAFCILLIN 10 G/100ML
INJECTION, POWDER, FOR SOLUTION INTRAVENOUS
Refills: 0 | Status: DISCONTINUED | OUTPATIENT
Start: 2021-10-05 | End: 2021-10-07

## 2021-10-05 RX ADMIN — TAMSULOSIN HYDROCHLORIDE 0.4 MILLIGRAM(S): 0.4 CAPSULE ORAL at 22:22

## 2021-10-05 RX ADMIN — CARVEDILOL PHOSPHATE 25 MILLIGRAM(S): 80 CAPSULE, EXTENDED RELEASE ORAL at 20:17

## 2021-10-05 RX ADMIN — Medication 81 MILLIGRAM(S): at 11:45

## 2021-10-05 RX ADMIN — HEPARIN SODIUM 5000 UNIT(S): 5000 INJECTION INTRAVENOUS; SUBCUTANEOUS at 13:08

## 2021-10-05 RX ADMIN — BUMETANIDE 1 MILLIGRAM(S): 0.25 INJECTION INTRAMUSCULAR; INTRAVENOUS at 11:45

## 2021-10-05 RX ADMIN — SEVELAMER CARBONATE 800 MILLIGRAM(S): 2400 POWDER, FOR SUSPENSION ORAL at 11:45

## 2021-10-05 RX ADMIN — HEPARIN SODIUM 5000 UNIT(S): 5000 INJECTION INTRAVENOUS; SUBCUTANEOUS at 22:22

## 2021-10-05 RX ADMIN — Medication 250 MILLIGRAM(S): at 01:01

## 2021-10-05 RX ADMIN — NAFCILLIN 100 GRAM(S): 10 INJECTION, POWDER, FOR SOLUTION INTRAVENOUS at 20:17

## 2021-10-05 NOTE — CONSULT NOTE ADULT - SUBJECTIVE AND OBJECTIVE BOX
NEPHROLOGY - NSN    Patient seen and examined.    HPI:  80M with PMHx of HFpEF, ESRD, and HTN presenting with self-reported fever of 104 F. Patient states it has been going on for one day and intermittent. Otherwise he denies focal symptoms such as cough, shortness of breath, chest pain, diarrhea, dysuria or polyuria. He states he urinates very little. No known sick contacts. No sore throat or nasal congestion. He has had a right chest wall permacath since March 2021 with no redness or warmth around the site. He was last dialyzed several days prior with no issues. He does complain of right shoulder discomfort, but no swelling and has been having discomfort since permacath was placed. In the ED patient had blood cultures drawn and given vancomycin and zosyn. No fevers since he's been here.  (05 Oct 2021 09:31)  He is nontoxic in nature  Perm cath is present.    HD days are today and he is done at [facility]       PAST MEDICAL & SURGICAL HISTORY:  HTN - Hypertension    Glaucoma (Increased Eye Pressure)    BPH (Benign Prostatic Hypertrophy)    ESRD on dialysis    Excision of Sinus Polyp  2006    Laser Surgery Left  ? regarding procedure ; secondary to glaucoma 12/07    Laser Surgery :Right  ?  regarding procedure ; 12/07 ; secondary to glaucoma        MEDICATIONS  (STANDING):  aspirin enteric coated 81 milliGRAM(s) Oral daily  buMETAnide 1 milliGRAM(s) Oral daily  carvedilol 25 milliGRAM(s) Oral every 12 hours  heparin   Injectable 5000 Unit(s) SubCutaneous every 8 hours  sevelamer carbonate 800 milliGRAM(s) Oral three times a day with meals  tamsulosin 0.4 milliGRAM(s) Oral at bedtime      Allergies    grass, pollen (Rhinitis)  No Known Drug Allergies    Intolerances        SOCIAL HISTORY:  Denies alcohol abuse, drug abuse or tobacco usage.     FAMILY HISTORY:  Family history of diabetes mellitus (DM) (Mother, Sibling)        VITALS:  T(C): 37 (10-05-21 @ 09:35), Max: 37.8 (10-05-21 @ 07:15)  HR: 79 (10-05-21 @ 09:35) (71 - 89)  BP: 127/61 (10-05-21 @ 09:35) (115/58 - 155/62)  RR: 18 (10-05-21 @ 09:35) (16 - 20)  SpO2: 95% (10-05-21 @ 09:35) (95% - 100%)    REVIEW OF SYSTEMS:  + fever or chills. Good oral intake and denies fatigue or weakness. All other pertinent systems are reviewed and are negative.    PHYSICAL EXAM:  Constitutional: NAD  HEENT: EOMI  Neck:  No JVD, supple   Respiratory: CTA B/L  Cardiovascular: S1 and S2, RRR  Gastrointestinal: + BS, soft, NT, ND  Extremities: No peripheral edema, + peripheral pulses  Neurological: A/O x 3, CN2-12 intact  Psychiatric: Normal mood, normal affect  : No Aguilar  Skin: No rashes, C/D/I  Access: perm cath and avf     I and O's:    Height (cm): 160 (10-04 @ 14:59)  Weight (kg): 65.8 (10-04 @ 14:59)  BMI (kg/m2): 25.7 (10-04 @ 14:59)  BSA (m2): 1.69 (10-04 @ 14:59)    LABS:                        10.5   13.98 )-----------( 134      ( 04 Oct 2021 20:02 )             35.2     10-04    137  |  99  |  55<H>  ----------------------------<  55<L>  5.1   |  21<L>  |  10.68<H>    Ca    9.0      04 Oct 2021 20:02  Phos  3.7     10-04  Mg     2.6     10-04    TPro  7.5  /  Alb  3.8  /  TBili  0.4  /  DBili  x   /  AST  38  /  ALT  16  /  AlkPhos  111  10-04      URINE:      RADIOLOGY & ADDITIONAL STUDIES:    < from: CT Chest No Cont (10.05.21 @ 00:00) >    EXAM:  CT CHEST                            PROCEDURE DATE:  10/05/2021            INTERPRETATION:  CLINICAL INFORMATION: Fever, sepsis.    COMPARISON: CT chest 12/17/2017.    CONTRAST/COMPLICATIONS:  IV Contrast: NONE  Oral Contrast: NONE  Complications: None reported at time of study completion    PROCEDURE:  CT of the Chest was performed.  Sagittal and coronal reformats were performed.    FINDINGS:    LUNGS AND AIRWAYS: Patent central airways.  Motion limited evaluation of the bilateral lung bases. No consolidation. Minimal centrilobular emphysema.  PLEURA: No pleural effusion. No pneumothorax.  MEDIASTINUM AND SAUMYA: No lymphadenopathy.  VESSELS: Right chest wall dual-lumen tunneled central venous catheter with tips in the SVC. Atherosclerotic disease of aorta and its branches.  HEART: Top normal size. No pericardial effusion.  CHEST WALL AND LOWER NECK: Lipoma posterior to the right scapula partially imaged.  VISUALIZED UPPER ABDOMEN: Bilateral renal atrophy and bilateral renal cysts.  BONES: Degenerative changes of the spine.    IMPRESSION:  No CT evidence of pneumonia.    --- End of Report ---              NELY SANDOVAL MD; Resident Interventional Radiology  This document has been electronically signed.  CONCEPCION BARNETT MD; Attending Radiologist  This document has been electronically signed. Oct  5 2021  1:55AM    < end of copied text >

## 2021-10-05 NOTE — CHART NOTE - NSCHARTNOTEFT_GEN_A_CORE
CHIEF COMPLAINT:  ·   The patient is a 80y Male complaining of fever and body aches.  · HPI Objective Statement: Patient is a 80 year old male who presents to the ED with a self-reported fever of 103. PMH includes HTN and ESRD that is managed by hemodialysis via R. chest  tunneling catheter placed in March of 2021. Dialysis every Tues/Thurs/Sat (last visit was on Saturday 10/02/2021). There is no associated SOB, cough, or chest pain. No associated GI symptoms. He took a tylenol at home yesterday which helped the fever somewhat. Patient is afebrile in triage and on multiple subsequent oral temps. Patient is vaccinated against COVID-19 (2nd dose in May 2021) and against flu (received two weeks ago).    SUBJECTIVE:     REVIEW OF SYSTEMS:  ·  GENERAL: + fever or chills  	EYES: No change in vision  	HEENT: No trouble swallowing or speaking  	CARDIAC: No chest pain  	PULMONARY: No cough or SOB  	GI: No abdominal pain, no nausea or no vomiting, no diarrhea or constipation  	: No changes in urination  	SKIN: No rashes  	NEURO: No headache, no numbness  	MSK: No joint pain                 Otherwise as HPI or negative.         Vital Signs Last 24 Hrs  T(C): 37.8 (05 Oct 2021 07:15), Max: 37.8 (05 Oct 2021 07:15)  T(F): 100 (05 Oct 2021 07:15), Max: 100 (05 Oct 2021 07:15)  HR: 89 (05 Oct 2021 07:15) (71 - 89)  BP: 125/63 (05 Oct 2021 07:15) (115/58 - 155/62)  BP(mean): 73 (05 Oct 2021 05:11) (73 - 94)  RR: 20 (05 Oct 2021 07:15) (16 - 20)  SpO2: 98% (05 Oct 2021 07:15) (96% - 100%)    I&O's Summary      CAPILLARY BLOOD GLUCOSE          PHYSICAL EXAM:     	General: NAD, Conversive  	Eyes: EOMI, Conjunctiva and sclera clear  	Neck: No JVD  	Lungs: Clear to auscultation bilaterally, no wheeze, no rhonchi  	Heart: Normal S1, S2, no murmurs  	Abdomen: Soft, nontender, nondistended, no CVA tenderness  	Extremities: 2+ peripheral pulses, no edema  	Psych: AAO X3                 Neurologic: Non-focal         LABS: All Labs Reviewed:                        10.5   13.98 )-----------( 134      ( 04 Oct 2021 20:02 )             35.2     10-04    137  |  99  |  55<H>  ----------------------------<  55<L>  5.1   |  21<L>  |  10.68<H>    Ca    9.0      04 Oct 2021 20:02  Phos  3.7     10-04  Mg     2.6     10-04    TPro  7.5  /  Alb  3.8  /  TBili  0.4  /  DBili  x   /  AST  38  /  ALT  16  /  AlkPhos  111  10-04          Blood Culture:   Urine Culture      RADIOLOGY/EKG:    ASSESSMENT AND PLAN:   r/o sepsis culture done admitted for IV antibiotic DW  ER at 4 AM    Diabetes   FSS         HTN /CKD  I called renal will see pt          DVT PPX:    ADVANCED DIRECTIVE:    DISPOSITION:

## 2021-10-05 NOTE — H&P ADULT - NSHPADDITIONALINFOADULT_GEN_ALL_CORE
Selwyn Graham  Pager: [phone]  Off-Hours: [phone]  Available on MS Teams Admitted on behalf of Dr. Clint Graham  Pager: [phone]  Off-Hours: [phone]  Available on MS Teams

## 2021-10-05 NOTE — ED ADULT NURSE REASSESSMENT NOTE - NS ED NURSE REASSESS COMMENT FT1
0710 Pt in ER ACP8. TBA. No bed yet. A&Ox4. Noc/o at present. Skin W&D. Lips and nailbeds pink. IVL intact RACF without sx of infilt. Skin W&D. Lips and nailbeds pink. AV fist L arm +thrill. dialysis due today. Pt states he does not void/anuric. Fall risk precautions maintained

## 2021-10-05 NOTE — H&P ADULT - PROBLEM SELECTOR PLAN 1
-Patient currently hemodynamically stable with cleared lactate and no source: CT chest neg, RVP neg --> will monitor off antimicrobials, trend fever curve, monitor WBC (slight leukocytosis)  -Follow up blood cultures  -Send UA and Urine Culture

## 2021-10-05 NOTE — H&P ADULT - NSHPPHYSICALEXAM_GEN_ALL_CORE
T(C): 37.8 (10-05-21 @ 07:15), Max: 37.8 (10-05-21 @ 07:15)  HR: 89 (10-05-21 @ 07:15) (71 - 89)  BP: 125/63 (10-05-21 @ 07:15) (115/58 - 155/62)  RR: 20 (10-05-21 @ 07:15) (16 - 20)  SpO2: 98% (10-05-21 @ 07:15) (96% - 100%)    GEN: (fe)male in NAD, appears comfortable, no diaphoresis  EYES: No scleral injection, PERRL, EOMI  ENTM: neck supple & symmetric without tracheal deviation, moist membranes, no gross hearing impairment, thyroid gland not enlarged  CV: +S1/S2, no m/r/g, no abdominal bruit, no LE edema  RESP: breathing comfortably, no respiratory accessory muscle use, CTAB, no w/r/r  GI: normoactive BS, soft, NTND, no rebounding/guarding, no palpable masses  LYMPHATICS: no LAD or tenderness to palpation  NEURO: AOx3, no focal deficits, CNII-XII grossly intact  PSYCH: No SI/HI/AVH, appropriate affect, appropriate insight/judgment   SKIN: no petechiae, ecchymosis or maculopapular rash noted T(C): 37.8 (10-05-21 @ 07:15), Max: 37.8 (10-05-21 @ 07:15)  HR: 89 (10-05-21 @ 07:15) (71 - 89)  BP: 125/63 (10-05-21 @ 07:15) (115/58 - 155/62)  RR: 20 (10-05-21 @ 07:15) (16 - 20)  SpO2: 98% (10-05-21 @ 07:15) (96% - 100%)    GEN: male in NAD, appears comfortable, no diaphoresis  EYES: No scleral injection, PERRL, EOMI  ENTM: neck supple & symmetric without tracheal deviation, moist membranes, no gross hearing impairment, thyroid gland not enlarged  CV: +S1/S2, no m/r/g, no abdominal bruit, no LE edema; +right chest wall permacath with no redness/warmth  RESP: breathing comfortably, no respiratory accessory muscle use, CTAB, no w/r/r  GI: normoactive BS, soft, NTND, no rebounding/guarding, no palpable masses  LYMPHATICS: no LAD or tenderness to palpation  NEURO: AOx3, no focal deficits, CNII-XII grossly intact  PSYCH: No SI/HI/AVH, appropriate affect, appropriate insight/judgment   SKIN: no petechiae, ecchymosis or maculopapular rash noted

## 2021-10-05 NOTE — H&P ADULT - NSICDXPASTMEDICALHX_GEN_ALL_CORE_FT
PAST MEDICAL HISTORY:  BPH (Benign Prostatic Hypertrophy)     ESRD on dialysis     Glaucoma (Increased Eye Pressure)     HTN - Hypertension

## 2021-10-05 NOTE — H&P ADULT - HISTORY OF PRESENT ILLNESS
80M with PMHx of HFpEF, ESRD, and HTN presenting with self-reported fever of 104 F. Patient states it has been going on for one day and intermittent. Otherwise he denies focal symptoms such as cough, shortness of breath, chest pain, diarrhea, dysuria or polyuria. He states he urinates very little. No known sick contacts. No sore throat or nasal congestion. He has had a right chest wall permacath since March 2021 with no redness or warmth around the site. He was last dialyzed several days prior with no issues. He does complain of right shoulder discomfort, but no swelling and has been having discomfort since permacath was placed. In the ED patient had blood cultures drawn and given vancomycin and zosyn. No fevers since he's been here.

## 2021-10-05 NOTE — CONSULT NOTE ADULT - ASSESSMENT
80M with PMHx of HFpEF, ESRD, and HTN presenting with self-reported fever of 104 F.  I suspect this may be a catheter related infection     1 ID-Zosyn and vanco given yesterday   Given nafcillin till the first cx are back   ID consult   2 Renal-Try to use the AVF which is immature;  Perm cath may need to be removed   HD today   3 -Cont Flomax     Sayed [facility]   [phone]

## 2021-10-05 NOTE — H&P ADULT - NSICDXPASTSURGICALHX_GEN_ALL_CORE_FT
PAST SURGICAL HISTORY:  Excision of Sinus Polyp 2006    Laser Surgery :Right ?  regarding procedure ; 12/07 ; secondary to glaucoma    Laser Surgery Left ? regarding procedure ; secondary to glaucoma 12/07

## 2021-10-05 NOTE — H&P ADULT - ASSESSMENT
80M with PMHx of HFpEF, ESRD, and HTN presenting with self-reported fever of 104 F. Patient being admitted for fever in dialysis patient.

## 2021-10-06 LAB
ANION GAP SERPL CALC-SCNC: 15 MMOL/L — SIGNIFICANT CHANGE UP (ref 5–17)
APPEARANCE UR: ABNORMAL
BACTERIA # UR AUTO: NEGATIVE — SIGNIFICANT CHANGE UP
BILIRUB UR-MCNC: NEGATIVE — SIGNIFICANT CHANGE UP
BUN SERPL-MCNC: 47 MG/DL — HIGH (ref 7–23)
CALCIUM SERPL-MCNC: 8.9 MG/DL — SIGNIFICANT CHANGE UP (ref 8.4–10.5)
CHLORIDE SERPL-SCNC: 98 MMOL/L — SIGNIFICANT CHANGE UP (ref 96–108)
CO2 SERPL-SCNC: 24 MMOL/L — SIGNIFICANT CHANGE UP (ref 22–31)
COLOR SPEC: ABNORMAL
COVID-19 SPIKE DOMAIN AB INTERP: POSITIVE
COVID-19 SPIKE DOMAIN ANTIBODY RESULT: >250 U/ML — HIGH
CREAT SERPL-MCNC: 9.56 MG/DL — HIGH (ref 0.5–1.3)
DIFF PNL FLD: ABNORMAL
EPI CELLS # UR: 7 /HPF — HIGH
GLUCOSE SERPL-MCNC: 124 MG/DL — HIGH (ref 70–99)
GLUCOSE UR QL: NEGATIVE — SIGNIFICANT CHANGE UP
HCT VFR BLD CALC: 30.7 % — LOW (ref 39–50)
HGB BLD-MCNC: 9.4 G/DL — LOW (ref 13–17)
HYALINE CASTS # UR AUTO: 4 /LPF — HIGH (ref 0–2)
KETONES UR-MCNC: NEGATIVE — SIGNIFICANT CHANGE UP
LEUKOCYTE ESTERASE UR-ACNC: ABNORMAL
MCHC RBC-ENTMCNC: 26.3 PG — LOW (ref 27–34)
MCHC RBC-ENTMCNC: 30.6 GM/DL — LOW (ref 32–36)
MCV RBC AUTO: 85.8 FL — SIGNIFICANT CHANGE UP (ref 80–100)
NITRITE UR-MCNC: NEGATIVE — SIGNIFICANT CHANGE UP
NRBC # BLD: 0 /100 WBCS — SIGNIFICANT CHANGE UP (ref 0–0)
PH UR: 6 — SIGNIFICANT CHANGE UP (ref 5–8)
PHOSPHATE SERPL-MCNC: 4.6 MG/DL — HIGH (ref 2.5–4.5)
PLATELET # BLD AUTO: 127 K/UL — LOW (ref 150–400)
POTASSIUM SERPL-MCNC: 3.4 MMOL/L — LOW (ref 3.5–5.3)
POTASSIUM SERPL-SCNC: 3.4 MMOL/L — LOW (ref 3.5–5.3)
PROT UR-MCNC: ABNORMAL
RBC # BLD: 3.58 M/UL — LOW (ref 4.2–5.8)
RBC # FLD: 16.6 % — HIGH (ref 10.3–14.5)
RBC CASTS # UR COMP ASSIST: 4 /HPF — SIGNIFICANT CHANGE UP (ref 0–4)
SARS-COV-2 IGG+IGM SERPL QL IA: >250 U/ML — HIGH
SARS-COV-2 IGG+IGM SERPL QL IA: POSITIVE
SODIUM SERPL-SCNC: 137 MMOL/L — SIGNIFICANT CHANGE UP (ref 135–145)
SP GR SPEC: 1.01 — LOW (ref 1.01–1.02)
UROBILINOGEN FLD QL: NEGATIVE — SIGNIFICANT CHANGE UP
WBC # BLD: 5.93 K/UL — SIGNIFICANT CHANGE UP (ref 3.8–10.5)
WBC # FLD AUTO: 5.93 K/UL — SIGNIFICANT CHANGE UP (ref 3.8–10.5)
WBC UR QL: 902 /HPF — HIGH (ref 0–5)

## 2021-10-06 RX ORDER — CHLORHEXIDINE GLUCONATE 213 G/1000ML
1 SOLUTION TOPICAL
Refills: 0 | Status: DISCONTINUED | OUTPATIENT
Start: 2021-10-06 | End: 2021-10-13

## 2021-10-06 RX ORDER — ACETAMINOPHEN 500 MG
1000 TABLET ORAL ONCE
Refills: 0 | Status: COMPLETED | OUTPATIENT
Start: 2021-10-06 | End: 2021-10-06

## 2021-10-06 RX ADMIN — HEPARIN SODIUM 5000 UNIT(S): 5000 INJECTION INTRAVENOUS; SUBCUTANEOUS at 12:55

## 2021-10-06 RX ADMIN — Medication 400 MILLIGRAM(S): at 00:15

## 2021-10-06 RX ADMIN — NAFCILLIN 100 GRAM(S): 10 INJECTION, POWDER, FOR SOLUTION INTRAVENOUS at 16:13

## 2021-10-06 RX ADMIN — SEVELAMER CARBONATE 800 MILLIGRAM(S): 2400 POWDER, FOR SUSPENSION ORAL at 17:38

## 2021-10-06 RX ADMIN — CARVEDILOL PHOSPHATE 25 MILLIGRAM(S): 80 CAPSULE, EXTENDED RELEASE ORAL at 21:17

## 2021-10-06 RX ADMIN — SEVELAMER CARBONATE 800 MILLIGRAM(S): 2400 POWDER, FOR SUSPENSION ORAL at 08:34

## 2021-10-06 RX ADMIN — CARVEDILOL PHOSPHATE 25 MILLIGRAM(S): 80 CAPSULE, EXTENDED RELEASE ORAL at 12:55

## 2021-10-06 RX ADMIN — NAFCILLIN 100 GRAM(S): 10 INJECTION, POWDER, FOR SOLUTION INTRAVENOUS at 02:39

## 2021-10-06 RX ADMIN — Medication 81 MILLIGRAM(S): at 12:55

## 2021-10-06 RX ADMIN — BUMETANIDE 1 MILLIGRAM(S): 0.25 INJECTION INTRAMUSCULAR; INTRAVENOUS at 06:04

## 2021-10-06 RX ADMIN — SEVELAMER CARBONATE 800 MILLIGRAM(S): 2400 POWDER, FOR SUSPENSION ORAL at 12:55

## 2021-10-06 RX ADMIN — TAMSULOSIN HYDROCHLORIDE 0.4 MILLIGRAM(S): 0.4 CAPSULE ORAL at 21:17

## 2021-10-06 RX ADMIN — HEPARIN SODIUM 5000 UNIT(S): 5000 INJECTION INTRAVENOUS; SUBCUTANEOUS at 21:53

## 2021-10-06 RX ADMIN — HEPARIN SODIUM 5000 UNIT(S): 5000 INJECTION INTRAVENOUS; SUBCUTANEOUS at 06:04

## 2021-10-06 RX ADMIN — Medication 1 DROP(S): at 22:48

## 2021-10-06 RX ADMIN — NAFCILLIN 100 GRAM(S): 10 INJECTION, POWDER, FOR SOLUTION INTRAVENOUS at 09:44

## 2021-10-06 RX ADMIN — Medication 1000 MILLIGRAM(S): at 01:58

## 2021-10-06 NOTE — PATIENT PROFILE ADULT - NSPROPTRIGHTCAREGIVER_GEN_A_NUR
[facility] Call Center    Phone Message    May a detailed message be left on voicemail: yes     Reason for Call: Other: Pt having itchy reaction to two patches on back. Would like to remove as she can't sleep. Please call her to discuss.     Action Taken: Message routed to:  [facility] ([facility]): Allergy    Travel Screening: Not Applicable                                                                         declines

## 2021-10-06 NOTE — PROGRESS NOTE ADULT - SUBJECTIVE AND OBJECTIVE BOX
NEPHROLOGY-NSN [phone]        Patient seen and examined in bed.  He was in good spirit but still febrile         MEDICATIONS  (STANDING):  aspirin enteric coated 81 milliGRAM(s) Oral daily  buMETAnide 1 milliGRAM(s) Oral daily  carvedilol 25 milliGRAM(s) Oral every 12 hours  chlorhexidine 2% Cloths 1 Application(s) Topical <User Schedule>  heparin   Injectable 5000 Unit(s) SubCutaneous every 8 hours  nafcillin  IVPB      nafcillin  IVPB 1 Gram(s) IV Intermittent every 6 hours  sevelamer carbonate 800 milliGRAM(s) Oral three times a day with meals  tamsulosin 0.4 milliGRAM(s) Oral at bedtime      VITAL:  T(C): , Max: 38.3 (10-06-21 @ 00:03)  T(F): , Max: 101 (10-06-21 @ 00:03)  HR: 64 (10-06-21 @ 08:42)  BP: 111/74 (10-06-21 @ 08:42)  BP(mean): --  RR: 18 (10-06-21 @ 08:42)  SpO2: 99% (10-06-21 @ 08:42)  Wt(kg): --    I and O's:    10-05 @ 07:01  -  10-06 @ 07:00  --------------------------------------------------------  IN: 920 mL / OUT: 2500 mL / NET: -1580 mL          PHYSICAL EXAM:    Constitutional: NAD  Neck:  No JVD  Respiratory: CTAB/L  Cardiovascular: S1 and S2  Gastrointestinal: BS+, soft, NT/ND  Extremities: No peripheral edema  Neurological: A/O x 3, no focal deficits  Psychiatric: Normal mood, normal affect  : No Aguilar  Skin: No rashes  Access: perm cath     LABS:                        9.4    5.93  )-----------( 127      ( 06 Oct 2021 07:30 )             30.7     10-06    137  |  98  |  47<H>  ----------------------------<  124<H>  3.4<L>   |  24  |  9.56<H>    Ca    8.9      06 Oct 2021 07:25  Phos  4.6     10-06  Mg     2.6     10-04    TPro  7.5  /  Alb  3.8  /  TBili  0.4  /  DBili  x   /  AST  38  /  ALT  16  /  AlkPhos  111  10-04          Urine Studies:          RADIOLOGY & ADDITIONAL STUDIES:

## 2021-10-06 NOTE — PROVIDER CONTACT NOTE (OTHER) - ACTION/TREATMENT ORDERED:
IV Tylenol ordered and cooling measures initiated; Will recheck temp in 1 hour. Will continue to monitor patient.

## 2021-10-06 NOTE — PROGRESS NOTE ADULT - ASSESSMENT
80M with PMHx of HFpEF, ESRD, and HTN presenting with self-reported fever of 104 F.   catheter related infection?     1 ID-If the blood cx are negative in am then will dc the  nafcillin in am.  He is still febbrile and the blood cx thus far are negative    2 Renal-Used AVF but needs another ballooning;  Perm cath may need to be removed   HD in am   3 -Cont Flomax     Sayed [facility]   [phone]

## 2021-10-06 NOTE — PROGRESS NOTE ADULT - SUBJECTIVE AND OBJECTIVE BOX
CHIEF COMPLAINT:  80M with PMHx of HFpEF, ESRD, and HTN presenting with self-reported fever of 104 F. Patient states it has been going on for one day and intermittent. Otherwise he denies focal symptoms such as cough, shortness of breath, chest pain, diarrhea, dysuria or polyuria. He states he urinates very little. No known sick contacts. No sore throat or nasal congestion. He has had a right chest wall permacath since March 2021 with no redness or warmth around the site. He was last dialyzed several days prior with no issues. He does complain of right shoulder discomfort, but no swelling and has been having discomfort since permacath was placed. In the ED patient had blood cultures drawn and given vancomycin and zosyn. No fevers since he's been here.  (05 Oct 2021 09:31)    SUBJECTIVE:     REVIEW OF SYSTEMS: + fever  EYES: No eye pain, visual disturbances, or discharge  ENMT:  No difficulty hearing, tinnitus  NECK: No pain or stiffness  RESPIRATORY: No cough, wheezing,  CARDIOVASCULAR: No chest pain, palpitations, passing out, dizziness, or leg swelling  GASTROINTESTINAL:  No nausea, vomiting, diarrhea or constipation. No melena.  GENITOURINARY: No dysuria, hematuria  NEUROLOGICAL: No stroke like symptoms  SKIN: No burning or lesions   ENDOCRINE: No heat or cold intolerance  MUSCULOSKELETAL: No joint pain or swelling  PSYCHIATRIC: No  anxiety, mood swings  HEME/LYMPH: No bleeding gums  ALLERGY AND IMMUNOLOGIC: No hives or eczema	       Vital Signs Last 24 Hrs  T(C): 36.7 (06 Oct 2021 13:29), Max: 38.3 (06 Oct 2021 00:03)  T(F): 98.1 (06 Oct 2021 13:29), Max: 101 (06 Oct 2021 00:03)  HR: 68 (06 Oct 2021 13:29) (64 - 108)  BP: 152/67 (06 Oct 2021 13:29) (90/46 - 156/90)  BP(mean): --  RR: 18 (06 Oct 2021 13:29) (18 - 18)  SpO2: 96% (06 Oct 2021 13:29) (95% - 100%)    I&O's Summary    05 Oct 2021 07:01  -  06 Oct 2021 07:00  --------------------------------------------------------  IN: 920 mL / OUT: 2500 mL / NET: -1580 mL    06 Oct 2021 07:01  -  06 Oct 2021 16:40  --------------------------------------------------------  IN: 460 mL / OUT: 0 mL / NET: 460 mL        CAPILLARY BLOOD GLUCOSE          PHYSICAL EXAM:  HEENT:   Normal oral mucosa, EOMI	  Cardiovascular:  S1 S2, No JVD,    Respiratory: Lungs clear to auscultation	  Psychiatry: Alert  Gastrointestinal:  Soft, Non-tender, + BS	  Skin: No rashes   Neurologic: Non-focal  Extremities:  No edema  Vascular: Peripheral pulses palpable          MEDICATIONS:  MEDICATIONS  (STANDING):  aspirin enteric coated 81 milliGRAM(s) Oral daily  buMETAnide 1 milliGRAM(s) Oral daily  carvedilol 25 milliGRAM(s) Oral every 12 hours  chlorhexidine 2% Cloths 1 Application(s) Topical <User Schedule>  heparin   Injectable 5000 Unit(s) SubCutaneous every 8 hours  nafcillin  IVPB      nafcillin  IVPB 1 Gram(s) IV Intermittent every 6 hours  sevelamer carbonate 800 milliGRAM(s) Oral three times a day with meals  tamsulosin 0.4 milliGRAM(s) Oral at bedtime      LABS: All Labs Reviewed:                        9.4    5.93  )-----------( 127      ( 06 Oct 2021 07:30 )             30.7     10-06    137  |  98  |  47<H>  ----------------------------<  124<H>  3.4<L>   |  24  |  9.56<H>    Ca    8.9      06 Oct 2021 07:25  Phos  4.6     10-06  Mg     2.6     10-04    TPro  7.5  /  Alb  3.8  /  TBili  0.4  /  DBili  x   /  AST  38  /  ALT  16  /  AlkPhos  111  10-04          Blood Culture: 10-04 @ 23:11  Organism --  Gram Stain Blood -- Gram Stain --  Specimen Source .Blood Blood-Peripheral  Culture-Blood --      Urine Culture      RADIOLOGY/EKG:    ASSESSMENT AND PLAN:  80M with PMHx of HFpEF, ESRD, and HTN presenting with self-reported fever of 104 FX 1 day. Pt doesn't have outpatient cardiologist. His cardiac w/u were done at his last admission in March which includes ECHO as noted above. Pt reports being physically active with no limitations. denies contributing family history of cardiac disease. Denies any past cardiac procedures.  As of now pt seems in no distress, no SOB, CP, palpitations,, N/V.       · Assessment	  80M with PMHx of HFpEF, ESRD, and HTN presenting with self-reported fever of 104 F. Patient being admitted for fever in dialysis patient.    Problem/Plan - 1:  ·  Problem: Chronic diastolic heart Failure with preserved EF   - c/w bumex   - c/w carvedilol  25mg Q12hrs   - pt appears euvolemic   - ESRD  per renal for fluid management       Problem/Plan - 2:  ·  Problem: Fever  - Mild leukocytosis   - bld and urine cx sent  - monitor fever curve and cbc   - on Abx   - chest CT negative   -10/6/21 continue nafcillin.   will ask ID to see him if remain febriel    Problem/Plan - 3:  ·  Problem: Hypertension  - c/w coreg and bumex  - monitor BP   - on ASA for prevention   - rec check Lipid panel with am Labs     Problem/Plan - 4:  ·  Problem: ESRD  - renal consult appreciated   - HD per renal     Problem/Plan - 5:  ·  Problem: DVT ppx on Heparin SQ         DVT PPX:    ADVANCED DIRECTIVE:    DISPOSITION:

## 2021-10-06 NOTE — CONSULT NOTE ADULT - SUBJECTIVE AND OBJECTIVE BOX
Date of Service :   10-06-21 @ 15:41  CHIEF COMPLAINT:Patient is a 80y old  Male who presents with a chief complaint of Fever (06 Oct 2021 10:05)      HISTORY OF PRESENT ILLNESS:HPI:  80M with PMHx of HFpEF, ESRD, and HTN presenting with self-reported fever of 104 F. Patient states it has been going on for one day and intermittent. Otherwise he denies focal symptoms such as cough, shortness of breath, chest pain, diarrhea, dysuria or polyuria. He states he urinates very little. No known sick contacts. No sore throat or nasal congestion. He has had a right chest wall permacath since March 2021 with no redness or warmth around the site. He was last dialyzed several days prior with no issues. He does complain of right shoulder discomfort, but no swelling and has been having discomfort since permacath was placed. In the ED patient had blood cultures drawn and given vancomycin and zosyn. No fevers since he's been here.  (05 Oct 2021 09:31)      PAST MEDICAL & SURGICAL HISTORY:  HTN - Hypertension    Glaucoma (Increased Eye Pressure)    BPH (Benign Prostatic Hypertrophy)    ESRD on dialysis    Excision of Sinus Polyp  2006    Laser Surgery Left  ? regarding procedure ; secondary to glaucoma 12/07    Laser Surgery :Right  ?  regarding procedure ; 12/07 ; secondary to glaucoma            MEDICATIONS:  aspirin enteric coated 81 milliGRAM(s) Oral daily  buMETAnide 1 milliGRAM(s) Oral daily  carvedilol 25 milliGRAM(s) Oral every 12 hours  heparin   Injectable 5000 Unit(s) SubCutaneous every 8 hours  tamsulosin 0.4 milliGRAM(s) Oral at bedtime    nafcillin  IVPB      nafcillin  IVPB 1 Gram(s) IV Intermittent every 6 hours      acetaminophen   Tablet .. 650 milliGRAM(s) Oral every 6 hours PRN        chlorhexidine 2% Cloths 1 Application(s) Topical <User Schedule>      FAMILY HISTORY:  Family history of diabetes mellitus (DM) (Mother, Sibling)        Non-contributory    SOCIAL HISTORY:    [ ] Tobacco  [ ] Drugs  [ ] Alcohol    Allergies    grass, pollen (Rhinitis)  No Known Drug Allergies    Intolerances    	    REVIEW OF SYSTEMS:  CONSTITUTIONAL: No fever  EYES: No eye pain, visual disturbances, or discharge  ENMT:  No difficulty hearing, tinnitus  NECK: No pain or stiffness  RESPIRATORY: No cough, wheezing,  CARDIOVASCULAR: No chest pain, palpitations, passing out, dizziness, or leg swelling  GASTROINTESTINAL:  No nausea, vomiting, diarrhea or constipation. No melena.  GENITOURINARY: No dysuria, hematuria  NEUROLOGICAL: No stroke like symptoms  SKIN: No burning or lesions   ENDOCRINE: No heat or cold intolerance  MUSCULOSKELETAL: No joint pain or swelling  PSYCHIATRIC: No  anxiety, mood swings  HEME/LYMPH: No bleeding gums  ALLERGY AND IMMUNOLOGIC: No hives or eczema	    All other ROS negative    PHYSICAL EXAM:  T(C): 36.7 (10-06-21 @ 13:29), Max: 38.3 (10-06-21 @ 00:03)  HR: 68 (10-06-21 @ 13:29) (64 - 108)  BP: 152/67 (10-06-21 @ 13:29) (90/46 - 156/90)  RR: 18 (10-06-21 @ 13:29) (18 - 18)  SpO2: 96% (10-06-21 @ 13:29) (95% - 100%)  Wt(kg): --  I&O's Summary    05 Oct 2021 07:01  -  06 Oct 2021 07:00  --------------------------------------------------------  IN: 920 mL / OUT: 2500 mL / NET: -1580 mL    06 Oct 2021 07:01  -  06 Oct 2021 15:41  --------------------------------------------------------  IN: 460 mL / OUT: 0 mL / NET: 460 mL        Appearance: Normal	  HEENT:   Normal oral mucosa, EOMI	  Cardiovascular:  S1 S2, No JVD,    Respiratory: Lungs clear to auscultation	  Psychiatry: Alert  Gastrointestinal:  Soft, Non-tender, + BS	  Skin: No rashes   Neurologic: Non-focal  Extremities:  No edema  Vascular: Peripheral pulses palpable    	    	  	  CARDIAC MARKERS:  Labs personally reviewed by me                                  9.4    5.93  )-----------( 127      ( 06 Oct 2021 07:30 )             30.7     10-06    137  |  98  |  47<H>  ----------------------------<  124<H>  3.4<L>   |  24  |  9.56<H>    Ca    8.9      06 Oct 2021 07:25  Phos  4.6     10-06  Mg     2.6     10-04    TPro  7.5  /  Alb  3.8  /  TBili  0.4  /  DBili  x   /  AST  38  /  ALT  16  /  AlkPhos  111  10-04          EKG: Personally reviewed by me -   Radiology: Personally reviewed by me -   < from: CT Chest No Cont (10.05.21 @ 00:00) >    IMPRESSION:  No CT evidence of pneumonia.    < end of copied text >  < from: Transthoracic Echocardiogram (03.12.21 @ 10:08) >  : Hyperdynamic left ventricular systolic  function. EF>75%.     < end of copied text >  < from: Transthoracic Echocardiogram (03.12.21 @ 10:08) >  . Normal mitral valve with chordal systolic anterior  motion. Minimal mitral regurgitation.  2. Calcified trileaflet aortic valve with normal opening.  No aortic valve regurgitation seen.  3. Hyperdynamic left ventricular systolic function. EF>75%.  Markedly increased intraventricular gradient: peak gradient  98 mm Hg.  4. Normal right ventricular size and function.  5.Small posterior pericardial effusion.  No obvious vegetations seen. This does not exclude the  presence of endocarditis.    < end of copied text >      Assessment /Plan:   80M with PMHx of HFpEF, ESRD, and HTN presenting with self-reported fever of 104 FX 1 day. Pt doesn't have outpatient cardiologist. His cardiac w/u were done at his last admission in March which includes ECHO as noted above. Pt reports being physically active with no limitations. denies contributing family history of cardiac disease. Denies any past cardiac procedures.  As of now pt seems in no distress, no SOB, CP, palpitations,, N/V.       · Assessment	  80M with PMHx of HFpEF, ESRD, and HTN presenting with self-reported fever of 104 F. Patient being admitted for fever in dialysis patient.    Problem/Plan - 1:  ·  Problem: Chronic diastolic heart Failure with preserved EF   - c/w bumex   - c/w carvedilol  25mg Q12hrs   - pt appears euvolemic   - ESRD  per renal for fluid management       Problem/Plan - 2:  ·  Problem: Fever  - Mild leukocytosis   - bld and urine cx sent  - monitor fever curve and cbc   - on Abx   - chest CT negative     Problem/Plan - 3:  ·  Problem: Hypertension  - c/w coreg and bumex  - monitor BP   - on ASA for prevention   - rec check Lipid panel with am Labs     Problem/Plan - 4:  ·  Problem: ESRD  - renal consult appreciated   - HD per renal     Problem/Plan - 5:  ·  Problem: DVT ppx on Heparin SQ         Sammie Carmichael FNP-BC   Sridhar Carrillo DO [facility]  Cardiovascular Medicine  [address], [address]  Office [phone]  Cell [phone] Date of Service :   10-06-21 @ 15:41  CHIEF COMPLAINT:Patient is a 80y old  Male who presents with a chief complaint of Fever (06 Oct 2021 10:05)      HISTORY OF PRESENT ILLNESS:HPI:  80M with PMHx of HFpEF, ESRD, and HTN presenting with self-reported fever of 104 F. Patient states it has been going on for one day and intermittent. Otherwise he denies focal symptoms such as cough, shortness of breath, chest pain, diarrhea, dysuria or polyuria. He states he urinates very little. No known sick contacts. No sore throat or nasal congestion. He has had a right chest wall permacath since March 2021 with no redness or warmth around the site. He was last dialyzed several days prior with no issues. He does complain of right shoulder discomfort, but no swelling and has been having discomfort since permacath was placed. In the ED patient had blood cultures drawn and given vancomycin and zosyn. No fevers since he's been here.  (05 Oct 2021 09:31)      PAST MEDICAL & SURGICAL HISTORY:  HTN - Hypertension    Glaucoma (Increased Eye Pressure)    BPH (Benign Prostatic Hypertrophy)    ESRD on dialysis    Excision of Sinus Polyp  2006    Laser Surgery Left  ? regarding procedure ; secondary to glaucoma 12/07    Laser Surgery :Right  ?  regarding procedure ; 12/07 ; secondary to glaucoma            MEDICATIONS:  aspirin enteric coated 81 milliGRAM(s) Oral daily  buMETAnide 1 milliGRAM(s) Oral daily  carvedilol 25 milliGRAM(s) Oral every 12 hours  heparin   Injectable 5000 Unit(s) SubCutaneous every 8 hours  tamsulosin 0.4 milliGRAM(s) Oral at bedtime    nafcillin  IVPB      nafcillin  IVPB 1 Gram(s) IV Intermittent every 6 hours      acetaminophen   Tablet .. 650 milliGRAM(s) Oral every 6 hours PRN        chlorhexidine 2% Cloths 1 Application(s) Topical <User Schedule>      FAMILY HISTORY:  Family history of diabetes mellitus (DM) (Mother, Sibling)        Non-contributory    SOCIAL HISTORY:    [ ] Tobacco  [ ] Drugs  [ ] Alcohol    Allergies    grass, pollen (Rhinitis)  No Known Drug Allergies    Intolerances    	    REVIEW OF SYSTEMS:  CONSTITUTIONAL: No fever  EYES: No eye pain, visual disturbances, or discharge  ENMT:  No difficulty hearing, tinnitus  NECK: No pain or stiffness  RESPIRATORY: No cough, wheezing,  CARDIOVASCULAR: No chest pain, palpitations, passing out, dizziness, or leg swelling  GASTROINTESTINAL:  No nausea, vomiting, diarrhea or constipation. No melena.  GENITOURINARY: No dysuria, hematuria  NEUROLOGICAL: No stroke like symptoms  SKIN: No burning or lesions   ENDOCRINE: No heat or cold intolerance  MUSCULOSKELETAL: No joint pain or swelling  PSYCHIATRIC: No  anxiety, mood swings  HEME/LYMPH: No bleeding gums  ALLERGY AND IMMUNOLOGIC: No hives or eczema	    All other ROS negative    PHYSICAL EXAM:  T(C): 36.7 (10-06-21 @ 13:29), Max: 38.3 (10-06-21 @ 00:03)  HR: 68 (10-06-21 @ 13:29) (64 - 108)  BP: 152/67 (10-06-21 @ 13:29) (90/46 - 156/90)  RR: 18 (10-06-21 @ 13:29) (18 - 18)  SpO2: 96% (10-06-21 @ 13:29) (95% - 100%)  Wt(kg): --  I&O's Summary    05 Oct 2021 07:01  -  06 Oct 2021 07:00  --------------------------------------------------------  IN: 920 mL / OUT: 2500 mL / NET: -1580 mL    06 Oct 2021 07:01  -  06 Oct 2021 15:41  --------------------------------------------------------  IN: 460 mL / OUT: 0 mL / NET: 460 mL        Appearance: Normal	  HEENT:   Normal oral mucosa, EOMI	  Cardiovascular:  S1 S2, No JVD,    Respiratory: Lungs clear to auscultation	  Psychiatry: Alert  Gastrointestinal:  Soft, Non-tender, + BS	  Skin: No rashes   Neurologic: Non-focal  Extremities:  No edema  Vascular: Peripheral pulses palpable    	    	  	  CARDIAC MARKERS:  Labs personally reviewed by me                                  9.4    5.93  )-----------( 127      ( 06 Oct 2021 07:30 )             30.7     10-06    137  |  98  |  47<H>  ----------------------------<  124<H>  3.4<L>   |  24  |  9.56<H>    Ca    8.9      06 Oct 2021 07:25  Phos  4.6     10-06  Mg     2.6     10-04    TPro  7.5  /  Alb  3.8  /  TBili  0.4  /  DBili  x   /  AST  38  /  ALT  16  /  AlkPhos  111  10-04          EKG: Personally reviewed by me -   Radiology: Personally reviewed by me -   < from: CT Chest No Cont (10.05.21 @ 00:00) >    IMPRESSION:  No CT evidence of pneumonia.    < end of copied text >  < from: Transthoracic Echocardiogram (03.12.21 @ 10:08) >  : Hyperdynamic left ventricular systolic  function. EF>75%.     < end of copied text >  < from: Transthoracic Echocardiogram (03.12.21 @ 10:08) >  . Normal mitral valve with chordal systolic anterior  motion. Minimal mitral regurgitation.  2. Calcified trileaflet aortic valve with normal opening.  No aortic valve regurgitation seen.  3. Hyperdynamic left ventricular systolic function. EF>75%.  Markedly increased intraventricular gradient: peak gradient  98 mm Hg.  4. Normal right ventricular size and function.  5.Small posterior pericardial effusion.  No obvious vegetations seen. This does not exclude the  presence of endocarditis.    < end of copied text >      Assessment /Plan:   80M with PMHx of HFpEF, ESRD, and HTN presenting with self-reported fever of 104 FX 1 day. Pt doesn't have outpatient cardiologist. His cardiac w/u were done at his last admission in March which includes ECHO as noted above. Pt reports being physically active with no limitations. denies contributing family history of cardiac disease. Denies any past cardiac procedures.  As of now pt seems in no distress, no SOB, CP, palpitations,, N/V.       · Assessment	  80M with PMHx of HFpEF, ESRD, and HTN presenting with self-reported fever of 104 F. Patient being admitted for fever in dialysis patient.    Problem/Plan - 1:  ·  Problem: Chronic diastolic heart Failure with preserved EF   - c/w bumex   - c/w carvedilol  25mg Q12hrs   - pt appears euvolemic   - ESRD  per renal for fluid management       Problem/Plan - 2:  ·  Problem: Fever  - Mild leukocytosis   - bld and urine cx sent  - monitor fever curve and cbc   - on Abx   - chest CT negative     Problem/Plan - 3:  ·  Problem: Hypertension  - c/w coreg and bumex  - monitor BP   - on ASA for prevention   - rec check Lipid panel with am Labs     Problem/Plan - 4:  ·  Problem: ESRD  - renal consult appreciated   - HD per renal     Problem/Plan - 5:  ·  Problem: DVT ppx on Heparin SQ     Advanced care planning/advanced directives discussed with patient/family. DNR status including forceful chest compressions to attempt to restart the heart, ventilator support/artificial breathing, electric shock, artificial nutrition, health care proxy, Molst form all discussed with pt. Pt wishes to consider.  More than fifteen minutes spent on discussing advanced directives. OMT on six regions for acute somatic dysfunctions done at the bedside     Sammie Carmichael FN-BC   Sridhar Carrillo DO [facility]  Cardiovascular Medicine  [address] [place] [address], [address]  Office [phone]  Cell [phone]

## 2021-10-07 LAB
ANION GAP SERPL CALC-SCNC: 19 MMOL/L — HIGH (ref 5–17)
BUN SERPL-MCNC: 60 MG/DL — HIGH (ref 7–23)
CALCIUM SERPL-MCNC: 8.6 MG/DL — SIGNIFICANT CHANGE UP (ref 8.4–10.5)
CHLORIDE SERPL-SCNC: 96 MMOL/L — SIGNIFICANT CHANGE UP (ref 96–108)
CO2 SERPL-SCNC: 21 MMOL/L — LOW (ref 22–31)
CREAT SERPL-MCNC: 11.16 MG/DL — HIGH (ref 0.5–1.3)
GLUCOSE SERPL-MCNC: 86 MG/DL — SIGNIFICANT CHANGE UP (ref 70–99)
HCT VFR BLD CALC: 30.5 % — LOW (ref 39–50)
HGB BLD-MCNC: 9.2 G/DL — LOW (ref 13–17)
MCHC RBC-ENTMCNC: 25.9 PG — LOW (ref 27–34)
MCHC RBC-ENTMCNC: 30.2 GM/DL — LOW (ref 32–36)
MCV RBC AUTO: 85.9 FL — SIGNIFICANT CHANGE UP (ref 80–100)
MRSA PCR RESULT.: SIGNIFICANT CHANGE UP
NRBC # BLD: 0 /100 WBCS — SIGNIFICANT CHANGE UP (ref 0–0)
PLATELET # BLD AUTO: 131 K/UL — LOW (ref 150–400)
POTASSIUM SERPL-MCNC: 3.7 MMOL/L — SIGNIFICANT CHANGE UP (ref 3.5–5.3)
POTASSIUM SERPL-SCNC: 3.7 MMOL/L — SIGNIFICANT CHANGE UP (ref 3.5–5.3)
RBC # BLD: 3.55 M/UL — LOW (ref 4.2–5.8)
RBC # FLD: 16.4 % — HIGH (ref 10.3–14.5)
S AUREUS DNA NOSE QL NAA+PROBE: DETECTED
SODIUM SERPL-SCNC: 136 MMOL/L — SIGNIFICANT CHANGE UP (ref 135–145)
WBC # BLD: 4.92 K/UL — SIGNIFICANT CHANGE UP (ref 3.8–10.5)
WBC # FLD AUTO: 4.92 K/UL — SIGNIFICANT CHANGE UP (ref 3.8–10.5)

## 2021-10-07 RX ORDER — ERYTHROPOIETIN 10000 [IU]/ML
10000 INJECTION, SOLUTION INTRAVENOUS; SUBCUTANEOUS ONCE
Refills: 0 | Status: COMPLETED | OUTPATIENT
Start: 2021-10-07 | End: 2021-10-07

## 2021-10-07 RX ADMIN — BUMETANIDE 1 MILLIGRAM(S): 0.25 INJECTION INTRAMUSCULAR; INTRAVENOUS at 06:42

## 2021-10-07 RX ADMIN — Medication 1 DROP(S): at 17:55

## 2021-10-07 RX ADMIN — CHLORHEXIDINE GLUCONATE 1 APPLICATION(S): 213 SOLUTION TOPICAL at 06:42

## 2021-10-07 RX ADMIN — TAMSULOSIN HYDROCHLORIDE 0.4 MILLIGRAM(S): 0.4 CAPSULE ORAL at 21:43

## 2021-10-07 RX ADMIN — SEVELAMER CARBONATE 800 MILLIGRAM(S): 2400 POWDER, FOR SUSPENSION ORAL at 17:55

## 2021-10-07 RX ADMIN — SEVELAMER CARBONATE 800 MILLIGRAM(S): 2400 POWDER, FOR SUSPENSION ORAL at 08:29

## 2021-10-07 RX ADMIN — ERYTHROPOIETIN 10000 UNIT(S): 10000 INJECTION, SOLUTION INTRAVENOUS; SUBCUTANEOUS at 17:15

## 2021-10-07 RX ADMIN — Medication 1 DROP(S): at 21:44

## 2021-10-07 RX ADMIN — Medication 1 APPLICATION(S): at 21:44

## 2021-10-07 RX ADMIN — SEVELAMER CARBONATE 800 MILLIGRAM(S): 2400 POWDER, FOR SUSPENSION ORAL at 12:08

## 2021-10-07 RX ADMIN — Medication 1 DROP(S): at 06:43

## 2021-10-07 RX ADMIN — Medication 1 DROP(S): at 09:43

## 2021-10-07 RX ADMIN — NAFCILLIN 100 GRAM(S): 10 INJECTION, POWDER, FOR SOLUTION INTRAVENOUS at 02:25

## 2021-10-07 RX ADMIN — HEPARIN SODIUM 5000 UNIT(S): 5000 INJECTION INTRAVENOUS; SUBCUTANEOUS at 06:42

## 2021-10-07 RX ADMIN — Medication 81 MILLIGRAM(S): at 17:55

## 2021-10-07 RX ADMIN — NAFCILLIN 100 GRAM(S): 10 INJECTION, POWDER, FOR SOLUTION INTRAVENOUS at 08:29

## 2021-10-07 RX ADMIN — CARVEDILOL PHOSPHATE 25 MILLIGRAM(S): 80 CAPSULE, EXTENDED RELEASE ORAL at 21:43

## 2021-10-07 RX ADMIN — HEPARIN SODIUM 5000 UNIT(S): 5000 INJECTION INTRAVENOUS; SUBCUTANEOUS at 21:44

## 2021-10-07 NOTE — PROGRESS NOTE ADULT - SUBJECTIVE AND OBJECTIVE BOX
CHIEF COMPLAINT:    SUBJECTIVE:     REVIEW OF SYSTEMS:    CONSTITUTIONAL: (  )  weakness,  (  ) fevers or chills  EYES/ENT: (  )visual changes;     NECK: (  ) pain or stiffness  RESPIRATORY:   (  )cough, wheezing, hemoptysis;  (  ) shortness of breath  CARDIOVASCULAR:  (  )chest pain or palpitations  GASTROINTESTINAL:   (  )abdominal or epigastric pain.  (  ) nausea, vomiting, or hematemesis;   (   ) diarrhea or constipation.   GENITOURINARY:   (    ) dysuria, frequency or hematuria  NEUROLOGICAL:  (   ) numbness or weakness   All other review of systems is negative unless indicated above    Vital Signs Last 24 Hrs  T(C): 36.7 (07 Oct 2021 06:55), Max: 37 (07 Oct 2021 00:20)  T(F): 98.1 (07 Oct 2021 06:55), Max: 98.6 (07 Oct 2021 00:20)  HR: 69 (07 Oct 2021 06:55) (64 - 69)  BP: 127/60 (07 Oct 2021 06:55) (125/62 - 159/74)  BP(mean): --  RR: 18 (07 Oct 2021 06:55) (18 - 18)  SpO2: 95% (07 Oct 2021 06:55) (95% - 98%)    I&O's Summary    06 Oct 2021 07:01  -  07 Oct 2021 07:00  --------------------------------------------------------  IN: 1430 mL / OUT: 0 mL / NET: 1430 mL        CAPILLARY BLOOD GLUCOSE          PHYSICAL EXAM:    Constitutional:  (   ) NAD,   (   )awake and alert  HEENT: PERR, EOMI,    Neck: Soft and supple, No LAD, No JVD  Respiratory:  (    Breath sounds are clear bilaterally,    (   ) wheezing, rales or rhonchi  Cardiovascular:     (   )S1 and S2, regular rate and rhythm, no Murmurs, gallops or rubs  Gastrointestinal:  (   )Bowel Sounds present, soft,   (  )nontender, nondistended,    Extremities:    (  ) peripheral edema  Vascular: 2+ peripheral pulses  Neurological:    (    )A/O x 3,   (  ) focal deficits  Musculoskeletal:    (   )  normal strength b/l upper  (     ) normal  lower extremities  Skin: No rashes    MEDICATIONS:  MEDICATIONS  (STANDING):  artificial  tears Solution 1 Drop(s) Left EYE every 4 hours  aspirin enteric coated 81 milliGRAM(s) Oral daily  buMETAnide 1 milliGRAM(s) Oral daily  carvedilol 25 milliGRAM(s) Oral every 12 hours  chlorhexidine 2% Cloths 1 Application(s) Topical <User Schedule>  epoetin hiro-epbx (RETACRIT) Injectable 91609 Unit(s) IV Push once  heparin   Injectable 5000 Unit(s) SubCutaneous every 8 hours  nafcillin  IVPB      nafcillin  IVPB 1 Gram(s) IV Intermittent every 6 hours  sevelamer carbonate 800 milliGRAM(s) Oral three times a day with meals  tamsulosin 0.4 milliGRAM(s) Oral at bedtime      LABS: All Labs Reviewed:                        9.2    4.92  )-----------( 131      ( 07 Oct 2021 07:12 )             30.5     10-07    136  |  96  |  60<H>  ----------------------------<  86  3.7   |  21<L>  |  11.16<H>    Ca    8.6      07 Oct 2021 07:12  Phos  4.6     10-06            Blood Culture: 10-04 @ 23:11  Organism --  Gram Stain Blood -- Gram Stain --  Specimen Source .Blood Blood-Peripheral  Culture-Blood --      Urine Culture      RADIOLOGY/EKG:    ASSESSMENT AND PLAN:    DVT PPX:    ADVANCED DIRECTIVE:    DISPOSITION: CHIEF COMPLAINT: no e vent overnight patient condition stable    SUBJECTIVE:     REVIEW OF SYSTEMS: awake and verbal without complaint    CONSTITUTIONAL: (  )  weakness,  (  ) fevers or chills  EYES/ENT: (  )visual changes;     NECK: (  ) pain or stiffness  RESPIRATORY:   (  )cough, wheezing, hemoptysis;  (  ) shortness of breath  CARDIOVASCULAR:  (  )chest pain or palpitations  GASTROINTESTINAL:   (  )abdominal or epigastric pain.  (  ) nausea, vomiting, or hematemesis;   (   ) diarrhea or constipation.   GENITOURINARY:   (    ) dysuria, frequency or hematuria  NEUROLOGICAL:  (   ) numbness or weakness   All other review of systems is negative unless indicated above    Vital Signs Last 24 Hrs  T(C): 36.7 (07 Oct 2021 06:55), Max: 37 (07 Oct 2021 00:20)  T(F): 98.1 (07 Oct 2021 06:55), Max: 98.6 (07 Oct 2021 00:20)  HR: 69 (07 Oct 2021 06:55) (64 - 69)  BP: 127/60 (07 Oct 2021 06:55) (125/62 - 159/74)  BP(mean): --  RR: 18 (07 Oct 2021 06:55) (18 - 18)  SpO2: 95% (07 Oct 2021 06:55) (95% - 98%)    I&O's Summary    06 Oct 2021 07:01  -  07 Oct 2021 07:00  --------------------------------------------------------  IN: 1430 mL / OUT: 0 mL / NET: 1430 mL        CAPILLARY BLOOD GLUCOSE          PHYSICAL EXAM:  Constitutional: NAD  Neck:  No JVD  Respiratory: CTAB/L  Cardiovascular: S1 and S2  Gastrointestinal: BS+, soft, NT/ND  Extremities: No peripheral edema  Neurological: A/O x 3, no focal deficits  Psychiatric: Normal mood, normal affect  : No Aguilar  Skin: No rashes  Access: avf and perm cath       MEDICATIONS:  MEDICATIONS  (STANDING):  artificial  tears Solution 1 Drop(s) Left EYE every 4 hours  aspirin enteric coated 81 milliGRAM(s) Oral daily  buMETAnide 1 milliGRAM(s) Oral daily  carvedilol 25 milliGRAM(s) Oral every 12 hours  chlorhexidine 2% Cloths 1 Application(s) Topical <User Schedule>  epoetin hiro-epbx (RETACRIT) Injectable 10741 Unit(s) IV Push once  heparin   Injectable 5000 Unit(s) SubCutaneous every 8 hours  nafcillin  IVPB      nafcillin  IVPB 1 Gram(s) IV Intermittent every 6 hours  sevelamer carbonate 800 milliGRAM(s) Oral three times a day with meals  tamsulosin 0.4 milliGRAM(s) Oral at bedtime      LABS: All Labs Reviewed:                        9.2    4.92  )-----------( 131      ( 07 Oct 2021 07:12 )             30.5     10-07    136  |  96  |  60<H>  ----------------------------<  86  3.7   |  21<L>  |  11.16<H>    Ca    8.6      07 Oct 2021 07:12  Phos  4.6     10-06            Blood Culture: 10-04 @ 23:11  Organism --  Gram Stain Blood -- Gram Stain --  Specimen Source .Blood Blood-Peripheral  Culture-Blood --      Urine Culture      RADIOLOGY/EKG:    ASSESSMENT AND PLAN:  80M with PMHx of HFpEF, ESRD, and HTN presenting with self-reported fever of 104 F. Patient being admitted for fever in dialysis patient.    Problem/Plan - 1:  ·  Problem: Fever.   ·  Plan: -Patient currently hemodynamically stable with cleared lactate and no source: CT chest neg, RVP neg --> will monitor off antimicrobials, trend fever curve, monitor WBC (slight leukocytosis)  -Follow up blood cultures  -Send UA and Urine Culture.  -culture reported negative discussed with renal May DC IV antibiotic  Problem/Plan - 2:  ·  Problem: ESRD on dialysis.   ·  Plan: -Renal called by Clint  -Sevelamer 800 mg TID, check PO4 and d/c if hypophosphatemic.    Problem/Plan - 3:  ·  Problem: Hypertension.   ·  Plan: -Continue with Coreg.    Problem/Plan - 4:  ·  Problem: Chronic diastolic heart failure.   ·  Plan: -Monitor volume status and maintain euvolemia  -Continue with Bumex.        DVT PPX:    ADVANCED DIRECTIVE:    DISPOSITION:

## 2021-10-07 NOTE — PROGRESS NOTE ADULT - SUBJECTIVE AND OBJECTIVE BOX
[facility] [phone]        Patient seen and examined in bed.  He was in good spirits         MEDICATIONS  (STANDING):  artificial  tears Solution 1 Drop(s) Left EYE every 4 hours  aspirin enteric coated 81 milliGRAM(s) Oral daily  buMETAnide 1 milliGRAM(s) Oral daily  carvedilol 25 milliGRAM(s) Oral every 12 hours  chlorhexidine 2% Cloths 1 Application(s) Topical <User Schedule>  epoetin hiro-epbx (RETACRIT) Injectable 96725 Unit(s) IV Push once  heparin   Injectable 5000 Unit(s) SubCutaneous every 8 hours  sevelamer carbonate 800 milliGRAM(s) Oral three times a day with meals  tamsulosin 0.4 milliGRAM(s) Oral at bedtime      VITAL:  T(C): , Max: 37 (10-07-21 @ 00:20)  T(F): , Max: 98.6 (10-07-21 @ 00:20)  HR: 62 (10-07-21 @ 09:39)  BP: 154/76 (10-07-21 @ 09:39)  BP(mean): --  RR: 18 (10-07-21 @ 09:39)  SpO2: 100% (10-07-21 @ 09:39)  Wt(kg): --    I and O's:    10-06 @ 07:01  -  10-07 @ 07:00  --------------------------------------------------------  IN: 1430 mL / OUT: 0 mL / NET: 1430 mL          PHYSICAL EXAM:    Constitutional: NAD  Neck:  No JVD  Respiratory: CTAB/L  Cardiovascular: S1 and S2  Gastrointestinal: BS+, soft, NT/ND  Extremities: No peripheral edema  Neurological: A/O x 3, no focal deficits  Psychiatric: Normal mood, normal affect  : No Aguilar  Skin: No rashes  Access: avf and perm cath    LABS:                        9.2    4.92  )-----------( 131      ( 07 Oct 2021 07:12 )             30.5     10-07    136  |  96  |  60<H>  ----------------------------<  86  3.7   |  21<L>  |  11.16<H>    Ca    8.6      07 Oct 2021 07:12  Phos  4.6     10-06            Urine Studies:  Urinalysis Basic - ( 06 Oct 2021 20:01 )    Color: Light Orange / Appearance: Turbid / S.009 / pH: x  Gluc: x / Ketone: Negative  / Bili: Negative / Urobili: Negative   Blood: x / Protein: 100 mg/dL / Nitrite: Negative   Leuk Esterase: Large / RBC: 4 /hpf /  /HPF   Sq Epi: x / Non Sq Epi: 7 /hpf / Bacteria: Negative            RADIOLOGY & ADDITIONAL STUDIES:

## 2021-10-07 NOTE — CONSULT NOTE ADULT - SUBJECTIVE AND OBJECTIVE BOX
Vascular & Interventional Radiology Brief Consult Note    Evaluate for Procedure: balloon angioplasty maturation for LUE AVF    HPI: 80M with PMHx of HFpEF, ESRD, and HTN presenting with self-reported fever of 104 F. Patient states it has been going on for one day and intermittent. Otherwise he denies focal symptoms such as cough, shortness of breath, chest pain, diarrhea, dysuria or polyuria. He states he urinates very little. No known sick contacts. No sore throat or nasal congestion. He has had a right chest wall permacath since March 2021 with no redness or warmth around the site. He was last dialyzed several days prior with no issues. He does complain of right shoulder discomfort, but no swelling and has been having discomfort since permacath was placed. In the ED patient had blood cultures drawn and given vancomycin and zosyn. No fevers since he's been here.     Allergies: grass, pollen (Rhinitis)    Medications (Abx/Cardiac/Anticoagulation/Blood Products)  aspirin enteric coated: 81 milliGRAM(s) Oral (10-06 @ 12:55)  buMETAnide: 1 milliGRAM(s) Oral (10-07 @ 06:42)  carvedilol: 25 milliGRAM(s) Oral (10-06 @ 21:17)  heparin   Injectable: 5000 Unit(s) SubCutaneous (10-07 @ 06:42)  nafcillin  IVPB: 100 mL/Hr IV Intermittent (10-05 @ 20:17)  nafcillin  IVPB: 100 mL/Hr IV Intermittent (10-07 @ 08:29)  tamsulosin: 0.4 milliGRAM(s) Oral (10-06 @ 21:17)    Data:    T(C): 36.4  HR: 66  BP: 156/70  RR: 17  SpO2: 96%    -WBC 4.92 / HgB 9.2 / Hct 30.5 / Plt 131  -Na 136 / Cl 96 / BUN 60 / Glucose 86  -K 3.7 / CO2 21 / Cr 11.16  -ALT -- / Alk Phos -- / T.Bili --  -INR 0.96 / PTT --    Imaging: _______    Assessment/Plan:   -80M with PMHx of HFpEF, ESRD, and HTN presenting with self-reported fever of 104 F. Infectious workup negative. BCx NGTD. Leukocytosis resolved, afebrile, abx discontinued per ID recs. IR consulted for BAM for left upper extremity AVF.    -case reviewed and approved for Friday, 10/8/2021  -please place IR procedure order under Dr. Moulton  -XENIAO Thur at 11pm  -Will need negative COVID PCR within 5 days of procedure  -STAT CBC, BMP, coags Fri AM  -Hold AC Thur PM/ Fri AM  -d/w team    Please contact IR with any questions or concerns

## 2021-10-07 NOTE — PROGRESS NOTE ADULT - SUBJECTIVE AND OBJECTIVE BOX
DATE OF SERVICE: 10-07-21 @ 22:24    Patient is a 80y old  Male who presents with a chief complaint of Fever (07 Oct 2021 12:51)      INTERVAL HISTORY: feels ok    	  MEDICATIONS:  buMETAnide 1 milliGRAM(s) Oral daily  carvedilol 25 milliGRAM(s) Oral every 12 hours  tamsulosin 0.4 milliGRAM(s) Oral at bedtime        PHYSICAL EXAM:  T(C): 37.6 (10-07-21 @ 20:01), Max: 37.6 (10-07-21 @ 20:01)  HR: 91 (10-07-21 @ 21:42) (62 - 91)  BP: 120/62 (10-07-21 @ 21:42) (115/67 - 159/74)  RR: 20 (10-07-21 @ 20:01) (17 - 20)  SpO2: 98% (10-07-21 @ 20:01) (95% - 100%)  Wt(kg): --  I&O's Summary    06 Oct 2021 07:01  -  07 Oct 2021 07:00  --------------------------------------------------------  IN: 1430 mL / OUT: 0 mL / NET: 1430 mL    07 Oct 2021 07:01  -  07 Oct 2021 22:24  --------------------------------------------------------  IN: 1130 mL / OUT: 0 mL / NET: 1130 mL          Appearance: In no distress	  HEENT:    PERRL, EOMI	  Cardiovascular:  S1 S2, No JVD  Respiratory: Lungs clear to auscultation	  Gastrointestinal:  Soft, Non-tender, + BS	  Vascularature:  No edema of LE  Psychiatric: Appropriate affect   Neuro: no acute focal deficits                               9.2    4.92  )-----------( 131      ( 07 Oct 2021 07:12 )             30.5     10-07    136  |  96  |  60<H>  ----------------------------<  86  3.7   |  21<L>  |  11.16<H>    Ca    8.6      07 Oct 2021 07:12  Phos  4.6     10-06          Labs personally reviewed      Assessment /Plan:   80M with PMHx of HFpEF, ESRD, and HTN presenting with self-reported fever of 104 FX 1 day. Pt doesn't have outpatient cardiologist. His cardiac w/u were done at his last admission in March which includes ECHO as noted above. Pt reports being physically active with no limitations. denies contributing family history of cardiac disease. Denies any past cardiac procedures.  As of now pt seems in no distress, no SOB, CP, palpitations,, N/V.       · Assessment	  80M with PMHx of HFpEF, ESRD, and HTN presenting with self-reported fever of 104 F. Patient being admitted for fever in dialysis patient.    Problem/Plan - 1:  ·  Problem: Chronic diastolic heart Failure with preserved EF   - c/w bumex   - c/w carvedilol  25mg Q12hrs   - pt appears euvolemic   - ESRD  per renal for fluid management       Problem/Plan - 2:  ·  Problem: Fever  - Mild leukocytosis   - bld and urine cx sent  - monitor fever curve and cbc   - on Abx   - chest CT negative     Problem/Plan - 3:  ·  Problem: Hypertension  - c/w coreg and bumex  - monitor BP   - on ASA for prevention   - rec check Lipid panel with am Labs     Problem/Plan - 4:  ·  Problem: ESRD  - renal consult appreciated   - HD per renal     Problem/Plan - 5:  ·  Problem: DVT ppx on Heparin SQ           Sridhar Carrillo DO [facility]  Cardiovascular Medicine  [address] [place] [address], [address]  Office: [phone]  Cell: [phone]

## 2021-10-07 NOTE — PROGRESS NOTE ADULT - ASSESSMENT
80M with PMHx of HFpEF, ESRD, and HTN presenting with self-reported fever of 104 F.   catheter related infection?     1 ID-If the blood cx are negative and dc the  nafcillin.     2 Renal-Used AVF but needs another ballooning;  IR for ballon and if cant be done soon will dc and do as outpt   HD  today   3 -Cont Flomax     dw PCP     Sayed [facility]   [phone]

## 2021-10-08 LAB
ANION GAP SERPL CALC-SCNC: 21 MMOL/L — HIGH (ref 5–17)
APTT BLD: 30.1 SEC — SIGNIFICANT CHANGE UP (ref 27.5–35.5)
BUN SERPL-MCNC: 48 MG/DL — HIGH (ref 7–23)
CALCIUM SERPL-MCNC: 8.8 MG/DL — SIGNIFICANT CHANGE UP (ref 8.4–10.5)
CHLORIDE SERPL-SCNC: 95 MMOL/L — LOW (ref 96–108)
CO2 SERPL-SCNC: 21 MMOL/L — LOW (ref 22–31)
CREAT SERPL-MCNC: 9.57 MG/DL — HIGH (ref 0.5–1.3)
GLUCOSE SERPL-MCNC: 97 MG/DL — SIGNIFICANT CHANGE UP (ref 70–99)
HCT VFR BLD CALC: 32.7 % — LOW (ref 39–50)
HGB BLD-MCNC: 9.9 G/DL — LOW (ref 13–17)
INR BLD: 1 RATIO — SIGNIFICANT CHANGE UP (ref 0.88–1.16)
MCHC RBC-ENTMCNC: 25.9 PG — LOW (ref 27–34)
MCHC RBC-ENTMCNC: 30.3 GM/DL — LOW (ref 32–36)
MCV RBC AUTO: 85.6 FL — SIGNIFICANT CHANGE UP (ref 80–100)
NRBC # BLD: 0 /100 WBCS — SIGNIFICANT CHANGE UP (ref 0–0)
PLATELET # BLD AUTO: 139 K/UL — LOW (ref 150–400)
POTASSIUM SERPL-MCNC: 3.9 MMOL/L — SIGNIFICANT CHANGE UP (ref 3.5–5.3)
POTASSIUM SERPL-SCNC: 3.9 MMOL/L — SIGNIFICANT CHANGE UP (ref 3.5–5.3)
PROTHROM AB SERPL-ACNC: 12 SEC — SIGNIFICANT CHANGE UP (ref 10.6–13.6)
RBC # BLD: 3.82 M/UL — LOW (ref 4.2–5.8)
RBC # FLD: 16.3 % — HIGH (ref 10.3–14.5)
SARS-COV-2 RNA SPEC QL NAA+PROBE: SIGNIFICANT CHANGE UP
SODIUM SERPL-SCNC: 137 MMOL/L — SIGNIFICANT CHANGE UP (ref 135–145)
WBC # BLD: 7.46 K/UL — SIGNIFICANT CHANGE UP (ref 3.8–10.5)
WBC # FLD AUTO: 7.46 K/UL — SIGNIFICANT CHANGE UP (ref 3.8–10.5)

## 2021-10-08 PROCEDURE — 71045 X-RAY EXAM CHEST 1 VIEW: CPT | Mod: 26

## 2021-10-08 PROCEDURE — 36902 INTRO CATH DIALYSIS CIRCUIT: CPT

## 2021-10-08 PROCEDURE — 76937 US GUIDE VASCULAR ACCESS: CPT | Mod: 26,59

## 2021-10-08 RX ORDER — ERYTHROPOIETIN 10000 [IU]/ML
10000 INJECTION, SOLUTION INTRAVENOUS; SUBCUTANEOUS ONCE
Refills: 0 | Status: COMPLETED | OUTPATIENT
Start: 2021-10-09 | End: 2021-10-09

## 2021-10-08 RX ORDER — ACETAMINOPHEN 500 MG
325 TABLET ORAL ONCE
Refills: 0 | Status: COMPLETED | OUTPATIENT
Start: 2021-10-08 | End: 2021-10-08

## 2021-10-08 RX ORDER — CEFTRIAXONE 500 MG/1
1000 INJECTION, POWDER, FOR SOLUTION INTRAMUSCULAR; INTRAVENOUS EVERY 24 HOURS
Refills: 0 | Status: COMPLETED | OUTPATIENT
Start: 2021-10-08 | End: 2021-10-14

## 2021-10-08 RX ADMIN — Medication 1 DROP(S): at 21:32

## 2021-10-08 RX ADMIN — Medication 1 DROP(S): at 05:31

## 2021-10-08 RX ADMIN — TAMSULOSIN HYDROCHLORIDE 0.4 MILLIGRAM(S): 0.4 CAPSULE ORAL at 21:31

## 2021-10-08 RX ADMIN — Medication 325 MILLIGRAM(S): at 22:36

## 2021-10-08 RX ADMIN — SEVELAMER CARBONATE 800 MILLIGRAM(S): 2400 POWDER, FOR SUSPENSION ORAL at 18:30

## 2021-10-08 RX ADMIN — CEFTRIAXONE 100 MILLIGRAM(S): 500 INJECTION, POWDER, FOR SOLUTION INTRAMUSCULAR; INTRAVENOUS at 21:30

## 2021-10-08 RX ADMIN — CHLORHEXIDINE GLUCONATE 1 APPLICATION(S): 213 SOLUTION TOPICAL at 05:31

## 2021-10-08 RX ADMIN — Medication 1 DROP(S): at 18:45

## 2021-10-08 RX ADMIN — Medication 650 MILLIGRAM(S): at 19:58

## 2021-10-08 RX ADMIN — Medication 650 MILLIGRAM(S): at 22:35

## 2021-10-08 RX ADMIN — Medication 1 APPLICATION(S): at 21:32

## 2021-10-08 RX ADMIN — CARVEDILOL PHOSPHATE 25 MILLIGRAM(S): 80 CAPSULE, EXTENDED RELEASE ORAL at 21:31

## 2021-10-08 RX ADMIN — HEPARIN SODIUM 5000 UNIT(S): 5000 INJECTION INTRAVENOUS; SUBCUTANEOUS at 21:32

## 2021-10-08 NOTE — PROCEDURE NOTE - PROCEDURE FINDINGS AND DETAILS
Status post LUE AVF venoplasty and outflow vein thrombus balloon maceration  No acute complications  Post venoplasty and balloon maceration venogram showed restoration of flow within the outflow fistula vein and patent AVF with brisk antegrade flow.

## 2021-10-08 NOTE — PROGRESS NOTE ADULT - SUBJECTIVE AND OBJECTIVE BOX
NEPHROLOGY-[facility] [phone]        Patient seen and examined in bed.  He was in good spirits and offered no complaints         MEDICATIONS  (STANDING):  artificial  tears Solution 1 Drop(s) Left EYE every 4 hours  aspirin enteric coated 81 milliGRAM(s) Oral daily  buMETAnide 1 milliGRAM(s) Oral daily  carvedilol 25 milliGRAM(s) Oral every 12 hours  chlorhexidine 2% Cloths 1 Application(s) Topical <User Schedule>  heparin   Injectable 5000 Unit(s) SubCutaneous every 8 hours  petrolatum Ophthalmic Ointment 1 Application(s) Left EYE at bedtime  sevelamer carbonate 800 milliGRAM(s) Oral three times a day with meals  tamsulosin 0.4 milliGRAM(s) Oral at bedtime      VITAL:  T(C): , Max: 37.6 (10-07-21 @ 20:01)  T(F): , Max: 99.7 (10-07-21 @ 20:01)  HR: 69 (10-08-21 @ 05:27)  BP: 112/66 (10-08-21 @ 05:27)  BP(mean): --  RR: 18 (10-08-21 @ 05:27)  SpO2: 95% (10-08-21 @ 05:27)  Wt(kg): --    I and O's:    10-07 @ 07:01  -  10-08 @ 07:00  --------------------------------------------------------  IN: 1250 mL / OUT: 0 mL / NET: 1250 mL          PHYSICAL EXAM:    Constitutional: NAD  Neck:  No JVD  Respiratory: CTAB/L  Cardiovascular: S1 and S2  Gastrointestinal: BS+, soft, NT/ND  Extremities: No peripheral edema  Neurological: A/O x 3, no focal deficits  Psychiatric: Normal mood, normal affect  : No Aguilar  Skin: No rashes  Access: perm cath and avf     LABS:                        9.9    7.46  )-----------( 139      ( 08 Oct 2021 06:15 )             32.7     10-08    137  |  95<L>  |  48<H>  ----------------------------<  97  3.9   |  21<L>  |  9.57<H>    Ca    8.8      08 Oct 2021 06:15            Urine Studies:  Urinalysis Basic - ( 06 Oct 2021 20:01 )    Color: Light Orange / Appearance: Turbid / S.009 / pH: x  Gluc: x / Ketone: Negative  / Bili: Negative / Urobili: Negative   Blood: x / Protein: 100 mg/dL / Nitrite: Negative   Leuk Esterase: Large / RBC: 4 /hpf /  /HPF   Sq Epi: x / Non Sq Epi: 7 /hpf / Bacteria: Negative            RADIOLOGY & ADDITIONAL STUDIES:

## 2021-10-08 NOTE — PROVIDER CONTACT NOTE (OTHER) - RECOMMENDATIONS
Adhere to Pa Orders, Cooling measures applied, tynenol admin prn Adhere to Pa Orders, Cooling measures applied, Tylenol admin prn

## 2021-10-08 NOTE — PROCEDURE NOTE - PLAN
Bedrest x 1 hour  Keep overlying dressing c/d/i  May use AVF now for hemodialysis Bedrest x 1 hour  Keep overlying dressing c/d/i  May use AVF now for hemodialysis    Findings discussed Sayed Juan of Nephrology

## 2021-10-08 NOTE — PROGRESS NOTE ADULT - ASSESSMENT
80M with PMHx of HFpEF, ESRD, and HTN presenting with self-reported fever of 104 F.   catheter related infection?     1 ID-Blood cx are negative;  Off IV abx   2 Renal-Used AVF but needs another ballooning;  BAM today at IR   HD  today   3 -Cont Flomax     DC planning p the IR procedure     Sayed [facility]   [phone]

## 2021-10-08 NOTE — PROVIDER CONTACT NOTE (OTHER) - ACTION/TREATMENT ORDERED:
As per RAS Bobby will place orders for blood cultures/UA and blood cult As per RAS Bobby will place orders for blood cultures/UA and blood cultures

## 2021-10-08 NOTE — PROVIDER CONTACT NOTE (OTHER) - BACKGROUND
Pt admitted with fever to unspecified condition. Hx ESRD on Dialysis.  In ER pt received carvedilol and Flomax
presents to the ED with fevers at home (104 max); PMH: HTN and ESRD
Admitted with fever due to unspecified condition

## 2021-10-08 NOTE — PRE PROCEDURE NOTE - PRE PROCEDURE EVALUATION
Interventional Radiology    HPI: 80y Male with ESRD status post right perm cath and left AV fistula. Patient has been receiving dialysis through left AVF, but as per nephrology note, flows not good enough, requiring balloon assisted maturation    Allergies: grass, pollen (Rhinitis)    Medications (Abx/Cardiac/Anticoagulation/Blood Products)  aspirin enteric coated: 81 milliGRAM(s) Oral (10-07 @ 17:55)  buMETAnide: 1 milliGRAM(s) Oral (10-07 @ 06:42)  carvedilol: 25 milliGRAM(s) Oral (10-07 @ 21:43)  heparin   Injectable: 5000 Unit(s) SubCutaneous (10-07 @ 21:44)  nafcillin  IVPB: 100 mL/Hr IV Intermittent (10-07 @ 08:29)  tamsulosin: 0.4 milliGRAM(s) Oral (10-07 @ 21:43)    Data:  160  65.8  T(C): 37.4  HR: 58  BP: 121/72  RR: 16  SpO2: 96%    Exam  General: No acute distress  Chest: Non labored breathing, right perm cath  Abdomen: Non-distended  Extremities: left arm AVF    -WBC 7.46 / HgB 9.9 / Hct 32.7 / Plt 139  -Na 137 / Cl 95 / BUN 48 / Glucose 97  -K 3.9 / CO2 21 / Cr 9.57  -ALT -- / Alk Phos -- / T.Bili --  -INR1.00    Imaging:     Plan: 80y Male presents for left arm fistulogram, possible balloon venoplasty  -Risks/Benefits/alternatives explained with the patient and/or healthcare proxy and witnessed informed consent obtained.

## 2021-10-08 NOTE — PROVIDER CONTACT NOTE (OTHER) - ASSESSMENT
Pt denies and headache, dizziness or chest pain
Pt feels warm to touch, No c.o of distress or discomfort at this time. Pt is resting comfortable in bed.
Pt a+ox4 OOBx1 assist, diaphoretic and hot to touch, lung sounds are clear.

## 2021-10-08 NOTE — PROGRESS NOTE ADULT - SUBJECTIVE AND OBJECTIVE BOX
CHIEF COMPLAINT:    SUBJECTIVE:     REVIEW OF SYSTEMS:    CONSTITUTIONAL: (  )  weakness,  (  ) fevers or chills  EYES/ENT: (  )visual changes;     NECK: (  ) pain or stiffness  RESPIRATORY:   (  )cough, wheezing, hemoptysis;  (  ) shortness of breath  CARDIOVASCULAR:  (  )chest pain or palpitations  GASTROINTESTINAL:   (  )abdominal or epigastric pain.  (  ) nausea, vomiting, or hematemesis;   (   ) diarrhea or constipation.   GENITOURINARY:   (    ) dysuria, frequency or hematuria  NEUROLOGICAL:  (   ) numbness or weakness   All other review of systems is negative unless indicated above    Vital Signs Last 24 Hrs  T(C): 37 (08 Oct 2021 09:00), Max: 37.6 (07 Oct 2021 20:01)  T(F): 98.6 (08 Oct 2021 09:00), Max: 99.7 (07 Oct 2021 20:01)  HR: 63 (08 Oct 2021 09:00) (62 - 91)  BP: 134/66 (08 Oct 2021 09:00) (111/65 - 156/70)  BP(mean): --  RR: 18 (08 Oct 2021 09:00) (17 - 20)  SpO2: 96% (08 Oct 2021 09:00) (95% - 100%)    I&O's Summary    07 Oct 2021 07:01  -  08 Oct 2021 07:00  --------------------------------------------------------  IN: 1250 mL / OUT: 0 mL / NET: 1250 mL        CAPILLARY BLOOD GLUCOSE          PHYSICAL EXAM:    Constitutional:  (   ) NAD,   (   )awake and alert  HEENT: PERR, EOMI,    Neck: Soft and supple, No LAD, No JVD  Respiratory:  (    Breath sounds are clear bilaterally,    (   ) wheezing, rales or rhonchi  Cardiovascular:     (   )S1 and S2, regular rate and rhythm, no Murmurs, gallops or rubs  Gastrointestinal:  (   )Bowel Sounds present, soft,   (  )nontender, nondistended,    Extremities:    (  ) peripheral edema  Vascular: 2+ peripheral pulses  Neurological:    (    )A/O x 3,   (  ) focal deficits  Musculoskeletal:    (   )  normal strength b/l upper  (     ) normal  lower extremities  Skin: No rashes    MEDICATIONS:  MEDICATIONS  (STANDING):  artificial  tears Solution 1 Drop(s) Left EYE every 4 hours  aspirin enteric coated 81 milliGRAM(s) Oral daily  buMETAnide 1 milliGRAM(s) Oral daily  carvedilol 25 milliGRAM(s) Oral every 12 hours  chlorhexidine 2% Cloths 1 Application(s) Topical <User Schedule>  heparin   Injectable 5000 Unit(s) SubCutaneous every 8 hours  petrolatum Ophthalmic Ointment 1 Application(s) Left EYE at bedtime  sevelamer carbonate 800 milliGRAM(s) Oral three times a day with meals  tamsulosin 0.4 milliGRAM(s) Oral at bedtime      LABS: All Labs Reviewed:                        9.9    7.46  )-----------( 139      ( 08 Oct 2021 06:15 )             32.7     10-08    137  |  95<L>  |  48<H>  ----------------------------<  97  3.9   |  21<L>  |  9.57<H>    Ca    8.8      08 Oct 2021 06:15      PT/INR - ( 08 Oct 2021 06:15 )   PT: 12.0 sec;   INR: 1.00 ratio         PTT - ( 08 Oct 2021 06:15 )  PTT:30.1 sec      Blood Culture: 10-04 @ 23:11  Organism --  Gram Stain Blood -- Gram Stain --  Specimen Source .Blood Blood-Peripheral  Culture-Blood --      Urine Culture      RADIOLOGY/EKG:    ASSESSMENT AND PLAN:    DVT PPX:    ADVANCED DIRECTIVE:    DISPOSITION: CHIEF COMPLAINT: patient condition remained stable without complaint    SUBJECTIVE:     REVIEW OF SYSTEMS: no complaint    CONSTITUTIONAL: (  )  weakness,  (  ) fevers or chills  EYES/ENT: (  )visual changes;     NECK: (  ) pain or stiffness  RESPIRATORY:   (  )cough, wheezing, hemoptysis;  (  ) shortness of breath  CARDIOVASCULAR:  (  )chest pain or palpitations  GASTROINTESTINAL:   (  )abdominal or epigastric pain.  (  ) nausea, vomiting, or hematemesis;   (   ) diarrhea or constipation.   GENITOURINARY:   (    ) dysuria, frequency or hematuria  NEUROLOGICAL:  (   ) numbness or weakness   All other review of systems is negative unless indicated above    Vital Signs Last 24 Hrs  T(C): 37 (08 Oct 2021 09:00), Max: 37.6 (07 Oct 2021 20:01)  T(F): 98.6 (08 Oct 2021 09:00), Max: 99.7 (07 Oct 2021 20:01)  HR: 63 (08 Oct 2021 09:00) (62 - 91)  BP: 134/66 (08 Oct 2021 09:00) (111/65 - 156/70)  BP(mean): --  RR: 18 (08 Oct 2021 09:00) (17 - 20)  SpO2: 96% (08 Oct 2021 09:00) (95% - 100%)    I&O's Summary    07 Oct 2021 07:01  -  08 Oct 2021 07:00  --------------------------------------------------------  IN: 1250 mL / OUT: 0 mL / NET: 1250 mL        CAPILLARY BLOOD GLUCOSE          PHYSICAL EXAM:  Appearance: In no distress	  HEENT:    PERRL, EOMI	  Cardiovascular:  S1 S2, No JVD  Respiratory: Lungs clear to auscultation	  Gastrointestinal:  Soft, Non-tender, + BS	  Vascularature:  No edema of LE  Psychiatric: Appropriate affect   Neuro: no acute focal deficits          MEDICATIONS:  MEDICATIONS  (STANDING):  artificial  tears Solution 1 Drop(s) Left EYE every 4 hours  aspirin enteric coated 81 milliGRAM(s) Oral daily  buMETAnide 1 milliGRAM(s) Oral daily  carvedilol 25 milliGRAM(s) Oral every 12 hours  chlorhexidine 2% Cloths 1 Application(s) Topical <User Schedule>  heparin   Injectable 5000 Unit(s) SubCutaneous every 8 hours  petrolatum Ophthalmic Ointment 1 Application(s) Left EYE at bedtime  sevelamer carbonate 800 milliGRAM(s) Oral three times a day with meals  tamsulosin 0.4 milliGRAM(s) Oral at bedtime      LABS: All Labs Reviewed:                        9.9    7.46  )-----------( 139      ( 08 Oct 2021 06:15 )             32.7     10-08    137  |  95<L>  |  48<H>  ----------------------------<  97  3.9   |  21<L>  |  9.57<H>    Ca    8.8      08 Oct 2021 06:15      PT/INR - ( 08 Oct 2021 06:15 )   PT: 12.0 sec;   INR: 1.00 ratio         PTT - ( 08 Oct 2021 06:15 )  PTT:30.1 sec      Blood Culture: 10-04 @ 23:11  Organism --  Gram Stain Blood -- Gram Stain --  Specimen Source .Blood Blood-Peripheral  Culture-Blood --      Urine Culture      RADIOLOGY/EKG:    ASSESSMENT AND PLAN:  80M with PMHx of HFpEF, ESRD, and HTN presenting with self-reported fever of 104 F. Patient being admitted for fever in dialysis patient.      Problem/Plan - 1:  ·  Problem: Chronic diastolic heart Failure with preserved EF   - c/w bumex   - c/w carvedilol  25mg Q12hrs   - pt appears euvolemic   - ESRD  per renal for fluid management     Problem/Plan - 2:  ·  Problem: Fever  - Mild leukocytosis   - bld and urine cx sent  - monitor fever curve and cbc   - off Abx   - chest CT negative     Problem/Plan - 3:  ·  Problem: Hypertension  - c/w coreg and bumex  - monitor BP   - on ASA for prevention   - rec check Lipid panel with am Labs     Problem/Plan - 4:  ·  Problem: ESRD  - renal consult appreciated   - HD per renal   - Left arm fistulagram, possible balloon venoplasty schedule for IR today and will be dialyzed tomorrow spoke with his wife possible discharge after dialysis tomorrow or Sunday    Problem/Plan - 5:  ·  Problem: DVT ppx on Heparin SQ         DVT PPX:    ADVANCED DIRECTIVE:    DISPOSITION:

## 2021-10-08 NOTE — CHART NOTE - NSCHARTNOTEFT_GEN_A_CORE
Notified by RN patient with temperature 102.3 (oral). Seen and examined patient at bedside. Patient is alert, NAD. Denies HA, CP, SOB, cough, N/V, or abd pain. Pt endorses to feeling warm.     VITAL SIGNS:  T(C): 39.1 (10-08-21 @ 19:53), Max: 39.1 (10-08-21 @ 19:53)  HR: 84 (10-08-21 @ 19:53) (58 - 91)  BP: 156/74 (10-08-21 @ 19:53) (111/65 - 170/76)  RR: 20 (10-08-21 @ 19:53) (15 - 20)  SpO2: 98% (10-08-21 @ 19:53) (95% - 100%)  Wt(kg): --      LABORATORY:                          9.9    7.46  )-----------( 139      ( 08 Oct 2021 06:15 )             32.7       10-08    137  |  95<L>  |  48<H>  ----------------------------<  97  3.9   |  21<L>  |  9.57<H>    Ca    8.8      08 Oct 2021 06:15    MICROBIOLOGY:   Culture - Blood (10.04.21 @ 23:11)   Specimen Source: .Blood Blood-Peripheral   Culture Results:   No growth to date.     Culture - Blood (10.04.21 @ 23:11)   Specimen Source: .Blood Blood-Peripheral   Culture Results:   No growth to date.     Urinalysis + Microscopic Examination (10.06.21 @ 20:01)   Blood, Urine: Moderate   Glucose Qualitative, Urine: Negative   Ketone - Urine: Negative   Bilirubin: Negative   Color: Light Orange   Urine Appearance: Turbid   Urobilinogen: Negative   Specific Gravity: 1.009   Protein, Urine: 100 mg/dL   pH Urine: 6.0   Leukocyte Esterase Concentration: Large   Nitrite: Negative   Red Blood Cell - Urine: 4 /hpf   White Blood Cell - Urine: 902 /HPF   Epithelial Cells: 7 /hpf   Hyaline Casts: 4 /lpf   Bacteria: Negative     RADIOLOGY:  i< from: CT Chest No Cont (10.05.21 @ 00:00) >  PROCEDURE:  CT of the Chest was performed.  Sagittal and coronal reformats were performed.    FINDINGS:    LUNGS AND AIRWAYS: Patent central airways.  Motion limited evaluation of the bilateral lung bases. No consolidation. Minimal centrilobular emphysema.  PLEURA: No pleural effusion. No pneumothorax.  MEDIASTINUM AND SAUMYA: No lymphadenopathy.  VESSELS: Right chest wall dual-lumen tunneled central venous catheter with tips in the SVC. Atherosclerotic disease of aorta and its branches.  HEART: Top normal size. No pericardial effusion.  CHEST WALL AND LOWER NECK: Lipoma posterior to the right scapula partially imaged.  VISUALIZED UPPER ABDOMEN: Bilateral renal atrophy and bilateral renal cysts.  BONES: Degenerative changes of the spine.    IMPRESSION:  No CT evidence of pneumonia.    < end of copied text >    PHYSICAL EXAM:    Constitutional: AOx3. NAD.  Respiratory: clear lungs bilaterally. No wheezing, rhonchi, or crackles.  Cardiovascular: S1 S2. No murmurs.  Gastrointestinal: BS X4 active. soft. nontender.  Extremities/Vascular: +2 pulses bilaterally. No BLE edema.  Skin: warm to touch      ASSESSMENT/PLAN:   HPI:  80M with PMHx of HFpEF, ESRD, and HTN presenting with self-reported fever of 104 F. Patient states it has been going on for one day and intermittently. Now presents with a temperature of 102.3 (oral)    1) Fever of unknown origin   - Pt is asymptomatic, mentating well with stable vitals at bedside  - Tylenol and cooling measures prn for pyrexia  - BCx and UA reviewed and noted from 10/4 and 10/6 respectively, Repeat BC x2, UA/UC  - CT chest reviewed and noted from 10/5  - Discussed with Dr. Jaramillo on overnight events, advised to start Rocephin empirically, and to order CT chest/abd/pelvis w/ contrast to r/o infectious etiology in addition to repeat BCx and UA/UC   - Continue to monitor patient's vitals closely overnight   - Endorse/sign out to day team on overnight events   - RN aware of management     Diamante Rankin PA-C   Dept of Medicine   [id number]

## 2021-10-08 NOTE — PROGRESS NOTE ADULT - SUBJECTIVE AND OBJECTIVE BOX
Date of Service   10-08-21 @ 12:16    Patient is a 80y old  Male who presents with a chief complaint of Fever (08 Oct 2021 09:30)      INTERVAL HISTORY:  pt feels ok     REVIEW OF SYSTEMS:   CONSTITUTIONAL: No weakness  EYES/ENT: No visual changes; No throat pain  Neck: No pain or stiffness  Respiratory: No cough, wheezing, No shortness of breath  CARDIOVASCULAR: no chest pain or palpitations  GASTROINTESTINAL: No abdominal pain, no nausea, vomiting or hematemesis  GENITOURINARY: No dysuria, frequency or hematuria  NEUROLOGICAL: No stroke like symptoms  SKIN: No rashes    	  MEDICATIONS:  buMETAnide 1 milliGRAM(s) Oral daily  carvedilol 25 milliGRAM(s) Oral every 12 hours  tamsulosin 0.4 milliGRAM(s) Oral at bedtime        PHYSICAL EXAM:  T(C): 37.4 (10-08-21 @ 12:05), Max: 37.6 (10-07-21 @ 20:01)  HR: 58 (10-08-21 @ 12:05) (58 - 91)  BP: 121/72 (10-08-21 @ 12:05) (111/65 - 156/70)  RR: 16 (10-08-21 @ 12:05) (16 - 20)  SpO2: 96% (10-08-21 @ 12:05) (95% - 98%)  Wt(kg): --  I&O's Summary    07 Oct 2021 07:01  -  08 Oct 2021 07:00  --------------------------------------------------------  IN: 1250 mL / OUT: 0 mL / NET: 1250 mL      Height (cm): 160 (10-08 @ 12:05)  Weight (kg): 65.8 (10-08 @ 12:05)  BMI (kg/m2): 25.7 (10-08 @ 12:05)  BSA (m2): 1.69 (10-08 @ 12:05)    Appearance: In no distress	  HEENT:    PERRL, EOMI	  Cardiovascular:  S1 S2, No JVD  Respiratory: Lungs clear to auscultation	  Gastrointestinal:  Soft, Non-tender, + BS	  Vascularature:  No edema of LE  Psychiatric: Appropriate affect   Neuro: no acute focal deficits                               9.9    7.46  )-----------( 139      ( 08 Oct 2021 06:15 )             32.7     10-08    137  |  95<L>  |  48<H>  ----------------------------<  97  3.9   |  21<L>  |  9.57<H>    Ca    8.8      08 Oct 2021 06:15          Labs personally reviewed      ASSESSMENT/PLAN: 	  80M with PMHx of HFpEF, ESRD, and HTN presenting with self-reported fever of 104 F. Patient being admitted for fever in dialysis patient.      Problem/Plan - 1:  ·  Problem: Chronic diastolic heart Failure with preserved EF   - c/w bumex   - c/w carvedilol  25mg Q12hrs   - pt appears euvolemic   - ESRD  per renal for fluid management     Problem/Plan - 2:  ·  Problem: Fever  - Mild leukocytosis   - bld and urine cx sent  - monitor fever curve and cbc   - off Abx   - chest CT negative     Problem/Plan - 3:  ·  Problem: Hypertension  - c/w coreg and bumex  - monitor BP   - on ASA for prevention   - rec check Lipid panel with am Labs     Problem/Plan - 4:  ·  Problem: ESRD  - renal consult appreciated   - HD per renal   - Left arm fistulagram, possible balloon venoplasty    Problem/Plan - 5:  ·  Problem: DVT ppx on Heparin SQ           Sammie Carmichael FNP-BC   Sridhar Carrillo DO [facility]  Cardiovascular Medicine  [address] [place] [address], [address]  Office: [phone]  Cell: [phone]

## 2021-10-09 LAB
E COLI DNA BLD POS QL NAA+NON-PROBE: SIGNIFICANT CHANGE UP
GRAM STN FLD: SIGNIFICANT CHANGE UP
METHOD TYPE: SIGNIFICANT CHANGE UP
SPECIMEN SOURCE: SIGNIFICANT CHANGE UP

## 2021-10-09 RX ADMIN — Medication 81 MILLIGRAM(S): at 11:12

## 2021-10-09 RX ADMIN — CARVEDILOL PHOSPHATE 25 MILLIGRAM(S): 80 CAPSULE, EXTENDED RELEASE ORAL at 21:11

## 2021-10-09 RX ADMIN — Medication 1 DROP(S): at 18:11

## 2021-10-09 RX ADMIN — Medication 1 DROP(S): at 21:11

## 2021-10-09 RX ADMIN — HEPARIN SODIUM 5000 UNIT(S): 5000 INJECTION INTRAVENOUS; SUBCUTANEOUS at 13:05

## 2021-10-09 RX ADMIN — Medication 325 MILLIGRAM(S): at 00:00

## 2021-10-09 RX ADMIN — SEVELAMER CARBONATE 800 MILLIGRAM(S): 2400 POWDER, FOR SUSPENSION ORAL at 13:04

## 2021-10-09 RX ADMIN — Medication 1 APPLICATION(S): at 21:12

## 2021-10-09 RX ADMIN — Medication 650 MILLIGRAM(S): at 09:05

## 2021-10-09 RX ADMIN — TAMSULOSIN HYDROCHLORIDE 0.4 MILLIGRAM(S): 0.4 CAPSULE ORAL at 21:11

## 2021-10-09 RX ADMIN — Medication 650 MILLIGRAM(S): at 09:35

## 2021-10-09 RX ADMIN — HEPARIN SODIUM 5000 UNIT(S): 5000 INJECTION INTRAVENOUS; SUBCUTANEOUS at 05:34

## 2021-10-09 RX ADMIN — SEVELAMER CARBONATE 800 MILLIGRAM(S): 2400 POWDER, FOR SUSPENSION ORAL at 08:11

## 2021-10-09 RX ADMIN — ERYTHROPOIETIN 10000 UNIT(S): 10000 INJECTION, SOLUTION INTRAVENOUS; SUBCUTANEOUS at 14:20

## 2021-10-09 RX ADMIN — CARVEDILOL PHOSPHATE 25 MILLIGRAM(S): 80 CAPSULE, EXTENDED RELEASE ORAL at 11:12

## 2021-10-09 RX ADMIN — Medication 1 DROP(S): at 05:33

## 2021-10-09 RX ADMIN — CEFTRIAXONE 100 MILLIGRAM(S): 500 INJECTION, POWDER, FOR SOLUTION INTRAMUSCULAR; INTRAVENOUS at 21:06

## 2021-10-09 RX ADMIN — SEVELAMER CARBONATE 800 MILLIGRAM(S): 2400 POWDER, FOR SUSPENSION ORAL at 18:10

## 2021-10-09 RX ADMIN — CHLORHEXIDINE GLUCONATE 1 APPLICATION(S): 213 SOLUTION TOPICAL at 05:32

## 2021-10-09 RX ADMIN — Medication 1 DROP(S): at 11:12

## 2021-10-09 RX ADMIN — HEPARIN SODIUM 5000 UNIT(S): 5000 INJECTION INTRAVENOUS; SUBCUTANEOUS at 21:10

## 2021-10-09 RX ADMIN — BUMETANIDE 1 MILLIGRAM(S): 0.25 INJECTION INTRAMUSCULAR; INTRAVENOUS at 05:33

## 2021-10-09 RX ADMIN — Medication 1 DROP(S): at 13:04

## 2021-10-09 NOTE — PROGRESS NOTE ADULT - ASSESSMENT
Assessment/Plan:  80y Male with ESRD s/p left upper extremity AV fistula with inadequate flow s/p LUE AVF venoplasty and outflow vein thrombus balloon maceration on 10/8/21 in Interventional Radiology with Dr. Le. AVF with good palpable thrill. Planning to use for HD today.    -plan for HD today via AVF  -monitor flows during HD  -rest as per primary team     Please call IR at extension [phone] with any questions, concerns, or issues regarding above.

## 2021-10-09 NOTE — PROGRESS NOTE ADULT - SUBJECTIVE AND OBJECTIVE BOX
CHIEF COMPLAINT: pt spike to 102.3 restarted on In Rocephin     SUBJECTIVE:     REVIEW OF SYSTEMS:    CONSTITUTIONAL: (  )  weakness,  (  ) fevers or chills  EYES/ENT: (  )visual changes;     NECK: (  ) pain or stiffness  RESPIRATORY:   (  )cough, wheezing, hemoptysis;  (  ) shortness of breath  CARDIOVASCULAR:  (  )chest pain or palpitations  GASTROINTESTINAL:   (  )abdominal or epigastric pain.  (  ) nausea, vomiting, or hematemesis;   (   ) diarrhea or constipation.   GENITOURINARY:   (    ) dysuria, frequency or hematuria  NEUROLOGICAL:  (   ) numbness or weakness   All other review of systems is negative unless indicated above    Vital Signs Last 24 Hrs  T(C): 37.7 (09 Oct 2021 04:37), Max: 39.1 (08 Oct 2021 19:53)  T(F): 99.8 (09 Oct 2021 04:37), Max: 102.3 (08 Oct 2021 19:53)  HR: 76 (09 Oct 2021 04:37) (58 - 84)  BP: 129/68 (09 Oct 2021 04:37) (101/57 - 170/76)  BP(mean): 97 (08 Oct 2021 14:25) (72 - 97)  RR: 17 (09 Oct 2021 04:37) (15 - 20)  SpO2: 98% (09 Oct 2021 04:37) (94% - 100%)    I&O's Summary    08 Oct 2021 07:01  -  09 Oct 2021 07:00  --------------------------------------------------------  IN: 730 mL / OUT: 0 mL / NET: 730 mL        CAPILLARY BLOOD GLUCOSE          PHYSICAL EXAM:    Constitutional:  (   ) NAD,   (   )awake and alert  HEENT: PERR, EOMI,    Neck: Soft and supple, No LAD, No JVD  Respiratory:  (    Breath sounds are clear bilaterally,    (   ) wheezing, rales or rhonchi  Cardiovascular:     (   )S1 and S2, regular rate and rhythm, no Murmurs, gallops or rubs  Gastrointestinal:  (   )Bowel Sounds present, soft,   (  )nontender, nondistended,    Extremities:    (  ) peripheral edema  Vascular: 2+ peripheral pulses  Neurological:    (    )A/O x 3,   (  ) focal deficits  Musculoskeletal:    (   )  normal strength b/l upper  (     ) normal  lower extremities  Skin: No rashes    MEDICATIONS:  MEDICATIONS  (STANDING):  artificial  tears Solution 1 Drop(s) Left EYE every 4 hours  aspirin enteric coated 81 milliGRAM(s) Oral daily  buMETAnide 1 milliGRAM(s) Oral daily  carvedilol 25 milliGRAM(s) Oral every 12 hours  cefTRIAXone   IVPB 1000 milliGRAM(s) IV Intermittent every 24 hours  chlorhexidine 2% Cloths 1 Application(s) Topical <User Schedule>  epoetin hiro-epbx (RETACRIT) Injectable 86238 Unit(s) IV Push once  heparin   Injectable 5000 Unit(s) SubCutaneous every 8 hours  petrolatum Ophthalmic Ointment 1 Application(s) Left EYE at bedtime  sevelamer carbonate 800 milliGRAM(s) Oral three times a day with meals  tamsulosin 0.4 milliGRAM(s) Oral at bedtime      LABS: All Labs Reviewed:                        9.9    7.46  )-----------( 139      ( 08 Oct 2021 06:15 )             32.7     10-08    137  |  95<L>  |  48<H>  ----------------------------<  97  3.9   |  21<L>  |  9.57<H>    Ca    8.8      08 Oct 2021 06:15      PT/INR - ( 08 Oct 2021 06:15 )   PT: 12.0 sec;   INR: 1.00 ratio         PTT - ( 08 Oct 2021 06:15 )  PTT:30.1 sec      Blood Culture: 10-04 @ 23:11  Organism --  Gram Stain Blood -- Gram Stain --  Specimen Source .Blood Blood-Peripheral  Culture-Blood --      Urine Culture      RADIOLOGY/EKG:    ASSESSMENT AND PLAN:    DVT PPX:    ADVANCED DIRECTIVE:    DISPOSITION: CHIEF COMPLAINT: I was informed last p.m. around 11 PM that pt spike to 102.3 restarted on In Rocephin  also requested CT scan of the chest abdomen pelvic to be done due to unclear etiology of his fever at present time is a fever back to normal .  He denies cough or other symptoms     SUBJECTIVE:     REVIEW OF SYSTEMS:    CONSTITUTIONAL: (  )  weakness,  (  ) fevers or chills  EYES/ENT: (  )visual changes;     NECK: (  ) pain or stiffness  RESPIRATORY:   (  )cough, wheezing, hemoptysis;  (  ) shortness of breath  CARDIOVASCULAR:  (  )chest pain or palpitations  GASTROINTESTINAL:   (  )abdominal or epigastric pain.  (  ) nausea, vomiting, or hematemesis;   (   ) diarrhea or constipation.   GENITOURINARY:   (    ) dysuria, frequency or hematuria  NEUROLOGICAL:  (   ) numbness or weakness   All other review of systems is negative unless indicated above    Vital Signs Last 24 Hrs  T(C): 37.7 (09 Oct 2021 04:37), Max: 39.1 (08 Oct 2021 19:53)  T(F): 99.8 (09 Oct 2021 04:37), Max: 102.3 (08 Oct 2021 19:53)  HR: 76 (09 Oct 2021 04:37) (58 - 84)  BP: 129/68 (09 Oct 2021 04:37) (101/57 - 170/76)  BP(mean): 97 (08 Oct 2021 14:25) (72 - 97)  RR: 17 (09 Oct 2021 04:37) (15 - 20)  SpO2: 98% (09 Oct 2021 04:37) (94% - 100%)    I&O's Summary    08 Oct 2021 07:01  -  09 Oct 2021 07:00  --------------------------------------------------------  IN: 730 mL / OUT: 0 mL / NET: 730 mL        CAPILLARY BLOOD GLUCOSE          PHYSICAL EXAM:    Constitutional:  (  x ) NAD,   ( x  )awake and alert  HEENT: PERR, EOMI,    Neck: Soft and supple, No LAD, No JVD  Respiratory:  (  x  Breath sounds are clear bilaterally,    (   ) wheezing, rales or rhonchi  Cardiovascular:     ( x  )S1 and S2, regular rate and rhythm, no Murmurs, gallops or rubs  Gastrointestinal:  (  x )Bowel Sounds present, soft,   (  )nontender, nondistended,    Extremities:    (  ) peripheral edema  Vascular: 2+ peripheral pulses  Neurological:    ( x   )A/O x 3,   (  ) focal deficits  Musculoskeletal:    (x   )  normal strength b/l upper  (     ) normal  lower extremities  Skin: No rashes    MEDICATIONS:  MEDICATIONS  (STANDING):  artificial  tears Solution 1 Drop(s) Left EYE every 4 hours  aspirin enteric coated 81 milliGRAM(s) Oral daily  buMETAnide 1 milliGRAM(s) Oral daily  carvedilol 25 milliGRAM(s) Oral every 12 hours  cefTRIAXone   IVPB 1000 milliGRAM(s) IV Intermittent every 24 hours  chlorhexidine 2% Cloths 1 Application(s) Topical <User Schedule>  epoetin hiro-epbx (RETACRIT) Injectable 99396 Unit(s) IV Push once  heparin   Injectable 5000 Unit(s) SubCutaneous every 8 hours  petrolatum Ophthalmic Ointment 1 Application(s) Left EYE at bedtime  sevelamer carbonate 800 milliGRAM(s) Oral three times a day with meals  tamsulosin 0.4 milliGRAM(s) Oral at bedtime      LABS: All Labs Reviewed:                        9.9    7.46  )-----------( 139      ( 08 Oct 2021 06:15 )             32.7     10    137  |  95<L>  |  48<H>  ----------------------------<  97  3.9   |  21<L>  |  9.57<H>    Ca    8.8      08 Oct 2021 06:15      PT/INR - ( 08 Oct 2021 06:15 )   PT: 12.0 sec;   INR: 1.00 ratio         PTT - ( 08 Oct 2021 06:15 )  PTT:30.1 sec      Blood Culture: 10-04 @ 23:11  Organism --  Gram Stain Blood -- Gram Stain --  Specimen Source .Blood Blood-Peripheral  Culture-Blood --      Urine Culture      RADIOLOGY/EKG:    ROCEDURE:   · Procedure Name	Interventional Radiology  · Procedure Name	Status post LUE AVF venoplasty and outflow vein thrombus balloon maceration  · TIME OUT	Patient's first and last name, , procedure, and correct site confirmed prior to the start of procedure.  · Procedure Date/Time	08-Oct-2021 13:46  · Informed Consent	Benefits, risks, and possible complications of procedure explained to patient/caregiver who verbalized understanding and gave written consent.  · Procedure Performed By	Myself  · Procedure Assisted By	Attending  · Estimated Blood Loss	Mild  · Complications	No complications  · Contrast	None  · Sedation	Provided by Anesthesia Department  · Local Anesthesia	1% Lidocaine  · Procedure Findings and Details	Status post LUE AVF venoplasty and outflow vein thrombus balloon maceration  No acute complications  Post venoplasty and balloon maceration venogram showed restoration of flow within the outflow fistula vein and patent AVF with brisk antegrade flow.  · Plan	Bedrest x 1 hour  Keep overlying dressing c/d/i  May use AVF now for hemodialysis    Findings discussed Sayed Juan of Nephrology      ASSESSMENT AND PLAN:  continue to monitor patient and was have been waiting for CT scan of abdomen chest and pelvic no need for ID consult at present time continue to monitor is scheduled for hemodialysis today as per IR    May use AVF now for hemodialysis.      DVT PPX:    ADVANCED DIRECTIVE:    DISPOSITION: we cancel his   discharge planning  at present time based on his CT scan results and blood culture and urine culture

## 2021-10-09 NOTE — PROGRESS NOTE ADULT - SUBJECTIVE AND OBJECTIVE BOX
Interventional Radiology Follow-Up Note    This is a 80y Male with ESRD s/p left upper extremity AV fistula with inadequate flow s/p LUE AVF venoplasty and outflow vein thrombus balloon maceration on 10/8/21 in Interventional Radiology with Dr. Le.     S: Patient seen and examined @ bedside. No complaints offered.     Medication:   aspirin enteric coated: (10-07)  buMETAnide: (10-09)  carvedilol: (10-08)  cefTRIAXone   IVPB: (10-08)  heparin   Injectable: (10-09)  tamsulosin: (10-08)    Vitals: T(F): 101.1, Max: 102.3 (19:53)  HR: 85  BP: 132/76  RR: 18  SpO2: 95%    Physical Exam:  General: Nontoxic, in NAD, A&O x3.  Abdomen: soft, NTND, no peritoneal signs.  Extremities:  Left forearm dressing clean and dry. Palpable thrill over AVF. No pedal edema or calf tenderness noted.    LABS:

## 2021-10-09 NOTE — PROGRESS NOTE ADULT - ASSESSMENT
80M with PMHx of HFpEF, ESRD, and HTN presenting with self-reported fever of 104 F.   catheter related infection?     1 ID- temperature last night and this morning Rocephin restarted. 10/8/21. Blood culture with gram neg rods/ ecoli  - for CT scan chest/abdomen and pelvis   2 Renal- s/p AVF venoplasty and outflow vein thrombus balloon maceration on 10/8/21,    AVF ok to use for HD  today   3 -Cont Flomax       Rekha Faith NP  [facility]  [phone]

## 2021-10-10 LAB
ANION GAP SERPL CALC-SCNC: 15 MMOL/L — SIGNIFICANT CHANGE UP (ref 5–17)
ANION GAP SERPL CALC-SCNC: 21 MMOL/L — HIGH (ref 5–17)
APPEARANCE UR: ABNORMAL
BACTERIA # UR AUTO: NEGATIVE — SIGNIFICANT CHANGE UP
BILIRUB UR-MCNC: NEGATIVE — SIGNIFICANT CHANGE UP
BUN SERPL-MCNC: 44 MG/DL — HIGH (ref 7–23)
BUN SERPL-MCNC: 47 MG/DL — HIGH (ref 7–23)
CALCIUM SERPL-MCNC: 8.6 MG/DL — SIGNIFICANT CHANGE UP (ref 8.4–10.5)
CALCIUM SERPL-MCNC: 9.1 MG/DL — SIGNIFICANT CHANGE UP (ref 8.4–10.5)
CHLORIDE SERPL-SCNC: 98 MMOL/L — SIGNIFICANT CHANGE UP (ref 96–108)
CHLORIDE SERPL-SCNC: 98 MMOL/L — SIGNIFICANT CHANGE UP (ref 96–108)
CO2 SERPL-SCNC: 20 MMOL/L — LOW (ref 22–31)
CO2 SERPL-SCNC: 25 MMOL/L — SIGNIFICANT CHANGE UP (ref 22–31)
COD CRY URNS QL: ABNORMAL
COLOR SPEC: ABNORMAL
COMMENT - URINE: SIGNIFICANT CHANGE UP
CREAT SERPL-MCNC: 9.11 MG/DL — HIGH (ref 0.5–1.3)
CREAT SERPL-MCNC: 9.69 MG/DL — HIGH (ref 0.5–1.3)
CULTURE RESULTS: SIGNIFICANT CHANGE UP
CULTURE RESULTS: SIGNIFICANT CHANGE UP
DIFF PNL FLD: ABNORMAL
EPI CELLS # UR: 0 /HPF — SIGNIFICANT CHANGE UP
GLUCOSE SERPL-MCNC: 146 MG/DL — HIGH (ref 70–99)
GLUCOSE SERPL-MCNC: 93 MG/DL — SIGNIFICANT CHANGE UP (ref 70–99)
GLUCOSE UR QL: NEGATIVE — SIGNIFICANT CHANGE UP
HCT VFR BLD CALC: 31.6 % — LOW (ref 39–50)
HGB BLD-MCNC: 9.9 G/DL — LOW (ref 13–17)
HYALINE CASTS # UR AUTO: 0 /LPF — SIGNIFICANT CHANGE UP (ref 0–2)
KETONES UR-MCNC: SIGNIFICANT CHANGE UP
LEUKOCYTE ESTERASE UR-ACNC: ABNORMAL
MAGNESIUM SERPL-MCNC: 2.4 MG/DL — SIGNIFICANT CHANGE UP (ref 1.6–2.6)
MCHC RBC-ENTMCNC: 26.7 PG — LOW (ref 27–34)
MCHC RBC-ENTMCNC: 31.3 GM/DL — LOW (ref 32–36)
MCV RBC AUTO: 85.2 FL — SIGNIFICANT CHANGE UP (ref 80–100)
NITRITE UR-MCNC: NEGATIVE — SIGNIFICANT CHANGE UP
NRBC # BLD: 0 /100 WBCS — SIGNIFICANT CHANGE UP (ref 0–0)
PH UR: 6 — SIGNIFICANT CHANGE UP (ref 5–8)
PHOSPHATE SERPL-MCNC: 4.5 MG/DL — SIGNIFICANT CHANGE UP (ref 2.5–4.5)
PLATELET # BLD AUTO: 167 K/UL — SIGNIFICANT CHANGE UP (ref 150–400)
POTASSIUM SERPL-MCNC: 3.9 MMOL/L — SIGNIFICANT CHANGE UP (ref 3.5–5.3)
POTASSIUM SERPL-MCNC: 5.4 MMOL/L — HIGH (ref 3.5–5.3)
POTASSIUM SERPL-SCNC: 3.9 MMOL/L — SIGNIFICANT CHANGE UP (ref 3.5–5.3)
POTASSIUM SERPL-SCNC: 5.4 MMOL/L — HIGH (ref 3.5–5.3)
PROT UR-MCNC: ABNORMAL
RBC # BLD: 3.71 M/UL — LOW (ref 4.2–5.8)
RBC # FLD: 16.8 % — HIGH (ref 10.3–14.5)
RBC CASTS # UR COMP ASSIST: 15 /HPF — HIGH (ref 0–4)
SODIUM SERPL-SCNC: 138 MMOL/L — SIGNIFICANT CHANGE UP (ref 135–145)
SODIUM SERPL-SCNC: 139 MMOL/L — SIGNIFICANT CHANGE UP (ref 135–145)
SP GR SPEC: 1.04 — HIGH (ref 1.01–1.02)
SPECIMEN SOURCE: SIGNIFICANT CHANGE UP
SPECIMEN SOURCE: SIGNIFICANT CHANGE UP
UROBILINOGEN FLD QL: NEGATIVE — SIGNIFICANT CHANGE UP
WBC # BLD: 12.6 K/UL — HIGH (ref 3.8–10.5)
WBC # FLD AUTO: 12.6 K/UL — HIGH (ref 3.8–10.5)
WBC UR QL: 1 /HPF — SIGNIFICANT CHANGE UP (ref 0–5)

## 2021-10-10 RX ADMIN — CARVEDILOL PHOSPHATE 25 MILLIGRAM(S): 80 CAPSULE, EXTENDED RELEASE ORAL at 10:06

## 2021-10-10 RX ADMIN — Medication 1 DROP(S): at 21:19

## 2021-10-10 RX ADMIN — CHLORHEXIDINE GLUCONATE 1 APPLICATION(S): 213 SOLUTION TOPICAL at 05:58

## 2021-10-10 RX ADMIN — SEVELAMER CARBONATE 800 MILLIGRAM(S): 2400 POWDER, FOR SUSPENSION ORAL at 12:36

## 2021-10-10 RX ADMIN — CARVEDILOL PHOSPHATE 25 MILLIGRAM(S): 80 CAPSULE, EXTENDED RELEASE ORAL at 21:20

## 2021-10-10 RX ADMIN — BUMETANIDE 1 MILLIGRAM(S): 0.25 INJECTION INTRAMUSCULAR; INTRAVENOUS at 06:01

## 2021-10-10 RX ADMIN — HEPARIN SODIUM 5000 UNIT(S): 5000 INJECTION INTRAVENOUS; SUBCUTANEOUS at 21:20

## 2021-10-10 RX ADMIN — Medication 1 APPLICATION(S): at 21:20

## 2021-10-10 RX ADMIN — Medication 650 MILLIGRAM(S): at 00:08

## 2021-10-10 RX ADMIN — CEFTRIAXONE 100 MILLIGRAM(S): 500 INJECTION, POWDER, FOR SOLUTION INTRAMUSCULAR; INTRAVENOUS at 21:19

## 2021-10-10 RX ADMIN — Medication 1 DROP(S): at 17:22

## 2021-10-10 RX ADMIN — SEVELAMER CARBONATE 800 MILLIGRAM(S): 2400 POWDER, FOR SUSPENSION ORAL at 08:16

## 2021-10-10 RX ADMIN — Medication 650 MILLIGRAM(S): at 00:40

## 2021-10-10 RX ADMIN — Medication 1 DROP(S): at 10:06

## 2021-10-10 RX ADMIN — HEPARIN SODIUM 5000 UNIT(S): 5000 INJECTION INTRAVENOUS; SUBCUTANEOUS at 06:02

## 2021-10-10 RX ADMIN — TAMSULOSIN HYDROCHLORIDE 0.4 MILLIGRAM(S): 0.4 CAPSULE ORAL at 21:20

## 2021-10-10 RX ADMIN — SEVELAMER CARBONATE 800 MILLIGRAM(S): 2400 POWDER, FOR SUSPENSION ORAL at 17:22

## 2021-10-10 RX ADMIN — Medication 1 DROP(S): at 13:58

## 2021-10-10 RX ADMIN — Medication 1 DROP(S): at 06:01

## 2021-10-10 RX ADMIN — Medication 81 MILLIGRAM(S): at 12:36

## 2021-10-10 RX ADMIN — HEPARIN SODIUM 5000 UNIT(S): 5000 INJECTION INTRAVENOUS; SUBCUTANEOUS at 12:37

## 2021-10-10 NOTE — PROGRESS NOTE ADULT - SUBJECTIVE AND OBJECTIVE BOX
CHIEF COMPLAINT: Pt condition stable no fever                         BC result noted    SUBJECTIVE:     REVIEW OF SYSTEMS:    CONSTITUTIONAL: (  -)  weakness,  ( - ) fevers or chills  EYES/ENT: (  )visual changes;     NECK: (  ) pain or stiffness  RESPIRATORY:   (-  )cough, wheezing, hemoptysis;  ( - ) shortness of breath  CARDIOVASCULAR:  ( - )chest pain or palpitations  GASTROINTESTINAL:   (  -)abdominal or epigastric pain.  (  ) nausea, vomiting, or hematemesis;   (   ) diarrhea or constipation.   GENITOURINARY:   (  -  ) dysuria, frequency or hematuria  NEUROLOGICAL:  ( -  ) numbness or weakness   All other review of systems is negative unless indicated above    Vital Signs Last 24 Hrs  T(C): 36.8 (10 Oct 2021 13:02), Max: 37.8 (09 Oct 2021 20:50)  T(F): 98.2 (10 Oct 2021 13:02), Max: 100.1 (10 Oct 2021 00:06)  HR: 71 (10 Oct 2021 13:02) (70 - 94)  BP: 111/68 (10 Oct 2021 13:02) (94/58 - 127/67)  BP(mean): --  RR: 18 (10 Oct 2021 13:02) (17 - 18)  SpO2: 96% (10 Oct 2021 13:02) (94% - 96%)    I&O's Summary    09 Oct 2021 07:01  -  10 Oct 2021 07:00  --------------------------------------------------------  IN: 500 mL / OUT: 1950 mL / NET: -1450 mL    10 Oct 2021 07:01  -  10 Oct 2021 18:42  --------------------------------------------------------  IN: 600 mL / OUT: 0 mL / NET: 600 mL        CAPILLARY BLOOD GLUCOSE          PHYSICAL EXAM:    Constitutional:  (  x ) NAD,   (  x )awake and alert  HEENT: PERR, EOMI,    Neck: Soft and supple, No LAD, No JVD  Respiratory:  (  x  Breath sounds are clear bilaterally,    (   ) wheezing, rales or rhonchi  Cardiovascular:     (   x)S1 and S2, regular rate and rhythm, no Murmurs, gallops or rubs  Gastrointestinal:  (  x )Bowel Sounds present, soft,   (  )nontender, nondistended,    Extremities:    (  ) peripheral edema  Vascular: 2+ peripheral pulses  Neurological:    (  x  )A/O x 3,   (  ) focal deficits  Musculoskeletal:    ( x  )x  normal strength b/l upper  (     ) normal  lower extremities  Skin: No rashes    MEDICATIONS:  MEDICATIONS  (STANDING):  artificial  tears Solution 1 Drop(s) Left EYE every 4 hours  aspirin enteric coated 81 milliGRAM(s) Oral daily  buMETAnide 1 milliGRAM(s) Oral daily  carvedilol 25 milliGRAM(s) Oral every 12 hours  cefTRIAXone   IVPB 1000 milliGRAM(s) IV Intermittent every 24 hours  chlorhexidine 2% Cloths 1 Application(s) Topical <User Schedule>  heparin   Injectable 5000 Unit(s) SubCutaneous every 8 hours  petrolatum Ophthalmic Ointment 1 Application(s) Left EYE at bedtime  sevelamer carbonate 800 milliGRAM(s) Oral three times a day with meals  tamsulosin 0.4 milliGRAM(s) Oral at bedtime      LABS: All Labs Reviewed:                        9.9    12.60 )-----------( 167      ( 10 Oct 2021 07:25 )             31.6     10-10    138  |  98  |  47<H>  ----------------------------<  146<H>  3.9   |  25  |  9.69<H>    Ca    8.6      10 Oct 2021 12:51  Phos  4.5     10-10  Mg     2.4     10-10            Blood Culture: 10-08 @ 22:50  Organism Blood Culture PCR  Gram Stain Blood -- Gram Stain   Growth in anaerobic bottle: Gram Negative Rods  Specimen Source .Blood Blood-Peripheral  Culture-Blood --      Urine Culture      RADIOLOGY/EKG:    ASSESSMENT AND PLAN:  continue to monitor patient and   waiting for CT scan of abdomen chest and pelvic no need for ID consult at present time continue to monito.  He was   hemodialysis o 10/9/21 as per IR    May use AVF now for hemodialysis.    DVT PPX:    ADVANCED DIRECTIVE:    DISPOSITION:

## 2021-10-11 LAB
-  AMIKACIN: SIGNIFICANT CHANGE UP
-  AMPICILLIN/SULBACTAM: SIGNIFICANT CHANGE UP
-  AMPICILLIN: SIGNIFICANT CHANGE UP
-  AZTREONAM: SIGNIFICANT CHANGE UP
-  CEFAZOLIN: SIGNIFICANT CHANGE UP
-  CEFEPIME: SIGNIFICANT CHANGE UP
-  CEFOXITIN: SIGNIFICANT CHANGE UP
-  CEFTRIAXONE: SIGNIFICANT CHANGE UP
-  CIPROFLOXACIN: SIGNIFICANT CHANGE UP
-  ERTAPENEM: SIGNIFICANT CHANGE UP
-  GENTAMICIN: SIGNIFICANT CHANGE UP
-  IMIPENEM: SIGNIFICANT CHANGE UP
-  LEVOFLOXACIN: SIGNIFICANT CHANGE UP
-  MEROPENEM: SIGNIFICANT CHANGE UP
-  PIPERACILLIN/TAZOBACTAM: SIGNIFICANT CHANGE UP
-  TOBRAMYCIN: SIGNIFICANT CHANGE UP
-  TRIMETHOPRIM/SULFAMETHOXAZOLE: SIGNIFICANT CHANGE UP
CULTURE RESULTS: SIGNIFICANT CHANGE UP
METHOD TYPE: SIGNIFICANT CHANGE UP
ORGANISM # SPEC MICROSCOPIC CNT: SIGNIFICANT CHANGE UP
SPECIMEN SOURCE: SIGNIFICANT CHANGE UP

## 2021-10-11 RX ORDER — ERYTHROPOIETIN 10000 [IU]/ML
10000 INJECTION, SOLUTION INTRAVENOUS; SUBCUTANEOUS ONCE
Refills: 0 | Status: COMPLETED | OUTPATIENT
Start: 2021-10-12 | End: 2021-10-12

## 2021-10-11 RX ADMIN — Medication 1 DROP(S): at 14:26

## 2021-10-11 RX ADMIN — SEVELAMER CARBONATE 800 MILLIGRAM(S): 2400 POWDER, FOR SUSPENSION ORAL at 12:45

## 2021-10-11 RX ADMIN — BUMETANIDE 1 MILLIGRAM(S): 0.25 INJECTION INTRAMUSCULAR; INTRAVENOUS at 06:24

## 2021-10-11 RX ADMIN — HEPARIN SODIUM 5000 UNIT(S): 5000 INJECTION INTRAVENOUS; SUBCUTANEOUS at 18:25

## 2021-10-11 RX ADMIN — CHLORHEXIDINE GLUCONATE 1 APPLICATION(S): 213 SOLUTION TOPICAL at 06:50

## 2021-10-11 RX ADMIN — Medication 1 DROP(S): at 10:30

## 2021-10-11 RX ADMIN — SEVELAMER CARBONATE 800 MILLIGRAM(S): 2400 POWDER, FOR SUSPENSION ORAL at 09:29

## 2021-10-11 RX ADMIN — Medication 81 MILLIGRAM(S): at 12:45

## 2021-10-11 RX ADMIN — Medication 1 DROP(S): at 18:44

## 2021-10-11 RX ADMIN — Medication 1 DROP(S): at 21:04

## 2021-10-11 RX ADMIN — SEVELAMER CARBONATE 800 MILLIGRAM(S): 2400 POWDER, FOR SUSPENSION ORAL at 18:27

## 2021-10-11 RX ADMIN — CEFTRIAXONE 100 MILLIGRAM(S): 500 INJECTION, POWDER, FOR SOLUTION INTRAMUSCULAR; INTRAVENOUS at 21:03

## 2021-10-11 RX ADMIN — Medication 1 APPLICATION(S): at 21:04

## 2021-10-11 RX ADMIN — Medication 1 DROP(S): at 06:25

## 2021-10-11 RX ADMIN — CARVEDILOL PHOSPHATE 25 MILLIGRAM(S): 80 CAPSULE, EXTENDED RELEASE ORAL at 21:04

## 2021-10-11 RX ADMIN — HEPARIN SODIUM 5000 UNIT(S): 5000 INJECTION INTRAVENOUS; SUBCUTANEOUS at 21:04

## 2021-10-11 RX ADMIN — CARVEDILOL PHOSPHATE 25 MILLIGRAM(S): 80 CAPSULE, EXTENDED RELEASE ORAL at 10:30

## 2021-10-11 RX ADMIN — HEPARIN SODIUM 5000 UNIT(S): 5000 INJECTION INTRAVENOUS; SUBCUTANEOUS at 06:24

## 2021-10-11 RX ADMIN — TAMSULOSIN HYDROCHLORIDE 0.4 MILLIGRAM(S): 0.4 CAPSULE ORAL at 21:04

## 2021-10-11 NOTE — PROGRESS NOTE ADULT - SUBJECTIVE AND OBJECTIVE BOX
[facility] [phone]        Patient seen and examined in bed.  He was the same         MEDICATIONS  (STANDING):  artificial  tears Solution 1 Drop(s) Left EYE every 4 hours  aspirin enteric coated 81 milliGRAM(s) Oral daily  buMETAnide 1 milliGRAM(s) Oral daily  carvedilol 25 milliGRAM(s) Oral every 12 hours  cefTRIAXone   IVPB 1000 milliGRAM(s) IV Intermittent every 24 hours  chlorhexidine 2% Cloths 1 Application(s) Topical <User Schedule>  heparin   Injectable 5000 Unit(s) SubCutaneous every 8 hours  petrolatum Ophthalmic Ointment 1 Application(s) Left EYE at bedtime  sevelamer carbonate 800 milliGRAM(s) Oral three times a day with meals  tamsulosin 0.4 milliGRAM(s) Oral at bedtime      VITAL:  T(C): , Max: 36.9 (10-11-21 @ 12:23)  T(F): , Max: 98.5 (10-11-21 @ 12:23)  HR: 63 (10-11-21 @ 12:23)  BP: 128/62 (10-11-21 @ 12:23)  BP(mean): --  RR: 18 (10-11-21 @ 12:23)  SpO2: 96% (10-11-21 @ 12:23)  Wt(kg): --    I and O's:    10-10 @ 07:01  -  10-11 @ 07:00  --------------------------------------------------------  IN: 1090 mL / OUT: 0 mL / NET: 1090 mL          PHYSICAL EXAM:    Constitutional: NAD  Neck:  No JVD  Respiratory: CTAB/L  Cardiovascular: S1 and S2  Gastrointestinal: BS+, soft, NT/ND  Extremities: No peripheral edema  Neurological: A/O x 3, no focal deficits  Psychiatric: Normal mood, normal affect  : No Aguilar  Skin: No rashes  Access: avf perm cath     LABS:                        9.9    12.60 )-----------( 167      ( 10 Oct 2021 07:25 )             31.6     10-10    138  |  98  |  47<H>  ----------------------------<  146<H>  3.9   |  25  |  9.69<H>    Ca    8.6      10 Oct 2021 12:51  Phos  4.5     10-10  Mg     2.4     10-10            Urine Studies:  Urinalysis Basic - ( 10 Oct 2021 07:25 )    Color: Light Orange / Appearance: Turbid / S.041 / pH: x  Gluc: x / Ketone: Trace  / Bili: Negative / Urobili: Negative   Blood: x / Protein: 300 mg/dL / Nitrite: Negative   Leuk Esterase: Large / RBC: 15 /hpf / WBC 1 /HPF   Sq Epi: x / Non Sq Epi: 0 /hpf / Bacteria: Negative            RADIOLOGY & ADDITIONAL STUDIES:

## 2021-10-11 NOTE — PROGRESS NOTE ADULT - ASSESSMENT
80M with PMHx of HFpEF, ESRD, and HTN presenting with self-reported fever of 104 F.   catheter related infection?     1 ID- temperature last night and this morning Rocephin restarted. 10/8/21. Blood culture with gram neg rods/ ecoli  - for CT scan chest/abdomen and pelvis and no issues with CONTRAST   2 Renal- s/p AVF venoplasty and outflow vein thrombus balloon maturation on 10/8/21,   perm cath and the fistula is not ready to use.  HD in am   3 -Cont Flomax       Sayed [facility]  [phone]

## 2021-10-11 NOTE — PROGRESS NOTE ADULT - SUBJECTIVE AND OBJECTIVE BOX
Date of Service   10-11-21 @ 13:06    Patient is a 80y old  Male who presents with a chief complaint of Fever (11 Oct 2021 08:45)      INTERVAL HISTORY: pt feels ok     REVIEW OF SYSTEMS:   CONSTITUTIONAL: No weakness  EYES/ENT: No visual changes; No throat pain  Neck: No pain or stiffness  Respiratory: No cough, wheezing, No shortness of breath  CARDIOVASCULAR: no chest pain or palpitations  GASTROINTESTINAL: No abdominal pain, no nausea, vomiting or hematemesis  GENITOURINARY: No dysuria, frequency or hematuria  NEUROLOGICAL: No stroke like symptoms  SKIN: No rashes    	  MEDICATIONS:  buMETAnide 1 milliGRAM(s) Oral daily  carvedilol 25 milliGRAM(s) Oral every 12 hours  tamsulosin 0.4 milliGRAM(s) Oral at bedtime        PHYSICAL EXAM:  T(C): 36.9 (10-11-21 @ 12:23), Max: 36.9 (10-11-21 @ 12:23)  HR: 63 (10-11-21 @ 12:23) (60 - 70)  BP: 128/62 (10-11-21 @ 12:23) (112/63 - 152/73)  RR: 18 (10-11-21 @ 12:23) (18 - 18)  SpO2: 96% (10-11-21 @ 12:23) (94% - 96%)  Wt(kg): --  I&O's Summary    10 Oct 2021 07:01  -  11 Oct 2021 07:00  --------------------------------------------------------  IN: 1090 mL / OUT: 0 mL / NET: 1090 mL          Appearance: In no distress	  HEENT:    PERRL, EOMI	  Cardiovascular:  S1 S2, No JVD  Respiratory: Lungs clear to auscultation	  Gastrointestinal:  Soft, Non-tender, + BS	  Vascularature:  No edema of LE  Psychiatric: Appropriate affect   Neuro: no acute focal deficits                               9.9    12.60 )-----------( 167      ( 10 Oct 2021 07:25 )             31.6     10-10    138  |  98  |  47<H>  ----------------------------<  146<H>  3.9   |  25  |  9.69<H>    Ca    8.6      10 Oct 2021 12:51  Phos  4.5     10-10  Mg     2.4     10-10          Labs personally reviewed      ASSESSMENT/PLAN:   80M with PMHx of HFpEF, ESRD, and HTN presenting with self-reported fever of 104 F. Patient being admitted for fever in dialysis patient.      Problem/Plan - 1:  ·  Problem: Chronic diastolic heart Failure with preserved EF   - c/w bumex   - c/w carvedilol  25mg Q12hrs   - pt appears euvolemic   - ESRD  per renal for fluid management     Problem/Plan - 2:  ·  Problem: Fever  - Mild leukocytosis   - bld and urine cx sent  - monitor fever curve and cbc   - off Abx   - chest CT negative   - CT abd and pelvis ordered     Problem/Plan - 3:  ·  Problem: Hypertension  - c/w coreg and bumex  - monitor BP   - on ASA for prevention   - rec check Lipid panel with am Labs     Problem/Plan - 4:  ·  Problem: ESRD  - renal consult appreciated   - HD per renal   - Left arm fistulogram, possible balloon venoplasty    Problem/Plan - 5:  ·  Problem: DVT ppx on Heparin SQ             Sammie DUENAS-BC   Sridhar Carrillo DO [facility]  Cardiovascular Medicine  [address] [place] [address], [address]  Office: [phone]  Cell: [phone] Date of Service   10-11-21 @ 13:06    Patient is a 80y old  Male who presents with a chief complaint of Fever (11 Oct 2021 08:45)      INTERVAL HISTORY: pt feels ok     REVIEW OF SYSTEMS:   CONSTITUTIONAL: No weakness  EYES/ENT: No visual changes; No throat pain  Neck: No pain or stiffness  Respiratory: No cough, wheezing, No shortness of breath  CARDIOVASCULAR: no chest pain or palpitations  GASTROINTESTINAL: No abdominal pain, no nausea, vomiting or hematemesis  GENITOURINARY: No dysuria, frequency or hematuria  NEUROLOGICAL: No stroke like symptoms  SKIN: No rashes    	  MEDICATIONS:  buMETAnide 1 milliGRAM(s) Oral daily  carvedilol 25 milliGRAM(s) Oral every 12 hours  tamsulosin 0.4 milliGRAM(s) Oral at bedtime        PHYSICAL EXAM:  T(C): 36.9 (10-11-21 @ 12:23), Max: 36.9 (10-11-21 @ 12:23)  HR: 63 (10-11-21 @ 12:23) (60 - 70)  BP: 128/62 (10-11-21 @ 12:23) (112/63 - 152/73)  RR: 18 (10-11-21 @ 12:23) (18 - 18)  SpO2: 96% (10-11-21 @ 12:23) (94% - 96%)  Wt(kg): --  I&O's Summary    10 Oct 2021 07:01  -  11 Oct 2021 07:00  --------------------------------------------------------  IN: 1090 mL / OUT: 0 mL / NET: 1090 mL          Appearance: In no distress	  HEENT:    PERRL, EOMI	  Cardiovascular:  S1 S2, No JVD  Respiratory: Lungs clear to auscultation	  Gastrointestinal:  Soft, Non-tender, + BS	  Vascularature:  No edema of LE  Psychiatric: Appropriate affect   Neuro: no acute focal deficits                               9.9    12.60 )-----------( 167      ( 10 Oct 2021 07:25 )             31.6     10-10    138  |  98  |  47<H>  ----------------------------<  146<H>  3.9   |  25  |  9.69<H>    Ca    8.6      10 Oct 2021 12:51  Phos  4.5     10-10  Mg     2.4     10-10          Labs personally reviewed      ASSESSMENT/PLAN:   80M with PMHx of HFpEF, ESRD, and HTN presenting with self-reported fever of 104 F. Patient being admitted for fever in dialysis patient.      Problem/Plan - 1:  ·  Problem: Chronic diastolic heart Failure with preserved EF   - c/w bumex   - c/w carvedilol  25mg Q12hrs   - pt appears euvolemic   - ESRD  per renal for fluid management     Problem/Plan - 2:  ·  Problem: Fever  - Mild leukocytosis   - bld and urine cx sent  - monitor fever curve and cbc   - off Abx   - chest CT negative   - CT abd and pelvis ordered     Problem/Plan - 3:  ·  Problem: Hypertension  - c/w coreg and bumex  - monitor BP   - on ASA for prevention        Problem/Plan - 4:  ·  Problem: ESRD  - renal consult appreciated   - HD per renal   - Left arm fistulogram, possible balloon venoplasty    Problem/Plan - 5:  ·  Problem: DVT ppx on Heparin SQ             Sammie Carmichael FNP-BC   Sridhar Carrillo DO [facility]  Cardiovascular Medicine  [address] [place] [address], [address]  Office: [phone]  Cell: [phone]

## 2021-10-11 NOTE — PROGRESS NOTE ADULT - SUBJECTIVE AND OBJECTIVE BOX
CHIEF COMPLAINT: + blood culture for E coli    SUBJECTIVE:     REVIEW OF SYSTEMS:    CONSTITUTIONAL: (  )  weakness,  (  ) fevers or chills  EYES/ENT: (  )visual changes;     NECK: (  ) pain or stiffness  RESPIRATORY:   (  )cough, wheezing, hemoptysis;  (  ) shortness of breath  CARDIOVASCULAR:  (  )chest pain or palpitations  GASTROINTESTINAL:   (  )abdominal or epigastric pain.  (  ) nausea, vomiting, or hematemesis;   (   ) diarrhea or constipation.   GENITOURINARY:   (    ) dysuria, frequency or hematuria  NEUROLOGICAL:  (   ) numbness or weakness   All other review of systems is negative unless indicated above    Vital Signs Last 24 Hrs  T(C): 36.6 (11 Oct 2021 08:26), Max: 36.8 (10 Oct 2021 13:02)  T(F): 97.9 (11 Oct 2021 08:26), Max: 98.2 (10 Oct 2021 13:02)  HR: 60 (11 Oct 2021 08:26) (60 - 71)  BP: 152/73 (11 Oct 2021 08:26) (111/68 - 152/73)  BP(mean): --  RR: 18 (11 Oct 2021 08:26) (17 - 18)  SpO2: 95% (11 Oct 2021 08:26) (94% - 96%)    I&O's Summary    10 Oct 2021 07:01  -  11 Oct 2021 07:00  --------------------------------------------------------  IN: 1090 mL / OUT: 0 mL / NET: 1090 mL        CAPILLARY BLOOD GLUCOSE          PHYSICAL EXAM:    Constitutional:  (   ) NAD,   (   )awake and alert  HEENT: PERR, EOMI,    Neck: Soft and supple, No LAD, No JVD  Respiratory:  (    Breath sounds are clear bilaterally,    (   ) wheezing, rales or rhonchi  Cardiovascular:     (   )S1 and S2, regular rate and rhythm, no Murmurs, gallops or rubs  Gastrointestinal:  (   )Bowel Sounds present, soft,   (  )nontender, nondistended,    Extremities:    (  ) peripheral edema  Vascular: 2+ peripheral pulses  Neurological:    (    )A/O x 3,   (  ) focal deficits  Musculoskeletal:    (   )  normal strength b/l upper  (     ) normal  lower extremities  Skin: No rashes    MEDICATIONS:  MEDICATIONS  (STANDING):  artificial  tears Solution 1 Drop(s) Left EYE every 4 hours  aspirin enteric coated 81 milliGRAM(s) Oral daily  buMETAnide 1 milliGRAM(s) Oral daily  carvedilol 25 milliGRAM(s) Oral every 12 hours  cefTRIAXone   IVPB 1000 milliGRAM(s) IV Intermittent every 24 hours  chlorhexidine 2% Cloths 1 Application(s) Topical <User Schedule>  heparin   Injectable 5000 Unit(s) SubCutaneous every 8 hours  petrolatum Ophthalmic Ointment 1 Application(s) Left EYE at bedtime  sevelamer carbonate 800 milliGRAM(s) Oral three times a day with meals  tamsulosin 0.4 milliGRAM(s) Oral at bedtime      LABS: All Labs Reviewed:                        9.9    12.60 )-----------( 167      ( 10 Oct 2021 07:25 )             31.6     10-10    138  |  98  |  47<H>  ----------------------------<  146<H>  3.9   |  25  |  9.69<H>    Ca    8.6      10 Oct 2021 12:51  Phos  4.5     10-10  Mg     2.4     10-10            Blood Culture: 10-08 @ 22:50  Organism Blood Culture PCR  Gram Stain Blood -- Gram Stain   Growth in anaerobic bottle: Gram Negative Rods  Specimen Source .Blood Blood-Peripheral  Culture-Blood --      Urine Culture      RADIOLOGY/EKG:    ASSESSMENT AND PLAN:    DVT PPX:    ADVANCED DIRECTIVE:    DISPOSITION: CHIEF COMPLAINT:  + blood culture for E coli    SUBJECTIVE:     REVIEW OF SYSTEMS:    CONSTITUTIONAL: (  )  weakness,  (  ) fevers or chills  EYES/ENT: (  )visual changes;     NECK: (  ) pain or stiffness  RESPIRATORY:   (  )cough, wheezing, hemoptysis;  (  ) shortness of breath  CARDIOVASCULAR:  (  )chest pain or palpitations  GASTROINTESTINAL:   (  )abdominal or epigastric pain.  (  ) nausea, vomiting, or hematemesis;   (   ) diarrhea or constipation.   GENITOURINARY:   (    ) dysuria, frequency or hematuria  NEUROLOGICAL:  (   ) numbness or weakness   All other review of systems is negative unless indicated above    Vital Signs Last 24 Hrs  T(C): 36.6 (11 Oct 2021 08:26), Max: 36.8 (10 Oct 2021 13:02)  T(F): 97.9 (11 Oct 2021 08:26), Max: 98.2 (10 Oct 2021 13:02)  HR: 60 (11 Oct 2021 08:26) (60 - 71)  BP: 152/73 (11 Oct 2021 08:26) (111/68 - 152/73)  BP(mean): --  RR: 18 (11 Oct 2021 08:26) (17 - 18)  SpO2: 95% (11 Oct 2021 08:26) (94% - 96%)    I&O's Summary    10 Oct 2021 07:01  -  11 Oct 2021 07:00  --------------------------------------------------------  IN: 1090 mL / OUT: 0 mL / NET: 1090 mL        CAPILLARY BLOOD GLUCOSE          PHYSICAL EXAM:  Constitutional: NAD  Neck:  No JVD  Respiratory: CTAB/L  Cardiovascular: S1 and S2  Gastrointestinal: BS+, soft, NT/ND  Extremities: No peripheral edema  Neurological: A/O x 3, no focal deficits  Psychiatric: Normal mood, normal affect  : No Aguilar  Skin: No rashes  Access: avf perm cath        MEDICATIONS:  MEDICATIONS  (STANDING):  artificial  tears Solution 1 Drop(s) Left EYE every 4 hours  aspirin enteric coated 81 milliGRAM(s) Oral daily  buMETAnide 1 milliGRAM(s) Oral daily  carvedilol 25 milliGRAM(s) Oral every 12 hours  cefTRIAXone   IVPB 1000 milliGRAM(s) IV Intermittent every 24 hours  chlorhexidine 2% Cloths 1 Application(s) Topical <User Schedule>  heparin   Injectable 5000 Unit(s) SubCutaneous every 8 hours  petrolatum Ophthalmic Ointment 1 Application(s) Left EYE at bedtime  sevelamer carbonate 800 milliGRAM(s) Oral three times a day with meals  tamsulosin 0.4 milliGRAM(s) Oral at bedtime      LABS: All Labs Reviewed:                        9.9    12.60 )-----------( 167      ( 10 Oct 2021 07:25 )             31.6     10-10    138  |  98  |  47<H>  ----------------------------<  146<H>  3.9   |  25  |  9.69<H>    Ca    8.6      10 Oct 2021 12:51  Phos  4.5     10-10  Mg     2.4     10-10            Blood Culture: 10-08 @ 22:50  Organism Blood Culture PCR  Gram Stain Blood -- Gram Stain   Growth in anaerobic bottle: Gram Negative Rods  Specimen Source .Blood Blood-Peripheral  Culture-Blood --      Urine Culture      RADIOLOGY/EKG:    ASSESSMENT AND PLAN:  80M with PMHx of HFpEF, ESRD, and HTN presenting with self-reported fever of 104 F.   catheter related infection?      # BacteremiaI  no fever x 24 hours   on Rocephin restarted. 10/8/21. Blood culture with gram neg rods/ ecoli  - for CT scan chest/abdomen and pelvis for final diagnosis.  # Renal- s/p AVF venoplasty and outflow vein thrombus balloon maturation on 10/8/21,      perm cath and the fistula is not ready to use.  HD in am    # -Cont Flomax       DVT PPX:    ADVANCED DIRECTIVE:    DISPOSITION: discussed with his wife about discharge planning after completion of his CAT scan if the CAT scan is negative with repeat blood culture

## 2021-10-12 LAB
ANION GAP SERPL CALC-SCNC: 22 MMOL/L — HIGH (ref 5–17)
BUN SERPL-MCNC: 68 MG/DL — HIGH (ref 7–23)
CALCIUM SERPL-MCNC: 8.7 MG/DL — SIGNIFICANT CHANGE UP (ref 8.4–10.5)
CHLORIDE SERPL-SCNC: 97 MMOL/L — SIGNIFICANT CHANGE UP (ref 96–108)
CO2 SERPL-SCNC: 18 MMOL/L — LOW (ref 22–31)
CREAT SERPL-MCNC: 12.2 MG/DL — HIGH (ref 0.5–1.3)
GLUCOSE SERPL-MCNC: 80 MG/DL — SIGNIFICANT CHANGE UP (ref 70–99)
HCT VFR BLD CALC: 25.3 % — LOW (ref 39–50)
HGB BLD-MCNC: 7.7 G/DL — LOW (ref 13–17)
MCHC RBC-ENTMCNC: 26.2 PG — LOW (ref 27–34)
MCHC RBC-ENTMCNC: 30.4 GM/DL — LOW (ref 32–36)
MCV RBC AUTO: 86.1 FL — SIGNIFICANT CHANGE UP (ref 80–100)
NRBC # BLD: 0 /100 WBCS — SIGNIFICANT CHANGE UP (ref 0–0)
PLATELET # BLD AUTO: 256 K/UL — SIGNIFICANT CHANGE UP (ref 150–400)
POTASSIUM SERPL-MCNC: 4.2 MMOL/L — SIGNIFICANT CHANGE UP (ref 3.5–5.3)
POTASSIUM SERPL-SCNC: 4.2 MMOL/L — SIGNIFICANT CHANGE UP (ref 3.5–5.3)
RBC # BLD: 2.94 M/UL — LOW (ref 4.2–5.8)
RBC # FLD: 16.7 % — HIGH (ref 10.3–14.5)
SODIUM SERPL-SCNC: 137 MMOL/L — SIGNIFICANT CHANGE UP (ref 135–145)
WBC # BLD: 7.84 K/UL — SIGNIFICANT CHANGE UP (ref 3.8–10.5)
WBC # FLD AUTO: 7.84 K/UL — SIGNIFICANT CHANGE UP (ref 3.8–10.5)

## 2021-10-12 PROCEDURE — 71260 CT THORAX DX C+: CPT | Mod: 26

## 2021-10-12 PROCEDURE — 74177 CT ABD & PELVIS W/CONTRAST: CPT | Mod: 26

## 2021-10-12 RX ADMIN — ERYTHROPOIETIN 10000 UNIT(S): 10000 INJECTION, SOLUTION INTRAVENOUS; SUBCUTANEOUS at 17:18

## 2021-10-12 RX ADMIN — Medication 81 MILLIGRAM(S): at 11:40

## 2021-10-12 RX ADMIN — HEPARIN SODIUM 5000 UNIT(S): 5000 INJECTION INTRAVENOUS; SUBCUTANEOUS at 14:40

## 2021-10-12 RX ADMIN — CARVEDILOL PHOSPHATE 25 MILLIGRAM(S): 80 CAPSULE, EXTENDED RELEASE ORAL at 11:41

## 2021-10-12 RX ADMIN — Medication 1 DROP(S): at 11:41

## 2021-10-12 RX ADMIN — CHLORHEXIDINE GLUCONATE 1 APPLICATION(S): 213 SOLUTION TOPICAL at 05:44

## 2021-10-12 RX ADMIN — BUMETANIDE 1 MILLIGRAM(S): 0.25 INJECTION INTRAMUSCULAR; INTRAVENOUS at 05:43

## 2021-10-12 RX ADMIN — Medication 1 DROP(S): at 05:44

## 2021-10-12 RX ADMIN — Medication 1 DROP(S): at 21:05

## 2021-10-12 RX ADMIN — HEPARIN SODIUM 5000 UNIT(S): 5000 INJECTION INTRAVENOUS; SUBCUTANEOUS at 21:06

## 2021-10-12 RX ADMIN — HEPARIN SODIUM 5000 UNIT(S): 5000 INJECTION INTRAVENOUS; SUBCUTANEOUS at 05:44

## 2021-10-12 RX ADMIN — Medication 1 APPLICATION(S): at 21:07

## 2021-10-12 RX ADMIN — SEVELAMER CARBONATE 800 MILLIGRAM(S): 2400 POWDER, FOR SUSPENSION ORAL at 08:40

## 2021-10-12 RX ADMIN — TAMSULOSIN HYDROCHLORIDE 0.4 MILLIGRAM(S): 0.4 CAPSULE ORAL at 21:06

## 2021-10-12 RX ADMIN — SEVELAMER CARBONATE 800 MILLIGRAM(S): 2400 POWDER, FOR SUSPENSION ORAL at 18:34

## 2021-10-12 RX ADMIN — CEFTRIAXONE 100 MILLIGRAM(S): 500 INJECTION, POWDER, FOR SOLUTION INTRAMUSCULAR; INTRAVENOUS at 21:01

## 2021-10-12 RX ADMIN — SEVELAMER CARBONATE 800 MILLIGRAM(S): 2400 POWDER, FOR SUSPENSION ORAL at 12:18

## 2021-10-12 RX ADMIN — CARVEDILOL PHOSPHATE 25 MILLIGRAM(S): 80 CAPSULE, EXTENDED RELEASE ORAL at 21:07

## 2021-10-12 RX ADMIN — Medication 1 DROP(S): at 14:39

## 2021-10-12 NOTE — CONSULT NOTE ADULT - ATTENDING COMMENTS
Pt care and plan discussed and reviewed with NP. Plan as outlined above edited by me to reflect our discussion.
Patient was seen and examined. He has history of low grade prostate cancer but has not followed up in several years. Prostate cancer was on observation. Discussed the importance of follow up with him. 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 There is no sign of infection, erythema, etc. Discussed CT results with patient, he will follow as outpatient.

## 2021-10-12 NOTE — CONSULT NOTE ADULT - SUBJECTIVE AND OBJECTIVE BOX
UROLOGY CONSULT NOTE    HPI:  This is a 80y old Male with ESRD on HD, james 6 prostate cancer 2010 with negative biopsy 2013, HTN, DM, glaucoma, who was admitted for fevers and was found on CT to have a cystic mass in the right scrotum.  Pt states that     Of note, he had james 6 prostate cancer 2010 but had a negative biopsy 2013.      PAST MEDICAL HISTORY    HTN - Hypertension    Glaucoma (Increased Eye Pressure)    BPH (Benign Prostatic Hypertrophy)    HTN (hypertension)    Hypertension    Diabetes    ESRD on dialysis        PAST SURGICAL HISTORY    Excision of Sinus Polyp    Laser Surgery Left    Laser Surgery :Right    No significant past surgical history        FAMILY HISTORY    Family history of diabetes mellitus (DM) (Mother, Sibling)        HOME MEDICATIONS    Aspirin Enteric Coated 81 mg oral delayed release tablet: 1 tab(s) orally once a day (05 Oct 2021 10:33)  Bumex 1 mg oral tablet: 1 tab(s) orally once a day (05 Oct 2021 10:33)  tamsulosin 0.4 mg oral capsule: 1 cap(s) orally once a day (at bedtime) (05 Oct 2021 10:33)      DRUG ALLERGIES    NKDA    REVIEW OF SYSTEMS: Pertinent positives and negatives as stated in HPI, otherwise negative      VITAL SIGNS    T(F): 98.2, Max: 98.4 (10-12-21 @ 08:16)  HR: 63  BP: 171/71  RR: 18  SpO2: 97%    I's & O's      11 Oct 2021 07:01  -  12 Oct 2021 07:00  --------------------------------------------------------  IN: 490 mL / OUT: 0 mL / NET: 490 mL    12 Oct 2021 07:01  -  12 Oct 2021 15:38  --------------------------------------------------------  IN: 480 mL / OUT: 0 mL / NET: 480 mL        PHYSICAL EXAM    Gen: Well groomed, well dressed, well nourished  Abd: Soft, NT/ND  :         LABS:                        7.7    7.84  )-----------( 256               25.3     137  |  97  |  68  ----------------------------<  80  4.2   |  18  |  12.20    Ca    8.7        Blood culture: ecoli x1, neg x3      IMAGING:    CT: An 8.1 x 6.4 cm cystic mass is seen within the right scrotum. This area was not imaged in 2017. Findings may represent a benign process such as epididymal or spermatic cyst; however, thickened peripheral walls may represent superimposed infection/abscess in the appropriate clinical context. Malignancy is an additional concern. Recommend further evaluation with scrotal ultrasound.    Markedly enlarged prostate gland.    Collapsed urinary bladder, limiting evaluation. Mild infiltration of the perivesical fat.   UROLOGY CONSULT NOTE    HPI:  This is a 80y old Male with ESRD on HD, james 6 prostate cancer 2010 with negative biopsy 2013, HTN, DM, glaucoma, who was admitted for fevers and was found on CT to have a cystic mass in the right scrotum.  Pt states that the swelling has been there for several years and has not caused him any problems.  He has been on HD since March and does not void.    Of note, he had james 6 prostate cancer in 2010 but had a negative biopsy 2013.      PAST MEDICAL HISTORY    HTN - Hypertension    Glaucoma (Increased Eye Pressure)    BPH (Benign Prostatic Hypertrophy)    HTN (hypertension)    Hypertension    Diabetes    ESRD on dialysis        PAST SURGICAL HISTORY    Excision of Sinus Polyp    Laser Surgery Left    Laser Surgery :Right    No significant past surgical history        FAMILY HISTORY    Family history of diabetes mellitus (DM) (Mother, Sibling)        HOME MEDICATIONS    Aspirin Enteric Coated 81 mg oral delayed release tablet: 1 tab(s) orally once a day (05 Oct 2021 10:33)  Bumex 1 mg oral tablet: 1 tab(s) orally once a day (05 Oct 2021 10:33)  tamsulosin 0.4 mg oral capsule: 1 cap(s) orally once a day (at bedtime) (05 Oct 2021 10:33)      DRUG ALLERGIES    NKDA    REVIEW OF SYSTEMS: Pertinent positives and negatives as stated in HPI, otherwise negative      VITAL SIGNS    T(F): 98.2, Max: 98.4 (10-12-21 @ 08:16)  HR: 63  BP: 171/71  RR: 18  SpO2: 97%    I's & O's      11 Oct 2021 07:01  -  12 Oct 2021 07:00  --------------------------------------------------------  IN: 490 mL / OUT: 0 mL / NET: 490 mL    12 Oct 2021 07:01  -  12 Oct 2021 15:38  --------------------------------------------------------  IN: 480 mL / OUT: 0 mL / NET: 480 mL        PHYSICAL EXAM    Gen: Well groomed, well dressed, well nourished  Abd: Soft, NT/ND  : R scrotal swelling nontender, mobile, not solid        LABS:                        7.7    7.84  )-----------( 256               25.3     137  |  97  |  68  ----------------------------<  80  4.2   |  18  |  12.20    Ca    8.7        Blood culture: ecoli x1, neg x3      IMAGING:    CT: An 8.1 x 6.4 cm cystic mass is seen within the right scrotum. This area was not imaged in 2017. Findings may represent a benign process such as epididymal or spermatic cyst; however, thickened peripheral walls may represent superimposed infection/abscess in the appropriate clinical context. Malignancy is an additional concern. Recommend further evaluation with scrotal ultrasound.    Markedly enlarged prostate gland.    Collapsed urinary bladder, limiting evaluation. Mild infiltration of the perivesical fat.   UROLOGY CONSULT NOTE    HPI:  This is a 80y old Male with ESRD on HD, james 6 prostate cancer 2010 with negative biopsy 2013, HTN, DM, glaucoma, nephrolithiasis, who was admitted for fevers and was found on CT to have a cystic mass in the right scrotum.  Pt states that the swelling has been there for several years and has not caused him any problems.  He has been on HD since March and does not void.    Of note, he had james 6 prostate cancer in 2010 but had a negative biopsy 2013.  Has a history of a right hydrocele.      PAST MEDICAL HISTORY    HTN - Hypertension    Glaucoma (Increased Eye Pressure)    BPH (Benign Prostatic Hypertrophy)    HTN (hypertension)    Hypertension    Diabetes    ESRD on dialysis        PAST SURGICAL HISTORY    Excision of Sinus Polyp    Laser Surgery Left    Laser Surgery :Right    No significant past surgical history        FAMILY HISTORY    Family history of diabetes mellitus (DM) (Mother, Sibling)        HOME MEDICATIONS    Aspirin Enteric Coated 81 mg oral delayed release tablet: 1 tab(s) orally once a day (05 Oct 2021 10:33)  Bumex 1 mg oral tablet: 1 tab(s) orally once a day (05 Oct 2021 10:33)  tamsulosin 0.4 mg oral capsule: 1 cap(s) orally once a day (at bedtime) (05 Oct 2021 10:33)      DRUG ALLERGIES    NKDA    REVIEW OF SYSTEMS: Pertinent positives and negatives as stated in HPI, otherwise negative      VITAL SIGNS    T(F): 98.2, Max: 98.4 (10-12-21 @ 08:16)  HR: 63  BP: 171/71  RR: 18  SpO2: 97%    I's & O's      11 Oct 2021 07:01  -  12 Oct 2021 07:00  --------------------------------------------------------  IN: 490 mL / OUT: 0 mL / NET: 490 mL    12 Oct 2021 07:01  -  12 Oct 2021 15:38  --------------------------------------------------------  IN: 480 mL / OUT: 0 mL / NET: 480 mL        PHYSICAL EXAM    Gen: Well groomed, well dressed, well nourished  Abd: Soft, NT/ND  : R scrotal swelling nontender, mobile, not solid        LABS:                        7.7    7.84  )-----------( 256               25.3     137  |  97  |  68  ----------------------------<  80  4.2   |  18  |  12.20    Ca    8.7        Blood culture: ecoli x1, neg x3      IMAGING:    CT: An 8.1 x 6.4 cm cystic mass is seen within the right scrotum. This area was not imaged in 2017. Findings may represent a benign process such as epididymal or spermatic cyst; however, thickened peripheral walls may represent superimposed infection/abscess in the appropriate clinical context. Malignancy is an additional concern. Recommend further evaluation with scrotal ultrasound.    Markedly enlarged prostate gland.    Collapsed urinary bladder, limiting evaluation. Mild infiltration of the perivesical fat.   UROLOGY CONSULT NOTE    HPI:  This is a 80y old Male with ESRD on HD, james 6 prostate cancer 2010 with negative biopsy 2013, HTN, DM, glaucoma, nephrolithiasis, who was admitted for fevers and was found on CT to have a cystic mass in the right scrotum.  Pt states that the swelling has been there for several years and has not caused him any problems.  He has been on HD since March and does not void.    Of note, he had james 6 prostate cancer in 2010 but had a negative biopsy 2013.  Has a history of a right hydrocele. Seen by Dr. Hutchins in the past with right hydrocele.       PAST MEDICAL HISTORY    HTN - Hypertension    Glaucoma (Increased Eye Pressure)    BPH (Benign Prostatic Hypertrophy)    HTN (hypertension)    Hypertension    Diabetes    ESRD on dialysis        PAST SURGICAL HISTORY    Excision of Sinus Polyp    Laser Surgery Left    Laser Surgery :Right    No significant past surgical history        FAMILY HISTORY    Family history of diabetes mellitus (DM) (Mother, Sibling)        HOME MEDICATIONS    Aspirin Enteric Coated 81 mg oral delayed release tablet: 1 tab(s) orally once a day (05 Oct 2021 10:33)  Bumex 1 mg oral tablet: 1 tab(s) orally once a day (05 Oct 2021 10:33)  tamsulosin 0.4 mg oral capsule: 1 cap(s) orally once a day (at bedtime) (05 Oct 2021 10:33)      DRUG ALLERGIES    NKDA    REVIEW OF SYSTEMS: Pertinent positives and negatives as stated in HPI, otherwise negative      VITAL SIGNS    T(F): 98.2, Max: 98.4 (10-12-21 @ 08:16)  HR: 63  BP: 171/71  RR: 18  SpO2: 97%    I's & O's      11 Oct 2021 07:01  -  12 Oct 2021 07:00  --------------------------------------------------------  IN: 490 mL / OUT: 0 mL / NET: 490 mL    12 Oct 2021 07:01  -  12 Oct 2021 15:38  --------------------------------------------------------  IN: 480 mL / OUT: 0 mL / NET: 480 mL        PHYSICAL EXAM    Gen: Well groomed, well dressed, well nourished  Abd: Soft, NT/ND  : R scrotal swelling nontender, mobile, not solid        LABS:                        7.7    7.84  )-----------( 256               25.3     137  |  97  |  68  ----------------------------<  80  4.2   |  18  |  12.20    Ca    8.7        Blood culture: ecoli x1, neg x3      IMAGING:    CT: An 8.1 x 6.4 cm cystic mass is seen within the right scrotum. This area was not imaged in 2017. Findings may represent a benign process such as epididymal or spermatic cyst; however, thickened peripheral walls may represent superimposed infection/abscess in the appropriate clinical context. Malignancy is an additional concern. Recommend further evaluation with scrotal ultrasound.    Markedly enlarged prostate gland.    Collapsed urinary bladder, limiting evaluation. Mild infiltration of the perivesical fat.

## 2021-10-12 NOTE — PROGRESS NOTE ADULT - ASSESSMENT
80M with PMHx of HFpEF, ESRD, and HTN presenting with self-reported fever of 104 F.   catheter related infection?   8 x 6 cm cystic lesion in the right scotum     1 ID- temperature last night and this morning Rocephin restarted. 10/8/21. Blood culture with gram neg rods/ ecoli     2 Renal- s/p AVF venoplasty and outflow vein thrombus balloon maturation on 10/8/21,   perm cath and the fistula is not ready to use.  HD today     3 -Cont Flomax;   consult and U/S of the testes please       Sayed Juan   [facility]  [phone]

## 2021-10-12 NOTE — CONSULT NOTE ADULT - ASSESSMENT
80 year old male with PMHx of ESRD on HD, james 6 prostate cancer 2010 with negative biopsy 2013, DM, HTN, admitted for fevers and found to have a cystic mass in his right scrotum    -follow up scrotal ultrasound  -  -  - 80 year old male with PMHx of ESRD on HD, james 6 prostate cancer 2010 with negative biopsy 2013, DM, HTN, and known chronic right hydrocele admitted for fevers and found to have a cystic mass in his right scrotum on CT.     - CT demonstrates right hydrocele consistent with exam and history  - No acute urologic intervention indicated    Discussed with Dr. Liset Hutchins   Urology PGY2

## 2021-10-12 NOTE — PROGRESS NOTE ADULT - SUBJECTIVE AND OBJECTIVE BOX
Date of Service   10-12-21 @ 12:15    Patient is a 80y old  Male who presents with a chief complaint of Fever (12 Oct 2021 09:12)      INTERVAL HISTORY: pt feels ok     REVIEW OF SYSTEMS:   CONSTITUTIONAL: No weakness  EYES/ENT: No visual changes; No throat pain  Neck: No pain or stiffness  Respiratory: No cough, wheezing, No shortness of breath  CARDIOVASCULAR: no chest pain or palpitations  GASTROINTESTINAL: No abdominal pain, no nausea, vomiting or hematemesis  GENITOURINARY: No dysuria, frequency or hematuria  NEUROLOGICAL: No stroke like symptoms  SKIN: No rashes    	  MEDICATIONS:  buMETAnide 1 milliGRAM(s) Oral daily  carvedilol 25 milliGRAM(s) Oral every 12 hours  tamsulosin 0.4 milliGRAM(s) Oral at bedtime        PHYSICAL EXAM:  T(C): 36.9 (10-12-21 @ 08:16), Max: 36.9 (10-11-21 @ 12:23)  HR: 63 (10-12-21 @ 08:16) (58 - 66)  BP: 159/71 (10-12-21 @ 08:16) (107/63 - 159/71)  RR: 18 (10-12-21 @ 08:16) (18 - 18)  SpO2: 97% (10-12-21 @ 08:16) (95% - 98%)  Wt(kg): --  I&O's Summary    11 Oct 2021 07:01  -  12 Oct 2021 07:00  --------------------------------------------------------  IN: 490 mL / OUT: 0 mL / NET: 490 mL          Appearance: In no distress	  HEENT:    PERRL, EOMI	  Cardiovascular:  S1 S2, No JVD  Respiratory: Lungs clear to auscultation	  Gastrointestinal:  Soft, Non-tender, + BS	  Vascularature:  No edema of LE  Psychiatric: Appropriate affect   Neuro: no acute focal deficits                               7.7    7.84  )-----------( 256      ( 12 Oct 2021 07:16 )             25.3     10-12    137  |  97  |  68<H>  ----------------------------<  80  4.2   |  18<L>  |  12.20<H>    Ca    8.7      12 Oct 2021 07:15          Labs personally reviewed      ASSESSMENT/PLAN: 	  80M with PMHx of HFpEF, ESRD, and HTN presenting with self-reported fever of 104 F. Patient being admitted for fever in dialysis patient.      Problem/Plan - 1:  ·  Problem: Chronic diastolic heart Failure with preserved EF   - c/w bumex   - c/w carvedilol  25mg Q12hrs   - pt appears euvolemic   - ESRD  per renal for fluid management     Problem/Plan - 2:  ·  Problem: Fever  - Mild leukocytosis   - bld and urine cx sent  - monitor fever curve and cbc   - off Abx   - chest CT negative   - awaiting CT abd and pelvis for d/c planning     Problem/Plan - 3:  ·  Problem: Hypertension  - c/w coreg and bumex  - monitor BP   - on ASA for prevention        Problem/Plan - 4:  ·  Problem: ESRD  - renal consult appreciated   - HD per renal   - Left arm fistulogram, possible balloon venoplasty    Problem/Plan - 5:  ·  Problem: DVT ppx on Heparin SQ       Sammie Carmichael FNP-BC   Sridhar Carrillo DO [facility]  Cardiovascular Medicine  [address], [address]  Office: [phone]  Cell: [phone]

## 2021-10-12 NOTE — PROGRESS NOTE ADULT - SUBJECTIVE AND OBJECTIVE BOX
CHIEF COMPLAINT:    SUBJECTIVE:     REVIEW OF SYSTEMS:    CONSTITUTIONAL: (  )  weakness,  (  ) fevers or chills  EYES/ENT: (  )visual changes;     NECK: (  ) pain or stiffness  RESPIRATORY:   (  )cough, wheezing, hemoptysis;  (  ) shortness of breath  CARDIOVASCULAR:  (  )chest pain or palpitations  GASTROINTESTINAL:   (  )abdominal or epigastric pain.  (  ) nausea, vomiting, or hematemesis;   (   ) diarrhea or constipation.   GENITOURINARY:   (    ) dysuria, frequency or hematuria  NEUROLOGICAL:  (   ) numbness or weakness   All other review of systems is negative unless indicated above    Vital Signs Last 24 Hrs  T(C): 36.9 (12 Oct 2021 08:16), Max: 36.9 (11 Oct 2021 12:23)  T(F): 98.4 (12 Oct 2021 08:16), Max: 98.5 (11 Oct 2021 12:23)  HR: 63 (12 Oct 2021 08:16) (58 - 66)  BP: 159/71 (12 Oct 2021 08:16) (107/63 - 159/71)  BP(mean): --  RR: 18 (12 Oct 2021 08:16) (18 - 18)  SpO2: 97% (12 Oct 2021 08:16) (95% - 98%)    I&O's Summary    11 Oct 2021 07:01  -  12 Oct 2021 07:00  --------------------------------------------------------  IN: 490 mL / OUT: 0 mL / NET: 490 mL        CAPILLARY BLOOD GLUCOSE          PHYSICAL EXAM:    Constitutional:  (   ) NAD,   (   )awake and alert  HEENT: PERR, EOMI,    Neck: Soft and supple, No LAD, No JVD  Respiratory:  (    Breath sounds are clear bilaterally,    (   ) wheezing, rales or rhonchi  Cardiovascular:     (   )S1 and S2, regular rate and rhythm, no Murmurs, gallops or rubs  Gastrointestinal:  (   )Bowel Sounds present, soft,   (  )nontender, nondistended,    Extremities:    (  ) peripheral edema  Vascular: 2+ peripheral pulses  Neurological:    (    )A/O x 3,   (  ) focal deficits  Musculoskeletal:    (   )  normal strength b/l upper  (     ) normal  lower extremities  Skin: No rashes    MEDICATIONS:  MEDICATIONS  (STANDING):  artificial  tears Solution 1 Drop(s) Left EYE every 4 hours  aspirin enteric coated 81 milliGRAM(s) Oral daily  buMETAnide 1 milliGRAM(s) Oral daily  carvedilol 25 milliGRAM(s) Oral every 12 hours  cefTRIAXone   IVPB 1000 milliGRAM(s) IV Intermittent every 24 hours  chlorhexidine 2% Cloths 1 Application(s) Topical <User Schedule>  epoetin hiro-epbx (RETACRIT) Injectable 82462 Unit(s) IV Push once  heparin   Injectable 5000 Unit(s) SubCutaneous every 8 hours  petrolatum Ophthalmic Ointment 1 Application(s) Left EYE at bedtime  sevelamer carbonate 800 milliGRAM(s) Oral three times a day with meals  tamsulosin 0.4 milliGRAM(s) Oral at bedtime      LABS: All Labs Reviewed:                        7.7    7.84  )-----------( 256      ( 12 Oct 2021 07:16 )             25.3     10-12    137  |  97  |  68<H>  ----------------------------<  80  4.2   |  18<L>  |  12.20<H>    Ca    8.7      12 Oct 2021 07:15            Blood Culture: 10-08 @ 22:50  Organism Blood Culture PCR  Gram Stain Blood -- Gram Stain   Growth in anaerobic bottle: Gram Negative Rods  Specimen Source .Blood Blood-Peripheral  Culture-Blood --      Urine Culture      RADIOLOGY/EKG:    ASSESSMENT AND PLAN:    DVT PPX:    ADVANCED DIRECTIVE:    DISPOSITION: CHIEF COMPLAINT:Patient condition remained stable afebrile waiting for CT scan of his abdomen pelvic and chest to be done    SUBJECTIVE:     REVIEW OF SYSTEMS: Afebrile    CONSTITUTIONAL: (  )  weakness,  (  ) fevers or chills  EYES/ENT: (  )visual changes;     NECK: (  ) pain or stiffness  RESPIRATORY:   (  )cough, wheezing, hemoptysis;  (  ) shortness of breath  CARDIOVASCULAR:  (  )chest pain or palpitations  GASTROINTESTINAL:   (  )abdominal or epigastric pain.  (  ) nausea, vomiting, or hematemesis;   (   ) diarrhea or constipation.   GENITOURINARY:   (    ) dysuria, frequency or hematuria  NEUROLOGICAL:  (   ) numbness or weakness   All other review of systems is negative unless indicated above    Vital Signs Last 24 Hrs  T(C): 36.9 (12 Oct 2021 08:16), Max: 36.9 (11 Oct 2021 12:23)  T(F): 98.4 (12 Oct 2021 08:16), Max: 98.5 (11 Oct 2021 12:23)  HR: 63 (12 Oct 2021 08:16) (58 - 66)  BP: 159/71 (12 Oct 2021 08:16) (107/63 - 159/71)  BP(mean): --  RR: 18 (12 Oct 2021 08:16) (18 - 18)  SpO2: 97% (12 Oct 2021 08:16) (95% - 98%)    I&O's Summary    11 Oct 2021 07:01  -  12 Oct 2021 07:00  --------------------------------------------------------  IN: 490 mL / OUT: 0 mL / NET: 490 mL        CAPILLARY BLOOD GLUCOSE          PHYSICAL EXAM:    Constitutional:  (  x ) NAD,   (   )awake and alert  HEENT: PERR, EOMI,    Neck: Soft and supple, No LAD, No JVD  Respiratory:  (  x  Breath sounds are clear bilaterally,    (   ) wheezing, rales or rhonchi  Cardiovascular:     (  x )S1 and S2, regular rate and rhythm, no Murmurs, gallops or rubs  Gastrointestinal:  ( x  )Bowel Sounds present, soft,   (  )nontender, nondistended,    Extremities:    (  ) peripheral edema  Vascular: 2+ peripheral pulses  Neurological:    ( x   )A/O x 3,   (  ) focal deficits  Musculoskeletal:    (   )  normal strength b/l upper  (     ) normal  lower extremities  Skin: No rashes    MEDICATIONS:  MEDICATIONS  (STANDING):  artificial  tears Solution 1 Drop(s) Left EYE every 4 hours  aspirin enteric coated 81 milliGRAM(s) Oral daily  buMETAnide 1 milliGRAM(s) Oral daily  carvedilol 25 milliGRAM(s) Oral every 12 hours  cefTRIAXone   IVPB 1000 milliGRAM(s) IV Intermittent every 24 hours  chlorhexidine 2% Cloths 1 Application(s) Topical <User Schedule>  epoetin hiro-epbx (RETACRIT) Injectable 56963 Unit(s) IV Push once  heparin   Injectable 5000 Unit(s) SubCutaneous every 8 hours  petrolatum Ophthalmic Ointment 1 Application(s) Left EYE at bedtime  sevelamer carbonate 800 milliGRAM(s) Oral three times a day with meals  tamsulosin 0.4 milliGRAM(s) Oral at bedtime      LABS: All Labs Reviewed:                        7.7    7.84  )-----------( 256      ( 12 Oct 2021 07:16 )             25.3     10-12    137  |  97  |  68<H>  ----------------------------<  80  4.2   |  18<L>  |  12.20<H>    Ca    8.7      12 Oct 2021 07:15            Blood Culture: 10-08 @ 22:50  Organism Blood Culture PCR  Gram Stain Blood -- Gram Stain   Growth in anaerobic bottle: Gram Negative Rods  Specimen Source .Blood Blood-Peripheral  Culture-Blood --      Urine Culture      RADIOLOGY/EKG:    ASSESSMENT AND PLAN:  Patient with recent fever and positive blood culture continue Rocephin 1 g daily waiting for the CT scan for final diagnosis and determination.  Discussed with the patient and his wife in detail  DVT PPX:    ADVANCED DIRECTIVE:    DISPOSITION:

## 2021-10-12 NOTE — PROGRESS NOTE ADULT - SUBJECTIVE AND OBJECTIVE BOX
NEPHROLOGY-[facility] [phone]        Patient seen and examined in bed.  He was in good spirits         MEDICATIONS  (STANDING):  artificial  tears Solution 1 Drop(s) Left EYE every 4 hours  aspirin enteric coated 81 milliGRAM(s) Oral daily  buMETAnide 1 milliGRAM(s) Oral daily  carvedilol 25 milliGRAM(s) Oral every 12 hours  cefTRIAXone   IVPB 1000 milliGRAM(s) IV Intermittent every 24 hours  chlorhexidine 2% Cloths 1 Application(s) Topical <User Schedule>  epoetin hiro-epbx (RETACRIT) Injectable 93925 Unit(s) IV Push once  heparin   Injectable 5000 Unit(s) SubCutaneous every 8 hours  petrolatum Ophthalmic Ointment 1 Application(s) Left EYE at bedtime  sevelamer carbonate 800 milliGRAM(s) Oral three times a day with meals  tamsulosin 0.4 milliGRAM(s) Oral at bedtime      VITAL:  T(C): , Max: 36.9 (10-12-21 @ 08:16)  T(F): , Max: 98.4 (10-12-21 @ 08:16)  HR: 63 (10-12-21 @ 12:23)  BP: 171/71 (10-12-21 @ 12:23)  BP(mean): --  RR: 18 (10-12-21 @ 12:23)  SpO2: 97% (10-12-21 @ 12:23)  Wt(kg): --    I and O's:    10-11 @ 07:01  -  10-12 @ 07:00  --------------------------------------------------------  IN: 490 mL / OUT: 0 mL / NET: 490 mL    10-12 @ 07:01  -  10-12 @ 14:33  --------------------------------------------------------  IN: 240 mL / OUT: 0 mL / NET: 240 mL          PHYSICAL EXAM:    Constitutional: NAD  Neck:  No JVD  Respiratory: CTAB/L  Cardiovascular: S1 and S2  Gastrointestinal: BS+, soft, NT/ND  Extremities: No peripheral edema  Neurological: A/O x 3, no focal deficits  Psychiatric: Normal mood, normal affect  : No Aguilar  Skin: No rashes  Access: Not applicable    LABS:                        7.7    7.84  )-----------( 256      ( 12 Oct 2021 07:16 )             25.3     10-12    137  |  97  |  68<H>  ----------------------------<  80  4.2   |  18<L>  |  12.20<H>    Ca    8.7      12 Oct 2021 07:15            Urine Studies:          RADIOLOGY & ADDITIONAL STUDIES:          < from: CT Abdomen and Pelvis w/ Oral Cont and w/ IV Cont (10.12.21 @ 10:29) >    EXAM:  CT ABDOMEN AND PELVIS OC IC                          EXAM:  CT CHEST IC                            PROCEDURE DATE:  10/12/2021            INTERPRETATION:  CLINICAL INFORMATION: Fever of unknown origin.    COMPARISON: CT chest 10/4/2021. CT chest, abdomen, and pelvis 12/17/2021.    CONTRAST/COMPLICATIONS:  IV Contrast: Omnipaque 350  90 cc administered   10 cc discarded  Oral Contrast: Omnipaque 300  Complications: None reported at time of study completion    PROCEDURE:  CT of the Chest, Abdomen and Pelvis was performed.  Sagittal and coronal reformats were performed.    FINDINGS:  CHEST:  LUNGS AND LARGE AIRWAYS: Mild emphysema. No central endobronchial lesion.  PLEURA: No pleural effusion.  VESSELS: Right chest wall dual lumen tunneled central venous catheter tips in the SVC.  Atherosclerotic calcification of the aorta.  HEART: Heart size is normal. No pericardial effusion.  MEDIASTINUM AND SAUMYA: A 1.4 x 1.1 cm right paratracheal lymph node (3:51) is stable since CT 12/17/2017.  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    ABDOMEN AND PELVIS:  LIVER: Subcentimeter hypodense foci too small to characterize.  BILE DUCTS: Normal caliber.  GALLBLADDER: Layering hyperattenuation may represent sludge and/or small stones.  SPLEEN: Within normal limits.  PANCREAS: Within normal limits.  ADRENALS: Within normal limits.  KIDNEYS/URETERS: Atrophic kidneys bilaterally. No hydronephrosis.   Bilateral renal cysts and additional subcentimeter hypodense foci too small to characterize.    BLADDER: Collapsed, limiting evaluation. Mild infiltration of the perivesical fat.  REPRODUCTIVE ORGANS: Prostate is  markedly enlarged, measuring 7.2 x 6.6 cm. An 8.1 x 6.4 cm cystic mass with thick peripheral walls is seen within the right scrotum. This area was not included in the field-of-view on prior CT dated 12/17/2017.    BOWEL: No bowel obstruction. Appendix is normal.  PERITONEUM: No ascites.  VESSELS: Atherosclerotic changes. Accessory renal arteries bilaterally.  RETROPERITONEUM/LYMPH NODES: Pelvic lymphadenopathy, similar in appearance to 12/17/2017. For example, bilateral external iliac lymph nodes, measuring 1.6 x 1.2 cm on the right (3:236), unchanged since 12/17/2017.  ABDOMINAL WALL: Small fat-containing umbilical and left inguinal hernias.  BONES: Degenerative changes. Grade 1 anterolisthesis of L5 with respect to S1.    IMPRESSION:  An 8.1 x 6.4 cm cystic mass is seen within the right scrotum.  This area was not imaged in 2017.   Findings may represent a benign process such as epididymal or spermatic cyst; however, thickened peripheral walls may represent superimposed infection/abscess in the appropriate clinical context. Malignancy is an additional concern. Recommend further evaluation with scrotal ultrasound.    Markedly enlarged prostate gland.    Collapsed urinary bladder, limiting evaluation. Mild infiltration of the perivesical fat.        --- End of Report ---              DANIS AMAYA MD; ResidentRadiology  This document has been electronically signed.  MASON MAHAJAN MD; Attending Radiologist  This document has been electronically signed. Oct 12 2021 1    < end of copied text >

## 2021-10-13 LAB
ANION GAP SERPL CALC-SCNC: 17 MMOL/L — SIGNIFICANT CHANGE UP (ref 5–17)
BUN SERPL-MCNC: 48 MG/DL — HIGH (ref 7–23)
CALCIUM SERPL-MCNC: 8.5 MG/DL — SIGNIFICANT CHANGE UP (ref 8.4–10.5)
CHLORIDE SERPL-SCNC: 95 MMOL/L — LOW (ref 96–108)
CO2 SERPL-SCNC: 24 MMOL/L — SIGNIFICANT CHANGE UP (ref 22–31)
CREAT SERPL-MCNC: 9.5 MG/DL — HIGH (ref 0.5–1.3)
CULTURE RESULTS: SIGNIFICANT CHANGE UP
GLUCOSE BLDC GLUCOMTR-MCNC: 135 MG/DL — HIGH (ref 70–99)
GLUCOSE SERPL-MCNC: 91 MG/DL — SIGNIFICANT CHANGE UP (ref 70–99)
HCT VFR BLD CALC: 24.9 % — LOW (ref 39–50)
HGB BLD-MCNC: 7.9 G/DL — LOW (ref 13–17)
MCHC RBC-ENTMCNC: 26.7 PG — LOW (ref 27–34)
MCHC RBC-ENTMCNC: 31.7 GM/DL — LOW (ref 32–36)
MCV RBC AUTO: 84.1 FL — SIGNIFICANT CHANGE UP (ref 80–100)
NRBC # BLD: 0 /100 WBCS — SIGNIFICANT CHANGE UP (ref 0–0)
PLATELET # BLD AUTO: 263 K/UL — SIGNIFICANT CHANGE UP (ref 150–400)
POTASSIUM SERPL-MCNC: 4.1 MMOL/L — SIGNIFICANT CHANGE UP (ref 3.5–5.3)
POTASSIUM SERPL-SCNC: 4.1 MMOL/L — SIGNIFICANT CHANGE UP (ref 3.5–5.3)
RBC # BLD: 2.96 M/UL — LOW (ref 4.2–5.8)
RBC # FLD: 16.8 % — HIGH (ref 10.3–14.5)
SODIUM SERPL-SCNC: 136 MMOL/L — SIGNIFICANT CHANGE UP (ref 135–145)
SPECIMEN SOURCE: SIGNIFICANT CHANGE UP
WBC # BLD: 10.84 K/UL — HIGH (ref 3.8–10.5)
WBC # FLD AUTO: 10.84 K/UL — HIGH (ref 3.8–10.5)

## 2021-10-13 PROCEDURE — 99222 1ST HOSP IP/OBS MODERATE 55: CPT

## 2021-10-13 PROCEDURE — 93975 VASCULAR STUDY: CPT | Mod: 26

## 2021-10-13 PROCEDURE — 76870 US EXAM SCROTUM: CPT | Mod: 26

## 2021-10-13 RX ORDER — BENZOCAINE AND MENTHOL 5; 1 G/100ML; G/100ML
1 LIQUID ORAL ONCE
Refills: 0 | Status: COMPLETED | OUTPATIENT
Start: 2021-10-13 | End: 2021-10-13

## 2021-10-13 RX ORDER — CHLORHEXIDINE GLUCONATE 213 G/1000ML
1 SOLUTION TOPICAL DAILY
Refills: 0 | Status: DISCONTINUED | OUTPATIENT
Start: 2021-10-13 | End: 2021-10-23

## 2021-10-13 RX ADMIN — SEVELAMER CARBONATE 800 MILLIGRAM(S): 2400 POWDER, FOR SUSPENSION ORAL at 18:02

## 2021-10-13 RX ADMIN — Medication 1 DROP(S): at 11:46

## 2021-10-13 RX ADMIN — HEPARIN SODIUM 5000 UNIT(S): 5000 INJECTION INTRAVENOUS; SUBCUTANEOUS at 21:01

## 2021-10-13 RX ADMIN — Medication 1 DROP(S): at 18:01

## 2021-10-13 RX ADMIN — CHLORHEXIDINE GLUCONATE 1 APPLICATION(S): 213 SOLUTION TOPICAL at 05:40

## 2021-10-13 RX ADMIN — BUMETANIDE 1 MILLIGRAM(S): 0.25 INJECTION INTRAMUSCULAR; INTRAVENOUS at 05:01

## 2021-10-13 RX ADMIN — Medication 1 DROP(S): at 03:15

## 2021-10-13 RX ADMIN — HEPARIN SODIUM 5000 UNIT(S): 5000 INJECTION INTRAVENOUS; SUBCUTANEOUS at 14:35

## 2021-10-13 RX ADMIN — Medication 81 MILLIGRAM(S): at 11:45

## 2021-10-13 RX ADMIN — Medication 1 DROP(S): at 05:01

## 2021-10-13 RX ADMIN — TAMSULOSIN HYDROCHLORIDE 0.4 MILLIGRAM(S): 0.4 CAPSULE ORAL at 21:02

## 2021-10-13 RX ADMIN — Medication 1 APPLICATION(S): at 21:01

## 2021-10-13 RX ADMIN — CARVEDILOL PHOSPHATE 25 MILLIGRAM(S): 80 CAPSULE, EXTENDED RELEASE ORAL at 21:02

## 2021-10-13 RX ADMIN — SEVELAMER CARBONATE 800 MILLIGRAM(S): 2400 POWDER, FOR SUSPENSION ORAL at 12:23

## 2021-10-13 RX ADMIN — HEPARIN SODIUM 5000 UNIT(S): 5000 INJECTION INTRAVENOUS; SUBCUTANEOUS at 05:00

## 2021-10-13 RX ADMIN — BENZOCAINE AND MENTHOL 1 LOZENGE: 5; 1 LIQUID ORAL at 20:55

## 2021-10-13 RX ADMIN — SEVELAMER CARBONATE 800 MILLIGRAM(S): 2400 POWDER, FOR SUSPENSION ORAL at 09:16

## 2021-10-13 RX ADMIN — Medication 1 DROP(S): at 21:01

## 2021-10-13 RX ADMIN — CEFTRIAXONE 100 MILLIGRAM(S): 500 INJECTION, POWDER, FOR SOLUTION INTRAMUSCULAR; INTRAVENOUS at 21:21

## 2021-10-13 RX ADMIN — CARVEDILOL PHOSPHATE 25 MILLIGRAM(S): 80 CAPSULE, EXTENDED RELEASE ORAL at 11:46

## 2021-10-13 NOTE — PROGRESS NOTE ADULT - SUBJECTIVE AND OBJECTIVE BOX
Subjective  Offers no complaints. Scrotal US pending.    Objective    Vital signs  T(F): , Max: 98.4 (10-13-21 @ 04:58)  HR: 78 (10-13-21 @ 04:58)  BP: 125/61 (10-13-21 @ 04:58)  SpO2: 98% (10-13-21 @ 04:58)  Wt(kg): --    Output         Gen: NAD  Abd: soft, nontender, nondistended  : deferred    Labs      10-13 @ 07:28    WBC 10.84 / Hct 24.9  / SCr 9.50     10-12 @ 07:16    WBC 7.84  / Hct 25.3  / SCr --           Culture - Blood (collected 10-08-21 @ 22:50)  Source: .Blood Blood-Peripheral  Preliminary Report (10-09-21 @ 23:01):    No growth to date.    Culture - Blood (collected 10-08-21 @ 22:50)  Source: .Blood Blood-Peripheral  Gram Stain (10-09-21 @ 13:04):    Growth in anaerobic bottle: Gram Negative Rods  Final Report (10-11-21 @ 08:11):    Growth in anaerobic bottle: Escherichia coli    ***Blood Panel PCR results on this specimen are available    approximately 3 hours after the Gram stain result.***    Gram stain, PCR, and/or culture results may not always    correspond due to difference in methodologies.    ************************************************************    This PCR assay was performed by multiplex PCR. This    Assay tests for 66 bacterial and resistance gene targets.    Please refer to the [facility] test directory    at [email or web address] for details.  Organism: Blood Culture PCR  Escherichia coli (10-11-21 @ 08:11)  Organism: Escherichia coli (10-11-21 @ 08:11)      -  Amikacin: S <=16      -  Ampicillin: R >16 These ampicillin results predict results for amoxicillin      -  Ampicillin/Sulbactam: R >16/8 Enterobacter, Citrobacter, and Serratia may develop resistance during prolonged therapy (3-4 days)      -  Aztreonam: S <=4      -  Cefazolin: S <=2 Enterobacter, Citrobacter, and Serratia may develop resistance during prolonged therapy (3-4 days)      -  Cefepime: S <=2      -  Cefoxitin: S <=8      -  Ceftriaxone: S <=1 Enterobacter, Citrobacter, and Serratia may develop resistance during prolonged therapy      -  Ciprofloxacin: S <=0.25      -  Ertapenem: S <=0.5      -  Gentamicin: S 4      -  Imipenem: S <=1      -  Levofloxacin: S <=0.5      -  Meropenem: S <=1      -  Piperacillin/Tazobactam: S <=8      -  Tobramycin: I 8      -  Trimethoprim/Sulfamethoxazole: S 2/38      Method Type: VIJAY  Organism: Blood Culture PCR (10-11-21 @ 08:11)      -  Escherichia coli: Detec      Method Type: PCR

## 2021-10-13 NOTE — PROGRESS NOTE ADULT - SUBJECTIVE AND OBJECTIVE BOX
CHIEF COMPLAINT:    SUBJECTIVE:     REVIEW OF SYSTEMS:    CONSTITUTIONAL: (  )  weakness,  (  ) fevers or chills  EYES/ENT: (  )visual changes;     NECK: (  ) pain or stiffness  RESPIRATORY:   (  )cough, wheezing, hemoptysis;  (  ) shortness of breath  CARDIOVASCULAR:  (  )chest pain or palpitations  GASTROINTESTINAL:   (  )abdominal or epigastric pain.  (  ) nausea, vomiting, or hematemesis;   (   ) diarrhea or constipation.   GENITOURINARY:   (    ) dysuria, frequency or hematuria  NEUROLOGICAL:  (   ) numbness or weakness   All other review of systems is negative unless indicated above    Vital Signs Last 24 Hrs  T(C): 36.9 (13 Oct 2021 04:58), Max: 36.9 (13 Oct 2021 04:58)  T(F): 98.4 (13 Oct 2021 04:58), Max: 98.4 (13 Oct 2021 04:58)  HR: 78 (13 Oct 2021 04:58) (51 - 80)  BP: 125/61 (13 Oct 2021 04:58) (125/61 - 171/71)  BP(mean): --  RR: 18 (13 Oct 2021 04:58) (16 - 18)  SpO2: 98% (13 Oct 2021 04:58) (97% - 100%)    I&O's Summary    12 Oct 2021 07:01  -  13 Oct 2021 07:00  --------------------------------------------------------  IN: 950 mL / OUT: 2000 mL / NET: -1050 mL        CAPILLARY BLOOD GLUCOSE      POCT Blood Glucose.: 135 mg/dL (13 Oct 2021 02:10)      PHYSICAL EXAM:    Constitutional:  (   ) NAD,   (   )awake and alert  HEENT: PERR, EOMI,    Neck: Soft and supple, No LAD, No JVD  Respiratory:  (    Breath sounds are clear bilaterally,    (   ) wheezing, rales or rhonchi  Cardiovascular:     (   )S1 and S2, regular rate and rhythm, no Murmurs, gallops or rubs  Gastrointestinal:  (   )Bowel Sounds present, soft,   (  )nontender, nondistended,    Extremities:    (  ) peripheral edema  Vascular: 2+ peripheral pulses  Neurological:    (    )A/O x 3,   (  ) focal deficits  Musculoskeletal:    (   )  normal strength b/l upper  (     ) normal  lower extremities  Skin: No rashes    MEDICATIONS:  MEDICATIONS  (STANDING):  artificial  tears Solution 1 Drop(s) Left EYE every 4 hours  aspirin enteric coated 81 milliGRAM(s) Oral daily  buMETAnide 1 milliGRAM(s) Oral daily  carvedilol 25 milliGRAM(s) Oral every 12 hours  cefTRIAXone   IVPB 1000 milliGRAM(s) IV Intermittent every 24 hours  chlorhexidine 4% Liquid 1 Application(s) Topical daily  heparin   Injectable 5000 Unit(s) SubCutaneous every 8 hours  petrolatum Ophthalmic Ointment 1 Application(s) Left EYE at bedtime  sevelamer carbonate 800 milliGRAM(s) Oral three times a day with meals  tamsulosin 0.4 milliGRAM(s) Oral at bedtime      LABS: All Labs Reviewed:                        7.9    10.84 )-----------( 263      ( 13 Oct 2021 07:28 )             24.9     10-13    136  |  95<L>  |  48<H>  ----------------------------<  91  4.1   |  24  |  9.50<H>    Ca    8.5      13 Oct 2021 07:28            Blood Culture: 10-08 @ 22:50  Organism Blood Culture PCR  Gram Stain Blood -- Gram Stain   Growth in anaerobic bottle: Gram Negative Rods  Specimen Source .Blood Blood-Peripheral  Culture-Blood --      Urine Culture      RADIOLOGY/EKG:    ASSESSMENT AND PLAN:    DVT PPX:    ADVANCED DIRECTIVE:    DISPOSITION: CHIEF COMPLAINT:  no event overnight patient was seen after his testicular sonogram result is still pending urology consult and nephrology follow-up appreciated  SUBJECTIVE:     REVIEW OF SYSTEMS:    CONSTITUTIONAL: (  )  weakness,  (  ) fevers or chills  EYES/ENT: (  )visual changes;     NECK: (  ) pain or stiffness  RESPIRATORY:   (  )cough, wheezing, hemoptysis;  (  ) shortness of breath  CARDIOVASCULAR:  (  )chest pain or palpitations  GASTROINTESTINAL:   (  )abdominal or epigastric pain.  (  ) nausea, vomiting, or hematemesis;   (   ) diarrhea or constipation.   GENITOURINARY:   (    ) dysuria, frequency or hematuria  NEUROLOGICAL:  (   ) numbness or weakness   All other review of systems is negative unless indicated above    Vital Signs Last 24 Hrs  T(C): 36.9 (13 Oct 2021 04:58), Max: 36.9 (13 Oct 2021 04:58)  T(F): 98.4 (13 Oct 2021 04:58), Max: 98.4 (13 Oct 2021 04:58)  HR: 78 (13 Oct 2021 04:58) (51 - 80)  BP: 125/61 (13 Oct 2021 04:58) (125/61 - 171/71)  BP(mean): --  RR: 18 (13 Oct 2021 04:58) (16 - 18)  SpO2: 98% (13 Oct 2021 04:58) (97% - 100%)    I&O's Summary    12 Oct 2021 07:01  -  13 Oct 2021 07:00  --------------------------------------------------------  IN: 950 mL / OUT: 2000 mL / NET: -1050 mL        CAPILLARY BLOOD GLUCOSE      POCT Blood Glucose.: 135 mg/dL (13 Oct 2021 02:10)      PHYSICAL EXAM:    Constitutional:  ( x  ) NAD,   (  x )awake and alert  HEENT: PERR, EOMI,    Neck: Soft and supple, No LAD, No JVD  Respiratory:  (  x  Breath sounds are clear bilaterally,    (   ) wheezing, rales or rhonchi  Cardiovascular:     ( x  )S1 and S2, regular rate and rhythm, no Murmurs, gallops or rubs  Gastrointestinal:  (  x )Bowel Sounds present, soft,   (  )nontender, nondistended,    Extremities:    ( x ) peripheral edema  Vascular: 2+ peripheral pulses  Neurological:    (   x )A/O x 3,   (  ) focal deficits  Musculoskeletal:    (  x )  normal strength b/l upper  (     ) normal  lower extremities    large hydrocele on the right side without tenderness    MEDICATIONS:  MEDICATIONS  (STANDING):  artificial  tears Solution 1 Drop(s) Left EYE every 4 hours  aspirin enteric coated 81 milliGRAM(s) Oral daily  buMETAnide 1 milliGRAM(s) Oral daily  carvedilol 25 milliGRAM(s) Oral every 12 hours  cefTRIAXone   IVPB 1000 milliGRAM(s) IV Intermittent every 24 hours  chlorhexidine 4% Liquid 1 Application(s) Topical daily  heparin   Injectable 5000 Unit(s) SubCutaneous every 8 hours  petrolatum Ophthalmic Ointment 1 Application(s) Left EYE at bedtime  sevelamer carbonate 800 milliGRAM(s) Oral three times a day with meals  tamsulosin 0.4 milliGRAM(s) Oral at bedtime      LABS: All Labs Reviewed:                        7.9    10.84 )-----------( 263      ( 13 Oct 2021 07:28 )             24.9     10-13    136  |  95<L>  |  48<H>  ----------------------------<  91  4.1   |  24  |  9.50<H>    Ca    8.5      13 Oct 2021 07:28            Blood Culture: 10-08 @ 22:50  Organism Blood Culture PCR  Gram Stain Blood -- Gram Stain   Growth in anaerobic bottle: Gram Negative Rods  Specimen Source .Blood Blood-Peripheral  Culture-Blood --      Urine Culture      RADIOLOGY/EKG:      EXAM:  CT ABDOMEN AND PELVIS OC IC                          EXAM:  CT CHEST IC                            PROCEDURE DATE:  10/12/2021            INTERPRETATION:  CLINICAL INFORMATION: Fever of unknown origin.    COMPARISON: CT chest 10/4/2021. CT chest, abdomen, and pelvis 12/17/2021.    CONTRAST/COMPLICATIONS:  IV Contrast: Omnipaque 350  90 cc administered   10 cc discarded  Oral Contrast: Omnipaque 300  Complications: None reported at time of study completion    PROCEDURE:  CT of the Chest, Abdomen and Pelvis was performed.  Sagittal and coronal reformats were performed.    FINDINGS:  CHEST:  LUNGS AND LARGE AIRWAYS: Mild emphysema. No central endobronchial lesion.  PLEURA: No pleural effusion.  VESSELS: Right chest wall dual lumen tunneled central venous catheter tips in the SVC.  Atherosclerotic calcification of the aorta.  HEART: Heart size is normal. No pericardial effusion.  MEDIASTINUM AND SAUMYA: A 1.4 x 1.1 cm right paratracheal lymph node (3:51) is stable since CT 12/17/2017.  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    ABDOMEN AND PELVIS:  LIVER: Subcentimeter hypodense foci too small to characterize.  BILE DUCTS: Normal caliber.  GALLBLADDER: Layering hyperattenuation may represent sludge and/or small stones.  SPLEEN: Within normal limits.  PANCREAS: Within normal limits.  ADRENALS: Within normal limits.  KIDNEYS/URETERS: Atrophic kidneys bilaterally. No hydronephrosis.   Bilateral renal cysts and additional subcentimeter hypodense foci too small to characterize.    BLADDER: Collapsed, limiting evaluation. Mild infiltration of the perivesical fat.  REPRODUCTIVE ORGANS: Prostate is  markedly enlarged, measuring 7.2 x 6.6 cm. An 8.1 x 6.4 cm cystic mass with thick peripheral walls is seen within the right scrotum. This area was not included in the field-of-view on prior CT dated 12/17/2017.    BOWEL: No bowel obstruction. Appendix is normal.  PERITONEUM: No ascites.  VESSELS: Atherosclerotic changes. Accessory renal arteries bilaterally.  RETROPERITONEUM/LYMPH NODES: Pelvic lymphadenopathy, similar in appearance to 12/17/2017. For example, bilateral external iliac lymph nodes, measuring 1.6 x 1.2 cm on the right (3:236), unchanged since 12/17/2017.  ABDOMINAL WALL: Small fat-containing umbilical and left inguinal hernias.  BONES: Degenerative changes. Grade 1 anterolisthesis of L5 with respect to S1.    IMPRESSION:  An 8.1 x 6.4 cm cystic mass is seen within the right scrotum.  This area was not imaged in 2017.   Findings may represent a benign process such as epididymal or spermatic cyst; however, thickened peripheral walls may represent superimposed infection/abscess in the appropriate clinical context. Malignancy is an additional concern. Recommend further evaluation with scrotal ultrasound.    Markedly enlarged prostate gland.    Collapsed urinary bladder, limiting evaluation. Mild infiltration of the perivesical fat.      ASSESSMENT AND PLAN:    patient with bacteremia and positive CT scan results for questionable right-sided testicular abscess urology was called appreciated on Rocephin IV improving with asked to be seen by ID for further assessment and management I personally spoke with Dr. NUNEZ  on 10/12/2021  DVT PPX:    ADVANCED DIRECTIVE:    DISPOSITION:

## 2021-10-13 NOTE — PROGRESS NOTE ADULT - SUBJECTIVE AND OBJECTIVE BOX
NEPHROLOGY-[facility] [phone]        Patient seen and examined in bed.  He was the same   Seen by Urology         MEDICATIONS  (STANDING):  artificial  tears Solution 1 Drop(s) Left EYE every 4 hours  aspirin enteric coated 81 milliGRAM(s) Oral daily  buMETAnide 1 milliGRAM(s) Oral daily  carvedilol 25 milliGRAM(s) Oral every 12 hours  cefTRIAXone   IVPB 1000 milliGRAM(s) IV Intermittent every 24 hours  chlorhexidine 4% Liquid 1 Application(s) Topical daily  heparin   Injectable 5000 Unit(s) SubCutaneous every 8 hours  petrolatum Ophthalmic Ointment 1 Application(s) Left EYE at bedtime  sevelamer carbonate 800 milliGRAM(s) Oral three times a day with meals  tamsulosin 0.4 milliGRAM(s) Oral at bedtime      VITAL:  T(C): , Max: 36.9 (10-13-21 @ 04:58)  T(F): , Max: 98.4 (10-13-21 @ 04:58)  HR: 78 (10-13-21 @ 04:58)  BP: 125/61 (10-13-21 @ 04:58)  BP(mean): --  RR: 18 (10-13-21 @ 04:58)  SpO2: 98% (10-13-21 @ 04:58)  Wt(kg): --    I and O's:    10-12 @ 07:01  -  10-13 @ 07:00  --------------------------------------------------------  IN: 950 mL / OUT: 2000 mL / NET: -1050 mL          PHYSICAL EXAM:    Constitutional: NAD  Neck:  No JVD  Respiratory: CTAB/L  Cardiovascular: S1 and S2  Gastrointestinal: BS+, soft, NT/ND  Extremities: No peripheral edema  Neurological: A/O x 3, no focal deficits  Psychiatric: Normal mood, normal affect  : No Aguilar  Skin: No rashes  Access: perm cath and avf     LABS:                        7.9    10.84 )-----------( 263      ( 13 Oct 2021 07:28 )             24.9     10-13    136  |  95<L>  |  48<H>  ----------------------------<  91  4.1   |  24  |  9.50<H>    Ca    8.5      13 Oct 2021 07:28            Urine Studies:          RADIOLOGY & ADDITIONAL STUDIES:

## 2021-10-13 NOTE — CONSULT NOTE ADULT - CONSULT REASON
cardiac disease management
balloon angioplasty maturation for LUE AVF
ESRD
Fever, bacteremia
Right scrotal mass

## 2021-10-13 NOTE — CHART NOTE - NSCHARTNOTEFT_GEN_A_CORE
No further urologic investigations or interventions warranted at this time, please do not hesitate to contact or re-consult urology should any additional urologic concerns or questions arise.    Please see progress note from this AM with full assessment, recommendations, and any necessary follow-up.

## 2021-10-13 NOTE — PROGRESS NOTE ADULT - ASSESSMENT
80M with PMHx of HFpEF, ESRD, and HTN presenting with self-reported fever of 104 F.   catheter related infection?   8 x 6 cm cystic lesion in the right scotum     1 ID-   Blood culture with gram neg rods/ ecoli     2 Renal- s/p AVF venoplasty and outflow vein thrombus balloon maturation on 10/8/21,   perm cath and the fistula is not ready to use.  HD in am     3 -Cont Flomax;  Hx of hydrocele and appreciate Urology input      4 Vasc-Called Dr Dunn for outflow issues related to the AVF       [facility]  [phone]

## 2021-10-13 NOTE — PROGRESS NOTE ADULT - ASSESSMENT
80 year old male with PMHx of ESRD on HD, james 6 prostate cancer 2010 with negative biopsy 2013, DM, HTN, and known chronic right hydrocele admitted for fevers and found to have a cystic mass in his right scrotum on CT.     - CT demonstrates right hydrocele consistent with exam and history  - No acute urologic intervention indicated  - Please contact urology when scrotal US is performed for review at that time 80 year old male with PMHx of ESRD on HD, james 6 prostate cancer 2010 with negative biopsy 2013, DM, HTN, and known chronic right hydrocele admitted for fevers and found to have a cystic mass in his right scrotum on CT.   CT demonstrates right hydrocele consistent with exam and history.  US has been ordered by primary team.    - No acute urologic intervention indicated for hydrocele  - No further urologic investigations or interventions warranted at this time, please do not hesitate to contact urology should any additional urologic concerns or questions arise      Outpatient follow up:  Dr. Liset Leos [facility]  [address] [place] [phone]

## 2021-10-13 NOTE — CONSULT NOTE ADULT - ASSESSMENT
80M with PMHx of HFpEF, ESRD, and HTN presenting with self-reported fever of 104 F. Patient states it has been going on for one day and intermittent. Otherwise he denies focal symptoms such as cough, shortness of breath, chest pain, diarrhea, dysuria or polyuria. He states he urinates very little. No known sick contacts. No sore throat or nasal congestion. He has had a right chest wall permacath since March 2021 with no redness or warmth around the site. He was last dialyzed several days prior with no issues. He does complain of right shoulder discomfort, but no swelling and has been having discomfort since permacath was placed. In the ED patient had blood cultures drawn and given vancomycin and zosyn. No fevers since he's been here.  (05 Oct 2021 09:31)    ER vss, afebrile.  WBC 10.8.  UA large LE, small blood.  Bcx E.coli.  CT c/a/p performed.  Pt with intermittent fevers, Tmax 102.3 on 10/8.    Pt states prior to hospitalization noted some dysuria and difficulty urinating.  Pt seen by Urology for rt scrotal swelling, denies tenderness.  Found to have rt hydrocele on US.   Pt now on rocephin, ID consulted for further abx managment.      Fever/E.coli bacteremia:    - UA large LE.  Bcx growing E.coli from 1/2 sets.  Pt c/o some dysuria, difficulty urinating.  Pt is ESRD on HD.  Currently on rocephin.    - f/u sensis.  Ucx u/a, will reorder, although may be low yield post abx.    - CT c/a/p reviewed.  Of note, showed an 8.1 x 6.4cm cystic mass in right scrotum, markedly enlarged prostate gland, and mild infiltration of perivesical fat.   Pt seen by URology, Subsequent scrotal US demonstrated rt hydrocele, no further intervention deemed necessary at this time.    - Possible acute cystitis vs prostatis as source.  f/u repeat bcx to ensure clearance as pt also with HD catheter in place.  Vascular eval called for f/u regarding AVF.     Will follow,    Marleni Garcia  [phone]

## 2021-10-13 NOTE — CONSULT NOTE ADULT - SUBJECTIVE AND OBJECTIVE BOX
Patient is a 80y old  Male who presents with a chief complaint of Fever (13 Oct 2021 10:38)      HPI:    80M with PMHx of HFpEF, ESRD, and HTN presenting with self-reported fever of 104 F. Patient states it has been going on for one day and intermittent. Otherwise he denies focal symptoms such as cough, shortness of breath, chest pain, diarrhea, dysuria or polyuria. He states he urinates very little. No known sick contacts. No sore throat or nasal congestion. He has had a right chest wall permacath since March 2021 with no redness or warmth around the site. He was last dialyzed several days prior with no issues. He does complain of right shoulder discomfort, but no swelling and has been having discomfort since permacath was placed. In the ED patient had blood cultures drawn and given vancomycin and zosyn. No fevers since he's been here.  (05 Oct 2021 09:31)    ER vss, afebrile.  WBC 10.8.  UA large LE, small blood.  Bcx E.coli.  CT c/a/p performed.  Pt with intermittent fevers, Tmax 102.3 on 10/8.    Pt now on rocephin, ID consulted for further abx managment.        REVIEW OF SYSTEMS:    CONSTITUTIONAL: No fever, weight loss, or fatigue  EYES: No eye pain, visual disturbances, or discharge  ENMT:  No sore throat  NECK: No pain or stiffness  RESPIRATORY: No cough, wheezing, chills or hemoptysis; No shortness of breath  CARDIOVASCULAR: No chest pain, palpitations, dizziness, or leg swelling  GASTROINTESTINAL: No abdominal or epigastric pain. No nausea, vomiting, or hematemesis; No diarrhea or constipation. No melena or hematochezia.  GENITOURINARY: No dysuria, frequency, hematuria, or incontinence  NEUROLOGICAL: No headaches, memory loss, loss of strength, numbness, or tremors  SKIN: No itching, burning, rashes, or lesions   LYMPH NODES: No enlarged glands  MUSCULOSKELETAL: No joint pain or swelling; No muscle, back, or extremity pain      PAST MEDICAL & SURGICAL HISTORY:  HTN - Hypertension    Glaucoma (Increased Eye Pressure)    BPH (Benign Prostatic Hypertrophy)    ESRD on dialysis    Excision of Sinus Polyp  2006    Laser Surgery Left  ? regarding procedure ; secondary to glaucoma 12/07    Laser Surgery :Right  ?  regarding procedure ; 12/07 ; secondary to glaucoma        Allergies    grass, pollen (Rhinitis)  No Known Drug Allergies    Intolerances        FAMILY HISTORY:  Family history of diabetes mellitus (DM) (Mother, Sibling)        SOCIAL HISTORY:  Patient denies tobacco or IVDU.  Patient born in [facility]      MEDICATIONS  (STANDING):  artificial  tears Solution 1 Drop(s) Left EYE every 4 hours  aspirin enteric coated 81 milliGRAM(s) Oral daily  benzocaine 15 mG/menthol 3.6 mG (Sugar-Free) Lozenge 1 Lozenge Oral once  buMETAnide 1 milliGRAM(s) Oral daily  carvedilol 25 milliGRAM(s) Oral every 12 hours  cefTRIAXone   IVPB 1000 milliGRAM(s) IV Intermittent every 24 hours  chlorhexidine 4% Liquid 1 Application(s) Topical daily  heparin   Injectable 5000 Unit(s) SubCutaneous every 8 hours  petrolatum Ophthalmic Ointment 1 Application(s) Left EYE at bedtime  sevelamer carbonate 800 milliGRAM(s) Oral three times a day with meals  tamsulosin 0.4 milliGRAM(s) Oral at bedtime    MEDICATIONS  (PRN):  acetaminophen   Tablet .. 650 milliGRAM(s) Oral every 6 hours PRN Temp greater or equal to 38C (100.4F), Mild Pain (1 - 3), Moderate Pain (4 - 6), Severe Pain (7 - 10)      Vital Signs Last 24 Hrs  T(C): 36.6 (13 Oct 2021 16:30), Max: 36.9 (13 Oct 2021 04:58)  T(F): 97.9 (13 Oct 2021 16:30), Max: 98.5 (13 Oct 2021 11:44)  HR: 63 (13 Oct 2021 16:30) (63 - 78)  BP: 121/65 (13 Oct 2021 16:30) (102/63 - 125/61)  BP(mean): --  RR: 18 (13 Oct 2021 16:30) (18 - 18)  SpO2: 97% (13 Oct 2021 16:30) (97% - 98%)    PHYSICAL EXAM:    GENERAL: NAD, well-groomed  HEAD:  Atraumatic, Normocephalic  EYES: EOMI, PERRLA, conjunctiva and sclera clear  ENMT: No tonsillar erythema, exudates, or enlargement; Moist mucous membranes  NECK: Supple, No JVD  CHEST/LUNG: Clear to percussion bilaterally; No rales, rhonchi, wheezing, or rubs  HEART: Regular rate and rhythm; No murmurs, rubs, or gallops  ABDOMEN: Soft, Nontender, Nondistended; Bowel sounds present  EXTREMITIES:  2+ Peripheral Pulses, No clubbing, cyanosis, or edema  LYMPH: No lymphadenopathy noted  SKIN: No rashes or lesions    LABS:  CBC Full  -  ( 13 Oct 2021 07:28 )  WBC Count : 10.84 K/uL  RBC Count : 2.96 M/uL  Hemoglobin : 7.9 g/dL  Hematocrit : 24.9 %  Platelet Count - Automated : 263 K/uL  Mean Cell Volume : 84.1 fl  Mean Cell Hemoglobin : 26.7 pg  Mean Cell Hemoglobin Concentration : 31.7 gm/dL  Auto Neutrophil # : x  Auto Lymphocyte # : x  Auto Monocyte # : x  Auto Eosinophil # : x  Auto Basophil # : x  Auto Neutrophil % : x  Auto Lymphocyte % : x  Auto Monocyte % : x  Auto Eosinophil % : x  Auto Basophil % : x      10-13    136  |  95<L>  |  48<H>  ----------------------------<  91  4.1   |  24  |  9.50<H>    Ca    8.5      13 Oct 2021 07:28        MICROBIOLOGY:    Culture - Blood (10.08.21 @ 22:50)   Specimen Source: .Blood Blood-Peripheral   Culture Results:   No growth to date.     Culture - Blood (10.08.21 @ 22:50)   - Escherichia coli: Detec   - Ampicillin: R >16 These ampicillin results predict results for amoxicillin   - Ampicillin/Sulbactam: R >16/8 Enterobacter, Citrobacter, and Serratia may develop resistance during prolonged therapy (3-4 days)   - Aztreonam: S <=4   Gram Stain:   Growth in anaerobic bottle: Gram Negative Rods   - Amikacin: S <=16   - Cefazolin: S <=2 Enterobacter, Citrobacter, and Serratia may develop resistance during prolonged therapy (3-4 days)   - Cefepime: S <=2   - Cefoxitin: S <=8   - Ceftriaxone: S <=1 Enterobacter, Citrobacter, and Serratia may develop resistance during prolonged therapy   - Ciprofloxacin: S <=0.25   - Ertapenem: S <=0.5   - Gentamicin: S 4   - Imipenem: S <=1   - Levofloxacin: S <=0.5   - Meropenem: S <=1   - Piperacillin/Tazobactam: S <=8   - Tobramycin: I 8   - Trimethoprim/Sulfamethoxazole: S 2/38   Specimen Source: .Blood Blood-Peripheral   Organism: Blood Culture PCR   Organism: Escherichia coli   Culture Results:   Growth in anaerobic bottle: Escherichia coli   ***Blood Panel PCR results on this specimen are available   approximately 3 hours after the Gram stain result.***   Gram stain, PCR, and/or culture results may not always   correspond due to difference in methodologies.   ************************************************************   This PCR assay was performed by multiplex PCR. This   Assay tests for 66 bacterial and resistance gene targets.   Please refer to the [facility] test directory   at [email or web address] for details.   Organism Identification: Blood Culture PCR   Escherichia coli   Method Type: PCR   Method Type: VIJAY     Culture - Blood (10.04.21 @ 23:11)   Specimen Source: .Blood Blood-Peripheral   Culture Results:   No Growth Final     Culture - Blood (10.04.21 @ 23:11)   Specimen Source: .Blood Blood-Peripheral   Culture Results:   No Growth Final         RADIOLOGY:    < from: US Doppler Scrotum (10.13.21 @ 10:19) >    EXAM:  US DPLX SCROTUM                          EXAM:  US SCROTUM AND CONTENTS                            PROCEDURE DATE:  10/13/2021            INTERPRETATION:  CLINICAL INFORMATION: Scrotal swelling for a year and a half    COMPARISON: CT abdomenand pelvis 10/12/2021    TECHNIQUE: Testicular ultrasound utilizing color and spectral Doppler.    FINDINGS:    RIGHT:  Right testis: 4.0 cm x 2.2 cm x  2.2 cm. Surrounding the right testicle, there is a large 6.6 x 10.8 x 6.5 cm area of complex fluid with internal echoes that causes mass effect upon the right testicle. Normal arterial and venous blood flow pattern.  Right epididymis: Not visualized.  Right hydrocele: None.  Right varicocele: None.    LEFT:  Left testis: 3.3 cm x 2.1 cm x 2.1 cm.Normal echogenicity and echotexture with no masses or areas of architectural distortion. Normal arterial and venous blood flow pattern.  Left epididymis: Within normal limits.  Left hydrocele: Small left hydrocele.  Left varicocele: None.    IMPRESSION:    Large area of complex fluid with internal echoes surrounding the right testicle with mild mass effect. 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    Small left hydrocele.    < end of copied text >      < from: CT Abdomen and Pelvis w/ Oral Cont and w/ IV Cont (10.12.21 @ 10:29) >  IMPRESSION:  An 8.1 x 6.4 cm cystic mass is seen within the right scrotum.  This area was not imaged in 2017.   Findings may represent a benign process such as epididymal or spermatic cyst; however, thickened peripheral walls may represent superimposed infection/abscess in the appropriate clinical context. Malignancy is an additional concern. Recommend further evaluation with scrotal ultrasound.    Markedly enlarged prostate gland.    Collapsed urinary bladder, limiting evaluation. Mild infiltration of the perivesical fat.    < end of copied text >        < from: Xray Chest 1 View- PORTABLE-Urgent (Xray Chest 1 View- PORTABLE-Urgent .) (10.08.21 @ 21:04) >  IMPRESSION:  No active pulmonary disease.    Thank you for the courtesy of this referral.    < end of copied text >                 Patient is a 80y old  Male who presents with a chief complaint of Fever (13 Oct 2021 10:38)      HPI:    80M with PMHx of HFpEF, ESRD, and HTN presenting with self-reported fever of 104 F. Patient states it has been going on for one day and intermittent. Otherwise he denies focal symptoms such as cough, shortness of breath, chest pain, diarrhea, dysuria or polyuria. He states he urinates very little. No known sick contacts. No sore throat or nasal congestion. He has had a right chest wall permacath since March 2021 with no redness or warmth around the site. He was last dialyzed several days prior with no issues. He does complain of right shoulder discomfort, but no swelling and has been having discomfort since permacath was placed. In the ED patient had blood cultures drawn and given vancomycin and zosyn. No fevers since he's been here.  (05 Oct 2021 09:31)    ER vss, afebrile.  WBC 10.8.  UA large LE, small blood.  Bcx E.coli.  CT c/a/p performed.  Pt with intermittent fevers, Tmax 102.3 on 10/8.    Pt states prior to hospitalization noted some dysuria and difficulty urinating.  Pt seen by Urology for rt scrotal swelling, denies tenderness.  Found to have rt hydrocele on US.   Pt now on rocephin, ID consulted for further abx managment.        REVIEW OF SYSTEMS:    CONSTITUTIONAL: No fever, weight loss, or fatigue  EYES: No eye pain, visual disturbances, or discharge  ENMT:  No sore throat  NECK: No pain or stiffness  RESPIRATORY: No cough, wheezing, chills or hemoptysis; No shortness of breath  CARDIOVASCULAR: No chest pain, palpitations, dizziness, or leg swelling  GASTROINTESTINAL: No abdominal or epigastric pain. No nausea, vomiting, or hematemesis; No diarrhea or constipation. No melena or hematochezia.  GENITOURINARY: No dysuria, frequency, hematuria, or incontinence  NEUROLOGICAL: No headaches, memory loss, loss of strength, numbness, or tremors  SKIN: No itching, burning, rashes, or lesions   LYMPH NODES: No enlarged glands  MUSCULOSKELETAL: No joint pain or swelling; No muscle, back, or extremity pain      PAST MEDICAL & SURGICAL HISTORY:  HTN - Hypertension    Glaucoma (Increased Eye Pressure)    BPH (Benign Prostatic Hypertrophy)    ESRD on dialysis    Excision of Sinus Polyp  2006    Laser Surgery Left  ? regarding procedure ; secondary to glaucoma 12/07    Laser Surgery :Right  ?  regarding procedure ; 12/07 ; secondary to glaucoma        Allergies    grass, pollen (Rhinitis)  No Known Drug Allergies    Intolerances        FAMILY HISTORY:  Family history of diabetes mellitus (DM) (Mother, Sibling)        SOCIAL HISTORY:  Patient denies tobacco or IVDU.  Patient born in [facility]      MEDICATIONS  (STANDING):  artificial  tears Solution 1 Drop(s) Left EYE every 4 hours  aspirin enteric coated 81 milliGRAM(s) Oral daily  benzocaine 15 mG/menthol 3.6 mG (Sugar-Free) Lozenge 1 Lozenge Oral once  buMETAnide 1 milliGRAM(s) Oral daily  carvedilol 25 milliGRAM(s) Oral every 12 hours  cefTRIAXone   IVPB 1000 milliGRAM(s) IV Intermittent every 24 hours  chlorhexidine 4% Liquid 1 Application(s) Topical daily  heparin   Injectable 5000 Unit(s) SubCutaneous every 8 hours  petrolatum Ophthalmic Ointment 1 Application(s) Left EYE at bedtime  sevelamer carbonate 800 milliGRAM(s) Oral three times a day with meals  tamsulosin 0.4 milliGRAM(s) Oral at bedtime    MEDICATIONS  (PRN):  acetaminophen   Tablet .. 650 milliGRAM(s) Oral every 6 hours PRN Temp greater or equal to 38C (100.4F), Mild Pain (1 - 3), Moderate Pain (4 - 6), Severe Pain (7 - 10)      Vital Signs Last 24 Hrs  T(C): 36.6 (13 Oct 2021 16:30), Max: 36.9 (13 Oct 2021 04:58)  T(F): 97.9 (13 Oct 2021 16:30), Max: 98.5 (13 Oct 2021 11:44)  HR: 63 (13 Oct 2021 16:30) (63 - 78)  BP: 121/65 (13 Oct 2021 16:30) (102/63 - 125/61)  BP(mean): --  RR: 18 (13 Oct 2021 16:30) (18 - 18)  SpO2: 97% (13 Oct 2021 16:30) (97% - 98%)    PHYSICAL EXAM:    GENERAL: NAD, well-groomed  HEAD:  Atraumatic, Normocephalic  EYES: EOMI, PERRLA, conjunctiva and sclera clear  ENMT: No tonsillar erythema, exudates, or enlargement; Moist mucous membranes  NECK: Supple, No JVD  CHEST/LUNG: Clear to percussion bilaterally; No rales, rhonchi, wheezing, or rubs  HEART: Regular rate and rhythm; No murmurs, rubs, or gallops  ABDOMEN: Soft, Nontender, Nondistended; Bowel sounds present  EXTREMITIES:  2+ Peripheral Pulses, No clubbing, cyanosis, or edema  LYMPH: No lymphadenopathy noted  SKIN: Rt chest wall HD cath  : rt scrotal swelling, no erythema    LABS:  CBC Full  -  ( 13 Oct 2021 07:28 )  WBC Count : 10.84 K/uL  RBC Count : 2.96 M/uL  Hemoglobin : 7.9 g/dL  Hematocrit : 24.9 %  Platelet Count - Automated : 263 K/uL  Mean Cell Volume : 84.1 fl  Mean Cell Hemoglobin : 26.7 pg  Mean Cell Hemoglobin Concentration : 31.7 gm/dL  Auto Neutrophil # : x  Auto Lymphocyte # : x  Auto Monocyte # : x  Auto Eosinophil # : x  Auto Basophil # : x  Auto Neutrophil % : x  Auto Lymphocyte % : x  Auto Monocyte % : x  Auto Eosinophil % : x  Auto Basophil % : x      10-13    136  |  95<L>  |  48<H>  ----------------------------<  91  4.1   |  24  |  9.50<H>    Ca    8.5      13 Oct 2021 07:28        MICROBIOLOGY:    Culture - Blood (10.08.21 @ 22:50)   Specimen Source: .Blood Blood-Peripheral   Culture Results:   No growth to date.     Culture - Blood (10.08.21 @ 22:50)   - Escherichia coli: Detec   - Ampicillin: R >16 These ampicillin results predict results for amoxicillin   - Ampicillin/Sulbactam: R >16/8 Enterobacter, Citrobacter, and Serratia may develop resistance during prolonged therapy (3-4 days)   - Aztreonam: S <=4   Gram Stain:   Growth in anaerobic bottle: Gram Negative Rods   - Amikacin: S <=16   - Cefazolin: S <=2 Enterobacter, Citrobacter, and Serratia may develop resistance during prolonged therapy (3-4 days)   - Cefepime: S <=2   - Cefoxitin: S <=8   - Ceftriaxone: S <=1 Enterobacter, Citrobacter, and Serratia may develop resistance during prolonged therapy   - Ciprofloxacin: S <=0.25   - Ertapenem: S <=0.5   - Gentamicin: S 4   - Imipenem: S <=1   - Levofloxacin: S <=0.5   - Meropenem: S <=1   - Piperacillin/Tazobactam: S <=8   - Tobramycin: I 8   - Trimethoprim/Sulfamethoxazole: S 2/38   Specimen Source: .Blood Blood-Peripheral   Organism: Blood Culture PCR   Organism: Escherichia coli   Culture Results:   Growth in anaerobic bottle: Escherichia coli   ***Blood Panel PCR results on this specimen are available   approximately 3 hours after the Gram stain result.***   Gram stain, PCR, and/or culture results may not always   correspond due to difference in methodologies.   ************************************************************   This PCR assay was performed by multiplex PCR. This   Assay tests for 66 bacterial and resistance gene targets.   Please refer to the [facility] test directory   at [email or web address] for details.   Organism Identification: Blood Culture PCR   Escherichia coli   Method Type: PCR   Method Type: VIJAY     Culture - Blood (10.04.21 @ 23:11)   Specimen Source: .Blood Blood-Peripheral   Culture Results:   No Growth Final     Culture - Blood (10.04.21 @ 23:11)   Specimen Source: .Blood Blood-Peripheral   Culture Results:   No Growth Final         RADIOLOGY:    < from: US Doppler Scrotum (10.13.21 @ 10:19) >    EXAM:  US DPLX SCROTUM                          EXAM:  US SCROTUM AND CONTENTS                            PROCEDURE DATE:  10/13/2021            INTERPRETATION:  CLINICAL INFORMATION: Scrotal swelling for a year and a half    COMPARISON: CT abdomenand pelvis 10/12/2021    TECHNIQUE: Testicular ultrasound utilizing color and spectral Doppler.    FINDINGS:    RIGHT:  Right testis: 4.0 cm x 2.2 cm x  2.2 cm. Surrounding the right testicle, there is a large 6.6 x 10.8 x 6.5 cm area of complex fluid with internal echoes that causes mass effect upon the right testicle. Normal arterial and venous blood flow pattern.  Right epididymis: Not visualized.  Right hydrocele: None.  Right varicocele: None.    LEFT:  Left testis: 3.3 cm x 2.1 cm x 2.1 cm.Normal echogenicity and echotexture with no masses or areas of architectural distortion. Normal arterial and venous blood flow pattern.  Left epididymis: Within normal limits.  Left hydrocele: Small left hydrocele.  Left varicocele: None.    IMPRESSION:    Large area of complex fluid with internal echoes surrounding the right testicle with mild mass effect. 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    Small left hydrocele.    < end of copied text >      < from: CT Abdomen and Pelvis w/ Oral Cont and w/ IV Cont (10.12.21 @ 10:29) >  IMPRESSION:  An 8.1 x 6.4 cm cystic mass is seen within the right scrotum.  This area was not imaged in 2017.   Findings may represent a benign process such as epididymal or spermatic cyst; however, thickened peripheral walls may represent superimposed infection/abscess in the appropriate clinical context. Malignancy is an additional concern. Recommend further evaluation with scrotal ultrasound.    Markedly enlarged prostate gland.    Collapsed urinary bladder, limiting evaluation. Mild infiltration of the perivesical fat.    < end of copied text >        < from: Xray Chest 1 View- PORTABLE-Urgent (Xray Chest 1 View- PORTABLE-Urgent .) (10.08.21 @ 21:04) >  IMPRESSION:  No active pulmonary disease.    Thank you for the courtesy of this referral.    < end of copied text >

## 2021-10-14 LAB
ANION GAP SERPL CALC-SCNC: 22 MMOL/L — HIGH (ref 5–17)
BUN SERPL-MCNC: 62 MG/DL — HIGH (ref 7–23)
CALCIUM SERPL-MCNC: 8.6 MG/DL — SIGNIFICANT CHANGE UP (ref 8.4–10.5)
CHLORIDE SERPL-SCNC: 95 MMOL/L — LOW (ref 96–108)
CO2 SERPL-SCNC: 21 MMOL/L — LOW (ref 22–31)
CREAT SERPL-MCNC: 10.71 MG/DL — HIGH (ref 0.5–1.3)
GLUCOSE SERPL-MCNC: 94 MG/DL — SIGNIFICANT CHANGE UP (ref 70–99)
HCT VFR BLD CALC: 23.1 % — LOW (ref 39–50)
HGB BLD-MCNC: 7.3 G/DL — LOW (ref 13–17)
MCHC RBC-ENTMCNC: 26.9 PG — LOW (ref 27–34)
MCHC RBC-ENTMCNC: 31.6 GM/DL — LOW (ref 32–36)
MCV RBC AUTO: 85.2 FL — SIGNIFICANT CHANGE UP (ref 80–100)
NRBC # BLD: 0 /100 WBCS — SIGNIFICANT CHANGE UP (ref 0–0)
PLATELET # BLD AUTO: 267 K/UL — SIGNIFICANT CHANGE UP (ref 150–400)
POTASSIUM SERPL-MCNC: 4.3 MMOL/L — SIGNIFICANT CHANGE UP (ref 3.5–5.3)
POTASSIUM SERPL-SCNC: 4.3 MMOL/L — SIGNIFICANT CHANGE UP (ref 3.5–5.3)
RBC # BLD: 2.71 M/UL — LOW (ref 4.2–5.8)
RBC # FLD: 16.9 % — HIGH (ref 10.3–14.5)
SODIUM SERPL-SCNC: 138 MMOL/L — SIGNIFICANT CHANGE UP (ref 135–145)
WBC # BLD: 8.24 K/UL — SIGNIFICANT CHANGE UP (ref 3.8–10.5)
WBC # FLD AUTO: 8.24 K/UL — SIGNIFICANT CHANGE UP (ref 3.8–10.5)

## 2021-10-14 RX ORDER — LIDOCAINE AND PRILOCAINE CREAM 25; 25 MG/G; MG/G
1 CREAM TOPICAL
Refills: 0 | Status: DISCONTINUED | OUTPATIENT
Start: 2021-10-14 | End: 2021-10-23

## 2021-10-14 RX ADMIN — Medication 1 DROP(S): at 13:13

## 2021-10-14 RX ADMIN — SEVELAMER CARBONATE 800 MILLIGRAM(S): 2400 POWDER, FOR SUSPENSION ORAL at 17:31

## 2021-10-14 RX ADMIN — Medication 1 DROP(S): at 10:58

## 2021-10-14 RX ADMIN — Medication 1 DROP(S): at 17:31

## 2021-10-14 RX ADMIN — SEVELAMER CARBONATE 800 MILLIGRAM(S): 2400 POWDER, FOR SUSPENSION ORAL at 13:12

## 2021-10-14 RX ADMIN — SEVELAMER CARBONATE 800 MILLIGRAM(S): 2400 POWDER, FOR SUSPENSION ORAL at 08:49

## 2021-10-14 RX ADMIN — HEPARIN SODIUM 5000 UNIT(S): 5000 INJECTION INTRAVENOUS; SUBCUTANEOUS at 13:13

## 2021-10-14 RX ADMIN — CEFTRIAXONE 100 MILLIGRAM(S): 500 INJECTION, POWDER, FOR SOLUTION INTRAMUSCULAR; INTRAVENOUS at 20:56

## 2021-10-14 RX ADMIN — Medication 1 DROP(S): at 02:06

## 2021-10-14 RX ADMIN — HEPARIN SODIUM 5000 UNIT(S): 5000 INJECTION INTRAVENOUS; SUBCUTANEOUS at 05:08

## 2021-10-14 RX ADMIN — CARVEDILOL PHOSPHATE 25 MILLIGRAM(S): 80 CAPSULE, EXTENDED RELEASE ORAL at 21:00

## 2021-10-14 RX ADMIN — BUMETANIDE 1 MILLIGRAM(S): 0.25 INJECTION INTRAMUSCULAR; INTRAVENOUS at 05:07

## 2021-10-14 RX ADMIN — HEPARIN SODIUM 5000 UNIT(S): 5000 INJECTION INTRAVENOUS; SUBCUTANEOUS at 21:00

## 2021-10-14 RX ADMIN — Medication 81 MILLIGRAM(S): at 13:13

## 2021-10-14 RX ADMIN — Medication 1 DROP(S): at 20:59

## 2021-10-14 RX ADMIN — Medication 1 APPLICATION(S): at 21:01

## 2021-10-14 RX ADMIN — TAMSULOSIN HYDROCHLORIDE 0.4 MILLIGRAM(S): 0.4 CAPSULE ORAL at 21:01

## 2021-10-14 RX ADMIN — Medication 1 DROP(S): at 05:08

## 2021-10-14 RX ADMIN — CHLORHEXIDINE GLUCONATE 1 APPLICATION(S): 213 SOLUTION TOPICAL at 06:43

## 2021-10-14 NOTE — PROGRESS NOTE ADULT - SUBJECTIVE AND OBJECTIVE BOX
Asked by Dr. Martinez to eval AVF.  Pt known.  L rad-ceph AVF.  Has not been maturing.  s/p recent AVFgram by IR.  2 punctures.  Inflow/swing seg ok.  Useable seg in forearm recanalized.  Small soft hematoma prox ant forearm.  +thrill.  Hnad/fingers ok.  +rad pulse.    Rec:   HD via pcath.  Avoid AVF use for now.    Will need to reeval for repeat AVFgram +/- intervention ~2 wks post previous procedure.  CAn fu outpt

## 2021-10-14 NOTE — PROGRESS NOTE ADULT - ASSESSMENT
80M with PMHx of HFpEF, ESRD, and HTN presenting with self-reported fever of 104 F. Patient states it has been going on for one day and intermittent. Otherwise he denies focal symptoms such as cough, shortness of breath, chest pain, diarrhea, dysuria or polyuria. He states he urinates very little. No known sick contacts. No sore throat or nasal congestion. He has had a right chest wall permacath since March 2021 with no redness or warmth around the site. He was last dialyzed several days prior with no issues. He does complain of right shoulder discomfort, but no swelling and has been having discomfort since permacath was placed. In the ED patient had blood cultures drawn and given vancomycin and zosyn. No fevers since he's been here.  (05 Oct 2021 09:31)    ER vss, afebrile.  WBC 10.8.  UA large LE, small blood.  Bcx E.coli.  CT c/a/p performed.  Pt with intermittent fevers, Tmax 102.3 on 10/8.    Pt states prior to hospitalization noted some dysuria and difficulty urinating.  Pt seen by Urology for rt scrotal swelling, denies tenderness.  Found to have rt hydrocele on US.   Pt now on rocephin, ID consulted for further abx managment.      Fever/E.coli bacteremia:    - UA large LE.  Bcx growing E.coli from 1/2 sets.  Pt c/o some dysuria, difficulty urinating.  Pt is ESRD on HD.  Currently on rocephin.    - f/u repeat bcx to ensure clearance.     - f/u sensis.  Ucx u/a, will reorder, although may be low yield post abx.    - CT c/a/p reviewed.  Of note, showed an 8.1 x 6.4cm cystic mass in right scrotum, markedly enlarged prostate gland, and mild infiltration of perivesical fat.   Pt seen by URology, Subsequent scrotal US demonstrated rt hydrocele, no further intervention deemed necessary at this time.    - Possible acute cystitis vs prostatis as source.  f/u repeat bcx to ensure clearance as pt also with HD catheter in place.  Vascular eval called for f/u regarding AVF.     Will follow,    Marleni Garcia  [phone]

## 2021-10-14 NOTE — PROGRESS NOTE ADULT - SUBJECTIVE AND OBJECTIVE BOX
Seen post dialysis  Comfortable, no acute complaints     VITAL:  T(C): , Max: 36.9 (10-14-21 @ 13:32)  T(F): , Max: 98.4 (10-14-21 @ 13:32)  HR: 70 (10-14-21 @ 16:32)  BP: 124/69 (10-14-21 @ 16:32)  BP(mean): --  RR: 18 (10-14-21 @ 16:32)  SpO2: 99% (10-14-21 @ 16:32)  Wt(kg): --      PHYSICAL EXAM:  General: Alerted, oriented  HEENT: MMM  Lungs:CTA-b/l  Heart: RRR S1S2  Abdomen: Soft, NTND  Ext: no pedal edema b/l  : no an  Access- R chest wall permcath, LAV fistula with thrill ( not being used for HD)     LABS:                        7.3    8.24  )-----------( 267      ( 14 Oct 2021 06:38 )             23.1     Na(138)/K(4.3)/Cl(95)/HCO3(21)/BUN(62)/Cr(10.71)Glu(94)/Ca(8.6)/Mg(--)/PO4(--)    10-14 @ 06:38  Na(136)/K(4.1)/Cl(95)/HCO3(24)/BUN(48)/Cr(9.50)Glu(91)/Ca(8.5)/Mg(--)/PO4(--)    10-13 @ 07:28  Na(137)/K(4.2)/Cl(97)/HCO3(18)/BUN(68)/Cr(12.20)Glu(80)/Ca(8.7)/Mg(--)/PO4(--)    10-12 @ 07:15    80M with PMHx of HFpEF, ESRD, and HTN presenting with self-reported fever of 104 F.   catheter related infection?   8 x 6 cm cystic lesion in the right scotum - Hydrocele     1 ID-   Blood culture with gram neg rods/ ecoli     2 Renal- s/p AVF venoplasty and outflow vein thrombus balloon maturation on 10/8/21,   perm cath and the fistula is not ready to use.  HD on Saturday     3 -Cont Flomax;  Hx of hydrocele and appreciate Urology input - Subsequent scrotal US demonstrated rt hydrocele, no further intervention at this time.    4 Vasc follow up -Dr Dunn was called for outflow issues related to the AVF         Aleida Lutz MD  [facility]  Office: [phone]

## 2021-10-14 NOTE — PROGRESS NOTE ADULT - SUBJECTIVE AND OBJECTIVE BOX
CHIEF COMPLAINT:    SUBJECTIVE:     REVIEW OF SYSTEMS:    CONSTITUTIONAL: (  )  weakness,  (  ) fevers or chills  EYES/ENT: (  )visual changes;     NECK: (  ) pain or stiffness  RESPIRATORY:   (  )cough, wheezing, hemoptysis;  (  ) shortness of breath  CARDIOVASCULAR:  (  )chest pain or palpitations  GASTROINTESTINAL:   (  )abdominal or epigastric pain.  (  ) nausea, vomiting, or hematemesis;   (   ) diarrhea or constipation.   GENITOURINARY:   (    ) dysuria, frequency or hematuria  NEUROLOGICAL:  (   ) numbness or weakness   All other review of systems is negative unless indicated above    Vital Signs Last 24 Hrs  T(C): 36.4 (14 Oct 2021 04:52), Max: 36.9 (13 Oct 2021 11:44)  T(F): 97.5 (14 Oct 2021 04:52), Max: 98.5 (13 Oct 2021 11:44)  HR: 67 (14 Oct 2021 04:52) (63 - 78)  BP: 127/70 (14 Oct 2021 04:52) (102/63 - 127/70)  BP(mean): --  RR: 18 (14 Oct 2021 04:52) (18 - 18)  SpO2: 96% (14 Oct 2021 04:52) (96% - 97%)    I&O's Summary    13 Oct 2021 07:01  -  14 Oct 2021 07:00  --------------------------------------------------------  IN: 1180 mL / OUT: 0 mL / NET: 1180 mL        CAPILLARY BLOOD GLUCOSE          PHYSICAL EXAM:    Constitutional:  (   ) NAD,   (   )awake and alert  HEENT: PERR, EOMI,    Neck: Soft and supple, No LAD, No JVD  Respiratory:  (    Breath sounds are clear bilaterally,    (   ) wheezing, rales or rhonchi  Cardiovascular:     (   )S1 and S2, regular rate and rhythm, no Murmurs, gallops or rubs  Gastrointestinal:  (   )Bowel Sounds present, soft,   (  )nontender, nondistended,    Extremities:    (  ) peripheral edema  Vascular: 2+ peripheral pulses  Neurological:    (    )A/O x 3,   (  ) focal deficits  Musculoskeletal:    (   )  normal strength b/l upper  (     ) normal  lower extremities  Skin: No rashes    MEDICATIONS:  MEDICATIONS  (STANDING):  artificial  tears Solution 1 Drop(s) Left EYE every 4 hours  aspirin enteric coated 81 milliGRAM(s) Oral daily  buMETAnide 1 milliGRAM(s) Oral daily  carvedilol 25 milliGRAM(s) Oral every 12 hours  cefTRIAXone   IVPB 1000 milliGRAM(s) IV Intermittent every 24 hours  chlorhexidine 4% Liquid 1 Application(s) Topical daily  heparin   Injectable 5000 Unit(s) SubCutaneous every 8 hours  petrolatum Ophthalmic Ointment 1 Application(s) Left EYE at bedtime  sevelamer carbonate 800 milliGRAM(s) Oral three times a day with meals  tamsulosin 0.4 milliGRAM(s) Oral at bedtime      LABS: All Labs Reviewed:                        7.3    8.24  )-----------( 267      ( 14 Oct 2021 06:38 )             23.1     10-14    138  |  95<L>  |  62<H>  ----------------------------<  94  4.3   |  21<L>  |  10.71<H>    Ca    8.6      14 Oct 2021 06:38            Blood Culture:   Urine Culture      RADIOLOGY/EKG:    ASSESSMENT AND PLAN:    DVT PPX:    ADVANCED DIRECTIVE:    DISPOSITION: CHIEF COMPLAINT:patient feels better afebrile ID renal and urology follow-up appreciated    SUBJECTIVE:     REVIEW OF SYSTEMS:    CONSTITUTIONAL: (  )  weakness,  (  ) fevers or chills  EYES/ENT: (  )visual changes;     NECK: (  ) pain or stiffness  RESPIRATORY:   (  )cough, wheezing, hemoptysis;  (  ) shortness of breath  CARDIOVASCULAR:  (  )chest pain or palpitations  GASTROINTESTINAL:   (  )abdominal or epigastric pain.  (  ) nausea, vomiting, or hematemesis;   (   ) diarrhea or constipation.   GENITOURINARY:   (    ) dysuria, frequency or hematuria  NEUROLOGICAL:  (   ) numbness or weakness   All other review of systems is negative unless indicated above    Vital Signs Last 24 Hrs  T(C): 36.4 (14 Oct 2021 04:52), Max: 36.9 (13 Oct 2021 11:44)  T(F): 97.5 (14 Oct 2021 04:52), Max: 98.5 (13 Oct 2021 11:44)  HR: 67 (14 Oct 2021 04:52) (63 - 78)  BP: 127/70 (14 Oct 2021 04:52) (102/63 - 127/70)  BP(mean): --  RR: 18 (14 Oct 2021 04:52) (18 - 18)  SpO2: 96% (14 Oct 2021 04:52) (96% - 97%)    I&O's Summary    13 Oct 2021 07:01  -  14 Oct 2021 07:00  --------------------------------------------------------  IN: 1180 mL / OUT: 0 mL / NET: 1180 mL        CAPILLARY BLOOD GLUCOSE          PHYSICAL EXAM:    Constitutional:  (  x ) NAD,   (   )awake and alert  HEENT: PERR, EOMI,    Neck: Soft and supple, No LAD, No JVD  Respiratory:  (  x  Breath sounds are clear bilaterally,    (   ) wheezing, rales or rhonchi  Cardiovascular:     (  x )S1 and S2, regular rate and rhythm, no Murmurs, gallops or rubs  Gastrointestinal:  (  x )Bowel Sounds present, soft,   (  )nontender, nondistended,    Extremities:    (  x) peripheral edema  Vascular: 2+ peripheral pulses  Neurological:    (   x )A/O x 3,   (  ) focal deficits  Musculoskeletal:    (  x )  normal strength b/l upper  (     ) normal  lower extremities  Skin: No rashes    MEDICATIONS:  MEDICATIONS  (STANDING):  artificial  tears Solution 1 Drop(s) Left EYE every 4 hours  aspirin enteric coated 81 milliGRAM(s) Oral daily  buMETAnide 1 milliGRAM(s) Oral daily  carvedilol 25 milliGRAM(s) Oral every 12 hours  cefTRIAXone   IVPB 1000 milliGRAM(s) IV Intermittent every 24 hours  chlorhexidine 4% Liquid 1 Application(s) Topical daily  heparin   Injectable 5000 Unit(s) SubCutaneous every 8 hours  petrolatum Ophthalmic Ointment 1 Application(s) Left EYE at bedtime  sevelamer carbonate 800 milliGRAM(s) Oral three times a day with meals  tamsulosin 0.4 milliGRAM(s) Oral at bedtime      LABS: All Labs Reviewed:                        7.3    8.24  )-----------( 267      ( 14 Oct 2021 06:38 )             23.1     10-14    138  |  95<L>  |  62<H>  ----------------------------<  94  4.3   |  21<L>  |  10.71<H>    Ca    8.6      14 Oct 2021 06:38            Blood Culture:   Urine Culture      RADIOLOGY/EKG:    ASSESSMENT AND PLAN:  continue IV Rocephin waiting for follow-up from ID and possibly vascular regarding AV fistula patient can be used permacath for dialysis present time  DVT PPX:    ADVANCED DIRECTIVE:    DISPOSITION:

## 2021-10-14 NOTE — PROGRESS NOTE ADULT - SUBJECTIVE AND OBJECTIVE BOX
Infectious Diseases progress note:    Subjective: NAD, afebrile.  Feels better, states dysuria is improved    ROS:  CONSTITUTIONAL:  No fever, chills, rigors  CARDIOVASCULAR:  No chest pain or palpitations  RESPIRATORY:   No SOB, cough, dyspnea on exertion.  No wheezing  GASTROINTESTINAL:  No abd pain, N/V, diarrhea/constipation  EXTREMITIES:  No swelling or joint pain  GENITOURINARY:  No burning on urination, increased frequency or urgency.  No flank pain  NEUROLOGIC:  No HA, visual disturbances  SKIN: No rashes    Allergies    grass, pollen (Rhinitis)  No Known Drug Allergies    Intolerances        ANTIBIOTICS/RELEVANT:  antimicrobials  cefTRIAXone   IVPB 1000 milliGRAM(s) IV Intermittent every 24 hours    immunologic:    OTHER:  acetaminophen   Tablet .. 650 milliGRAM(s) Oral every 6 hours PRN  artificial  tears Solution 1 Drop(s) Left EYE every 4 hours  aspirin enteric coated 81 milliGRAM(s) Oral daily  buMETAnide 1 milliGRAM(s) Oral daily  carvedilol 25 milliGRAM(s) Oral every 12 hours  chlorhexidine 4% Liquid 1 Application(s) Topical daily  heparin   Injectable 5000 Unit(s) SubCutaneous every 8 hours  lidocaine/prilocaine Cream 1 Application(s) Topical <User Schedule>  petrolatum Ophthalmic Ointment 1 Application(s) Left EYE at bedtime  sevelamer carbonate 800 milliGRAM(s) Oral three times a day with meals  tamsulosin 0.4 milliGRAM(s) Oral at bedtime      Objective:  Vital Signs Last 24 Hrs  T(C): 36.9 (14 Oct 2021 17:02), Max: 36.9 (14 Oct 2021 13:32)  T(F): 98.4 (14 Oct 2021 17:02), Max: 98.4 (14 Oct 2021 13:32)  HR: 60 (14 Oct 2021 17:02) (60 - 77)  BP: 123/78 (14 Oct 2021 17:02) (117/64 - 139/73)  BP(mean): --  RR: 18 (14 Oct 2021 17:02) (18 - 18)  SpO2: 99% (14 Oct 2021 17:02) (96% - 99%)    PHYSICAL EXAM:  Constitutional:NAD  Eyes:MOI, EOMI  Ear/Nose/Throat: no thrush, mucositis.  Moist mucous membranes	  Neck:no JVD, no lymphadenopathy, supple  Respiratory: CTA checo  Cardiovascular: S1S2 RRR, no murmurs  Gastrointestinal:soft, nontender,  nondistended (+) BS  Extremities:no e/e/c.  LUE graft, rt chest wall HD cath  Skin:  no rashes, open wounds or ulcerations        LABS:                        7.3    8.24  )-----------( 267      ( 14 Oct 2021 06:38 )             23.1     10-14    138  |  95<L>  |  62<H>  ----------------------------<  94  4.3   |  21<L>  |  10.71<H>    Ca    8.6      14 Oct 2021 06:38                      Rapid RVP Result: [facility]          MICROBIOLOGY:    Culture - Blood (10.08.21 @ 22:50)   Specimen Source: .Blood Blood-Peripheral   Culture Results:   No Growth Final     Culture - Blood (10.08.21 @ 22:50)   - Escherichia coli: Detec   Gram Stain:   Growth in anaerobic bottle: Gram Negative Rods   - Amikacin: S <=16   - Ampicillin: R >16 These ampicillin results predict results for amoxicillin   - Ampicillin/Sulbactam: R >16/8 Enterobacter, Citrobacter, and Serratia may develop resistance during prolonged therapy (3-4 days)   - Aztreonam: S <=4   - Cefazolin: S <=2 Enterobacter, Citrobacter, and Serratia may develop resistance during prolonged therapy (3-4 days)   - Cefepime: S <=2   - Cefoxitin: S <=8   - Ertapenem: S <=0.5   - Gentamicin: S 4   - Imipenem: S <=1   - Levofloxacin: S <=0.5   - Meropenem: S <=1   - Piperacillin/Tazobactam: S <=8   - Tobramycin: I 8   - Trimethoprim/Sulfamethoxazole: S 2/38   - Ceftriaxone: S <=1 Enterobacter, Citrobacter, and Serratia may develop resistance during prolonged therapy   - Ciprofloxacin: S <=0.25   Specimen Source: .Blood Blood-Peripheral   Organism: Blood Culture PCR   Organism: Escherichia coli   Culture Results:   Growth in anaerobic bottle: Escherichia coli   ***Blood Panel PCR results on this specimen are available   approximately 3 hours after the Gram stain result.***   Gram stain, PCR, and/or culture results may not always   correspond due to difference in methodologies.   ************************************************************   This PCR assay was performed by multiplex PCR. This   Assay tests for 66 bacterial and resistance gene targets.   Please refer to the [facility] test directory   at [email or web address] for details.   Organism Identification: Blood Culture PCR   Escherichia coli   Method Type: PCR   Method Type: VIJAY       RADIOLOGY & ADDITIONAL STUDIES:

## 2021-10-15 LAB
ANION GAP SERPL CALC-SCNC: 20 MMOL/L — HIGH (ref 5–17)
BUN SERPL-MCNC: 41 MG/DL — HIGH (ref 7–23)
CALCIUM SERPL-MCNC: 9 MG/DL — SIGNIFICANT CHANGE UP (ref 8.4–10.5)
CHLORIDE SERPL-SCNC: 93 MMOL/L — LOW (ref 96–108)
CO2 SERPL-SCNC: 22 MMOL/L — SIGNIFICANT CHANGE UP (ref 22–31)
CREAT SERPL-MCNC: 8.06 MG/DL — HIGH (ref 0.5–1.3)
GLUCOSE SERPL-MCNC: 97 MG/DL — SIGNIFICANT CHANGE UP (ref 70–99)
HCT VFR BLD CALC: 25.2 % — LOW (ref 39–50)
HGB BLD-MCNC: 7.7 G/DL — LOW (ref 13–17)
MAGNESIUM SERPL-MCNC: 2.3 MG/DL — SIGNIFICANT CHANGE UP (ref 1.6–2.6)
MCHC RBC-ENTMCNC: 26.2 PG — LOW (ref 27–34)
MCHC RBC-ENTMCNC: 30.6 GM/DL — LOW (ref 32–36)
MCV RBC AUTO: 85.7 FL — SIGNIFICANT CHANGE UP (ref 80–100)
NRBC # BLD: 0 /100 WBCS — SIGNIFICANT CHANGE UP (ref 0–0)
PHOSPHATE SERPL-MCNC: 5.6 MG/DL — HIGH (ref 2.5–4.5)
PLATELET # BLD AUTO: 257 K/UL — SIGNIFICANT CHANGE UP (ref 150–400)
POTASSIUM SERPL-MCNC: 4.2 MMOL/L — SIGNIFICANT CHANGE UP (ref 3.5–5.3)
POTASSIUM SERPL-SCNC: 4.2 MMOL/L — SIGNIFICANT CHANGE UP (ref 3.5–5.3)
RBC # BLD: 2.94 M/UL — LOW (ref 4.2–5.8)
RBC # FLD: 16.6 % — HIGH (ref 10.3–14.5)
SARS-COV-2 RNA SPEC QL NAA+PROBE: SIGNIFICANT CHANGE UP
SODIUM SERPL-SCNC: 135 MMOL/L — SIGNIFICANT CHANGE UP (ref 135–145)
WBC # BLD: 8.85 K/UL — SIGNIFICANT CHANGE UP (ref 3.8–10.5)
WBC # FLD AUTO: 8.85 K/UL — SIGNIFICANT CHANGE UP (ref 3.8–10.5)

## 2021-10-15 RX ORDER — CEFAZOLIN SODIUM 1 G
1000 VIAL (EA) INJECTION EVERY 24 HOURS
Refills: 0 | Status: DISCONTINUED | OUTPATIENT
Start: 2021-10-15 | End: 2021-10-23

## 2021-10-15 RX ADMIN — BUMETANIDE 1 MILLIGRAM(S): 0.25 INJECTION INTRAMUSCULAR; INTRAVENOUS at 05:45

## 2021-10-15 RX ADMIN — TAMSULOSIN HYDROCHLORIDE 0.4 MILLIGRAM(S): 0.4 CAPSULE ORAL at 21:52

## 2021-10-15 RX ADMIN — Medication 100 MILLIGRAM(S): at 14:34

## 2021-10-15 RX ADMIN — Medication 1 DROP(S): at 18:09

## 2021-10-15 RX ADMIN — CHLORHEXIDINE GLUCONATE 1 APPLICATION(S): 213 SOLUTION TOPICAL at 12:19

## 2021-10-15 RX ADMIN — Medication 1 DROP(S): at 14:15

## 2021-10-15 RX ADMIN — CARVEDILOL PHOSPHATE 25 MILLIGRAM(S): 80 CAPSULE, EXTENDED RELEASE ORAL at 09:11

## 2021-10-15 RX ADMIN — Medication 1 DROP(S): at 21:52

## 2021-10-15 RX ADMIN — HEPARIN SODIUM 5000 UNIT(S): 5000 INJECTION INTRAVENOUS; SUBCUTANEOUS at 14:23

## 2021-10-15 RX ADMIN — Medication 1 DROP(S): at 09:11

## 2021-10-15 RX ADMIN — CARVEDILOL PHOSPHATE 25 MILLIGRAM(S): 80 CAPSULE, EXTENDED RELEASE ORAL at 21:52

## 2021-10-15 RX ADMIN — HEPARIN SODIUM 5000 UNIT(S): 5000 INJECTION INTRAVENOUS; SUBCUTANEOUS at 05:45

## 2021-10-15 RX ADMIN — Medication 1 DROP(S): at 02:03

## 2021-10-15 RX ADMIN — Medication 1 APPLICATION(S): at 21:52

## 2021-10-15 RX ADMIN — HEPARIN SODIUM 5000 UNIT(S): 5000 INJECTION INTRAVENOUS; SUBCUTANEOUS at 21:51

## 2021-10-15 RX ADMIN — Medication 81 MILLIGRAM(S): at 14:04

## 2021-10-15 RX ADMIN — Medication 1 DROP(S): at 05:45

## 2021-10-15 RX ADMIN — SEVELAMER CARBONATE 800 MILLIGRAM(S): 2400 POWDER, FOR SUSPENSION ORAL at 09:11

## 2021-10-15 RX ADMIN — SEVELAMER CARBONATE 800 MILLIGRAM(S): 2400 POWDER, FOR SUSPENSION ORAL at 14:23

## 2021-10-15 RX ADMIN — SEVELAMER CARBONATE 800 MILLIGRAM(S): 2400 POWDER, FOR SUSPENSION ORAL at 18:10

## 2021-10-15 NOTE — PROGRESS NOTE ADULT - SUBJECTIVE AND OBJECTIVE BOX
Date of Service   10-15-21 @ 14:52    Patient is a 80y old  Male who presents with a chief complaint of Fever (15 Oct 2021 13:46)      INTERVAL HISTORY:     TELEMETRY Personally reviewed:    REVIEW OF SYSTEMS:   CONSTITUTIONAL: No weakness  EYES/ENT: No visual changes; No throat pain  Neck: No pain or stiffness  Respiratory: No cough, wheezing, No shortness of breath  CARDIOVASCULAR: no chest pain or palpitations  GASTROINTESTINAL: No abdominal pain, no nausea, vomiting or hematemesis  GENITOURINARY: No dysuria, frequency or hematuria  NEUROLOGICAL: No stroke like symptoms  SKIN: No rashes    	  MEDICATIONS:  buMETAnide 1 milliGRAM(s) Oral daily  carvedilol 25 milliGRAM(s) Oral every 12 hours  tamsulosin 0.4 milliGRAM(s) Oral at bedtime        PHYSICAL EXAM:  T(C): 36.4 (10-15-21 @ 12:27), Max: 37.2 (10-15-21 @ 09:32)  HR: 65 (10-15-21 @ 12:27) (60 - 91)  BP: 100/60 (10-15-21 @ 12:27) (100/60 - 128/73)  RR: 18 (10-15-21 @ 12:27) (18 - 19)  SpO2: 98% (10-15-21 @ 12:27) (96% - 99%)  Wt(kg): --  I&O's Summary    14 Oct 2021 07:01  -  15 Oct 2021 07:00  --------------------------------------------------------  IN: 990 mL / OUT: 2100 mL / NET: -1110 mL    15 Oct 2021 07:01  -  15 Oct 2021 14:52  --------------------------------------------------------  IN: 420 mL / OUT: 0 mL / NET: 420 mL          Appearance: In no distress	  HEENT:    PERRL, EOMI	  Cardiovascular:  S1 S2, No JVD  Respiratory: Lungs clear to auscultation	  Gastrointestinal:  Soft, Non-tender, + BS	  Vascularature:  No edema of LE  Psychiatric: Appropriate affect   Neuro: no acute focal deficits                               7.7    8.85  )-----------( 257      ( 15 Oct 2021 07:23 )             25.2     10-15    135  |  93<L>  |  41<H>  ----------------------------<  97  4.2   |  22  |  8.06<H>    Ca    9.0      15 Oct 2021 07:18  Phos  5.6     10-15  Mg     2.3     10-15          Labs personally reviewed      ASSESSMENT/PLAN: 	  80M with PMHx of HFpEF, ESRD, and HTN presenting with self-reported fever of 104 F. Patient being admitted for fever in dialysis patient.      Problem/Plan - 1:  ·  Problem: Chronic diastolic heart Failure with preserved EF   - c/w bumex   - c/w carvedilol  25mg Q12hrs   - pt appears euvolemic   - ESRD  per renal for fluid management     Problem/Plan - 2:  ·  Problem: Fever  - Mild leukocytosis   - monitor fever curve and cbc   - off Abx   - chest CT negative   - CT abd and pelvis reviewed by ID  - repeat bld Cx sent no growth to date     Problem/Plan - 3:  ·  Problem: Hypertension  - c/w coreg and bumex  - monitor BP   - on ASA for prevention     Problem/Plan - 4:  ·  Problem: ESRD  - renal consult appreciated   - HD per renal   - Left arm fistulogram, possible balloon venoplasty    Problem/Plan - 5:  ·  Problem: DVT ppx on Heparin SQ             Sammie Carmichael FNP-BC   Sridhar Carrillo DO [facility]  Cardiovascular Medicine  [address], [address]  Office: [phone]  Cell: [phone]

## 2021-10-15 NOTE — PHYSICAL THERAPY INITIAL EVALUATION ADULT - PERTINENT HX OF CURRENT PROBLEM, REHAB EVAL
80M with PMHx of HFpEF, ESRD, and HTN presenting with self-reported fever of 104 F. Patient states it has been going on for one day and intermittent. Otherwise he denies focal symptoms such as cough, shortness of breath, chest pain, diarrhea, dysuria or polyuria. He states he urinates very little. He has had a right chest wall permacath since March 2021 with no redness or warmth around the site. He was last dialyzed several days prior with no issues.

## 2021-10-15 NOTE — PROGRESS NOTE ADULT - SUBJECTIVE AND OBJECTIVE BOX
Infectious Diseases progress note:    Subjective:  NAD, afebrile.  No acute o/n events.      ROS:  CONSTITUTIONAL:  No fever, chills, rigors  CARDIOVASCULAR:  No chest pain or palpitations  RESPIRATORY:   No SOB, cough, dyspnea on exertion.  No wheezing  GASTROINTESTINAL:  No abd pain, N/V, diarrhea/constipation  EXTREMITIES:  No swelling or joint pain  GENITOURINARY:  No burning on urination, increased frequency or urgency.  No flank pain  NEUROLOGIC:  No HA, visual disturbances  SKIN: No rashes    Allergies    grass, pollen (Rhinitis)  No Known Drug Allergies    Intolerances        ANTIBIOTICS/RELEVANT:  antimicrobials    immunologic:    OTHER:  acetaminophen   Tablet .. 650 milliGRAM(s) Oral every 6 hours PRN  artificial  tears Solution 1 Drop(s) Left EYE every 4 hours  aspirin enteric coated 81 milliGRAM(s) Oral daily  buMETAnide 1 milliGRAM(s) Oral daily  carvedilol 25 milliGRAM(s) Oral every 12 hours  chlorhexidine 4% Liquid 1 Application(s) Topical daily  heparin   Injectable 5000 Unit(s) SubCutaneous every 8 hours  lidocaine/prilocaine Cream 1 Application(s) Topical <User Schedule>  petrolatum Ophthalmic Ointment 1 Application(s) Left EYE at bedtime  sevelamer carbonate 800 milliGRAM(s) Oral three times a day with meals  tamsulosin 0.4 milliGRAM(s) Oral at bedtime      Objective:  Vital Signs Last 24 Hrs  T(C): 36.4 (15 Oct 2021 12:27), Max: 37.2 (15 Oct 2021 09:32)  T(F): 97.5 (15 Oct 2021 12:27), Max: 98.9 (15 Oct 2021 09:32)  HR: 65 (15 Oct 2021 12:27) (60 - 91)  BP: 100/60 (15 Oct 2021 12:27) (100/60 - 128/73)  BP(mean): --  RR: 18 (15 Oct 2021 12:27) (18 - 19)  SpO2: 98% (15 Oct 2021 12:27) (96% - 99%)    PHYSICAL EXAM:  Constitutional:NAD  Eyes:MOI, EOMI  Ear/Nose/Throat: no thrush, mucositis.  Moist mucous membranes	  Neck:no JVD, no lymphadenopathy, supple  Respiratory: CTA checo  Cardiovascular: S1S2 RRR, no murmurs  Gastrointestinal:soft, nontender,  nondistended (+) BS  Extremities:no e/e/c, LUE avf  Skin:  no rashes, open wounds or ulcerations, rt chest wall HD cath        LABS:                        7.7    8.85  )-----------( 257      ( 15 Oct 2021 07:23 )             25.2     10-15    135  |  93<L>  |  41<H>  ----------------------------<  97  4.2   |  22  |  8.06<H>    Ca    9.0      15 Oct 2021 07:18  Phos  5.6     10-15  Mg     2.3     10-15        Rapid RVP Result: [facility]          MICROBIOLOGY:    Culture - Blood in AM (10.14.21 @ 09:19)   Specimen Source: .Blood Blood-Peripheral   Culture Results:   No growth to date.     Culture - Blood in AM (10.14.21 @ 09:19)   Specimen Source: .Blood Blood-Venous   Culture Results:   No growth to date.     Culture - Blood (10.08.21 @ 22:50)   Specimen Source: .Blood Blood-Peripheral   Culture Results:   No Growth Final     Culture - Blood (10.08.21 @ 22:50)   - Escherichia coli: Detec   - Ampicillin: R >16 These ampicillin results predict results for amoxicillin   - Ampicillin/Sulbactam: R >16/8 Enterobacter, Citrobacter, and Serratia may develop resistance during prolonged therapy (3-4 days)   - Aztreonam: S <=4   Gram Stain:   Growth in anaerobic bottle: Gram Negative Rods   - Amikacin: S <=16   - Cefazolin: S <=2 Enterobacter, Citrobacter, and Serratia may develop resistance during prolonged therapy (3-4 days)   - Cefepime: S <=2   - Cefoxitin: S <=8   - Ceftriaxone: S <=1 Enterobacter, Citrobacter, and Serratia may develop resistance during prolonged therapy   - Ciprofloxacin: S <=0.25   - Ertapenem: S <=0.5   - Gentamicin: S 4   - Imipenem: S <=1   - Levofloxacin: S <=0.5   - Meropenem: S <=1   - Piperacillin/Tazobactam: S <=8   - Tobramycin: I 8   - Trimethoprim/Sulfamethoxazole: S 2/38   Specimen Source: .Blood Blood-Peripheral   Organism: Blood Culture PCR   Organism: Escherichia coli   Culture Results:   Growth in anaerobic bottle: Escherichia coli   ***Blood Panel PCR results on this specimen are available   approximately 3 hours after the Gram stain result.***   Gram stain, PCR, and/or culture results may not always   correspond due to difference in methodologies.   ************************************************************   This PCR assay was performed by multiplex PCR. This   Assay tests for 66 bacterial and resistance gene targets.   Please refer to the [facility] test directory   at [email or web address] for details.   Organism Identification: Blood Culture PCR   Escherichia coli   Method Type: PCR   Method Type: VIJAY   RADIOLOGY & ADDITIONAL STUDIES:

## 2021-10-15 NOTE — PROGRESS NOTE ADULT - SUBJECTIVE AND OBJECTIVE BOX
CHIEF COMPLAINT: Patient was transferred to room [address] [place] [id number] due to possible exposure to is old roommate is asymptomatic is Covid test is done result pending patient is not happy about this 10 more days isolation and had a long discussion with him it is very hard to discharge him due to dialysis unit once a clear status regarding his Covid    SUBJECTIVE:     REVIEW OF SYSTEMS: Asymptomatic      CONSTITUTIONAL: (  )  weakness,  (  ) fevers or chills  EYES/ENT: (  )visual changes;     NECK: (  ) pain or stiffness  RESPIRATORY:   (  )cough, wheezing, hemoptysis;  (  ) shortness of breath  CARDIOVASCULAR:  (  )chest pain or palpitations  GASTROINTESTINAL:   (  )abdominal or epigastric pain.  (  ) nausea, vomiting, or hematemesis;   (   ) diarrhea or constipation.   GENITOURINARY:   (    ) dysuria, frequency or hematuria  NEUROLOGICAL:  (   ) numbness or weakness   All other review of systems is negative unless indicated above    Vital Signs Last 24 Hrs  T(C): 36.5 (15 Oct 2021 16:51), Max: 37.2 (15 Oct 2021 09:32)  T(F): 97.7 (15 Oct 2021 16:51), Max: 98.9 (15 Oct 2021 09:32)  HR: 65 (15 Oct 2021 16:51) (65 - 91)  BP: 159/70 (15 Oct 2021 16:51) (100/60 - 159/70)  BP(mean): --  RR: 18 (15 Oct 2021 16:51) (18 - 19)  SpO2: 98% (15 Oct 2021 16:51) (96% - 98%)    I&O's Summary    14 Oct 2021 07:01  -  15 Oct 2021 07:00  --------------------------------------------------------  IN: 990 mL / OUT: 2100 mL / NET: -1110 mL    15 Oct 2021 07:01  -  15 Oct 2021 18:59  --------------------------------------------------------  IN: 420 mL / OUT: 0 mL / NET: 420 mL        CAPILLARY BLOOD GLUCOSE          PHYSICAL EXAM:    Constitutional:  ( x  ) NAD,   ( x  )awake and alert  HEENT: PERR, EOMI,    Neck: Soft and supple, No LAD, No JVD  Respiratory:  (   x Breath sounds are clear bilaterally,    (   ) wheezing, rales or rhonchi  Cardiovascular:     (  x )S1 and S2, regular rate and rhythm, no Murmurs, gallops or rubs  Gastrointestinal:  ( x  )Bowel Sounds present, soft,   (  )nontender, nondistended,    Extremities:    (  ) peripheral edema  Vascular: 2+ peripheral pulses  Neurological:    (    )A/O x 3,   (  ) focal deficits  Musculoskeletal:    ( x  )  normal strength b/l upper  (     ) normal  lower extremities  Skin: No rashes    MEDICATIONS:  MEDICATIONS  (STANDING):  artificial  tears Solution 1 Drop(s) Left EYE every 4 hours  aspirin enteric coated 81 milliGRAM(s) Oral daily  buMETAnide 1 milliGRAM(s) Oral daily  carvedilol 25 milliGRAM(s) Oral every 12 hours  ceFAZolin   IVPB 1000 milliGRAM(s) IV Intermittent every 24 hours  chlorhexidine 4% Liquid 1 Application(s) Topical daily  heparin   Injectable 5000 Unit(s) SubCutaneous every 8 hours  lidocaine/prilocaine Cream 1 Application(s) Topical <User Schedule>  petrolatum Ophthalmic Ointment 1 Application(s) Left EYE at bedtime  sevelamer carbonate 800 milliGRAM(s) Oral three times a day with meals  tamsulosin 0.4 milliGRAM(s) Oral at bedtime      LABS: All Labs Reviewed:                        7.7    8.85  )-----------( 257      ( 15 Oct 2021 07:23 )             25.2     10-15    135  |  93<L>  |  41<H>  ----------------------------<  97  4.2   |  22  |  8.06<H>    Ca    9.0      15 Oct 2021 07:18  Phos  5.6     10-15  Mg     2.3     10-15            Blood Culture: 10-14 @ 09:19  Organism --  Gram Stain Blood -- Gram Stain --  Specimen Source .Blood Blood-Venous  Culture-Blood --      Urine Culture      RADIOLOGY/EKG:    ASSESSMENT AND PLAN:  Patient without fever ID consult and follow-up appreciated recommended to continue Ancef for the next 3 weeks before dialysis 2 g 2 gram and 3 g on each session.  Discussed with him at length about this isolation status he understands I will notify his wife in a.m..  Also discussed with case management and he needs to stay here for 10 days before any dialysis unit take him back for outpatient hemodialysis  DVT PPX:    ADVANCED DIRECTIVE:    DISPOSITION:

## 2021-10-15 NOTE — PROGRESS NOTE ADULT - ASSESSMENT
80M with PMHx of HFpEF, ESRD, and HTN presenting with self-reported fever of 104 F. Patient states it has been going on for one day and intermittent. Otherwise he denies focal symptoms such as cough, shortness of breath, chest pain, diarrhea, dysuria or polyuria. He states he urinates very little. No known sick contacts. No sore throat or nasal congestion. He has had a right chest wall permacath since March 2021 with no redness or warmth around the site. He was last dialyzed several days prior with no issues. He does complain of right shoulder discomfort, but no swelling and has been having discomfort since permacath was placed. In the ED patient had blood cultures drawn and given vancomycin and zosyn. No fevers since he's been here.  (05 Oct 2021 09:31)    ER vss, afebrile.  WBC 10.8.  UA large LE, small blood.  Bcx E.coli.  CT c/a/p performed.  Pt with intermittent fevers, Tmax 102.3 on 10/8.    Pt states prior to hospitalization noted some dysuria and difficulty urinating.  Pt seen by Urology for rt scrotal swelling, denies tenderness.  Found to have rt hydrocele on US.   Pt now on rocephin, ID consulted for further abx managment.      Fever/E.coli bacteremia:    - UA large LE.  Bcx growing E.coli from 1/2 sets.  Pt c/o some dysuria, difficulty urinating.  Pt is ESRD on HD.  Currently on rocephin.    - f/u repeat bcx to ensure clearance.     - f/u sensis.  Ucx u/a, will reorder, although may be low yield post abx.    - CT c/a/p reviewed.  Of note, showed an 8.1 x 6.4cm cystic mass in right scrotum, markedly enlarged prostate gland, and mild infiltration of perivesical fat.   Pt seen by URology, Subsequent scrotal US demonstrated rt hydrocele, no further intervention deemed necessary at this time.    - Possible acute cystitis vs prostatis as source.  f/u repeat bcx to ensure clearance as pt also with HD catheter in place.  Vascular eval called for f/u regarding AVF.       * Repeat bcx from 10/14 [facility] at 24 hrs.  Recommend 3 week course of abx to treat for bacteremia, possible prostatis/cystitis as source.  Can change to cefazolin 2gm/2gm/3gm post dialysis.     Will follow,    Marleni Garcia  [phone]

## 2021-10-15 NOTE — PHYSICAL THERAPY INITIAL EVALUATION ADULT - ADDITIONAL COMMENTS
Pt lives in  with wife, son and daughter. 3-4 ERROL front door and 14 steps to bedroom level. PTA pt was Ind with all functional mob and ADLs, no devices required.

## 2021-10-16 LAB
ANION GAP SERPL CALC-SCNC: 20 MMOL/L — HIGH (ref 5–17)
BUN SERPL-MCNC: 54 MG/DL — HIGH (ref 7–23)
CALCIUM SERPL-MCNC: 8.9 MG/DL — SIGNIFICANT CHANGE UP (ref 8.4–10.5)
CHLORIDE SERPL-SCNC: 95 MMOL/L — LOW (ref 96–108)
CO2 SERPL-SCNC: 21 MMOL/L — LOW (ref 22–31)
CREAT SERPL-MCNC: 9.87 MG/DL — HIGH (ref 0.5–1.3)
GLUCOSE SERPL-MCNC: 86 MG/DL — SIGNIFICANT CHANGE UP (ref 70–99)
HCT VFR BLD CALC: 24.8 % — LOW (ref 39–50)
HGB BLD-MCNC: 7.8 G/DL — LOW (ref 13–17)
MCHC RBC-ENTMCNC: 26.6 PG — LOW (ref 27–34)
MCHC RBC-ENTMCNC: 31.5 GM/DL — LOW (ref 32–36)
MCV RBC AUTO: 84.6 FL — SIGNIFICANT CHANGE UP (ref 80–100)
NRBC # BLD: 0 /100 WBCS — SIGNIFICANT CHANGE UP (ref 0–0)
PHOSPHATE SERPL-MCNC: 6.6 MG/DL — HIGH (ref 2.5–4.5)
PLATELET # BLD AUTO: 285 K/UL — SIGNIFICANT CHANGE UP (ref 150–400)
POTASSIUM SERPL-MCNC: 4.9 MMOL/L — SIGNIFICANT CHANGE UP (ref 3.5–5.3)
POTASSIUM SERPL-SCNC: 4.9 MMOL/L — SIGNIFICANT CHANGE UP (ref 3.5–5.3)
RBC # BLD: 2.93 M/UL — LOW (ref 4.2–5.8)
RBC # FLD: 16.6 % — HIGH (ref 10.3–14.5)
SODIUM SERPL-SCNC: 136 MMOL/L — SIGNIFICANT CHANGE UP (ref 135–145)
WBC # BLD: 8 K/UL — SIGNIFICANT CHANGE UP (ref 3.8–10.5)
WBC # FLD AUTO: 8 K/UL — SIGNIFICANT CHANGE UP (ref 3.8–10.5)

## 2021-10-16 RX ADMIN — BUMETANIDE 1 MILLIGRAM(S): 0.25 INJECTION INTRAMUSCULAR; INTRAVENOUS at 06:08

## 2021-10-16 RX ADMIN — HEPARIN SODIUM 5000 UNIT(S): 5000 INJECTION INTRAVENOUS; SUBCUTANEOUS at 06:08

## 2021-10-16 RX ADMIN — HEPARIN SODIUM 5000 UNIT(S): 5000 INJECTION INTRAVENOUS; SUBCUTANEOUS at 21:33

## 2021-10-16 RX ADMIN — Medication 81 MILLIGRAM(S): at 11:56

## 2021-10-16 RX ADMIN — SEVELAMER CARBONATE 800 MILLIGRAM(S): 2400 POWDER, FOR SUSPENSION ORAL at 11:56

## 2021-10-16 RX ADMIN — CHLORHEXIDINE GLUCONATE 1 APPLICATION(S): 213 SOLUTION TOPICAL at 11:56

## 2021-10-16 RX ADMIN — Medication 1 DROP(S): at 22:00

## 2021-10-16 RX ADMIN — CARVEDILOL PHOSPHATE 25 MILLIGRAM(S): 80 CAPSULE, EXTENDED RELEASE ORAL at 12:23

## 2021-10-16 RX ADMIN — Medication 1 APPLICATION(S): at 21:34

## 2021-10-16 RX ADMIN — Medication 1 DROP(S): at 06:08

## 2021-10-16 RX ADMIN — CARVEDILOL PHOSPHATE 25 MILLIGRAM(S): 80 CAPSULE, EXTENDED RELEASE ORAL at 21:33

## 2021-10-16 RX ADMIN — Medication 100 MILLIGRAM(S): at 18:24

## 2021-10-16 RX ADMIN — SEVELAMER CARBONATE 800 MILLIGRAM(S): 2400 POWDER, FOR SUSPENSION ORAL at 18:24

## 2021-10-16 RX ADMIN — SEVELAMER CARBONATE 800 MILLIGRAM(S): 2400 POWDER, FOR SUSPENSION ORAL at 08:59

## 2021-10-16 RX ADMIN — Medication 1 DROP(S): at 18:23

## 2021-10-16 RX ADMIN — Medication 1 DROP(S): at 11:55

## 2021-10-16 RX ADMIN — TAMSULOSIN HYDROCHLORIDE 0.4 MILLIGRAM(S): 0.4 CAPSULE ORAL at 21:34

## 2021-10-16 NOTE — PROGRESS NOTE ADULT - SUBJECTIVE AND OBJECTIVE BOX
CHIEF COMPLAINT: patient condition is stable no fever no cough result of his Covid  test was negative.  he was reassured and happy about his condition he was worried and 10/15/2021 he may be positive due to his recent exposure    SUBJECTIVE:     REVIEW OF SYSTEMS:    CONSTITUTIONAL: (  )  weakness,  (  ) fevers or chills  EYES/ENT: (  )visual changes;     NECK: (  ) pain or stiffness  RESPIRATORY:   (  )cough, wheezing, hemoptysis;  (  ) shortness of breath  CARDIOVASCULAR:  (  )chest pain or palpitations  GASTROINTESTINAL:   (  )abdominal or epigastric pain.  (  ) nausea, vomiting, or hematemesis;   (   ) diarrhea or constipation.   GENITOURINARY:   (    ) dysuria, frequency or hematuria  NEUROLOGICAL:  (   ) numbness or weakness   All other review of systems is negative unless indicated above    Vital Signs Last 24 Hrs  T(C): 36.6 (16 Oct 2021 15:00), Max: 36.9 (15 Oct 2021 20:59)  T(F): 97.9 (16 Oct 2021 15:00), Max: 98.4 (15 Oct 2021 20:59)  HR: 67 (16 Oct 2021 15:00) (67 - 81)  BP: 121/63 (16 Oct 2021 15:00) (121/63 - 142/79)  BP(mean): --  RR: 18 (16 Oct 2021 15:00) (16 - 18)  SpO2: 97% (16 Oct 2021 15:00) (97% - 98%)    I&O's Summary    15 Oct 2021 07:01  -  16 Oct 2021 07:00  --------------------------------------------------------  IN: 1020 mL / OUT: 0 mL / NET: 1020 mL    16 Oct 2021 07:01  -  16 Oct 2021 17:38  --------------------------------------------------------  IN: 480 mL / OUT: 0 mL / NET: 480 mL        CAPILLARY BLOOD GLUCOSE          PHYSICAL EXAM:    Constitutional:  (  x ) NAD,   (  x )awake and alert  HEENT: PERR, EOMI,    Neck: Soft and supple, No LAD, No JVD  Respiratory:  (  x  Breath sounds are clear bilaterally,    (   ) wheezing, rales or rhonchi  Cardiovascular:     ( x  )S1 and S2, regular rate and rhythm, no Murmurs, gallops or rubs  Gastrointestinal:  ( x  )Bowel Sounds present, soft,   (  )nontender, nondistended,    Extremities:    (  ) peripheral edema  Vascular: 2+ peripheral pulses  Neurological:    (  x  )A/O x 3,   (  ) focal deficits  Musculoskeletal:    ( x  )  normal strength b/l upper  (  x   ) normal  lower extremities  Skin: No rashes    MEDICATIONS:  MEDICATIONS  (STANDING):  artificial  tears Solution 1 Drop(s) Left EYE every 4 hours  aspirin enteric coated 81 milliGRAM(s) Oral daily  buMETAnide 1 milliGRAM(s) Oral daily  carvedilol 25 milliGRAM(s) Oral every 12 hours  ceFAZolin   IVPB 1000 milliGRAM(s) IV Intermittent every 24 hours  chlorhexidine 4% Liquid 1 Application(s) Topical daily  heparin   Injectable 5000 Unit(s) SubCutaneous every 8 hours  lidocaine/prilocaine Cream 1 Application(s) Topical <User Schedule>  petrolatum Ophthalmic Ointment 1 Application(s) Left EYE at bedtime  sevelamer carbonate 800 milliGRAM(s) Oral three times a day with meals  tamsulosin 0.4 milliGRAM(s) Oral at bedtime      LABS: All Labs Reviewed:                        7.8    8.00  )-----------( 285      ( 16 Oct 2021 07:07 )             24.8     10-16    136  |  95<L>  |  54<H>  ----------------------------<  86  4.9   |  21<L>  |  9.87<H>    Ca    8.9      16 Oct 2021 07:07  Phos  6.6     10-16  Mg     2.3     10-15            Blood Culture: 10-14 @ 09:19  Organism --  Gram Stain Blood -- Gram Stain --  Specimen Source .Blood Blood-Venous  Culture-Blood --      Urine Culture      RADIOLOGY/EKG:    ASSESSMENT AND PLAN:  80M with PMHx of HFpEF, ESRD, and HTN presenting with self-reported fever of 104 F. Patient states it has been going on for one day and intermittent. Otherwise he denies focal symptoms such as cough, shortness of breath, chest pain, diarrhea, dysuria or polyuria. He states he urinates very little. No known sick contacts. No sore throat or nasal congestion. He has had a right chest wall permacath since March 2021 with no redness or warmth around the site. He was last dialyzed several days prior with no issues. He does complain of right shoulder discomfort, but no swelling and has been having discomfort since permacath was placed. In the ED patient had blood cultures drawn and given vancomycin and zosyn. No fevers since he's been here.  (05 Oct 2021 09:31)    ER vss, afebrile.  WBC 10.8.  UA large LE, small blood.  Bcx E.coli.  CT c/a/p performed.  Pt with intermittent fevers, Tmax 102.3 on 10/8.    Pt states prior to hospitalization noted some dysuria and difficulty urinating.  Pt seen by Urology for rt scrotal swelling, denies tenderness.  Found to have rt hydrocele on US.   Pt now on rocephin, ID consulted for further abx managment.       #fever/positive blood culture    - UA large LE.  Bcx growing E.coli from 1/2 sets.  Pt c/o some dysuria, difficulty urinating.  Pt is ESRD on HD.  Currently on rocephin.    - f/u repeat bcx to ensure clearance.     - f/u sensis.  Ucx u/a, will reorder, although may be low yield post abx.    - CT c/a/p reviewed.  Of note, showed an 8.1 x 6.4cm cystic mass in right scrotum, markedly enlarged prostate gland, and mild infiltration of perivesical fat.   Pt seen by URology, Subsequent scrotal US demonstrated rt hydrocele, no further intervention deemed necessary at this time.    - Possible acute cystitis vs prostatis as source.  f/u repeat bcx to ensure clearance as pt also with HD catheter in place.  Vascular eval called for f/u regarding AVF.       * Repeat bcx from 10/14 [facility] at 24 hrs.  Recommend 3 week course of abx to treat for bacteremia, possible prostatis/cystitis as source.  Can change to cefazolin 2gm/2gm/3gm post dialysis.     #CKD continue hemodialysis as per renal    DVT PPX:    ADVANCED DIRECTIVE:    DISPOSITION:

## 2021-10-16 NOTE — PROGRESS NOTE ADULT - ASSESSMENT
80M with PMHx of HFpEF, ESRD, and HTN presenting with self-reported fever of 104 F.   catheter related infection?   8 x 6 cm cystic lesion in the right scotum - Hydrocele     1 ID-   Blood culture with gram neg rods/ ecoli     2 Renal- s/p AVF venoplasty and outflow vein thrombus balloon maturation on 10/8/21,   perm cath and the fistula is not ready to use.  HD today and next Tuesday    3 -Cont Flomax;  Hx of hydrocele and appreciate Urology input - Subsequent scrotal US demonstrated rt hydrocele, no further intervention at this time.    4 Vasc follow up with Dr Dunn appreciated -   Patient is s/p recent AVF revision  by IR  - 10/8/2021  Repeat AVFgram +/- intervention ~2 wks post previous procedure.  Fistula not fit for use- will continue HD via permcath     5 GI- Vomiting x1 and stopped

## 2021-10-16 NOTE — PROGRESS NOTE ADULT - SUBJECTIVE AND OBJECTIVE BOX
[facility]-Yocasta Villalba, [place]- PROGRESS NOTE    Patient seen and examined in [facility]. Patient had HD today with only 1 L removed due to vomiting. No further vomiting.       Hospital Medications:   MEDICATIONS  (STANDING):  artificial  tears Solution 1 Drop(s) Left EYE every 4 hours  aspirin enteric coated 81 milliGRAM(s) Oral daily  buMETAnide 1 milliGRAM(s) Oral daily  carvedilol 25 milliGRAM(s) Oral every 12 hours  ceFAZolin   IVPB 1000 milliGRAM(s) IV Intermittent every 24 hours  chlorhexidine 4% Liquid 1 Application(s) Topical daily  heparin   Injectable 5000 Unit(s) SubCutaneous every 8 hours  lidocaine/prilocaine Cream 1 Application(s) Topical <User Schedule>  petrolatum Ophthalmic Ointment 1 Application(s) Left EYE at bedtime  sevelamer carbonate 800 milliGRAM(s) Oral three times a day with meals  tamsulosin 0.4 milliGRAM(s) Oral at bedtime      REVIEW OF SYSTEMS:  As per HPI, 8 out of 10 ROS were unremarkable    VITALS:  T(F): 98.1 (10-16-21 @ 20:26), Max: 98.1 (10-16-21 @ 12:24)  HR: 73 (10-16-21 @ 20:26)  BP: 145/79 (10-16-21 @ 20:26)  RR: 18 (10-16-21 @ 20:26)  SpO2: 96% (10-16-21 @ 20:26)  Wt(kg): --    10-15 @ 07:01  -  10-16 @ 07:00  --------------------------------------------------------  IN: 1020 mL / OUT: 0 mL / NET: 1020 mL    10-16 @ 07:01  -  10-16 @ 21:41  --------------------------------------------------------  IN: 1550 mL / OUT: 1900 mL / NET: -350 mL          PHYSICAL EXAM:  Constitutional: NAD  HEENT: PERRLA  Neck: No JVD  Respiratory: CTAB, no wheezes, rales or rhonchi  Cardiovascular: S1, S2, RRR  Gastrointestinal: BS+, soft, NT/ND  Extremities: No peripheral edema  Neurological: A/O x 3, no focal deficits  Psychiatric: Normal mood, normal affect  : No CVA tenderness. No an.   Skin: No rashes  Vascular Access:R CW tunneled cath and L arm AVF + thrill (not fit for use)    LABS:      10-16    136  |  95<L>  |  54<H>  ----------------------------<  86  4.9   |  21<L>  |  9.87<H>  10-15    135  |  93<L>  |  41<H>  ----------------------------<  97  4.2   |  22  |  8.06<H>  10-14    138  |  95<L>  |  62<H>  ----------------------------<  94  4.3   |  21<L>  |  10.71<H>    Ca    8.9      16 Oct 2021 07:07  Phos  6.6     10-16  Mg     2.3     10-15                              7.8    8.00  )-----------( 285      ( 16 Oct 2021 07:07 )             24.8

## 2021-10-17 LAB
CULTURE RESULTS: SIGNIFICANT CHANGE UP
SPECIMEN SOURCE: SIGNIFICANT CHANGE UP

## 2021-10-17 RX ADMIN — SEVELAMER CARBONATE 800 MILLIGRAM(S): 2400 POWDER, FOR SUSPENSION ORAL at 11:14

## 2021-10-17 RX ADMIN — Medication 100 MILLIGRAM(S): at 14:57

## 2021-10-17 RX ADMIN — CARVEDILOL PHOSPHATE 25 MILLIGRAM(S): 80 CAPSULE, EXTENDED RELEASE ORAL at 21:27

## 2021-10-17 RX ADMIN — Medication 1 APPLICATION(S): at 21:26

## 2021-10-17 RX ADMIN — HEPARIN SODIUM 5000 UNIT(S): 5000 INJECTION INTRAVENOUS; SUBCUTANEOUS at 06:18

## 2021-10-17 RX ADMIN — Medication 1 DROP(S): at 14:57

## 2021-10-17 RX ADMIN — TAMSULOSIN HYDROCHLORIDE 0.4 MILLIGRAM(S): 0.4 CAPSULE ORAL at 21:27

## 2021-10-17 RX ADMIN — Medication 81 MILLIGRAM(S): at 11:13

## 2021-10-17 RX ADMIN — HEPARIN SODIUM 5000 UNIT(S): 5000 INJECTION INTRAVENOUS; SUBCUTANEOUS at 21:27

## 2021-10-17 RX ADMIN — BUMETANIDE 1 MILLIGRAM(S): 0.25 INJECTION INTRAMUSCULAR; INTRAVENOUS at 06:18

## 2021-10-17 RX ADMIN — CHLORHEXIDINE GLUCONATE 1 APPLICATION(S): 213 SOLUTION TOPICAL at 11:12

## 2021-10-17 RX ADMIN — SEVELAMER CARBONATE 800 MILLIGRAM(S): 2400 POWDER, FOR SUSPENSION ORAL at 08:27

## 2021-10-17 RX ADMIN — SEVELAMER CARBONATE 800 MILLIGRAM(S): 2400 POWDER, FOR SUSPENSION ORAL at 17:32

## 2021-10-17 RX ADMIN — Medication 1 DROP(S): at 21:26

## 2021-10-17 RX ADMIN — Medication 1 DROP(S): at 17:31

## 2021-10-17 RX ADMIN — Medication 1 DROP(S): at 11:12

## 2021-10-17 RX ADMIN — Medication 1 DROP(S): at 06:18

## 2021-10-17 RX ADMIN — CARVEDILOL PHOSPHATE 25 MILLIGRAM(S): 80 CAPSULE, EXTENDED RELEASE ORAL at 11:12

## 2021-10-17 RX ADMIN — HEPARIN SODIUM 5000 UNIT(S): 5000 INJECTION INTRAVENOUS; SUBCUTANEOUS at 14:57

## 2021-10-17 NOTE — PROGRESS NOTE ADULT - SUBJECTIVE AND OBJECTIVE BOX
CHIEF COMPLAINT: patient condition remained stable awake and verbal without any complaint discussed with his wife about his negative   test for Covid but he need to stay here for 10 days isolation.    SUBJECTIVE:     REVIEW OF SYSTEMS: asymptomatic    CONSTITUTIONAL: (  )  weakness,  (  ) fevers or chills  EYES/ENT: (  )visual changes;     NECK: (  ) pain or stiffness  RESPIRATORY:   (  )cough, wheezing, hemoptysis;  (  ) shortness of breath  CARDIOVASCULAR:  (  )chest pain or palpitations  GASTROINTESTINAL:   (  )abdominal or epigastric pain.  (  ) nausea, vomiting, or hematemesis;   (   ) diarrhea or constipation.   GENITOURINARY:   (    ) dysuria, frequency or hematuria  NEUROLOGICAL:  (   ) numbness or weakness   All other review of systems is negative unless indicated above    Vital Signs Last 24 Hrs  T(C): 36.4 (17 Oct 2021 19:44), Max: 36.7 (16 Oct 2021 20:26)  T(F): 97.6 (17 Oct 2021 19:44), Max: 98.1 (16 Oct 2021 20:26)  HR: 80 (17 Oct 2021 19:44) (73 - 84)  BP: 106/56 (17 Oct 2021 19:44) (100/56 - 145/79)  BP(mean): --  RR: 18 (17 Oct 2021 19:44) (16 - 18)  SpO2: 96% (17 Oct 2021 19:44) (96% - 100%)    I&O's Summary    16 Oct 2021 07:01  -  17 Oct 2021 07:00  --------------------------------------------------------  IN: 2030 mL / OUT: 1900 mL / NET: 130 mL    17 Oct 2021 07:01  -  17 Oct 2021 20:00  --------------------------------------------------------  IN: 770 mL / OUT: 0 mL / NET: 770 mL        CAPILLARY BLOOD GLUCOSE          PHYSICAL EXAM:    Constitutional:  (  x ) NAD,   (  x )awake and alert  HEENT: PERR, EOMI,    Neck: Soft and supple, No LAD, No JVD  Respiratory:  (  x  Breath sounds are clear bilaterally,    (   ) wheezing, rales or rhonchi  Cardiovascular:     ( x  )S1 and S2, regular rate and rhythm, no Murmurs, gallops or rubs  Gastrointestinal:  (  x )Bowel Sounds present, soft,   (  )nontender, nondistended,    Extremities:    (  ) peripheral edema  Vascular: 2+ peripheral pulses  Neurological:    (   x )A/O x 3,   (  ) focal deficits  Musculoskeletal:    (  x )  normal strength b/l upper  (     ) normal  lower extremities  Skin: No rashes    MEDICATIONS:  MEDICATIONS  (STANDING):  artificial  tears Solution 1 Drop(s) Left EYE every 4 hours  aspirin enteric coated 81 milliGRAM(s) Oral daily  buMETAnide 1 milliGRAM(s) Oral daily  carvedilol 25 milliGRAM(s) Oral every 12 hours  ceFAZolin   IVPB 1000 milliGRAM(s) IV Intermittent every 24 hours  chlorhexidine 4% Liquid 1 Application(s) Topical daily  heparin   Injectable 5000 Unit(s) SubCutaneous every 8 hours  lidocaine/prilocaine Cream 1 Application(s) Topical <User Schedule>  petrolatum Ophthalmic Ointment 1 Application(s) Left EYE at bedtime  sevelamer carbonate 800 milliGRAM(s) Oral three times a day with meals  tamsulosin 0.4 milliGRAM(s) Oral at bedtime      LABS: All Labs Reviewed:                        7.8    8.00  )-----------( 285      ( 16 Oct 2021 07:07 )             24.8     10-16    136  |  95<L>  |  54<H>  ----------------------------<  86  4.9   |  21<L>  |  9.87<H>    Ca    8.9      16 Oct 2021 07:07  Phos  6.6     10-16            Blood Culture: 10-14 @ 15:04  Organism --  Gram Stain Blood -- Gram Stain --  Specimen Source Clean Catch Clean Catch (Midstream)  Culture-Blood --    10-14 @ 09:19  Organism --  Gram Stain Blood -- Gram Stain --  Specimen Source .Blood Blood-Venous  Culture-Blood --      Urine Culture      RADIOLOGY/EKG:    ASSESSMENT AND PLAN:  patient with the bacteremia/cystitis continue cefazolin 1 g daily continue hemodialysis inpatient  DVT PPX:    ADVANCED DIRECTIVE:    DISPOSITION:

## 2021-10-18 LAB
ANION GAP SERPL CALC-SCNC: 19 MMOL/L — HIGH (ref 5–17)
BUN SERPL-MCNC: 61 MG/DL — HIGH (ref 7–23)
CALCIUM SERPL-MCNC: 8.9 MG/DL — SIGNIFICANT CHANGE UP (ref 8.4–10.5)
CHLORIDE SERPL-SCNC: 94 MMOL/L — LOW (ref 96–108)
CO2 SERPL-SCNC: 20 MMOL/L — LOW (ref 22–31)
CREAT SERPL-MCNC: 10.76 MG/DL — HIGH (ref 0.5–1.3)
GLUCOSE SERPL-MCNC: 90 MG/DL — SIGNIFICANT CHANGE UP (ref 70–99)
HCT VFR BLD CALC: 23.4 % — LOW (ref 39–50)
HCT VFR BLD CALC: 25.6 % — LOW (ref 39–50)
HGB BLD-MCNC: 7 G/DL — CRITICAL LOW (ref 13–17)
HGB BLD-MCNC: 7.7 G/DL — LOW (ref 13–17)
MCHC RBC-ENTMCNC: 25.9 PG — LOW (ref 27–34)
MCHC RBC-ENTMCNC: 25.9 PG — LOW (ref 27–34)
MCHC RBC-ENTMCNC: 29.9 GM/DL — LOW (ref 32–36)
MCHC RBC-ENTMCNC: 30.1 GM/DL — LOW (ref 32–36)
MCV RBC AUTO: 86.2 FL — SIGNIFICANT CHANGE UP (ref 80–100)
MCV RBC AUTO: 86.7 FL — SIGNIFICANT CHANGE UP (ref 80–100)
NRBC # BLD: 0 /100 WBCS — SIGNIFICANT CHANGE UP (ref 0–0)
NRBC # BLD: 0 /100 WBCS — SIGNIFICANT CHANGE UP (ref 0–0)
PHOSPHATE SERPL-MCNC: 7.1 MG/DL — HIGH (ref 2.5–4.5)
PLATELET # BLD AUTO: 268 K/UL — SIGNIFICANT CHANGE UP (ref 150–400)
PLATELET # BLD AUTO: 313 K/UL — SIGNIFICANT CHANGE UP (ref 150–400)
POTASSIUM SERPL-MCNC: 4.4 MMOL/L — SIGNIFICANT CHANGE UP (ref 3.5–5.3)
POTASSIUM SERPL-SCNC: 4.4 MMOL/L — SIGNIFICANT CHANGE UP (ref 3.5–5.3)
RBC # BLD: 2.7 M/UL — LOW (ref 4.2–5.8)
RBC # BLD: 2.97 M/UL — LOW (ref 4.2–5.8)
RBC # FLD: 16.8 % — HIGH (ref 10.3–14.5)
RBC # FLD: 16.8 % — HIGH (ref 10.3–14.5)
SODIUM SERPL-SCNC: 133 MMOL/L — LOW (ref 135–145)
WBC # BLD: 6.84 K/UL — SIGNIFICANT CHANGE UP (ref 3.8–10.5)
WBC # BLD: 7.62 K/UL — SIGNIFICANT CHANGE UP (ref 3.8–10.5)
WBC # FLD AUTO: 6.84 K/UL — SIGNIFICANT CHANGE UP (ref 3.8–10.5)
WBC # FLD AUTO: 7.62 K/UL — SIGNIFICANT CHANGE UP (ref 3.8–10.5)

## 2021-10-18 RX ORDER — SEVELAMER CARBONATE 2400 MG/1
1600 POWDER, FOR SUSPENSION ORAL THREE TIMES A DAY
Refills: 0 | Status: DISCONTINUED | OUTPATIENT
Start: 2021-10-18 | End: 2021-10-18

## 2021-10-18 RX ORDER — SEVELAMER CARBONATE 2400 MG/1
1600 POWDER, FOR SUSPENSION ORAL
Refills: 0 | Status: DISCONTINUED | OUTPATIENT
Start: 2021-10-18 | End: 2021-10-23

## 2021-10-18 RX ORDER — DARBEPOETIN ALFA IN POLYSORBAT 200MCG/0.4
60 PEN INJECTOR (ML) SUBCUTANEOUS
Refills: 0 | Status: DISCONTINUED | OUTPATIENT
Start: 2021-10-18 | End: 2021-10-23

## 2021-10-18 RX ADMIN — Medication 1 DROP(S): at 10:36

## 2021-10-18 RX ADMIN — HEPARIN SODIUM 5000 UNIT(S): 5000 INJECTION INTRAVENOUS; SUBCUTANEOUS at 14:15

## 2021-10-18 RX ADMIN — CARVEDILOL PHOSPHATE 25 MILLIGRAM(S): 80 CAPSULE, EXTENDED RELEASE ORAL at 10:36

## 2021-10-18 RX ADMIN — BUMETANIDE 1 MILLIGRAM(S): 0.25 INJECTION INTRAMUSCULAR; INTRAVENOUS at 05:23

## 2021-10-18 RX ADMIN — CHLORHEXIDINE GLUCONATE 1 APPLICATION(S): 213 SOLUTION TOPICAL at 10:20

## 2021-10-18 RX ADMIN — Medication 1 DROP(S): at 05:23

## 2021-10-18 RX ADMIN — SEVELAMER CARBONATE 800 MILLIGRAM(S): 2400 POWDER, FOR SUSPENSION ORAL at 10:36

## 2021-10-18 RX ADMIN — HEPARIN SODIUM 5000 UNIT(S): 5000 INJECTION INTRAVENOUS; SUBCUTANEOUS at 21:19

## 2021-10-18 RX ADMIN — Medication 1 DROP(S): at 14:15

## 2021-10-18 RX ADMIN — Medication 1 DROP(S): at 21:18

## 2021-10-18 RX ADMIN — CARVEDILOL PHOSPHATE 25 MILLIGRAM(S): 80 CAPSULE, EXTENDED RELEASE ORAL at 21:19

## 2021-10-18 RX ADMIN — Medication 1 APPLICATION(S): at 21:19

## 2021-10-18 RX ADMIN — Medication 1 DROP(S): at 18:12

## 2021-10-18 RX ADMIN — Medication 100 MILLIGRAM(S): at 14:15

## 2021-10-18 RX ADMIN — Medication 81 MILLIGRAM(S): at 12:20

## 2021-10-18 RX ADMIN — TAMSULOSIN HYDROCHLORIDE 0.4 MILLIGRAM(S): 0.4 CAPSULE ORAL at 21:18

## 2021-10-18 RX ADMIN — SEVELAMER CARBONATE 1600 MILLIGRAM(S): 2400 POWDER, FOR SUSPENSION ORAL at 18:12

## 2021-10-18 RX ADMIN — SEVELAMER CARBONATE 800 MILLIGRAM(S): 2400 POWDER, FOR SUSPENSION ORAL at 12:20

## 2021-10-18 RX ADMIN — HEPARIN SODIUM 5000 UNIT(S): 5000 INJECTION INTRAVENOUS; SUBCUTANEOUS at 05:23

## 2021-10-18 NOTE — PROGRESS NOTE ADULT - ASSESSMENT
80M with PMHx of HFpEF, ESRD, and HTN presenting with self-reported fever of 104 F. Patient states it has been going on for one day and intermittent. Otherwise he denies focal symptoms such as cough, shortness of breath, chest pain, diarrhea, dysuria or polyuria. He states he urinates very little. No known sick contacts. No sore throat or nasal congestion. He has had a right chest wall permacath since March 2021 with no redness or warmth around the site. He was last dialyzed several days prior with no issues. He does complain of right shoulder discomfort, but no swelling and has been having discomfort since permacath was placed. In the ED patient had blood cultures drawn and given vancomycin and zosyn. No fevers since he's been here.  (05 Oct 2021 09:31)    ER vss, afebrile.  WBC 10.8.  UA large LE, small blood.  Bcx E.coli.  CT c/a/p performed.  Pt with intermittent fevers, Tmax 102.3 on 10/8.    Pt states prior to hospitalization noted some dysuria and difficulty urinating.  Pt seen by Urology for rt scrotal swelling, denies tenderness.  Found to have rt hydrocele on US.   Pt now on rocephin, ID consulted for further abx managment.      Fever/E.coli bacteremia:    - UA large LE.  Bcx growing E.coli from 1/2 sets.  Pt c/o some dysuria, difficulty urinating.  Pt is ESRD on HD.  Currently on rocephin.    - f/u repeat bcx to ensure clearance.     - f/u sensis.  Ucx u/a, will reorder, although may be low yield post abx.    - CT c/a/p reviewed.  Of note, showed an 8.1 x 6.4cm cystic mass in right scrotum, markedly enlarged prostate gland, and mild infiltration of perivesical fat.   Pt seen by URology, Subsequent scrotal US demonstrated rt hydrocele, no further intervention deemed necessary at this time.    - Possible acute cystitis vs prostatis as source.  f/u repeat bcx to ensure clearance as pt also with HD catheter in place.  Vascular eval called for f/u regarding AVF.       * [facility]  Recommend 3 week course of abx to treat for bacteremia, possible prostatis/cystitis as source.  Can change to cefazolin 2gm/2gm/3gm post dialysis (10/8 through 10/28).    d/w Medicine NP     Will follow,    Marleni Garcia  [phone]

## 2021-10-18 NOTE — GOALS OF CARE CONVERSATION - ADVANCED CARE PLANNING - CONVERSATION DETAILS
Goals of care discussed with the patient. Our HCP form and conversation starter guide explained to and given to him for his further discussion with his family.

## 2021-10-18 NOTE — PROGRESS NOTE ADULT - SUBJECTIVE AND OBJECTIVE BOX
Date of Service   10-18-21 @ 11:43    Patient is a 80y old  Male who presents with a chief complaint of Fever (18 Oct 2021 08:37)      INTERVAL HISTORY:     TELEMETRY Personally reviewed:    REVIEW OF SYSTEMS:   CONSTITUTIONAL: No weakness  EYES/ENT: No visual changes; No throat pain  Neck: No pain or stiffness  Respiratory: No cough, wheezing, No shortness of breath  CARDIOVASCULAR: no chest pain or palpitations  GASTROINTESTINAL: No abdominal pain, no nausea, vomiting or hematemesis  GENITOURINARY: No dysuria, frequency or hematuria  NEUROLOGICAL: No stroke like symptoms  SKIN: No rashes    	  MEDICATIONS:  buMETAnide 1 milliGRAM(s) Oral daily  carvedilol 25 milliGRAM(s) Oral every 12 hours  tamsulosin 0.4 milliGRAM(s) Oral at bedtime        PHYSICAL EXAM:  T(C): 36.5 (10-18-21 @ 05:39), Max: 36.7 (10-17-21 @ 12:20)  HR: 65 (10-18-21 @ 05:39) (65 - 84)  BP: 122/70 (10-18-21 @ 05:39) (100/56 - 122/70)  RR: 18 (10-18-21 @ 05:39) (18 - 18)  SpO2: 95% (10-18-21 @ 05:39) (95% - 96%)  Wt(kg): --  I&O's Summary    17 Oct 2021 07:01  -  18 Oct 2021 07:00  --------------------------------------------------------  IN: 1250 mL / OUT: 0 mL / NET: 1250 mL    18 Oct 2021 07:01  -  18 Oct 2021 11:43  --------------------------------------------------------  IN: 360 mL / OUT: 0 mL / NET: 360 mL          Appearance: In no distress	  HEENT:    PERRL, EOMI	  Cardiovascular:  S1 S2, No JVD  Respiratory: Lungs clear to auscultation	  Gastrointestinal:  Soft, Non-tender, + BS	  Vascularature:  No edema of LE  Psychiatric: Appropriate affect   Neuro: no acute focal deficits                               7.0    6.84  )-----------( 268      ( 18 Oct 2021 07:28 )             23.4     10-18    133<L>  |  94<L>  |  61<H>  ----------------------------<  90  4.4   |  20<L>  |  10.76<H>    Ca    8.9      18 Oct 2021 07:25  Phos  7.1     10-18          Labs personally reviewed  < from: US Doppler Scrotum (10.13.21 @ 10:19) >    Large area of complex fluid with internal echoes surrounding the right testicle with mild mass effect. 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    Small left hydrocele.      < end of copied text >  < from: US Doppler Scrotum (10.13.21 @ 10:19) >    Large area of complex fluid with internal echoes surrounding the right testicle with mild mass effect. 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    Small left hydrocele.      < end of copied text >        ASSESSMENT/PLAN: 	    80M with PMHx of HFpEF, ESRD, and HTN presenting with self-reported fever of 104 F. Patient being admitted for fever in dialysis patient.      Problem/Plan - 1:  ·  Problem: Chronic diastolic heart Failure with preserved EF   - c/w bumex   - c/w carvedilol  25mg Q12hrs   - pt appears euvolemic   - ESRD  per renal for fluid management     Problem/Plan - 2:  ·  Problem: Fever  - Mild leukocytosis   - monitor fever curve and cbc   - off Abx   - chest CT negative   - CT abd and pelvis reviewed by ID  - repeat bld Cx sent no growth to date   - pt was exposed to COVID now in quarantine     Problem/Plan - 3:  ·  Problem: Hypertension  - c/w coreg and bumex  - monitor BP   - on ASA for prevention     Problem/Plan - 4:  ·  Problem: ESRD  - renal consult appreciated   - HD per renal     Problem/Plan - 5:  ·  Problem: DVT ppx on Heparin SQ           Sammie Carmichael FNP-BC   Sridhar Carrillo DO [facility]  Cardiovascular Medicine  [address], [address]  Office: [phone]  Cell: [phone] Date of Service   10-18-21 @ 11:43    Patient is a 80y old  Male who presents with a chief complaint of Fever (18 Oct 2021 08:37)      INTERVAL HISTORY: feels ok    REVIEW OF SYSTEMS:   CONSTITUTIONAL: No weakness  EYES/ENT: No visual changes; No throat pain  Neck: No pain or stiffness  Respiratory: No cough, wheezing, No shortness of breath  CARDIOVASCULAR: no chest pain or palpitations  GASTROINTESTINAL: No abdominal pain, no nausea, vomiting or hematemesis  GENITOURINARY: No dysuria, frequency or hematuria  NEUROLOGICAL: No stroke like symptoms  SKIN: No rashes    	  MEDICATIONS:  buMETAnide 1 milliGRAM(s) Oral daily  carvedilol 25 milliGRAM(s) Oral every 12 hours  tamsulosin 0.4 milliGRAM(s) Oral at bedtime        PHYSICAL EXAM:  T(C): 36.5 (10-18-21 @ 05:39), Max: 36.7 (10-17-21 @ 12:20)  HR: 65 (10-18-21 @ 05:39) (65 - 84)  BP: 122/70 (10-18-21 @ 05:39) (100/56 - 122/70)  RR: 18 (10-18-21 @ 05:39) (18 - 18)  SpO2: 95% (10-18-21 @ 05:39) (95% - 96%)  Wt(kg): --  I&O's Summary    17 Oct 2021 07:01  -  18 Oct 2021 07:00  --------------------------------------------------------  IN: 1250 mL / OUT: 0 mL / NET: 1250 mL    18 Oct 2021 07:01  -  18 Oct 2021 11:43  --------------------------------------------------------  IN: 360 mL / OUT: 0 mL / NET: 360 mL          Appearance: In no distress	  HEENT:    PERRL, EOMI	  Cardiovascular:  S1 S2, No JVD  Respiratory: Lungs clear to auscultation	  Gastrointestinal:  Soft, Non-tender, + BS	  Vascularature:  No edema of LE  Psychiatric: Appropriate affect   Neuro: no acute focal deficits                               7.0    6.84  )-----------( 268      ( 18 Oct 2021 07:28 )             23.4     10-18    133<L>  |  94<L>  |  61<H>  ----------------------------<  90  4.4   |  20<L>  |  10.76<H>    Ca    8.9      18 Oct 2021 07:25  Phos  7.1     10-18          Labs personally reviewed  < from: US Doppler Scrotum (10.13.21 @ 10:19) >    Large area of complex fluid with internal echoes surrounding the right testicle with mild mass effect. 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    Small left hydrocele.      < end of copied text >  < from: US Doppler Scrotum (10.13.21 @ 10:19) >    Large area of complex fluid with internal echoes surrounding the right testicle with mild mass effect. 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    Small left hydrocele.      < end of copied text >        ASSESSMENT/PLAN: 	    80M with PMHx of HFpEF, ESRD, and HTN presenting with self-reported fever of 104 F. Patient being admitted for fever in dialysis patient.      Problem/Plan - 1:  ·  Problem: Chronic diastolic heart Failure with preserved EF   - c/w bumex   - c/w carvedilol  25mg Q12hrs   - pt appears euvolemic   - ESRD  per renal for fluid management     Problem/Plan - 2:  ·  Problem: Fever  - Mild leukocytosis   - monitor fever curve and cbc   - off Abx   - chest CT negative   - CT abd and pelvis reviewed by ID  - repeat bld Cx sent no growth to date   - pt was exposed to COVID now in quarantine     Problem/Plan - 3:  ·  Problem: Hypertension  - c/w coreg and bumex  - monitor BP   - on ASA for prevention     Problem/Plan - 4:  ·  Problem: ESRD  - renal consult appreciated   - HD per renal     Problem/Plan - 5:  ·  Problem: DVT ppx on Heparin SQ           Sammie Carmichael FNP-BC   Sridhar Carrillo DO [facility]  Cardiovascular Medicine  [address], [address]  Office: [phone]  Cell: [phone]

## 2021-10-18 NOTE — PROGRESS NOTE ADULT - SUBJECTIVE AND OBJECTIVE BOX
pt was exposed to COVID now in quarantine   Comfortable, no acute complaints  Last HD saturday       VITAL:  T(C): , Max: 36.5 (10-18-21 @ 05:39)  T(F): , Max: 97.7 (10-18-21 @ 05:39)  HR: 76 (10-18-21 @ 12:39)  BP: 147/72 (10-18-21 @ 12:39)  BP(mean): --  RR: 18 (10-18-21 @ 12:39)  SpO2: 97% (10-18-21 @ 12:39)  Wt(kg): --      PHYSICAL EXAM:  General: Alerted, oriented  HEENT: MMM  Lungs:CTA-b/l  Heart: RRR S1S2  Abdomen: Soft, NTND  Ext: no pedal edema b/l  : no an  Access - R chest wall perm cath and LAV fistula with thrill       LABS:                        7.0    6.84  )-----------( 268      ( 18 Oct 2021 07:28 )             23.4     Na(133)/K(4.4)/Cl(94)/HCO3(20)/BUN(61)/Cr(10.76)Glu(90)/Ca(8.9)/Mg(--)/PO4(7.1)    10-18 @ 07:25  Na(136)/K(4.9)/Cl(95)/HCO3(21)/BUN(54)/Cr(9.87)Glu(86)/Ca(8.9)/Mg(--)/PO4(6.6)    10-16 @ 07:07        80M with PMHx of HFpEF, ESRD, and HTN presenting with self-reported fever of 104 F in the setting of cystitis/prostatitis     1 ID-   Blood culture with gram neg rods/ ecoli     2 Renal- s/p AVF venoplasty and outflow vein thrombus balloon maturation on 10/8/21,   perm cath and the fistula is not ready to use.  HD  tomorrow     3 -Cont Flomax; 8 x 6 cm cystic lesion in the right scotum  Hx of hydrocele and appreciate Urology input - Subsequent scrotal US demonstrated rt hydrocele, no further intervention at this time.    4 Vasc follow up with Dr Dunn appreciated -   Patient is s/p recent AVF revision  by IR  - 10/8/2021  Repeat AVFgram +/- intervention ~2 wks post previous procedure.  Fistula not fit for use- will continue HD via permcath     5 Anemia  Check stool guaic  Start aranesp 60 mcg Sc once weekly, with dialysis  Repeat CBC in pm, if less than < 7 may need PRBC transfusion   Check iron studies     6 Hyperphosphatemia  Increase Renvela 800 > 1600  mg TID with meals     D/w team NP     Aleida Lutz MD  [facility]  Office: [phone]

## 2021-10-18 NOTE — PROGRESS NOTE ADULT - SUBJECTIVE AND OBJECTIVE BOX
Infectious Diseases progress note:    Subjective: NAD, afebrile.  Pt on quarantine precautions.      ROS:  CONSTITUTIONAL:  No fever, chills, rigors  CARDIOVASCULAR:  No chest pain or palpitations  RESPIRATORY:   No SOB, cough, dyspnea on exertion.  No wheezing  GASTROINTESTINAL:  No abd pain, N/V, diarrhea/constipation  EXTREMITIES:  No swelling or joint pain  GENITOURINARY:  No burning on urination, increased frequency or urgency.  No flank pain  NEUROLOGIC:  No HA, visual disturbances  SKIN: No rashes    Allergies    grass, pollen (Rhinitis)  No Known Drug Allergies    Intolerances        ANTIBIOTICS/RELEVANT:  antimicrobials  ceFAZolin   IVPB 1000 milliGRAM(s) IV Intermittent every 24 hours    immunologic:    OTHER:  acetaminophen   Tablet .. 650 milliGRAM(s) Oral every 6 hours PRN  artificial  tears Solution 1 Drop(s) Left EYE every 4 hours  aspirin enteric coated 81 milliGRAM(s) Oral daily  buMETAnide 1 milliGRAM(s) Oral daily  carvedilol 25 milliGRAM(s) Oral every 12 hours  chlorhexidine 4% Liquid 1 Application(s) Topical daily  heparin   Injectable 5000 Unit(s) SubCutaneous every 8 hours  lidocaine/prilocaine Cream 1 Application(s) Topical <User Schedule>  petrolatum Ophthalmic Ointment 1 Application(s) Left EYE at bedtime  sevelamer carbonate 800 milliGRAM(s) Oral three times a day with meals  tamsulosin 0.4 milliGRAM(s) Oral at bedtime      Objective:  Vital Signs Last 24 Hrs  T(C): 36.4 (18 Oct 2021 12:39), Max: 36.5 (18 Oct 2021 05:39)  T(F): 97.5 (18 Oct 2021 12:39), Max: 97.7 (18 Oct 2021 05:39)  HR: 76 (18 Oct 2021 12:39) (65 - 80)  BP: 147/72 (18 Oct 2021 12:39) (106/56 - 147/72)  BP(mean): --  RR: 18 (18 Oct 2021 12:39) (18 - 18)  SpO2: 97% (18 Oct 2021 12:39) (95% - 97%)    PHYSICAL EXAM:  Constitutional:NAD  Eyes:MOI, EOMI  Ear/Nose/Throat: no thrush, mucositis.  Moist mucous membranes	  Neck:no JVD, no lymphadenopathy, supple  Respiratory: CTA checo  Cardiovascular: S1S2 RRR, no murmurs  Gastrointestinal:soft, nontender,  nondistended (+) BS  Extremities:no e/e/c  Skin:  no rashes, open wounds or ulcerations        LABS:                        7.0    6.84  )-----------( 268      ( 18 Oct 2021 07:28 )             23.4     10-18    133<L>  |  94<L>  |  61<H>  ----------------------------<  90  4.4   |  20<L>  |  10.76<H>    Ca    8.9      18 Oct 2021 07:25  Phos  7.1     10-18            Rapid RVP Result: [facility]          MICROBIOLOGY:      Culture - Urine (10.14.21 @ 15:04)   Specimen Source: Clean Catch Clean Catch (Midstream)   Culture Results:   10,000 - 49,000 CFU/mL Yeast "Susceptibilities not performed"     Culture - Blood in AM (10.14.21 @ 09:19)   Specimen Source: .Blood Blood-Peripheral   Culture Results:   No growth to date.     RADIOLOGY & ADDITIONAL STUDIES:    < from: US Doppler Scrotum (10.13.21 @ 10:19) >    LEFT:  Left testis: 3.3 cm x 2.1 cm x 2.1 cm.Normal echogenicity and echotexture with no masses or areas of architectural distortion. Normal arterial and venous blood flow pattern.  Left epididymis: Within normal limits.  Left hydrocele: Small left hydrocele.  Left varicocele: None.    IMPRESSION:    Large area of complex fluid with internal echoes surrounding the right testicle with mild mass effect. 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    Small left hydrocele.    < end of copied text >

## 2021-10-18 NOTE — PROGRESS NOTE ADULT - SUBJECTIVE AND OBJECTIVE BOX
CHIEF COMPLAINT:    SUBJECTIVE:     REVIEW OF SYSTEMS:    CONSTITUTIONAL: (  )  weakness,  (  ) fevers or chills  EYES/ENT: (  )visual changes;     NECK: (  ) pain or stiffness  RESPIRATORY:   (  )cough, wheezing, hemoptysis;  (  ) shortness of breath  CARDIOVASCULAR:  (  )chest pain or palpitations  GASTROINTESTINAL:   (  )abdominal or epigastric pain.  (  ) nausea, vomiting, or hematemesis;   (   ) diarrhea or constipation.   GENITOURINARY:   (    ) dysuria, frequency or hematuria  NEUROLOGICAL:  (   ) numbness or weakness   All other review of systems is negative unless indicated above    Vital Signs Last 24 Hrs  T(C): 36.5 (18 Oct 2021 05:39), Max: 36.7 (17 Oct 2021 11:15)  T(F): 97.7 (18 Oct 2021 05:39), Max: 98 (17 Oct 2021 11:15)  HR: 65 (18 Oct 2021 05:39) (65 - 84)  BP: 122/70 (18 Oct 2021 05:39) (100/56 - 122/70)  BP(mean): --  RR: 18 (18 Oct 2021 05:39) (18 - 18)  SpO2: 95% (18 Oct 2021 05:39) (95% - 100%)    I&O's Summary    17 Oct 2021 07:01  -  18 Oct 2021 07:00  --------------------------------------------------------  IN: 1250 mL / OUT: 0 mL / NET: 1250 mL        CAPILLARY BLOOD GLUCOSE          PHYSICAL EXAM:    Constitutional:  (   ) NAD,   (   )awake and alert  HEENT: PERR, EOMI,    Neck: Soft and supple, No LAD, No JVD  Respiratory:  (    Breath sounds are clear bilaterally,    (   ) wheezing, rales or rhonchi  Cardiovascular:     (   )S1 and S2, regular rate and rhythm, no Murmurs, gallops or rubs  Gastrointestinal:  (   )Bowel Sounds present, soft,   (  )nontender, nondistended,    Extremities:    (  ) peripheral edema  Vascular: 2+ peripheral pulses  Neurological:    (    )A/O x 3,   (  ) focal deficits  Musculoskeletal:    (   )  normal strength b/l upper  (     ) normal  lower extremities  Skin: No rashes    MEDICATIONS:  MEDICATIONS  (STANDING):  artificial  tears Solution 1 Drop(s) Left EYE every 4 hours  aspirin enteric coated 81 milliGRAM(s) Oral daily  buMETAnide 1 milliGRAM(s) Oral daily  carvedilol 25 milliGRAM(s) Oral every 12 hours  ceFAZolin   IVPB 1000 milliGRAM(s) IV Intermittent every 24 hours  chlorhexidine 4% Liquid 1 Application(s) Topical daily  heparin   Injectable 5000 Unit(s) SubCutaneous every 8 hours  lidocaine/prilocaine Cream 1 Application(s) Topical <User Schedule>  petrolatum Ophthalmic Ointment 1 Application(s) Left EYE at bedtime  sevelamer carbonate 800 milliGRAM(s) Oral three times a day with meals  tamsulosin 0.4 milliGRAM(s) Oral at bedtime      LABS: All Labs Reviewed:                        7.0    6.84  )-----------( 268      ( 18 Oct 2021 07:28 )             23.4     10-18    133<L>  |  94<L>  |  61<H>  ----------------------------<  90  4.4   |  20<L>  |  10.76<H>    Ca    8.9      18 Oct 2021 07:25  Phos  7.1     10-18            Blood Culture: 10-14 @ 15:04  Organism --  Gram Stain Blood -- Gram Stain --  Specimen Source Clean Catch Clean Catch (Midstream)  Culture-Blood --    10-14 @ 09:19  Organism --  Gram Stain Blood -- Gram Stain --  Specimen Source .Blood Blood-Venous  Culture-Blood --      Urine Culture      RADIOLOGY/EKG:    ASSESSMENT AND PLAN:    DVT PPX:    ADVANCED DIRECTIVE:    DISPOSITION: CHIEF COMPLAINT:   NAD, afebrile.  Pt on quarantine precautions.      REVIEW OF SYSTEMS:    CONSTITUTIONAL: (  )  weakness,  (  ) fevers or chills  EYES/ENT: (  )visual changes;     NECK: (  ) pain or stiffness  RESPIRATORY:   (  )cough, wheezing, hemoptysis;  (  ) shortness of breath  CARDIOVASCULAR:  (  )chest pain or palpitations  GASTROINTESTINAL:   (  )abdominal or epigastric pain.  (  ) nausea, vomiting, or hematemesis;   (   ) diarrhea or constipation.   GENITOURINARY:   (    ) dysuria, frequency or hematuria  NEUROLOGICAL:  (   ) numbness or weakness   All other review of systems is negative unless indicated above    Vital Signs Last 24 Hrs  T(C): 36.5 (18 Oct 2021 05:39), Max: 36.7 (17 Oct 2021 11:15)  T(F): 97.7 (18 Oct 2021 05:39), Max: 98 (17 Oct 2021 11:15)  HR: 65 (18 Oct 2021 05:39) (65 - 84)  BP: 122/70 (18 Oct 2021 05:39) (100/56 - 122/70)  BP(mean): --  RR: 18 (18 Oct 2021 05:39) (18 - 18)  SpO2: 95% (18 Oct 2021 05:39) (95% - 100%)    I&O's Summary    17 Oct 2021 07:01  -  18 Oct 2021 07:00  --------------------------------------------------------  IN: 1250 mL / OUT: 0 mL / NET: 1250 mL        CAPILLARY BLOOD GLUCOSE          PHYSICAL EXAM:  Constitutional:NAD  Eyes:MOI, EOMI  Ear/Nose/Throat: no thrush, mucositis.  Moist mucous membranes	  Neck:no JVD, no lymphadenopathy, supple  Respiratory: CTA checo  Cardiovascular: S1S2 RRR, no murmurs  Gastrointestinal:soft, nontender,  nondistended (+) BS  Extremities:no e/e/c  Skin:  no rashes, open wounds or ulcerations         MEDICATIONS:  MEDICATIONS  (STANDING):  artificial  tears Solution 1 Drop(s) Left EYE every 4 hours  aspirin enteric coated 81 milliGRAM(s) Oral daily  buMETAnide 1 milliGRAM(s) Oral daily  carvedilol 25 milliGRAM(s) Oral every 12 hours  ceFAZolin   IVPB 1000 milliGRAM(s) IV Intermittent every 24 hours  chlorhexidine 4% Liquid 1 Application(s) Topical daily  heparin   Injectable 5000 Unit(s) SubCutaneous every 8 hours  lidocaine/prilocaine Cream 1 Application(s) Topical <User Schedule>  petrolatum Ophthalmic Ointment 1 Application(s) Left EYE at bedtime  sevelamer carbonate 800 milliGRAM(s) Oral three times a day with meals  tamsulosin 0.4 milliGRAM(s) Oral at bedtime      LABS: All Labs Reviewed:                        7.0    6.84  )-----------( 268      ( 18 Oct 2021 07:28 )             23.4     10-18    133<L>  |  94<L>  |  61<H>  ----------------------------<  90  4.4   |  20<L>  |  10.76<H>    Ca    8.9      18 Oct 2021 07:25  Phos  7.1     10-18            Blood Culture: 10-14 @ 15:04  Organism --  Gram Stain Blood -- Gram Stain --  Specimen Source Clean Catch Clean Catch (Midstream)  Culture-Blood --    10-14 @ 09:19  Organism --  Gram Stain Blood -- Gram Stain --  Specimen Source .Blood Blood-Venous  Culture-Blood --      Urine Culture      RADIOLOGY/EKG:    ASSESSMENT AND PLAN:  80M with PMHx of HFpEF, ESRD, and HTN presenting with self-reported fever of 104 F. Patient states it has been going on for one day and intermittent. Otherwise he denies focal symptoms such as cough, shortness of breath, chest pain, diarrhea, dysuria or polyuria. He states he urinates very little. No known sick contacts. No sore throat or nasal congestion. He has had a right chest wall permacath since March 2021 with no redness or warmth around the site. He was last dialyzed several days prior with no issues. He does complain of right shoulder discomfort, but no swelling and has been having discomfort since permacath was placed. In the ED patient had blood cultures drawn and given vancomycin and zosyn. No fevers since he's been here.  (05 Oct 2021 09:31)    ER vss, afebrile.  WBC 10.8.  UA large LE, small blood.  Bcx E.coli.  CT c/a/p performed.  Pt with intermittent fevers, Tmax 102.3 on 10/8.    Pt states prior to hospitalization noted some dysuria and difficulty urinating.  Pt seen by Urology for rt scrotal swelling, denies tenderness.  Found to have rt hydrocele on US.   Pt now on rocephin,         - CT c/a/p reviewed.  Of note, showed an 8.1 x 6.4cm cystic mass in right scrotum, markedly enlarged prostate gland, and mild infiltration of perivesical fat.   Pt seen by URology, Subsequent scrotal US demonstrated rt hydrocele, no further intervention deemed necessary at this time.    - Possible acute cystitis vs prostatis as source.  f/u repeat bcx to ensure clearance as pt also with HD catheter in place.  Vascular eval called for f/u regarding AVF.    continue IV antibiotic until 10/28/2021 .  -exposure to  Covid discussed with his wife in detail and reassured her that he has no Covid only in need to stay in isolation for 10 days             DVT PPX:    ADVANCED DIRECTIVE:    DISPOSITION:

## 2021-10-19 LAB
ANION GAP SERPL CALC-SCNC: 21 MMOL/L — HIGH (ref 5–17)
BLD GP AB SCN SERPL QL: NEGATIVE — SIGNIFICANT CHANGE UP
BUN SERPL-MCNC: 75 MG/DL — HIGH (ref 7–23)
CALCIUM SERPL-MCNC: 8.4 MG/DL — SIGNIFICANT CHANGE UP (ref 8.4–10.5)
CHLORIDE SERPL-SCNC: 92 MMOL/L — LOW (ref 96–108)
CO2 SERPL-SCNC: 19 MMOL/L — LOW (ref 22–31)
CREAT SERPL-MCNC: 12.06 MG/DL — HIGH (ref 0.5–1.3)
CULTURE RESULTS: SIGNIFICANT CHANGE UP
CULTURE RESULTS: SIGNIFICANT CHANGE UP
FERRITIN SERPL-MCNC: 445 NG/ML — HIGH (ref 30–400)
GLUCOSE SERPL-MCNC: 104 MG/DL — HIGH (ref 70–99)
HCT VFR BLD CALC: 22.3 % — LOW (ref 39–50)
HCT VFR BLD CALC: 30.7 % — LOW (ref 39–50)
HGB BLD-MCNC: 6.7 G/DL — CRITICAL LOW (ref 13–17)
HGB BLD-MCNC: 9.7 G/DL — LOW (ref 13–17)
IRON SATN MFR SERPL: 24 % — SIGNIFICANT CHANGE UP (ref 16–55)
IRON SATN MFR SERPL: 49 UG/DL — SIGNIFICANT CHANGE UP (ref 45–165)
MCHC RBC-ENTMCNC: 26.3 PG — LOW (ref 27–34)
MCHC RBC-ENTMCNC: 27.2 PG — SIGNIFICANT CHANGE UP (ref 27–34)
MCHC RBC-ENTMCNC: 30 GM/DL — LOW (ref 32–36)
MCHC RBC-ENTMCNC: 31.6 GM/DL — LOW (ref 32–36)
MCV RBC AUTO: 86.2 FL — SIGNIFICANT CHANGE UP (ref 80–100)
MCV RBC AUTO: 87.5 FL — SIGNIFICANT CHANGE UP (ref 80–100)
NRBC # BLD: 0 /100 WBCS — SIGNIFICANT CHANGE UP (ref 0–0)
NRBC # BLD: 0 /100 WBCS — SIGNIFICANT CHANGE UP (ref 0–0)
OB PNL STL: NEGATIVE — SIGNIFICANT CHANGE UP
PHOSPHATE SERPL-MCNC: 8.4 MG/DL — HIGH (ref 2.5–4.5)
PLATELET # BLD AUTO: 267 K/UL — SIGNIFICANT CHANGE UP (ref 150–400)
PLATELET # BLD AUTO: 280 K/UL — SIGNIFICANT CHANGE UP (ref 150–400)
POTASSIUM SERPL-MCNC: 4.8 MMOL/L — SIGNIFICANT CHANGE UP (ref 3.5–5.3)
POTASSIUM SERPL-SCNC: 4.8 MMOL/L — SIGNIFICANT CHANGE UP (ref 3.5–5.3)
RBC # BLD: 2.55 M/UL — LOW (ref 4.2–5.8)
RBC # BLD: 3.56 M/UL — LOW (ref 4.2–5.8)
RBC # FLD: 15.9 % — HIGH (ref 10.3–14.5)
RBC # FLD: 17.1 % — HIGH (ref 10.3–14.5)
RH IG SCN BLD-IMP: POSITIVE — SIGNIFICANT CHANGE UP
SODIUM SERPL-SCNC: 132 MMOL/L — LOW (ref 135–145)
SPECIMEN SOURCE: SIGNIFICANT CHANGE UP
SPECIMEN SOURCE: SIGNIFICANT CHANGE UP
TIBC SERPL-MCNC: 204 UG/DL — LOW (ref 220–430)
TRANSFERRIN SERPL-MCNC: 156 MG/DL — LOW (ref 200–360)
UIBC SERPL-MCNC: 155 UG/DL — SIGNIFICANT CHANGE UP (ref 110–370)
WBC # BLD: 5.74 K/UL — SIGNIFICANT CHANGE UP (ref 3.8–10.5)
WBC # BLD: 7.25 K/UL — SIGNIFICANT CHANGE UP (ref 3.8–10.5)
WBC # FLD AUTO: 5.74 K/UL — SIGNIFICANT CHANGE UP (ref 3.8–10.5)
WBC # FLD AUTO: 7.25 K/UL — SIGNIFICANT CHANGE UP (ref 3.8–10.5)

## 2021-10-19 RX ADMIN — TAMSULOSIN HYDROCHLORIDE 0.4 MILLIGRAM(S): 0.4 CAPSULE ORAL at 21:24

## 2021-10-19 RX ADMIN — Medication 1 DROP(S): at 17:33

## 2021-10-19 RX ADMIN — SEVELAMER CARBONATE 1600 MILLIGRAM(S): 2400 POWDER, FOR SUSPENSION ORAL at 08:07

## 2021-10-19 RX ADMIN — Medication 100 MILLIGRAM(S): at 17:34

## 2021-10-19 RX ADMIN — Medication 1 APPLICATION(S): at 21:25

## 2021-10-19 RX ADMIN — HEPARIN SODIUM 5000 UNIT(S): 5000 INJECTION INTRAVENOUS; SUBCUTANEOUS at 21:24

## 2021-10-19 RX ADMIN — Medication 81 MILLIGRAM(S): at 17:33

## 2021-10-19 RX ADMIN — Medication 1 DROP(S): at 06:21

## 2021-10-19 RX ADMIN — Medication 1 DROP(S): at 10:19

## 2021-10-19 RX ADMIN — CHLORHEXIDINE GLUCONATE 1 APPLICATION(S): 213 SOLUTION TOPICAL at 11:50

## 2021-10-19 RX ADMIN — SEVELAMER CARBONATE 1600 MILLIGRAM(S): 2400 POWDER, FOR SUSPENSION ORAL at 17:34

## 2021-10-19 RX ADMIN — CARVEDILOL PHOSPHATE 25 MILLIGRAM(S): 80 CAPSULE, EXTENDED RELEASE ORAL at 21:24

## 2021-10-19 RX ADMIN — Medication 60 MICROGRAM(S): at 13:07

## 2021-10-19 RX ADMIN — BUMETANIDE 1 MILLIGRAM(S): 0.25 INJECTION INTRAMUSCULAR; INTRAVENOUS at 06:21

## 2021-10-19 RX ADMIN — HEPARIN SODIUM 5000 UNIT(S): 5000 INJECTION INTRAVENOUS; SUBCUTANEOUS at 06:21

## 2021-10-19 RX ADMIN — Medication 1 DROP(S): at 21:24

## 2021-10-19 NOTE — PROGRESS NOTE ADULT - SUBJECTIVE AND OBJECTIVE BOX
NE[facility]ROLOGY[facility] [phone]        Patient seen and examined in bed.  He was in good spirits         MEDICATIONS  (STANDING):  artificial  tears Solution 1 Drop(s) Left EYE every 4 hours  aspirin enteric coated 81 milliGRAM(s) Oral daily  buMETAnide 1 milliGRAM(s) Oral daily  carvedilol 25 milliGRAM(s) Oral every 12 hours  ceFAZolin   IVPB 1000 milliGRAM(s) IV Intermittent every 24 hours  chlorhexidine 4% Liquid 1 Application(s) Topical daily  darbepoetin Injectable ViaL 60 MICROGram(s) IV Push every 7 days  heparin   Injectable 5000 Unit(s) SubCutaneous every 8 hours  lidocaine/prilocaine Cream 1 Application(s) Topical <User Schedule>  petrolatum Ophthalmic Ointment 1 Application(s) Left EYE at bedtime  sevelamer carbonate 1600 milliGRAM(s) Oral three times a day with meals  tamsulosin 0.4 milliGRAM(s) Oral at bedtime      VITAL:  T(C): , Max: 36.9 (10-19-21 @ 04:28)  T(F): , Max: 98.4 (10-19-21 @ 04:28)  HR: 72 (10-19-21 @ 04:28)  BP: 118/63 (10-19-21 @ 04:28)  BP(mean): --  RR: 18 (10-19-21 @ 04:28)  SpO2: 95% (10-19-21 @ 04:28)  Wt(kg): --    I and O's:    10-18 @ 07:01  -  10-19 @ 07:00  --------------------------------------------------------  IN: 1370 mL / OUT: 0 mL / NET: 1370 mL          PHYSICAL EXAM:    Constitutional: NAD  Neck:  No JVD  Respiratory: CTAB/L  Cardiovascular: S1 and S2  Gastrointestinal: BS+, soft, NT/ND  Extremities: No peripheral edema  Neurological: A/O x 3, no focal deficits  Psychiatric: Normal mood, normal affect  : No Aguilar  Skin: No rashes  Access: AVF and perm cath     LABS:                        6.7    5.74  )-----------( 267      ( 19 Oct 2021 06:45 )             22.3     10-19    132<L>  |  92<L>  |  75<H>  ----------------------------<  104<H>  4.8   |  19<L>  |  12.06<H>    Ca    8.4      19 Oct 2021 06:43  Phos  8.4     10-19            Urine Studies:          RADIOLOGY & ADDITIONAL STUDIES:

## 2021-10-19 NOTE — PROGRESS NOTE ADULT - SUBJECTIVE AND OBJECTIVE BOX
Infectious Diseases progress note:    Subjective: NAD, afebrile.  No new complaints.  Pt remains in quarantine through 10/23 for covid exposure.      ROS:  CONSTITUTIONAL:  No fever, chills, rigors  CARDIOVASCULAR:  No chest pain or palpitations  RESPIRATORY:   No SOB, cough, dyspnea on exertion.  No wheezing  GASTROINTESTINAL:  No abd pain, N/V, diarrhea/constipation  EXTREMITIES:  No swelling or joint pain  GENITOURINARY:  No burning on urination, increased frequency or urgency.  No flank pain  NEUROLOGIC:  No HA, visual disturbances  SKIN: No rashes    Allergies    grass, pollen (Rhinitis)  No Known Drug Allergies    Intolerances        ANTIBIOTICS/RELEVANT:  antimicrobials  ceFAZolin   IVPB 1000 milliGRAM(s) IV Intermittent every 24 hours    immunologic:  darbepoetin Injectable ViaL 60 MICROGram(s) IV Push every 7 days    OTHER:  acetaminophen   Tablet .. 650 milliGRAM(s) Oral every 6 hours PRN  artificial  tears Solution 1 Drop(s) Left EYE every 4 hours  aspirin enteric coated 81 milliGRAM(s) Oral daily  buMETAnide 1 milliGRAM(s) Oral daily  carvedilol 25 milliGRAM(s) Oral every 12 hours  chlorhexidine 4% Liquid 1 Application(s) Topical daily  heparin   Injectable 5000 Unit(s) SubCutaneous every 8 hours  lidocaine/prilocaine Cream 1 Application(s) Topical <User Schedule>  petrolatum Ophthalmic Ointment 1 Application(s) Left EYE at bedtime  sevelamer carbonate 1600 milliGRAM(s) Oral three times a day with meals  tamsulosin 0.4 milliGRAM(s) Oral at bedtime      Objective:  Vital Signs Last 24 Hrs  T(C): 36.7 (19 Oct 2021 17:43), Max: 36.9 (19 Oct 2021 04:28)  T(F): 98.1 (19 Oct 2021 17:43), Max: 98.4 (19 Oct 2021 04:28)  HR: 72 (19 Oct 2021 17:43) (61 - 77)  BP: 152/78 (19 Oct 2021 17:43) (118/63 - 159/81)  BP(mean): --  RR: 189 (19 Oct 2021 17:43) (18 - 189)  SpO2: 97% (19 Oct 2021 16:00) (95% - 100%)    PHYSICAL EXAM:  Constitutional:NAD  Eyes:MOI, EOMI  Ear/Nose/Throat: no thrush, mucositis.  Moist mucous membranes	  Neck:no JVD, no lymphadenopathy, supple  Respiratory: CTA checo  Cardiovascular: S1S2 RRR, no murmurs  Gastrointestinal:soft, nontender,  nondistended (+) BS  Extremities:no e/e/c  Skin:  no rashes, open wounds or ulcerations        LABS:                        6.7    5.74  )-----------( 267      ( 19 Oct 2021 06:45 )             22.3     10-19    132<L>  |  92<L>  |  75<H>  ----------------------------<  104<H>  4.8   |  19<L>  |  12.06<H>    Ca    8.4      19 Oct 2021 06:43  Phos  8.4     10-19          Rapid RVP Result: [facility]          MICROBIOLOGY:    Culture - Urine (10.14.21 @ 15:04)   Specimen Source: Clean Catch Clean Catch (Midstream)   Culture Results:   10,000 - 49,000 CFU/mL Yeast "Susceptibilities not performed"     Culture - Blood in AM (10.14.21 @ 09:19)   Specimen Source: .Blood Blood-Peripheral   Culture Results:   No Growth Final     Culture - Blood in AM (10.14.21 @ 09:19)   Specimen Source: .Blood Blood-Venous   Culture Results:   No Growth Final       RADIOLOGY & ADDITIONAL STUDIES:    < from: US Doppler Scrotum (10.13.21 @ 10:19) >    IMPRESSION:    Large area of complex fluid with internal echoes surrounding the right testicle with mild mass effect. 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    Small left hydrocele.    < end of copied text >

## 2021-10-19 NOTE — PROGRESS NOTE ADULT - ASSESSMENT
80M with PMHx of HFpEF, ESRD, and HTN presenting with self-reported fever of 104 F. Patient states it has been going on for one day and intermittent. Otherwise he denies focal symptoms such as cough, shortness of breath, chest pain, diarrhea, dysuria or polyuria. He states he urinates very little. No known sick contacts. No sore throat or nasal congestion. He has had a right chest wall permacath since March 2021 with no redness or warmth around the site. He was last dialyzed several days prior with no issues. He does complain of right shoulder discomfort, but no swelling and has been having discomfort since permacath was placed. In the ED patient had blood cultures drawn and given vancomycin and zosyn. No fevers since he's been here.  (05 Oct 2021 09:31)    ER vss, afebrile.  WBC 10.8.  UA large LE, small blood.  Bcx E.coli.  CT c/a/p performed.  Pt with intermittent fevers, Tmax 102.3 on 10/8.    Pt states prior to hospitalization noted some dysuria and difficulty urinating.  Pt seen by Urology for rt scrotal swelling, denies tenderness.  Found to have rt hydrocele on US.   Pt now on rocephin, ID consulted for further abx managment.      Fever/E.coli bacteremia:    - UA large LE.  Bcx growing E.coli from 1/2 sets.  Pt c/o some dysuria, difficulty urinating.  Pt is ESRD on HD.  Currently on rocephin.    - f/u repeat bcx to ensure clearance.     - f/u sensis.  Ucx u/a, will reorder, although may be low yield post abx.    - CT c/a/p reviewed.  Of note, showed an 8.1 x 6.4cm cystic mass in right scrotum, markedly enlarged prostate gland, and mild infiltration of perivesical fat.   Pt seen by URology, Subsequent scrotal US demonstrated rt hydrocele, no further intervention deemed necessary at this time.    - Possible acute cystitis vs prostatis as source.  f/u repeat bcx to ensure clearance as pt also with HD catheter in place.  Vascular eval called for f/u regarding AVF.       * [facility]  Recommend 3 week course of abx to treat for bacteremia, possible prostatis/cystitis as source.  Can change to cefazolin 2gm/2gm/3gm post dialysis (10/8 through 10/28).    Pt completing quarantine period through 10/23 for recent Covid exposure.  No cough/fever/sob    [facility]  [phone]

## 2021-10-19 NOTE — PROGRESS NOTE ADULT - SUBJECTIVE AND OBJECTIVE BOX
CHIEF COMPLAINT:    SUBJECTIVE:     REVIEW OF SYSTEMS:    CONSTITUTIONAL: (  )  weakness,  (  ) fevers or chills  EYES/ENT: (  )visual changes;     NECK: (  ) pain or stiffness  RESPIRATORY:   (  )cough, wheezing, hemoptysis;  (  ) shortness of breath  CARDIOVASCULAR:  (  )chest pain or palpitations  GASTROINTESTINAL:   (  )abdominal or epigastric pain.  (  ) nausea, vomiting, or hematemesis;   (   ) diarrhea or constipation.   GENITOURINARY:   (    ) dysuria, frequency or hematuria  NEUROLOGICAL:  (   ) numbness or weakness   All other review of systems is negative unless indicated above    Vital Signs Last 24 Hrs  T(C): 36.9 (19 Oct 2021 04:28), Max: 36.9 (19 Oct 2021 04:28)  T(F): 98.4 (19 Oct 2021 04:28), Max: 98.4 (19 Oct 2021 04:28)  HR: 72 (19 Oct 2021 04:28) (61 - 76)  BP: 118/63 (19 Oct 2021 04:28) (118/63 - 147/72)  BP(mean): --  RR: 18 (19 Oct 2021 04:28) (18 - 18)  SpO2: 95% (19 Oct 2021 04:28) (95% - 97%)    I&O's Summary    18 Oct 2021 07:01  -  19 Oct 2021 07:00  --------------------------------------------------------  IN: 1370 mL / OUT: 0 mL / NET: 1370 mL        CAPILLARY BLOOD GLUCOSE          PHYSICAL EXAM:    Constitutional:  (   ) NAD,   (   )awake and alert  HEENT: PERR, EOMI,    Neck: Soft and supple, No LAD, No JVD  Respiratory:  (    Breath sounds are clear bilaterally,    (   ) wheezing, rales or rhonchi  Cardiovascular:     (   )S1 and S2, regular rate and rhythm, no Murmurs, gallops or rubs  Gastrointestinal:  (   )Bowel Sounds present, soft,   (  )nontender, nondistended,    Extremities:    (  ) peripheral edema  Vascular: 2+ peripheral pulses  Neurological:    (    )A/O x 3,   (  ) focal deficits  Musculoskeletal:    (   )  normal strength b/l upper  (     ) normal  lower extremities  Skin: No rashes    MEDICATIONS:  MEDICATIONS  (STANDING):  artificial  tears Solution 1 Drop(s) Left EYE every 4 hours  aspirin enteric coated 81 milliGRAM(s) Oral daily  buMETAnide 1 milliGRAM(s) Oral daily  carvedilol 25 milliGRAM(s) Oral every 12 hours  ceFAZolin   IVPB 1000 milliGRAM(s) IV Intermittent every 24 hours  chlorhexidine 4% Liquid 1 Application(s) Topical daily  darbepoetin Injectable ViaL 60 MICROGram(s) IV Push every 7 days  heparin   Injectable 5000 Unit(s) SubCutaneous every 8 hours  lidocaine/prilocaine Cream 1 Application(s) Topical <User Schedule>  petrolatum Ophthalmic Ointment 1 Application(s) Left EYE at bedtime  sevelamer carbonate 1600 milliGRAM(s) Oral three times a day with meals  tamsulosin 0.4 milliGRAM(s) Oral at bedtime      LABS: All Labs Reviewed:                        6.7    5.74  )-----------( 267      ( 19 Oct 2021 06:45 )             22.3     10-19    132<L>  |  92<L>  |  75<H>  ----------------------------<  104<H>  4.8   |  19<L>  |  12.06<H>    Ca    8.4      19 Oct 2021 06:43  Phos  8.4     10-19            Blood Culture: 10-14 @ 15:04  Organism --  Gram Stain Blood -- Gram Stain --  Specimen Source Clean Catch Clean Catch (Midstream)  Culture-Blood --    10-14 @ 09:19  Organism --  Gram Stain Blood -- Gram Stain --  Specimen Source .Blood Blood-Venous  Culture-Blood --      Urine Culture      RADIOLOGY/EKG:    ASSESSMENT AND PLAN:    DVT PPX:    ADVANCED DIRECTIVE:    DISPOSITION: CHIEF COMPLAINT: patient condition stable no event overnight discussed with patient and his wife in detail on the phone about his status and he has no Covid at present time but still need to be on isolation for a total 10 days    SUBJECTIVE:     REVIEW OF SYSTEMS: asymptomatic    CONSTITUTIONAL: (  )  weakness,  (  ) fevers or chills  EYES/ENT: (  )visual changes;     NECK: (  ) pain or stiffness  RESPIRATORY:   (  )cough, wheezing, hemoptysis;  (  ) shortness of breath  CARDIOVASCULAR:  (  )chest pain or palpitations  GASTROINTESTINAL:   (  )abdominal or epigastric pain.  (  ) nausea, vomiting, or hematemesis;   (   ) diarrhea or constipation.   GENITOURINARY:   (    ) dysuria, frequency or hematuria  NEUROLOGICAL:  (   ) numbness or weakness   All other review of systems is negative unless indicated above    Vital Signs Last 24 Hrs  T(C): 36.9 (19 Oct 2021 04:28), Max: 36.9 (19 Oct 2021 04:28)  T(F): 98.4 (19 Oct 2021 04:28), Max: 98.4 (19 Oct 2021 04:28)  HR: 72 (19 Oct 2021 04:28) (61 - 76)  BP: 118/63 (19 Oct 2021 04:28) (118/63 - 147/72)  BP(mean): --  RR: 18 (19 Oct 2021 04:28) (18 - 18)  SpO2: 95% (19 Oct 2021 04:28) (95% - 97%)    I&O's Summary    18 Oct 2021 07:01  -  19 Oct 2021 07:00  --------------------------------------------------------  IN: 1370 mL / OUT: 0 mL / NET: 1370 mL        CAPILLARY BLOOD GLUCOSE          PHYSICAL EXAM:    Constitutional:  (  x NAD,   ( x  )awake and alert  HEENT: PERR, EOMI,    Neck: Soft and supple, No LAD, No JVD  Respiratory:  ( x   Breath sounds are clear bilaterally,    (   ) wheezing, rales or rhonchi  Cardiovascular:     ( x  )S1 and S2, regular rate and rhythm, no Murmurs, gallops or rubs  Gastrointestinal:  ( x  )Bowel Sounds present, soft,   (  )nontender, nondistended,    Extremities:    (  ) peripheral edema  Vascular: 2+ peripheral pulses  Neurological:    ( x   )A/O x 3,   (  ) focal deficits  Musculoskeletal:    (  x )  normal strength b/l upper  (  x   ) normal  lower extremities  Skin: No rashes    MEDICATIONS:  MEDICATIONS  (STANDING):  artificial  tears Solution 1 Drop(s) Left EYE every 4 hours  aspirin enteric coated 81 milliGRAM(s) Oral daily  buMETAnide 1 milliGRAM(s) Oral daily  carvedilol 25 milliGRAM(s) Oral every 12 hours  ceFAZolin   IVPB 1000 milliGRAM(s) IV Intermittent every 24 hours  chlorhexidine 4% Liquid 1 Application(s) Topical daily  darbepoetin Injectable ViaL 60 MICROGram(s) IV Push every 7 days  heparin   Injectable 5000 Unit(s) SubCutaneous every 8 hours  lidocaine/prilocaine Cream 1 Application(s) Topical <User Schedule>  petrolatum Ophthalmic Ointment 1 Application(s) Left EYE at bedtime  sevelamer carbonate 1600 milliGRAM(s) Oral three times a day with meals  tamsulosin 0.4 milliGRAM(s) Oral at bedtime      LABS: All Labs Reviewed:                        6.7    5.74  )-----------( 267      ( 19 Oct 2021 06:45 )             22.3     10-19    132<L>  |  92<L>  |  75<H>  ----------------------------<  104<H>  4.8   |  19<L>  |  12.06<H>    Ca    8.4      19 Oct 2021 06:43  Phos  8.4     10-19            Blood Culture: 10-14 @ 15:04  Organism --  Gram Stain Blood -- Gram Stain --  Specimen Source Clean Catch Clean Catch (Midstream)  Culture-Blood --    10-14 @ 09:19  Organism --  Gram Stain Blood -- Gram Stain --  Specimen Source .Blood Blood-Venous  Culture-Blood --      Urine Culture      RADIOLOGY/EKG:    ASSESSMENT AND PLAN:   continue above management he will be hemodialyzed today continue IV cefazolin as per ID recommendation  DVT PPX:    ADVANCED DIRECTIVE:    DISPOSITION:

## 2021-10-19 NOTE — PROGRESS NOTE ADULT - SUBJECTIVE AND OBJECTIVE BOX
Date of Service   10-19-21 @ 12:27    Patient is a 80y old  Male who presents with a chief complaint of Fever (19 Oct 2021 10:19)      INTERVAL HISTORY: pt feels ok     REVIEW OF SYSTEMS:   CONSTITUTIONAL: No weakness  EYES/ENT: No visual changes; No throat pain  Neck: No pain or stiffness  Respiratory: No cough, wheezing, No shortness of breath  CARDIOVASCULAR: no chest pain or palpitations  GASTROINTESTINAL: No abdominal pain, no nausea, vomiting or hematemesis  GENITOURINARY: No dysuria, frequency or hematuria  NEUROLOGICAL: No stroke like symptoms  SKIN: No rashes    	  MEDICATIONS:  buMETAnide 1 milliGRAM(s) Oral daily  carvedilol 25 milliGRAM(s) Oral every 12 hours  tamsulosin 0.4 milliGRAM(s) Oral at bedtime        PHYSICAL EXAM:  T(C): 36.9 (10-19-21 @ 04:28), Max: 36.9 (10-19-21 @ 04:28)  HR: 72 (10-19-21 @ 04:28) (61 - 76)  BP: 118/63 (10-19-21 @ 04:28) (118/63 - 147/72)  RR: 18 (10-19-21 @ 04:28) (18 - 18)  SpO2: 95% (10-19-21 @ 04:28) (95% - 97%)  Wt(kg): --  I&O's Summary    18 Oct 2021 07:01  -  19 Oct 2021 07:00  --------------------------------------------------------  IN: 1370 mL / OUT: 0 mL / NET: 1370 mL    19 Oct 2021 07:01  -  19 Oct 2021 12:27  --------------------------------------------------------  IN: 360 mL / OUT: 0 mL / NET: 360 mL          Appearance: In no distress	  HEENT:    PERRL, EOMI	  Cardiovascular:  S1 S2, No JVD  Respiratory: Lungs clear to auscultation	  Gastrointestinal:  Soft, Non-tender, + BS	  Vascularature:  No edema of LE  Psychiatric: Appropriate affect   Neuro: no acute focal deficits                               6.7    5.74  )-----------( 267      ( 19 Oct 2021 06:45 )             22.3     10-19    132<L>  |  92<L>  |  75<H>  ----------------------------<  104<H>  4.8   |  19<L>  |  12.06<H>    Ca    8.4      19 Oct 2021 06:43  Phos  8.4     10-19          Labs personally reviewed      ASSESSMENT/PLAN: 	    80M with PMHx of HFpEF, ESRD, and HTN presenting with self-reported fever of 104 F. Patient being admitted for fever in dialysis patient.      Problem/Plan - 1:  ·  Problem: Chronic diastolic heart Failure with preserved EF   - c/w bumex   - c/w carvedilol 25mg Q12hrs   - pt appears euvolemic   - ESRD  per renal for fluid management     Problem/Plan - 2:  ·  Problem: Fever  - Mild leukocytosis   - monitor fever curve and cbc   - off Abx   - chest CT negative   - CT abd and pelvis reviewed by ID  - repeat bld Cx sent no growth to date   - pt was exposed to COVID now in quarantine     Problem/Plan - 3:  ·  Problem: Hypertension  - c/w coreg and bumex  - monitor BP   - on ASA for prevention     Problem/Plan - 4:  ·  Problem: ESRD  - renal consult appreciated   - HD per renal     Problem/Plan - 5:  ·  Problem: DVT ppx on Heparin SQ     Problem/Plan - 6:  ·  Problem: Anemia   - H&H 6.7/22.3  - monitor H&H   - type and screen and transfuse as needed           [facility]   Sridhar Carrillo DO [facility]  Cardiovascular Medicine  [address] [place] [address], [address]  Office: [phone]  Cell: [phone]

## 2021-10-19 NOTE — PROGRESS NOTE ADULT - ASSESSMENT
80M with PMHx of HFpEF, ESRD, and HTN presenting with self-reported fever of 104 F in the setting of cystitis/prostatitis     1 ID-   Blood culture with gram neg rods/ ecoli     2 Renal- s/p AVF venoplasty and outflow vein thrombus balloon maturation on 10/8/21,   perm cath and the fistula is not ready to use.  HD  today    3 -Cont Flomax; 8 x 6 cm cystic lesion in the right scotum  Hx of hydrocele and appreciate Urology input - Subsequent scrotal US demonstrated rt hydrocele, no further intervention at this time.    4 Vasc follow up with Dr Dunn appreciated -   Patient is s/p recent AVF revision  by IR  - 10/8/2021  Repeat AVFgram +/- intervention ~2 wks post previous procedure.  Fistula not fit for use- will continue HD via permcath     5 Anemia  Check stool guaic  Blood transfusion at HD     6 Hyperphosphatemia  Increase Renvela 800 > 1600  mg TID with meals   Add Phoslo     [facility]   [phone]

## 2021-10-20 LAB
ANION GAP SERPL CALC-SCNC: 16 MMOL/L — SIGNIFICANT CHANGE UP (ref 5–17)
BUN SERPL-MCNC: 40 MG/DL — HIGH (ref 7–23)
CALCIUM SERPL-MCNC: 8.6 MG/DL — SIGNIFICANT CHANGE UP (ref 8.4–10.5)
CHLORIDE SERPL-SCNC: 98 MMOL/L — SIGNIFICANT CHANGE UP (ref 96–108)
CO2 SERPL-SCNC: 22 MMOL/L — SIGNIFICANT CHANGE UP (ref 22–31)
CREAT SERPL-MCNC: 8.15 MG/DL — HIGH (ref 0.5–1.3)
GLUCOSE SERPL-MCNC: 87 MG/DL — SIGNIFICANT CHANGE UP (ref 70–99)
HCT VFR BLD CALC: 26.8 % — LOW (ref 39–50)
HGB BLD-MCNC: 8.5 G/DL — LOW (ref 13–17)
MAGNESIUM SERPL-MCNC: 2.2 MG/DL — SIGNIFICANT CHANGE UP (ref 1.6–2.6)
MCHC RBC-ENTMCNC: 27.2 PG — SIGNIFICANT CHANGE UP (ref 27–34)
MCHC RBC-ENTMCNC: 31.7 GM/DL — LOW (ref 32–36)
MCV RBC AUTO: 85.9 FL — SIGNIFICANT CHANGE UP (ref 80–100)
NRBC # BLD: 0 /100 WBCS — SIGNIFICANT CHANGE UP (ref 0–0)
PHOSPHATE SERPL-MCNC: 5.9 MG/DL — HIGH (ref 2.5–4.5)
PLATELET # BLD AUTO: 251 K/UL — SIGNIFICANT CHANGE UP (ref 150–400)
POTASSIUM SERPL-MCNC: 4.3 MMOL/L — SIGNIFICANT CHANGE UP (ref 3.5–5.3)
POTASSIUM SERPL-SCNC: 4.3 MMOL/L — SIGNIFICANT CHANGE UP (ref 3.5–5.3)
RBC # BLD: 3.12 M/UL — LOW (ref 4.2–5.8)
RBC # FLD: 16.4 % — HIGH (ref 10.3–14.5)
SODIUM SERPL-SCNC: 136 MMOL/L — SIGNIFICANT CHANGE UP (ref 135–145)
WBC # BLD: 7.2 K/UL — SIGNIFICANT CHANGE UP (ref 3.8–10.5)
WBC # FLD AUTO: 7.2 K/UL — SIGNIFICANT CHANGE UP (ref 3.8–10.5)

## 2021-10-20 RX ORDER — CALCIUM ACETATE 667 MG
1334 TABLET ORAL
Refills: 0 | Status: DISCONTINUED | OUTPATIENT
Start: 2021-10-20 | End: 2021-10-23

## 2021-10-20 RX ADMIN — HEPARIN SODIUM 5000 UNIT(S): 5000 INJECTION INTRAVENOUS; SUBCUTANEOUS at 22:26

## 2021-10-20 RX ADMIN — SEVELAMER CARBONATE 1600 MILLIGRAM(S): 2400 POWDER, FOR SUSPENSION ORAL at 17:20

## 2021-10-20 RX ADMIN — Medication 1334 MILLIGRAM(S): at 11:56

## 2021-10-20 RX ADMIN — HEPARIN SODIUM 5000 UNIT(S): 5000 INJECTION INTRAVENOUS; SUBCUTANEOUS at 13:43

## 2021-10-20 RX ADMIN — SEVELAMER CARBONATE 1600 MILLIGRAM(S): 2400 POWDER, FOR SUSPENSION ORAL at 09:11

## 2021-10-20 RX ADMIN — Medication 1334 MILLIGRAM(S): at 17:20

## 2021-10-20 RX ADMIN — SEVELAMER CARBONATE 1600 MILLIGRAM(S): 2400 POWDER, FOR SUSPENSION ORAL at 11:55

## 2021-10-20 RX ADMIN — CHLORHEXIDINE GLUCONATE 1 APPLICATION(S): 213 SOLUTION TOPICAL at 11:55

## 2021-10-20 RX ADMIN — Medication 81 MILLIGRAM(S): at 11:55

## 2021-10-20 RX ADMIN — CARVEDILOL PHOSPHATE 25 MILLIGRAM(S): 80 CAPSULE, EXTENDED RELEASE ORAL at 22:27

## 2021-10-20 RX ADMIN — HEPARIN SODIUM 5000 UNIT(S): 5000 INJECTION INTRAVENOUS; SUBCUTANEOUS at 05:48

## 2021-10-20 RX ADMIN — Medication 1 APPLICATION(S): at 22:26

## 2021-10-20 RX ADMIN — TAMSULOSIN HYDROCHLORIDE 0.4 MILLIGRAM(S): 0.4 CAPSULE ORAL at 22:26

## 2021-10-20 RX ADMIN — CARVEDILOL PHOSPHATE 25 MILLIGRAM(S): 80 CAPSULE, EXTENDED RELEASE ORAL at 09:10

## 2021-10-20 RX ADMIN — Medication 100 MILLIGRAM(S): at 13:42

## 2021-10-20 RX ADMIN — BUMETANIDE 1 MILLIGRAM(S): 0.25 INJECTION INTRAMUSCULAR; INTRAVENOUS at 05:48

## 2021-10-20 NOTE — PROGRESS NOTE ADULT - SUBJECTIVE AND OBJECTIVE BOX
NEPHROLOGY-[facility] [phone]        Patient seen and examined in bed.   He was about the same         MEDICATIONS  (STANDING):  artificial  tears Solution 1 Drop(s) Left EYE every 4 hours  aspirin enteric coated 81 milliGRAM(s) Oral daily  buMETAnide 1 milliGRAM(s) Oral daily  carvedilol 25 milliGRAM(s) Oral every 12 hours  ceFAZolin   IVPB 1000 milliGRAM(s) IV Intermittent every 24 hours  chlorhexidine 4% Liquid 1 Application(s) Topical daily  darbepoetin Injectable ViaL 60 MICROGram(s) IV Push every 7 days  heparin   Injectable 5000 Unit(s) SubCutaneous every 8 hours  lidocaine/prilocaine Cream 1 Application(s) Topical <User Schedule>  petrolatum Ophthalmic Ointment 1 Application(s) Left EYE at bedtime  sevelamer carbonate 1600 milliGRAM(s) Oral three times a day with meals  tamsulosin 0.4 milliGRAM(s) Oral at bedtime      VITAL:  T(C): , Max: 36.9 (10-19-21 @ 19:37)  T(F): , Max: 98.4 (10-19-21 @ 19:37)  HR: 73 (10-20-21 @ 05:45)  BP: 114/60 (10-20-21 @ 05:45)  BP(mean): --  RR: 18 (10-20-21 @ 05:45)  SpO2: 96% (10-20-21 @ 05:45)  Wt(kg): --    I and O's:    10-19 @ 07:01  -  10-20 @ 07:00  --------------------------------------------------------  IN: 2530 mL / OUT: 2100 mL / NET: 430 mL          PHYSICAL EXAM:    Constitutional: NAD  Neck:  No JVD  Respiratory: CTAB/L  Cardiovascular: S1 and S2  Gastrointestinal: BS+, soft, NT/ND  Extremities: No peripheral edema  Neurological: A/O x 3, no focal deficits  Psychiatric: Normal mood, normal affect  : No Aguilar  Skin: No rashes  Access: perm cath and AVF     LABS:                        8.5    7.20  )-----------( 251      ( 20 Oct 2021 07:14 )             26.8     10-20    136  |  98  |  40<H>  ----------------------------<  87  4.3   |  22  |  8.15<H>    Ca    8.6      20 Oct 2021 07:11  Phos  5.9     10-20  Mg     2.2     10-20            Urine Studies:          RADIOLOGY & ADDITIONAL STUDIES:

## 2021-10-20 NOTE — PROGRESS NOTE ADULT - ASSESSMENT
80M with PMHx of HFpEF, ESRD, and HTN presenting with self-reported fever of 104 F in the setting of cystitis/prostatitis     1 ID-   Blood culture with gram neg rods/ ecoli.  ABX till 10/28      2 Renal- s/p AVF venoplasty and outflow vein thrombus balloon maturation on 10/8/21,   perm cath and the fistula is not ready to use.  HD in am   Phoslo added     3 -Cont Flomax; 8 x 6 cm cystic lesion in the right scotum  Hx of hydrocele and appreciate Urology input - Subsequent scrotal US demonstrated rt hydrocele, no further intervention at this time.    4 Vasc follow up with Dr Dunn appreciated -   Patient is s/p recent AVF revision  by IR  - 10/8/2021  Repeat AVFgram +/- intervention ~2 wks post previous procedure.  Fistula not fit for use- will continue HD via permcath        [facility]   [phone]

## 2021-10-20 NOTE — DIETITIAN INITIAL EVALUATION ADULT. - ORAL INTAKE PTA/DIET HISTORY
pt was evasive with diet recall -he consumes sandwiches, soup, rice for meals. snacks on cake and crackers.

## 2021-10-20 NOTE — DIETITIAN INITIAL EVALUATION ADULT. - PERTINENT MEDS FT
MEDICATIONS  (STANDING):  artificial  tears Solution 1 Drop(s) Left EYE every 4 hours  aspirin enteric coated 81 milliGRAM(s) Oral daily  buMETAnide 1 milliGRAM(s) Oral daily  calcium acetate 1334 milliGRAM(s) Oral three times a day with meals  carvedilol 25 milliGRAM(s) Oral every 12 hours  ceFAZolin   IVPB 1000 milliGRAM(s) IV Intermittent every 24 hours  chlorhexidine 4% Liquid 1 Application(s) Topical daily  darbepoetin Injectable ViaL 60 MICROGram(s) IV Push every 7 days  heparin   Injectable 5000 Unit(s) SubCutaneous every 8 hours  lidocaine/prilocaine Cream 1 Application(s) Topical <User Schedule>  petrolatum Ophthalmic Ointment 1 Application(s) Left EYE at bedtime  sevelamer carbonate 1600 milliGRAM(s) Oral three times a day with meals  tamsulosin 0.4 milliGRAM(s) Oral at bedtime    MEDICATIONS  (PRN):  acetaminophen   Tablet .. 650 milliGRAM(s) Oral every 6 hours PRN Temp greater or equal to 38C (100.4F), Mild Pain (1 - 3), Moderate Pain (4 - 6), Severe Pain (7 - 10)

## 2021-10-20 NOTE — DIETITIAN INITIAL EVALUATION ADULT. - OTHER INFO
Denies nausea/vomit :  Denies difficulty chewing /swallow :  Denies diarrhea/constipation:  Last BM : 10/19  NKFA   Ht: 63"  Ht taken from pt  Dosing ht: 160cm  Dosing wt: 65.8kg  Ht used for BMI 63"  Wt used for BMI post dialysis weight 74.4kg  Education Provided : pt was educated on the importance of supplements to increase calorie and protein intake in light of RD's nutritional findings.   refused need for diet ed  pressure injury: none

## 2021-10-20 NOTE — PROGRESS NOTE ADULT - SUBJECTIVE AND OBJECTIVE BOX
CHIEF COMPLAINT:  The patient was seen earlier this morning condition remained stable awake and verbal ambulating in his room that he had to be on the patient until 10/23/2021 and outpatient hemodialysis started on 10/26/2021  SUBJECTIVE:     REVIEW OF SYSTEMS: asymptomatic    CONSTITUTIONAL: (  )  weakness,  (  ) fevers or chills  EYES/ENT: (  )visual changes;     NECK: (  ) pain or stiffness  RESPIRATORY:   (  )cough, wheezing, hemoptysis;  (  ) shortness of breath  CARDIOVASCULAR:  (  )chest pain or palpitations  GASTROINTESTINAL:   (  )abdominal or epigastric pain.  (  ) nausea, vomiting, or hematemesis;   (   ) diarrhea or constipation.   GENITOURINARY:   (    ) dysuria, frequency or hematuria  NEUROLOGICAL:  (   ) numbness or weakness   All other review of systems is negative unless indicated above    Vital Signs Last 24 Hrs  T(C): 36.8 (20 Oct 2021 20:34), Max: 36.8 (20 Oct 2021 11:51)  T(F): 98.2 (20 Oct 2021 20:34), Max: 98.2 (20 Oct 2021 11:51)  HR: 70 (20 Oct 2021 20:34) (70 - 77)  BP: 153/77 (20 Oct 2021 20:34) (94/54 - 153/77)  BP(mean): --  RR: 18 (20 Oct 2021 20:34) (18 - 18)  SpO2: 98% (20 Oct 2021 20:34) (96% - 98%)    I&O's Summary    19 Oct 2021 07:01  -  20 Oct 2021 07:00  --------------------------------------------------------  IN: 2530 mL / OUT: 2100 mL / NET: 430 mL    20 Oct 2021 07:01  -  20 Oct 2021 21:54  --------------------------------------------------------  IN: 480 mL / OUT: 0 mL / NET: 480 mL        CAPILLARY BLOOD GLUCOSE          PHYSICAL EXAM:    Constitutional:  ( x  ) NAD,   ( x  )awake and alert  HEENT: PERR, EOMI,    Neck: Soft and supple, No LAD, No JVD  Respiratory:  (   x Breath sounds are clear bilaterally,    (   ) wheezing, rales or rhonchi  Cardiovascular:     (  x )S1 and S2, regular rate and rhythm, no Murmurs, gallops or rubs  Gastrointestinal:  (  x )Bowel Sounds present, soft,   (  )nontender, nondistended,    Extremities:    (  ) peripheral edema  Vascular: 2+ peripheral pulses  Neurological:    (  x  )A/O x 3,   (  ) focal deficits  Musculoskeletal:    (  x )  normal strength b/l upper  (     ) normal  lower extremities  Skin: No rashes    MEDICATIONS:  MEDICATIONS  (STANDING):  artificial  tears Solution 1 Drop(s) Left EYE every 4 hours  aspirin enteric coated 81 milliGRAM(s) Oral daily  buMETAnide 1 milliGRAM(s) Oral daily  calcium acetate 1334 milliGRAM(s) Oral three times a day with meals  carvedilol 25 milliGRAM(s) Oral every 12 hours  ceFAZolin   IVPB 1000 milliGRAM(s) IV Intermittent every 24 hours  chlorhexidine 4% Liquid 1 Application(s) Topical daily  darbepoetin Injectable ViaL 60 MICROGram(s) IV Push every 7 days  heparin   Injectable 5000 Unit(s) SubCutaneous every 8 hours  lidocaine/prilocaine Cream 1 Application(s) Topical <User Schedule>  petrolatum Ophthalmic Ointment 1 Application(s) Left EYE at bedtime  sevelamer carbonate 1600 milliGRAM(s) Oral three times a day with meals  tamsulosin 0.4 milliGRAM(s) Oral at bedtime      LABS: All Labs Reviewed:                        8.5    7.20  )-----------( 251      ( 20 Oct 2021 07:14 )             26.8     10-20    136  |  98  |  40<H>  ----------------------------<  87  4.3   |  22  |  8.15<H>    Ca    8.6      20 Oct 2021 07:11  Phos  5.9     10-20  Mg     2.2     10-20            Blood Culture:   Urine Culture      RADIOLOGY/EKG:    ASSESSMENT AND PLAN:  the patient will be continued on  cefazolin IV1 g daily continue hemodialysis inpatient until 10/23/2021 and will be discharged home after the be followed by outpatient dialysis on 10/26/2021  DVT PPX:    ADVANCED DIRECTIVE:    DISPOSITION:

## 2021-10-20 NOTE — DIETITIAN INITIAL EVALUATION ADULT. - PERTINENT LABORATORY DATA
10-20 @ 07:11: Na 136, BUN 40<H>, Cr 8.15<H>, BG 87, K+ 4.3, Phos 5.9<H>, Mg 2.2, Alk Phos --, ALT/SGPT --, AST/SGOT --, HbA1c --

## 2021-10-21 LAB
ANION GAP SERPL CALC-SCNC: 16 MMOL/L — SIGNIFICANT CHANGE UP (ref 5–17)
BUN SERPL-MCNC: 52 MG/DL — HIGH (ref 7–23)
CALCIUM SERPL-MCNC: 8.7 MG/DL — SIGNIFICANT CHANGE UP (ref 8.4–10.5)
CHLORIDE SERPL-SCNC: 97 MMOL/L — SIGNIFICANT CHANGE UP (ref 96–108)
CO2 SERPL-SCNC: 23 MMOL/L — SIGNIFICANT CHANGE UP (ref 22–31)
CREAT SERPL-MCNC: 9.66 MG/DL — HIGH (ref 0.5–1.3)
GLUCOSE SERPL-MCNC: 91 MG/DL — SIGNIFICANT CHANGE UP (ref 70–99)
HCT VFR BLD CALC: 27 % — LOW (ref 39–50)
HGB BLD-MCNC: 8.6 G/DL — LOW (ref 13–17)
MCHC RBC-ENTMCNC: 27.5 PG — SIGNIFICANT CHANGE UP (ref 27–34)
MCHC RBC-ENTMCNC: 31.9 GM/DL — LOW (ref 32–36)
MCV RBC AUTO: 86.3 FL — SIGNIFICANT CHANGE UP (ref 80–100)
NRBC # BLD: 0 /100 WBCS — SIGNIFICANT CHANGE UP (ref 0–0)
PLATELET # BLD AUTO: 270 K/UL — SIGNIFICANT CHANGE UP (ref 150–400)
POTASSIUM SERPL-MCNC: 4.8 MMOL/L — SIGNIFICANT CHANGE UP (ref 3.5–5.3)
POTASSIUM SERPL-SCNC: 4.8 MMOL/L — SIGNIFICANT CHANGE UP (ref 3.5–5.3)
RBC # BLD: 3.13 M/UL — LOW (ref 4.2–5.8)
RBC # FLD: 16.5 % — HIGH (ref 10.3–14.5)
SODIUM SERPL-SCNC: 136 MMOL/L — SIGNIFICANT CHANGE UP (ref 135–145)
WBC # BLD: 7.09 K/UL — SIGNIFICANT CHANGE UP (ref 3.8–10.5)
WBC # FLD AUTO: 7.09 K/UL — SIGNIFICANT CHANGE UP (ref 3.8–10.5)

## 2021-10-21 RX ORDER — CEFAZOLIN SODIUM 1 G
2 VIAL (EA) INJECTION
Qty: 8 | Refills: 0
Start: 2021-10-21

## 2021-10-21 RX ORDER — ERYTHROPOIETIN 10000 [IU]/ML
14000 INJECTION, SOLUTION INTRAVENOUS; SUBCUTANEOUS ONCE
Refills: 0 | Status: COMPLETED | OUTPATIENT
Start: 2021-10-21 | End: 2021-10-21

## 2021-10-21 RX ADMIN — SEVELAMER CARBONATE 1600 MILLIGRAM(S): 2400 POWDER, FOR SUSPENSION ORAL at 12:11

## 2021-10-21 RX ADMIN — TAMSULOSIN HYDROCHLORIDE 0.4 MILLIGRAM(S): 0.4 CAPSULE ORAL at 21:40

## 2021-10-21 RX ADMIN — HEPARIN SODIUM 5000 UNIT(S): 5000 INJECTION INTRAVENOUS; SUBCUTANEOUS at 13:50

## 2021-10-21 RX ADMIN — Medication 1 DROP(S): at 21:40

## 2021-10-21 RX ADMIN — Medication 1 DROP(S): at 17:39

## 2021-10-21 RX ADMIN — Medication 1334 MILLIGRAM(S): at 07:46

## 2021-10-21 RX ADMIN — Medication 100 MILLIGRAM(S): at 13:41

## 2021-10-21 RX ADMIN — Medication 1 APPLICATION(S): at 21:41

## 2021-10-21 RX ADMIN — Medication 1334 MILLIGRAM(S): at 17:39

## 2021-10-21 RX ADMIN — Medication 1334 MILLIGRAM(S): at 12:11

## 2021-10-21 RX ADMIN — CARVEDILOL PHOSPHATE 25 MILLIGRAM(S): 80 CAPSULE, EXTENDED RELEASE ORAL at 21:40

## 2021-10-21 RX ADMIN — HEPARIN SODIUM 5000 UNIT(S): 5000 INJECTION INTRAVENOUS; SUBCUTANEOUS at 21:40

## 2021-10-21 RX ADMIN — CARVEDILOL PHOSPHATE 25 MILLIGRAM(S): 80 CAPSULE, EXTENDED RELEASE ORAL at 10:11

## 2021-10-21 RX ADMIN — ERYTHROPOIETIN 14000 UNIT(S): 10000 INJECTION, SOLUTION INTRAVENOUS; SUBCUTANEOUS at 15:20

## 2021-10-21 RX ADMIN — BUMETANIDE 1 MILLIGRAM(S): 0.25 INJECTION INTRAMUSCULAR; INTRAVENOUS at 05:47

## 2021-10-21 RX ADMIN — SEVELAMER CARBONATE 1600 MILLIGRAM(S): 2400 POWDER, FOR SUSPENSION ORAL at 17:39

## 2021-10-21 RX ADMIN — Medication 1 DROP(S): at 10:11

## 2021-10-21 RX ADMIN — CHLORHEXIDINE GLUCONATE 1 APPLICATION(S): 213 SOLUTION TOPICAL at 12:12

## 2021-10-21 RX ADMIN — HEPARIN SODIUM 5000 UNIT(S): 5000 INJECTION INTRAVENOUS; SUBCUTANEOUS at 05:47

## 2021-10-21 RX ADMIN — SEVELAMER CARBONATE 1600 MILLIGRAM(S): 2400 POWDER, FOR SUSPENSION ORAL at 07:46

## 2021-10-21 RX ADMIN — Medication 81 MILLIGRAM(S): at 12:12

## 2021-10-21 NOTE — PROGRESS NOTE ADULT - ASSESSMENT
80M with PMHx of HFpEF, ESRD, and HTN presenting with self-reported fever of 104 F. Patient states it has been going on for one day and intermittent. Otherwise he denies focal symptoms such as cough, shortness of breath, chest pain, diarrhea, dysuria or polyuria. He states he urinates very little. No known sick contacts. No sore throat or nasal congestion. He has had a right chest wall permacath since March 2021 with no redness or warmth around the site. He was last dialyzed several days prior with no issues. He does complain of right shoulder discomfort, but no swelling and has been having discomfort since permacath was placed. In the ED patient had blood cultures drawn and given vancomycin and zosyn. No fevers since he's been here.  (05 Oct 2021 09:31)    ER vss, afebrile.  WBC 10.8.  UA large LE, small blood.  Bcx E.coli.  CT c/a/p performed.  Pt with intermittent fevers, Tmax 102.3 on 10/8.    Pt states prior to hospitalization noted some dysuria and difficulty urinating.  Pt seen by Urology for rt scrotal swelling, denies tenderness.  Found to have rt hydrocele on US.   Pt now on rocephin, ID consulted for further abx managment.      Fever/E.coli bacteremia:    - UA large LE.  Bcx growing E.coli from 1/2 sets.  Pt c/o some dysuria, difficulty urinating.  Pt is ESRD on HD.  Currently on rocephin.    - f/u repeat bcx to ensure clearance.     - f/u sensis.  Ucx u/a, will reorder, although may be low yield post abx.    - CT c/a/p reviewed.  Of note, showed an 8.1 x 6.4cm cystic mass in right scrotum, markedly enlarged prostate gland, and mild infiltration of perivesical fat.   Pt seen by URology, Subsequent scrotal US demonstrated rt hydrocele, no further intervention deemed necessary at this time.    - Possible acute cystitis vs prostatis as source.  f/u repeat bcx to ensure clearance as pt also with HD catheter in place.  Vascular eval called for f/u regarding AVF.       * [facility]  Recommend 3 week course of abx to treat for bacteremia, possible prostatis/cystitis as source.  Can change to cefazolin 2gm/2gm/3gm post dialysis (10/8 through 10/28).    Pt completing quarantine period through 10/23 for recent Covid exposure.  No cough/fever/sob.  Cont current abx.     Marleni Garcia  [phone]

## 2021-10-21 NOTE — PROGRESS NOTE ADULT - SUBJECTIVE AND OBJECTIVE BOX
Infectious Diseases progress note:    Subjective:  NAD, afebrile.  Pt completing quarantine period post exposure to covid    ROS:  CONSTITUTIONAL:  No fever, chills, rigors  CARDIOVASCULAR:  No chest pain or palpitations  RESPIRATORY:   No SOB, cough, dyspnea on exertion.  No wheezing  GASTROINTESTINAL:  No abd pain, N/V, diarrhea/constipation  EXTREMITIES:  No swelling or joint pain  GENITOURINARY:  No burning on urination, increased frequency or urgency.  No flank pain  NEUROLOGIC:  No HA, visual disturbances  SKIN: No rashes    Allergies    grass, pollen (Rhinitis)  No Known Drug Allergies    Intolerances        ANTIBIOTICS/RELEVANT:  antimicrobials  ceFAZolin   IVPB 1000 milliGRAM(s) IV Intermittent every 24 hours    immunologic:  darbepoetin Injectable ViaL 60 MICROGram(s) IV Push every 7 days    OTHER:  acetaminophen   Tablet .. 650 milliGRAM(s) Oral every 6 hours PRN  artificial  tears Solution 1 Drop(s) Left EYE every 4 hours  aspirin enteric coated 81 milliGRAM(s) Oral daily  buMETAnide 1 milliGRAM(s) Oral daily  calcium acetate 1334 milliGRAM(s) Oral three times a day with meals  carvedilol 25 milliGRAM(s) Oral every 12 hours  chlorhexidine 4% Liquid 1 Application(s) Topical daily  heparin   Injectable 5000 Unit(s) SubCutaneous every 8 hours  lidocaine/prilocaine Cream 1 Application(s) Topical <User Schedule>  petrolatum Ophthalmic Ointment 1 Application(s) Left EYE at bedtime  sevelamer carbonate 1600 milliGRAM(s) Oral three times a day with meals  tamsulosin 0.4 milliGRAM(s) Oral at bedtime      Objective:  Vital Signs Last 24 Hrs  T(C): 36.7 (21 Oct 2021 15:15), Max: 36.8 (20 Oct 2021 20:34)  T(F): 98.1 (21 Oct 2021 15:15), Max: 98.2 (20 Oct 2021 20:34)  HR: 67 (21 Oct 2021 15:15) (67 - 76)  BP: 157/76 (21 Oct 2021 15:15) (150/74 - 157/76)  BP(mean): --  RR: 18 (21 Oct 2021 15:15) (17 - 18)  SpO2: 96% (21 Oct 2021 15:15) (96% - 100%)    PHYSICAL EXAM:  Constitutional:NAD  Eyes:MOI, EOMI  Ear/Nose/Throat: no thrush, mucositis.  Moist mucous membranes	  Neck:no JVD, no lymphadenopathy, supple  Respiratory: CTA checo  Cardiovascular: S1S2 RRR, no murmurs  Gastrointestinal:soft, nontender,  nondistended (+) BS  Extremities:no e/e/c  Skin:  no rashes, open wounds or ulcerations        LABS:                        8.6    7.09  )-----------( 270      ( 21 Oct 2021 06:56 )             27.0     10-21    136  |  97  |  52<H>  ----------------------------<  91  4.8   |  23  |  9.66<H>    Ca    8.7      21 Oct 2021 06:56  Phos  5.9     10-20  Mg     2.2     10-20                      Rapid RVP Result: NotDetec          MICROBIOLOGY:          RADIOLOGY & ADDITIONAL STUDIES:

## 2021-10-21 NOTE — PROGRESS NOTE ADULT - SUBJECTIVE AND OBJECTIVE BOX
Date of Service   10-21-21 @ 12:43    Patient is a 80y old  Male who presents with a chief complaint of Fever (21 Oct 2021 10:37)      INTERVAL HISTORY:     TELEMETRY Personally reviewed:    REVIEW OF SYSTEMS:   CONSTITUTIONAL: No weakness  EYES/ENT: No visual changes; No throat pain  Neck: No pain or stiffness  Respiratory: No cough, wheezing, No shortness of breath  CARDIOVASCULAR: no chest pain or palpitations  GASTROINTESTINAL: No abdominal pain, no nausea, vomiting or hematemesis  GENITOURINARY: No dysuria, frequency or hematuria  NEUROLOGICAL: No stroke like symptoms  SKIN: No rashes    	  MEDICATIONS:  buMETAnide 1 milliGRAM(s) Oral daily  carvedilol 25 milliGRAM(s) Oral every 12 hours  tamsulosin 0.4 milliGRAM(s) Oral at bedtime        PHYSICAL EXAM:  T(C): 36.5 (10-21-21 @ 05:43), Max: 36.8 (10-20-21 @ 20:34)  HR: 76 (10-21-21 @ 05:43) (70 - 76)  BP: 150/74 (10-21-21 @ 05:43) (150/74 - 153/77)  RR: 17 (10-21-21 @ 05:43) (17 - 18)  SpO2: 98% (10-21-21 @ 05:43) (98% - 98%)  Wt(kg): --  I&O's Summary    20 Oct 2021 07:01  -  21 Oct 2021 07:00  --------------------------------------------------------  IN: 480 mL / OUT: 0 mL / NET: 480 mL    21 Oct 2021 07:01  -  21 Oct 2021 12:43  --------------------------------------------------------  IN: 240 mL / OUT: 0 mL / NET: 240 mL          Appearance: In no distress	  HEENT:    PERRL, EOMI	  Cardiovascular:  S1 S2, No JVD  Respiratory: Lungs clear to auscultation	  Gastrointestinal:  Soft, Non-tender, + BS	  Vascularature:  No edema of LE  Psychiatric: Appropriate affect   Neuro: no acute focal deficits                               8.6    7.09  )-----------( 270      ( 21 Oct 2021 06:56 )             27.0     10-21    136  |  97  |  52<H>  ----------------------------<  91  4.8   |  23  |  9.66<H>    Ca    8.7      21 Oct 2021 06:56  Phos  5.9     10-20  Mg     2.2     10-20          Labs personally reviewed      ASSESSMENT/PLAN: 	  80M with PMHx of HFpEF, ESRD, and HTN presenting with self-reported fever of 104 F. Patient being admitted for fever in dialysis patient.      Problem/Plan - 1:  ·  Problem: Chronic diastolic heart Failure with preserved EF   - c/w bumex   - c/w carvedilol 25mg Q12hrs   - pt appears euvolemic   - ESRD  per renal for fluid management     Problem/Plan - 2:  ·  Problem: Fever  - Mild leukocytosis   - monitor fever curve and cbc   - off Abx   - chest CT negative   - CT abd and pelvis reviewed by ID  - repeat bld Cx sent no growth to date   - pt was exposed to COVID now in quarantine     Problem/Plan - 3:  ·  Problem: Hypertension  - c/w coreg and bumex  - monitor BP   - on ASA for prevention     Problem/Plan - 4:  ·  Problem: ESRD  - renal consult appreciated   - HD per renal     Problem/Plan - 5:  ·  Problem: DVT ppx on Heparin SQ     Problem/Plan - 6:  ·  Problem: Anemia   - H&H 6.7/22.3  - monitor H&H   - type and screen and transfuse as needed           Sammie Carmichael [facility]   Sridhar Carrillo DO [facility]  Cardiovascular Medicine  [address], [address]  Office: [phone]  Cell: [phone]

## 2021-10-21 NOTE — PROGRESS NOTE ADULT - ASSESSMENT
80M with PMHx of HFpEF, ESRD, and HTN presenting with self-reported fever of 104 F in the setting of cystitis/prostatitis     1 ID-   Blood culture with gram neg rods/ ecoli.  ABX till 10/28   Send RX to [facility] for abx please      2 Renal- s/p AVF venoplasty and outflow vein thrombus balloon maturation on 10/8/21,   perm cath and the fistula is not ready to use.  HD today   Phoslo added     3 -Cont Flomax; 8 x 6 cm cystic lesion in the right scotum  Hx of hydrocele and appreciate Urology input - Subsequent scrotal US demonstrated rt hydrocele, no further intervention at this time.    4 Vasc follow up with Dr Dunn appreciated -   Patient is s/p recent AVF revision  by IR  - 10/8/2021  Repeat AVFgram +/- intervention ~2 wks post previous procedure.  Fistula not fit for use- will continue HD via permcath      DW NP     [facility]   [employer]   [phone]

## 2021-10-21 NOTE — PROGRESS NOTE ADULT - SUBJECTIVE AND OBJECTIVE BOX
NEPHROLOGY-[facility] [phone]        Patient seen and examined in bed.  He was about the same         MEDICATIONS  (STANDING):  artificial  tears Solution 1 Drop(s) Left EYE every 4 hours  aspirin enteric coated 81 milliGRAM(s) Oral daily  buMETAnide 1 milliGRAM(s) Oral daily  calcium acetate 1334 milliGRAM(s) Oral three times a day with meals  carvedilol 25 milliGRAM(s) Oral every 12 hours  ceFAZolin   IVPB 1000 milliGRAM(s) IV Intermittent every 24 hours  chlorhexidine 4% Liquid 1 Application(s) Topical daily  darbepoetin Injectable ViaL 60 MICROGram(s) IV Push every 7 days  heparin   Injectable 5000 Unit(s) SubCutaneous every 8 hours  lidocaine/prilocaine Cream 1 Application(s) Topical <User Schedule>  petrolatum Ophthalmic Ointment 1 Application(s) Left EYE at bedtime  sevelamer carbonate 1600 milliGRAM(s) Oral three times a day with meals  tamsulosin 0.4 milliGRAM(s) Oral at bedtime      VITAL:  T(C): , Max: 36.8 (10-20-21 @ 11:51)  T(F): , Max: 98.2 (10-20-21 @ 11:51)  HR: 76 (10-21-21 @ 05:43)  BP: 150/74 (10-21-21 @ 05:43)  BP(mean): --  RR: 17 (10-21-21 @ 05:43)  SpO2: 98% (10-21-21 @ 05:43)  Wt(kg): --    I and O's:    10-20 @ 07:01  -  10-21 @ 07:00  --------------------------------------------------------  IN: 480 mL / OUT: 0 mL / NET: 480 mL    10-21 @ 07:01  -  10-21 @ 10:38  --------------------------------------------------------  IN: 240 mL / OUT: 0 mL / NET: 240 mL          PHYSICAL EXAM:    Constitutional: NAD  Neck:  No JVD  Respiratory: CTAB/L  Cardiovascular: S1 and S2  Gastrointestinal: BS+, soft, NT/ND  Extremities: No peripheral edema  Neurological: A/O x 3, no focal deficits  Psychiatric: Normal mood, normal affect  : No Aguilar  Skin: No rashes  Access: perm cath and avf     LABS:                        8.6    7.09  )-----------( 270      ( 21 Oct 2021 06:56 )             27.0     10-21    136  |  97  |  52<H>  ----------------------------<  91  4.8   |  23  |  9.66<H>    Ca    8.7      21 Oct 2021 06:56  Phos  5.9     10-20  Mg     2.2     10-20            Urine Studies:          RADIOLOGY & ADDITIONAL STUDIES:

## 2021-10-21 NOTE — PROGRESS NOTE ADULT - SUBJECTIVE AND OBJECTIVE BOX
CHIEF COMPLAINT:    SUBJECTIVE:     REVIEW OF SYSTEMS:    CONSTITUTIONAL: (  )  weakness,  (  ) fevers or chills  EYES/ENT: (  )visual changes;     NECK: (  ) pain or stiffness  RESPIRATORY:   (  )cough, wheezing, hemoptysis;  (  ) shortness of breath  CARDIOVASCULAR:  (  )chest pain or palpitations  GASTROINTESTINAL:   (  )abdominal or epigastric pain.  (  ) nausea, vomiting, or hematemesis;   (   ) diarrhea or constipation.   GENITOURINARY:   (    ) dysuria, frequency or hematuria  NEUROLOGICAL:  (   ) numbness or weakness   All other review of systems is negative unless indicated above    Vital Signs Last 24 Hrs  T(C): 36.7 (21 Oct 2021 15:15), Max: 36.8 (20 Oct 2021 20:34)  T(F): 98.1 (21 Oct 2021 15:15), Max: 98.2 (20 Oct 2021 20:34)  HR: 67 (21 Oct 2021 15:15) (67 - 76)  BP: 157/76 (21 Oct 2021 15:15) (150/74 - 157/76)  BP(mean): --  RR: 18 (21 Oct 2021 15:15) (17 - 18)  SpO2: 96% (21 Oct 2021 15:15) (96% - 100%)    I&O's Summary    20 Oct 2021 07:01  -  21 Oct 2021 07:00  --------------------------------------------------------  IN: 480 mL / OUT: 0 mL / NET: 480 mL    21 Oct 2021 07:01  -  21 Oct 2021 18:24  --------------------------------------------------------  IN: 530 mL / OUT: 0 mL / NET: 530 mL        CAPILLARY BLOOD GLUCOSE          PHYSICAL EXAM:    Constitutional:  (   ) NAD,   (   )awake and alert  HEENT: PERR, EOMI,    Neck: Soft and supple, No LAD, No JVD  Respiratory:  (    Breath sounds are clear bilaterally,    (   ) wheezing, rales or rhonchi  Cardiovascular:     (   )S1 and S2, regular rate and rhythm, no Murmurs, gallops or rubs  Gastrointestinal:  (   )Bowel Sounds present, soft,   (  )nontender, nondistended,    Extremities:    (  ) peripheral edema  Vascular: 2+ peripheral pulses  Neurological:    (    )A/O x 3,   (  ) focal deficits  Musculoskeletal:    (   )  normal strength b/l upper  (     ) normal  lower extremities  Skin: No rashes    MEDICATIONS:  MEDICATIONS  (STANDING):  artificial  tears Solution 1 Drop(s) Left EYE every 4 hours  aspirin enteric coated 81 milliGRAM(s) Oral daily  buMETAnide 1 milliGRAM(s) Oral daily  calcium acetate 1334 milliGRAM(s) Oral three times a day with meals  carvedilol 25 milliGRAM(s) Oral every 12 hours  ceFAZolin   IVPB 1000 milliGRAM(s) IV Intermittent every 24 hours  chlorhexidine 4% Liquid 1 Application(s) Topical daily  darbepoetin Injectable ViaL 60 MICROGram(s) IV Push every 7 days  heparin   Injectable 5000 Unit(s) SubCutaneous every 8 hours  lidocaine/prilocaine Cream 1 Application(s) Topical <User Schedule>  petrolatum Ophthalmic Ointment 1 Application(s) Left EYE at bedtime  sevelamer carbonate 1600 milliGRAM(s) Oral three times a day with meals  tamsulosin 0.4 milliGRAM(s) Oral at bedtime      LABS: All Labs Reviewed:                        8.6    7.09  )-----------( 270      ( 21 Oct 2021 06:56 )             27.0     10-21    136  |  97  |  52<H>  ----------------------------<  91  4.8   |  23  |  9.66<H>    Ca    8.7      21 Oct 2021 06:56  Phos  5.9     10-20  Mg     2.2     10-20            Blood Culture:   Urine Culture      RADIOLOGY/EKG:    ASSESSMENT AND PLAN:    DVT PPX:    ADVANCED DIRECTIVE:    DISPOSITION: CHIEF COMPLAINT:  pt remain stable no event over night patient was seen earlier this morning he will be discharged on 10/23/2021 is a happy about the news  SUBJECTIVE:     REVIEW OF SYSTEMS: asymptomatic    CONSTITUTIONAL: (  )  weakness,  (  ) fevers or chills  EYES/ENT: (  )visual changes;     NECK: (  ) pain or stiffness  RESPIRATORY:   (  )cough, wheezing, hemoptysis;  (  ) shortness of breath  CARDIOVASCULAR:  (  )chest pain or palpitations  GASTROINTESTINAL:   (  )abdominal or epigastric pain.  (  ) nausea, vomiting, or hematemesis;   (   ) diarrhea or constipation.   GENITOURINARY:   (    ) dysuria, frequency or hematuria  NEUROLOGICAL:  (   ) numbness or weakness   All other review of systems is negative unless indicated above    Vital Signs Last 24 Hrs  T(C): 36.7 (21 Oct 2021 15:15), Max: 36.8 (20 Oct 2021 20:34)  T(F): 98.1 (21 Oct 2021 15:15), Max: 98.2 (20 Oct 2021 20:34)  HR: 67 (21 Oct 2021 15:15) (67 - 76)  BP: 157/76 (21 Oct 2021 15:15) (150/74 - 157/76)  BP(mean): --  RR: 18 (21 Oct 2021 15:15) (17 - 18)  SpO2: 96% (21 Oct 2021 15:15) (96% - 100%)    I&O's Summary    20 Oct 2021 07:01  -  21 Oct 2021 07:00  --------------------------------------------------------  IN: 480 mL / OUT: 0 mL / NET: 480 mL    21 Oct 2021 07:01  -  21 Oct 2021 18:24  --------------------------------------------------------  IN: 530 mL / OUT: 0 mL / NET: 530 mL        CAPILLARY BLOOD GLUCOSE          PHYSICAL EXAM:    Constitutional:  ( x  ) NAD,   (  x )awake and alert  HEENT: PERR, EOMI,    Neck: Soft and supple, No LAD, No JVD  Respiratory:  (   x Breath sounds are clear bilaterally,    (   ) wheezing, rales or rhonchi  Cardiovascular:     (  x )S1 and S2, regular rate and rhythm, no Murmurs, gallops or rubs  Gastrointestinal:  (  x )Bowel Sounds present, soft,   (  )nontender, nondistended,    Extremities:    (  ) peripheral edema  Vascular: 2+ peripheral pulses  Neurological:    (   x )A/O x 3,   (  ) focal deficits  Musculoskeletal:    (   )  normal strength b/l upper  (     ) normal  lower extremities  Skin: No rashes    MEDICATIONS:  MEDICATIONS  (STANDING):  artificial  tears Solution 1 Drop(s) Left EYE every 4 hours  aspirin enteric coated 81 milliGRAM(s) Oral daily  buMETAnide 1 milliGRAM(s) Oral daily  calcium acetate 1334 milliGRAM(s) Oral three times a day with meals  carvedilol 25 milliGRAM(s) Oral every 12 hours  ceFAZolin   IVPB 1000 milliGRAM(s) IV Intermittent every 24 hours  chlorhexidine 4% Liquid 1 Application(s) Topical daily  darbepoetin Injectable ViaL 60 MICROGram(s) IV Push every 7 days  heparin   Injectable 5000 Unit(s) SubCutaneous every 8 hours  lidocaine/prilocaine Cream 1 Application(s) Topical <User Schedule>  petrolatum Ophthalmic Ointment 1 Application(s) Left EYE at bedtime  sevelamer carbonate 1600 milliGRAM(s) Oral three times a day with meals  tamsulosin 0.4 milliGRAM(s) Oral at bedtime      LABS: All Labs Reviewed:                        8.6    7.09  )-----------( 270      ( 21 Oct 2021 06:56 )             27.0     10-21    136  |  97  |  52<H>  ----------------------------<  91  4.8   |  23  |  9.66<H>    Ca    8.7      21 Oct 2021 06:56  Phos  5.9     10-20  Mg     2.2     10-20            Blood Culture:   Urine Culture      RADIOLOGY/EKG:    ASSESSMENT AND PLAN:   continue hemodialysis today continue IV Ancef 1 g daily discharge planning on 10/23/2021 after hemodialysis  DVT PPX:    ADVANCED DIRECTIVE:    DISPOSITION:

## 2021-10-22 ENCOUNTER — TRANSCRIPTION ENCOUNTER (OUTPATIENT)
Age: 81
End: 2021-10-22

## 2021-10-22 LAB
ALBUMIN SERPL ELPH-MCNC: 3.5 G/DL — SIGNIFICANT CHANGE UP (ref 3.3–5)
ALP SERPL-CCNC: 94 U/L — SIGNIFICANT CHANGE UP (ref 40–120)
ALT FLD-CCNC: <5 U/L — LOW (ref 10–45)
ANION GAP SERPL CALC-SCNC: 14 MMOL/L — SIGNIFICANT CHANGE UP (ref 5–17)
AST SERPL-CCNC: 10 U/L — SIGNIFICANT CHANGE UP (ref 10–40)
BILIRUB SERPL-MCNC: 0.1 MG/DL — LOW (ref 0.2–1.2)
BUN SERPL-MCNC: 35 MG/DL — HIGH (ref 7–23)
CALCIUM SERPL-MCNC: 8.8 MG/DL — SIGNIFICANT CHANGE UP (ref 8.4–10.5)
CHLORIDE SERPL-SCNC: 100 MMOL/L — SIGNIFICANT CHANGE UP (ref 96–108)
CO2 SERPL-SCNC: 21 MMOL/L — LOW (ref 22–31)
CREAT SERPL-MCNC: 7.44 MG/DL — HIGH (ref 0.5–1.3)
GLUCOSE SERPL-MCNC: 99 MG/DL — SIGNIFICANT CHANGE UP (ref 70–99)
HCT VFR BLD CALC: 26.8 % — LOW (ref 39–50)
HGB BLD-MCNC: 8.4 G/DL — LOW (ref 13–17)
MCHC RBC-ENTMCNC: 27.5 PG — SIGNIFICANT CHANGE UP (ref 27–34)
MCHC RBC-ENTMCNC: 31.3 GM/DL — LOW (ref 32–36)
MCV RBC AUTO: 87.6 FL — SIGNIFICANT CHANGE UP (ref 80–100)
NRBC # BLD: 0 /100 WBCS — SIGNIFICANT CHANGE UP (ref 0–0)
PLATELET # BLD AUTO: 224 K/UL — SIGNIFICANT CHANGE UP (ref 150–400)
POTASSIUM SERPL-MCNC: 4.5 MMOL/L — SIGNIFICANT CHANGE UP (ref 3.5–5.3)
POTASSIUM SERPL-SCNC: 4.5 MMOL/L — SIGNIFICANT CHANGE UP (ref 3.5–5.3)
PROT SERPL-MCNC: 6.2 G/DL — SIGNIFICANT CHANGE UP (ref 6–8.3)
RBC # BLD: 3.06 M/UL — LOW (ref 4.2–5.8)
RBC # FLD: 16.8 % — HIGH (ref 10.3–14.5)
SARS-COV-2 RNA SPEC QL NAA+PROBE: SIGNIFICANT CHANGE UP
SODIUM SERPL-SCNC: 135 MMOL/L — SIGNIFICANT CHANGE UP (ref 135–145)
WBC # BLD: 8.52 K/UL — SIGNIFICANT CHANGE UP (ref 3.8–10.5)
WBC # FLD AUTO: 8.52 K/UL — SIGNIFICANT CHANGE UP (ref 3.8–10.5)

## 2021-10-22 RX ORDER — ERYTHROPOIETIN 10000 [IU]/ML
10000 INJECTION, SOLUTION INTRAVENOUS; SUBCUTANEOUS ONCE
Refills: 0 | Status: COMPLETED | OUTPATIENT
Start: 2021-10-23 | End: 2021-10-23

## 2021-10-22 RX ADMIN — Medication 1 DROP(S): at 05:53

## 2021-10-22 RX ADMIN — Medication 1334 MILLIGRAM(S): at 07:55

## 2021-10-22 RX ADMIN — HEPARIN SODIUM 5000 UNIT(S): 5000 INJECTION INTRAVENOUS; SUBCUTANEOUS at 05:53

## 2021-10-22 RX ADMIN — Medication 81 MILLIGRAM(S): at 12:25

## 2021-10-22 RX ADMIN — SEVELAMER CARBONATE 1600 MILLIGRAM(S): 2400 POWDER, FOR SUSPENSION ORAL at 12:25

## 2021-10-22 RX ADMIN — Medication 100 MILLIGRAM(S): at 14:35

## 2021-10-22 RX ADMIN — CARVEDILOL PHOSPHATE 25 MILLIGRAM(S): 80 CAPSULE, EXTENDED RELEASE ORAL at 11:20

## 2021-10-22 RX ADMIN — TAMSULOSIN HYDROCHLORIDE 0.4 MILLIGRAM(S): 0.4 CAPSULE ORAL at 22:34

## 2021-10-22 RX ADMIN — CHLORHEXIDINE GLUCONATE 1 APPLICATION(S): 213 SOLUTION TOPICAL at 12:26

## 2021-10-22 RX ADMIN — HEPARIN SODIUM 5000 UNIT(S): 5000 INJECTION INTRAVENOUS; SUBCUTANEOUS at 22:34

## 2021-10-22 RX ADMIN — SEVELAMER CARBONATE 1600 MILLIGRAM(S): 2400 POWDER, FOR SUSPENSION ORAL at 07:55

## 2021-10-22 RX ADMIN — Medication 1 DROP(S): at 17:55

## 2021-10-22 RX ADMIN — SEVELAMER CARBONATE 1600 MILLIGRAM(S): 2400 POWDER, FOR SUSPENSION ORAL at 17:54

## 2021-10-22 RX ADMIN — Medication 1 DROP(S): at 02:03

## 2021-10-22 RX ADMIN — CARVEDILOL PHOSPHATE 25 MILLIGRAM(S): 80 CAPSULE, EXTENDED RELEASE ORAL at 22:34

## 2021-10-22 RX ADMIN — Medication 1 DROP(S): at 11:20

## 2021-10-22 RX ADMIN — Medication 1334 MILLIGRAM(S): at 12:25

## 2021-10-22 RX ADMIN — Medication 1 DROP(S): at 14:35

## 2021-10-22 RX ADMIN — Medication 1 APPLICATION(S): at 22:34

## 2021-10-22 RX ADMIN — Medication 1334 MILLIGRAM(S): at 17:55

## 2021-10-22 RX ADMIN — HEPARIN SODIUM 5000 UNIT(S): 5000 INJECTION INTRAVENOUS; SUBCUTANEOUS at 14:35

## 2021-10-22 RX ADMIN — BUMETANIDE 1 MILLIGRAM(S): 0.25 INJECTION INTRAMUSCULAR; INTRAVENOUS at 05:53

## 2021-10-22 NOTE — PROGRESS NOTE ADULT - ATTENDING COMMENTS
Pt care and plan discussed and reviewed with NP. Plan as outlined above edited by me to reflect our discussion.
80y Male with ESRD s/p left upper extremity AV fistula with inadequate flow s/p LUE AVF venoplasty and outflow vein thrombus balloon maceration on 10/8/21 AVF with good palpable thrill. Planning to use for HD today.    -plan for HD today via AVF  -monitor flows during HD  -patient may need repeat fistulogram as an outpatient if flows do not improve appropriately
Please see addendum to consult note, agree with above. No urologic intervention needed at this time.
Pt care and plan discussed and reviewed with NP. Plan as outlined above edited by me to reflect our discussion.

## 2021-10-22 NOTE — DISCHARGE NOTE PROVIDER - CARE PROVIDER_API CALL
Melvin Jaramillo)  Medicine  [address] [place] [address]  Phone: [phone]  Fax: [phone]  Follow Up Time:    Melvin Jaramillo)  Medicine  [address] [place] [address]  Phone: [phone]  Fax: [phone]  Follow Up Time:     Marleni Garcia)  Infectious Disease; Internal Medicine  [address] [facility] [facility] [address]  [place] [address]  Phone: [phone]  Fax: [phone]  Follow Up Time:    Melvin Jaramillo)  Medicine  [address] [place] [address]  Phone: [phone]  Fax: [phone]  Follow Up Time:     Marleni Garcia)  Infectious Disease; Internal Medicine  [address] [facility], [address]  [place] [address]  Phone: [phone]  Fax: [phone]  Follow Up Time:     Chuy Castellanos)  Vascular Surgery  [address] [facility], [address] [place] [address]  Phone: [phone]  Fax: [phone]  Follow Up Time: 2 weeks   Melvin Jaramillo)  Medicine  [address] [place] [address]  Phone: [phone]  Fax: [phone]  Follow Up Time: 1 week    Marleni Garcia)  Infectious Disease; Internal Medicine  [address] [facility] [place], [address]  [place] [address]  Phone: [phone]  Fax: [phone]  Follow Up Time: 2 weeks    Chuy Castellanos)  Vascular Surgery  [address] [facility], [address] [place] [address]  Phone: [phone]  Fax: [phone]  Follow Up Time: 2 weeks

## 2021-10-22 NOTE — PROGRESS NOTE ADULT - SUBJECTIVE AND OBJECTIVE BOX
Date of Service   10-22-21 @ 14:21    Patient is a 80y old  Male who presents with a chief complaint of Fever (22 Oct 2021 13:43)      INTERVAL HISTORY:     TELEMETRY Personally reviewed:    REVIEW OF SYSTEMS:   CONSTITUTIONAL: No weakness  EYES/ENT: No visual changes; No throat pain  Neck: No pain or stiffness  Respiratory: No cough, wheezing, No shortness of breath  CARDIOVASCULAR: no chest pain or palpitations  GASTROINTESTINAL: No abdominal pain, no nausea, vomiting or hematemesis  GENITOURINARY: No dysuria, frequency or hematuria  NEUROLOGICAL: No stroke like symptoms  SKIN: No rashes    	  MEDICATIONS:  buMETAnide 1 milliGRAM(s) Oral daily  carvedilol 25 milliGRAM(s) Oral every 12 hours  tamsulosin 0.4 milliGRAM(s) Oral at bedtime        PHYSICAL EXAM:  T(C): 36.4 (10-22-21 @ 12:44), Max: 37.1 (10-22-21 @ 04:43)  HR: 95 (10-22-21 @ 12:44) (62 - 95)  BP: 155/79 (10-22-21 @ 12:44) (128/74 - 159/76)  RR: 18 (10-22-21 @ 12:44) (18 - 18)  SpO2: 99% (10-22-21 @ 12:44) (94% - 99%)  Wt(kg): --  I&O's Summary    21 Oct 2021 07:01  -  22 Oct 2021 07:00  --------------------------------------------------------  IN: 1010 mL / OUT: 1000 mL / NET: 10 mL    22 Oct 2021 07:01  -  22 Oct 2021 14:21  --------------------------------------------------------  IN: 480 mL / OUT: 0 mL / NET: 480 mL          Appearance: In no distress	  HEENT:    PERRL, EOMI	  Cardiovascular:  S1 S2, No JVD  Respiratory: Lungs clear to auscultation	  Gastrointestinal:  Soft, Non-tender, + BS	  Vascularature:  No edema of LE  Psychiatric: Appropriate affect   Neuro: no acute focal deficits                               8.4    8.52  )-----------( 224      ( 22 Oct 2021 07:14 )             26.8     10-22    135  |  100  |  35<H>  ----------------------------<  99  4.5   |  21<L>  |  7.44<H>    Ca    8.8      22 Oct 2021 07:13    TPro  6.2  /  Alb  3.5  /  TBili  0.1<L>  /  DBili  x   /  AST  10  /  ALT  <5<L>  /  AlkPhos  94  10-22        Labs personally reviewed    ASSESSMENT/PLAN: 	  80M with PMHx of HFpEF, ESRD, and HTN presenting with self-reported fever of 104 F. Patient being admitted for fever in dialysis patient.      Problem/Plan - 1:  ·  Problem: Chronic diastolic heart Failure with preserved EF   - c/w bumex   - c/w carvedilol 25mg Q12hrs   - pt appears euvolemic   - ESRD  per renal for fluid management     Problem/Plan - 2:  ·  Problem: Fever  - Mild leukocytosis   - monitor fever curve and cbc   - off Abx   - chest CT negative   - CT abd and pelvis reviewed by ID  - repeat bld Cx sent no growth to date   - pt was exposed to COVID now in quarantine     Problem/Plan - 3:  ·  Problem: Hypertension  - c/w coreg and bumex  - monitor BP   - on ASA for prevention     Problem/Plan - 4:  ·  Problem: ESRD  - renal consult appreciated   - HD per renal     Problem/Plan - 5:  ·  Problem: DVT ppx on Heparin SQ     Problem/Plan - 6:  ·  Problem: Anemia   - H&H 6.7/22.3  - monitor H&H   - type and screen and transfuse as needed     10/23 d/c post HD         Sammie DUENAS-MALU Carrillo DO [facility]  Cardiovascular Medicine  [address] [facility], [address]  Office: [phone]  Cell: [phone]

## 2021-10-22 NOTE — PROGRESS NOTE ADULT - ASSESSMENT
80M with PMHx of HFpEF, ESRD, and HTN presenting with self-reported fever of 104 F in the setting of cystitis/prostatitis     1 ID-   Blood culture with gram neg rods/ ecoli.  ABX till 10/28   Send RX to [facility] for abx please      2 Renal- s/p AVF venoplasty and outflow vein thrombus balloon maturation on 10/8/21,   perm cath and the fistula is not ready to use.  HD today   Phoslo added     3 -Cont Flomax; 8 x 6 cm cystic lesion in the right scotum  Hx of hydrocele and appreciate Urology input - Subsequent scrotal US demonstrated rt hydrocele, no further intervention at this time.    4 Vasc follow up with Dr Dunn appreciated -   Patient is s/p recent AVF revision  by IR  - 10/8/2021  Repeat AVFgram +/- intervention ~2 wks post previous procedure.  Fistula not fit for use- will continue HD via permcath            Sayed [facility]   [phone]

## 2021-10-22 NOTE — DISCHARGE NOTE PROVIDER - HOSPITAL COURSE
10M with PMHx of HFpEF, ESRD, and HTN presenting with self-reported fever of 104 F. Patient states it has been going on for one day and intermittent. Otherwise he denies focal symptoms such as cough, shortness of breath, chest pain, diarrhea, dysuria or polyuria. He states he urinates very little. No known sick contacts. No sore throat or nasal congestion. He has had a right chest wall permacath since March 2021 with no redness or warmth around the site. He was last dialyzed several days prior with no issues. He does complain of right shoulder discomfort, but no swelling and has been having discomfort since permacath was placed. In the ED patient had blood cultures drawn and given vancomycin and zosyn. No fevers since he's been here.     WBC 10.8.  UA large LE, small blood.  Bcx E.coli.  CT c/a/p performed.  Pt with intermittent fevers, Tmax 102.3 on 10/8.    Pt states prior to hospitalization noted some dysuria and difficulty urinating.  Pt seen by Urology for rt scrotal swelling, denies tenderness.  Found to have rt hydrocele on US.   Pt now on rocephin, ID consulted for further abx management.     UA large LE.  Bcx growing E.coli from 1/2 sets.  Pt c/o some dysuria, difficulty urinating.  Pt is ESRD on HD.  Rocephin was started  CT c/a/p reviewed.  Of note, showed an 8.1 x 6.4cm cystic mass in right scrotum, markedly enlarged prostate gland, and mild infiltration of perivesical fat.   Pt seen by URology, Subsequent scrotal US demonstrated rt hydrocele, no further intervention deemed necessary at this time.  Possible acute cystitis vs prostatis as source.  f/u repeat bcx to ensure clearance as pt also with HD catheter in place.  Vascular eval called for f/u regarding AVF.   Repeat bcx from 10/14 ngtd at 24 hrs.  Recommend 3 week course of abx to treat for bacteremia, possible prostatis/cystitis as source.    Can change to cefazolin 2gm/2gm/3gm post dialysis for discharge.  Outpatient follow up with PMD, Nephrologist.            10M with PMHx of HFpEF, ESRD, and HTN presenting with self-reported fever of 104 F. Patient states it has been going on for one day and intermittent. Otherwise he denies focal symptoms such as cough, shortness of breath, chest pain, diarrhea, dysuria or polyuria. He states he urinates very little. No known sick contacts. No sore throat or nasal congestion. He has had a right chest wall permacath since March 2021 with no redness or warmth around the site. He was last dialyzed several days prior with no issues. He does complain of right shoulder discomfort, but no swelling and has been having discomfort since permacath was placed. In the ED patient had blood cultures drawn and given vancomycin and zosyn. No fevers since he's been here.     WBC 10.8.  UA large LE, small blood.  Bcx E.coli.  CT c/a/p performed.  Pt with intermittent fevers, Tmax 102.3 on 10/8.    Pt states prior to hospitalization noted some dysuria and difficulty urinating.  Pt seen by Urology for rt scrotal swelling, denies tenderness.  Found to have rt hydrocele on US.   Pt now on rocephin, ID consulted for further abx management.     UA large LE.  Bcx growing E.coli. Pt c/o some dysuria, difficulty urinating.  Pt is ESRD on HD.  Rocephin was started  CT c/a/p reviewed.  Of note, showed an 8.1 x 6.4cm cystic mass in right scrotum, markedly enlarged prostate gland, and mild infiltration of perivesical fat.   Pt seen by URology, Subsequent scrotal US demonstrated rt hydrocele, no further intervention deemed necessary at this time.  Possible acute cystitis vs prostatis as source.   Vascular eval called for f/u regarding AVF.   Repeat bcx from 10/14 ngtd . ID recommend 3 week course of abx to treat for bacteremia, possible prostatis/cystitis as source.    Cefazolin 2gm/2gm/3gm post dialysis for discharge until 10/28  Outpatient follow up with PMD, Nephrologist.            10M with PMHx of HFpEF, ESRD, and HTN presenting with self-reported fever of 104 F. Patient states it has been going on for one day and intermittent. Otherwise he denies focal symptoms such as cough, shortness of breath, chest pain, diarrhea, dysuria or polyuria. He states he urinates very little. No known sick contacts. No sore throat or nasal congestion. He has had a right chest wall permacath since March 2021 with no redness or warmth around the site. He was last dialyzed several days prior with no issues. He does complain of right shoulder discomfort, but no swelling and has been having discomfort since permacath was placed. In the ED patient had blood cultures drawn and given vancomycin and zosyn. No fevers since he's been here.     WBC 10.8.  UA large LE, small blood.  Bcx E.coli.  CT c/a/p performed.  Pt with intermittent fevers, Tmax 102.3 on 10/8.    Pt states prior to hospitalization noted some dysuria and difficulty urinating.  Pt seen by Urology for rt scrotal swelling, denies tenderness.  Found to have rt hydrocele on US.   Pt now on rocephin, ID consulted for further abx management.     UA large LE.  Bcx growing E.coli. Pt c/o some dysuria, difficulty urinating.  Pt is ESRD on HD.  Rocephin was started  CT c/a/p reviewed.  Of note, showed an 8.1 x 6.4cm cystic mass in right scrotum, markedly enlarged prostate gland, and mild infiltration of perivesical fat.   Pt seen by URology, Subsequent scrotal US demonstrated rt hydrocele, no further intervention deemed necessary at this time.  Possible acute cystitis vs prostatis as source.   Vascular eval called for f/u regarding AVF. s/p recent AVFgram by IR. HD via pcath. Avoid AVF use for now.  Will need to reeval for repeat AVFgram +/- intervention ~2 wks post previous procedure.  Repeat bcx from 10/14 ngtd . ID recommend 3 week course of abx to treat for bacteremia, possible prostatis/cystitis as source.    Cefazolin 2gm/2gm/3gm post dialysis for discharge until 10/28  Outpatient follow up with PMD, Nephrologist.

## 2021-10-22 NOTE — DISCHARGE NOTE PROVIDER - NSDCACTIVITY_GEN_ALL_CORE
Walking - Indoors allowed/Walking - Outdoors allowed Walking - Indoors allowed/No heavy lifting/straining/Walking - Outdoors allowed

## 2021-10-22 NOTE — PROGRESS NOTE ADULT - SUBJECTIVE AND OBJECTIVE BOX
NEPHROLOGY-[facility] [phone]        Patient seen and examined in bed.  He was in good spirits         MEDICATIONS  (STANDING):  artificial  tears Solution 1 Drop(s) Left EYE every 4 hours  aspirin enteric coated 81 milliGRAM(s) Oral daily  buMETAnide 1 milliGRAM(s) Oral daily  calcium acetate 1334 milliGRAM(s) Oral three times a day with meals  carvedilol 25 milliGRAM(s) Oral every 12 hours  ceFAZolin   IVPB 1000 milliGRAM(s) IV Intermittent every 24 hours  chlorhexidine 4% Liquid 1 Application(s) Topical daily  darbepoetin Injectable ViaL 60 MICROGram(s) IV Push every 7 days  heparin   Injectable 5000 Unit(s) SubCutaneous every 8 hours  lidocaine/prilocaine Cream 1 Application(s) Topical <User Schedule>  petrolatum Ophthalmic Ointment 1 Application(s) Left EYE at bedtime  sevelamer carbonate 1600 milliGRAM(s) Oral three times a day with meals  tamsulosin 0.4 milliGRAM(s) Oral at bedtime      VITAL:  T(C): , Max: 37.1 (10-22-21 @ 04:43)  T(F): , Max: 98.7 (10-22-21 @ 04:43)  HR: 69 (10-22-21 @ 04:43)  BP: 128/74 (10-22-21 @ 04:43)  BP(mean): --  RR: 18 (10-22-21 @ 04:43)  SpO2: 98% (10-22-21 @ 04:43)  Wt(kg): --    I and O's:    10-21 @ 07:01  -  10-22 @ 07:00  --------------------------------------------------------  IN: 1010 mL / OUT: 1000 mL / NET: 10 mL          PHYSICAL EXAM:    Constitutional: NAD  Neck:  No JVD  Respiratory: CTAB/L  Cardiovascular: S1 and S2  Gastrointestinal: BS+, soft, NT/ND  Extremities: No peripheral edema  Neurological: A/O x 3, no focal deficits  Psychiatric: Normal mood, normal affect  : No Aguilar  Skin: No rashes  Access: perm cath and avf     LABS:                        8.4    8.52  )-----------( 224      ( 22 Oct 2021 07:14 )             26.8     10-22    135  |  100  |  35<H>  ----------------------------<  99  4.5   |  21<L>  |  7.44<H>    Ca    8.8      22 Oct 2021 07:13    TPro  6.2  /  Alb  3.5  /  TBili  0.1<L>  /  DBili  x   /  AST  10  /  ALT  <5<L>  /  AlkPhos  94  10-22          Urine Studies:          RADIOLOGY & ADDITIONAL STUDIES:

## 2021-10-22 NOTE — DISCHARGE NOTE PROVIDER - NSDCMRMEDTOKEN_GEN_ALL_CORE_FT
Aspirin Enteric Coated 81 mg oral delayed release tablet: 1 tab(s) orally once a day  Bumex 1 mg oral tablet: 1 tab(s) orally once a day  carvedilol 25 mg oral tablet: 1 tab(s) orally every 12 hours  ceFAZolin: 2 gram(s) intravenous Tuesday and Thursday,   and 3 gram on Saturdays until 10/28   in dialysis  sevelamer carbonate 800 mg oral tablet: 1 tab(s) orally 3 times a day  tamsulosin 0.4 mg oral capsule: 1 cap(s) orally once a day (at bedtime)   Aspirin Enteric Coated 81 mg oral delayed release tablet: 1 tab(s) orally once a day  Bumex 1 mg oral tablet: 1 tab(s) orally once a day  carvedilol 25 mg oral tablet: 1 tab(s) orally every 12 hours  ceFAZolin: 2 gram(s) intravenous Tuesday and Thursday,   and 3 gram on Saturdays until 10/28   in dialysis  ocular lubricant ophthalmic ointment: 1 application to each affected eye once a day (at bedtime)  ocular lubricant ophthalmic solution: 1 drop(s) to each affected eye every 4 hours  sevelamer carbonate 800 mg oral tablet: 1 tab(s) orally 3 times a day  tamsulosin 0.4 mg oral capsule: 1 cap(s) orally once a day (at bedtime)   Aspirin Enteric Coated 81 mg oral delayed release tablet: 1 tab(s) orally once a day  Bumex 1 mg oral tablet: 1 tab(s) orally once a day  calcium acetate 667 mg oral tablet: 1 tab(s) orally 3 times a day   carvedilol 25 mg oral tablet: 1 tab(s) orally every 12 hours  ceFAZolin: 2 gram(s) intravenous Tuesday and Thursday,   and 3 gram on Saturdays until 10/28   in dialysis  ocular lubricant ophthalmic ointment: 1 application to each affected eye once a day (at bedtime)  ocular lubricant ophthalmic solution: 1 drop(s) to each affected eye every 4 hours  sevelamer carbonate 800 mg oral tablet: 2 tab(s) orally 3 times a day (with meals)  tamsulosin 0.4 mg oral capsule: 1 cap(s) orally once a day (at bedtime)

## 2021-10-22 NOTE — DISCHARGE NOTE PROVIDER - NSDCCPCAREPLAN_GEN_ALL_CORE_FT
PRINCIPAL DISCHARGE DIAGNOSIS  Diagnosis: Fever  Assessment and Plan of Treatment:        PRINCIPAL DISCHARGE DIAGNOSIS  Diagnosis: Bacteremia  Assessment and Plan of Treatment: Continue antibiotics as prescribed after dialysis  Follow up with your Primary care doctor in 1 week  Follow up with Infectious disease in 2 weeks- Dr. Garcia      SECONDARY DISCHARGE DIAGNOSES  Diagnosis: ESRD on dialysis  Assessment and Plan of Treatment: Follow up with your Nephrologist outpatient    Diagnosis: Hypertension  Assessment and Plan of Treatment: Low salt diet  Activity as tolerated.  Take all medication as prescribed.  Follow up with your medical doctor for routine blood pressure monitoring at your next visit.  Notify your doctor if you have any of the following symptoms:   Dizziness, Lightheadedness, Blurry vision, Headache, Chest pain, Shortness of breath    Diagnosis: Chronic diastolic heart failure  Assessment and Plan of Treatment: Weigh yourself daily.  If you gain 3lbs in 3 days, or 5lbs in a week call your Health Care Provider.  Do not eat or drink foods containing more than 2000mg of salt (sodium) in your diet every day.  Call your Health Care Provider if you have any swelling or increased swelling in your feet, ankles, and/or stomach.  Take all of your medication as directed.  If you become dizzy call your Health Care Provider.     PRINCIPAL DISCHARGE DIAGNOSIS  Diagnosis: Bacteremia  Assessment and Plan of Treatment: Continue antibiotics as prescribed after dialysis  Follow up with your Primary care doctor in 1 week  Follow up with Infectious disease in 2 weeks- Dr. Garcia      SECONDARY DISCHARGE DIAGNOSES  Diagnosis: ESRD on dialysis  Assessment and Plan of Treatment: - Follow up with your Nephrologist outpatient  - HD via pcath  - Had AV Fistulogram with IR  - Avoid AVF use for now.    - Will need to reeval for repeat AVFgram +/- intervention ~2 wks post previous procedure.  - Outpatient vascular surgery follow up    Diagnosis: Hypertension  Assessment and Plan of Treatment: Low salt diet  Activity as tolerated.  Take all medication as prescribed.  Follow up with your medical doctor for routine blood pressure monitoring at your next visit.  Notify your doctor if you have any of the following symptoms:   Dizziness, Lightheadedness, Blurry vision, Headache, Chest pain, Shortness of breath    Diagnosis: Chronic diastolic heart failure  Assessment and Plan of Treatment: Weigh yourself daily.  If you gain 3lbs in 3 days, or 5lbs in a week call your Health Care Provider.  Do not eat or drink foods containing more than 2000mg of salt (sodium) in your diet every day.  Call your Health Care Provider if you have any swelling or increased swelling in your feet, ankles, and/or stomach.  Take all of your medication as directed.  If you become dizzy call your Health Care Provider.

## 2021-10-22 NOTE — PROGRESS NOTE ADULT - SUBJECTIVE AND OBJECTIVE BOX
CHIEF COMPLAINT:  pt remain stable no event over night patient was seen earlier this morning he will be discharged on 10/23/2021 is a happy about the news  SUBJECTIVE:     REVIEW OF SYSTEMS: asymptomatic    CONSTITUTIONAL: (  )  weakness,  (  ) fevers or chills  EYES/ENT: (  )visual changes;     NECK: (  ) pain or stiffness  RESPIRATORY:   (  )cough, wheezing, hemoptysis;  (  ) shortness of breath  CARDIOVASCULAR:  (  )chest pain or palpitations  GASTROINTESTINAL:   (  )abdominal or epigastric pain.  (  ) nausea, vomiting, or hematemesis;   (   ) diarrhea or constipation.   GENITOURINARY:   (    ) dysuria, frequency or hematuria  NEUROLOGICAL:  (   ) numbness or weakness   All other review of systems is negative unless indicated above    Vital Signs Last 24 Hrs  T(C): 36.7 (21 Oct 2021 15:15), Max: 36.8 (20 Oct 2021 20:34)  T(F): 98.1 (21 Oct 2021 15:15), Max: 98.2 (20 Oct 2021 20:34)  HR: 67 (21 Oct 2021 15:15) (67 - 76)  BP: 157/76 (21 Oct 2021 15:15) (150/74 - 157/76)  BP(mean): --  RR: 18 (21 Oct 2021 15:15) (17 - 18)  SpO2: 96% (21 Oct 2021 15:15) (96% - 100%)    I&O's Summary    20 Oct 2021 07:01  -  21 Oct 2021 07:00  --------------------------------------------------------  IN: 480 mL / OUT: 0 mL / NET: 480 mL    21 Oct 2021 07:01  -  21 Oct 2021 18:24  --------------------------------------------------------  IN: 530 mL / OUT: 0 mL / NET: 530 mL        CAPILLARY BLOOD GLUCOSE          PHYSICAL EXAM:    Constitutional:  ( x  ) NAD,   (  x )awake and alert  HEENT: PERR, EOMI,    Neck: Soft and supple, No LAD, No JVD  Respiratory:  (   x Breath sounds are clear bilaterally,    (   ) wheezing, rales or rhonchi  Cardiovascular:     (  x )S1 and S2, regular rate and rhythm, no Murmurs, gallops or rubs  Gastrointestinal:  (  x )Bowel Sounds present, soft,   (  )nontender, nondistended,    Extremities:    (  ) peripheral edema  Vascular: 2+ peripheral pulses  Neurological:    (   x )A/O x 3,   (  ) focal deficits  Musculoskeletal:    (   )  normal strength b/l upper  (     ) normal  lower extremities  Skin: No rashes    MEDICATIONS:  MEDICATIONS  (STANDING):  artificial  tears Solution 1 Drop(s) Left EYE every 4 hours  aspirin enteric coated 81 milliGRAM(s) Oral daily  buMETAnide 1 milliGRAM(s) Oral daily  calcium acetate 1334 milliGRAM(s) Oral three times a day with meals  carvedilol 25 milliGRAM(s) Oral every 12 hours  ceFAZolin   IVPB 1000 milliGRAM(s) IV Intermittent every 24 hours  chlorhexidine 4% Liquid 1 Application(s) Topical daily  darbepoetin Injectable ViaL 60 MICROGram(s) IV Push every 7 days  heparin   Injectable 5000 Unit(s) SubCutaneous every 8 hours  lidocaine/prilocaine Cream 1 Application(s) Topical <User Schedule>  petrolatum Ophthalmic Ointment 1 Application(s) Left EYE at bedtime  sevelamer carbonate 1600 milliGRAM(s) Oral three times a day with meals  tamsulosin 0.4 milliGRAM(s) Oral at bedtime      LABS: All Labs Reviewed:                        8.6    7.09  )-----------( 270      ( 21 Oct 2021 06:56 )             27.0     10-21    136  |  97  |  52<H>  ----------------------------<  91  4.8   |  23  |  9.66<H>    Ca    8.7      21 Oct 2021 06:56  Phos  5.9     10-20  Mg     2.2     10-20            Blood Culture:   Urine Culture      RADIOLOGY/EKG:    ASSESSMENT AND PLAN:   continue hemodialysis today continue IV Ancef 1 g daily discharge planning on 10/23/2021 after hemodialysis  DVT PPX:    ADVANCED DIRECTIVE:    DISPOSITION: home on 10/23/21 IV ancef  with HD  3 x week     CHIEF COMPLAINT:  pt remain stable no event over night patient was seen earlier this morning he will be discharged on 10/23/2021 is a happy about the news  SUBJECTIVE:     REVIEW OF SYSTEMS: asymptomatic    CONSTITUTIONAL: (  )  weakness,  (  ) fevers or chills  EYES/ENT: (  )visual changes;     NECK: (  ) pain or stiffness  RESPIRATORY:   (  )cough, wheezing, hemoptysis;  (  ) shortness of breath  CARDIOVASCULAR:  (  )chest pain or palpitations  GASTROINTESTINAL:   (  )abdominal or epigastric pain.  (  ) nausea, vomiting, or hematemesis;   (   ) diarrhea or constipation.   GENITOURINARY:   (    ) dysuria, frequency or hematuria  NEUROLOGICAL:  (   ) numbness or weakness   All other review of systems is negative unless indicated above    Vital Signs Last 24 Hrs     ICU Vital Signs Last 24 Hrs  T(C): 36.4 (22 Oct 2021 12:44), Max: 37.1 (22 Oct 2021 04:43)  T(F): 97.5 (22 Oct 2021 12:44), Max: 98.7 (22 Oct 2021 04:43)  HR: 95 (22 Oct 2021 12:44) (62 - 95)  BP: 155/79 (22 Oct 2021 12:44) (128/74 - 159/76)  BP(mean): --  ABP: --  ABP(mean): --  RR: 18 (22 Oct 2021 12:44) (18 - 18)  SpO2: 99% (22 Oct 2021 12:44) (94% - 99%)    I&O's Summary    20 Oct 2021 07:01  -  21 Oct 2021 07:00  --------------------------------------------------------  IN: 480 mL / OUT: 0 mL / NET: 480 mL    21 Oct 2021 07:01  -  21 Oct 2021 18:24  --------------------------------------------------------  IN: 530 mL / OUT: 0 mL / NET: 530 mL        CAPILLARY BLOOD GLUCOSE          PHYSICAL EXAM:    Constitutional:  ( x  ) NAD,   (  x )awake and alert  HEENT: PERR, EOMI,    Neck: Soft and supple, No LAD, No JVD  Respiratory:  (   x Breath sounds are clear bilaterally,    (   ) wheezing, rales or rhonchi  Cardiovascular:     (  x )S1 and S2, regular rate and rhythm, no Murmurs, gallops or rubs  Gastrointestinal:  (  x )Bowel Sounds present, soft,   (  )nontender, nondistended,    Extremities:    (  ) peripheral edema  Vascular: 2+ peripheral pulses  Neurological:    (   x )A/O x 3,   (  ) focal deficits  Musculoskeletal:    (   )  normal strength b/l upper  (     ) normal  lower extremities  Skin: No rashes    MEDICATIONS:  MEDICATIONS  (STANDING):  artificial  tears Solution 1 Drop(s) Left EYE every 4 hours  aspirin enteric coated 81 milliGRAM(s) Oral daily  buMETAnide 1 milliGRAM(s) Oral daily  calcium acetate 1334 milliGRAM(s) Oral three times a day with meals  carvedilol 25 milliGRAM(s) Oral every 12 hours  ceFAZolin   IVPB 1000 milliGRAM(s) IV Intermittent every 24 hours  chlorhexidine 4% Liquid 1 Application(s) Topical daily  darbepoetin Injectable ViaL 60 MICROGram(s) IV Push every 7 days  heparin   Injectable 5000 Unit(s) SubCutaneous every 8 hours  lidocaine/prilocaine Cream 1 Application(s) Topical <User Schedule>  petrolatum Ophthalmic Ointment 1 Application(s) Left EYE at bedtime  sevelamer carbonate 1600 milliGRAM(s) Oral three times a day with meals  tamsulosin 0.4 milliGRAM(s) Oral at bedtime      LABS: All Labs Reviewed:                        8.6    7.09  )-----------( 270      ( 21 Oct 2021 06:56 )             27.0     10-21    136  |  97  |  52<H>  ----------------------------<  91  4.8   |  23  |  9.66<H>    Ca    8.7      21 Oct 2021 06:56  Phos  5.9     10-20  Mg     2.2     10-20            Blood Culture:   Urine Culture      RADIOLOGY/EKG:    ASSESSMENT AND PLAN:   continue hemodialysis today continue IV Ancef 1 g daily discharge planning on 10/23/2021 after hemodialysis  DVT PPX:    ADVANCED DIRECTIVE:    DISPOSITION: home on 10/23/21 IV ancef  with HD  3 x week

## 2021-10-22 NOTE — DISCHARGE NOTE PROVIDER - PROVIDER TOKENS
PROVIDER:[TOKEN:[[id number]]] PROVIDER:[TOKEN:[[id number]]],PROVIDER:[TOKEN:[[id number]]] PROVIDER:[TOKEN:[[id number]]],PROVIDER:[TOKEN:[[id number]]],PROVIDER:[TOKEN:[[id number]],FOLLOWUP:[2 weeks]] PROVIDER:[TOKEN:[[id number]],FOLLOWUP:[1 week]],PROVIDER:[TOKEN:[[id number]],FOLLOWUP:[2 weeks]],PROVIDER:[TOKEN:[[id number]],FOLLOWUP:[2 weeks]]

## 2021-10-22 NOTE — PROGRESS NOTE ADULT - SUBJECTIVE AND OBJECTIVE BOX
Infectious Diseases progress note:    Subjective:  Pt to complete quarantine period tomorrow and planned for discharge.  Afebrile.      ROS:  CONSTITUTIONAL:  No fever, chills, rigors  CARDIOVASCULAR:  No chest pain or palpitations  RESPIRATORY:   No SOB, cough, dyspnea on exertion.  No wheezing  GASTROINTESTINAL:  No abd pain, N/V, diarrhea/constipation  EXTREMITIES:  No swelling or joint pain  GENITOURINARY:  No burning on urination, increased frequency or urgency.  No flank pain  NEUROLOGIC:  No HA, visual disturbances  SKIN: No rashes    Allergies    grass, pollen (Rhinitis)  No Known Drug Allergies    Intolerances        ANTIBIOTICS/RELEVANT:  antimicrobials  ceFAZolin   IVPB 1000 milliGRAM(s) IV Intermittent every 24 hours    immunologic:  darbepoetin Injectable ViaL 60 MICROGram(s) IV Push every 7 days    OTHER:  acetaminophen   Tablet .. 650 milliGRAM(s) Oral every 6 hours PRN  artificial  tears Solution 1 Drop(s) Left EYE every 4 hours  aspirin enteric coated 81 milliGRAM(s) Oral daily  buMETAnide 1 milliGRAM(s) Oral daily  calcium acetate 1334 milliGRAM(s) Oral three times a day with meals  carvedilol 25 milliGRAM(s) Oral every 12 hours  chlorhexidine 4% Liquid 1 Application(s) Topical daily  heparin   Injectable 5000 Unit(s) SubCutaneous every 8 hours  lidocaine/prilocaine Cream 1 Application(s) Topical <User Schedule>  petrolatum Ophthalmic Ointment 1 Application(s) Left EYE at bedtime  sevelamer carbonate 1600 milliGRAM(s) Oral three times a day with meals  tamsulosin 0.4 milliGRAM(s) Oral at bedtime      Objective:  Vital Signs Last 24 Hrs  T(C): 36.4 (22 Oct 2021 12:44), Max: 37.1 (22 Oct 2021 04:43)  T(F): 97.5 (22 Oct 2021 12:44), Max: 98.7 (22 Oct 2021 04:43)  HR: 95 (22 Oct 2021 12:44) (69 - 95)  BP: 155/79 (22 Oct 2021 12:44) (128/74 - 155/79)  BP(mean): --  RR: 18 (22 Oct 2021 12:44) (18 - 18)  SpO2: 99% (22 Oct 2021 12:44) (94% - 99%)    PHYSICAL EXAM:  Constitutional:NAD  Eyes:MOI, EOMI  Ear/Nose/Throat: no thrush, mucositis.  Moist mucous membranes	  Neck:no JVD, no lymphadenopathy, supple  Respiratory: CTA checo  Cardiovascular: S1S2 RRR, no murmurs  Gastrointestinal:soft, nontender,  nondistended (+) BS  Extremities:no e/e/c  Skin:  no rashes, open wounds or ulcerations        LABS:                        8.4    8.52  )-----------( 224      ( 22 Oct 2021 07:14 )             26.8     10-22    135  |  100  |  35<H>  ----------------------------<  99  4.5   |  21<L>  |  7.44<H>    Ca    8.8      22 Oct 2021 07:13    TPro  6.2  /  Alb  3.5  /  TBili  0.1<L>  /  DBili  x   /  AST  10  /  ALT  <5<L>  /  AlkPhos  94  10-22                    Rapid RVP Result: [facility]          MICROBIOLOGY:    Culture - Urine (10.14.21 @ 15:04)   Specimen Source: Clean Catch Clean Catch (Midstream)   Culture Results:   10,000 - 49,000 CFU/mL Yeast "Susceptibilities not performed"     Culture - Blood in AM (10.14.21 @ 09:19)   Specimen Source: .Blood Blood-Peripheral   Culture Results:   No Growth Final       Culture - Blood in AM (10.14.21 @ 09:19)   Specimen Source: .Blood Blood-Venous   Culture Results:   No Growth Final         RADIOLOGY & ADDITIONAL STUDIES:

## 2021-10-22 NOTE — PROGRESS NOTE ADULT - ASSESSMENT
80M with PMHx of HFpEF, ESRD, and HTN presenting with self-reported fever of 104 F. Patient states it has been going on for one day and intermittent. Otherwise he denies focal symptoms such as cough, shortness of breath, chest pain, diarrhea, dysuria or polyuria. He states he urinates very little. No known sick contacts. No sore throat or nasal congestion. He has had a right chest wall permacath since March 2021 with no redness or warmth around the site. He was last dialyzed several days prior with no issues. He does complain of right shoulder discomfort, but no swelling and has been having discomfort since permacath was placed. In the ED patient had blood cultures drawn and given vancomycin and zosyn. No fevers since he's been here.  (05 Oct 2021 09:31)    ER vss, afebrile.  WBC 10.8.  UA large LE, small blood.  Bcx E.coli.  CT c/a/p performed.  Pt with intermittent fevers, Tmax 102.3 on 10/8.    Pt states prior to hospitalization noted some dysuria and difficulty urinating.  Pt seen by Urology for rt scrotal swelling, denies tenderness.  Found to have rt hydrocele on US.   Pt now on rocephin, ID consulted for further abx managment.      Fever/E.coli bacteremia:    - UA large LE.  Bcx growing E.coli from 1/2 sets.  Pt c/o some dysuria, difficulty urinating.  Pt is ESRD on HD.  Currently on rocephin.    - f/u repeat bcx to ensure clearance.     - Ucx growing yeast, likely colonizer, no indication for treatment.     - CT c/a/p reviewed.  Of note, showed an 8.1 x 6.4cm cystic mass in right scrotum, markedly enlarged prostate gland, and mild infiltration of perivesical fat.   Pt seen by URology, Subsequent scrotal US demonstrated rt hydrocele, no further intervention deemed necessary at this time.    - Possible acute cystitis vs prostatis as source.  f/u repeat bcx to ensure clearance as pt also with HD catheter in place.  Vascular eval called for f/u regarding AVF.       * Repeat bcx from 10/14 ngtd at 24 hrs.  Recommend 3 week course of abx to treat for bacteremia, possible prostatis/cystitis as source.  Can change to cefazolin 2gm/2gm/3gm post dialysis (10/8 through 10/28).    Pt completing quarantine period through 10/23 for recent Covid exposure.  No cough/fever/sob.  Cont current abx.   Planned for d/c tomorrow.     Marleni Garcia  [phone]

## 2021-10-22 NOTE — DISCHARGE NOTE PROVIDER - NSDCFUADDAPPT_GEN_ALL_CORE_FT
Please call Dr. Jaramillo office to make an appointment within 1 week around 11/1/21.  Please follow up with nephrology as routine.

## 2021-10-22 NOTE — DISCHARGE NOTE PROVIDER - CARE PROVIDERS DIRECT ADDRESSES
,DirectAddress_Unknown ,DirectAddress_Unknown,DirectAddress_Unknown ,DirectAddress_Unknown,DirectAddress_Unknown,[email or web address]

## 2021-10-23 ENCOUNTER — TRANSCRIPTION ENCOUNTER (OUTPATIENT)
Age: 81
End: 2021-10-23

## 2021-10-23 VITALS
SYSTOLIC BLOOD PRESSURE: 148 MMHG | RESPIRATION RATE: 18 BRPM | HEART RATE: 61 BPM | WEIGHT: 164.02 LBS | TEMPERATURE: 98 F | DIASTOLIC BLOOD PRESSURE: 77 MMHG | OXYGEN SATURATION: 98 %

## 2021-10-23 LAB
ANION GAP SERPL CALC-SCNC: 17 MMOL/L — SIGNIFICANT CHANGE UP (ref 5–17)
BUN SERPL-MCNC: 46 MG/DL — HIGH (ref 7–23)
CALCIUM SERPL-MCNC: 8.7 MG/DL — SIGNIFICANT CHANGE UP (ref 8.4–10.5)
CHLORIDE SERPL-SCNC: 96 MMOL/L — SIGNIFICANT CHANGE UP (ref 96–108)
CO2 SERPL-SCNC: 20 MMOL/L — LOW (ref 22–31)
CREAT SERPL-MCNC: 9.5 MG/DL — HIGH (ref 0.5–1.3)
GLUCOSE SERPL-MCNC: 88 MG/DL — SIGNIFICANT CHANGE UP (ref 70–99)
HCT VFR BLD CALC: 25.2 % — LOW (ref 39–50)
HGB BLD-MCNC: 7.8 G/DL — LOW (ref 13–17)
MCHC RBC-ENTMCNC: 27.4 PG — SIGNIFICANT CHANGE UP (ref 27–34)
MCHC RBC-ENTMCNC: 31 GM/DL — LOW (ref 32–36)
MCV RBC AUTO: 88.4 FL — SIGNIFICANT CHANGE UP (ref 80–100)
NRBC # BLD: 0 /100 WBCS — SIGNIFICANT CHANGE UP (ref 0–0)
PLATELET # BLD AUTO: 227 K/UL — SIGNIFICANT CHANGE UP (ref 150–400)
POTASSIUM SERPL-MCNC: 4.9 MMOL/L — SIGNIFICANT CHANGE UP (ref 3.5–5.3)
POTASSIUM SERPL-SCNC: 4.9 MMOL/L — SIGNIFICANT CHANGE UP (ref 3.5–5.3)
RBC # BLD: 2.85 M/UL — LOW (ref 4.2–5.8)
RBC # FLD: 16.4 % — HIGH (ref 10.3–14.5)
SODIUM SERPL-SCNC: 133 MMOL/L — LOW (ref 135–145)
WBC # BLD: 7.75 K/UL — SIGNIFICANT CHANGE UP (ref 3.8–10.5)
WBC # FLD AUTO: 7.75 K/UL — SIGNIFICANT CHANGE UP (ref 3.8–10.5)

## 2021-10-23 PROCEDURE — 82272 OCCULT BLD FECES 1-3 TESTS: CPT

## 2021-10-23 PROCEDURE — 85610 PROTHROMBIN TIME: CPT

## 2021-10-23 PROCEDURE — 36415 COLL VENOUS BLD VENIPUNCTURE: CPT

## 2021-10-23 PROCEDURE — C1894: CPT

## 2021-10-23 PROCEDURE — 36430 TRANSFUSION BLD/BLD COMPNT: CPT

## 2021-10-23 PROCEDURE — 82962 GLUCOSE BLOOD TEST: CPT

## 2021-10-23 PROCEDURE — 96375 TX/PRO/DX INJ NEW DRUG ADDON: CPT

## 2021-10-23 PROCEDURE — 0225U NFCT DS DNA&RNA 21 SARSCOV2: CPT

## 2021-10-23 PROCEDURE — 93975 VASCULAR STUDY: CPT

## 2021-10-23 PROCEDURE — 85018 HEMOGLOBIN: CPT

## 2021-10-23 PROCEDURE — 82947 ASSAY GLUCOSE BLOOD QUANT: CPT

## 2021-10-23 PROCEDURE — 87086 URINE CULTURE/COLONY COUNT: CPT

## 2021-10-23 PROCEDURE — 83550 IRON BINDING TEST: CPT

## 2021-10-23 PROCEDURE — 71045 X-RAY EXAM CHEST 1 VIEW: CPT

## 2021-10-23 PROCEDURE — 87641 MR-STAPH DNA AMP PROBE: CPT

## 2021-10-23 PROCEDURE — 86923 COMPATIBILITY TEST ELECTRIC: CPT

## 2021-10-23 PROCEDURE — 84100 ASSAY OF PHOSPHORUS: CPT

## 2021-10-23 PROCEDURE — 82803 BLOOD GASES ANY COMBINATION: CPT

## 2021-10-23 PROCEDURE — 74177 CT ABD & PELVIS W/CONTRAST: CPT

## 2021-10-23 PROCEDURE — 87150 DNA/RNA AMPLIFIED PROBE: CPT

## 2021-10-23 PROCEDURE — P9040: CPT

## 2021-10-23 PROCEDURE — 87040 BLOOD CULTURE FOR BACTERIA: CPT

## 2021-10-23 PROCEDURE — 71250 CT THORAX DX C-: CPT | Mod: MA

## 2021-10-23 PROCEDURE — 96374 THER/PROPH/DIAG INJ IV PUSH: CPT

## 2021-10-23 PROCEDURE — C1887: CPT

## 2021-10-23 PROCEDURE — 82330 ASSAY OF CALCIUM: CPT

## 2021-10-23 PROCEDURE — 82728 ASSAY OF FERRITIN: CPT

## 2021-10-23 PROCEDURE — 82435 ASSAY OF BLOOD CHLORIDE: CPT

## 2021-10-23 PROCEDURE — 83540 ASSAY OF IRON: CPT

## 2021-10-23 PROCEDURE — 87640 STAPH A DNA AMP PROBE: CPT

## 2021-10-23 PROCEDURE — 86901 BLOOD TYPING SEROLOGIC RH(D): CPT

## 2021-10-23 PROCEDURE — 36902 INTRO CATH DIALYSIS CIRCUIT: CPT

## 2021-10-23 PROCEDURE — 71260 CT THORAX DX C+: CPT

## 2021-10-23 PROCEDURE — 99261: CPT

## 2021-10-23 PROCEDURE — 76937 US GUIDE VASCULAR ACCESS: CPT

## 2021-10-23 PROCEDURE — U0005: CPT

## 2021-10-23 PROCEDURE — 86900 BLOOD TYPING SEROLOGIC ABO: CPT

## 2021-10-23 PROCEDURE — 84466 ASSAY OF TRANSFERRIN: CPT

## 2021-10-23 PROCEDURE — 87186 SC STD MICRODIL/AGAR DIL: CPT

## 2021-10-23 PROCEDURE — 83605 ASSAY OF LACTIC ACID: CPT

## 2021-10-23 PROCEDURE — 81001 URINALYSIS AUTO W/SCOPE: CPT

## 2021-10-23 PROCEDURE — 84132 ASSAY OF SERUM POTASSIUM: CPT

## 2021-10-23 PROCEDURE — 97161 PT EVAL LOW COMPLEX 20 MIN: CPT

## 2021-10-23 PROCEDURE — 85014 HEMATOCRIT: CPT

## 2021-10-23 PROCEDURE — C1725: CPT

## 2021-10-23 PROCEDURE — 83735 ASSAY OF MAGNESIUM: CPT

## 2021-10-23 PROCEDURE — 84295 ASSAY OF SERUM SODIUM: CPT

## 2021-10-23 PROCEDURE — 80048 BASIC METABOLIC PNL TOTAL CA: CPT

## 2021-10-23 PROCEDURE — 76870 US EXAM SCROTUM: CPT

## 2021-10-23 PROCEDURE — 86850 RBC ANTIBODY SCREEN: CPT

## 2021-10-23 PROCEDURE — 85025 COMPLETE CBC W/AUTO DIFF WBC: CPT

## 2021-10-23 PROCEDURE — C1769: CPT

## 2021-10-23 PROCEDURE — 80053 COMPREHEN METABOLIC PANEL: CPT

## 2021-10-23 PROCEDURE — 99285 EMERGENCY DEPT VISIT HI MDM: CPT

## 2021-10-23 PROCEDURE — 86769 SARS-COV-2 COVID-19 ANTIBODY: CPT

## 2021-10-23 PROCEDURE — 85027 COMPLETE CBC AUTOMATED: CPT

## 2021-10-23 PROCEDURE — U0003: CPT

## 2021-10-23 PROCEDURE — 85730 THROMBOPLASTIN TIME PARTIAL: CPT

## 2021-10-23 RX ORDER — CALCIUM ACETATE 667 MG
1 TABLET ORAL
Qty: 90 | Refills: 0
Start: 2021-10-23 | End: 2021-11-21

## 2021-10-23 RX ORDER — SEVELAMER CARBONATE 2400 MG/1
2 POWDER, FOR SUSPENSION ORAL
Qty: 0 | Refills: 0 | DISCHARGE
Start: 2021-10-23

## 2021-10-23 RX ADMIN — Medication 1 DROP(S): at 11:03

## 2021-10-23 RX ADMIN — Medication 1334 MILLIGRAM(S): at 08:41

## 2021-10-23 RX ADMIN — CHLORHEXIDINE GLUCONATE 1 APPLICATION(S): 213 SOLUTION TOPICAL at 12:47

## 2021-10-23 RX ADMIN — Medication 1 DROP(S): at 05:04

## 2021-10-23 RX ADMIN — Medication 100 MILLIGRAM(S): at 15:31

## 2021-10-23 RX ADMIN — Medication 81 MILLIGRAM(S): at 12:47

## 2021-10-23 RX ADMIN — ERYTHROPOIETIN 10000 UNIT(S): 10000 INJECTION, SOLUTION INTRAVENOUS; SUBCUTANEOUS at 12:06

## 2021-10-23 RX ADMIN — CARVEDILOL PHOSPHATE 25 MILLIGRAM(S): 80 CAPSULE, EXTENDED RELEASE ORAL at 11:03

## 2021-10-23 RX ADMIN — Medication 1 DROP(S): at 15:31

## 2021-10-23 RX ADMIN — SEVELAMER CARBONATE 1600 MILLIGRAM(S): 2400 POWDER, FOR SUSPENSION ORAL at 08:41

## 2021-10-23 RX ADMIN — HEPARIN SODIUM 5000 UNIT(S): 5000 INJECTION INTRAVENOUS; SUBCUTANEOUS at 05:04

## 2021-10-23 RX ADMIN — BUMETANIDE 1 MILLIGRAM(S): 0.25 INJECTION INTRAMUSCULAR; INTRAVENOUS at 05:05

## 2021-10-23 RX ADMIN — HEPARIN SODIUM 5000 UNIT(S): 5000 INJECTION INTRAVENOUS; SUBCUTANEOUS at 15:31

## 2021-10-23 RX ADMIN — Medication 1334 MILLIGRAM(S): at 17:23

## 2021-10-23 RX ADMIN — Medication 1334 MILLIGRAM(S): at 12:46

## 2021-10-23 RX ADMIN — Medication 1 DROP(S): at 17:22

## 2021-10-23 RX ADMIN — SEVELAMER CARBONATE 1600 MILLIGRAM(S): 2400 POWDER, FOR SUSPENSION ORAL at 12:47

## 2021-10-23 RX ADMIN — SEVELAMER CARBONATE 1600 MILLIGRAM(S): 2400 POWDER, FOR SUSPENSION ORAL at 17:23

## 2021-10-23 NOTE — PROGRESS NOTE ADULT - ASSESSMENT
ASSESSMENT/PLAN  80M with PMHx of HFpEF, ESRD, and HTN presenting with self-reported fever of 104 F in the setting of cystitis/prostatitis     1 ID-   Blood culture with gram neg rods/ ecoli.  ABX till 10/28   Send RX to [facility] for abx please      2 Renal- s/p AVF venoplasty and outflow vein thrombus balloon maturation on 10/8/21,   perm cath and the fistula is not ready to use.  HD today   Phoslo added     3 -Cont Flomax; 8 x 6 cm cystic lesion in the right scotum  Hx of hydrocele and appreciate Urology input - Subsequent scrotal US demonstrated rt hydrocele, no further intervention at this time.    4 Vasc follow up with Dr Dunn appreciated -   Patient is s/p recent AVF revision  by IR  - 10/8/2021  Repeat AVFgram +/- intervention ~2 wks post previous procedure.  Fistula not fit for use- will continue HD via permcath     NIGEL JacksonC  [facility]  [phone]

## 2021-10-23 NOTE — PROGRESS NOTE ADULT - REASON FOR ADMISSION
Fever

## 2021-10-23 NOTE — DISCHARGE NOTE NURSING/CASE MANAGEMENT/SOCIAL WORK - PATIENT PORTAL LINK FT
You can access the FollowMyHealth Patient Portal offered by [facility] by registering at the following website: [email or web address]. By joining [employer]’s FollowMyHealth portal, you will also be able to view your health information using other applications (apps) compatible with our system.

## 2021-10-23 NOTE — PROGRESS NOTE ADULT - SUBJECTIVE AND OBJECTIVE BOX
NEPHROLOGY     Patient seen and examined on HD. Pt reports feeling well, no complaints of pain, no sob, in no acute distress.    MEDICATIONS  (STANDING):  artificial  tears Solution 1 Drop(s) Left EYE every 4 hours  aspirin enteric coated 81 milliGRAM(s) Oral daily  buMETAnide 1 milliGRAM(s) Oral daily  calcium acetate 1334 milliGRAM(s) Oral three times a day with meals  carvedilol 25 milliGRAM(s) Oral every 12 hours  ceFAZolin   IVPB 1000 milliGRAM(s) IV Intermittent every 24 hours  chlorhexidine 4% Liquid 1 Application(s) Topical daily  darbepoetin Injectable ViaL 60 MICROGram(s) IV Push every 7 days  heparin   Injectable 5000 Unit(s) SubCutaneous every 8 hours  lidocaine/prilocaine Cream 1 Application(s) Topical <User Schedule>  petrolatum Ophthalmic Ointment 1 Application(s) Left EYE at bedtime  sevelamer carbonate 1600 milliGRAM(s) Oral three times a day with meals  tamsulosin 0.4 milliGRAM(s) Oral at bedtime    VITALS:  T(C): , Max: 36.9 (10-22-21 @ 19:49)  T(F): , Max: 98.4 (10-22-21 @ 19:49)  HR: 61 (10-23-21 @ 14:55)  BP: 148/77 (10-23-21 @ 14:55)  RR: 18 (10-23-21 @ 14:55)  SpO2: 98% (10-23-21 @ 14:55)    I and O's:    10-22 @ 07:01  -  10-23 @ 07:00  --------------------------------------------------------  IN: 970 mL / OUT: 0 mL / NET: 970 mL    10-23 @ 07:01  -  10-23 @ 17:40  --------------------------------------------------------  IN: 800 mL / OUT: 2800 mL / NET: -2000 mL    PHYSICAL EXAM:  Constitutional: NAD  Respiratory: CTA B/L  Cardiovascular: S1 and S2, RRR  Gastrointestinal: + BS, soft, NT, ND  Extremities: No peripheral edema  Neurological: AAO x 3, CN 2-12 intact  : No Aguilar   Access: perm cath and avf     LABS:                        7.8    7.75  )-----------( 227      ( 23 Oct 2021 07:19 )             25.2     10-23    133<L>  |  96  |  46<H>  ----------------------------<  88  4.9   |  20<L>  |  9.50<H>    Ca    8.7      23 Oct 2021 07:19    TPro  6.2  /  Alb  3.5  /  TBili  0.1<L>  /  DBili  x   /  AST  10  /  ALT  <5<L>  /  AlkPhos  94  10-22

## 2021-10-23 NOTE — PROGRESS NOTE ADULT - PROVIDER SPECIALTY LIST ADULT
Cardiology
Cardiology
Infectious Disease
Infectious Disease
Internal Medicine
Nephrology
Vascular Surgery
Cardiology
Internal Medicine
Nephrology
Cardiology
Infectious Disease
Internal Medicine
Intervent Radiology
Nephrology
Infectious Disease
Nephrology
Urology

## 2021-10-27 NOTE — ED ADULT NURSE NOTE - IS THE PATIENT ABLE TO BE SCREENED?
PRE-SEDATION ASSESSMENT    CONSENT  Risks, benefits, and alternatives have been discussed with the patient/patient representative, and patient/patient representative agrees to proceed: Yes    MEDICAL HISTORY       PHYSICAL EXAM  History and Physical Reviewed: Patient has valid H&P within 30 days. I have reviewed and there are no changes.  Airway Risk History: No previous history  Airway Anatomy : Class I  Heart : Normal  Lungs : Normal  LOC/Mental Status : Normal    OTHER FINDINGS  Reviewed current medications and allergies: Yes  Pertinent lab/diagnostic test reviewed: Yes    SEDATION RISK ASSESSMENT  Risk Status ASA: Class II - Normal patient with mild systemic disease  Plan for Sedation: Moderate Sedation  EKG Monitoring: Yes    NARRATIVE FINDINGS     
Yes

## 2021-11-08 PROBLEM — N18.6 END STAGE RENAL DISEASE: Chronic | Status: ACTIVE | Noted: 2021-10-05

## 2022-01-07 ENCOUNTER — APPOINTMENT (OUTPATIENT)
Dept: VASCULAR SURGERY | Facility: CLINIC | Age: 82
End: 2022-01-07

## 2022-06-07 NOTE — DISCHARGE NOTE ADULT - INSTRUCTIONS
Detail Level: Detailed
DASH/TLC
Patient Specific Counseling (Will Not Stick From Patient To Patient): Recommended a total body skin exam.

## 2022-10-22 ENCOUNTER — EMERGENCY (EMERGENCY)
Facility: HOSPITAL | Age: 82
LOS: 1 days | Discharge: HOME | End: 2022-10-22
Attending: EMERGENCY MEDICINE
Payer: COMMERCIAL

## 2022-10-22 VITALS
SYSTOLIC BLOOD PRESSURE: 137 MMHG | HEIGHT: 63 IN | DIASTOLIC BLOOD PRESSURE: 55 MMHG | OXYGEN SATURATION: 97 % | TEMPERATURE: 100 F | HEART RATE: 81 BPM | RESPIRATION RATE: 18 BRPM | WEIGHT: 139.99 LBS

## 2022-10-22 PROCEDURE — 99285 EMERGENCY DEPT VISIT HI MDM: CPT

## 2022-10-22 NOTE — ED ADULT TRIAGE NOTE - CHIEF COMPLAINT QUOTE
C/o subjective fever and cough since yesterday. Denies sick contacts. Denies NVD, CP, SOB. Received dialysis today

## 2022-10-22 NOTE — ED ADULT NURSE NOTE - OBJECTIVE STATEMENT
pt A&Ox3 states he has HD TTHSAT states he has been spiking a fever since Thursday which has been controlled with Tylenol. pt states before arrival he took Tylenol arrived to Emergency Department to be evaluated.

## 2022-10-23 VITALS
TEMPERATURE: 99 F | RESPIRATION RATE: 17 BRPM | SYSTOLIC BLOOD PRESSURE: 128 MMHG | DIASTOLIC BLOOD PRESSURE: 66 MMHG | HEART RATE: 89 BPM | OXYGEN SATURATION: 95 %

## 2022-10-23 LAB
ALBUMIN SERPL ELPH-MCNC: 4.2 G/DL — SIGNIFICANT CHANGE UP (ref 3.3–5)
ALP SERPL-CCNC: 106 U/L — SIGNIFICANT CHANGE UP (ref 40–120)
ALT FLD-CCNC: 11 U/L — SIGNIFICANT CHANGE UP (ref 10–45)
ANION GAP SERPL CALC-SCNC: 13 MMOL/L — SIGNIFICANT CHANGE UP (ref 5–17)
APTT BLD: 26.9 SEC — LOW (ref 27.5–35.5)
AST SERPL-CCNC: 24 U/L — SIGNIFICANT CHANGE UP (ref 10–40)
BASOPHILS # BLD AUTO: 0.05 K/UL — SIGNIFICANT CHANGE UP (ref 0–0.2)
BASOPHILS NFR BLD AUTO: 0.3 % — SIGNIFICANT CHANGE UP (ref 0–2)
BILIRUB SERPL-MCNC: 0.5 MG/DL — SIGNIFICANT CHANGE UP (ref 0.2–1.2)
BUN SERPL-MCNC: 22 MG/DL — SIGNIFICANT CHANGE UP (ref 7–23)
CALCIUM SERPL-MCNC: 9.4 MG/DL — SIGNIFICANT CHANGE UP (ref 8.4–10.5)
CHLORIDE SERPL-SCNC: 98 MMOL/L — SIGNIFICANT CHANGE UP (ref 96–108)
CO2 SERPL-SCNC: 29 MMOL/L — SIGNIFICANT CHANGE UP (ref 22–31)
CREAT SERPL-MCNC: 7.48 MG/DL — HIGH (ref 0.5–1.3)
EGFR: 7 ML/MIN/1.73M2 — LOW
EOSINOPHIL # BLD AUTO: 0.25 K/UL — SIGNIFICANT CHANGE UP (ref 0–0.5)
EOSINOPHIL NFR BLD AUTO: 1.5 % — SIGNIFICANT CHANGE UP (ref 0–6)
GAS PNL BLDV: SIGNIFICANT CHANGE UP
GLUCOSE SERPL-MCNC: 132 MG/DL — HIGH (ref 70–99)
HCT VFR BLD CALC: 31.7 % — LOW (ref 39–50)
HGB BLD-MCNC: 9.5 G/DL — LOW (ref 13–17)
IMM GRANULOCYTES NFR BLD AUTO: 0.6 % — SIGNIFICANT CHANGE UP (ref 0–0.9)
INR BLD: 1.34 RATIO — HIGH (ref 0.88–1.16)
LYMPHOCYTES # BLD AUTO: 1.59 K/UL — SIGNIFICANT CHANGE UP (ref 1–3.3)
LYMPHOCYTES # BLD AUTO: 9.8 % — LOW (ref 13–44)
MCHC RBC-ENTMCNC: 26.8 PG — LOW (ref 27–34)
MCHC RBC-ENTMCNC: 30 GM/DL — LOW (ref 32–36)
MCV RBC AUTO: 89.5 FL — SIGNIFICANT CHANGE UP (ref 80–100)
MONOCYTES # BLD AUTO: 1.55 K/UL — HIGH (ref 0–0.9)
MONOCYTES NFR BLD AUTO: 9.6 % — SIGNIFICANT CHANGE UP (ref 2–14)
NEUTROPHILS # BLD AUTO: 12.65 K/UL — HIGH (ref 1.8–7.4)
NEUTROPHILS NFR BLD AUTO: 78.2 % — HIGH (ref 43–77)
NRBC # BLD: 0 /100 WBCS — SIGNIFICANT CHANGE UP (ref 0–0)
PLATELET # BLD AUTO: 181 K/UL — SIGNIFICANT CHANGE UP (ref 150–400)
POTASSIUM SERPL-MCNC: 4.6 MMOL/L — SIGNIFICANT CHANGE UP (ref 3.5–5.3)
POTASSIUM SERPL-SCNC: 4.6 MMOL/L — SIGNIFICANT CHANGE UP (ref 3.5–5.3)
PROT SERPL-MCNC: 8.2 G/DL — SIGNIFICANT CHANGE UP (ref 6–8.3)
PROTHROM AB SERPL-ACNC: 15.5 SEC — HIGH (ref 10.5–13.4)
RAPID RVP RESULT: SIGNIFICANT CHANGE UP
RBC # BLD: 3.54 M/UL — LOW (ref 4.2–5.8)
RBC # FLD: 18 % — HIGH (ref 10.3–14.5)
SARS-COV-2 RNA SPEC QL NAA+PROBE: SIGNIFICANT CHANGE UP
SODIUM SERPL-SCNC: 140 MMOL/L — SIGNIFICANT CHANGE UP (ref 135–145)
WBC # BLD: 16.19 K/UL — HIGH (ref 3.8–10.5)
WBC # FLD AUTO: 16.19 K/UL — HIGH (ref 3.8–10.5)

## 2022-10-23 PROCEDURE — 85025 COMPLETE CBC W/AUTO DIFF WBC: CPT

## 2022-10-23 PROCEDURE — 82947 ASSAY GLUCOSE BLOOD QUANT: CPT

## 2022-10-23 PROCEDURE — 0225U NFCT DS DNA&RNA 21 SARSCOV2: CPT

## 2022-10-23 PROCEDURE — 82330 ASSAY OF CALCIUM: CPT

## 2022-10-23 PROCEDURE — 80053 COMPREHEN METABOLIC PANEL: CPT

## 2022-10-23 PROCEDURE — 84295 ASSAY OF SERUM SODIUM: CPT

## 2022-10-23 PROCEDURE — 99285 EMERGENCY DEPT VISIT HI MDM: CPT | Mod: 25

## 2022-10-23 PROCEDURE — 71046 X-RAY EXAM CHEST 2 VIEWS: CPT

## 2022-10-23 PROCEDURE — 85730 THROMBOPLASTIN TIME PARTIAL: CPT

## 2022-10-23 PROCEDURE — 85018 HEMOGLOBIN: CPT

## 2022-10-23 PROCEDURE — 36415 COLL VENOUS BLD VENIPUNCTURE: CPT

## 2022-10-23 PROCEDURE — 85610 PROTHROMBIN TIME: CPT

## 2022-10-23 PROCEDURE — 85014 HEMATOCRIT: CPT

## 2022-10-23 PROCEDURE — 82803 BLOOD GASES ANY COMBINATION: CPT

## 2022-10-23 PROCEDURE — 82435 ASSAY OF BLOOD CHLORIDE: CPT

## 2022-10-23 PROCEDURE — 83605 ASSAY OF LACTIC ACID: CPT

## 2022-10-23 PROCEDURE — 84132 ASSAY OF SERUM POTASSIUM: CPT

## 2022-10-23 PROCEDURE — 93005 ELECTROCARDIOGRAM TRACING: CPT

## 2022-10-23 PROCEDURE — 87040 BLOOD CULTURE FOR BACTERIA: CPT

## 2022-10-23 PROCEDURE — 71046 X-RAY EXAM CHEST 2 VIEWS: CPT | Mod: 26

## 2022-10-23 RX ORDER — ACETAMINOPHEN 500 MG
975 TABLET ORAL ONCE
Refills: 0 | Status: COMPLETED | OUTPATIENT
Start: 2022-10-23 | End: 2022-10-23

## 2022-10-23 RX ADMIN — Medication 975 MILLIGRAM(S): at 00:46

## 2022-10-23 NOTE — ED PROVIDER NOTE - CLINICAL SUMMARY MEDICAL DECISION MAKING FREE TEXT BOX
81-year-old male presents with fever.  Also has cough.  Currently well-appearing.  We will send cultures, RVP, chest x-ray and reassess

## 2022-10-23 NOTE — ED PROVIDER NOTE - NSFOLLOWUPINSTRUCTIONS_ED_ALL_ED_FT
You were seen in the Emergency Department for respiratory symptoms. Lab and imaging results, if performed, were discussed with you along with your discharge diagnosis.    Follow up with your doctor in 1 week - bring copies of your results if you were given. If you do not have a primary doctor, please call [phone] to find one convenient for you.    Continue all home meds.     To control your pain at home, you should take Tylenol 650mg-1000mg every 6 to 8 hours. Limit your maximum daily Tylenol from all sources to 4000mg. Be aware that many other medications contain acetaminophen which is also known as Tylenol. Taking Tylenol and Ibuprofen together has been shown to be more effective at relieving pain than taking them separately. These are both over the counter medications that you can  at your local pharmacy without a prescription. You need to respect all of the warnings on the bottles. You shouldn’t take these medications for more than a week without following up with your doctor. Both medications come with certain risks and side effects that you need to discuss with your doctor, especially if you are taking them for a prolonged period.    Return to ED for any new or worsening symptoms including but not limited to: development of chest pain, shortness of breath, fever, vomiting, focal numbness, weakness or tingling, any severe CP, headache, abdominal pain, back pain.      Rest and keep yourself hydrated with fluids

## 2022-10-23 NOTE — ED PROVIDER NOTE - PROGRESS NOTE DETAILS
Mike Torres MD (PGY2): Patient feeling well. Lab work non-actionable. Awaiting family to pick him up.

## 2022-10-23 NOTE — ED PROVIDER NOTE - PATIENT PORTAL LINK FT
You can access the FollowMyHealth Patient Portal offered by [facility] by registering at the following website: [email or web address]. By joining [employer]’s FollowMyHealth portal, you will also be able to view your health information using other applications (apps) compatible with our system.

## 2022-10-23 NOTE — ED ADULT NURSE REASSESSMENT NOTE - NS ED NURSE REASSESS COMMENT FT1
Patient A&Ox4, breathing spontaneous and unlabored- no complaints, vitals stable. Spoke with wife Alona with patient's consent to come pick him up for discharge. States she will be here in 15 mins

## 2022-10-23 NOTE — ED PROVIDER NOTE - OBJECTIVE STATEMENT
Attend[age]-year-old male with history of ESRD on hemodialysis  presents with intermittent fevers since Wednesday of this week.  Patient also endorses cough.  No shortness of breath.  No nausea or vomiting.  No pain anywhere.  Denies abdominal pain.  Tolerating p.o. well.  No nausea vomiting or diarrhea.  Patient does not make urine.  Has not missed any dialysis sessions, last dialysis session was earlier today.  Last took acetaminophen yesterday

## 2022-10-28 LAB
CULTURE RESULTS: SIGNIFICANT CHANGE UP
CULTURE RESULTS: SIGNIFICANT CHANGE UP
SPECIMEN SOURCE: SIGNIFICANT CHANGE UP
SPECIMEN SOURCE: SIGNIFICANT CHANGE UP

## 2022-11-16 ENCOUNTER — INPATIENT (INPATIENT)
Facility: HOSPITAL | Age: 82
LOS: 10 days | Discharge: HOME | DRG: 871 | End: 2022-11-27
Attending: INTERNAL MEDICINE | Admitting: INTERNAL MEDICINE
Payer: COMMERCIAL

## 2022-11-16 VITALS
RESPIRATION RATE: 18 BRPM | WEIGHT: 164.02 LBS | HEIGHT: 60 IN | DIASTOLIC BLOOD PRESSURE: 76 MMHG | HEART RATE: 118 BPM | SYSTOLIC BLOOD PRESSURE: 171 MMHG | OXYGEN SATURATION: 97 % | TEMPERATURE: 101 F

## 2022-11-16 DIAGNOSIS — R50.9 FEVER, UNSPECIFIED: ICD-10-CM

## 2022-11-16 LAB
ALBUMIN SERPL ELPH-MCNC: 4.1 G/DL — SIGNIFICANT CHANGE UP (ref 3.3–5)
ALP SERPL-CCNC: 124 U/L — HIGH (ref 40–120)
ALT FLD-CCNC: 16 U/L — SIGNIFICANT CHANGE UP (ref 10–45)
ANION GAP SERPL CALC-SCNC: 14 MMOL/L — SIGNIFICANT CHANGE UP (ref 5–17)
ANISOCYTOSIS BLD QL: SIGNIFICANT CHANGE UP
AST SERPL-CCNC: 52 U/L — HIGH (ref 10–40)
BASE EXCESS BLDV CALC-SCNC: 4.4 MMOL/L — HIGH (ref -2–3)
BASOPHILS # BLD AUTO: 0 K/UL — SIGNIFICANT CHANGE UP (ref 0–0.2)
BASOPHILS NFR BLD AUTO: 0 % — SIGNIFICANT CHANGE UP (ref 0–2)
BILIRUB SERPL-MCNC: 0.3 MG/DL — SIGNIFICANT CHANGE UP (ref 0.2–1.2)
BUN SERPL-MCNC: 40 MG/DL — HIGH (ref 7–23)
CA-I SERPL-SCNC: 1.17 MMOL/L — SIGNIFICANT CHANGE UP (ref 1.15–1.33)
CALCIUM SERPL-MCNC: 9.4 MG/DL — SIGNIFICANT CHANGE UP (ref 8.4–10.5)
CHLORIDE BLDV-SCNC: 99 MMOL/L — SIGNIFICANT CHANGE UP (ref 96–108)
CHLORIDE SERPL-SCNC: 97 MMOL/L — SIGNIFICANT CHANGE UP (ref 96–108)
CO2 BLDV-SCNC: 32 MMOL/L — HIGH (ref 22–26)
CO2 SERPL-SCNC: 27 MMOL/L — SIGNIFICANT CHANGE UP (ref 22–31)
CREAT SERPL-MCNC: 9.17 MG/DL — HIGH (ref 0.5–1.3)
EGFR: 5 ML/MIN/1.73M2 — LOW
EOSINOPHIL # BLD AUTO: 0 K/UL — SIGNIFICANT CHANGE UP (ref 0–0.5)
EOSINOPHIL NFR BLD AUTO: 0 % — SIGNIFICANT CHANGE UP (ref 0–6)
FLUAV AG NPH QL: SIGNIFICANT CHANGE UP
FLUBV AG NPH QL: SIGNIFICANT CHANGE UP
GAS PNL BLDV: 136 MMOL/L — SIGNIFICANT CHANGE UP (ref 136–145)
GAS PNL BLDV: SIGNIFICANT CHANGE UP
GAS PNL BLDV: SIGNIFICANT CHANGE UP
GLUCOSE BLDV-MCNC: 83 MG/DL — SIGNIFICANT CHANGE UP (ref 70–99)
GLUCOSE SERPL-MCNC: 76 MG/DL — SIGNIFICANT CHANGE UP (ref 70–99)
HCO3 BLDV-SCNC: 31 MMOL/L — HIGH (ref 22–29)
HCT VFR BLD CALC: 33.7 % — LOW (ref 39–50)
HCT VFR BLDA CALC: 32 % — LOW (ref 39–51)
HGB BLD CALC-MCNC: 10.6 G/DL — LOW (ref 12.6–17.4)
HGB BLD-MCNC: 10.2 G/DL — LOW (ref 13–17)
HYPOCHROMIA BLD QL: SLIGHT — SIGNIFICANT CHANGE UP
LACTATE BLDV-MCNC: 2.2 MMOL/L — HIGH (ref 0.5–2)
LYMPHOCYTES # BLD AUTO: 12.8 % — LOW (ref 13–44)
LYMPHOCYTES # BLD AUTO: 2.18 K/UL — SIGNIFICANT CHANGE UP (ref 1–3.3)
MACROCYTES BLD QL: SIGNIFICANT CHANGE UP
MANUAL SMEAR VERIFICATION: SIGNIFICANT CHANGE UP
MCHC RBC-ENTMCNC: 26.3 PG — LOW (ref 27–34)
MCHC RBC-ENTMCNC: 30.3 GM/DL — LOW (ref 32–36)
MCV RBC AUTO: 86.9 FL — SIGNIFICANT CHANGE UP (ref 80–100)
MONOCYTES # BLD AUTO: 2.47 K/UL — HIGH (ref 0–0.9)
MONOCYTES NFR BLD AUTO: 14.5 % — HIGH (ref 2–14)
NEUTROPHILS # BLD AUTO: 12.36 K/UL — HIGH (ref 1.8–7.4)
NEUTROPHILS NFR BLD AUTO: 72.7 % — SIGNIFICANT CHANGE UP (ref 43–77)
NRBC # BLD: 1 /100 — HIGH (ref 0–0)
PCO2 BLDV: 53 MMHG — SIGNIFICANT CHANGE UP (ref 42–55)
PH BLDV: 7.37 — SIGNIFICANT CHANGE UP (ref 7.32–7.43)
PLAT MORPH BLD: NORMAL — SIGNIFICANT CHANGE UP
PLATELET # BLD AUTO: 215 K/UL — SIGNIFICANT CHANGE UP (ref 150–400)
PO2 BLDV: 21 MMHG — LOW (ref 25–45)
POIKILOCYTOSIS BLD QL AUTO: SLIGHT — SIGNIFICANT CHANGE UP
POLYCHROMASIA BLD QL SMEAR: SLIGHT — SIGNIFICANT CHANGE UP
POTASSIUM BLDV-SCNC: 4.7 MMOL/L — SIGNIFICANT CHANGE UP (ref 3.5–5.1)
POTASSIUM SERPL-MCNC: 5.9 MMOL/L — HIGH (ref 3.5–5.3)
POTASSIUM SERPL-SCNC: 5.9 MMOL/L — HIGH (ref 3.5–5.3)
PROT SERPL-MCNC: 8.1 G/DL — SIGNIFICANT CHANGE UP (ref 6–8.3)
RBC # BLD: 3.88 M/UL — LOW (ref 4.2–5.8)
RBC # FLD: 17.5 % — HIGH (ref 10.3–14.5)
RBC BLD AUTO: ABNORMAL
RSV RNA NPH QL NAA+NON-PROBE: SIGNIFICANT CHANGE UP
SAO2 % BLDV: 24.1 % — LOW (ref 67–88)
SARS-COV-2 RNA SPEC QL NAA+PROBE: SIGNIFICANT CHANGE UP
SODIUM SERPL-SCNC: 138 MMOL/L — SIGNIFICANT CHANGE UP (ref 135–145)
TARGETS BLD QL SMEAR: SLIGHT — SIGNIFICANT CHANGE UP
WBC # BLD: 17 K/UL — HIGH (ref 3.8–10.5)
WBC # FLD AUTO: 17 K/UL — HIGH (ref 3.8–10.5)

## 2022-11-16 PROCEDURE — 99285 EMERGENCY DEPT VISIT HI MDM: CPT

## 2022-11-16 PROCEDURE — 71045 X-RAY EXAM CHEST 1 VIEW: CPT | Mod: 26

## 2022-11-16 RX ORDER — AZITHROMYCIN 500 MG/1
500 TABLET, FILM COATED ORAL ONCE
Refills: 0 | Status: COMPLETED | OUTPATIENT
Start: 2022-11-16 | End: 2022-11-16

## 2022-11-16 RX ORDER — CEFTRIAXONE 500 MG/1
1000 INJECTION, POWDER, FOR SOLUTION INTRAMUSCULAR; INTRAVENOUS ONCE
Refills: 0 | Status: COMPLETED | OUTPATIENT
Start: 2022-11-16 | End: 2022-11-16

## 2022-11-16 RX ORDER — SODIUM CHLORIDE 9 MG/ML
1000 INJECTION INTRAMUSCULAR; INTRAVENOUS; SUBCUTANEOUS ONCE
Refills: 0 | Status: DISCONTINUED | OUTPATIENT
Start: 2022-11-16 | End: 2022-11-16

## 2022-11-16 RX ORDER — ACETAMINOPHEN 500 MG
975 TABLET ORAL ONCE
Refills: 0 | Status: COMPLETED | OUTPATIENT
Start: 2022-11-16 | End: 2022-11-16

## 2022-11-16 RX ADMIN — Medication 975 MILLIGRAM(S): at 20:15

## 2022-11-16 RX ADMIN — CEFTRIAXONE 100 MILLIGRAM(S): 500 INJECTION, POWDER, FOR SOLUTION INTRAMUSCULAR; INTRAVENOUS at 23:44

## 2022-11-16 NOTE — ED PROVIDER NOTE - PROGRESS NOTE DETAILS
Patient urinated in ED   [facility] PGY 2 Will admit patient given hypoxia and fever and prior bacteremia.

## 2022-11-16 NOTE — ED ADULT NURSE REASSESSMENT NOTE - NS ED NURSE REASSESS COMMENT FT1
attempted to straight cath pt for residual urine, unable to obtain urine sample, coudée catheter used, unable to obtain sample

## 2022-11-16 NOTE — ED ADULT NURSE NOTE - OBJECTIVE STATEMENT
PT is a 82 year old male with Urinary retention x3-4 days. Dialysis Tuesday, Thursday, Saturday; last treatment yesterday.  Pt c/o urinary symptoms, burning, and urgency with very little urine output.

## 2022-11-16 NOTE — ED PROVIDER NOTE - CLINICAL SUMMARY MEDICAL DECISION MAKING FREE TEXT BOX
82-year-old male history of end-stage renal disease on dialysis BPH with presenting with urinary retention.  Given history and physical concern for possible UTI will place Aguilar assess urine obtain lab given hypoxia febrile and white count possibility for upper respiratory infection as well.

## 2022-11-16 NOTE — ED PROVIDER NOTE - PHYSICAL EXAMINATION
GENERAL: Awake, alert, NAD  LUNGS: CTAB, no wheezes or crackles   CARDIAC: RRR, no m/r/g  ABDOMEN: Soft, non tender, non distended, no rebound, no guarding  EXT: No edema, no calf tenderness, 2+ DP pulses bilaterally, no deformities.  NEURO: A&Ox3. Moving all extremities.  SKIN: Warm and dry. No rash.  PSYCH: Normal affect.     pt was hypoxic mildly 93 transiently not in any resp distress

## 2022-11-16 NOTE — ED PROVIDER NOTE - ATTENDING CONTRIBUTION TO CARE
Patient is an 83 yo M with history of HFpEF, ESRD on HD T/H/S, last had HD yesterday (Tuesday), and HTN, BPH here for evaluation of weakness, fatigue, decreased urination for 5 days (at baseline he makes drops 3-4 times a day), fever, cough, myalgias. He denies vomiting, diarrhea. No known sick contacts. He is vaccinated for COVID19 x 3. Patient has had a UTI in the past, with blood culture growing E. Coli.     VS noted  Gen. no acute distress, Non toxic   HEENT: EOMI, mmm  Lungs: CTAB/L no C/ W /R   CVS: RRR   Abd; Soft non tender, non distended, no CVA tenderness bilaterally  Ext: no edema  Skin: no rash  Neuro AAOx3 non focal clear speech  a/p: fever, myalgias, weakness - plan to evaluate for source. Will send flu/ covid, CXR, labs, u/a.   - Allyn GRANDE

## 2022-11-16 NOTE — ED PROVIDER NOTE - RAPID ASSESSMENT
PCP: Melvin Jaramillo, 83 y/o M w/ PMHx HTN, DM and ESRD, on dialysis (Tue/Thur/Sat), last treatment yesterday, presents to the ED c/o urinary retention x4days. Denies any other acute complaints.     Mallorie MARIE (Scribe) have documented this rapid assessment note under the dictation of Robert Reynolds)  which has been reviewed and affirmed to be accurate. Patient was seen as a QDOC patient. The patient will be seen and further worked up in the main emergency department and their care will be completed by the main emergency department team along with a thorough physical exam. Receiving team will follow up on labs, analgesia, any clinical imaging, reassess and disposition as clinically indicated, all decisions regarding the progression of care will be made at their discretion. PCP: Melvin Jaramillo, 81 y/o M w/ PMHx HTN, DM and ESRD, on dialysis (Tue/Thur/Sat), last treatment yesterday, presents to the ED c/o urinary retention x4days. Denies any other acute complaints. PE Adult male awake alert looking mildy uncomfortable.   Robert Reynolds MD, [place]     I, Mallorie Fink (Scribe) have documented this rapid assessment note under the dictation of Robert Reynolds (MD)  which has been reviewed and affirmed to be accurate. Patient was seen as a QDOC patient. The patient will be seen and further worked up in the main emergency department and their care will be completed by the main emergency department team along with a thorough physical exam. Receiving team will follow up on labs, analgesia, any clinical imaging, reassess and disposition as clinically indicated, all decisions regarding the progression of care will be made at their discretion.    Pt seen in  triage as Q-doc with scribe, full evaulation/H&P to be performed once pt is transferred to main ED  Robert Reynolds MD, [place]

## 2022-11-16 NOTE — ED PROVIDER NOTE - OBJECTIVE STATEMENT
82-year-old male history of CKD on dialysis Tuesday Thursday saturday History of BPH last treatment yesterday got 2 L off to completion.  Complains he has not been able to urinate since Saturday.  Normally he is able to pee 3-4 times a day.  While in the room patient urinated.  Also try to assess with bladder scan but was unable to get a calculation.  States he is also had a fever since yesterday T-max 102 questionably took Tylenol patient states he did not take down today but wife present in the room states he did take Tylenol today earlier this morning.

## 2022-11-17 DIAGNOSIS — I10 ESSENTIAL (PRIMARY) HYPERTENSION: ICD-10-CM

## 2022-11-17 DIAGNOSIS — Z29.9 ENCOUNTER FOR PROPHYLACTIC MEASURES, UNSPECIFIED: ICD-10-CM

## 2022-11-17 DIAGNOSIS — Z87.438 PERSONAL HISTORY OF OTHER DISEASES OF MALE GENITAL ORGANS: ICD-10-CM

## 2022-11-17 DIAGNOSIS — N18.6 END STAGE RENAL DISEASE: ICD-10-CM

## 2022-11-17 DIAGNOSIS — A41.9 SEPSIS, UNSPECIFIED ORGANISM: ICD-10-CM

## 2022-11-17 DIAGNOSIS — N39.0 URINARY TRACT INFECTION, SITE NOT SPECIFIED: ICD-10-CM

## 2022-11-17 LAB
ALBUMIN SERPL ELPH-MCNC: 3.2 G/DL — LOW (ref 3.3–5)
ALP SERPL-CCNC: 103 U/L — SIGNIFICANT CHANGE UP (ref 40–120)
ALT FLD-CCNC: 5 U/L — LOW (ref 10–45)
ANION GAP SERPL CALC-SCNC: 14 MMOL/L — SIGNIFICANT CHANGE UP (ref 5–17)
AST SERPL-CCNC: 8 U/L — LOW (ref 10–40)
BILIRUB SERPL-MCNC: 0.3 MG/DL — SIGNIFICANT CHANGE UP (ref 0.2–1.2)
BUN SERPL-MCNC: 43 MG/DL — HIGH (ref 7–23)
CALCIUM SERPL-MCNC: 8.7 MG/DL — SIGNIFICANT CHANGE UP (ref 8.4–10.5)
CHLORIDE SERPL-SCNC: 100 MMOL/L — SIGNIFICANT CHANGE UP (ref 96–108)
CHOLEST SERPL-MCNC: 117 MG/DL — SIGNIFICANT CHANGE UP
CO2 SERPL-SCNC: 25 MMOL/L — SIGNIFICANT CHANGE UP (ref 22–31)
CREAT SERPL-MCNC: 10.36 MG/DL — HIGH (ref 0.5–1.3)
EGFR: 5 ML/MIN/1.73M2 — LOW
GLUCOSE SERPL-MCNC: 76 MG/DL — SIGNIFICANT CHANGE UP (ref 70–99)
HCT VFR BLD CALC: 28.2 % — LOW (ref 39–50)
HDLC SERPL-MCNC: 51 MG/DL — SIGNIFICANT CHANGE UP
HGB BLD-MCNC: 8.5 G/DL — LOW (ref 13–17)
INR BLD: 1.25 RATIO — HIGH (ref 0.88–1.16)
LIPID PNL WITH DIRECT LDL SERPL: 47 MG/DL — SIGNIFICANT CHANGE UP
MCHC RBC-ENTMCNC: 26.1 PG — LOW (ref 27–34)
MCHC RBC-ENTMCNC: 30.1 GM/DL — LOW (ref 32–36)
MCV RBC AUTO: 86.5 FL — SIGNIFICANT CHANGE UP (ref 80–100)
NON HDL CHOLESTEROL: 66 MG/DL — SIGNIFICANT CHANGE UP
NRBC # BLD: 0 /100 WBCS — SIGNIFICANT CHANGE UP (ref 0–0)
PLATELET # BLD AUTO: 166 K/UL — SIGNIFICANT CHANGE UP (ref 150–400)
POTASSIUM SERPL-MCNC: 4.7 MMOL/L — SIGNIFICANT CHANGE UP (ref 3.5–5.3)
POTASSIUM SERPL-SCNC: 4.7 MMOL/L — SIGNIFICANT CHANGE UP (ref 3.5–5.3)
PROT SERPL-MCNC: 6.6 G/DL — SIGNIFICANT CHANGE UP (ref 6–8.3)
PROTHROM AB SERPL-ACNC: 14.5 SEC — HIGH (ref 10.5–13.4)
RBC # BLD: 3.26 M/UL — LOW (ref 4.2–5.8)
RBC # FLD: 17.2 % — HIGH (ref 10.3–14.5)
SODIUM SERPL-SCNC: 139 MMOL/L — SIGNIFICANT CHANGE UP (ref 135–145)
TRIGL SERPL-MCNC: 94 MG/DL — SIGNIFICANT CHANGE UP
WBC # BLD: 13.55 K/UL — HIGH (ref 3.8–10.5)
WBC # FLD AUTO: 13.55 K/UL — HIGH (ref 3.8–10.5)

## 2022-11-17 PROCEDURE — 76770 US EXAM ABDO BACK WALL COMP: CPT | Mod: 26

## 2022-11-17 PROCEDURE — 99223 1ST HOSP IP/OBS HIGH 75: CPT

## 2022-11-17 RX ORDER — HYDRALAZINE HCL 50 MG
10 TABLET ORAL THREE TIMES A DAY
Refills: 0 | Status: DISCONTINUED | OUTPATIENT
Start: 2022-11-17 | End: 2022-11-18

## 2022-11-17 RX ORDER — INSULIN HUMAN 100 [IU]/ML
5 INJECTION, SOLUTION SUBCUTANEOUS ONCE
Refills: 0 | Status: COMPLETED | OUTPATIENT
Start: 2022-11-17 | End: 2022-11-17

## 2022-11-17 RX ORDER — SODIUM ZIRCONIUM CYCLOSILICATE 10 G/10G
10 POWDER, FOR SUSPENSION ORAL ONCE
Refills: 0 | Status: COMPLETED | OUTPATIENT
Start: 2022-11-17 | End: 2022-11-17

## 2022-11-17 RX ORDER — ONDANSETRON 8 MG/1
4 TABLET, FILM COATED ORAL EVERY 8 HOURS
Refills: 0 | Status: DISCONTINUED | OUTPATIENT
Start: 2022-11-17 | End: 2022-11-27

## 2022-11-17 RX ORDER — CHLORHEXIDINE GLUCONATE 213 G/1000ML
1 SOLUTION TOPICAL DAILY
Refills: 0 | Status: DISCONTINUED | OUTPATIENT
Start: 2022-11-17 | End: 2022-11-27

## 2022-11-17 RX ORDER — HEPARIN SODIUM 5000 [USP'U]/ML
5000 INJECTION INTRAVENOUS; SUBCUTANEOUS EVERY 12 HOURS
Refills: 0 | Status: DISCONTINUED | OUTPATIENT
Start: 2022-11-17 | End: 2022-11-27

## 2022-11-17 RX ORDER — AZITHROMYCIN 500 MG/1
250 TABLET, FILM COATED ORAL EVERY 24 HOURS
Refills: 0 | Status: DISCONTINUED | OUTPATIENT
Start: 2022-11-17 | End: 2022-11-17

## 2022-11-17 RX ORDER — ASPIRIN/CALCIUM CARB/MAGNESIUM 324 MG
81 TABLET ORAL DAILY
Refills: 0 | Status: DISCONTINUED | OUTPATIENT
Start: 2022-11-17 | End: 2022-11-27

## 2022-11-17 RX ORDER — SODIUM CHLORIDE 9 MG/ML
1000 INJECTION INTRAMUSCULAR; INTRAVENOUS; SUBCUTANEOUS ONCE
Refills: 0 | Status: COMPLETED | OUTPATIENT
Start: 2022-11-17 | End: 2022-11-17

## 2022-11-17 RX ORDER — SEVELAMER CARBONATE 2400 MG/1
800 POWDER, FOR SUSPENSION ORAL THREE TIMES A DAY
Refills: 0 | Status: DISCONTINUED | OUTPATIENT
Start: 2022-11-17 | End: 2022-11-27

## 2022-11-17 RX ORDER — BUMETANIDE 0.25 MG/ML
1 INJECTION INTRAMUSCULAR; INTRAVENOUS
Qty: 0 | Refills: 0 | DISCHARGE

## 2022-11-17 RX ORDER — ERYTHROPOIETIN 10000 [IU]/ML
14000 INJECTION, SOLUTION INTRAVENOUS; SUBCUTANEOUS ONCE
Refills: 0 | Status: COMPLETED | OUTPATIENT
Start: 2022-11-17 | End: 2022-11-17

## 2022-11-17 RX ORDER — ACETAMINOPHEN 500 MG
650 TABLET ORAL EVERY 6 HOURS
Refills: 0 | Status: DISCONTINUED | OUTPATIENT
Start: 2022-11-17 | End: 2022-11-27

## 2022-11-17 RX ORDER — LANOLIN ALCOHOL/MO/W.PET/CERES
3 CREAM (GRAM) TOPICAL AT BEDTIME
Refills: 0 | Status: DISCONTINUED | OUTPATIENT
Start: 2022-11-17 | End: 2022-11-27

## 2022-11-17 RX ORDER — DEXTROSE 50 % IN WATER 50 %
25 SYRINGE (ML) INTRAVENOUS ONCE
Refills: 0 | Status: COMPLETED | OUTPATIENT
Start: 2022-11-17 | End: 2022-11-17

## 2022-11-17 RX ORDER — SODIUM CHLORIDE 9 MG/ML
1000 INJECTION INTRAMUSCULAR; INTRAVENOUS; SUBCUTANEOUS
Refills: 0 | Status: DISCONTINUED | OUTPATIENT
Start: 2022-11-17 | End: 2022-11-27

## 2022-11-17 RX ORDER — TAMSULOSIN HYDROCHLORIDE 0.4 MG/1
0.4 CAPSULE ORAL AT BEDTIME
Refills: 0 | Status: DISCONTINUED | OUTPATIENT
Start: 2022-11-17 | End: 2022-11-19

## 2022-11-17 RX ORDER — CEFTRIAXONE 500 MG/1
1000 INJECTION, POWDER, FOR SOLUTION INTRAMUSCULAR; INTRAVENOUS EVERY 24 HOURS
Refills: 0 | Status: COMPLETED | OUTPATIENT
Start: 2022-11-17 | End: 2022-11-19

## 2022-11-17 RX ADMIN — HEPARIN SODIUM 5000 UNIT(S): 5000 INJECTION INTRAVENOUS; SUBCUTANEOUS at 20:43

## 2022-11-17 RX ADMIN — HEPARIN SODIUM 5000 UNIT(S): 5000 INJECTION INTRAVENOUS; SUBCUTANEOUS at 05:24

## 2022-11-17 RX ADMIN — CEFTRIAXONE 100 MILLIGRAM(S): 500 INJECTION, POWDER, FOR SOLUTION INTRAMUSCULAR; INTRAVENOUS at 23:45

## 2022-11-17 RX ADMIN — CHLORHEXIDINE GLUCONATE 1 APPLICATION(S): 213 SOLUTION TOPICAL at 11:16

## 2022-11-17 RX ADMIN — Medication 25 MILLILITER(S): at 06:47

## 2022-11-17 RX ADMIN — SODIUM ZIRCONIUM CYCLOSILICATE 10 GRAM(S): 10 POWDER, FOR SUSPENSION ORAL at 07:28

## 2022-11-17 RX ADMIN — Medication 81 MILLIGRAM(S): at 11:15

## 2022-11-17 RX ADMIN — TAMSULOSIN HYDROCHLORIDE 0.4 MILLIGRAM(S): 0.4 CAPSULE ORAL at 21:53

## 2022-11-17 RX ADMIN — SODIUM CHLORIDE 1000 MILLILITER(S): 9 INJECTION INTRAMUSCULAR; INTRAVENOUS; SUBCUTANEOUS at 03:06

## 2022-11-17 RX ADMIN — Medication 1 DROP(S): at 11:15

## 2022-11-17 RX ADMIN — Medication 10 MILLIGRAM(S): at 05:23

## 2022-11-17 RX ADMIN — Medication 10 MILLIGRAM(S): at 21:53

## 2022-11-17 RX ADMIN — SEVELAMER CARBONATE 800 MILLIGRAM(S): 2400 POWDER, FOR SUSPENSION ORAL at 05:23

## 2022-11-17 RX ADMIN — SODIUM CHLORIDE 75 MILLILITER(S): 9 INJECTION INTRAMUSCULAR; INTRAVENOUS; SUBCUTANEOUS at 05:25

## 2022-11-17 RX ADMIN — Medication 650 MILLIGRAM(S): at 23:46

## 2022-11-17 RX ADMIN — ERYTHROPOIETIN 14000 UNIT(S): 10000 INJECTION, SOLUTION INTRAVENOUS; SUBCUTANEOUS at 17:19

## 2022-11-17 RX ADMIN — INSULIN HUMAN 5 UNIT(S): 100 INJECTION, SOLUTION SUBCUTANEOUS at 06:48

## 2022-11-17 RX ADMIN — AZITHROMYCIN 255 MILLIGRAM(S): 500 TABLET, FILM COATED ORAL at 00:35

## 2022-11-17 RX ADMIN — SEVELAMER CARBONATE 800 MILLIGRAM(S): 2400 POWDER, FOR SUSPENSION ORAL at 21:53

## 2022-11-17 NOTE — H&P ADULT - HISTORY OF PRESENT ILLNESS
82y M PMH CKD on HD(T/Th/S), last HD session 11/15 presents for c/o fever, decreased urination since Saturday. Also endorses dysuria over past 1-2day, denies hematuria.   Usually urinates 3-4x/d. Also reports fever, Tmax 102. Took Tylenol w/some improvement.     Denies CP, SOB, N/V, chills, palpitation, recent illness, sick contact    UTD flu vaccine, Received COVID vaccine x2     In ED: [id number], Azithromycin 500mg

## 2022-11-17 NOTE — CONSULT NOTE ADULT - SUBJECTIVE AND OBJECTIVE BOX
NEPHROLOGY - NSN    Patient seen and examined.    HPI:  82y M PMH CKD on HD(T/Th/S), last HD session 11/15 presents for c/o fever, decreased urination since Saturday. Also endorses dysuria over past 1-2day, denies hematuria.   Usually urinates 3-4x/d. Also reports fever, Tmax 102. Took Tylenol w/some improvement.     Denies CP, SOB, N/V, chills, palpitation, recent illness, sick contact    UTD flu vaccine, Received COVID vaccine x2     In ED: [id number], Azithromycin 500mg           (17 Nov 2022 01:36)      PAST MEDICAL & SURGICAL HISTORY:  HTN - Hypertension      Glaucoma (Increased Eye Pressure)      BPH (Benign Prostatic Hypertrophy)      ESRD on dialysis      Excision of Sinus Polyp  2006      Laser Surgery Left  ? regarding procedure ; secondary to glaucoma 12/07      Laser Surgery :Right  ?  regarding procedure ; 12/07 ; secondary to glaucoma          MEDICATIONS  (STANDING):  artificial tears (preservative free) Ophthalmic Solution 1 Drop(s) Both EYES daily  aspirin enteric coated 81 milliGRAM(s) Oral daily  azithromycin  IVPB 250 milliGRAM(s) IV Intermittent every 24 hours  cefTRIAXone   IVPB 1000 milliGRAM(s) IV Intermittent every 24 hours  heparin   Injectable 5000 Unit(s) SubCutaneous every 12 hours  hydrALAZINE 10 milliGRAM(s) Oral three times a day  sevelamer carbonate 800 milliGRAM(s) Oral three times a day  sodium chloride 0.9%. 1000 milliLiter(s) (75 mL/Hr) IV Continuous <Continuous>  tamsulosin 0.4 milliGRAM(s) Oral at bedtime      Allergies    grass, pollen (Rhinitis)  No Known Drug Allergies    Intolerances        SOCIAL HISTORY:  Denies alcohol abuse, drug abuse or tobacco usage.     FAMILY HISTORY:  Family history of diabetes mellitus (DM) (Mother, Sibling)        VITALS:  T(C): 36.8 (11-17-22 @ 00:24), Max: 38.2 (11-16-22 @ 15:05)  HR: 82 (11-17-22 @ 05:23) (82 - 118)  BP: 107/68 (11-17-22 @ 05:23) (107/68 - 171/76)  RR: 18 (11-17-22 @ 00:24) (16 - 18)  SpO2: 99% (11-17-22 @ 00:24) (94% - 99%)    REVIEW OF SYSTEMS:  Denies any nausea, vomiting, diarrhea, fever or chills. Denies chest pain, SOB, focal weakness, hematuria or dysuria. Good oral intake and denies fatigue or weakness. All other pertinent systems are reviewed and are negative.    PHYSICAL EXAM:  Constitutional: NAD  HEENT: EOMI  Neck:  No JVD, supple   Respiratory: CTA B/L  Cardiovascular: S1 and S2, RRR  Gastrointestinal: + BS, soft, NT, ND  Extremities: No peripheral edema, + peripheral pulses  Neurological: A/O x 3, CN2-12 intact  Psychiatric: Normal mood, normal affect  : No Aguilar  Skin: No rashes, C/D/I  Access: Not applicable    I and O's:    Height (cm): 152.4 (11-17 @ 00:24)  Weight (kg): 71.3 (11-17 @ 00:24)  BMI (kg/m2): 30.7 (11-17 @ 00:24)  BSA (m2): 1.68 (11-17 @ 00:24)    LABS:                        8.5    13.55 )-----------( 166      ( 17 Nov 2022 07:32 )             28.2     11-16    138  |  97  |  40<H>  ----------------------------<  76  5.9<H>   |  27  |  9.17<H>    Ca    9.4      16 Nov 2022 17:39    TPro  8.1  /  Alb  4.1  /  TBili  0.3  /  DBili  x   /  AST  52<H>  /  ALT  16  /  AlkPhos  124<H>  11-16      URINE:      RADIOLOGY & ADDITIONAL STUDIES:     NEPHROLOGY - NSN    Patient seen and examined.    HPI:  82y M PMH CKD on HD(T/Th/S), last HD session 11/15 presents for c/o fever, decreased urination since Saturday. Also endorses dysuria over past 1-2day, denies hematuria.   Usually urinates 3-4x/d. Also reports fever, Tmax 102. Took Tylenol w/some improvement.     Denies CP, SOB, N/V, chills, palpitation, recent illness, sick contact    UTD flu vaccine, Received COVID vaccine x2     In ED: [id number], Azithromycin 500mg           (17 Nov 2022 01:36)      PAST MEDICAL & SURGICAL HISTORY:  HTN - Hypertension      Glaucoma (Increased Eye Pressure)      BPH (Benign Prostatic Hypertrophy)      ESRD on dialysis      Excision of Sinus Polyp  2006      Laser Surgery Left  ? regarding procedure ; secondary to glaucoma 12/07      Laser Surgery :Right  ?  regarding procedure ; 12/07 ; secondary to glaucoma          MEDICATIONS  (STANDING):  artificial tears (preservative free) Ophthalmic Solution 1 Drop(s) Both EYES daily  aspirin enteric coated 81 milliGRAM(s) Oral daily  azithromycin  IVPB 250 milliGRAM(s) IV Intermittent every 24 hours  cefTRIAXone   IVPB 1000 milliGRAM(s) IV Intermittent every 24 hours  heparin   Injectable 5000 Unit(s) SubCutaneous every 12 hours  hydrALAZINE 10 milliGRAM(s) Oral three times a day  sevelamer carbonate 800 milliGRAM(s) Oral three times a day  sodium chloride 0.9%. 1000 milliLiter(s) (75 mL/Hr) IV Continuous <Continuous>  tamsulosin 0.4 milliGRAM(s) Oral at bedtime      Allergies    grass, pollen (Rhinitis)  No Known Drug Allergies    Intolerances        SOCIAL HISTORY:  Denies alcohol abuse, drug abuse or tobacco usage.     FAMILY HISTORY:  Family history of diabetes mellitus (DM) (Mother, Sibling)        VITALS:  T(C): 36.8 (11-17-22 @ 00:24), Max: 38.2 (11-16-22 @ 15:05)  HR: 82 (11-17-22 @ 05:23) (82 - 118)  BP: 107/68 (11-17-22 @ 05:23) (107/68 - 171/76)  RR: 18 (11-17-22 @ 00:24) (16 - 18)  SpO2: 99% (11-17-22 @ 00:24) (94% - 99%)    REVIEW OF SYSTEMS:    Denies chest pain, SOB, focal weakness, hematuria or dysuria. + fatigue or weakness. All other pertinent systems are reviewed and are negative.    PHYSICAL EXAM:  Constitutional: NAD  HEENT: EOMI  Neck:  No JVD, supple   Respiratory: CTA B/L  Cardiovascular: S1 and S2, RRR  Gastrointestinal: + BS, soft, NT, ND  Extremities: No peripheral edema, + peripheral pulses  Neurological: A/O x 3, CN2-12 intact  Psychiatric: Normal mood, normal affect  : No Aguilar  Skin: No rashes, C/D/I  Access: avf     I and O's:    Height (cm): 152.4 (11-17 @ 00:24)  Weight (kg): 71.3 (11-17 @ 00:24)  BMI (kg/m2): 30.7 (11-17 @ 00:24)  BSA (m2): 1.68 (11-17 @ 00:24)    LABS:                        8.5    13.55 )-----------( 166      ( 17 Nov 2022 07:32 )             28.2     11-16    138  |  97  |  40<H>  ----------------------------<  76  5.9<H>   |  27  |  9.17<H>    Ca    9.4      16 Nov 2022 17:39    TPro  8.1  /  Alb  4.1  /  TBili  0.3  /  DBili  x   /  AST  52<H>  /  ALT  16  /  AlkPhos  124<H>  11-16      URINE:      RADIOLOGY & ADDITIONAL STUDIES:    < from: Xray Chest 1 View- PORTABLE-Urgent (11.16.22 @ 20:25) >    ******PRELIMINARY REPORT******      ******PRELIMINARY REPORT******       ACC: [id number] EXAM:  XR CHEST PORTABLE URGENT 1V                          PROCEDURE DATE:  11/16/2022    ******PRELIMINARY REPORT******      ******PRELIMINARY REPORT******           INTERPRETATION:  INDICATION: Shortness of breath    COMPARISON: Chest radiograph 10/23/2022    FINDINGS:  Heart/Vascular: The cardiac silhouette is normal in size.  Pulmonary: No focal consolidation, pleural effusion or pneumothorax.  Bones: The visualized osseous structures are unremarkable.    Impression:  Clear lungs.        ******PRELIMINARY REPORT******      ******PRELIMINARY REPORT******        MELANY ORDOÑEZ DO; Resident Radiologist  This document is a PRELIMINARY interpretation and is pending final   attending approval. Nov 16 2022  8:46PM    < end of copied text >  < from: PACS Image (Xray Chest 1 View- PORTABLE-Urgent) (11.16.22 @ 20:25) >      < end of copied text >

## 2022-11-17 NOTE — H&P ADULT - PROBLEM SELECTOR PLAN 3
- Reports taking Hydralazine but unsure of dose   - Will start Hydralazine 10mg TID, titrate as needed  - Risk stratify: A1c, lipid panel - Reports taking Hydralazine but unsure of dose   - Will start Hydralazine 10mg TID, titrate as needed  - Risk stratify: A1c, lipid panel    Need to confirm home med in AM. Says pharmacy is [facility] on [address]

## 2022-11-17 NOTE — H&P ADULT - NSHPPHYSICALEXAM_GEN_ALL_CORE
Vital Signs Last 24 Hrs  T(C): 36.8 (17 Nov 2022 00:24), Max: 38.2 (16 Nov 2022 15:05)  T(F): 98.2 (17 Nov 2022 00:24), Max: 100.8 (16 Nov 2022 20:00)  HR: 84 (17 Nov 2022 00:24) (84 - 118)  BP: 122/64 (17 Nov 2022 00:24) (122/64 - 171/76)  BP(mean): --  RR: 18 (17 Nov 2022 00:24) (16 - 18)  SpO2: 99% (17 Nov 2022 00:24) (94% - 99%)    Parameters below as of 17 Nov 2022 00:24  Patient On (Oxygen Delivery Method): room air

## 2022-11-17 NOTE — PATIENT PROFILE ADULT - FUNCTIONAL ASSESSMENT - BASIC MOBILITY 6.
3-calculated by average/Not able to assess (calculate score using [facility] averaging method)

## 2022-11-17 NOTE — H&P ADULT - PROBLEM SELECTOR PLAN 2
- CKD on HD(T/Th/S), last HD session 11/15, 2L rmv  - Nephro consult to be called in AM for HD   - C/w Sevelamer   - Monitor electrolytes

## 2022-11-17 NOTE — PROGRESS NOTE ADULT - ASSESSMENT
82y M PMH CKD on HD(T/Th/S), last HD session 11/15 presents for c/o fever, decreased urination, dysuria admitted for infection w/u    Sepsis due to UTI  - Oliguric, dysuria, febrile   - Febrile to 100.8F + Tachycardic + Leukocytosis     - High risk of bacteremia given hx/o E. coli bacteremia Oct 2021  - C/w Ceftriaxone 1g qd, Azithromycin 250qd for now pending ID recs   - C/w IVF for now, NS at 50 CC X 24 hours   - Blood culture X 2 in lab; F/u results   - UA and UCx pending collection -- Pt now on ABX, results likely to be inaccurate    - Monitor patient closely; monitor HD, VS, CBC, Temp curve closely   - ID eval consulted; F/u recs    Oliguria/ Dysuria  - Likely due to UTI  - Check Renal US   - Monitor I and O     Stage 5 chronic kidney disease on dialysis.   - CKD on HD(T/Th/S), last HD session 11/15   - Nephro consulted, F/u recs  - C/w Sevelamer   - Monitor electrolytes.  - HD as per renal     Hypertension.   - Reports taking Hydralazine but unsure of dose   - Start w/ Hydralazine 10mg TID, titrate as needed  - Cardio eval consulted; F/u recs  - Check A1c, lipid panel    History of BPH.   - c/w Tamsulosin.    Prophylactic measure.   - DVT ppx: SQ heparin   - DASH diet.   82y M PMH CKD on HD(T/Th/S), last HD session 11/15 presents for c/o fever, decreased urination, dysuria admitted for infection w/u    Sepsis due to UTI  - Oliguric, dysuria, febrile   - Febrile to 100.8F + Tachycardic + Leukocytosis     - High risk of bacteremia given hx/o E. coli bacteremia Oct 2021  - C/w Ceftriaxone 1g qd, Azithromycin 250qd for now pending ID recs   - C/w IVF for now, NS at 50 CC X 24 hours   - Blood culture X 2 in lab; F/u results   - UA and UCx pending collection -- Pt now on ABX, results likely to be inaccurate    - Monitor patient closely; monitor HD, VS, CBC, Temp curve closely   - ID eval consulted; F/u recs    Oliguria/ Dysuria  - Likely due to UTI  - Check Renal US   - Monitor I and O     Stage 5 chronic kidney disease on dialysis.   - CKD on HD(T/Th/S), last HD session 11/15   - Nephro consulted, F/u recs  - C/w Sevelamer   - Monitor electrolytes.  - HD as per renal     Anemia  - Likely AOCD in view of ESRD  - Checking Iron, B12, Folate, Ferritin  - Transfuse for Hgb < 7.0     Hypertension.   - Reports taking Hydralazine but unsure of dose   - Start w/ Hydralazine 10mg TID, titrate as needed  - Cardio eval consulted; F/u recs  - Check A1c, lipid panel    History of BPH.   - c/w Tamsulosin.    Prophylactic measure.   - DVT ppx: SQ heparin   - DASH diet.   82y M PMH CKD on HD(T/Th/S), last HD session 11/15 presents for c/o fever, decreased urination, dysuria admitted for infection w/u    Sepsis due to UTI  - Oliguric, dysuria, febrile   - Febrile to 100.8F + Tachycardic + Leukocytosis     - High risk of bacteremia given hx/o E. coli bacteremia Oct 2021  - C/w Ceftriaxone 1g qd, Azithromycin 250qd for now pending ID recs   - Blood culture X 2 in lab; F/u results   - UA and UCx pending collection -- Pt now on ABX, results likely to be inaccurate    - Monitor patient closely; monitor HD, VS, CBC, Temp curve closely   - ID eval consulted; F/u recs    Oliguria/ Dysuria  - Likely due to UTI  - Check Renal US   - Check bladder scan   - Monitor I and O     Stage 5 chronic kidney disease on dialysis.   - CKD on HD(T/Th/S), last HD session 11/15   - Nephro consulted, F/u recs  - C/w Sevelamer   - Monitor electrolytes.  - HD as per renal     Anemia  - Likely AOCD in view of ESRD  - Checking Iron, B12, Folate, Ferritin  - Transfuse for Hgb < 7.0     Hypertension.   - Reports taking Hydralazine but unsure of dose   - Start w/ Hydralazine 10mg TID, titrate as needed  - Cardio eval consulted; F/u recs  - Check A1c, lipid panel    History of BPH.   - c/w Tamsulosin.    Prophylactic measure.   - DVT ppx: SQ heparin   - DASH diet.

## 2022-11-17 NOTE — CONSULT NOTE ADULT - ASSESSMENT
EKG - NSR RBBB   Echo 3/21- EF 75% severe LVOT obstruction 98mmHg     a/p     1) Severe LVOT obstruction - would repeat 2d echo , could have been sec to dehydration in the past, needs out pt follow up     2) Hematuria - f/u urology     3) ESRD on HD     4) DVT prophylasix - on sc heparin

## 2022-11-17 NOTE — H&P ADULT - ASSESSMENT
82y M PMH CKD on HD(T/Th/S), last HD session 11/15 presents for c/o fever, decreased urination, dysuria admitted for infection w/u

## 2022-11-17 NOTE — CONSULT NOTE ADULT - SUBJECTIVE AND OBJECTIVE BOX
Roni Monson MD  Interventional Cardiology / Advance Heart Failure and Cardiac Transplant Specialist  [place] Office : [address] [place] [address] [place] [address]  Tel:   [place] Office : [address] [facility] [place] [address]  Tel: [phone]         HISTORY OF PRESENTING ILLNESS:  HPI:  82y M PMH CKD on HD(T/Th/S), last HD session 11/15 presents for c/o fever, decreased urination since Saturday. Also endorses dysuria over past 1-2day, denies hematuria.   Usually urinates 3-4x/d. Also reports fever, Tmax 102. Took Tylenol w/some improvement.   Denies CP, SOB, N/V, chills, palpitation, recent illness, sick contact  UTD flu vaccine, Received COVID vaccine x2   In ED: [id number], Azithromycin 500mg    Pt denies any h/o heart disease, no h/o syncope , no chest pains on exertion, no SOB     PAST MEDICAL & SURGICAL HISTORY:  HTN - Hypertension      Glaucoma (Increased Eye Pressure)      BPH (Benign Prostatic Hypertrophy)      ESRD on dialysis      Excision of Sinus Polyp  2006      Laser Surgery Left  ? regarding procedure ; secondary to glaucoma 12/07      Laser Surgery :Right  ?  regarding procedure ; 12/07 ; secondary to glaucoma          SOCIAL HISTORY: Substance Use (street drugs): ( x ) never used  (  ) other:    FAMILY HISTORY:  Family history of diabetes mellitus (DM) (Mother, Sibling)           MEDICATIONS:  aspirin enteric coated 81 milliGRAM(s) Oral daily  heparin   Injectable 5000 Unit(s) SubCutaneous every 12 hours  hydrALAZINE 10 milliGRAM(s) Oral three times a day    cefTRIAXone   IVPB 1000 milliGRAM(s) IV Intermittent every 24 hours      acetaminophen     Tablet .. 650 milliGRAM(s) Oral every 6 hours PRN  melatonin 3 milliGRAM(s) Oral at bedtime PRN  ondansetron Injectable 4 milliGRAM(s) IV Push every 8 hours PRN    aluminum hydroxide/magnesium hydroxide/simethicone Suspension 30 milliLiter(s) Oral every 4 hours PRN      artificial tears (preservative free) Ophthalmic Solution 1 Drop(s) Both EYES daily  chlorhexidine 2% Cloths 1 Application(s) Topical daily  sodium chloride 0.9%. 1000 milliLiter(s) IV Continuous <Continuous>  tamsulosin 0.4 milliGRAM(s) Oral at bedtime      FAMILY HISTORY:  Family history of diabetes mellitus (DM) (Mother, Sibling)          Allergies    grass, pollen (Rhinitis)  No Known Drug Allergies    Intolerances    	      PHYSICAL EXAM:  T(C): 36.4 (11-17-22 @ 15:16), Max: 38.2 (11-16-22 @ 20:00)  HR: 81 (11-17-22 @ 15:16) (80 - 100)  BP: 143/67 (11-17-22 @ 15:16) (107/68 - 143/67)  RR: 16 (11-17-22 @ 15:16) (16 - 18)  SpO2: 93% (11-17-22 @ 15:16) (93% - 99%)  Wt(kg): --  I&O's Summary    17 Nov 2022 07:01  -  17 Nov 2022 18:21  --------------------------------------------------------  IN: 360 mL / OUT: 0 mL / NET: 360 mL        GENERAL: NAD   EYES: EOMI, PERRLA, conjunctiva and sclera clear  ENMT: No tonsillar erythema, exudates, or enlargement; Moist mucous membranes, Good dentition, No lesions  Cardiovascular: Normal S1 S2, No JVD, No murmurs, No edema  Respiratory: Lungs clear to auscultation	  Gastrointestinal:  Soft, Non-tender, + BS	  Extremities: Normal range of motion, No clubbing, cyanosis or edema  LYMPH: No lymphadenopathy noted  NERVOUS SYSTEM:  Alert & Oriented X3, Good concentration; Motor Strength 5/5 B/L upper and lower extremities; DTRs 2+ intact and symmetric      LABS:	 	    CARDIAC MARKERS:                                  8.5    13.55 )-----------( 166      ( 17 Nov 2022 07:32 )             28.2     11-17    139  |  100  |  43<H>  ----------------------------<  76  4.7   |  25  |  10.36<H>    Ca    8.7      17 Nov 2022 07:29    TPro  6.6  /  Alb  3.2<L>  /  TBili  0.3  /  DBili  x   /  AST  8<L>  /  ALT  5<L>  /  AlkPhos  103  11-17    proBNP:   Lipid Profile:   HgA1c:   TSH:     Consultant(s) Notes Reviewed:  [x ] YES  [ ] NO    Care Discussed with Consultants/Other Providers [ x] YES  [ ] NO    Imaging Personally Reviewed independently:  [x] YES  [ ] NO    All labs, radiologic studies, vitals, orders and medications list reviewed. Patient is seen and examined at bedside. Case discussed with medical team.    ASSESSMENT/PLAN:

## 2022-11-17 NOTE — H&P ADULT - ENDOCRINE
[facility]ation [facility]  Discharge Final Note    Primary Care Provider: Rubi Young DO    Expected Discharge Date: 11/15/2022    Final Discharge Note (most recent)       Final Note - 11/14/22 1716          Final Note    Assessment Type Final Discharge Note     [place]tic[place] [facility] [place]/Appts/Education Provided Provided patient/caregiver with written discharge plan information;Appointments scheduled by Navigator/Coordinator;Community resources provided        Post-Acute Status    Post-Acute Authorization Home Health     Home Health Status Set-up Complete/Auth obtained   Current with [facility]    Discharge Delays None known at this time                     Important Message from Medicare         Patient is ready to discharge from CM standpoint.         negative

## 2022-11-17 NOTE — H&P ADULT - PROBLEM SELECTOR PROBLEM 4
May 15, 2019           To Whom it may concern:      My patient, Arleen Ramos ([birth year]) was seen in my office for a physical on 5/10/19. She is cleared to work without restrictions.      Sincerely,        Karol Chery, CNP  [address]  [facility] [address]       History of BPH

## 2022-11-17 NOTE — CONSULT NOTE ADULT - SUBJECTIVE AND OBJECTIVE BOX
"HPI:  82y M PMH CKD on HD(T/Th/S), last HD session 11/15 presents for c/o fever, decreased urination since Saturday. Also endorses dysuria over past 1-2day, denies hematuria.   Usually urinates 3-4x/d. Also reports fever, Tmax 102. Took Tylenol w/some improvement.     Denies CP, SOB, N/V, chills, palpitation, recent illness, sick contact    UTD flu vaccine, Received COVID vaccine x2     In ED: [id number], Azithromycin 500mg           (17 Nov 2022 01:36)"    Above reviewed. 81 yo M CKD on HD, fever, initially with dysuria  Patient has had pain on urination over past several days  Still present today  Patient with fevers/chills  No diarrhea  No abd pain  No flank pain  No other events  ID called for further eval    PAST MEDICAL & SURGICAL HISTORY:  HTN - Hypertension    Glaucoma (Increased Eye Pressure)      BPH (Benign Prostatic Hypertrophy)    ESRD on dialysis    Excision of Sinus Polyp  2006    Laser Surgery Left  ? regarding procedure ; secondary to glaucoma 12/07    Laser Surgery :Right  ?  regarding procedure ; 12/07 ; secondary to glaucoma    Allergies    grass, pollen (Rhinitis)  No Known Drug Allergies    Intolerances    ANTIMICROBIALS:  azithromycin  IVPB 250 every 24 hours  cefTRIAXone   IVPB 1000 every 24 hours    OTHER MEDS:  acetaminophen     Tablet .. 650 milliGRAM(s) Oral every 6 hours PRN  aluminum hydroxide/magnesium hydroxide/simethicone Suspension 30 milliLiter(s) Oral every 4 hours PRN  artificial tears (preservative free) Ophthalmic Solution 1 Drop(s) Both EYES daily  aspirin enteric coated 81 milliGRAM(s) Oral daily  chlorhexidine 2% Cloths 1 Application(s) Topical daily  epoetin hiro-epbx (RETACRIT) Injectable 23813 Unit(s) IV Push once  heparin   Injectable 5000 Unit(s) SubCutaneous every 12 hours  hydrALAZINE 10 milliGRAM(s) Oral three times a day  melatonin 3 milliGRAM(s) Oral at bedtime PRN  ondansetron Injectable 4 milliGRAM(s) IV Push every 8 hours PRN  sevelamer carbonate 800 milliGRAM(s) Oral three times a day  sodium chloride 0.9%. 1000 milliLiter(s) IV Continuous <Continuous>  tamsulosin 0.4 milliGRAM(s) Oral at bedtime    SOCIAL HISTORY: No tobacco, no alcohol, no illicit drugs    FAMILY HISTORY:  Family history of diabetes mellitus (DM) (Mother, Sibling)    Drug Dosing Weight  Height (cm): 152.4 (17 Nov 2022 00:24)  Weight (kg): 71.3 (17 Nov 2022 00:24)  BMI (kg/m2): 30.7 (17 Nov 2022 00:24)  BSA (m2): 1.68 (17 Nov 2022 00:24)    PE:    Vital Signs Last 24 Hrs  T(C): 36.8 (17 Nov 2022 08:28), Max: 38.2 (16 Nov 2022 20:00)  T(F): 98.3 (17 Nov 2022 08:28), Max: 100.8 (16 Nov 2022 20:00)  HR: 80 (17 Nov 2022 08:28) (80 - 100)  BP: 136/63 (17 Nov 2022 08:28) (107/68 - 136/63)  RR: 18 (17 Nov 2022 08:28) (16 - 18)  SpO2: 96% (17 Nov 2022 08:28) (94% - 99%)    Gen: AOx3, NAD, non-toxic, pleasant  CV: S1+S2 normal, nontachycardic  Resp: Clear bilat, no resp distress, no crackles/wheezes  Abd: Soft, nontender, +BS  Ext: No LE edema, no wounds  : No Aguilar  IV/Skin: No thrombophlebitis  Msk: No low back pain, no arthralgias, no joint swelling  Neuro: No sensory deficits, no motor deficits    LABS:                        8.5    13.55 )-----------( 166      ( 17 Nov 2022 07:32 )             28.2     11-17    139  |  100  |  43<H>  ----------------------------<  76  4.7   |  25  |  10.36<H>    Ca    8.7      17 Nov 2022 07:29    TPro  6.6  /  Alb  3.2<L>  /  TBili  0.3  /  DBili  x   /  AST  8<L>  /  ALT  5<L>  /  AlkPhos  103  11-17    MICROBIOLOGY:    .Blood Blood-Peripheral  10-23-22   No Growth Final  --  --    RADIOLOGY:    11/17 USG:    FINDINGS:  Right kidney: 9.5 cm. Atrophic. No hydronephrosis. Multiple cysts.    Left kidney: 9.8 cm. Atrophic. No hydronephrosis. Multiple cysts.    Urinary bladder: Underdistention limits evaluation.    Prostate measures 7.2 x 5.7 x 6.4 cm with volume of 137 cc.    IMPRESSION:    No hydronephrosis.  Redemonstrated markedly enlarged prostate.

## 2022-11-17 NOTE — CONSULT NOTE ADULT - ASSESSMENT
82y M PMH CKD on HD(T/Th/S), last HD session 11/15 presents for c/o fever  RIght hydrocele and was evaluated by URology in the past     1 Renal- HD today   2 ID-With cystitis would have expected to see some urine but he is describing oliguria   Blood cx sent   3 -Renal ultrasound  4 ID-IV abx for now     Sayed [facility]   [phone]

## 2022-11-17 NOTE — CONSULT NOTE ADULT - ASSESSMENT
81 yo M CKD on HD, fever, initially with dysuria  Fever, leukocytosis  On HD but complains of new onset dysuria, no flank pain (states still makes urine despite ESRD)  No other complaints to suggest source  CXR clear  Renal USG enlarged prostate  UTI? Prostatitis?  Overall, UTI, fever, leukocytosis  - Ceftriaxone 1g q 24  - DC Azithromycin   - Please check UA/UCX  - F/U pending bCXs  - Monitor for further fevers  - Trend WBC to normal    Joseph Hicks MD  Contact on TEAMS messaging from 9am - 5pm  From 5pm-9am, on weekends, or if no response call [phone]

## 2022-11-17 NOTE — PATIENT PROFILE ADULT - NSPROPTRIGHTCAREGIVER_GEN_A_NUR
Impression: Vitreous degeneration, bilateral: H43.813. Plan: Posterior vitreous detachment (floater) - reviewed the signs and symptoms of possible retinal detachment (flashes, floaters, change in peripheral vision, etc). Advised to contact us immediately for any of these or other new symptoms. declines

## 2022-11-17 NOTE — PROGRESS NOTE ADULT - SUBJECTIVE AND OBJECTIVE BOX
Name of Patient : BAKARI CHARLES  MRN: [id number]  Date of visit: 11-17-22 @ 11:51      Subjective: Patient seen and examined. No new events except as noted.   Patient seen earlier this AM. Lying down in bed. Patient reports continued dysuria.   Patient admits to decreased urination overnight.     REVIEW OF SYSTEMS:    CONSTITUTIONAL: No weakness, fevers or chills  EYES/ENT: No visual changes;  No vertigo or throat pain   NECK: No pain or stiffness  RESPIRATORY: No cough, wheezing, hemoptysis; No shortness of breath  CARDIOVASCULAR: No chest pain or palpitations  GASTROINTESTINAL: No abdominal or epigastric pain. No nausea, vomiting, or hematemesis; No diarrhea or constipation. No melena or hematochezia.  GENITOURINARY: + Dysuria, + Urinary retention   NEUROLOGICAL: No numbness or weakness  SKIN: No itching, burning, rashes, or lesions   All other review of systems is negative unless indicated above.    MEDICATIONS:  MEDICATIONS  (STANDING):  artificial tears (preservative free) Ophthalmic Solution 1 Drop(s) Both EYES daily  aspirin enteric coated 81 milliGRAM(s) Oral daily  azithromycin  IVPB 250 milliGRAM(s) IV Intermittent every 24 hours  cefTRIAXone   IVPB 1000 milliGRAM(s) IV Intermittent every 24 hours  chlorhexidine 2% Cloths 1 Application(s) Topical daily  epoetin hiro-epbx (RETACRIT) Injectable 96254 Unit(s) IV Push once  heparin   Injectable 5000 Unit(s) SubCutaneous every 12 hours  hydrALAZINE 10 milliGRAM(s) Oral three times a day  sevelamer carbonate 800 milliGRAM(s) Oral three times a day  sodium chloride 0.9%. 1000 milliLiter(s) (75 mL/Hr) IV Continuous <Continuous>  tamsulosin 0.4 milliGRAM(s) Oral at bedtime      PHYSICAL EXAM:  T(C): 36.8 (11-17-22 @ 08:28), Max: 38.2 (11-16-22 @ 15:05)  HR: 80 (11-17-22 @ 08:28) (80 - 118)  BP: 136/63 (11-17-22 @ 08:28) (107/68 - 171/76)  RR: 18 (11-17-22 @ 08:28) (16 - 18)  SpO2: 96% (11-17-22 @ 08:28) (94% - 99%)  Wt(kg): --  I&O's Summary    Height (cm): 152.4 (11-17 @ 00:24)  Weight (kg): 71.3 (11-17 @ 00:24)  BMI (kg/m2): 30.7 (11-17 @ 00:24)  BSA (m2): 1.68 (11-17 @ 00:24)    Appearance: Normal	  HEENT: Eyes are open   Lymphatic: No lymphadenopathy   Cardiovascular: Normal S1 S2, no JVD  Respiratory: normal effort , clear  Gastrointestinal:  Soft, Non-tender  Skin: No rashes,  warm to touch  Psychiatry:  Mood & affect appropriate  Musculoskeletal: No edema                            8.5    13.55 )-----------( 166      ( 17 Nov 2022 07:32 )             28.2               11-17    139  |  100  |  43<H>  ----------------------------<  76  4.7   |  25  |  10.36<H>    Ca    8.7      17 Nov 2022 07:29    TPro  6.6  /  Alb  3.2<L>  /  TBili  0.3  /  DBili  x   /  AST  8<L>  /  ALT  5<L>  /  AlkPhos  103  11-17    PT/INR - ( 17 Nov 2022 07:32 )   PT: 14.5 sec;   INR: 1.25 ratio                            < from: Xray Chest 1 View- PORTABLE-Urgent (11.16.22 @ 20:25) >  Impression:  Clear lungs.    < end of copied text >

## 2022-11-17 NOTE — H&P ADULT - PROBLEM SELECTOR PLAN 1
Presents for decreased urination, dysuria, fever  - Febrile to 100.8F + Tachycardic + Leukocytosis +likely urinary source meeting   sepsis criteria likely 2/2 acute UTI  - High risk of bacteremia given hx/o Ecoli bacteremia Oct 2021  - In ED: ceftriaxone 1g, Azithromycin 500mg   - c/w Ceftriaxone 1g qd, Azithromycin 250qd   - Will give NS 1L bolus then c/w 75ml/hr until completion of 1L bag for sepsis resuscitation   - Obtain Bcx, UA/Ucx, narrow abx pending cx   - URI, PNA also possibility but less likely as no resp sx, COVID & RVP neg, CXR clear   - Monitor clinically, symptomatic tx

## 2022-11-17 NOTE — H&P ADULT - NSHPLABSRESULTS_GEN_ALL_CORE
EKG personally reviewed:  Sinus, incomplete RBBB, TWI V1-V4    Images personally reviewed:   < from: Xray Chest 1 View- PORTABLE-Urgent (11.16.22 @ 20:25) >  Impression:  Clear lungs.  < end of copied text >

## 2022-11-18 LAB
ANION GAP SERPL CALC-SCNC: 12 MMOL/L — SIGNIFICANT CHANGE UP (ref 5–17)
BUN SERPL-MCNC: 29 MG/DL — HIGH (ref 7–23)
CALCIUM SERPL-MCNC: 8.9 MG/DL — SIGNIFICANT CHANGE UP (ref 8.4–10.5)
CHLORIDE SERPL-SCNC: 96 MMOL/L — SIGNIFICANT CHANGE UP (ref 96–108)
CO2 SERPL-SCNC: 30 MMOL/L — SIGNIFICANT CHANGE UP (ref 22–31)
CREAT SERPL-MCNC: 7.68 MG/DL — HIGH (ref 0.5–1.3)
EGFR: 7 ML/MIN/1.73M2 — LOW
FERRITIN SERPL-MCNC: 814 NG/ML — HIGH (ref 30–400)
FOLATE SERPL-MCNC: 7.3 NG/ML — SIGNIFICANT CHANGE UP
GLUCOSE SERPL-MCNC: 79 MG/DL — SIGNIFICANT CHANGE UP (ref 70–99)
HCT VFR BLD CALC: 30.1 % — LOW (ref 39–50)
HGB BLD-MCNC: 9.1 G/DL — LOW (ref 13–17)
IRON SATN MFR SERPL: 21 % — SIGNIFICANT CHANGE UP (ref 16–55)
IRON SATN MFR SERPL: 24 UG/DL — LOW (ref 45–165)
MCHC RBC-ENTMCNC: 25.9 PG — LOW (ref 27–34)
MCHC RBC-ENTMCNC: 30.2 GM/DL — LOW (ref 32–36)
MCV RBC AUTO: 85.8 FL — SIGNIFICANT CHANGE UP (ref 80–100)
MRSA PCR RESULT.: SIGNIFICANT CHANGE UP
NRBC # BLD: 0 /100 WBCS — SIGNIFICANT CHANGE UP (ref 0–0)
PLATELET # BLD AUTO: 210 K/UL — SIGNIFICANT CHANGE UP (ref 150–400)
POTASSIUM SERPL-MCNC: 4.2 MMOL/L — SIGNIFICANT CHANGE UP (ref 3.5–5.3)
POTASSIUM SERPL-SCNC: 4.2 MMOL/L — SIGNIFICANT CHANGE UP (ref 3.5–5.3)
RBC # BLD: 3.51 M/UL — LOW (ref 4.2–5.8)
RBC # FLD: 17.1 % — HIGH (ref 10.3–14.5)
S AUREUS DNA NOSE QL NAA+PROBE: DETECTED
SODIUM SERPL-SCNC: 138 MMOL/L — SIGNIFICANT CHANGE UP (ref 135–145)
TIBC SERPL-MCNC: 115 UG/DL — LOW (ref 220–430)
UIBC SERPL-MCNC: 91 UG/DL — LOW (ref 110–370)
VIT B12 SERPL-MCNC: 1688 PG/ML — HIGH (ref 232–1245)
WBC # BLD: 8.04 K/UL — SIGNIFICANT CHANGE UP (ref 3.8–10.5)
WBC # FLD AUTO: 8.04 K/UL — SIGNIFICANT CHANGE UP (ref 3.8–10.5)

## 2022-11-18 PROCEDURE — 99222 1ST HOSP IP/OBS MODERATE 55: CPT

## 2022-11-18 PROCEDURE — 93306 TTE W/DOPPLER COMPLETE: CPT | Mod: 26

## 2022-11-18 PROCEDURE — 99232 SBSQ HOSP IP/OBS MODERATE 35: CPT

## 2022-11-18 RX ORDER — ERYTHROPOIETIN 10000 [IU]/ML
10000 INJECTION, SOLUTION INTRAVENOUS; SUBCUTANEOUS ONCE
Refills: 0 | Status: COMPLETED | OUTPATIENT
Start: 2022-11-18 | End: 2022-11-19

## 2022-11-18 RX ORDER — HYDRALAZINE HCL 50 MG
25 TABLET ORAL THREE TIMES A DAY
Refills: 0 | Status: DISCONTINUED | OUTPATIENT
Start: 2022-11-18 | End: 2022-11-21

## 2022-11-18 RX ADMIN — HEPARIN SODIUM 5000 UNIT(S): 5000 INJECTION INTRAVENOUS; SUBCUTANEOUS at 05:00

## 2022-11-18 RX ADMIN — Medication 650 MILLIGRAM(S): at 15:37

## 2022-11-18 RX ADMIN — TAMSULOSIN HYDROCHLORIDE 0.4 MILLIGRAM(S): 0.4 CAPSULE ORAL at 22:33

## 2022-11-18 RX ADMIN — Medication 650 MILLIGRAM(S): at 00:32

## 2022-11-18 RX ADMIN — CHLORHEXIDINE GLUCONATE 1 APPLICATION(S): 213 SOLUTION TOPICAL at 11:58

## 2022-11-18 RX ADMIN — CEFTRIAXONE 100 MILLIGRAM(S): 500 INJECTION, POWDER, FOR SOLUTION INTRAMUSCULAR; INTRAVENOUS at 23:37

## 2022-11-18 RX ADMIN — SEVELAMER CARBONATE 800 MILLIGRAM(S): 2400 POWDER, FOR SUSPENSION ORAL at 22:33

## 2022-11-18 RX ADMIN — Medication 1 DROP(S): at 11:56

## 2022-11-18 RX ADMIN — SEVELAMER CARBONATE 800 MILLIGRAM(S): 2400 POWDER, FOR SUSPENSION ORAL at 15:36

## 2022-11-18 RX ADMIN — Medication 25 MILLIGRAM(S): at 15:36

## 2022-11-18 RX ADMIN — SEVELAMER CARBONATE 800 MILLIGRAM(S): 2400 POWDER, FOR SUSPENSION ORAL at 04:59

## 2022-11-18 RX ADMIN — Medication 25 MILLIGRAM(S): at 22:38

## 2022-11-18 RX ADMIN — Medication 10 MILLIGRAM(S): at 05:00

## 2022-11-18 RX ADMIN — Medication 650 MILLIGRAM(S): at 17:02

## 2022-11-18 RX ADMIN — HEPARIN SODIUM 5000 UNIT(S): 5000 INJECTION INTRAVENOUS; SUBCUTANEOUS at 17:11

## 2022-11-18 RX ADMIN — Medication 81 MILLIGRAM(S): at 11:56

## 2022-11-18 NOTE — PROGRESS NOTE ADULT - SUBJECTIVE AND OBJECTIVE BOX
Roni Monson MD  Interventional Cardiology / Advance Heart Failure and Cardiac Transplant Specialist  [place] Office : [address] [place] [address] [place] [address]  Tel:   [place] Office : [address] [facility] [place] [address]  Tel: [phone]       Pt is lying in bed comfortable not in distress, no chest pains no SOB no palpitations  	  MEDICATIONS:  aspirin enteric coated 81 milliGRAM(s) Oral daily  heparin   Injectable 5000 Unit(s) SubCutaneous every 12 hours  hydrALAZINE 25 milliGRAM(s) Oral three times a day    cefTRIAXone   IVPB 1000 milliGRAM(s) IV Intermittent every 24 hours      acetaminophen     Tablet .. 650 milliGRAM(s) Oral every 6 hours PRN  melatonin 3 milliGRAM(s) Oral at bedtime PRN  ondansetron Injectable 4 milliGRAM(s) IV Push every 8 hours PRN    aluminum hydroxide/magnesium hydroxide/simethicone Suspension 30 milliLiter(s) Oral every 4 hours PRN      artificial tears (preservative free) Ophthalmic Solution 1 Drop(s) Both EYES daily  chlorhexidine 2% Cloths 1 Application(s) Topical daily  epoetin hiro-epbx (RETACRIT) Injectable 57138 Unit(s) IV Push once  sodium chloride 0.9%. 1000 milliLiter(s) IV Continuous <Continuous>  tamsulosin 0.4 milliGRAM(s) Oral at bedtime      PAST MEDICAL/SURGICAL HISTORY  PAST MEDICAL & SURGICAL HISTORY:  HTN - Hypertension      Glaucoma (Increased Eye Pressure)      BPH (Benign Prostatic Hypertrophy)      ESRD on dialysis      Excision of Sinus Polyp  2006      Laser Surgery Left  ? regarding procedure ; secondary to glaucoma 12/07      Laser Surgery :Right  ?  regarding procedure ; 12/07 ; secondary to glaucoma          SOCIAL HISTORY: Substance Use (street drugs): ( x ) never used  (  ) other:    FAMILY HISTORY:  Family history of diabetes mellitus (DM) (Mother, Sibling)        REVIEW OF SYSTEMS:  CONSTITUTIONAL: No fever, weight loss, or fatigue  EYES: No eye pain, visual disturbances, or discharge  ENMT:  No difficulty hearing, tinnitus, vertigo; No sinus or throat pain  BREASTS: No pain, masses, or nipple discharge  GASTROINTESTINAL: No abdominal or epigastric pain. No nausea, vomiting, or hematemesis; No diarrhea or constipation. No melena or hematochezia.  GENITOURINARY: No dysuria, frequency, hematuria, or incontinence  NEUROLOGICAL: No headaches, memory loss, loss of strength, numbness, or tremors  ENDOCRINE: No heat or cold intolerance; No hair loss  MUSCULOSKELETAL: No joint pain or swelling; No muscle, back, or extremity pain  PSYCHIATRIC: No depression, anxiety, mood swings, or difficulty sleeping  HEME/LYMPH: No easy bruising, or bleeding gums       PHYSICAL EXAM:  T(C): 37.1 (11-18-22 @ 11:24), Max: 37.3 (11-17-22 @ 22:52)  HR: 91 (11-18-22 @ 11:24) (81 - 92)  BP: 140/68 (11-18-22 @ 11:24) (138/78 - 149/64)  RR: 18 (11-18-22 @ 11:24) (16 - 18)  SpO2: 98% (11-18-22 @ 11:24) (97% - 99%)  Wt(kg): --  I&O's Summary    17 Nov 2022 07:01  -  18 Nov 2022 07:00  --------------------------------------------------------  IN: 360 mL / OUT: 1500 mL / NET: -1140 mL    18 Nov 2022 07:01  -  18 Nov 2022 17:39  --------------------------------------------------------  IN: 440 mL / OUT: 0 mL / NET: 440 mL          GENERAL: NAD  EYES:   PERRLA   ENMT:   Moist mucous membranes, Good dentition, No lesions  Cardiovascular: Normal S1 S2, No JVD, No murmurs, No edema  Respiratory: Lungs clear to auscultation	  Gastrointestinal:  Soft, Non-tender, + BS	  Extremities: no edema                                    9.1    8.04  )-----------( 210      ( 18 Nov 2022 07:17 )             30.1     11-18    138  |  96  |  29<H>  ----------------------------<  79  4.2   |  30  |  7.68<H>    Ca    8.9      18 Nov 2022 07:17    TPro  6.6  /  Alb  3.2<L>  /  TBili  0.3  /  DBili  x   /  AST  8<L>  /  ALT  5<L>  /  AlkPhos  103  11-17    proBNP:   Lipid Profile:   HgA1c:   TSH:     Consultant(s) Notes Reviewed:  [x ] YES  [ ] NO    Care Discussed with Consultants/Other Providers [ x] YES  [ ] NO    Imaging Personally Reviewed independently:  [x] YES  [ ] NO    All labs, radiologic studies, vitals, orders and medications list reviewed. Patient is seen and examined at bedside. Case discussed with medical team.

## 2022-11-18 NOTE — CONSULT NOTE ADULT - ASSESSMENT
82-year-old male with a history of CKD on hemodialysis Tuesday, Thursday, Saturday with last session 11/15 who presents with fever, oliguria, dysuria, urology consulted for UTI.     Plan  -Trend WBC  -Monitor for fevers  -F/u Urine culture, urinalysis  -Continue empiric treatment of suspected UTI with antibiotics  -Continue HD T/Th/S  -No acute urological intervention at this time  -Remaining care per primary team  -Pending discussion with attending 82-year-old male with a history of CKD on hemodialysis Tuesday, Thursday, Saturday with last session 11/15 who presents with fever, oliguria, dysuria, urology consulted for UTI.     Plan  -Trend WBC  -Monitor for fevers  -F/u Urine culture, urinalysis  -Continue empiric treatment of suspected UTI with antibiotics  -Continue HD T/Th/S  -No acute urological intervention at this time  -Remaining care per primary team  -Discussed with attending, Dr. Melendrez 82-year-old male with a history of CKD on hemodialysis Tuesday, Thursday, Saturday with last session 11/15 who presents with fever, oliguria, dysuria, urology consulted for UTI.     Plan  -Trend WBC  -Monitor for fevers  -F/u Urine culture, urinalysis--> Can obtain a sterile lavage for culture.  -Continue empiric treatment of suspected UTI with antibiotics--> It is likely to be prostatitis based on symptom profile, consider better penetration antibiotics like cipro or bactrim for management.  -Continue HD T/Th/S  -No acute urological intervention at this time  -Remaining care per primary team  -Discussed with attending, Dr. Melendrez

## 2022-11-18 NOTE — PROGRESS NOTE ADULT - SUBJECTIVE AND OBJECTIVE BOX
Name of Patient : BAKARI CHARLES  MRN: [id number]  Date of visit: 11-18-22 @ 13:04      Subjective: Patient seen and examined. No new events except as noted.   Patient seen earlier this AM.   S/P HD yesterday  Patient reports continued dysuria. Afebrile     REVIEW OF SYSTEMS:    CONSTITUTIONAL: No weakness, fevers or chills  EYES/ENT: No visual changes;  No vertigo or throat pain   NECK: No pain or stiffness  RESPIRATORY: No cough, wheezing, hemoptysis; No shortness of breath  CARDIOVASCULAR: No chest pain or palpitations  GASTROINTESTINAL: No abdominal or epigastric pain. No nausea, vomiting, or hematemesis; No diarrhea or constipation. No melena or hematochezia.  GENITOURINARY: + Dysuria   NEUROLOGICAL: No numbness or weakness  SKIN: No itching, burning, rashes, or lesions   All other review of systems is negative unless indicated above.    MEDICATIONS:  MEDICATIONS  (STANDING):  artificial tears (preservative free) Ophthalmic Solution 1 Drop(s) Both EYES daily  aspirin enteric coated 81 milliGRAM(s) Oral daily  cefTRIAXone   IVPB 1000 milliGRAM(s) IV Intermittent every 24 hours  chlorhexidine 2% Cloths 1 Application(s) Topical daily  epoetin hiro-epbx (RETACRIT) Injectable 43613 Unit(s) IV Push once  heparin   Injectable 5000 Unit(s) SubCutaneous every 12 hours  hydrALAZINE 10 milliGRAM(s) Oral three times a day  sevelamer carbonate 800 milliGRAM(s) Oral three times a day  sodium chloride 0.9%. 1000 milliLiter(s) (75 mL/Hr) IV Continuous <Continuous>  tamsulosin 0.4 milliGRAM(s) Oral at bedtime      PHYSICAL EXAM:  T(C): 37.1 (11-18-22 @ 11:24), Max: 37.3 (11-17-22 @ 22:52)  HR: 91 (11-18-22 @ 11:24) (81 - 92)  BP: 140/68 (11-18-22 @ 11:24) (138/78 - 149/64)  RR: 18 (11-18-22 @ 11:24) (16 - 18)  SpO2: 98% (11-18-22 @ 11:24) (93% - 99%)  Wt(kg): --  I&O's Summary    17 Nov 2022 07:01  -  18 Nov 2022 07:00  --------------------------------------------------------  IN: 360 mL / OUT: 1500 mL / NET: -1140 mL    18 Nov 2022 07:01  -  18 Nov 2022 13:04  --------------------------------------------------------  IN: 200 mL / OUT: 0 mL / NET: 200 mL          Appearance: Normal	  HEENT:  Eyes are open; Glasses   Lymphatic: No lymphadenopathy   Cardiovascular: Normal S1 S2, no JVD  Respiratory: normal effort , clear  Gastrointestinal:  Soft, Non-tender  Skin: No rashes,  warm to touch  Psychiatry:  Mood & affect appropriate  Musculoskeletal: No edema      11-17-22 @ 07:01  -  11-18-22 @ 07:00  --------------------------------------------------------  IN: 360 mL / OUT: 1500 mL / NET: -1140 mL    11-18-22 @ 07:01  -  11-18-22 @ 13:04  --------------------------------------------------------  IN: 200 mL / OUT: 0 mL / NET: 200 mL                                  9.1    8.04  )-----------( 210      ( 18 Nov 2022 07:17 )             30.1               11-18    138  |  96  |  29<H>  ----------------------------<  79  4.2   |  30  |  7.68<H>    Ca    8.9      18 Nov 2022 07:17    TPro  6.6  /  Alb  3.2<L>  /  TBili  0.3  /  DBili  x   /  AST  8<L>  /  ALT  5<L>  /  AlkPhos  103  11-17    PT/INR - ( 17 Nov 2022 07:32 )   PT: 14.5 sec;   INR: 1.25 ratio                              < from: US Kidney and Bladder (11.17.22 @ 14:23) >  IMPRESSION:    No hydronephrosis.  Redemonstrated markedly enlarged prostate.      < end of copied text >      Culture - Blood (11.16.22 @ 20:50)   Specimen Source: .Blood Blood-Peripheral   Culture Results: No growth to date.     Culture - Blood (11.16.22 @ 20:50)   Specimen Source: .Blood Blood-Peripheral   Culture Results: No growth to date.

## 2022-11-18 NOTE — PROGRESS NOTE ADULT - ASSESSMENT
82y M PMH CKD on HD(T/Th/S), last HD session 11/15 presents for c/o fever  RIght hydrocele and was evaluated by URology in the past     1 Renal- HD in am   2 ID-With cystitis would have expected to see some urine but he is describing oliguria   Blood cx sent and thus far negative and still awaiting the urine at present   3 -Renal ultrasound noted;  May dc the flomax   4 ID-IV abx for now and ID eval noted     Sayed [facility]   [phone]

## 2022-11-18 NOTE — PROGRESS NOTE ADULT - SUBJECTIVE AND OBJECTIVE BOX
CC: F/U for UTI    Saw/spoke to patient. No fevers, no chills. No new complaints. Still dysuria.    Allergies  grass, pollen (Rhinitis)  No Known Drug Allergies    ANTIMICROBIALS:  cefTRIAXone   IVPB 1000 every 24 hours    PE:    Vital Signs Last 24 Hrs  T(C): 36.9 (18 Nov 2022 15:25), Max: 37.3 (17 Nov 2022 22:52)  T(F): 98.5 (18 Nov 2022 15:25), Max: 99.2 (17 Nov 2022 22:52)  HR: 72 (18 Nov 2022 15:25) (72 - 92)  BP: 158/73 (18 Nov 2022 15:25) (140/68 - 158/73)  RR: 18 (18 Nov 2022 15:25) (18 - 18)  SpO2: 99% (18 Nov 2022 15:25) (98% - 99%)    Gen: AOx3, NAD, non-toxic  CV: Nontachycardic  Resp: Breathing comfortably, RA  Abd: Soft, nontender  IV/Skin: No thrombophlebitis    LABS:                        9.1    8.04  )-----------( 210      ( 18 Nov 2022 07:17 )             30.1     11-18    138  |  96  |  29<H>  ----------------------------<  79  4.2   |  30  |  7.68<H>    Ca    8.9      18 Nov 2022 07:17    TPro  6.6  /  Alb  3.2<L>  /  TBili  0.3  /  DBili  x   /  AST  8<L>  /  ALT  5<L>  /  AlkPhos  103  11-17    MICROBIOLOGY:    .Blood Blood-Peripheral  11-16-22   No growth to date.  --  --    .Blood Blood-Peripheral  10-23-22   No Growth Final  --  --    RADIOLOGY:    11/17    FINDINGS:  Right kidney: 9.5 cm. Atrophic. No hydronephrosis. Multiple cysts.    Left kidney: 9.8 cm. Atrophic. No hydronephrosis. Multiple cysts.    Urinary bladder: Underdistention limits evaluation.    Prostate measures 7.2 x 5.7 x 6.4 cm with volume of 137 cc.    IMPRESSION:    No hydronephrosis.  Redemonstrated markedly enlarged prostate.

## 2022-11-18 NOTE — CONSULT NOTE ADULT - SUBJECTIVE AND OBJECTIVE BOX
HPI  Very pleasant 82-year-old male with a history of CKD on hemodialysis Tuesday, Thursday, Saturday with last session 11/15 who presents with fever, oliguria, dysuria. Patient reports that he has been having dysuria for the last few days, denies worse hematuria. Has had fevers at home to 102F. In the emergency room was given ceftriaxone and azithromycin and subsequently admitted to medicine for further management of suspected UTI in the setting of oliguria and fevers. Also has no history of right Hydro seal that has been evaluated by urology 10/21. Patient follows with Dr. Hutchins, urologist. Urology consulted for dysuria w/fever and dysuria.     Vitals reviewed, afebrile since arrival in the hospital. No hypotension, no tachycardia. Only 5 cc urine noted in bladder today, however is ESRD on dialysis. Labs reviewed, initially WBC 13.5, now improved eight. H/H 9.1/30.1, creatinine 7.68 from 10.36 on admission. Had dialysis 11/17 (-1.5 L). UA, urine culture pending. Ultrasound reviewed kidney and bladder re-demonstrating enlarged prostate, no bilateral hydronephrosis.     PAST MEDICAL & SURGICAL HISTORY:  HTN - Hypertension      Glaucoma (Increased Eye Pressure)      BPH (Benign Prostatic Hypertrophy)      ESRD on dialysis      Excision of Sinus Polyp  2006      Laser Surgery Left  ? regarding procedure ; secondary to glaucoma 12/07      Laser Surgery :Right  ?  regarding procedure ; 12/07 ; secondary to glaucoma          MEDICATIONS  (STANDING):  artificial tears (preservative free) Ophthalmic Solution 1 Drop(s) Both EYES daily  aspirin enteric coated 81 milliGRAM(s) Oral daily  cefTRIAXone   IVPB 1000 milliGRAM(s) IV Intermittent every 24 hours  chlorhexidine 2% Cloths 1 Application(s) Topical daily  epoetin hiro-epbx (RETACRIT) Injectable 29820 Unit(s) IV Push once  heparin   Injectable 5000 Unit(s) SubCutaneous every 12 hours  hydrALAZINE 25 milliGRAM(s) Oral three times a day  sevelamer carbonate 800 milliGRAM(s) Oral three times a day  sodium chloride 0.9%. 1000 milliLiter(s) (75 mL/Hr) IV Continuous <Continuous>  tamsulosin 0.4 milliGRAM(s) Oral at bedtime    MEDICATIONS  (PRN):  acetaminophen     Tablet .. 650 milliGRAM(s) Oral every 6 hours PRN Temp greater or equal to 38C (100.4F), Mild Pain (1 - 3)  aluminum hydroxide/magnesium hydroxide/simethicone Suspension 30 milliLiter(s) Oral every 4 hours PRN Dyspepsia  melatonin 3 milliGRAM(s) Oral at bedtime PRN Insomnia  ondansetron Injectable 4 milliGRAM(s) IV Push every 8 hours PRN Nausea and/or Vomiting      FAMILY HISTORY:  Family history of diabetes mellitus (DM) (Mother, Sibling)        Allergies    grass, pollen (Rhinitis)  No Known Drug Allergies    Intolerances        SOCIAL HISTORY:    REVIEW OF SYSTEMS: Otherwise negative as stated in HPI    Physical Exam  Vital signs  T(C): 37.1 (11-18-22 @ 11:24), Max: 37.3 (11-17-22 @ 22:52)  HR: 91 (11-18-22 @ 11:24)  BP: 140/68 (11-18-22 @ 11:24)  SpO2: 98% (11-18-22 @ 11:24)  Wt(kg): --    Output    OUT:    Other (mL): 1500 mL  Total OUT: 1500 mL    Total NET: -1500 mL          Gen: awake and alert. NAD.   Pulm: Normal work of breathing on RA  CV: Regular rate  Abd: Generally distended, no CVAt  : NO an. low bladder scan      LABS:      11-18 @ 07:17    WBC 8.04  / Hct 30.1  / SCr 7.68     11-17 @ 07:32    WBC 13.55 / Hct 28.2  / SCr --       11-18    138  |  96  |  29<H>  ----------------------------<  79  4.2   |  30  |  7.68<H>    Ca    8.9      18 Nov 2022 07:17    TPro  6.6  /  Alb  3.2<L>  /  TBili  0.3  /  DBili  x   /  AST  8<L>  /  ALT  5<L>  /  AlkPhos  103  11-17    PT/INR - ( 17 Nov 2022 07:32 )   PT: 14.5 sec;   INR: 1.25 ratio               Urine Cx: Pending    RADIOLOGY:    < from: US Kidney and Bladder (11.17.22 @ 14:23) >  US KIDNEYS AND BLADDER                          PROCEDURE DATE:  11/17/2022          INTERPRETATION:  CLINICAL INFORMATION: Oliguria. Assess for   hydronephrosis.    COMPARISON: Ultrasound kidneys and bladder 3/5/2021. CT abdomen and   pelvis 10/12/2021.    TECHNIQUE: Sonography of the kidneys and bladder.    FINDINGS:  Right kidney: 9.5 cm. Atrophic. No hydronephrosis. Multiple cysts.    Left kidney: 9.8 cm. Atrophic. No hydronephrosis. Multiple cysts.    Urinary bladder: Underdistention limits evaluation.    Prostate measures 7.2 x 5.7 x 6.4 cm with volume of 137 cc.    IMPRESSION:    No hydronephrosis.  Redemonstrated markedly enlarged prostate.        --- End of Report ---           MARVIN YOON MD; Resident Radiology  This document has been electronically signed.  MADELYN NAILS MD; Attending Radiologist  This document has been electronically signed. Nov 17 2022  3:22PM    < end of copied text >

## 2022-11-18 NOTE — CONSULT NOTE ADULT - ATTENDING COMMENTS
most likely prostatitis   change antbx to Bactrim or Levaquin x 30 days  if bladder culture needed then perform lavage with 50 cc of sterile water and send for culture   will sign off

## 2022-11-18 NOTE — PROGRESS NOTE ADULT - ASSESSMENT
82y M PMH CKD on HD(T/Th/S), last HD session 11/15 presents for c/o fever, decreased urination, dysuria admitted for infection w/u    Sepsis due to UTI  - Oliguric, dysuria, febrile   - Febrile to 100.8F + Tachycardic + Leukocytosis     - High risk of bacteremia given hx/o E. coli bacteremia Oct 2021  - C/w Ceftriaxone 1g qd per ID   - Blood culture X 2 w/ NGTD; F/u final   - UA and UCx pending collection -- Pt now on ABX, results likely to be inaccurate    - Monitor patient closely; monitor HD, VS, CBC, Temp curve closely   - ID eval consulted; F/u recs    Oliguria/ Dysuria  - Likely due to UTI  - Check Renal US  -- negative for hydronephrosis, prostamegaly   - Check bladder scan   - Monitor I and O     LVOT  obstruction   - Cardio follow up appreciated  - F/u TTE    Stage 5 chronic kidney disease on dialysis.   - CKD on HD(T/Th/S), last HD session 11/15   - Nephro consulted, F/u recs  - C/w Sevelamer   - Monitor electrolytes.  - HD as per renal     Anemia  - Likely AOCD in view of ESRD  - Checking Iron, B12, Folate, Ferritin -- Not consistent with deficiency   - Transfuse for Hgb < 7.0     Hypertension.   - Reports taking Hydralazine but unsure of dose   - Start w/ Hydralazine 10mg TID, titrate as needed --> Increased to 25 TID   - Cardio eval consulted; F/u recs  - Check A1c  - Check Lipid panel  (LDL of 47)    History of BPH.   - c/w Tamsulosin.  - Renal US for prostamegaly   - Urology consulted; F/u recs    Prophylactic measure.   - DVT ppx: SQ heparin   - DASH diet.

## 2022-11-18 NOTE — PROGRESS NOTE ADULT - ASSESSMENT
83 yo M CKD on HD, fever, initially with dysuria  Fever, leukocytosis  On HD but complains of new onset dysuria, no flank pain (states still makes urine despite ESRD)  No other complaints to suggest source  CXR clear  Renal USG enlarged prostate  UTI? Prostatitis?  WBC improving, although patient still complaining of dysuria/bladder pain  Overall, UTI, fever, leukocytosis  - Ceftriaxone 1g q 24  - F/U UCX  - F/U pending bCXs  - Monitor for further fevers  - Trend WBC to normal  - Final antibiotic plan pending UCX results; low threshold to escalate antibiotic if not improving    Joseph Hicks MD  Contact on TEAMS messaging from 9am - 5pm  From 5pm-9am, on weekends, or if no response call [phone]

## 2022-11-18 NOTE — CONSULT NOTE ADULT - REASON FOR ADMISSION
fever, dysuria, decreased urination

## 2022-11-18 NOTE — PROGRESS NOTE ADULT - SUBJECTIVE AND OBJECTIVE BOX
[facility] [phone]        Patient seen and examined in bed.  He was the same         MEDICATIONS  (STANDING):  artificial tears (preservative free) Ophthalmic Solution 1 Drop(s) Both EYES daily  aspirin enteric coated 81 milliGRAM(s) Oral daily  cefTRIAXone   IVPB 1000 milliGRAM(s) IV Intermittent every 24 hours  chlorhexidine 2% Cloths 1 Application(s) Topical daily  heparin   Injectable 5000 Unit(s) SubCutaneous every 12 hours  hydrALAZINE 10 milliGRAM(s) Oral three times a day  sevelamer carbonate 800 milliGRAM(s) Oral three times a day  sodium chloride 0.9%. 1000 milliLiter(s) (75 mL/Hr) IV Continuous <Continuous>  tamsulosin 0.4 milliGRAM(s) Oral at bedtime      VITAL:  T(C): , Max: 37.3 (11-17-22 @ 22:52)  T(F): , Max: 99.2 (11-17-22 @ 22:52)  HR: 92 (11-17-22 @ 22:52)  BP: 149/64 (11-17-22 @ 22:52)  BP(mean): --  RR: 18 (11-17-22 @ 22:52)  SpO2: 99% (11-17-22 @ 22:52)  Wt(kg): --    I and O's:    11-17 @ 07:01  -  11-18 @ 07:00  --------------------------------------------------------  IN: 360 mL / OUT: 1500 mL / NET: -1140 mL    11-18 @ 07:01  -  11-18 @ 10:46  --------------------------------------------------------  IN: 200 mL / OUT: 0 mL / NET: 200 mL          PHYSICAL EXAM:    Constitutional: NAD  Neck:  No JVD  Respiratory: CTAB/L  Cardiovascular: S1 and S2  Gastrointestinal: BS+, soft, NT/ND  Extremities: No peripheral edema  Neurological: A/O x 3, no focal deficits  Psychiatric: Normal mood, normal affect  : No Aguilar  Skin: No rashes  Access: avf    LABS:                        9.1    8.04  )-----------( 210      ( 18 Nov 2022 07:17 )             30.1     11-18    138  |  96  |  29<H>  ----------------------------<  79  4.2   |  30  |  7.68<H>    Ca    8.9      18 Nov 2022 07:17    TPro  6.6  /  Alb  3.2<L>  /  TBili  0.3  /  DBili  x   /  AST  8<L>  /  ALT  5<L>  /  AlkPhos  103  11-17          Urine Studies:          RADIOLOGY & ADDITIONAL STUDIES:

## 2022-11-19 LAB
A1C WITH ESTIMATED AVERAGE GLUCOSE RESULT: 5.2 % — SIGNIFICANT CHANGE UP (ref 4–5.6)
ESTIMATED AVERAGE GLUCOSE: 103 MG/DL — SIGNIFICANT CHANGE UP (ref 68–114)

## 2022-11-19 RX ORDER — MUPIROCIN 20 MG/G
1 OINTMENT TOPICAL
Refills: 0 | Status: COMPLETED | OUTPATIENT
Start: 2022-11-19 | End: 2022-11-24

## 2022-11-19 RX ORDER — FERROUS SULFATE 325(65) MG
325 TABLET ORAL DAILY
Refills: 0 | Status: DISCONTINUED | OUTPATIENT
Start: 2022-11-19 | End: 2022-11-27

## 2022-11-19 RX ADMIN — SEVELAMER CARBONATE 800 MILLIGRAM(S): 2400 POWDER, FOR SUSPENSION ORAL at 05:53

## 2022-11-19 RX ADMIN — Medication 25 MILLIGRAM(S): at 13:32

## 2022-11-19 RX ADMIN — Medication 1 DROP(S): at 13:32

## 2022-11-19 RX ADMIN — HEPARIN SODIUM 5000 UNIT(S): 5000 INJECTION INTRAVENOUS; SUBCUTANEOUS at 05:53

## 2022-11-19 RX ADMIN — MUPIROCIN 1 APPLICATION(S): 20 OINTMENT TOPICAL at 19:19

## 2022-11-19 RX ADMIN — SEVELAMER CARBONATE 800 MILLIGRAM(S): 2400 POWDER, FOR SUSPENSION ORAL at 21:53

## 2022-11-19 RX ADMIN — CHLORHEXIDINE GLUCONATE 1 APPLICATION(S): 213 SOLUTION TOPICAL at 13:33

## 2022-11-19 RX ADMIN — Medication 325 MILLIGRAM(S): at 13:35

## 2022-11-19 RX ADMIN — SEVELAMER CARBONATE 800 MILLIGRAM(S): 2400 POWDER, FOR SUSPENSION ORAL at 13:31

## 2022-11-19 RX ADMIN — HEPARIN SODIUM 5000 UNIT(S): 5000 INJECTION INTRAVENOUS; SUBCUTANEOUS at 17:43

## 2022-11-19 RX ADMIN — Medication 81 MILLIGRAM(S): at 13:32

## 2022-11-19 RX ADMIN — ERYTHROPOIETIN 10000 UNIT(S): 10000 INJECTION, SOLUTION INTRAVENOUS; SUBCUTANEOUS at 09:51

## 2022-11-19 RX ADMIN — CEFTRIAXONE 100 MILLIGRAM(S): 500 INJECTION, POWDER, FOR SOLUTION INTRAMUSCULAR; INTRAVENOUS at 21:52

## 2022-11-19 NOTE — PROGRESS NOTE ADULT - SUBJECTIVE AND OBJECTIVE BOX
Nephrology Progress Note    Patient is a 82y male with    Allergies:  grass, pollen (Rhinitis)  No Known Drug Allergies    Hospital Medications:   MEDICATIONS  (STANDING):  artificial tears (preservative free) Ophthalmic Solution 1 Drop(s) Both EYES daily  aspirin enteric coated 81 milliGRAM(s) Oral daily  cefTRIAXone   IVPB 1000 milliGRAM(s) IV Intermittent every 24 hours  chlorhexidine 2% Cloths 1 Application(s) Topical daily  ferrous    sulfate 325 milliGRAM(s) Oral daily  heparin   Injectable 5000 Unit(s) SubCutaneous every 12 hours  hydrALAZINE 25 milliGRAM(s) Oral three times a day  mupirocin 2% Ointment 1 Application(s) Both Nostrils two times a day  sevelamer carbonate 800 milliGRAM(s) Oral three times a day  sodium chloride 0.9%. 1000 milliLiter(s) (75 mL/Hr) IV Continuous <Continuous>  tamsulosin 0.4 milliGRAM(s) Oral at bedtime        VITALS:  T(F): 97.5 (11-19-22 @ 09:32), Max: 98.7 (11-18-22 @ 11:24)  HR: 69 (11-19-22 @ 09:32)  BP: 151/78 (11-19-22 @ 09:32)  RR: 17 (11-19-22 @ 09:32)  SpO2: 95% (11-19-22 @ 09:32)      11-17 @ 07:01  -  11-18 @ 07:00  --------------------------------------------------------  IN: 360 mL / OUT: 1500 mL / NET: -1140 mL    11-18 @ 07:01  -  11-19 @ 07:00  --------------------------------------------------------  IN: 890 mL / OUT: 4 mL / NET: 886 mL        PHYSICAL EXAM:  Constitutional: NAD  HEENT: anicteric sclera, oropharynx clear, MMM  Neck: No JVD  Respiratory: CTAB, no wheezes, rales or rhonchi  Cardiovascular: S1, S2, RRR  Gastrointestinal: BS+, soft, NT/ND  Extremities: No cyanosis or clubbing. No peripheral edema  Neurological: A/O x 3  Psychiatric: Normal mood, normal affect  : No CVA tenderness. No an.   Skin: No rashes  Vascular Access:    LABS:  11-18    138  |  96  |  29<H>  ----------------------------<  79  4.2   |  30  |  7.68<H>    Ca    8.9      18 Nov 2022 07:17                            9.1    8.04  )-----------( 210      ( 18 Nov 2022 07:17 )             30.1             RADIOLOGY & ADDITIONAL STUDIES:    ACC: [id number] EXAM:  US KIDNEYS AND BLADDER                          PROCEDURE DATE:  11/17/2022          INTERPRETATION:  CLINICAL INFORMATION: Oliguria. Assess for   hydronephrosis.    COMPARISON: Ultrasound kidneys and bladder 3/5/2021. CT abdomen and   pelvis 10/12/2021.    TECHNIQUE: Sonography of the kidneys and bladder.    FINDINGS:  Right kidney: 9.5 cm. Atrophic. No hydronephrosis. Multiple cysts.    Left kidney: 9.8 cm. Atrophic. No hydronephrosis. Multiple cysts.    Urinary bladder: Underdistention limits evaluation.    Prostate measures 7.2 x 5.7 x 6.4 cm with volume of 137 cc.    IMPRESSION:    No hydronephrosis.  Redemonstrated markedly enlarged prostate.     Nephrology Progress Note    Patient is a 82y male OOB to chair, no shortness of breath.    Allergies:  grass, pollen (Rhinitis)  No Known Drug Allergies    Hospital Medications:   MEDICATIONS  (STANDING):  artificial tears (preservative free) Ophthalmic Solution 1 Drop(s) Both EYES daily  aspirin enteric coated 81 milliGRAM(s) Oral daily  cefTRIAXone   IVPB 1000 milliGRAM(s) IV Intermittent every 24 hours  chlorhexidine 2% Cloths 1 Application(s) Topical daily  ferrous    sulfate 325 milliGRAM(s) Oral daily  heparin   Injectable 5000 Unit(s) SubCutaneous every 12 hours  hydrALAZINE 25 milliGRAM(s) Oral three times a day  mupirocin 2% Ointment 1 Application(s) Both Nostrils two times a day  sevelamer carbonate 800 milliGRAM(s) Oral three times a day  sodium chloride 0.9%. 1000 milliLiter(s) (75 mL/Hr) IV Continuous <Continuous>  tamsulosin 0.4 milliGRAM(s) Oral at bedtime        VITALS:  T(F): 97.5 (11-19-22 @ 09:32), Max: 98.7 (11-18-22 @ 11:24)  HR: 69 (11-19-22 @ 09:32)  BP: 151/78 (11-19-22 @ 09:32)  RR: 17 (11-19-22 @ 09:32)  SpO2: 95% (11-19-22 @ 09:32)      11-17 @ 07:01  -  11-18 @ 07:00  --------------------------------------------------------  IN: 360 mL / OUT: 1500 mL / NET: -1140 mL    11-18 @ 07:01  -  11-19 @ 07:00  --------------------------------------------------------  IN: 890 mL / OUT: 4 mL / NET: 886 mL        PHYSICAL EXAM:  Constitutional: NAD  HEENT: anicteric sclera, oropharynx clear, MMM  Neck: No JVD  Respiratory: CTAB, no wheezes, rales or rhonchi  Cardiovascular: S1, S2, RRR  Gastrointestinal: BS+, soft, NT/ND  Extremities: No cyanosis or clubbing. No peripheral edema  Neurological: A/O x 3  Psychiatric: Normal mood, normal affect  : No CVA tenderness. No an.   Skin: No rashes  Vascular Access:    LABS:  11-18    138  |  96  |  29<H>  ----------------------------<  79  4.2   |  30  |  7.68<H>    Ca    8.9      18 Nov 2022 07:17                            9.1    8.04  )-----------( 210      ( 18 Nov 2022 07:17 )             30.1             RADIOLOGY & ADDITIONAL STUDIES:    ACC: [id number] EXAM:  US KIDNEYS AND BLADDER                          PROCEDURE DATE:  11/17/2022          INTERPRETATION:  CLINICAL INFORMATION: Oliguria. Assess for   hydronephrosis.    COMPARISON: Ultrasound kidneys and bladder 3/5/2021. CT abdomen and   pelvis 10/12/2021.    TECHNIQUE: Sonography of the kidneys and bladder.    FINDINGS:  Right kidney: 9.5 cm. Atrophic. No hydronephrosis. Multiple cysts.    Left kidney: 9.8 cm. Atrophic. No hydronephrosis. Multiple cysts.    Urinary bladder: Underdistention limits evaluation.    Prostate measures 7.2 x 5.7 x 6.4 cm with volume of 137 cc.    IMPRESSION:    No hydronephrosis.  Redemonstrated markedly enlarged prostate.

## 2022-11-19 NOTE — PROGRESS NOTE ADULT - SUBJECTIVE AND OBJECTIVE BOX
Subjective  Patient still complaining of dysuria and pelvic discomfort. Urine culture now in lab since patient voided.  Denies fevers, chills, nausea, vomiting, SOB, CP.  Tolerating diet.    Objective    Vital signs  T(F): , Max: 98.5 (11-18-22 @ 15:25)  HR: 74 (11-19-22 @ 13:27)  BP: 134/67 (11-19-22 @ 13:27)  SpO2: 97% (11-19-22 @ 13:27)  Wt(kg): --    Output     OUT:    Voided (mL): 4 mL  Total OUT: 4 mL    Total NET: -4 mL      Gen: NAD  Abd: soft, nontender, nondistended  : Voiding    Labs      11-18 @ 07:17    WBC 8.04  / Hct 30.1  / SCr 7.68         Culture - Blood (collected 11-16-22 @ 20:50)  Source: .Blood Blood-Peripheral  Preliminary Report (11-18-22 @ 02:03):    No growth to date.    Culture - Blood (collected 11-16-22 @ 20:50)  Source: .Blood Blood-Peripheral  Preliminary Report (11-18-22 @ 02:03):    No growth to date.        Urine Cx: pending    Imaging

## 2022-11-19 NOTE — PROGRESS NOTE ADULT - SUBJECTIVE AND OBJECTIVE BOX
Roni Monson MD  Interventional Cardiology / Advance Heart Failure and Cardiac Transplant Specialist  [place] Office : [phone] [place] [address] [place]. [address]  Tel:   [place] Office : [phone] [facility] [place] [address]  Tel: [phone]      Subjective/Overnight events: Pt is lying in bed comfortable not in distress  	  MEDICATIONS:  aspirin enteric coated 81 milliGRAM(s) Oral daily  heparin   Injectable 5000 Unit(s) SubCutaneous every 12 hours  hydrALAZINE 25 milliGRAM(s) Oral three times a day    cefTRIAXone   IVPB 1000 milliGRAM(s) IV Intermittent every 24 hours      acetaminophen     Tablet .. 650 milliGRAM(s) Oral every 6 hours PRN  melatonin 3 milliGRAM(s) Oral at bedtime PRN  ondansetron Injectable 4 milliGRAM(s) IV Push every 8 hours PRN    aluminum hydroxide/magnesium hydroxide/simethicone Suspension 30 milliLiter(s) Oral every 4 hours PRN      artificial tears (preservative free) Ophthalmic Solution 1 Drop(s) Both EYES daily  chlorhexidine 2% Cloths 1 Application(s) Topical daily  ferrous    sulfate 325 milliGRAM(s) Oral daily  mupirocin 2% Ointment 1 Application(s) Both Nostrils two times a day  sodium chloride 0.9%. 1000 milliLiter(s) IV Continuous <Continuous>  tamsulosin 0.4 milliGRAM(s) Oral at bedtime      PAST MEDICAL/SURGICAL HISTORY  PAST MEDICAL & SURGICAL HISTORY:  HTN - Hypertension      Glaucoma (Increased Eye Pressure)      BPH (Benign Prostatic Hypertrophy)      ESRD on dialysis      Excision of Sinus Polyp  2006      Laser Surgery Left  ? regarding procedure ; secondary to glaucoma 12/07      Laser Surgery :Right  ?  regarding procedure ; 12/07 ; secondary to glaucoma          SOCIAL HISTORY: Substance Use (street drugs): ( x ) never used  (  ) other:    FAMILY HISTORY:  Family history of diabetes mellitus (DM) (Mother, Sibling)            PHYSICAL EXAM:  T(C): 36.4 (11-19-22 @ 09:32), Max: 37.1 (11-18-22 @ 11:24)  HR: 69 (11-19-22 @ 09:32) (69 - 100)  BP: 151/78 (11-19-22 @ 09:32) (107/73 - 158/81)  RR: 17 (11-19-22 @ 09:32) (16 - 18)  SpO2: 95% (11-19-22 @ 09:32) (93% - 99%)  Wt(kg): --  I&O's Summary    18 Nov 2022 07:01  -  19 Nov 2022 07:00  --------------------------------------------------------  IN: 890 mL / OUT: 4 mL / NET: 886 mL        GENERAL: NAD  EYES:   PERRLA   ENMT:   Moist mucous membranes, Good dentition, No lesions  Cardiovascular: Normal S1 S2, No JVD, No murmurs, No edema  Respiratory: Lungs clear to auscultation	  Gastrointestinal:  Soft, Non-tender, + BS	  Extremities: no edema                                9.1    8.04  )-----------( 210      ( 18 Nov 2022 07:17 )             30.1     11-18    138  |  96  |  29<H>  ----------------------------<  79  4.2   |  30  |  7.68<H>    Ca    8.9      18 Nov 2022 07:17      proBNP:   Lipid Profile:   HgA1c:   TSH:     Consultant(s) Notes Reviewed:  [x ] YES  [ ] NO    Care Discussed with Consultants/Other Providers [ x] YES  [ ] NO    Imaging Personally Reviewed independently:  [x] YES  [ ] NO    All labs, radiologic studies, vitals, orders and medications list reviewed. Patient is seen and examined at bedside. Case discussed with medical team.

## 2022-11-19 NOTE — PROGRESS NOTE ADULT - ASSESSMENT
82-year-old male with a history of CKD on hemodialysis Tuesday, Thursday, Saturday with last session 11/15 who presents with fever, oliguria, dysuria, urology consulted for UTI.     Plan  -Trend WBC  -Monitor for fevers  -F/u Urine culture, urinalysis -- urine culture is now in lab  -Continue empiric treatment of suspected UTI with antibiotics--> It is likely to be prostatitis based on symptom profile, consider better penetration antibiotics like cipro or bactrim for management.  -Continue HD T/Th/S  -No acute urological intervention at this time  -Remaining care per primary team

## 2022-11-19 NOTE — PROGRESS NOTE ADULT - SUBJECTIVE AND OBJECTIVE BOX
Name of Patient : BAKARI CHARLES  MRN: [id number]  Date of visit: 11-19-22       Subjective: Patient seen and examined. No new events except as noted.     REVIEW OF SYSTEMS:    CONSTITUTIONAL: No weakness, fevers or chills  EYES/ENT: No visual changes;  No vertigo or throat pain   NECK: No pain or stiffness  RESPIRATORY: No cough, wheezing, hemoptysis; No shortness of breath  CARDIOVASCULAR: No chest pain or palpitations  GASTROINTESTINAL: No abdominal or epigastric pain. No nausea, vomiting, or hematemesis; No diarrhea or constipation. No melena or hematochezia.  GENITOURINARY: No dysuria, frequency or hematuria  NEUROLOGICAL: No numbness or weakness  SKIN: No itching, burning, rashes, or lesions   All other review of systems is negative unless indicated above.    MEDICATIONS:  MEDICATIONS  (STANDING):  artificial tears (preservative free) Ophthalmic Solution 1 Drop(s) Both EYES daily  aspirin enteric coated 81 milliGRAM(s) Oral daily  chlorhexidine 2% Cloths 1 Application(s) Topical daily  ferrous    sulfate 325 milliGRAM(s) Oral daily  heparin   Injectable 5000 Unit(s) SubCutaneous every 12 hours  hydrALAZINE 25 milliGRAM(s) Oral three times a day  mupirocin 2% Ointment 1 Application(s) Both Nostrils two times a day  sevelamer carbonate 800 milliGRAM(s) Oral three times a day  sodium chloride 0.9%. 1000 milliLiter(s) (75 mL/Hr) IV Continuous <Continuous>      PHYSICAL EXAM:  T(C): 36.4 (11-19-22 @ 21:57), Max: 36.9 (11-19-22 @ 05:26)  HR: 79 (11-19-22 @ 21:57) (69 - 100)  BP: 159/81 (11-19-22 @ 21:57) (107/73 - 169/84)  RR: 18 (11-19-22 @ 21:57) (16 - 18)  SpO2: 99% (11-19-22 @ 21:57) (93% - 99%)  Wt(kg): --  I&O's Summary    18 Nov 2022 07:01  -  19 Nov 2022 07:00  --------------------------------------------------------  IN: 890 mL / OUT: 4 mL / NET: 886 mL    19 Nov 2022 07:01  -  19 Nov 2022 23:27  --------------------------------------------------------  IN: 480 mL / OUT: 2000 mL / NET: -1520 mL          Appearance: Normal	  HEENT:  PERRLA   Lymphatic: No lymphadenopathy   Cardiovascular: Normal S1 S2, no JVD  Respiratory: normal effort , clear  Gastrointestinal:  Soft, Non-tender  Skin: No rashes,  warm to touch  Psychiatry:  Mood & affect appropriate  Musculuskeletal: No edema      All labs, Imaging and EKGs personally reviewed     11-18-22 @ 07:01  -  11-19-22 @ 07:00  --------------------------------------------------------  IN: 890 mL / OUT: 4 mL / NET: 886 mL    11-19-22 @ 07:01  -  11-19-22 @ 23:27  --------------------------------------------------------  IN: 480 mL / OUT: 2000 mL / NET: -1520 mL                          9.1    8.04  )-----------( 210      ( 18 Nov 2022 07:17 )             30.1               11-18    138  |  96  |  29<H>  ----------------------------<  79  4.2   |  30  |  7.68<H>    Ca    8.9      18 Nov 2022 07:17

## 2022-11-19 NOTE — PROGRESS NOTE ADULT - ASSESSMENT
COVERING DR. CASANOVA    82y M PMH CKD on HD(T/Th/S), last HD session 11/15 presents for c/o fever, decreased urination since Saturday.     EKG - NSR RBBB   Echo 3/21- EF 75% severe LVOT obstruction 98mmHg     1) Severe LVOT obstruction   -could have been sec to dehydration in the past  -Echo this admission shows small, hyperdynamic left ventricle with severe asymmetric hypertrophy.  -needs out pt follow up     2) Hematuria/ Oliguria  - f/u urology   -on abx for sepsis 2/2 UTI    3) ESRD  - on HD   -f/u renal    4) DVT prophylaxis   - on sc heparin

## 2022-11-19 NOTE — PROGRESS NOTE ADULT - ASSESSMENT
82y M PMH CKD on HD(T/Th/S), last HD session 11/15 presents for c/o fever  RIght hydrocele and was evaluated by Urology in the past     1 Renal- HD today  2 ID-With cystitis would have expected to see some urine but he is describing oliguria   Blood cx sent and thus far negative and still awaiting the urine at present   3 -Renal ultrasound noted;  May dc the flomax, urology following  4 ID-IV abx for now and ID eval noted     Rekha Faith NP  [facility]  [phone]

## 2022-11-20 LAB
ALBUMIN SERPL ELPH-MCNC: 3.4 G/DL — SIGNIFICANT CHANGE UP (ref 3.3–5)
ALP SERPL-CCNC: 95 U/L — SIGNIFICANT CHANGE UP (ref 40–120)
ALT FLD-CCNC: 11 U/L — SIGNIFICANT CHANGE UP (ref 10–45)
ANION GAP SERPL CALC-SCNC: 15 MMOL/L — SIGNIFICANT CHANGE UP (ref 5–17)
AST SERPL-CCNC: 15 U/L — SIGNIFICANT CHANGE UP (ref 10–40)
BILIRUB SERPL-MCNC: 0.1 MG/DL — LOW (ref 0.2–1.2)
BUN SERPL-MCNC: 39 MG/DL — HIGH (ref 7–23)
CALCIUM SERPL-MCNC: 8.8 MG/DL — SIGNIFICANT CHANGE UP (ref 8.4–10.5)
CHLORIDE SERPL-SCNC: 99 MMOL/L — SIGNIFICANT CHANGE UP (ref 96–108)
CO2 SERPL-SCNC: 24 MMOL/L — SIGNIFICANT CHANGE UP (ref 22–31)
CREAT SERPL-MCNC: 8.92 MG/DL — HIGH (ref 0.5–1.3)
CULTURE RESULTS: SIGNIFICANT CHANGE UP
EGFR: 5 ML/MIN/1.73M2 — LOW
GLUCOSE SERPL-MCNC: 77 MG/DL — SIGNIFICANT CHANGE UP (ref 70–99)
HCT VFR BLD CALC: 28.2 % — LOW (ref 39–50)
HGB BLD-MCNC: 8.7 G/DL — LOW (ref 13–17)
MAGNESIUM SERPL-MCNC: 2.4 MG/DL — SIGNIFICANT CHANGE UP (ref 1.6–2.6)
MCHC RBC-ENTMCNC: 26.3 PG — LOW (ref 27–34)
MCHC RBC-ENTMCNC: 30.9 GM/DL — LOW (ref 32–36)
MCV RBC AUTO: 85.2 FL — SIGNIFICANT CHANGE UP (ref 80–100)
NRBC # BLD: 0 /100 WBCS — SIGNIFICANT CHANGE UP (ref 0–0)
PHOSPHATE SERPL-MCNC: 5 MG/DL — HIGH (ref 2.5–4.5)
PLATELET # BLD AUTO: 215 K/UL — SIGNIFICANT CHANGE UP (ref 150–400)
POTASSIUM SERPL-MCNC: 4.4 MMOL/L — SIGNIFICANT CHANGE UP (ref 3.5–5.3)
POTASSIUM SERPL-SCNC: 4.4 MMOL/L — SIGNIFICANT CHANGE UP (ref 3.5–5.3)
PROT SERPL-MCNC: 6.8 G/DL — SIGNIFICANT CHANGE UP (ref 6–8.3)
RBC # BLD: 3.31 M/UL — LOW (ref 4.2–5.8)
RBC # FLD: 16.6 % — HIGH (ref 10.3–14.5)
SODIUM SERPL-SCNC: 138 MMOL/L — SIGNIFICANT CHANGE UP (ref 135–145)
SPECIMEN SOURCE: SIGNIFICANT CHANGE UP
WBC # BLD: 6.43 K/UL — SIGNIFICANT CHANGE UP (ref 3.8–10.5)
WBC # FLD AUTO: 6.43 K/UL — SIGNIFICANT CHANGE UP (ref 3.8–10.5)

## 2022-11-20 RX ADMIN — Medication 1 DROP(S): at 12:28

## 2022-11-20 RX ADMIN — SEVELAMER CARBONATE 800 MILLIGRAM(S): 2400 POWDER, FOR SUSPENSION ORAL at 05:55

## 2022-11-20 RX ADMIN — Medication 81 MILLIGRAM(S): at 12:27

## 2022-11-20 RX ADMIN — Medication 650 MILLIGRAM(S): at 13:15

## 2022-11-20 RX ADMIN — SEVELAMER CARBONATE 800 MILLIGRAM(S): 2400 POWDER, FOR SUSPENSION ORAL at 21:10

## 2022-11-20 RX ADMIN — Medication 325 MILLIGRAM(S): at 12:27

## 2022-11-20 RX ADMIN — SEVELAMER CARBONATE 800 MILLIGRAM(S): 2400 POWDER, FOR SUSPENSION ORAL at 12:27

## 2022-11-20 RX ADMIN — Medication 25 MILLIGRAM(S): at 05:54

## 2022-11-20 RX ADMIN — Medication 25 MILLIGRAM(S): at 21:13

## 2022-11-20 RX ADMIN — CHLORHEXIDINE GLUCONATE 1 APPLICATION(S): 213 SOLUTION TOPICAL at 10:52

## 2022-11-20 RX ADMIN — MUPIROCIN 1 APPLICATION(S): 20 OINTMENT TOPICAL at 17:34

## 2022-11-20 RX ADMIN — HEPARIN SODIUM 5000 UNIT(S): 5000 INJECTION INTRAVENOUS; SUBCUTANEOUS at 17:34

## 2022-11-20 RX ADMIN — HEPARIN SODIUM 5000 UNIT(S): 5000 INJECTION INTRAVENOUS; SUBCUTANEOUS at 05:55

## 2022-11-20 RX ADMIN — Medication 650 MILLIGRAM(S): at 12:28

## 2022-11-20 RX ADMIN — Medication 25 MILLIGRAM(S): at 14:54

## 2022-11-20 RX ADMIN — MUPIROCIN 1 APPLICATION(S): 20 OINTMENT TOPICAL at 05:55

## 2022-11-20 NOTE — PROGRESS NOTE ADULT - SUBJECTIVE AND OBJECTIVE BOX
Roni Monson MD  Interventional Cardiology / Advance Heart Failure and Cardiac Transplant Specialist  [place] Office : [address] [place] [address] [address] [address]  Tel:   [place] Office : [address] [facility] [place] [address]  Tel: [phone]      Subjective/Overnight events: Pt is sitting up in chair comfortable not in distress, no chest pains no SOB no palpitations  	  MEDICATIONS:  aspirin enteric coated 81 milliGRAM(s) Oral daily  heparin   Injectable 5000 Unit(s) SubCutaneous every 12 hours  hydrALAZINE 25 milliGRAM(s) Oral three times a day        acetaminophen     Tablet .. 650 milliGRAM(s) Oral every 6 hours PRN  melatonin 3 milliGRAM(s) Oral at bedtime PRN  ondansetron Injectable 4 milliGRAM(s) IV Push every 8 hours PRN    aluminum hydroxide/magnesium hydroxide/simethicone Suspension 30 milliLiter(s) Oral every 4 hours PRN      artificial tears (preservative free) Ophthalmic Solution 1 Drop(s) Both EYES daily  chlorhexidine 2% Cloths 1 Application(s) Topical daily  ferrous    sulfate 325 milliGRAM(s) Oral daily  mupirocin 2% Ointment 1 Application(s) Both Nostrils two times a day  sodium chloride 0.9%. 1000 milliLiter(s) IV Continuous <Continuous>      PAST MEDICAL/SURGICAL HISTORY  PAST MEDICAL & SURGICAL HISTORY:  HTN - Hypertension      Glaucoma (Increased Eye Pressure)      BPH (Benign Prostatic Hypertrophy)      ESRD on dialysis      Excision of Sinus Polyp  2006      Laser Surgery Left  ? regarding procedure ; secondary to glaucoma 12/07      Laser Surgery :Right  ?  regarding procedure ; 12/07 ; secondary to glaucoma          SOCIAL HISTORY: Substance Use (street drugs): ( x ) never used  (  ) other:    FAMILY HISTORY:  Family history of diabetes mellitus (DM) (Mother, Sibling)            PHYSICAL EXAM:  T(C): 36.7 (11-20-22 @ 08:58), Max: 36.7 (11-19-22 @ 13:27)  HR: 80 (11-20-22 @ 08:58) (73 - 86)  BP: 168/71 (11-20-22 @ 08:58) (134/67 - 169/84)  RR: 18 (11-20-22 @ 08:58) (18 - 18)  SpO2: 95% (11-20-22 @ 08:58) (95% - 99%)  Wt(kg): --  I&O's Summary    19 Nov 2022 07:01  -  20 Nov 2022 07:00  --------------------------------------------------------  IN: 650 mL / OUT: 2000 mL / NET: -1350 mL          GENERAL: NAD  EYES:   PERRLA   ENMT:   Moist mucous membranes, Good dentition, No lesions  Cardiovascular: Normal S1 S2, No JVD, No murmurs, No edema  Respiratory: Lungs clear to auscultation	  Gastrointestinal:  Soft, Non-tender, + BS	  Extremities: no edema                                    8.7    6.43  )-----------( 215      ( 20 Nov 2022 07:05 )             28.2     11-20    138  |  99  |  39<H>  ----------------------------<  77  4.4   |  24  |  8.92<H>    Ca    8.8      20 Nov 2022 07:03  Phos  5.0     11-20  Mg     2.4     11-20    TPro  6.8  /  Alb  3.4  /  TBili  0.1<L>  /  DBili  x   /  AST  15  /  ALT  11  /  AlkPhos  95  11-20    proBNP:   Lipid Profile:   HgA1c:   TSH:     Consultant(s) Notes Reviewed:  [x ] YES  [ ] NO    Care Discussed with Consultants/Other Providers [ x] YES  [ ] NO    Imaging Personally Reviewed independently:  [x] YES  [ ] NO    All labs, radiologic studies, vitals, orders and medications list reviewed. Patient is seen and examined at bedside. Case discussed with medical team.

## 2022-11-20 NOTE — PROGRESS NOTE ADULT - SUBJECTIVE AND OBJECTIVE BOX
Name of Patient : BAKARI CHARLES  MRN: [id number]  Date of visit: 11-20-22       Subjective: Patient seen and examined. No new events except as noted.     REVIEW OF SYSTEMS:    CONSTITUTIONAL: No weakness, fevers or chills  EYES/ENT: No visual changes;  No vertigo or throat pain   NECK: No pain or stiffness  RESPIRATORY: No cough, wheezing, hemoptysis; No shortness of breath  CARDIOVASCULAR: No chest pain or palpitations  GASTROINTESTINAL: No abdominal or epigastric pain. No nausea, vomiting, or hematemesis; No diarrhea or constipation. No melena or hematochezia.  GENITOURINARY: No dysuria, frequency or hematuria  NEUROLOGICAL: No numbness or weakness  SKIN: No itching, burning, rashes, or lesions   All other review of systems is negative unless indicated above.    MEDICATIONS:  MEDICATIONS  (STANDING):  artificial tears (preservative free) Ophthalmic Solution 1 Drop(s) Both EYES daily  aspirin enteric coated 81 milliGRAM(s) Oral daily  chlorhexidine 2% Cloths 1 Application(s) Topical daily  ferrous    sulfate 325 milliGRAM(s) Oral daily  heparin   Injectable 5000 Unit(s) SubCutaneous every 12 hours  hydrALAZINE 25 milliGRAM(s) Oral three times a day  mupirocin 2% Ointment 1 Application(s) Both Nostrils two times a day  sevelamer carbonate 800 milliGRAM(s) Oral three times a day  sodium chloride 0.9%. 1000 milliLiter(s) (75 mL/Hr) IV Continuous <Continuous>      PHYSICAL EXAM:  T(C): 36.7 (11-20-22 @ 21:13), Max: 36.7 (11-20-22 @ 08:58)  HR: 78 (11-20-22 @ 21:13) (78 - 88)  BP: 164/83 (11-20-22 @ 21:13) (149/82 - 168/78)  RR: 18 (11-20-22 @ 21:13) (18 - 18)  SpO2: 95% (11-20-22 @ 21:13) (95% - 100%)  Wt(kg): --  I&O's Summary    19 Nov 2022 07:01  -  20 Nov 2022 07:00  --------------------------------------------------------  IN: 650 mL / OUT: 2000 mL / NET: -1350 mL          Appearance: Normal	  HEENT:  PERRLA   Lymphatic: No lymphadenopathy   Cardiovascular: Normal S1 S2, no JVD  Respiratory: normal effort , clear  Gastrointestinal:  Soft, Non-tender  Skin: No rashes,  warm to touch  Psychiatry:  Mood & affect appropriate  Musculuskeletal: No edema      All labs, Imaging and EKGs personally reviewed     11-19-22 @ 07:01  -  11-20-22 @ 07:00  --------------------------------------------------------  IN: 650 mL / OUT: 2000 mL / NET: -1350 mL                            8.7    6.43  )-----------( 215      ( 20 Nov 2022 07:05 )             28.2               11-20    138  |  99  |  39<H>  ----------------------------<  77  4.4   |  24  |  8.92<H>    Ca    8.8      20 Nov 2022 07:03  Phos  5.0     11-20  Mg     2.4     11-20    TPro  6.8  /  Alb  3.4  /  TBili  0.1<L>  /  DBili  x   /  AST  15  /  ALT  11  /  AlkPhos  95  11-20

## 2022-11-20 NOTE — PROGRESS NOTE ADULT - ASSESSMENT
OOB to chair  afebrile  s/p HD yesterday 2L net fluid loss  Next HD Monday  c/o on  dysuria - urology following - urine culture in lab per urology     acetaminophen     Tablet .. 650 milliGRAM(s) Oral every 6 hours PRN  aluminum hydroxide/magnesium hydroxide/simethicone Suspension 30 milliLiter(s) Oral every 4 hours PRN  artificial tears (preservative free) Ophthalmic Solution 1 Drop(s) Both EYES daily  aspirin enteric coated 81 milliGRAM(s) Oral daily  chlorhexidine 2% Cloths 1 Application(s) Topical daily  ferrous    sulfate 325 milliGRAM(s) Oral daily  heparin   Injectable 5000 Unit(s) SubCutaneous every 12 hours  hydrALAZINE 25 milliGRAM(s) Oral three times a day  melatonin 3 milliGRAM(s) Oral at bedtime PRN  mupirocin 2% Ointment 1 Application(s) Both Nostrils two times a day  ondansetron Injectable 4 milliGRAM(s) IV Push every 8 hours PRN  sevelamer carbonate 800 milliGRAM(s) Oral three times a day  sodium chloride 0.9%. 1000 milliLiter(s) IV Continuous <Continuous>      VITAL:  T(C): , Max: 36.7 (11-20-22 @ 08:58)  T(F): , Max: 98.1 (11-20-22 @ 08:58)  HR: 88 (11-20-22 @ 14:45)  BP: 149/82 (11-20-22 @ 14:45)  BP(mean): --  RR: 18 (11-20-22 @ 08:58)  SpO2: 97% (11-20-22 @ 14:45)  Wt(kg): --    11-19-22 @ 07:01  -  11-20-22 @ 07:00  --------------------------------------------------------  IN: 650 mL / OUT: 2000 mL / NET: -1350 mL        PHYSICAL EXAM:  Constitutional: NAD  HEENT: anicteric sclera, oropharynx clear, MMM  Neck: No JVD  Respiratory: CTAB, no wheezes, rales or rhonchi  Cardiovascular: S1, S2, RRR  Gastrointestinal: BS+, soft, NT/ND  Extremities: No cyanosis or clubbing. No peripheral edema  Neurological: A/O x 3  Psychiatric: Normal mood, normal affect  : No CVA tenderness. No an.   Skin: No rashes  Vascular Access: left AVF    LABS:                          8.7    6.43  )-----------( 215      ( 20 Nov 2022 07:05 )             28.2     Na(138)/K(4.4)/Cl(99)/HCO3(24)/BUN(39)/Cr(8.92)Glu(77)/Ca(8.8)/Mg(2.4)/PO4(5.0)    11-20 @ 07:03  Na(138)/K(4.2)/Cl(96)/HCO3(30)/BUN(29)/Cr(7.68)Glu(79)/Ca(8.9)/Mg(--)/PO4(--)    11-18 @ 07:17            ASSESSMENT/PLAN  Assessment:  82y M PMH CKD on HD(T/Th/S), last HD session 11/15 presents for c/o fever  RIght hydrocele and was evaluated by Urology in the past     1 Renal- s/p HD yesterday with 2L net loss, next HD Monday(following holiday schedule)  2 ID-With cystitis would have expected to see some urine but he is describing oliguria   Blood cx sent and thus far negative . f/u urine culture   3 -Renal ultrasound noted;  s/p  flomax, - urology following  4 ID- s/p IV abx ;  and ID eval noted ( wbc wnl)  5 Lytes controlled      Harris Dillon NP-BC  [employer]  [phone]

## 2022-11-20 NOTE — PROGRESS NOTE ADULT - ASSESSMENT
COVERING DR. CASANOVA    82y M PMH CKD on HD(T/Th/S), last HD session 11/15 presents for c/o fever, decreased urination since Saturday.     EKG - NSR RBBB   Echo 3/21- EF 75% severe LVOT obstruction 98mmHg     1) Severe LVOT obstruction   -could have been sec to dehydration in the past  -Echo this admission shows small, hyperdynamic left ventricle with severe asymmetric hypertrophy.  -needs out pt follow up     2) Hematuria/ Oliguria  - f/u urology   -on abx for sepsis 2/2 UTI    3) ESRD  - on HD   -f/u renal    4) HTN  -if BP remains elevated recommend increasing hydra to 50mg TID  -continue to monitor BP    5) DVT prophylaxis   - on sc heparin

## 2022-11-20 NOTE — PROGRESS NOTE ADULT - ASSESSMENT
82y M PMH CKD on HD(T/Th/S), last HD session 11/15 presents for c/o fever, decreased urination, dysuria admitted for infection w/u    Sepsis due to UTI  - Oliguric, dysuria, febrile   - Febrile to 100.8F + Tachycardic + Leukocytosis     - High risk of bacteremia given hx/o E. coli bacteremia Oct 2021  - C/w Ceftriaxone 1g qd per ID   - Blood culture X 2 w/ NGTD; F/u final   - UA and UCx pending collection -- Pt now on ABX, results likely to be inaccurate    - Monitor patient closely; monitor HD, VS, CBC, Temp curve closely   - ID eval consulted; F/u recs    Oliguria/ Dysuria  - Likely due to UTI  - Check Renal US  -- negative for hydronephrosis, prostamegaly   - Check bladder scan   - Monitor I and O     LVOT  obstruction   - Cardio follow up appreciated  - F/u TTE    Stage 5 chronic kidney disease on dialysis.   - CKD on HD(T/Th/S), last HD session 11/15   - Nephro consulted, F/u recs  - C/w Sevelamer   - Monitor electrolytes.  - HD as per renal     Anemia  - Likely AOCD in view of ESRD  - Checking Iron, B12, Folate, Ferritin -- Not consistent with deficiency   - Transfuse for Hgb < 7.0     Hypertension.   - Reports taking Hydralazine but unsure of dose   - Start w/ Hydralazine 10mg TID, titrate as needed --> Increased to 25 TID   - Cardio eval consulted; F/u recs  - Check A1c  - Check Lipid panel  (LDL of 47)    History of BPH.   - c/w Tamsulosin.  - Renal US for prostamegaly   - Urology consulted; F/u recs    Prophylactic measure.   - DVT ppx: SQ heparin   - DASH diet.   PA Perkins   CHEST XRAY INTERPRETATION: lungs clear, heart shadow normal, bony structures intact   RIGOBERTO Perkins   CHEST XRAY INTERPRETATION: lungs clear, heart shadow normal, bony structures intact    Pelvis XR: no obvious fractures  hip replacement left   no obstructive pattern

## 2022-11-21 PROCEDURE — 99232 SBSQ HOSP IP/OBS MODERATE 35: CPT

## 2022-11-21 RX ORDER — ERTAPENEM SODIUM 1 G/1
500 INJECTION, POWDER, LYOPHILIZED, FOR SOLUTION INTRAMUSCULAR; INTRAVENOUS EVERY 24 HOURS
Refills: 0 | Status: COMPLETED | OUTPATIENT
Start: 2022-11-21 | End: 2022-11-23

## 2022-11-21 RX ORDER — METOPROLOL TARTRATE 50 MG
50 TABLET ORAL
Refills: 0 | Status: DISCONTINUED | OUTPATIENT
Start: 2022-11-21 | End: 2022-11-22

## 2022-11-21 RX ADMIN — ERTAPENEM SODIUM 100 MILLIGRAM(S): 1 INJECTION, POWDER, LYOPHILIZED, FOR SOLUTION INTRAMUSCULAR; INTRAVENOUS at 21:12

## 2022-11-21 RX ADMIN — SEVELAMER CARBONATE 800 MILLIGRAM(S): 2400 POWDER, FOR SUSPENSION ORAL at 21:12

## 2022-11-21 RX ADMIN — MUPIROCIN 1 APPLICATION(S): 20 OINTMENT TOPICAL at 18:23

## 2022-11-21 RX ADMIN — HEPARIN SODIUM 5000 UNIT(S): 5000 INJECTION INTRAVENOUS; SUBCUTANEOUS at 04:54

## 2022-11-21 RX ADMIN — MUPIROCIN 1 APPLICATION(S): 20 OINTMENT TOPICAL at 04:55

## 2022-11-21 RX ADMIN — Medication 650 MILLIGRAM(S): at 04:56

## 2022-11-21 RX ADMIN — Medication 650 MILLIGRAM(S): at 23:34

## 2022-11-21 RX ADMIN — HEPARIN SODIUM 5000 UNIT(S): 5000 INJECTION INTRAVENOUS; SUBCUTANEOUS at 18:23

## 2022-11-21 RX ADMIN — SEVELAMER CARBONATE 800 MILLIGRAM(S): 2400 POWDER, FOR SUSPENSION ORAL at 04:54

## 2022-11-21 RX ADMIN — Medication 50 MILLIGRAM(S): at 18:23

## 2022-11-21 RX ADMIN — Medication 650 MILLIGRAM(S): at 22:49

## 2022-11-21 RX ADMIN — Medication 25 MILLIGRAM(S): at 04:54

## 2022-11-21 NOTE — PROGRESS NOTE ADULT - ASSESSMENT
ASSESSMENT/PLAN  Assessment:  82y M PMH CKD on HD(T/Th/S), last HD session 11/15 presents for c/o fever  RIght hydrocele and was evaluated by Urology in the past     1 Renal-  next HD Monday(following holiday schedule)  2 ID-With cystitis would have expected to see some urine but he is describing oliguria   Blood cx and urine  negative  culture   Not on Abx   3 -Renal ultrasound noted;  s/p  flomax, - urology following  4 ID- s/p IV abx ;          DC planning     Sayed Kalion  [phone]

## 2022-11-21 NOTE — PROGRESS NOTE ADULT - SUBJECTIVE AND OBJECTIVE BOX
[facility]OLOGY[facility] [phone]        Patient seen and examined in bed.  He was in good spirits         MEDICATIONS  (STANDING):  artificial tears (preservative free) Ophthalmic Solution 1 Drop(s) Both EYES daily  aspirin enteric coated 81 milliGRAM(s) Oral daily  chlorhexidine 2% Cloths 1 Application(s) Topical daily  ferrous    sulfate 325 milliGRAM(s) Oral daily  heparin   Injectable 5000 Unit(s) SubCutaneous every 12 hours  hydrALAZINE 25 milliGRAM(s) Oral three times a day  mupirocin 2% Ointment 1 Application(s) Both Nostrils two times a day  sevelamer carbonate 800 milliGRAM(s) Oral three times a day  sodium chloride 0.9%. 1000 milliLiter(s) (75 mL/Hr) IV Continuous <Continuous>      VITAL:  T(C): , Max: 36.7 (11-20-22 @ 16:52)  T(F): , Max: 98.1 (11-20-22 @ 16:52)  HR: 77 (11-21-22 @ 08:23)  BP: 134/65 (11-21-22 @ 08:23)  BP(mean): --  RR: 18 (11-21-22 @ 08:23)  SpO2: 94% (11-21-22 @ 08:23)  Wt(kg): --    I and O's:    11-20 @ 07:01  -  11-21 @ 07:00  --------------------------------------------------------  IN: 120 mL / OUT: 0 mL / NET: 120 mL          PHYSICAL EXAM:    Constitutional: NAD  Neck:  No JVD  Respiratory: CTAB/L  Cardiovascular: S1 and S2  Gastrointestinal: BS+, soft, NT/ND  Extremities: No peripheral edema  Neurological: A/O x 3, no focal deficits  Psychiatric: Normal mood, normal affect  : No Aguilar  Skin: No rashes  Access: avf    LABS:                        8.7    6.43  )-----------( 215      ( 20 Nov 2022 07:05 )             28.2     11-20    138  |  99  |  39<H>  ----------------------------<  77  4.4   |  24  |  8.92<H>    Ca    8.8      20 Nov 2022 07:03  Phos  5.0     11-20  Mg     2.4     11-20    TPro  6.8  /  Alb  3.4  /  TBili  0.1<L>  /  DBili  x   /  AST  15  /  ALT  11  /  AlkPhos  95  11-20          Urine Studies:          RADIOLOGY & ADDITIONAL STUDIES:

## 2022-11-21 NOTE — PROGRESS NOTE ADULT - SUBJECTIVE AND OBJECTIVE BOX
DATE OF SERVICE: 11-21-22 @ 14:44    Patient is a 82y old  Male who presents with a chief complaint of fever, dysuria, decreased urination (21 Nov 2022 09:34)      INTERVAL HISTORY: Feels ok.     REVIEW OF SYSTEMS:  CONSTITUTIONAL: No weakness  EYES/ENT: No visual changes;  No throat pain   NECK: No pain or stiffness  RESPIRATORY: No cough, wheezing; No shortness of breath  CARDIOVASCULAR: No chest pain or palpitations  GASTROINTESTINAL: No abdominal  pain. No nausea, vomiting, or hematemesis  GENITOURINARY: No dysuria, frequency or hematuria  NEUROLOGICAL: No stroke like symptoms  SKIN: No rashes    	  MEDICATIONS:  hydrALAZINE 25 milliGRAM(s) Oral three times a day        PHYSICAL EXAM:  T(C): 36.6 (11-21-22 @ 13:20), Max: 36.7 (11-20-22 @ 16:52)  HR: 75 (11-21-22 @ 13:20) (75 - 88)  BP: 161/104 (11-21-22 @ 13:20) (134/65 - 181/82)  RR: 18 (11-21-22 @ 13:20) (18 - 18)  SpO2: 97% (11-21-22 @ 13:20) (94% - 100%)  Wt(kg): --  I&O's Summary    20 Nov 2022 07:01  -  21 Nov 2022 07:00  --------------------------------------------------------  IN: 120 mL / OUT: 0 mL / NET: 120 mL          Appearance: In no distress	  HEENT:    PERRL, EOMI	  Cardiovascular:  S1 S2, No JVD  Respiratory: Lungs clear to auscultation	  Gastrointestinal:  Soft, Non-tender, + BS	  Vascularature:  No edema of LE  Psychiatric: Appropriate affect   Neuro: no acute focal deficits                               8.7    6.43  )-----------( 215      ( 20 Nov 2022 07:05 )             28.2     11-20    138  |  99  |  39<H>  ----------------------------<  77  4.4   |  24  |  8.92<H>    Ca    8.8      20 Nov 2022 07:03  Phos  5.0     11-20  Mg     2.4     11-20    TPro  6.8  /  Alb  3.4  /  TBili  0.1<L>  /  DBili  x   /  AST  15  /  ALT  11  /  AlkPhos  95  11-20        Labs personally reviewed      ASSESSMENT/PLAN: 	    82y M PMH CKD on HD(T/Th/S), last HD session 11/15 presents for c/o fever, decreased urination since Saturday.     EKG - NSR RBBB   Echo 3/21- EF 75% severe LVOT obstruction 98mmHg     1) Severe LVOT obstruction   -could have been sec to dehydration in the past  -Echo this admission shows small, hyperdynamic left ventricle with severe asymmetric hypertrophy.  -Will DC Hydralazine and start Metoprolol 50mg PO BID  - Uptitrate BB as tolerated    2) Hematuria/ Oliguria  - f/u urology   -on abx for sepsis 2/2 UTI    3) ESRD  - on HD   -f/u renal    4) HTN  - Will change hydralazine to Metoprolol i/s/o LVOT obstruction    5) DVT prophylaxis   - on sc heparin           Lakshmi Moore, SHEFALI-NP   Sridhar Carrillo DO [facility]  Cardiovascular Medicine  [address] [place] [address], [address]  Available through call or text on [employer]  Office: [phone]

## 2022-11-21 NOTE — PROGRESS NOTE ADULT - SUBJECTIVE AND OBJECTIVE BOX
CC: F/U for Dysuria    Saw/spoke to patient. No fevers, no chills. Still with dysuria symptoms.    Allergies  grass, pollen (Rhinitis)  No Known Drug Allergies    ANTIMICROBIALS:  ertapenem  IVPB 500 every 24 hours    PE:    Vital Signs Last 24 Hrs  T(C): 36.6 (21 Nov 2022 13:20), Max: 36.7 (20 Nov 2022 16:52)  T(F): 97.9 (21 Nov 2022 13:20), Max: 98.1 (20 Nov 2022 16:52)  HR: 75 (21 Nov 2022 13:20) (75 - 88)  BP: 161/104 (21 Nov 2022 13:20) (134/65 - 181/82)  RR: 18 (21 Nov 2022 13:20) (18 - 18)  SpO2: 97% (21 Nov 2022 13:20) (94% - 100%)    Gen: AOx3, NAD, non-toxic  CV: Nontachycardic  Resp: Breathing comfortably, RA  Abd: Soft, nontender  IV/Skin: No thrombophlebitis    LABS:                        8.7    6.43  )-----------( 215      ( 20 Nov 2022 07:05 )             28.2     11-20    138  |  99  |  39<H>  ----------------------------<  77  4.4   |  24  |  8.92<H>    Ca    8.8      20 Nov 2022 07:03  Phos  5.0     11-20  Mg     2.4     11-20    TPro  6.8  /  Alb  3.4  /  TBili  0.1<L>  /  DBili  x   /  AST  15  /  ALT  11  /  AlkPhos  95  11-20    MICROBIOLOGY:    Catheterized Catheterized  11-18-22   <10,000 CFU/mL Normal Urogenital Aranza  --  --    .Blood Blood-Peripheral  11-16-22   No growth to date.  --  --    .Blood Blood-Peripheral  10-23-22   No Growth Final  --  --    (otherwise reviewed)    RADIOLOGY:    11/17 USG:    FINDINGS:  Right kidney: 9.5 cm. Atrophic. No hydronephrosis. Multiple cysts.    Left kidney: 9.8 cm. Atrophic. No hydronephrosis. Multiple cysts.    Urinary bladder: Underdistention limits evaluation.    Prostate measures 7.2 x 5.7 x 6.4 cm with volume of 137 cc.    IMPRESSION:    No hydronephrosis.  Redemonstrated markedly enlarged prostate.

## 2022-11-21 NOTE — PROGRESS NOTE ADULT - ASSESSMENT
82y M PMH CKD on HD(T/Th/S), last HD session 11/15 presents for c/o fever, decreased urination, dysuria admitted for infection w/u    Sepsis due to UTI  - Oliguric, dysuria, febrile   - Febrile to 100.8F + Tachycardic + Leukocytosis     - High risk of bacteremia given hx/o E. coli bacteremia Oct 2021  - Started on Ertapenem 500 Q24, ABX per ID   - Blood culture X 2 w/ NGTD; F/u final   - UA and UCx pending collection -- Pt now on ABX, results likely to be inaccurate    - Monitor patient closely; monitor HD, VS, CBC, Temp curve closely   - ID eval consulted; F/u recs    Oliguria/ Dysuria  - Likely due to UTI  - Renal US  -- negative for hydronephrosis, prostatomegaly   - Check bladder scan   - Monitor I and O     LVOT  obstruction   - Cardio follow up appreciated  - TTE w EF of 75%, Small, hyperdynamic left ventricle with severe asymmetric hypertrophy   - Pt will require outpatient structural heart follow up   - Hydralazine D/C, now on Metoprolol 50 BID, monitor and up-titrate as tolerated    Stage 5 chronic kidney disease on dialysis.   - CKD on HD(T/Th/S), last HD session 11/15   - Nephro consulted, F/u recs  - C/w Sevelamer   - Monitor electrolytes.  - HD as per renal - planned for HD today     Anemia  - Likely AOCD in view of ESRD  - Iron, B12, Folate, Ferritin -- Not consistent with deficiency   - Transfuse for Hgb < 7.0     Hypertension.   - D/C hydralazine, Started on Metoprolol 50 BID in view of LVOT   - Cardio eval consulted; F/u recs  - Check A1c  - Lipid panel  (LDL of 47)    History of BPH.   - c/w Tamsulosin.  - Renal US for prostamegaly   - Urology consulted; F/u recs    Prophylactic measure.   - DVT ppx: SQ heparin   - DASH diet.

## 2022-11-21 NOTE — PROGRESS NOTE ADULT - ASSESSMENT
83 yo M CKD on HD, fever, initially with dysuria  Fever, leukocytosis  On HD but complains of new onset dysuria, no flank pain (states still makes urine despite ESRD)  No other complaints to suggest source  CXR clear  Renal USG enlarged prostate  UTI? Prostatitis?  WBC improving, although patient still complaining of dysuria/bladder pain  UCX clear  Possible non-UTI etiology? STI? Irritative voiding issue?  Urology suspecting prostatitis  Overall, UTI, fever, leukocytosis  - Ertapenem 500mg q 24  - Check urine GC/Chlamydia  - Monitor for further fevers  - Trend WBC to normal  - F/U urology    Joseph Hicks MD  Contact on TEAMS messaging from 9am - 5pm  From 5pm-9am, on weekends, or if no response call [phone]

## 2022-11-21 NOTE — PROGRESS NOTE ADULT - SUBJECTIVE AND OBJECTIVE BOX
Name of Patient : BAKARI CHARLES  MRN: [id number]  Date of visit: 11-21-22 @ 17:11      Subjective: Patient seen and examined. No new events except as noted.   Patient seen earlier this AM; OOB TC  Reports continued dysuria  Started on Ivanz for ABX per ID recs  Planned for HD today    REVIEW OF SYSTEMS:    CONSTITUTIONAL: No weakness, fevers or chills  EYES/ENT: No visual changes;  No vertigo or throat pain   NECK: No pain or stiffness  RESPIRATORY: No cough, wheezing, hemoptysis; No shortness of breath  CARDIOVASCULAR: No chest pain or palpitations  GASTROINTESTINAL: No abdominal or epigastric pain. No nausea, vomiting, or hematemesis; No diarrhea or constipation. No melena or hematochezia.  GENITOURINARY: + Dysuria   NEUROLOGICAL: No numbness or weakness  SKIN: No itching, burning, rashes, or lesions   All other review of systems is negative unless indicated above.    MEDICATIONS:  MEDICATIONS  (STANDING):  artificial tears (preservative free) Ophthalmic Solution 1 Drop(s) Both EYES daily  aspirin enteric coated 81 milliGRAM(s) Oral daily  chlorhexidine 2% Cloths 1 Application(s) Topical daily  ertapenem  IVPB 500 milliGRAM(s) IV Intermittent every 24 hours  ferrous    sulfate 325 milliGRAM(s) Oral daily  heparin   Injectable 5000 Unit(s) SubCutaneous every 12 hours  metoprolol tartrate 50 milliGRAM(s) Oral two times a day  mupirocin 2% Ointment 1 Application(s) Both Nostrils two times a day  sevelamer carbonate 800 milliGRAM(s) Oral three times a day  sodium chloride 0.9%. 1000 milliLiter(s) (75 mL/Hr) IV Continuous <Continuous>      PHYSICAL EXAM:  T(C): 36.7 (11-21-22 @ 16:41), Max: 36.7 (11-20-22 @ 21:13)  HR: 70 (11-21-22 @ 16:41) (70 - 79)  BP: 143/65 (11-21-22 @ 16:41) (134/65 - 181/82)  RR: 18 (11-21-22 @ 16:41) (18 - 18)  SpO2: 97% (11-21-22 @ 16:41) (94% - 97%)  Wt(kg): --  I&O's Summary    20 Nov 2022 07:01  -  21 Nov 2022 07:00  --------------------------------------------------------  IN: 120 mL / OUT: 0 mL / NET: 120 mL    21 Nov 2022 07:01  -  21 Nov 2022 17:11  --------------------------------------------------------  IN: 0 mL / OUT: 2000 mL / NET: -2000 mL          Appearance: Normal; OOB TC  HEENT:  Eyes are open   Lymphatic: No lymphadenopathy   Cardiovascular: Normal S1 S2, no JVD  Respiratory: normal effort , clear  Gastrointestinal:  Soft, Non-tender  Skin: No rashes,  warm to touch  Psychiatry:  Mood & affect appropriate  Musculoskeletal: No edema        11-20-22 @ 07:01  -  11-21-22 @ 07:00  --------------------------------------------------------  IN: 120 mL / OUT: 0 mL / NET: 120 mL    11-21-22 @ 07:01  -  11-21-22 @ 17:11  --------------------------------------------------------  IN: 0 mL / OUT: 2000 mL / NET: -2000 mL                                8.7    6.43  )-----------( 215      ( 20 Nov 2022 07:05 )             28.2               11-20    138  |  99  |  39<H>  ----------------------------<  77  4.4   |  24  |  8.92<H>    Ca    8.8      20 Nov 2022 07:03  Phos  5.0     11-20  Mg     2.4     11-20    TPro  6.8  /  Alb  3.4  /  TBili  0.1<L>  /  DBili  x   /  AST  15  /  ALT  11  /  AlkPhos  95  11-20                         Culture - Urine (11.18.22 @ 20:15)   Specimen Source: Catheterized Catheterized   Culture Results: <10,000 CFU/mL Normal Urogenital Aranza     Culture - Blood (11.16.22 @ 20:50)   Specimen Source: .Blood Blood-Peripheral   Culture Results: No growth to date.     Culture - Blood (11.16.22 @ 20:50)   Specimen Source: .Blood Blood-Peripheral   Culture Results: No growth to date.     < from: Transthoracic Echocardiogram (11.18.22 @ 09:10) >    Patient name: BAKARI CHARLES  YOB: 1940   Age: 82 (M)   MR#: [id number]  Study Date: 11/18/2022  Location: [address] [place], [place] [id number]: Tegan Pablo RDCS  Study quality: Technically fair  Referring Physician: Sohan Gustafson MD  Blood Pressure: 149/64 mmHg  Height: 152 cm  Weight: 71 kg  BSA: 1.7 m2  ------------------------------------------------------------------------  PROCEDURE: Transthoracic echocardiogram with 2-D, M-Mode  and complete spectral and color flow Doppler.  INDICATION: Chronic ischemic heart disease, unspecified  (I25.9)  ------------------------------------------------------------------------  Dimensions:    Normal Values:  LA:     3.6    2.0 - 4.0 cm  Ao:     3.6    2.0 - 3.8 cm  SEPTUM: 1.6    0.6 - 1.2 cm  PWT:    1.1    0.6 - 1.1 cm  LVIDd:  4.8    3.0 - 5.6 cm  LVIDs:  2.5    1.8 - 4.0 cm  Derived variables:  LVMI: 155 g/m2  RWT: 0.45  EF (Visual Estimate): 75 %  Doppler Peak Velocity (m/sec): AoV=1.4  ------------------------------------------------------------------------  Observations:  Mitral Valve: Normal mitral valve.  Aortic Valve/Aorta: Calcified aortic valve with normal  opening.  Normal aortic root size.  Left Atrium: Moderately dilated left atrium.  Left Ventricle: Small, hyperdynamic left ventricle with  severe asymmetric hypertrophy.  Impaired LV-relaxation with normal filling pressure.  Right Heart: Normal right atrium. Normal right ventricular  size and function.  Normal tricuspid valve. Normal pulmonic valve.  Pericardium/Pleura: Normal pericardium with trace  pericardial effusion.  Hemodynamic: Estimated right atrial pressure is normal.  No evidence of pulmonary hypertension.  ------------------------------------------------------------------------  Conclusions:  Small, hyperdynamic left ventricle with severe asymmetric  hypertrophy.  ------------------------------------------------------------------------  Confirmed on  11/18/2022 - 17:39:19 by Casey Hall MD, FASE  ------------------------------------------------------------------------    < end of copied text >

## 2022-11-22 LAB
CULTURE RESULTS: SIGNIFICANT CHANGE UP
CULTURE RESULTS: SIGNIFICANT CHANGE UP
PSA FLD-MCNC: 12 NG/ML — HIGH (ref 0–4)
SPECIMEN SOURCE: SIGNIFICANT CHANGE UP
SPECIMEN SOURCE: SIGNIFICANT CHANGE UP

## 2022-11-22 PROCEDURE — 99232 SBSQ HOSP IP/OBS MODERATE 35: CPT

## 2022-11-22 RX ORDER — ISOPROPYL ALCOHOL, BENZOCAINE .7; .06 ML/ML; ML/ML
1 SWAB TOPICAL
Qty: 1 | Refills: 0
Start: 2022-11-22

## 2022-11-22 RX ORDER — METOPROLOL TARTRATE 50 MG
75 TABLET ORAL
Refills: 0 | Status: DISCONTINUED | OUTPATIENT
Start: 2022-11-22 | End: 2022-11-25

## 2022-11-22 RX ORDER — METOPROLOL TARTRATE 50 MG
25 TABLET ORAL ONCE
Refills: 0 | Status: COMPLETED | OUTPATIENT
Start: 2022-11-22 | End: 2022-11-22

## 2022-11-22 RX ORDER — ERYTHROPOIETIN 10000 [IU]/ML
10000 INJECTION, SOLUTION INTRAVENOUS; SUBCUTANEOUS ONCE
Refills: 0 | Status: COMPLETED | OUTPATIENT
Start: 2022-11-23 | End: 2022-11-23

## 2022-11-22 RX ORDER — ISOPROPYL ALCOHOL, BENZOCAINE .7; .06 ML/ML; ML/ML
1 SWAB TOPICAL
Qty: 120 | Refills: 0
Start: 2022-11-22 | End: 2022-12-21

## 2022-11-22 RX ORDER — ISOPROPYL ALCOHOL, BENZOCAINE .7; .06 ML/ML; ML/ML
1 SWAB TOPICAL
Qty: 100 | Refills: 0
Start: 2022-11-22

## 2022-11-22 RX ORDER — TAMSULOSIN HYDROCHLORIDE 0.4 MG/1
0.8 CAPSULE ORAL AT BEDTIME
Refills: 0 | Status: DISCONTINUED | OUTPATIENT
Start: 2022-11-22 | End: 2022-11-27

## 2022-11-22 RX ADMIN — ERTAPENEM SODIUM 100 MILLIGRAM(S): 1 INJECTION, POWDER, LYOPHILIZED, FOR SOLUTION INTRAMUSCULAR; INTRAVENOUS at 20:57

## 2022-11-22 RX ADMIN — MUPIROCIN 1 APPLICATION(S): 20 OINTMENT TOPICAL at 05:01

## 2022-11-22 RX ADMIN — TAMSULOSIN HYDROCHLORIDE 0.8 MILLIGRAM(S): 0.4 CAPSULE ORAL at 20:57

## 2022-11-22 RX ADMIN — Medication 25 MILLIGRAM(S): at 14:22

## 2022-11-22 RX ADMIN — HEPARIN SODIUM 5000 UNIT(S): 5000 INJECTION INTRAVENOUS; SUBCUTANEOUS at 05:02

## 2022-11-22 RX ADMIN — Medication 81 MILLIGRAM(S): at 12:31

## 2022-11-22 RX ADMIN — SEVELAMER CARBONATE 800 MILLIGRAM(S): 2400 POWDER, FOR SUSPENSION ORAL at 20:57

## 2022-11-22 RX ADMIN — SEVELAMER CARBONATE 800 MILLIGRAM(S): 2400 POWDER, FOR SUSPENSION ORAL at 14:25

## 2022-11-22 RX ADMIN — MUPIROCIN 1 APPLICATION(S): 20 OINTMENT TOPICAL at 17:29

## 2022-11-22 RX ADMIN — CHLORHEXIDINE GLUCONATE 1 APPLICATION(S): 213 SOLUTION TOPICAL at 12:32

## 2022-11-22 RX ADMIN — Medication 325 MILLIGRAM(S): at 12:31

## 2022-11-22 RX ADMIN — SEVELAMER CARBONATE 800 MILLIGRAM(S): 2400 POWDER, FOR SUSPENSION ORAL at 05:01

## 2022-11-22 RX ADMIN — Medication 50 MILLIGRAM(S): at 09:29

## 2022-11-22 RX ADMIN — HEPARIN SODIUM 5000 UNIT(S): 5000 INJECTION INTRAVENOUS; SUBCUTANEOUS at 17:29

## 2022-11-22 RX ADMIN — Medication 75 MILLIGRAM(S): at 17:29

## 2022-11-22 RX ADMIN — Medication 1 DROP(S): at 12:31

## 2022-11-22 NOTE — PROGRESS NOTE ADULT - SUBJECTIVE AND OBJECTIVE BOX
Name of Patient : BAKARI CHARLES  MRN: [id number]  Date of visit: 11-22-22 @ 17:02      Subjective: Patient seen and examined. No new events except as noted.   Patient seen earlier this Am.   Sitting OOB TC.   Patient complaining of dysuria.    REVIEW OF SYSTEMS:    CONSTITUTIONAL: No weakness, fevers or chills  EYES/ENT: No visual changes;  No vertigo or throat pain   NECK: No pain or stiffness  RESPIRATORY: No cough, wheezing, hemoptysis; No shortness of breath  CARDIOVASCULAR: No chest pain or palpitations  GASTROINTESTINAL: No abdominal or epigastric pain. No nausea, vomiting, or hematemesis; No diarrhea or constipation. No melena or hematochezia.  GENITOURINARY: + Dysuria   NEUROLOGICAL: No numbness or weakness  SKIN: No itching, burning, rashes, or lesions   All other review of systems is negative unless indicated above.    MEDICATIONS:  MEDICATIONS  (STANDING):  artificial tears (preservative free) Ophthalmic Solution 1 Drop(s) Both EYES daily  aspirin enteric coated 81 milliGRAM(s) Oral daily  chlorhexidine 2% Cloths 1 Application(s) Topical daily  ertapenem  IVPB 500 milliGRAM(s) IV Intermittent every 24 hours  ferrous    sulfate 325 milliGRAM(s) Oral daily  heparin   Injectable 5000 Unit(s) SubCutaneous every 12 hours  metoprolol tartrate 75 milliGRAM(s) Oral two times a day  mupirocin 2% Ointment 1 Application(s) Both Nostrils two times a day  sevelamer carbonate 800 milliGRAM(s) Oral three times a day  sodium chloride 0.9%. 1000 milliLiter(s) (75 mL/Hr) IV Continuous <Continuous>  tamsulosin 0.8 milliGRAM(s) Oral at bedtime      PHYSICAL EXAM:  T(C): 36.8 (11-22-22 @ 08:49), Max: 37 (11-21-22 @ 23:36)  HR: 80 (11-22-22 @ 13:30) (72 - 87)  BP: 130/84 (11-22-22 @ 13:30) (130/84 - 196/86)  RR: 18 (11-22-22 @ 08:49) (18 - 18)  SpO2: 97% (11-22-22 @ 08:49) (97% - 99%)  Wt(kg): --  I&O's Summary    21 Nov 2022 07:01  -  22 Nov 2022 07:00  --------------------------------------------------------  IN: 300 mL / OUT: 2000 mL / NET: -1700 mL    22 Nov 2022 07:01  -  22 Nov 2022 17:02  --------------------------------------------------------  IN: 500 mL / OUT: 0 mL / NET: 500 mL          Appearance: Normal	  HEENT:  Eyes are open    Lymphatic: No lymphadenopathy   Cardiovascular: Normal S1 S2, no JVD  Respiratory: normal effort , clear  Gastrointestinal:  Soft, Non-tender  Skin: No rashes,  warm to touch  Psychiatry:  Mood & affect appropriate  Musculoskeletal; No edema        11-21-22 @ 07:01  -  11-22-22 @ 07:00  --------------------------------------------------------  IN: 300 mL / OUT: 2000 mL / NET: -1700 mL    11-22-22 @ 07:01  -  11-22-22 @ 17:02  --------------------------------------------------------  IN: 500 mL / OUT: 0 mL / NET: 500 mL            Culture - Urine (11.18.22 @ 20:15)   Specimen Source: Catheterized Catheterized   Culture Results:   <10,000 CFU/mL Normal Urogenital Aranza     Culture - Blood (11.16.22 @ 20:50)   Specimen Source: .Blood Blood-Peripheral   Culture Results:   No Growth Final     Culture - Blood (11.16.22 @ 20:50)   Specimen Source: .Blood Blood-Peripheral   Culture Results:   No Growth Final

## 2022-11-22 NOTE — PROGRESS NOTE ADULT - SUBJECTIVE AND OBJECTIVE BOX
CC: F/U for UTI    Saw/spoke to patient. No fevers, no chills. No new complaints.    Allergies  grass, pollen (Rhinitis)  No Known Drug Allergies    ANTIMICROBIALS:  ertapenem  IVPB 500 every 24 hours    PE:    Vital Signs Last 24 Hrs  T(C): 36.8 (22 Nov 2022 08:49), Max: 37 (21 Nov 2022 23:36)  T(F): 98.3 (22 Nov 2022 08:49), Max: 98.6 (21 Nov 2022 23:36)  HR: 80 (22 Nov 2022 13:30) (70 - 87)  BP: 130/84 (22 Nov 2022 13:30) (130/84 - 196/86)  RR: 18 (22 Nov 2022 08:49) (18 - 18)  SpO2: 97% (22 Nov 2022 08:49) (97% - 99%)    Gen: AOx3, NAD, non-toxic  CV: Nontachycardic  Resp: Breathing comfortably, RA  Abd: Soft, nontender  IV/Skin: No thrombophlebitis    LABS:    No new available    MICROBIOLOGY:    Catheterized Catheterized  11-18-22   <10,000 CFU/mL Normal Urogenital Aranza  --  --    .Blood Blood-Peripheral  11-16-22   No Growth Final  --  --    RADIOLOGY:    11/16 XR:    FINDINGS:  Heart/Vascular: The cardiac silhouette is normal in size.  Pulmonary: No focal consolidation, pleural effusion or pneumothorax.  Bones: The visualized osseous structures are unremarkable.    Impression:  Clear lungs.

## 2022-11-22 NOTE — PROGRESS NOTE ADULT - ASSESSMENT
81 yo M CKD on HD, fever, initially with dysuria  Fever, leukocytosis  On HD but complains of new onset dysuria, no flank pain (states still makes urine despite ESRD)  No other complaints to suggest source  CXR clear  Renal USG enlarged prostate  UTI? Prostatitis?  WBC improving, although patient still complaining of dysuria/bladder pain  UCX clear  Possible non-UTI etiology? STI? Irritative voiding issue?  Urology suspecting prostatitis  Overall, UTI, fever, leukocytosis  - Ertapenem 500mg q 24  - F/U urine GC/Chlamydia  - Likely will consider PO options when DC planning (with plans for extended course treatment for ? prostatitis)  - Monitor for further fevers  - Trend WBC to normal  - F/U urology    Joseph Hicks MD  Contact on TEAMS messaging from 9am - 5pm  From 5pm-9am, on weekends, or if no response call [phone]

## 2022-11-22 NOTE — PROGRESS NOTE ADULT - ASSESSMENT
82y M PMH CKD on HD(T/Th/S), last HD session 11/15 presents for c/o fever, decreased urination, dysuria admitted for infection w/u    Sepsis due to UTI  - Oliguric, dysuria, febrile   - Febrile to 100.8F + Tachycardic + Leukocytosis     - High risk of bacteremia given hx/o E. coli bacteremia Oct 2021  - Started on Ertapenem 500 Q24, ABX per ID   - Blood culture X 2 w/ NGTD; F/u final   - UA and UCx   -- Pt now on ABX, results likely to be inaccurate    - Monitor patient closely; monitor HD, VS, CBC, Temp curve closely   - ID eval consulted; F/u recs  - F/u GC/ Chlamydia results     Oliguria/ Dysuria  - Likely due to UTI  - Renal US  -- negative for hydronephrosis, prostatomegaly   - Check bladder scan   - Monitor I and O     LVOT  obstruction   - Cardio follow up appreciated  - TTE w EF of 75%, Small, hyperdynamic left ventricle with severe asymmetric hypertrophy   - Pt will require outpatient structural heart follow up   - Hydralazine D/C, now on Metoprolol 50 BID, monitor and up-titrate as tolerated  - metoprolol increased to 75 BID    PAT  - Monitor on tele  - Monitor electrolytes closely; Keep K > 4 and Mg > 2  - Metoprolol increased to 75 BID. Monitor    Stage 5 chronic kidney disease on dialysis.   - CKD on HD(T/Th/S), last HD session 11/15   - Nephro consulted, F/u recs  - C/w Sevelamer   - Monitor electrolytes.  - HD as per renal - planned for HD today     Anemia  - Likely AOCD in view of ESRD  - Iron, B12, Folate, Ferritin -- Not consistent with deficiency   - Transfuse for Hgb < 7.0     Hypertension.   - C/w  Metoprolol 75 BID in view of LVOT   - Cardio eval consulted; F/u recs  - Lipid panel  (LDL of 47)    History of BPH.   - c/w Tamsulosin.  - Renal US for prostamegaly   - Urology consulted; F/u recs  - F/u PSA    Prophylactic measure.   - DVT ppx: SQ heparin   - DASH diet.

## 2022-11-22 NOTE — PROGRESS NOTE ADULT - SUBJECTIVE AND OBJECTIVE BOX
DATE OF SERVICE: 11-22-22 @ 13:06    Patient is a 82y old  Male who presents with a chief complaint of fever, dysuria, decreased urination (22 Nov 2022 10:51)      INTERVAL HISTORY: Feels ok.     REVIEW OF SYSTEMS:  CONSTITUTIONAL: No weakness  EYES/ENT: No visual changes;  No throat pain   NECK: No pain or stiffness  RESPIRATORY: No cough, wheezing; No shortness of breath  CARDIOVASCULAR: No chest pain or palpitations  GASTROINTESTINAL: No abdominal  pain. No nausea, vomiting, or hematemesis  GENITOURINARY: No dysuria, frequency or hematuria  NEUROLOGICAL: No stroke like symptoms  SKIN: No rashes    TELEMETRY Personally reviewed:SR 70-80 PAC/PVC  	  MEDICATIONS:  metoprolol tartrate 50 milliGRAM(s) Oral two times a day        PHYSICAL EXAM:  T(C): 36.8 (11-22-22 @ 08:49), Max: 37 (11-21-22 @ 23:36)  HR: 79 (11-22-22 @ 09:15) (70 - 87)  BP: 167/84 (11-22-22 @ 10:26) (131/69 - 196/86)  RR: 18 (11-22-22 @ 08:49) (18 - 18)  SpO2: 97% (11-22-22 @ 08:49) (97% - 99%)  Wt(kg): --  I&O's Summary    21 Nov 2022 07:01  -  22 Nov 2022 07:00  --------------------------------------------------------  IN: 300 mL / OUT: 2000 mL / NET: -1700 mL          Appearance: In no distress	  HEENT:    PERRL, EOMI	  Cardiovascular:  S1 S2, No JVD  Respiratory: Lungs clear to auscultation	  Gastrointestinal:  Soft, Non-tender, + BS	  Vascularature:  No edema of LE  Psychiatric: Appropriate affect   Neuro: no acute focal deficits                     Labs personally reviewed      ASSESSMENT/PLAN: 	    82y M PMH CKD on HD(T/Th/S), last HD session 11/15 presents for c/o fever, decreased urination since Saturday.     EKG - NSR RBBB   Echo 3/21- EF 75% severe LVOT obstruction 98mmHg     1) Severe LVOT obstruction   -could have been sec to dehydration in the past  -Echo this admission shows small, hyperdynamic left ventricle with severe asymmetric hypertrophy.  -Will DC Hydralazine and start Metoprolol 50mg PO BID  -BB elevated. Metoprol increased to 75mg PO BID    2) Hematuria/ Oliguria  - f/u urology   -on abx for sepsis 2/2 UTI    3) ESRD  - on HD   -f/u renal    4) HTN  - Will change hydralazine to Metoprolol i/s/o LVOT obstruction    5) DVT prophylaxis   - on sc heparin         Lakshmi Moore, AG-NP   Sridhar Carrillo DO [facility]  Cardiovascular Medicine  [address] [facility], [address]  Available through call or text on [employer] TEAMs  Office: [phone]   DATE OF SERVICE: 11-22-22 @ 13:06    Patient is a 82y old  Male who presents with a chief complaint of fever, dysuria, decreased urination (22 Nov 2022 10:51)      INTERVAL HISTORY: Feels ok.     REVIEW OF SYSTEMS:  CONSTITUTIONAL: No weakness  EYES/ENT: No visual changes;  No throat pain   NECK: No pain or stiffness  RESPIRATORY: No cough, wheezing; No shortness of breath  CARDIOVASCULAR: No chest pain or palpitations  GASTROINTESTINAL: No abdominal  pain. No nausea, vomiting, or hematemesis  GENITOURINARY: No dysuria, frequency or hematuria  NEUROLOGICAL: No stroke like symptoms  SKIN: No rashes    TELEMETRY Personally reviewed:SR 70-80 PAC/PVC  	  MEDICATIONS:  metoprolol tartrate 50 milliGRAM(s) Oral two times a day        PHYSICAL EXAM:  T(C): 36.8 (11-22-22 @ 08:49), Max: 37 (11-21-22 @ 23:36)  HR: 79 (11-22-22 @ 09:15) (70 - 87)  BP: 167/84 (11-22-22 @ 10:26) (131/69 - 196/86)  RR: 18 (11-22-22 @ 08:49) (18 - 18)  SpO2: 97% (11-22-22 @ 08:49) (97% - 99%)  Wt(kg): --  I&O's Summary    21 Nov 2022 07:01  -  22 Nov 2022 07:00  --------------------------------------------------------  IN: 300 mL / OUT: 2000 mL / NET: -1700 mL          Appearance: In no distress	  HEENT:    PERRL, EOMI	  Cardiovascular:  S1 S2, No JVD  Respiratory: Lungs clear to auscultation	  Gastrointestinal:  Soft, Non-tender, + BS	  Vascularature:  No edema of LE  Psychiatric: Appropriate affect   Neuro: no acute focal deficits             Labs personally reviewed      ASSESSMENT/PLAN: 	    82y M PMH CKD on HD(T/Th/S), last HD session 11/15 presents for c/o fever, decreased urination since Saturday.     EKG - NSR RBBB   Echo 3/21- EF 75% severe LVOT obstruction 98mmHg     1) Severe LVOT obstruction   -could have been sec to dehydration in the past  -Echo this admission shows small, hyperdynamic left ventricle with severe asymmetric hypertrophy.  -Will DC Hydralazine and start Metoprolol 50mg PO BID  -BB elevated. Metoprol increased to 75mg PO BID    2) Hematuria/ Oliguria  - f/u urology   -on abx for sepsis 2/2 UTI    3) ESRD  - on HD   -f/u renal    4) HTN  - Will change hydralazine to Metoprolol i/s/o LVOT obstruction    5) DVT prophylaxis   - on sc heparin         Lakshmi Moore, AG-NP   Sridhar Carrillo DO [facility]  Cardiovascular Medicine  [address] [facility], [address]  Available through call or text on [employer] TEAMs  Office: [phone]

## 2022-11-22 NOTE — PROGRESS NOTE ADULT - ASSESSMENT
ASSESSMENT/PLAN  Assessment:  82y M PMH CKD on HD(T/Th/S), last HD session 11/15 presents for c/o fever  RIght hydrocele and was evaluated by Urology in the past     1 Renal-  next HD in am if stays   2 ID-This is likely prostatitis?  On IV abx   3 -Restart flomax, 0.4mg po qd- urology following       Sayed [facility]   [place]  [phone]

## 2022-11-22 NOTE — PROGRESS NOTE ADULT - SUBJECTIVE AND OBJECTIVE BOX
NEPHROLOGY-[facility] [phone]        Patient seen and examined in bed.  Still has that same sensation that he needs to urinate         MEDICATIONS  (STANDING):  artificial tears (preservative free) Ophthalmic Solution 1 Drop(s) Both EYES daily  aspirin enteric coated 81 milliGRAM(s) Oral daily  chlorhexidine 2% Cloths 1 Application(s) Topical daily  ertapenem  IVPB 500 milliGRAM(s) IV Intermittent every 24 hours  ferrous    sulfate 325 milliGRAM(s) Oral daily  heparin   Injectable 5000 Unit(s) SubCutaneous every 12 hours  metoprolol tartrate 50 milliGRAM(s) Oral two times a day  mupirocin 2% Ointment 1 Application(s) Both Nostrils two times a day  sevelamer carbonate 800 milliGRAM(s) Oral three times a day  sodium chloride 0.9%. 1000 milliLiter(s) (75 mL/Hr) IV Continuous <Continuous>      VITAL:  T(C): , Max: 37 (11-21-22 @ 23:36)  T(F): , Max: 98.6 (11-21-22 @ 23:36)  HR: 79 (11-22-22 @ 09:15)  BP: 180/80 (11-22-22 @ 09:15)  BP(mean): --  RR: 18 (11-22-22 @ 08:49)  SpO2: 97% (11-22-22 @ 08:49)  Wt(kg): --    I and O's:    11-21 @ 07:01  -  11-22 @ 07:00  --------------------------------------------------------  IN: 300 mL / OUT: 2000 mL / NET: -1700 mL          PHYSICAL EXAM:    Constitutional: NAD  Neck:  No JVD  Respiratory: CTAB/L  Cardiovascular: S1 and S2  Gastrointestinal: BS+, soft, NT/ND  Extremities: No peripheral edema  Neurological: A/O x 3, no focal deficits  Psychiatric: Normal mood, normal affect  : No Aguilar  Skin: No rashes  Access: avf    LABS:                Urine Studies:          RADIOLOGY & ADDITIONAL STUDIES:

## 2022-11-22 NOTE — CHART NOTE - NSCHARTNOTEFT_GEN_A_CORE
81 y/o male admitted with sepsis due UTI/ prostatitis  s/p Ceftriaxone now  Ertapenem 2/2 frequent urinary symptoms. Now RN reported episode of PAT 6.4 sec, HR 160BPM on tele, lopressor given; Irene informed.

## 2022-11-23 LAB
ANION GAP SERPL CALC-SCNC: 17 MMOL/L — SIGNIFICANT CHANGE UP (ref 5–17)
BUN SERPL-MCNC: 54 MG/DL — HIGH (ref 7–23)
C TRACH RRNA SPEC QL NAA+PROBE: SIGNIFICANT CHANGE UP
CALCIUM SERPL-MCNC: 9.2 MG/DL — SIGNIFICANT CHANGE UP (ref 8.4–10.5)
CHLORIDE SERPL-SCNC: 96 MMOL/L — SIGNIFICANT CHANGE UP (ref 96–108)
CO2 SERPL-SCNC: 24 MMOL/L — SIGNIFICANT CHANGE UP (ref 22–31)
CREAT SERPL-MCNC: 9.73 MG/DL — HIGH (ref 0.5–1.3)
EGFR: 5 ML/MIN/1.73M2 — LOW
GLUCOSE SERPL-MCNC: 90 MG/DL — SIGNIFICANT CHANGE UP (ref 70–99)
HCT VFR BLD CALC: 30.3 % — LOW (ref 39–50)
HGB BLD-MCNC: 9.3 G/DL — LOW (ref 13–17)
MAGNESIUM SERPL-MCNC: 2.4 MG/DL — SIGNIFICANT CHANGE UP (ref 1.6–2.6)
MAGNESIUM SERPL-MCNC: 2.4 MG/DL — SIGNIFICANT CHANGE UP (ref 1.6–2.6)
MCHC RBC-ENTMCNC: 25.8 PG — LOW (ref 27–34)
MCHC RBC-ENTMCNC: 30.7 GM/DL — LOW (ref 32–36)
MCV RBC AUTO: 84.2 FL — SIGNIFICANT CHANGE UP (ref 80–100)
N GONORRHOEA RRNA SPEC QL NAA+PROBE: SIGNIFICANT CHANGE UP
NRBC # BLD: 0 /100 WBCS — SIGNIFICANT CHANGE UP (ref 0–0)
PHOSPHATE SERPL-MCNC: 5.9 MG/DL — HIGH (ref 2.5–4.5)
PHOSPHATE SERPL-MCNC: 6.1 MG/DL — HIGH (ref 2.5–4.5)
PLATELET # BLD AUTO: 242 K/UL — SIGNIFICANT CHANGE UP (ref 150–400)
POTASSIUM SERPL-MCNC: 5 MMOL/L — SIGNIFICANT CHANGE UP (ref 3.5–5.3)
POTASSIUM SERPL-SCNC: 5 MMOL/L — SIGNIFICANT CHANGE UP (ref 3.5–5.3)
RBC # BLD: 3.6 M/UL — LOW (ref 4.2–5.8)
RBC # FLD: 17.2 % — HIGH (ref 10.3–14.5)
SODIUM SERPL-SCNC: 137 MMOL/L — SIGNIFICANT CHANGE UP (ref 135–145)
SPECIMEN SOURCE: SIGNIFICANT CHANGE UP
WBC # BLD: 7.56 K/UL — SIGNIFICANT CHANGE UP (ref 3.8–10.5)
WBC # FLD AUTO: 7.56 K/UL — SIGNIFICANT CHANGE UP (ref 3.8–10.5)

## 2022-11-23 PROCEDURE — 99232 SBSQ HOSP IP/OBS MODERATE 35: CPT

## 2022-11-23 RX ADMIN — Medication 75 MILLIGRAM(S): at 04:16

## 2022-11-23 RX ADMIN — MUPIROCIN 1 APPLICATION(S): 20 OINTMENT TOPICAL at 18:12

## 2022-11-23 RX ADMIN — SEVELAMER CARBONATE 800 MILLIGRAM(S): 2400 POWDER, FOR SUSPENSION ORAL at 22:19

## 2022-11-23 RX ADMIN — Medication 325 MILLIGRAM(S): at 12:46

## 2022-11-23 RX ADMIN — ERTAPENEM SODIUM 100 MILLIGRAM(S): 1 INJECTION, POWDER, LYOPHILIZED, FOR SOLUTION INTRAMUSCULAR; INTRAVENOUS at 22:18

## 2022-11-23 RX ADMIN — Medication 81 MILLIGRAM(S): at 12:46

## 2022-11-23 RX ADMIN — CHLORHEXIDINE GLUCONATE 1 APPLICATION(S): 213 SOLUTION TOPICAL at 12:48

## 2022-11-23 RX ADMIN — HEPARIN SODIUM 5000 UNIT(S): 5000 INJECTION INTRAVENOUS; SUBCUTANEOUS at 05:08

## 2022-11-23 RX ADMIN — SEVELAMER CARBONATE 800 MILLIGRAM(S): 2400 POWDER, FOR SUSPENSION ORAL at 05:08

## 2022-11-23 RX ADMIN — Medication 75 MILLIGRAM(S): at 18:11

## 2022-11-23 RX ADMIN — HEPARIN SODIUM 5000 UNIT(S): 5000 INJECTION INTRAVENOUS; SUBCUTANEOUS at 18:12

## 2022-11-23 RX ADMIN — ERYTHROPOIETIN 10000 UNIT(S): 10000 INJECTION, SOLUTION INTRAVENOUS; SUBCUTANEOUS at 14:41

## 2022-11-23 RX ADMIN — SEVELAMER CARBONATE 800 MILLIGRAM(S): 2400 POWDER, FOR SUSPENSION ORAL at 13:41

## 2022-11-23 RX ADMIN — MUPIROCIN 1 APPLICATION(S): 20 OINTMENT TOPICAL at 05:08

## 2022-11-23 RX ADMIN — TAMSULOSIN HYDROCHLORIDE 0.8 MILLIGRAM(S): 0.4 CAPSULE ORAL at 22:19

## 2022-11-23 RX ADMIN — Medication 1 DROP(S): at 13:01

## 2022-11-23 NOTE — PROGRESS NOTE ADULT - ASSESSMENT
ASSESSMENT/PLAN  Assessment:  82y M PMH CKD on HD(T/Th/S), last HD session 11/15 presents for c/o fever  RIght hydrocele and was evaluated by Urology in the past     1 Renal-  next HD this am  2 ID-This is likely prostatitis?  On IV abx per ID;  Afebrile   3 -Restart flomax, 0.4mg po qd- urology following  4 CVS-Lopressor was increased and hydralazine was stopped        Sayed [facility]   [employer]  [phone]

## 2022-11-23 NOTE — PROGRESS NOTE ADULT - SUBJECTIVE AND OBJECTIVE BOX
Name of Patient : BAKARI CHARLES  MRN: [id number]  Date of visit: 11-23-22 @ 12:38      Subjective: Patient seen and examined. No new events except as noted.   Patient seen earlier this AM. Sitting up in bed.  Patient reports his dysuria is improving. States that it is not constant.   Planned for HD today.     REVIEW OF SYSTEMS:    CONSTITUTIONAL: No weakness, fevers or chills  EYES/ENT: No visual changes;  No vertigo or throat pain   NECK: No pain or stiffness  RESPIRATORY: No cough, wheezing, hemoptysis; No shortness of breath  CARDIOVASCULAR: No chest pain or palpitations  GASTROINTESTINAL: No abdominal or epigastric pain. No nausea, vomiting, or hematemesis; No diarrhea or constipation. No melena or hematochezia.  GENITOURINARY: + Intermittent dysuria   NEUROLOGICAL: No numbness or weakness  SKIN: No itching, burning, rashes, or lesions   All other review of systems is negative unless indicated above.    MEDICATIONS:  MEDICATIONS  (STANDING):  artificial tears (preservative free) Ophthalmic Solution 1 Drop(s) Both EYES daily  aspirin enteric coated 81 milliGRAM(s) Oral daily  chlorhexidine 2% Cloths 1 Application(s) Topical daily  epoetin hiro-epbx (RETACRIT) Injectable 06089 Unit(s) IV Push once  ertapenem  IVPB 500 milliGRAM(s) IV Intermittent every 24 hours  ferrous    sulfate 325 milliGRAM(s) Oral daily  heparin   Injectable 5000 Unit(s) SubCutaneous every 12 hours  metoprolol tartrate 75 milliGRAM(s) Oral two times a day  mupirocin 2% Ointment 1 Application(s) Both Nostrils two times a day  sevelamer carbonate 800 milliGRAM(s) Oral three times a day  sodium chloride 0.9%. 1000 milliLiter(s) (75 mL/Hr) IV Continuous <Continuous>  tamsulosin 0.8 milliGRAM(s) Oral at bedtime      PHYSICAL EXAM:  T(C): 36.6 (11-23-22 @ 12:16), Max: 37.2 (11-23-22 @ 04:20)  HR: 77 (11-23-22 @ 12:16) (70 - 81)  BP: 150/72 (11-23-22 @ 12:16) (130/84 - 166/81)  RR: 18 (11-23-22 @ 12:16) (18 - 18)  SpO2: 97% (11-23-22 @ 12:16) (97% - 98%)  Wt(kg): --  I&O's Summary    22 Nov 2022 07:01  -  23 Nov 2022 07:00  --------------------------------------------------------  IN: 500 mL / OUT: 0 mL / NET: 500 mL    23 Nov 2022 07:01  -  23 Nov 2022 12:38  --------------------------------------------------------  IN: 240 mL / OUT: 0 mL / NET: 240 mL          Appearance: Normal	  HEENT:  Eyes are open   Lymphatic: No lymphadenopathy   Cardiovascular: Normal S1 S2, no JVD  Respiratory: normal effort , clear  Gastrointestinal:  Soft, Non-tender  Skin: No rashes,  warm to touch  Psychiatry:  Mood & affect appropriate  Musculoskeletal: No edema      11-22-22 @ 07:01  -  11-23-22 @ 07:00  --------------------------------------------------------  IN: 500 mL / OUT: 0 mL / NET: 500 mL    11-23-22 @ 07:01  -  11-23-22 @ 12:38  --------------------------------------------------------  IN: 240 mL / OUT: 0 mL / NET: 240 mL                              9.3    7.56  )-----------( 242      ( 23 Nov 2022 04:30 )             30.3               11-23    137  |  96  |  54<H>  ----------------------------<  90  5.0   |  24  |  9.73<H>    Ca    9.2      23 Nov 2022 04:30  Phos  5.9     11-23  Mg     2.4     11-23      Culture - Urine (11.18.22 @ 20:15)   Specimen Source: Catheterized Catheterized   Culture Results: <10,000 CFU/mL Normal Urogenital Aranza     Culture - Blood (11.16.22 @ 20:50)   Specimen Source: .Blood Blood-Peripheral   Culture Results: No Growth Final     Culture - Blood (11.16.22 @ 20:50)   Specimen Source: .Blood Blood-Peripheral   Culture Results: No Growth Final

## 2022-11-23 NOTE — PROGRESS NOTE ADULT - SUBJECTIVE AND OBJECTIVE BOX
CC: F/U for Prostatitis    Saw/spoke to patient. No fevers, no chills. No new complaints.    Allergies  grass, pollen (Rhinitis)  No Known Drug Allergies    ANTIMICROBIALS:  ertapenem  IVPB 500 every 24 hours    PE:    Vital Signs Last 24 Hrs  T(C): 36.6 (23 Nov 2022 12:16), Max: 37.2 (23 Nov 2022 04:20)  T(F): 97.9 (23 Nov 2022 12:16), Max: 98.9 (23 Nov 2022 04:20)  HR: 77 (23 Nov 2022 12:16) (70 - 81)  BP: 150/72 (23 Nov 2022 12:16) (143/80 - 166/81)  RR: 18 (23 Nov 2022 12:16) (18 - 18)  SpO2: 97% (23 Nov 2022 12:16) (97% - 98%)    Gen: AOx3, NAD, non-toxic  CV: Nontachycardic  Resp: Breathing comfortably, RA  Abd: Soft, nontender  IV/Skin: No thrombophlebitis    LABS:                        9.3    7.56  )-----------( 242      ( 23 Nov 2022 04:30 )             30.3     11-23    137  |  96  |  54<H>  ----------------------------<  90  5.0   |  24  |  9.73<H>    Ca    9.2      23 Nov 2022 04:30  Phos  5.9     11-23  Mg     2.4     11-23    MICROBIOLOGY:    Catheterized Catheterized  11-18-22   <10,000 CFU/mL Normal Urogenital Aranza  --  --    .Blood Blood-Peripheral  11-16-22   No Growth Final  --  --    RADIOLOGY:    11/17 US:    IMPRESSION:    No hydronephrosis.  Redemonstrated markedly enlarged prostate.

## 2022-11-23 NOTE — PROGRESS NOTE ADULT - ASSESSMENT
83 yo M CKD on HD, fever, initially with dysuria  Fever, leukocytosis  On HD but complains of new onset dysuria, no flank pain (states still makes urine despite ESRD)  No other complaints to suggest source  CXR clear  Renal USG enlarged prostate  UTI? Prostatitis?  WBC improving, although patient still complaining of dysuria/bladder pain  UCX clear  Possible non-UTI etiology? STI? Irritative voiding issue?  Urology suspecting prostatitis  Overall, UTI, fever, leukocytosis  - Ertapenem 500mg q 24  - When DC planning, send out on Cipro 500mg q 24 to complete 4 weeks including IV days (should be given every day but after HD on HD days)  - Monitor for further fevers  - Trend WBC to normal  - F/U urology    Joseph Hicks MD  Contact on TEAMS messaging from 9am - 5pm  From 5pm-9am, on weekends, or if no response call [phone]

## 2022-11-23 NOTE — PROGRESS NOTE ADULT - ASSESSMENT
82y M PMH CKD on HD(T/Th/S), last HD session 11/15 presents for c/o fever, decreased urination, dysuria admitted for infection w/u    Sepsis due to UTI  - Oliguric, dysuria, febrile to 100.8F + Tachycardic + Leukocytosis     - High risk of bacteremia given hx/o E. coli bacteremia Oct 2021  - C/w Ertapenem 500 Q24, ABX per ID   - BC X 2 negative   - UA and UCx   -- Pt now on ABX, results likely to be inaccurate    - Monitor patient closely; monitor HD, VS, CBC, Temp curve closely   - ID eval consulted; F/u recs   - F/u GC/ Chlamydia results -- in lab     Oliguria/ Dysuria  - Likely due to UTI  - Renal US  -- negative for hydronephrosis, prostatomegaly   - Check bladder scan   - Monitor I and O     LVOT  obstruction   - Cardio follow up appreciated  - TTE w EF of 75%, Small, hyperdynamic left ventricle with severe asymmetric hypertrophy   - Pt will require outpatient structural heart follow up   - Hydralazine D/C, now on Metoprolol 75 BID, monitor and up-titrate as tolerated    PAT  - Monitor on tele  - Monitor electrolytes closely; Keep K > 4 and Mg > 2  - Metoprolol increased to 75 BID. Monitor    Stage 5 chronic kidney disease on dialysis.   - CKD on HD(T/Th/S)   - Nephro consulted, F/u recs  - C/w Sevelamer   - Monitor electrolytes.  - HD as per renal - planned for HD today     Hypertension.   - C/w  Metoprolol 75 BID in view of LVOT   - Cardio eval consulted; F/u recs  - Lipid panel  (LDL of 47)  - If patient remains hypertensive, plan to increase BB to 100 BID as hemodynamics permit     History of BPH.   - C/w Tamsulosin  - Renal US for prostatomegaly   - Urology consulted; F/u recs  - PSA --> elevated, likely do to BPH. Pt will require outpatient urology follow up for trending and monitoring     Anemia  - Likely AOCD in view of ESRD  - Iron, B12, Folate, Ferritin -- Not consistent with deficiency   - Transfuse for Hgb < 7.0       Prophylactic measure.   - DVT ppx: SQ heparin   - DASH diet.

## 2022-11-23 NOTE — PROGRESS NOTE ADULT - SUBJECTIVE AND OBJECTIVE BOX
[facility] [phone]        Patient seen and examined in bed.  He was about the same         MEDICATIONS  (STANDING):  artificial tears (preservative free) Ophthalmic Solution 1 Drop(s) Both EYES daily  aspirin enteric coated 81 milliGRAM(s) Oral daily  chlorhexidine 2% Cloths 1 Application(s) Topical daily  epoetin hiro-epbx (RETACRIT) Injectable 38182 Unit(s) IV Push once  ertapenem  IVPB 500 milliGRAM(s) IV Intermittent every 24 hours  ferrous    sulfate 325 milliGRAM(s) Oral daily  heparin   Injectable 5000 Unit(s) SubCutaneous every 12 hours  metoprolol tartrate 75 milliGRAM(s) Oral two times a day  mupirocin 2% Ointment 1 Application(s) Both Nostrils two times a day  sevelamer carbonate 800 milliGRAM(s) Oral three times a day  sodium chloride 0.9%. 1000 milliLiter(s) (75 mL/Hr) IV Continuous <Continuous>  tamsulosin 0.8 milliGRAM(s) Oral at bedtime      VITAL:  T(C): , Max: 37.2 (11-23-22 @ 04:20)  T(F): , Max: 98.9 (11-23-22 @ 04:20)  HR: 70 (11-23-22 @ 08:47)  BP: 143/80 (11-23-22 @ 08:47)  BP(mean): --  RR: 18 (11-23-22 @ 08:47)  SpO2: 97% (11-23-22 @ 08:47)  Wt(kg): --    I and O's:    11-22 @ 07:01  -  11-23 @ 07:00  --------------------------------------------------------  IN: 500 mL / OUT: 0 mL / NET: 500 mL    11-23 @ 07:01  -  11-23 @ 10:34  --------------------------------------------------------  IN: 240 mL / OUT: 0 mL / NET: 240 mL          PHYSICAL EXAM:    Constitutional: NAD  Neck:  No JVD  Respiratory: CTAB/L  Cardiovascular: S1 and S2  Gastrointestinal: BS+, soft, NT/ND  Extremities: No peripheral edema  Neurological: A/O x 3, no focal deficits  Psychiatric: Normal mood, normal affect  : No Aguilar  Skin: No rashes  Access: avf    LABS:                        9.3    7.56  )-----------( 242      ( 23 Nov 2022 04:30 )             30.3     11-23    137  |  96  |  54<H>  ----------------------------<  90  5.0   |  24  |  9.73<H>    Ca    9.2      23 Nov 2022 04:30  Phos  5.9     11-23  Mg     2.4     11-23            Urine Studies:          RADIOLOGY & ADDITIONAL STUDIES:

## 2022-11-23 NOTE — PROGRESS NOTE ADULT - SUBJECTIVE AND OBJECTIVE BOX
DATE OF SERVICE: 11-23-22 @ 11:25    Patient is a 82y old  Male who presents with a chief complaint of fever, dysuria, decreased urination (23 Nov 2022 10:34)      INTERVAL HISTORY: Feels ok.     REVIEW OF SYSTEMS:  CONSTITUTIONAL: No weakness  EYES/ENT: No visual changes;  No throat pain   NECK: No pain or stiffness  RESPIRATORY: No cough, wheezing; No shortness of breath  CARDIOVASCULAR: No chest pain or palpitations  GASTROINTESTINAL: No abdominal  pain. No nausea, vomiting, or hematemesis  GENITOURINARY: No dysuria, frequency or hematuria  NEUROLOGICAL: No stroke like symptoms  SKIN: No rashes    TELEMETRY Personally reviewed: SR 60-70, 11 beats of WCT  	  MEDICATIONS:  metoprolol tartrate 75 milliGRAM(s) Oral two times a day        PHYSICAL EXAM:  T(C): 36.7 (11-23-22 @ 08:47), Max: 37.2 (11-23-22 @ 04:20)  HR: 70 (11-23-22 @ 08:47) (70 - 81)  BP: 143/80 (11-23-22 @ 08:47) (130/84 - 166/81)  RR: 18 (11-23-22 @ 08:47) (18 - 18)  SpO2: 97% (11-23-22 @ 08:47) (97% - 98%)  Wt(kg): --  I&O's Summary    22 Nov 2022 07:01  -  23 Nov 2022 07:00  --------------------------------------------------------  IN: 500 mL / OUT: 0 mL / NET: 500 mL    23 Nov 2022 07:01  -  23 Nov 2022 11:25  --------------------------------------------------------  IN: 240 mL / OUT: 0 mL / NET: 240 mL          Appearance: In no distress	  HEENT:    PERRL, EOMI	  Cardiovascular:  S1 S2, No JVD  Respiratory: Lungs clear to auscultation	  Gastrointestinal:  Soft, Non-tender, + BS	  Vascularature:  No edema of LE  Psychiatric: Appropriate affect   Neuro: no acute focal deficits                               9.3    7.56  )-----------( 242      ( 23 Nov 2022 04:30 )             30.3     11-23    137  |  96  |  54<H>  ----------------------------<  90  5.0   |  24  |  9.73<H>    Ca    9.2      23 Nov 2022 04:30  Phos  5.9     11-23  Mg     2.4     11-23          Labs personally reviewed      ASSESSMENT/PLAN: 	    82y M PMH CKD on HD(T/Th/S), last HD session 11/15 presents for c/o fever, decreased urination since Saturday.     EKG - NSR RBBB   Echo 3/21- EF 75% severe LVOT obstruction 98mmHg     1) Severe LVOT obstruction   -could have been sec to dehydration in the past  -Echo this admission shows small, hyperdynamic left ventricle with severe asymmetric hypertrophy.  -Will DC Hydralazine and start Metoprolol 50mg PO BID  -BP elevated. Metoprol increased to 75mg PO BID  - BP remains elevated. If still high post HD, will increase BB to 100mg PO BID.     2) Hematuria/ Oliguria  - f/u urology   -on abx for sepsis 2/2 UTI    3) ESRD  - on HD   -f/u renal    4) HTN  - Will change hydralazine to Metoprolol i/s/o LVOT obstruction    5) DVT prophylaxis   - on sc heparin           SHEFALI Zeng-NP   Sridhar Carrillo DO [facility]  Cardiovascular Medicine  [address], [address]  Available through call or text on [employer] TE[employer]  Office: [phone]

## 2022-11-24 LAB — SARS-COV-2 RNA SPEC QL NAA+PROBE: SIGNIFICANT CHANGE UP

## 2022-11-24 RX ORDER — CIPROFLOXACIN LACTATE 400MG/40ML
500 VIAL (ML) INTRAVENOUS DAILY
Refills: 0 | Status: DISCONTINUED | OUTPATIENT
Start: 2022-11-24 | End: 2022-11-27

## 2022-11-24 RX ADMIN — HEPARIN SODIUM 5000 UNIT(S): 5000 INJECTION INTRAVENOUS; SUBCUTANEOUS at 05:41

## 2022-11-24 RX ADMIN — Medication 325 MILLIGRAM(S): at 11:44

## 2022-11-24 RX ADMIN — MUPIROCIN 1 APPLICATION(S): 20 OINTMENT TOPICAL at 05:41

## 2022-11-24 RX ADMIN — Medication 500 MILLIGRAM(S): at 18:53

## 2022-11-24 RX ADMIN — Medication 81 MILLIGRAM(S): at 11:44

## 2022-11-24 RX ADMIN — Medication 75 MILLIGRAM(S): at 18:09

## 2022-11-24 RX ADMIN — Medication 75 MILLIGRAM(S): at 05:57

## 2022-11-24 RX ADMIN — CHLORHEXIDINE GLUCONATE 1 APPLICATION(S): 213 SOLUTION TOPICAL at 11:44

## 2022-11-24 RX ADMIN — SEVELAMER CARBONATE 800 MILLIGRAM(S): 2400 POWDER, FOR SUSPENSION ORAL at 13:28

## 2022-11-24 RX ADMIN — SEVELAMER CARBONATE 800 MILLIGRAM(S): 2400 POWDER, FOR SUSPENSION ORAL at 21:35

## 2022-11-24 RX ADMIN — SEVELAMER CARBONATE 800 MILLIGRAM(S): 2400 POWDER, FOR SUSPENSION ORAL at 05:42

## 2022-11-24 RX ADMIN — HEPARIN SODIUM 5000 UNIT(S): 5000 INJECTION INTRAVENOUS; SUBCUTANEOUS at 18:09

## 2022-11-24 RX ADMIN — Medication 1 DROP(S): at 11:48

## 2022-11-24 RX ADMIN — TAMSULOSIN HYDROCHLORIDE 0.8 MILLIGRAM(S): 0.4 CAPSULE ORAL at 21:35

## 2022-11-24 NOTE — PROGRESS NOTE ADULT - SUBJECTIVE AND OBJECTIVE BOX
Name of Patient : BAKARI CHARLES  MRN: [id number]  Date of visit: 11-24-22       Subjective: Patient seen and examined. No new events except as noted.     REVIEW OF SYSTEMS:    CONSTITUTIONAL: No weakness, fevers or chills  EYES/ENT: No visual changes;  No vertigo or throat pain   NECK: No pain or stiffness  RESPIRATORY: No cough, wheezing, hemoptysis; No shortness of breath  CARDIOVASCULAR: No chest pain or palpitations  GASTROINTESTINAL: No abdominal or epigastric pain. No nausea, vomiting, or hematemesis; No diarrhea or constipation. No melena or hematochezia.  GENITOURINARY: No dysuria, frequency or hematuria  NEUROLOGICAL: No numbness or weakness  SKIN: No itching, burning, rashes, or lesions   All other review of systems is negative unless indicated above.    MEDICATIONS:  MEDICATIONS  (STANDING):  artificial tears (preservative free) Ophthalmic Solution 1 Drop(s) Both EYES daily  aspirin enteric coated 81 milliGRAM(s) Oral daily  chlorhexidine 2% Cloths 1 Application(s) Topical daily  ciprofloxacin     Tablet 500 milliGRAM(s) Oral daily  ferrous    sulfate 325 milliGRAM(s) Oral daily  heparin   Injectable 5000 Unit(s) SubCutaneous every 12 hours  metoprolol tartrate 75 milliGRAM(s) Oral two times a day  sevelamer carbonate 800 milliGRAM(s) Oral three times a day  sodium chloride 0.9%. 1000 milliLiter(s) (75 mL/Hr) IV Continuous <Continuous>  tamsulosin 0.8 milliGRAM(s) Oral at bedtime      PHYSICAL EXAM:  T(C): 36.6 (11-24-22 @ 15:34), Max: 37 (11-24-22 @ 00:05)  HR: 80 (11-24-22 @ 18:13) (64 - 80)  BP: 132/73 (11-24-22 @ 18:13) (127/57 - 165/81)  RR: 18 (11-24-22 @ 15:34) (16 - 18)  SpO2: 98% (11-24-22 @ 15:34) (96% - 99%)  Wt(kg): --  I&O's Summary    23 Nov 2022 07:01  -  24 Nov 2022 07:00  --------------------------------------------------------  IN: 1090 mL / OUT: 2000 mL / NET: -910 mL          Appearance: Normal	  HEENT:  PERRLA   Lymphatic: No lymphadenopathy   Cardiovascular: Normal S1 S2, no JVD  Respiratory: normal effort , clear  Gastrointestinal:  Soft, Non-tender  Skin: No rashes,  warm to touch  Psychiatry:  Mood & affect appropriate  Musculuskeletal: No edema      All labs, Imaging and EKGs personally reviewed     11-23-22 @ 07:01  -  11-24-22 @ 07:00  --------------------------------------------------------  IN: 1090 mL / OUT: 2000 mL / NET: -910 mL                            9.3    7.56  )-----------( 242      ( 23 Nov 2022 04:30 )             30.3               11-23    137  |  96  |  54<H>  ----------------------------<  90  5.0   |  24  |  9.73<H>    Ca    9.2      23 Nov 2022 04:30  Phos  5.9     11-23  Mg     2.4     11-23

## 2022-11-24 NOTE — PROGRESS NOTE ADULT - SUBJECTIVE AND OBJECTIVE BOX
DATE OF SERVICE: 11-24-22 @ 10:54    Patient is a 82y old  Male who presents with a chief complaint of fever, dysuria, decreased urination (23 Nov 2022 12:38)      INTERVAL HISTORY: Feels ok.     REVIEW OF SYSTEMS:  CONSTITUTIONAL: No weakness  EYES/ENT: No visual changes;  No throat pain   NECK: No pain or stiffness  RESPIRATORY: No cough, wheezing; No shortness of breath  CARDIOVASCULAR: No chest pain or palpitations  GASTROINTESTINAL: No abdominal  pain. No nausea, vomiting, or hematemesis  GENITOURINARY: No dysuria, frequency or hematuria  NEUROLOGICAL: No stroke like symptoms  SKIN: No rashes    TELEMETRY Personally reviewed: SR 70-90  	  MEDICATIONS:  metoprolol tartrate 75 milliGRAM(s) Oral two times a day        PHYSICAL EXAM:  T(C): 36.6 (11-24-22 @ 05:35), Max: 37.2 (11-23-22 @ 18:08)  HR: 72 (11-24-22 @ 05:35) (66 - 77)  BP: 127/57 (11-24-22 @ 05:35) (127/57 - 165/81)  RR: 17 (11-24-22 @ 05:35) (17 - 18)  SpO2: 96% (11-24-22 @ 05:35) (96% - 100%)  Wt(kg): --  I&O's Summary    23 Nov 2022 07:01  -  24 Nov 2022 07:00  --------------------------------------------------------  IN: 1090 mL / OUT: 2000 mL / NET: -910 mL          Appearance: In no distress	  HEENT:    PERRL, EOMI	  Cardiovascular:  S1 S2, No JVD  Respiratory: Lungs clear to auscultation	  Gastrointestinal:  Soft, Non-tender, + BS	  Vascularature:  No edema of LE  Psychiatric: Appropriate affect   Neuro: no acute focal deficits                               9.3    7.56  )-----------( 242      ( 23 Nov 2022 04:30 )             30.3     11-23    137  |  96  |  54<H>  ----------------------------<  90  5.0   |  24  |  9.73<H>    Ca    9.2      23 Nov 2022 04:30  Phos  5.9     11-23  Mg     2.4     11-23          Labs personally reviewed      ASSESSMENT/PLAN: 	    82y M PMH CKD on HD(T/Th/S), last HD session 11/15 presents for c/o fever, decreased urination since Saturday.     EKG - NSR RBBB   Echo 3/21- EF 75% severe LVOT obstruction 98mmHg     1) Severe LVOT obstruction   -could have been sec to dehydration in the past  -Echo this admission shows small, hyperdynamic left ventricle with severe asymmetric hypertrophy.  -Will DC Hydralazine and start Metoprolol 50mg PO BID  -BP elevated. Metoprol increased to 75mg PO BID  - BP remains elevated. If still high post HD, will increase BB to 100mg PO BID.     2) Hematuria/ Oliguria  - f/u urology   -on abx for sepsis 2/2 UTI    3) ESRD  - on HD   -f/u renal    4) HTN  - Will change hydralazine to Metoprolol i/s/o LVOT obstruction    5) DVT prophylaxis   - on sc heparin         Lakshmi Moore, SHEFALI-NP   Sridhar Carrillo DO [facility]  Cardiovascular Medicine  [address] [place] [address], [address]  Available through call or text on [employer] TEAM[employer]  Office: [phone]

## 2022-11-25 LAB
ANION GAP SERPL CALC-SCNC: 18 MMOL/L — HIGH (ref 5–17)
BUN SERPL-MCNC: 60 MG/DL — HIGH (ref 7–23)
CALCIUM SERPL-MCNC: 8.1 MG/DL — LOW (ref 8.4–10.5)
CHLORIDE SERPL-SCNC: 99 MMOL/L — SIGNIFICANT CHANGE UP (ref 96–108)
CO2 SERPL-SCNC: 16 MMOL/L — LOW (ref 22–31)
CREAT SERPL-MCNC: 9.91 MG/DL — HIGH (ref 0.5–1.3)
EGFR: 5 ML/MIN/1.73M2 — LOW
GLUCOSE SERPL-MCNC: 81 MG/DL — SIGNIFICANT CHANGE UP (ref 70–99)
POTASSIUM SERPL-MCNC: 6.2 MMOL/L — CRITICAL HIGH (ref 3.5–5.3)
POTASSIUM SERPL-SCNC: 6.2 MMOL/L — CRITICAL HIGH (ref 3.5–5.3)
SODIUM SERPL-SCNC: 133 MMOL/L — LOW (ref 135–145)

## 2022-11-25 PROCEDURE — 99232 SBSQ HOSP IP/OBS MODERATE 35: CPT

## 2022-11-25 RX ORDER — ERYTHROPOIETIN 10000 [IU]/ML
10000 INJECTION, SOLUTION INTRAVENOUS; SUBCUTANEOUS ONCE
Refills: 0 | Status: COMPLETED | OUTPATIENT
Start: 2022-11-25 | End: 2022-11-25

## 2022-11-25 RX ORDER — SODIUM ZIRCONIUM CYCLOSILICATE 10 G/10G
5 POWDER, FOR SUSPENSION ORAL ONCE
Refills: 0 | Status: COMPLETED | OUTPATIENT
Start: 2022-11-25 | End: 2022-11-25

## 2022-11-25 RX ORDER — METOPROLOL TARTRATE 50 MG
100 TABLET ORAL
Refills: 0 | Status: DISCONTINUED | OUTPATIENT
Start: 2022-11-25 | End: 2022-11-27

## 2022-11-25 RX ADMIN — TAMSULOSIN HYDROCHLORIDE 0.8 MILLIGRAM(S): 0.4 CAPSULE ORAL at 22:39

## 2022-11-25 RX ADMIN — CHLORHEXIDINE GLUCONATE 1 APPLICATION(S): 213 SOLUTION TOPICAL at 13:01

## 2022-11-25 RX ADMIN — SEVELAMER CARBONATE 800 MILLIGRAM(S): 2400 POWDER, FOR SUSPENSION ORAL at 22:39

## 2022-11-25 RX ADMIN — Medication 325 MILLIGRAM(S): at 12:55

## 2022-11-25 RX ADMIN — Medication 100 MILLIGRAM(S): at 18:11

## 2022-11-25 RX ADMIN — Medication 75 MILLIGRAM(S): at 05:16

## 2022-11-25 RX ADMIN — Medication 500 MILLIGRAM(S): at 12:55

## 2022-11-25 RX ADMIN — Medication 1 DROP(S): at 13:03

## 2022-11-25 RX ADMIN — HEPARIN SODIUM 5000 UNIT(S): 5000 INJECTION INTRAVENOUS; SUBCUTANEOUS at 05:16

## 2022-11-25 RX ADMIN — Medication 81 MILLIGRAM(S): at 12:54

## 2022-11-25 RX ADMIN — SEVELAMER CARBONATE 800 MILLIGRAM(S): 2400 POWDER, FOR SUSPENSION ORAL at 05:17

## 2022-11-25 RX ADMIN — ERYTHROPOIETIN 10000 UNIT(S): 10000 INJECTION, SOLUTION INTRAVENOUS; SUBCUTANEOUS at 14:25

## 2022-11-25 RX ADMIN — HEPARIN SODIUM 5000 UNIT(S): 5000 INJECTION INTRAVENOUS; SUBCUTANEOUS at 18:10

## 2022-11-25 NOTE — PROGRESS NOTE ADULT - NS ATTEND OPT1 GEN_ALL_CORE
I attest my time as attending is greater than 50% of the total combined time spent on qualifying patient care activities by the PA/NP and attending.

## 2022-11-25 NOTE — PROVIDER CONTACT NOTE (OTHER) - ACTION/TREATMENT ORDERED:
Samira Quintanilla aware, says okay to give pts scheduled dose of metoprolol 75mg oral  Samira Quintanilla ordered labs- mag and phos
administer lopressor 75 mg po
advice to give metoprolol 25mg pox1
XENIA Hobson says okay to give 75mg oral scheduled metoprolol. parameters on order say to hold med with diastolic below 60, diastolic is currently 57, XENIA Hobson says okay to give 75mg metoprolol now

## 2022-11-25 NOTE — PROGRESS NOTE ADULT - ASSESSMENT
81 yo M CKD on HD, fever, initially with dysuria  Fever, leukocytosis  On HD but complains of new onset dysuria, no flank pain (states still makes urine despite ESRD)  No other complaints to suggest source  CXR clear  Renal USG enlarged prostate  UTI? Prostatitis?  WBC improving, although patient still complaining of dysuria/bladder pain  UCX clear  Possible non-UTI etiology? STI? Irritative voiding issue?  Urology suspecting prostatitis  Overall, UTI, fever, leukocytosis  - When DC planning, send out on Cipro 500mg q 24 through 12/18/22 (should be given every day but after HD on HD days)  - Trend WBC to normal  - F/U urology    After discharge, have patient follow up in ID clinic ~12/18/22; [phone]    Signing off. Please call with further questions or change in status.    Joseph Hicks MD  Contact on TEAMS messaging from 9am - 5pm  From 5pm-9am, on weekends, or if no response call [phone]

## 2022-11-25 NOTE — PROGRESS NOTE ADULT - NS ATTEND AMEND GEN_ALL_CORE FT
Patient care and plan discussed and reviewed with Advanced Care Provider. Plan as outlined above edited by me to reflect our discussion.
I reviewed the overnight course of events on the unit, re-confirming the patient history. I discussed the care with the patient and their family. The plan of care was discussed with the ACP team and modifications were made to the notation where appropriate. Differential diagnosis and plan of care discussed with patient after the evaluation. Advanced care planning was discussed with patient and family.  Advanced care planning forms were reviewed and discussed.  Risks, benefits and alternatives of cardiac procedures were discussed in detail and all questions were answered. 35 minutes spent on total encounter of which more than fifty percent of the encounter was spent counseling and/or coordinating care by the attending physician.
Patient care and plan discussed and reviewed with Advanced Care Provider. Plan as outlined above edited by me to reflect our discussion.
Pt care and plan discussed and reviewed with PA. Plan as outlined above edited by me to reflect our discussion. Advanced care planning/advanced directives discussed with patient/family. DNR status including forceful chest compressions to attempt to restart the heart, ventilator support/artificial breathing, electric shock, artificial nutrition, health care proxy, Molst form all discussed with pt. Pt wishes to consider.
Pt care and plan discussed and reviewed with PA. Plan as outlined above edited by me to reflect our discussion. Advanced care planning/advanced directives discussed with patient/family. DNR status including forceful chest compressions to attempt to restart the heart, ventilator support/artificial breathing, electric shock, artificial nutrition, health care proxy, Molst form all discussed with pt. Pt wishes to consider.
I reviewed the overnight course of events on the unit, re-confirming the patient history. I discussed the care with the patient and their family. The plan of care was discussed with the ACP team and modifications were made to the notation where appropriate. Differential diagnosis and plan of care discussed with patient after the evaluation. Advanced care planning was discussed with patient and family.  Advanced care planning forms were reviewed and discussed.  Risks, benefits and alternatives of cardiac procedures were discussed in detail and all questions were answered. 35 minutes spent on total encounter of which more than fifty percent of the encounter was spent counseling and/or coordinating care by the attending physician.
Patient care and plan discussed and reviewed with Advanced Care Provider. Plan as outlined above edited by me to reflect our discussion.
Patient care and plan discussed and reviewed with Advanced Care Provider. Plan as outlined above edited by me to reflect our discussion.
Pt care and plan discussed and reviewed with PA. Plan as outlined above edited by me to reflect our discussion. Advanced care planning/advanced directives discussed with patient/family. DNR status including forceful chest compressions to attempt to restart the heart, ventilator support/artificial breathing, electric shock, artificial nutrition, health care proxy, Molst form all discussed with pt. Pt wishes to consider.
Pt care and plan discussed and reviewed with PA. Plan as outlined above edited by me to reflect our discussion. Advanced care planning/advanced directives discussed with patient/family. DNR status including forceful chest compressions to attempt to restart the heart, ventilator support/artificial breathing, electric shock, artificial nutrition, health care proxy, Molst form all discussed with pt. Pt wishes to consider.
Patient care and plan discussed and reviewed with Advanced Care Provider. Plan as outlined above edited by me to reflect our discussion.
Pt care and plan discussed and reviewed with PA. Plan as outlined above edited by me to reflect our discussion. Advanced care planning/advanced directives discussed with patient/family. DNR status including forceful chest compressions to attempt to restart the heart, ventilator support/artificial breathing, electric shock, artificial nutrition, health care proxy, Molst form all discussed with pt. Pt wishes to consider.

## 2022-11-25 NOTE — PROVIDER CONTACT NOTE (OTHER) - SITUATION
pt alert oriented x4 not in any distress pt in chair
Pt had 11 wide complexes on tele
tele called floor @ 0440am. pt had an episode of WCT; bundle branch ventricular ectopy, 15 beats,   rate-

## 2022-11-25 NOTE — PROGRESS NOTE ADULT - ASSESSMENT
82y M PMH CKD on HD(T/Th/S), last HD session 11/15 presents for c/o fever, decreased urination, dysuria admitted for infection w/u    Sepsis due to UTI  - Oliguric, dysuria, febrile to 100.8F + Tachycardic + Leukocytosis     - High risk of bacteremia given hx/o E. coli bacteremia Oct 2021  - C/w Ertapenem 500 Q24, ABX per ID   - BC X 2 negative   - UA and UCx   -- Pt now on ABX, results likely to be inaccurate    - Monitor patient closely; monitor HD, VS, CBC, Temp curve closely   - ID eval consulted; F/u recs   - GC/ Chlamydia results -- undetected 11/22/22    Oliguria/ Dysuria  - Likely due to UTI  - Renal US  -- negative for hydronephrosis, prostatomegaly   - Check bladder scan   - Monitor I and O     LVOT  obstruction   - Cardio follow up appreciated  - TTE w EF of 75%, Small, hyperdynamic left ventricle with severe asymmetric hypertrophy   - Pt will require outpatient structural heart follow up   - Hydralazine D/C, now on Metoprolol 75 BID, monitor and up-titrate as tolerated    PAT  - Monitor on tele  - Monitor electrolytes closely; Keep K > 4 and Mg > 2  - Metoprolol increased to 75 BID. Monitor    Stage 5 chronic kidney disease on dialysis.   - CKD on HD(T/Th/S)   - Nephro consulted, F/u recs  - C/w Sevelamer   - Monitor electrolytes.  - HD as per renal - planned for HD today     Hypertension.   - C/w  Metoprolol 75 BID in view of LVOT   - Cardio eval consulted; F/u recs  - Lipid panel  (LDL of 47)  - If patient remains hypertensive, plan to increase BB to 100 BID as hemodynamics permit     History of BPH.   - C/w Tamsulosin  - Renal US for prostatomegaly   - Urology consulted; F/u recs  - PSA --> elevated, likely do to BPH. Pt will require outpatient urology follow up for trending and monitoring     Anemia  - Likely AOCD in view of ESRD  - Iron, B12, Folate, Ferritin -- Not consistent with deficiency   - Transfuse for Hgb < 7.0       Prophylactic measure.   - DVT ppx: SQ heparin   - DASH diet.

## 2022-11-25 NOTE — PROGRESS NOTE ADULT - SUBJECTIVE AND OBJECTIVE BOX
DATE OF SERVICE: 11-25-22 @ 11:30    Patient is a 82y old  Male who presents with a chief complaint of fever, dysuria, decreased urination (25 Nov 2022 09:23)      INTERVAL HISTORY: Feels ok.     REVIEW OF SYSTEMS:  CONSTITUTIONAL: No weakness  EYES/ENT: No visual changes;  No throat pain   NECK: No pain or stiffness  RESPIRATORY: No cough, wheezing; No shortness of breath  CARDIOVASCULAR: No chest pain or palpitations  GASTROINTESTINAL: No abdominal  pain. No nausea, vomiting, or hematemesis  GENITOURINARY: No dysuria, frequency or hematuria  NEUROLOGICAL: No stroke like symptoms  SKIN: No rashes    TELEMETRY Personally reviewed: SR 60-80, PACs  	  MEDICATIONS:  metoprolol tartrate 75 milliGRAM(s) Oral two times a day        PHYSICAL EXAM:  T(C): 36.8 (11-25-22 @ 08:30), Max: 37.1 (11-25-22 @ 00:00)  HR: 67 (11-25-22 @ 08:30) (64 - 85)  BP: 128/56 (11-25-22 @ 08:30) (128/56 - 152/68)  RR: 18 (11-25-22 @ 08:30) (16 - 18)  SpO2: 96% (11-25-22 @ 08:30) (95% - 99%)  Wt(kg): --  I&O's Summary    24 Nov 2022 07:01  -  25 Nov 2022 07:00  --------------------------------------------------------  IN: 400 mL / OUT: 0 mL / NET: 400 mL    25 Nov 2022 07:01  -  25 Nov 2022 11:30  --------------------------------------------------------  IN: 300 mL / OUT: 0 mL / NET: 300 mL          Appearance: In no distress	  HEENT:    PERRL, EOMI	  Cardiovascular:  S1 S2, No JVD  Respiratory: Lungs clear to auscultation	  Gastrointestinal:  Soft, Non-tender, + BS	  Vascularature:  No edema of LE  Psychiatric: Appropriate affect   Neuro: no acute focal deficits           11-25    133<L>  |  99  |  60<H>  ----------------------------<  81  6.2<HH>   |  16<L>  |  9.91<H>    Ca    8.1<L>      25 Nov 2022 07:02  Phos  6.4     11-25  Mg     2.4     11-25          Labs personally reviewed      ASSESSMENT/PLAN: 	    82y M PMH CKD on HD(T/Th/S), last HD session 11/15 presents for c/o fever, decreased urination since Saturday.     EKG - NSR RBBB   Echo 3/21- EF 75% severe LVOT obstruction 98mmHg     1) Severe LVOT obstruction   -could have been sec to dehydration in the past  -Echo this admission shows small, hyperdynamic left ventricle with severe asymmetric hypertrophy.  -Will DC Hydralazine and start Metoprolol 50mg PO BID  -BP elevated. Metoprol increased to 75mg PO BID  - Will further up-titrate BB as outpatient. c/w Metoprolol 75mg PO BID    2) Hematuria/ Oliguria  - f/u urology   -on abx for sepsis 2/2 UTI    3) ESRD  - on HD   -f/u renal    4) HTN  - Will change hydralazine to Metoprolol i/s/o LVOT obstruction    5) DVT prophylaxis   - on sc heparin           Lakshmi Moore, SHEFALI-NP   Sridhar Carrillo DO [facility]  Cardiovascular Medicine  [address] [place] [address], [address]  Available through call or text on [employer] TEAM[employer]  Office: [phone]   DATE OF SERVICE: 11-25-22 @ 11:30    Patient is a 82y old  Male who presents with a chief complaint of fever, dysuria, decreased urination (25 Nov 2022 09:23)      INTERVAL HISTORY: Feels ok.     REVIEW OF SYSTEMS:  CONSTITUTIONAL: No weakness  EYES/ENT: No visual changes;  No throat pain   NECK: No pain or stiffness  RESPIRATORY: No cough, wheezing; No shortness of breath  CARDIOVASCULAR: No chest pain or palpitations  GASTROINTESTINAL: No abdominal  pain. No nausea, vomiting, or hematemesis  GENITOURINARY: No dysuria, frequency or hematuria  NEUROLOGICAL: No stroke like symptoms  SKIN: No rashes    TELEMETRY Personally reviewed: SR 60-80, PACs  	  MEDICATIONS:  metoprolol tartrate 75 milliGRAM(s) Oral two times a day        PHYSICAL EXAM:  T(C): 36.8 (11-25-22 @ 08:30), Max: 37.1 (11-25-22 @ 00:00)  HR: 67 (11-25-22 @ 08:30) (64 - 85)  BP: 128/56 (11-25-22 @ 08:30) (128/56 - 152/68)  RR: 18 (11-25-22 @ 08:30) (16 - 18)  SpO2: 96% (11-25-22 @ 08:30) (95% - 99%)  Wt(kg): --  I&O's Summary    24 Nov 2022 07:01  -  25 Nov 2022 07:00  --------------------------------------------------------  IN: 400 mL / OUT: 0 mL / NET: 400 mL    25 Nov 2022 07:01  -  25 Nov 2022 11:30  --------------------------------------------------------  IN: 300 mL / OUT: 0 mL / NET: 300 mL          Appearance: In no distress	  HEENT:    PERRL, EOMI	  Cardiovascular:  S1 S2, No JVD  Respiratory: Lungs clear to auscultation	  Gastrointestinal:  Soft, Non-tender, + BS	  Vascularature:  No edema of LE  Psychiatric: Appropriate affect   Neuro: no acute focal deficits           11-25    133<L>  |  99  |  60<H>  ----------------------------<  81  6.2<HH>   |  16<L>  |  9.91<H>    Ca    8.1<L>      25 Nov 2022 07:02  Phos  6.4     11-25  Mg     2.4     11-25          Labs personally reviewed      ASSESSMENT/PLAN: 	    82y M PMH CKD on HD(T/Th/S), last HD session 11/15 presents for c/o fever, decreased urination since Saturday.     EKG - NSR RBBB   Echo 3/21- EF 75% severe LVOT obstruction 98mmHg     1) Severe LVOT obstruction   -could have been sec to dehydration in the past  -Echo this admission shows small, hyperdynamic left ventricle with severe asymmetric hypertrophy.  -Will DC Hydralazine and start Metoprolol   - Will increase Metoprolol to 100mg PO BID with hold parameters    2) Hematuria/ Oliguria  - f/u urology   -on abx for sepsis 2/2 UTI    3) ESRD  - on HD   -f/u renal    4) HTN  - Will change hydralazine to Metoprolol i/s/o LVOT obstruction    5) DVT prophylaxis   - on sc heparin           SHEFALI Zeng-NP   Sridhar Carrillo DO [facility]  Cardiovascular Medicine  [address] [place] [address], [address]  Available through call or text on [employer] TEAM[employer]  Office: [phone]

## 2022-11-25 NOTE — CHART NOTE - NSCHARTNOTEFT_GEN_A_CORE
Called by RN for critical lab k 6.2   PT without s/s hyperkalemia hx ESRD plans for HD   Will continue to monitor   Department of Medicine   [facility] GARCIA-c [id number]

## 2022-11-25 NOTE — PROVIDER CONTACT NOTE (OTHER) - ASSESSMENT
vitals taken, VSS- NP Samira aware of vitals. pt currently afebrile, pt denies SOB, chest pain and n/v/d. Pt asymptomatic at this time
Pt asymptomatic. denies chest pain

## 2022-11-25 NOTE — PROGRESS NOTE ADULT - SUBJECTIVE AND OBJECTIVE BOX
Covering for Dr. Gustafson    Reports some dysuria  No flank pain  No fevers or chills    Vital Signs Last 24 Hrs  T(C): 36.8 (25 Nov 2022 08:30), Max: 37.1 (25 Nov 2022 00:00)  T(F): 98.2 (25 Nov 2022 08:30), Max: 98.8 (25 Nov 2022 00:00)  HR: 67 (25 Nov 2022 08:30) (64 - 85)  BP: 128/56 (25 Nov 2022 08:30) (128/56 - 152/68)  BP(mean): --  RR: 18 (25 Nov 2022 08:30) (16 - 18)  SpO2: 96% (25 Nov 2022 08:30) (95% - 99%)    I&O's Summary    11-24-22 @ 07:01  -  11-25-22 @ 07:00  --------------------------------------------------------  IN: 400 mL / OUT: 0 mL / NET: 400 mL        Appearance: Normal	  HEENT:  PERRLA   Lymphatic: No lymphadenopathy   Cardiovascular: Normal S1 S2, no JVD  Respiratory: normal effort , clear  Gastrointestinal:  Soft, Non-tender  Skin: No rashes,  warm to touch  Psychiatry:  Mood & affect appropriate  Musculuskeletal: No edema    LABS:    11-25    133<L>  |  99  |  60<H>  ----------------------------<  81  6.2<HH>   |  16<L>  |  9.91<H>    Ca    8.1<L>      25 Nov 2022 07:02  Phos  6.4     11-25  Mg     2.4     11-25        CAPILLARY BLOOD GLUCOSE                RADIOLOGY & ADDITIONAL TESTS:    Imaging Personally Reviewed:  [x] YES  [ ] NO    Will obtain old records:  [ ] YES  [x] NO

## 2022-11-25 NOTE — PROGRESS NOTE ADULT - SUBJECTIVE AND OBJECTIVE BOX
NEPHROLOGY-[facility] [phone]        Patient seen and examined in bed.  He had less pain by the pubic area         MEDICATIONS  (STANDING):  artificial tears (preservative free) Ophthalmic Solution 1 Drop(s) Both EYES daily  aspirin enteric coated 81 milliGRAM(s) Oral daily  chlorhexidine 2% Cloths 1 Application(s) Topical daily  ciprofloxacin     Tablet 500 milliGRAM(s) Oral daily  ferrous    sulfate 325 milliGRAM(s) Oral daily  heparin   Injectable 5000 Unit(s) SubCutaneous every 12 hours  metoprolol tartrate 100 milliGRAM(s) Oral two times a day  sevelamer carbonate 800 milliGRAM(s) Oral three times a day  sodium chloride 0.9%. 1000 milliLiter(s) (75 mL/Hr) IV Continuous <Continuous>  tamsulosin 0.8 milliGRAM(s) Oral at bedtime      VITAL:  T(C): , Max: 37.1 (11-25-22 @ 00:00)  T(F): , Max: 98.8 (11-25-22 @ 00:00)  HR: 67 (11-25-22 @ 08:30)  BP: 128/56 (11-25-22 @ 08:30)  BP(mean): --  RR: 18 (11-25-22 @ 08:30)  SpO2: 96% (11-25-22 @ 08:30)  Wt(kg): --    I and O's:    11-24 @ 07:01  -  11-25 @ 07:00  --------------------------------------------------------  IN: 400 mL / OUT: 0 mL / NET: 400 mL    11-25 @ 07:01  -  11-25 @ 11:37  --------------------------------------------------------  IN: 300 mL / OUT: 0 mL / NET: 300 mL          PHYSICAL EXAM:    Constitutional: NAD  Neck:  No JVD  Respiratory: CTAB/L  Cardiovascular: S1 and S2  Gastrointestinal: BS+, soft, NT/ND  Extremities: No peripheral edema  Neurological: A/O x 3, no focal deficits  Psychiatric: Normal mood, normal affect  : No Aguilar  Skin: No rashes  Access: avf    LABS:    11-25    133<L>  |  99  |  60<H>  ----------------------------<  81  6.2<HH>   |  16<L>  |  9.91<H>    Ca    8.1<L>      25 Nov 2022 07:02  Phos  6.4     11-25  Mg     2.4     11-25            Urine Studies:          RADIOLOGY & ADDITIONAL STUDIES:

## 2022-11-25 NOTE — PROVIDER CONTACT NOTE (OTHER) - REASON
Bp 127/57. HR 72
tele episode
as per TELE pt with PAT for 6.4sec HR upto 160
patient had 11 wide complexes

## 2022-11-25 NOTE — PROGRESS NOTE ADULT - SUBJECTIVE AND OBJECTIVE BOX
CC: F/U for Prostatitis    Saw/spoke to patient. No fevers, no chills. No new complaints.    Allergies  grass, pollen (Rhinitis)  No Known Drug Allergies    ANTIMICROBIALS:  ciprofloxacin     Tablet 500 daily    PE:    Vital Signs Last 24 Hrs  T(C): 36.2 (25 Nov 2022 14:10), Max: 37.1 (25 Nov 2022 00:00)  T(F): 97.2 (25 Nov 2022 14:10), Max: 98.8 (25 Nov 2022 00:00)  HR: 62 (25 Nov 2022 14:10) (62 - 85)  BP: 147/79 (25 Nov 2022 14:10) (128/56 - 153/75)  RR: 18 (25 Nov 2022 14:10) (17 - 18)  SpO2: 98% (25 Nov 2022 14:10) (95% - 98%)    Gen: AOx3, NAD, non-toxic  CV: Nontachycardic  Resp: Breathing comfortably, RA  Abd: Soft, nontender  IV/Skin: No thrombophlebitis    LABS:    11-25    133<L>  |  99  |  60<H>  ----------------------------<  81  6.2<HH>   |  16<L>  |  9.91<H>    Ca    8.1<L>      25 Nov 2022 07:02  Phos  6.4     11-25  Mg     2.4     11-25    MICROBIOLOGY:    Catheterized Catheterized  11-18-22   <10,000 CFU/mL Normal Urogenital Aranza  --  --    .Blood Blood-Peripheral  11-16-22   No Growth Final  --  --    RADIOLOGY:    11/16 XR:    FINDINGS:  Heart/Vascular: The cardiac silhouette is normal in size.  Pulmonary: No focal consolidation, pleural effusion or pneumothorax.  Bones: The visualized osseous structures are unremarkable.    Impression:  Clear lungs.

## 2022-11-25 NOTE — PROGRESS NOTE ADULT - ASSESSMENT
ASSESSMENT/PLAN  Assessment:  82y M PMH CKD on HD(T/Th/S), last HD session 11/15 presents for c/o fever  RIght hydrocele and was evaluated by Urology in the past   Hyperkalemia     1 Renal-  next HD this am due to hyperkalemia   2 ID-This is likely prostatitis?  PO IV abx per ID;     3 -Restart flomax, 0.8mg po qd- urology following  4 CVS-Lopressor was increased and hydralazine was stopped        [facility]   [facility]illon Spout  [phone]      ASSESSMENT/PLAN  Assessment:  82y M PMH CKD on HD(T/Th/S), last HD session 11/15 presents for c/o fever  RIght hydrocele and was evaluated by Urology in the past   Hyperkalemia     1 Renal-  next HD this am due to hyperkalemia;  HD in am for short session to get back to [facility] sched  2 ID-This is likely prostatitis?  PO IV abx per ID;     3 -Restart flomax, 0.8mg po qd- urology following  4 CVS-Lopressor was increased and hydralazine was stopped        [facility] [employer]  [phone]

## 2022-11-26 LAB
ANION GAP SERPL CALC-SCNC: 17 MMOL/L — SIGNIFICANT CHANGE UP (ref 5–17)
BUN SERPL-MCNC: 35 MG/DL — HIGH (ref 7–23)
CALCIUM SERPL-MCNC: 8.9 MG/DL — SIGNIFICANT CHANGE UP (ref 8.4–10.5)
CHLORIDE SERPL-SCNC: 95 MMOL/L — LOW (ref 96–108)
CO2 SERPL-SCNC: 26 MMOL/L — SIGNIFICANT CHANGE UP (ref 22–31)
CREAT SERPL-MCNC: 7.16 MG/DL — HIGH (ref 0.5–1.3)
EGFR: 7 ML/MIN/1.73M2 — LOW
GLUCOSE SERPL-MCNC: 77 MG/DL — SIGNIFICANT CHANGE UP (ref 70–99)
POTASSIUM SERPL-MCNC: 4.7 MMOL/L — SIGNIFICANT CHANGE UP (ref 3.5–5.3)
POTASSIUM SERPL-SCNC: 4.7 MMOL/L — SIGNIFICANT CHANGE UP (ref 3.5–5.3)
SARS-COV-2 RNA SPEC QL NAA+PROBE: SIGNIFICANT CHANGE UP
SODIUM SERPL-SCNC: 138 MMOL/L — SIGNIFICANT CHANGE UP (ref 135–145)

## 2022-11-26 RX ADMIN — Medication 81 MILLIGRAM(S): at 12:45

## 2022-11-26 RX ADMIN — SEVELAMER CARBONATE 800 MILLIGRAM(S): 2400 POWDER, FOR SUSPENSION ORAL at 21:32

## 2022-11-26 RX ADMIN — SEVELAMER CARBONATE 800 MILLIGRAM(S): 2400 POWDER, FOR SUSPENSION ORAL at 13:57

## 2022-11-26 RX ADMIN — HEPARIN SODIUM 5000 UNIT(S): 5000 INJECTION INTRAVENOUS; SUBCUTANEOUS at 17:44

## 2022-11-26 RX ADMIN — HEPARIN SODIUM 5000 UNIT(S): 5000 INJECTION INTRAVENOUS; SUBCUTANEOUS at 05:48

## 2022-11-26 RX ADMIN — SEVELAMER CARBONATE 800 MILLIGRAM(S): 2400 POWDER, FOR SUSPENSION ORAL at 05:48

## 2022-11-26 RX ADMIN — Medication 1 DROP(S): at 12:45

## 2022-11-26 RX ADMIN — TAMSULOSIN HYDROCHLORIDE 0.8 MILLIGRAM(S): 0.4 CAPSULE ORAL at 21:32

## 2022-11-26 RX ADMIN — Medication 325 MILLIGRAM(S): at 12:45

## 2022-11-26 RX ADMIN — Medication 100 MILLIGRAM(S): at 05:52

## 2022-11-26 RX ADMIN — CHLORHEXIDINE GLUCONATE 1 APPLICATION(S): 213 SOLUTION TOPICAL at 13:56

## 2022-11-26 RX ADMIN — Medication 500 MILLIGRAM(S): at 12:45

## 2022-11-26 RX ADMIN — Medication 100 MILLIGRAM(S): at 17:44

## 2022-11-26 NOTE — PROGRESS NOTE ADULT - ASSESSMENT
on room air  oob to cahir  s/p HD with 600ml net loss today  denies complaints  next HD Tuesday     acetaminophen     Tablet .. 650 milliGRAM(s) Oral every 6 hours PRN  aluminum hydroxide/magnesium hydroxide/simethicone Suspension 30 milliLiter(s) Oral every 4 hours PRN  artificial tears (preservative free) Ophthalmic Solution 1 Drop(s) Both EYES daily  aspirin enteric coated 81 milliGRAM(s) Oral daily  chlorhexidine 2% Cloths 1 Application(s) Topical daily  ciprofloxacin     Tablet 500 milliGRAM(s) Oral daily  ferrous    sulfate 325 milliGRAM(s) Oral daily  heparin   Injectable 5000 Unit(s) SubCutaneous every 12 hours  melatonin 3 milliGRAM(s) Oral at bedtime PRN  metoprolol tartrate 100 milliGRAM(s) Oral two times a day  ondansetron Injectable 4 milliGRAM(s) IV Push every 8 hours PRN  sevelamer carbonate 800 milliGRAM(s) Oral three times a day  sodium chloride 0.9%. 1000 milliLiter(s) IV Continuous <Continuous>  tamsulosin 0.8 milliGRAM(s) Oral at bedtime      VITAL:  T(C): , Max: 37.2 (11-26-22 @ 15:36)  T(F): , Max: 98.9 (11-26-22 @ 15:36)  HR: 74 (11-26-22 @ 15:36)  BP: 138/76 (11-26-22 @ 15:36)  BP(mean): --  RR: 18 (11-26-22 @ 15:36)  SpO2: 95% (11-26-22 @ 15:36)  Wt(kg): --    11-25-22 @ 07:01  -  11-26-22 @ 07:00  --------------------------------------------------------  IN: 1840 mL / OUT: 2900 mL / NET: -1060 mL    11-26-22 @ 07:01  -  11-26-22 @ 16:24  --------------------------------------------------------  IN: 0 mL / OUT: 600 mL / NET: -600 mL        PHYSICAL EXAM:  Constitutional: NAD  Neck:  No JVD  Respiratory: CTAB/L  Cardiovascular: S1 and S2  Gastrointestinal: BS+, soft, NT/ND  Extremities: No peripheral edema  Neurological: A/O x 3, no focal deficits  Psychiatric: Normal mood, normal affect  : No Aguilar  Skin: No rashes  Access: avf  LABS:      Na(138)/K(4.7)/Cl(95)/HCO3(26)/BUN(35)/Cr(7.16)Glu(77)/Ca(8.9)/Mg(--)/PO4(--)    11-26 @ 07:09  Na(133)/K(6.2)/Cl(99)/HCO3(16)/BUN(60)/Cr(9.91)Glu(81)/Ca(8.1)/Mg(2.4)/PO4(6.4)    11-25 @ 07:02            ASSESSMENT/PLAN  Assessment:  82y M PMH CKD on HD(T/Th/S), last HD session 11/15 presents for c/o fever  RIght hydrocele and was evaluated by Urology in the past   Hyperkalemia- resolving s/p HD Friday and earlier this AM (Lytes controlled)     1 Renal- s/ HD earlier today with 600ml net fluid loss . Next HD Tuesday  if still here  2 ID-This is likely prostatitis?  PO IV abx per ID;     3 -Restart flomax, 0.8mg po qd- urology following  4 CVS- BP acceptable, continue Lopressor as ordered  5 Anemia- hgb improving           Harris Dillon NP-BC  [facility]  [phone]

## 2022-11-26 NOTE — PROGRESS NOTE ADULT - ASSESSMENT
82y M PMH CKD on HD(T/Th/S), last HD session 11/15 presents for c/o fever, decreased urination, dysuria admitted for infection w/u    Sepsis due to UTI  - Oliguric, dysuria, febrile to 100.8F + Tachycardic + Leukocytosis     - High risk of bacteremia given hx/o E. coli bacteremia Oct 2021  - C/w Ertapenem 500 Q24, ABX per ID ; upon dc, will switch to Cipro 500mg q 24 through 12/18/22 (should be given every day but after HD on HD days)  - BC X 2 negative   - UA and UCx   -- Pt now on ABX, results likely to be inaccurate    - Monitor patient closely; monitor HD, VS, CBC, Temp curve closely   - ID eval consulted; F/u recs   - GC/ Chlamydia results -- undetected 11/22/22    Oliguria/ Dysuria  - Likely due to UTI  - Renal US  -- negative for hydronephrosis, prostatomegaly   - Check bladder scan   - Monitor I and O     LVOT  obstruction   - Cardio follow up appreciated  - TTE w EF of 75%, Small, hyperdynamic left ventricle with severe asymmetric hypertrophy   - Pt will require outpatient structural heart follow up   - Hydralazine D/C, now on Metoprolol 75 BID, monitor and up-titrate as tolerated    PAT  - Monitor on tele  - Monitor electrolytes closely; Keep K > 4 and Mg > 2  - Metoprolol increased to 75 BID. Monitor    Stage 5 chronic kidney disease on dialysis.   - CKD on HD(T/Th/S)   - Nephro consulted, F/u recs  - C/w Sevelamer   - Monitor electrolytes.  - HD as per renal - planned for HD today     Hypertension.   - C/w  Metoprolol 75 BID in view of LVOT   - Cardio eval consulted; F/u recs  - Lipid panel  (LDL of 47)  - If patient remains hypertensive, plan to increase BB to 100 BID as hemodynamics permit     History of BPH.   - C/w Tamsulosin  - Renal US for prostatomegaly   - Urology consulted; F/u recs  - PSA --> elevated, likely do to BPH. Pt will require outpatient urology follow up for trending and monitoring     Anemia  - Likely AOCD in view of ESRD  - Iron, B12, Folate, Ferritin -- Not consistent with deficiency   - Transfuse for Hgb < 7.0       Prophylactic measure.   - DVT ppx: SQ heparin   - DASH diet.

## 2022-11-26 NOTE — PROGRESS NOTE ADULT - SUBJECTIVE AND OBJECTIVE BOX
DATE OF SERVICE: 11-26-22 @ 18:23    Patient is a 82y old  Male who presents with a chief complaint of fever, dysuria, decreased urination (26 Nov 2022 16:23)      INTERVAL HISTORY: no complaints     REVIEW OF SYSTEMS:  CONSTITUTIONAL: No weakness  EYES/ENT: No visual changes;  No throat pain   NECK: No pain or stiffness  RESPIRATORY: No cough, wheezing; No shortness of breath  CARDIOVASCULAR: No chest pain or palpitations  GASTROINTESTINAL: No abdominal  pain. No nausea, vomiting, or hematemesis  GENITOURINARY: No dysuria, frequency or hematuria  NEUROLOGICAL: No stroke like symptoms  SKIN: No rashes  	  MEDICATIONS:  metoprolol tartrate 100 milliGRAM(s) Oral two times a day        PHYSICAL EXAM:  T(C): 37.2 (11-26-22 @ 15:36), Max: 37.2 (11-26-22 @ 15:36)  HR: 86 (11-26-22 @ 17:40) (69 - 86)  BP: 136/74 (11-26-22 @ 17:40) (126/64 - 153/71)  RR: 18 (11-26-22 @ 17:40) (17 - 18)  SpO2: 95% (11-26-22 @ 17:40) (95% - 97%)  Wt(kg): --  I&O's Summary    25 Nov 2022 07:01  -  26 Nov 2022 07:00  --------------------------------------------------------  IN: 1840 mL / OUT: 2900 mL / NET: -1060 mL    26 Nov 2022 07:01  -  26 Nov 2022 18:23  --------------------------------------------------------  IN: 320 mL / OUT: 600 mL / NET: -280 mL          Appearance: In no distress	  HEENT:    PERRL, EOMI	  Cardiovascular:  S1 S2, No JVD  Respiratory: Lungs clear to auscultation	  Gastrointestinal:  Soft, Non-tender, + BS	  Vascularature:  No edema of LE  Psychiatric: Appropriate affect   Neuro: no acute focal deficits           11-26    138  |  95<L>  |  35<H>  ----------------------------<  77  4.7   |  26  |  7.16<H>    Ca    8.9      26 Nov 2022 07:09  Phos  6.4     11-25  Mg     2.4     11-25          Labs personally reviewed      ASSESSMENT/PLAN: 	    82y M PMH CKD on HD(T/Th/S), last HD session 11/15 presents for c/o fever, decreased urination since Saturday.     EKG - NSR RBBB   Echo 3/21- EF 75% severe LVOT obstruction 98mmHg     1) Severe LVOT obstruction   -could have been sec to dehydration in the past  -Echo this admission shows small, hyperdynamic left ventricle with severe asymmetric hypertrophy.  -Will DC Hydralazine and start Metoprolol   - Will increase Metoprolol to 100mg PO BID with hold parameters  -11/26 c/w Metoprolol 100mg BID     2) Hematuria/ Oliguria  - f/u urology   -on abx for sepsis 2/2 UTI    3) ESRD  - on HD   -f/u renal    4) HTN  - Will change hydralazine to Metoprolol i/s/o LVOT obstruction    5) DVT prophylaxis   - on sc heparin             JIM Gr DO [facility]  Cardiovascular Medicine  [address] [place] [address], [address]  Office: [phone]  Available via call/text on Microsoft Teams

## 2022-11-26 NOTE — PROGRESS NOTE ADULT - SUBJECTIVE AND OBJECTIVE BOX
Covering for Dr. Gustafson    Reports some dysuria  No flank pain  No fevers or chills    Vital Signs Last 24 Hrs  T(C): 36.8 (26 Nov 2022 05:46), Max: 36.9 (25 Nov 2022 23:37)  T(F): 98.2 (26 Nov 2022 05:46), Max: 98.4 (25 Nov 2022 23:37)  HR: 76 (26 Nov 2022 05:46) (58 - 78)  BP: 138/72 (26 Nov 2022 05:46) (128/56 - 153/75)  BP(mean): --  RR: 17 (26 Nov 2022 05:46) (17 - 18)  SpO2: 95% (26 Nov 2022 05:46) (95% - 100%)    I&O's Summary    11-25-22 @ 07:01  -  11-26-22 @ 07:00  --------------------------------------------------------  IN: 1840 mL / OUT: 2900 mL / NET: -1060 mL        Appearance: Normal	  HEENT:  PERRLA   Lymphatic: No lymphadenopathy   Cardiovascular: Normal S1 S2, no JVD  Respiratory: normal effort , clear  Gastrointestinal:  Soft, Non-tender  Skin: No rashes,  warm to touch  Psychiatry:  Mood & affect appropriate  Musculuskeletal: No edema    LABS:    11-26    138  |  95<L>  |  35<H>  ----------------------------<  77  4.7   |  26  |  7.16<H>    Ca    8.9      26 Nov 2022 07:09  Phos  6.4     11-25  Mg     2.4     11-25        CAPILLARY BLOOD GLUCOSE                RADIOLOGY & ADDITIONAL TESTS:    Imaging Personally Reviewed:  [x] YES  [ ] NO    Will obtain old records:  [ ] YES  [x] NO                           Covering for Dr. Gustafson    Reports some dysuria  (-) CP/SOB  No fevers or sweats    Vital Signs Last 24 Hrs  T(C): 36.8 (26 Nov 2022 05:46), Max: 36.9 (25 Nov 2022 23:37)  T(F): 98.2 (26 Nov 2022 05:46), Max: 98.4 (25 Nov 2022 23:37)  HR: 76 (26 Nov 2022 05:46) (58 - 78)  BP: 138/72 (26 Nov 2022 05:46) (128/56 - 153/75)  BP(mean): --  RR: 17 (26 Nov 2022 05:46) (17 - 18)  SpO2: 95% (26 Nov 2022 05:46) (95% - 100%)    I&O's Summary    11-25-22 @ 07:01  -  11-26-22 @ 07:00  --------------------------------------------------------  IN: 1840 mL / OUT: 2900 mL / NET: -1060 mL        Appearance: Normal	  HEENT:  PERRLA   Lymphatic: No lymphadenopathy   Cardiovascular: Normal S1 S2, no JVD  Respiratory: normal effort , clear  Gastrointestinal:  Soft, Non-tender  Skin: No rashes,  warm to touch  Psychiatry:  Mood & affect appropriate  Musculuskeletal: No edema    LABS:    11-26    138  |  95<L>  |  35<H>  ----------------------------<  77  4.7   |  26  |  7.16<H>    Ca    8.9      26 Nov 2022 07:09  Phos  6.4     11-25  Mg     2.4     11-25        CAPILLARY BLOOD GLUCOSE                RADIOLOGY & ADDITIONAL TESTS:    Imaging Personally Reviewed:  [x] YES  [ ] NO    Will obtain old records:  [ ] YES  [x] NO

## 2022-11-27 ENCOUNTER — TRANSCRIPTION ENCOUNTER (OUTPATIENT)
Age: 82
End: 2022-11-27

## 2022-11-27 VITALS
DIASTOLIC BLOOD PRESSURE: 93 MMHG | SYSTOLIC BLOOD PRESSURE: 160 MMHG | HEART RATE: 75 BPM | RESPIRATION RATE: 18 BRPM | OXYGEN SATURATION: 98 % | TEMPERATURE: 98 F

## 2022-11-27 PROCEDURE — 82607 VITAMIN B-12: CPT

## 2022-11-27 PROCEDURE — 71045 X-RAY EXAM CHEST 1 VIEW: CPT

## 2022-11-27 PROCEDURE — U0005: CPT

## 2022-11-27 PROCEDURE — 83540 ASSAY OF IRON: CPT

## 2022-11-27 PROCEDURE — U0003: CPT

## 2022-11-27 PROCEDURE — 82947 ASSAY GLUCOSE BLOOD QUANT: CPT

## 2022-11-27 PROCEDURE — 85610 PROTHROMBIN TIME: CPT

## 2022-11-27 PROCEDURE — 36415 COLL VENOUS BLD VENIPUNCTURE: CPT

## 2022-11-27 PROCEDURE — 87040 BLOOD CULTURE FOR BACTERIA: CPT

## 2022-11-27 PROCEDURE — 87641 MR-STAPH DNA AMP PROBE: CPT

## 2022-11-27 PROCEDURE — 83735 ASSAY OF MAGNESIUM: CPT

## 2022-11-27 PROCEDURE — 87591 N.GONORRHOEAE DNA AMP PROB: CPT

## 2022-11-27 PROCEDURE — 82746 ASSAY OF FOLIC ACID SERUM: CPT

## 2022-11-27 PROCEDURE — 82728 ASSAY OF FERRITIN: CPT

## 2022-11-27 PROCEDURE — 82962 GLUCOSE BLOOD TEST: CPT

## 2022-11-27 PROCEDURE — 82803 BLOOD GASES ANY COMBINATION: CPT

## 2022-11-27 PROCEDURE — 85027 COMPLETE CBC AUTOMATED: CPT

## 2022-11-27 PROCEDURE — 82435 ASSAY OF BLOOD CHLORIDE: CPT

## 2022-11-27 PROCEDURE — 93306 TTE W/DOPPLER COMPLETE: CPT

## 2022-11-27 PROCEDURE — 83036 HEMOGLOBIN GLYCOSYLATED A1C: CPT

## 2022-11-27 PROCEDURE — 83550 IRON BINDING TEST: CPT

## 2022-11-27 PROCEDURE — 84132 ASSAY OF SERUM POTASSIUM: CPT

## 2022-11-27 PROCEDURE — 87640 STAPH A DNA AMP PROBE: CPT

## 2022-11-27 PROCEDURE — 82330 ASSAY OF CALCIUM: CPT

## 2022-11-27 PROCEDURE — G0103: CPT

## 2022-11-27 PROCEDURE — 87086 URINE CULTURE/COLONY COUNT: CPT

## 2022-11-27 PROCEDURE — 99285 EMERGENCY DEPT VISIT HI MDM: CPT | Mod: 25

## 2022-11-27 PROCEDURE — 85014 HEMATOCRIT: CPT

## 2022-11-27 PROCEDURE — 83605 ASSAY OF LACTIC ACID: CPT

## 2022-11-27 PROCEDURE — 85018 HEMOGLOBIN: CPT

## 2022-11-27 PROCEDURE — 76770 US EXAM ABDO BACK WALL COMP: CPT

## 2022-11-27 PROCEDURE — 85025 COMPLETE CBC W/AUTO DIFF WBC: CPT

## 2022-11-27 PROCEDURE — 80061 LIPID PANEL: CPT

## 2022-11-27 PROCEDURE — 99261: CPT

## 2022-11-27 PROCEDURE — 84100 ASSAY OF PHOSPHORUS: CPT

## 2022-11-27 PROCEDURE — 80053 COMPREHEN METABOLIC PANEL: CPT

## 2022-11-27 PROCEDURE — 84295 ASSAY OF SERUM SODIUM: CPT

## 2022-11-27 PROCEDURE — 80048 BASIC METABOLIC PNL TOTAL CA: CPT

## 2022-11-27 PROCEDURE — 87491 CHLMYD TRACH DNA AMP PROBE: CPT

## 2022-11-27 PROCEDURE — 87637 SARSCOV2&INF A&B&RSV AMP PRB: CPT

## 2022-11-27 RX ORDER — CIPROFLOXACIN LACTATE 400MG/40ML
1 VIAL (ML) INTRAVENOUS
Qty: 21 | Refills: 0
Start: 2022-11-27 | End: 2022-12-17

## 2022-11-27 RX ORDER — SEVELAMER CARBONATE 2400 MG/1
1 POWDER, FOR SUSPENSION ORAL
Qty: 0 | Refills: 0 | DISCHARGE
Start: 2022-11-27

## 2022-11-27 RX ORDER — TAMSULOSIN HYDROCHLORIDE 0.4 MG/1
2 CAPSULE ORAL
Qty: 60 | Refills: 0
Start: 2022-11-27 | End: 2022-12-26

## 2022-11-27 RX ORDER — METOPROLOL TARTRATE 50 MG
1 TABLET ORAL
Qty: 60 | Refills: 0
Start: 2022-11-27 | End: 2022-12-26

## 2022-11-27 RX ORDER — FERROUS SULFATE 325(65) MG
1 TABLET ORAL
Qty: 30 | Refills: 0
Start: 2022-11-27 | End: 2022-12-26

## 2022-11-27 RX ADMIN — Medication 81 MILLIGRAM(S): at 08:04

## 2022-11-27 RX ADMIN — SEVELAMER CARBONATE 800 MILLIGRAM(S): 2400 POWDER, FOR SUSPENSION ORAL at 17:56

## 2022-11-27 RX ADMIN — Medication 500 MILLIGRAM(S): at 08:05

## 2022-11-27 RX ADMIN — Medication 1 DROP(S): at 08:05

## 2022-11-27 RX ADMIN — Medication 100 MILLIGRAM(S): at 05:37

## 2022-11-27 RX ADMIN — SEVELAMER CARBONATE 800 MILLIGRAM(S): 2400 POWDER, FOR SUSPENSION ORAL at 05:37

## 2022-11-27 RX ADMIN — Medication 100 MILLIGRAM(S): at 17:56

## 2022-11-27 RX ADMIN — CHLORHEXIDINE GLUCONATE 1 APPLICATION(S): 213 SOLUTION TOPICAL at 08:05

## 2022-11-27 RX ADMIN — HEPARIN SODIUM 5000 UNIT(S): 5000 INJECTION INTRAVENOUS; SUBCUTANEOUS at 05:36

## 2022-11-27 RX ADMIN — Medication 325 MILLIGRAM(S): at 08:04

## 2022-11-27 NOTE — PROGRESS NOTE ADULT - REASON FOR ADMISSION
fever, dysuria, decreased urination

## 2022-11-27 NOTE — DISCHARGE NOTE PROVIDER - NSDCMRMEDTOKEN_GEN_ALL_CORE_FT
Aspirin Enteric Coated 81 mg oral delayed release tablet: 1 tab(s) orally once a day  calcium acetate 667 mg oral tablet: 1 tab(s) orally 3 times a day   ocular lubricant ophthalmic ointment: 1 application to each affected eye once a day (at bedtime)  ocular lubricant ophthalmic solution: 1 drop(s) to each affected eye every 4 hours  sevelamer carbonate 800 mg oral tablet: 2 tab(s) orally 3 times a day (with meals)  tamsulosin 0.4 mg oral capsule: 1 cap(s) orally once a day (at bedtime)   Aspirin Enteric Coated 81 mg oral delayed release tablet: 1 tab(s) orally once a day  ciprofloxacin 500 mg oral tablet: 1 tab(s) orally once a day through 12/18/22 Please take after dialysis on HD days  ferrous sulfate 325 mg (65 mg elemental iron) oral tablet: 1 tab(s) orally once a day  metoprolol tartrate 100 mg oral tablet: 1 tab(s) orally 2 times a day  ocular lubricant ophthalmic ointment: 1 application to each affected eye once a day (at bedtime)  ocular lubricant ophthalmic solution: 1 drop(s) to each affected eye every 4 hours  sevelamer carbonate 800 mg oral tablet: 1 tab(s) orally 3 times a day  tamsulosin 0.4 mg oral capsule: 2 cap(s) orally once a day (at bedtime)

## 2022-11-27 NOTE — PROGRESS NOTE ADULT - PROVIDER SPECIALTY LIST ADULT
Cardiology
Cardiology
Infectious Disease
Infectious Disease
Internal Medicine
Nephrology
Cardiology
Infectious Disease
Infectious Disease
Internal Medicine
Internal Medicine
Nephrology
Nephrology
Urology
Cardiology
Infectious Disease
Internal Medicine
Nephrology
Internal Medicine
Nephrology
Nephrology

## 2022-11-27 NOTE — DISCHARGE NOTE PROVIDER - NSFOLLOWUPCLINICS_GEN_ALL_ED_FT
[facility] Specialty Clinics  Urology  [address] [place] [address] - [address] [place] [address]  Phone: [phone]  Fax:

## 2022-11-27 NOTE — DISCHARGE NOTE PROVIDER - HOSPITAL COURSE
82y M PMH CKD on HD(T/Th/S), last HD session 11/15 presents for c/o fever, decreased urination, dysuria admitted for infection w/u    Sepsis due to UTI  - Oliguric, dysuria, febrile to 100.8F + Tachycardic + Leukocytosis     - High risk of bacteremia given hx/o E. coli bacteremia Oct 2021  - C/w Ertapenem 500 Q24, ABX per ID ; upon dc, will switch to Cipro 500mg q 24 through 12/18/22 (should be given every day but after HD on HD days)  - BC X 2 negative   - UA and UCx   -- Pt now on ABX, results likely to be inaccurate    - Monitor patient closely; monitor HD, VS, CBC, Temp curve closely   - ID eval consulted; F/u recs   - GC/ Chlamydia results -- undetected 11/22/22    Oliguria/ Dysuria  - Likely due to UTI  - Renal US  -- negative for hydronephrosis, prostatomegaly   - Check bladder scan   - Monitor I and O     LVOT  obstruction   - Cardio follow up appreciated  - TTE w EF of 75%, Small, hyperdynamic left ventricle with severe asymmetric hypertrophy   - Pt will require outpatient structural heart follow up   - Hydralazine D/C, now on Metoprolol 75 BID, monitor and up-titrate as tolerated    PAT  - Monitor on tele  - Monitor electrolytes closely; Keep K > 4 and Mg > 2  - Metoprolol increased to 75 BID. Monitor    Stage 5 chronic kidney disease on dialysis.   - CKD on HD(T/Th/S)   - Nephro consulted, F/u recs  - C/w Sevelamer   - Monitor electrolytes.  - HD as per renal - planned for HD today     Hypertension.   - C/w  Metoprolol 75 BID in view of LVOT   - Cardio eval consulted; F/u recs  - Lipid panel  (LDL of 47)  - If patient remains hypertensive, plan to increase BB to 100 BID as hemodynamics permit     History of BPH.   - C/w Tamsulosin  - Renal US for prostatomegaly   - Urology consulted; F/u recs  - PSA --> elevated, likely do to BPH. Pt will require outpatient urology follow up for trending and monitoring     Anemia  - Likely AOCD in view of ESRD  - Iron, B12, Folate, Ferritin -- Not consistent with deficiency   - Transfuse for Hgb < 7.0       Prophylactic measure.   - DVT ppx: SQ heparin   - DASH diet.    Patient remains stable for DC with close follow up with PCP as per Dr. Garza

## 2022-11-27 NOTE — DISCHARGE NOTE PROVIDER - CARE PROVIDER_API CALL
Melvin Jaramillo)  Medicine  [address] [place] [address]  Phone: [phone]  Fax: [phone]  Follow Up Time: 1 week

## 2022-11-27 NOTE — DISCHARGE NOTE NURSING/CASE MANAGEMENT/SOCIAL WORK - PATIENT PORTAL LINK FT
You can access the FollowMyHealth Patient Portal offered by [facility] by registering at the following website: [email or web address]. By joining [facility]’s FollowMyHealth portal, you will also be able to view your health information using other applications (apps) compatible with our system.

## 2022-11-27 NOTE — DISCHARGE NOTE NURSING/CASE MANAGEMENT/SOCIAL WORK - NSDCPEFALRISK_GEN_ALL_CORE
For information on Fall & Injury Prevention, visit: [email or web address] OR  [email or web address] OR  https://www.cdc.gov/steadi/patient.html

## 2022-11-27 NOTE — DISCHARGE NOTE PROVIDER - NSDCFUADDAPPT_GEN_ALL_CORE_FT
Please follow up with your PCP in a week and arrange Urology followup in 2 weeks for close monitoring of your prostatitis and elevated PSA (level: 12)

## 2022-11-27 NOTE — DISCHARGE NOTE PROVIDER - NSDCCPCAREPLAN_GEN_ALL_CORE_FT
PRINCIPAL DISCHARGE DIAGNOSIS  Diagnosis: Prostatitis  Assessment and Plan of Treatment: You were seen and evaluated by ID and Urologist, and diagnosed with prostatitis which is inflammation of your prostate.   Take all antibiotics as ordered.  Call you Health care provider upon arrival home to make a one week follow up appointment.  If you develop fever, chills, malaise, or change in mental status call your Health Care Provider or go to the Emergency Department.  Nutrition is important, eat small frequent meals to help ensure you get adequate calories.  Do not stay in bed all day!  Increase your activity daily as tolerated.        SECONDARY DISCHARGE DIAGNOSES  Diagnosis: Hypertension  Assessment and Plan of Treatment: Hypertension, also known simply as "high blood pressure" is very common, however can lead to many significant complications if left uncontrolled. When the blood pressure is elevated, the force the blood puts on the walls of the arteries is high and can lead to artery damage. Also, when the heart muscle has to pump blood against a high blood pressure, it thickens and enlarges, just like any muscle does when it has to do more work (think of a weight ). When the blood pressure is very high, people may feel a headache or tired. Some people can feel pounding in their head or have blurry vision. Hearing the heart beating in the ear especially at night can be a sign of high blood pressure. Eventually, symptoms of stroke, heart attack, heart failure or irregular heartbeats can occur  - Exercise: Doing cardiovascular exercise such as running, biking or swimming at least 30 minutes per day most days of the week is recommended to help keep blood pressure healthy  - Lose weight: Maintaining a normal BMI (body mass index) is very important in keeping blood pressure readings normal   - Avoid salt: Sodium in the diet increases the blood pressure in many ways. Salt comes in many foods, so just because you don't add salt to your food it does not mean that you are eating a low salt diet. Read labels and keep sodium intake to less than 2000 mg per day   - Avoid alcohol: Even 1 or 2 alcoholic drinks can significantly increase blood pressure   - DASH Diet: The DASH diet has been shown to reduce blood pressure   - Take all medication as prescribed.   - Follow up with your medical doctor for routine blood pressure monitoring at your next visit.   - Notify your doctor if you have any of the following symptoms:    - Dizziness, Lightheadedness, Blurry vision, Headache, Chest pain, Shortness of breath      Diagnosis: ESRD on dialysis  Assessment and Plan of Treatment: Your kidneys perform several critical functions. They clean your blood and support important bodily functions. When your kidneys cannot perform these functions, hemodialysis may be required. Hemodialysis is a treatment for kidney failure. Normally, the kidneys work to filter the blood and remove waste and excess salt and water. Kidney failure, also called "end-stage kidney disease," is when the kidneys stop working completely. With hemodialysis, a machine takes over the job of the kidneys. Blood is pumped from the body, filtered through a dialysis machine, and then returned to the body. People can also have problems during dialysis treatments. These can include:   - Feeling lightheaded   - Trouble breathing   - Belly or muscle cramps   - Nausea or vomiting  Let your doctor or nurse know if you have any problems. Many of them can be treated.  .  - Weigh yourself every day – When your kidneys don't work, fluid collects in your body. Let your doctor or nurse know if you gain more weight than usual between dialysis treatments.  - Follow a special diet – You will need to limit the amount of fluids you drink. You might also need to avoid foods with a lot of sodium, potassium, and phosphorus. These are minerals that can build up in your body if you have kidney problems.  - Check your vascular access daily for signs of infection such as redness, pus and swelling. Notify your healthcare professional if you notice these signs.  - Keep your catheter bandage clean and dry. If your bandage gets wet, notify your healthcare professional.  - If you have a central line catheter, ask your healthcare professional why it is needed, how long it will be in place, and if you can use a fistula or graft for your dialysis treatment.  - Make sure that all healthcare providers clean their hands with soap and water or alcohol-based hand  before and after caring for you or your vascular access site.      Diagnosis: History of BPH  Assessment and Plan of Treatment: You have an enlarged prostate gland which gets bigger as men get older - it is a very common problem and has nothing to do with prostate cancer  Call your doctor if you are urinating more frequently, have trouble starting to urinate, have weak stream, urine leaking or dribbling, and feeling as though bladder is not empty after urination  Your doctor will monitor your prostate with a rectal exam as well as urine or blood testing  You can help yourself by reducing the amount of fluid you drink before going to bed, limiting the amount of alcohol & caffeine you drink   Avoid cold & allergy medication that contain decongestants or antihistamines which make BPH symptoms worse  You can also "double void" by waiting a moment after urinating & trying again  Take your medication as prescribed - one medication helps to relax the muscle around the urethra and the other medication you may take prevents the prostate from growing more or even shrinking the prostate

## 2022-11-27 NOTE — PROGRESS NOTE ADULT - SUBJECTIVE AND OBJECTIVE BOX
Covering for Dr. Gustafson    Feels well this morning  No new symptoms  "Have a nice day."    Vital Signs Last 24 Hrs  T(C): 37 (27 Nov 2022 00:21), Max: 37.2 (26 Nov 2022 15:36)  T(F): 98.6 (27 Nov 2022 00:21), Max: 98.9 (26 Nov 2022 15:36)  HR: 77 (27 Nov 2022 00:21) (69 - 86)  BP: 154/71 (27 Nov 2022 00:21) (126/64 - 154/71)  BP(mean): --  RR: 18 (27 Nov 2022 00:21) (18 - 18)  SpO2: 96% (27 Nov 2022 00:21) (95% - 96%)    I&O's Summary    11-26-22 @ 07:01  -  11-27-22 @ 07:00  --------------------------------------------------------  IN: 440 mL / OUT: 600 mL / NET: -160 mL        Appearance: Normal	  HEENT:  PERRLA   Lymphatic: No lymphadenopathy   Cardiovascular: Normal S1 S2, no JVD  Respiratory: normal effort , clear  Gastrointestinal:  Soft, Non-tender  Skin: No rashes,  warm to touch  Psychiatry:  Mood & affect appropriate  Musculuskeletal: No edema    LABS:    11-26    138  |  95<L>  |  35<H>  ----------------------------<  77  4.7   |  26  |  7.16<H>    Ca    8.9      26 Nov 2022 07:09        CAPILLARY BLOOD GLUCOSE                RADIOLOGY & ADDITIONAL TESTS:    Imaging Personally Reviewed:  [x] YES  [ ] NO    Will obtain old records:  [ ] YES  [x] NO

## 2023-01-26 NOTE — PATIENT PROFILE ADULT - DO YOU FEEL UNSAFE AT HOME, WORK, OR SCHOOL?
Patient to ED for chest and back pain. Patient reports pain started in his back when waking up this AM. Patient went to work where back pain started to radiate into chest. Patient denies any heart hx. Patient does see dr Kimmy Bonilla for HTN. Patient denies feeling ill over the past several days.  patient is alert and oriented      Jayme Castillo RN  01/26/23 [id number] no

## 2023-04-03 ENCOUNTER — INPATIENT (INPATIENT)
Facility: HOSPITAL | Age: 83
LOS: 2 days | Discharge: HOME | End: 2023-04-06
Attending: INTERNAL MEDICINE | Admitting: INTERNAL MEDICINE
Payer: MEDICARE

## 2023-04-03 VITALS
RESPIRATION RATE: 28 BRPM | OXYGEN SATURATION: 94 % | SYSTOLIC BLOOD PRESSURE: 148 MMHG | DIASTOLIC BLOOD PRESSURE: 88 MMHG | HEART RATE: 89 BPM

## 2023-04-03 DIAGNOSIS — J45.901 UNSPECIFIED ASTHMA WITH (ACUTE) EXACERBATION: ICD-10-CM

## 2023-04-03 DIAGNOSIS — R06.02 SHORTNESS OF BREATH: ICD-10-CM

## 2023-04-03 DIAGNOSIS — Z29.9 ENCOUNTER FOR PROPHYLACTIC MEASURES, UNSPECIFIED: ICD-10-CM

## 2023-04-03 DIAGNOSIS — I10 ESSENTIAL (PRIMARY) HYPERTENSION: ICD-10-CM

## 2023-04-03 DIAGNOSIS — N18.6 END STAGE RENAL DISEASE: ICD-10-CM

## 2023-04-03 LAB
ALBUMIN SERPL ELPH-MCNC: 4.4 G/DL — SIGNIFICANT CHANGE UP (ref 3.3–5)
ALP SERPL-CCNC: 127 U/L — HIGH (ref 40–120)
ALT FLD-CCNC: 15 U/L — SIGNIFICANT CHANGE UP (ref 4–41)
ANION GAP SERPL CALC-SCNC: 14 MMOL/L — SIGNIFICANT CHANGE UP (ref 7–14)
ANION GAP SERPL CALC-SCNC: 18 MMOL/L — HIGH (ref 7–14)
AST SERPL-CCNC: 34 U/L — SIGNIFICANT CHANGE UP (ref 4–40)
BASE EXCESS BLDV CALC-SCNC: -0.6 MMOL/L — SIGNIFICANT CHANGE UP (ref -2–3)
BASE EXCESS BLDV CALC-SCNC: -1 MMOL/L — SIGNIFICANT CHANGE UP (ref -2–3)
BASE EXCESS BLDV CALC-SCNC: -3.2 MMOL/L — LOW (ref -2–3)
BASOPHILS # BLD AUTO: 0.05 K/UL — SIGNIFICANT CHANGE UP (ref 0–0.2)
BASOPHILS NFR BLD AUTO: 0.7 % — SIGNIFICANT CHANGE UP (ref 0–2)
BILIRUB SERPL-MCNC: 0.3 MG/DL — SIGNIFICANT CHANGE UP (ref 0.2–1.2)
BLOOD GAS VENOUS COMPREHENSIVE RESULT: SIGNIFICANT CHANGE UP
BUN SERPL-MCNC: 57 MG/DL — HIGH (ref 7–23)
BUN SERPL-MCNC: 59 MG/DL — HIGH (ref 7–23)
CALCIUM SERPL-MCNC: 9.3 MG/DL — SIGNIFICANT CHANGE UP (ref 8.4–10.5)
CALCIUM SERPL-MCNC: 9.5 MG/DL — SIGNIFICANT CHANGE UP (ref 8.4–10.5)
CHLORIDE BLDV-SCNC: 106 MMOL/L — SIGNIFICANT CHANGE UP (ref 96–108)
CHLORIDE BLDV-SCNC: 106 MMOL/L — SIGNIFICANT CHANGE UP (ref 96–108)
CHLORIDE BLDV-SCNC: 107 MMOL/L — SIGNIFICANT CHANGE UP (ref 96–108)
CHLORIDE SERPL-SCNC: 105 MMOL/L — SIGNIFICANT CHANGE UP (ref 98–107)
CHLORIDE SERPL-SCNC: 107 MMOL/L — SIGNIFICANT CHANGE UP (ref 98–107)
CO2 BLDV-SCNC: 26.7 MMOL/L — HIGH (ref 22–26)
CO2 BLDV-SCNC: 29.4 MMOL/L — HIGH (ref 22–26)
CO2 BLDV-SCNC: 32.4 MMOL/L — HIGH (ref 22–26)
CO2 SERPL-SCNC: 18 MMOL/L — LOW (ref 22–31)
CO2 SERPL-SCNC: 26 MMOL/L — SIGNIFICANT CHANGE UP (ref 22–31)
CREAT SERPL-MCNC: 10.74 MG/DL — HIGH (ref 0.5–1.3)
CREAT SERPL-MCNC: 11.12 MG/DL — HIGH (ref 0.5–1.3)
EGFR: 4 ML/MIN/1.73M2 — LOW
EGFR: 4 ML/MIN/1.73M2 — LOW
EOSINOPHIL # BLD AUTO: 0.96 K/UL — HIGH (ref 0–0.5)
EOSINOPHIL NFR BLD AUTO: 13.4 % — HIGH (ref 0–6)
FLUAV AG NPH QL: SIGNIFICANT CHANGE UP
FLUBV AG NPH QL: SIGNIFICANT CHANGE UP
GAS PNL BLDV: 135 MMOL/L — LOW (ref 136–145)
GAS PNL BLDV: 140 MMOL/L — SIGNIFICANT CHANGE UP (ref 136–145)
GAS PNL BLDV: 141 MMOL/L — SIGNIFICANT CHANGE UP (ref 136–145)
GAS PNL BLDV: SIGNIFICANT CHANGE UP
GAS PNL BLDV: SIGNIFICANT CHANGE UP
GLUCOSE BLDV-MCNC: 90 MG/DL — SIGNIFICANT CHANGE UP (ref 70–99)
GLUCOSE BLDV-MCNC: 92 MG/DL — SIGNIFICANT CHANGE UP (ref 70–99)
GLUCOSE BLDV-MCNC: 93 MG/DL — SIGNIFICANT CHANGE UP (ref 70–99)
GLUCOSE SERPL-MCNC: 71 MG/DL — SIGNIFICANT CHANGE UP (ref 70–99)
GLUCOSE SERPL-MCNC: 91 MG/DL — SIGNIFICANT CHANGE UP (ref 70–99)
HCO3 BLDV-SCNC: 25 MMOL/L — SIGNIFICANT CHANGE UP (ref 22–29)
HCO3 BLDV-SCNC: 27 MMOL/L — SIGNIFICANT CHANGE UP (ref 22–29)
HCO3 BLDV-SCNC: 30 MMOL/L — HIGH (ref 22–29)
HCT VFR BLD CALC: 42.1 % — SIGNIFICANT CHANGE UP (ref 39–50)
HCT VFR BLDA CALC: 34 % — LOW (ref 39–51)
HCT VFR BLDA CALC: 35 % — LOW (ref 39–51)
HCT VFR BLDA CALC: 38 % — LOW (ref 39–51)
HGB BLD CALC-MCNC: 11.4 G/DL — LOW (ref 12.6–17.4)
HGB BLD CALC-MCNC: 11.8 G/DL — LOW (ref 12.6–17.4)
HGB BLD CALC-MCNC: 12.5 G/DL — LOW (ref 12.6–17.4)
HGB BLD-MCNC: 12.3 G/DL — LOW (ref 13–17)
IANC: 3.55 K/UL — SIGNIFICANT CHANGE UP (ref 1.8–7.4)
IMM GRANULOCYTES NFR BLD AUTO: 0.3 % — SIGNIFICANT CHANGE UP (ref 0–0.9)
LACTATE BLDV-MCNC: 1 MMOL/L — SIGNIFICANT CHANGE UP (ref 0.5–2)
LACTATE BLDV-MCNC: 1.8 MMOL/L — SIGNIFICANT CHANGE UP (ref 0.5–2)
LACTATE BLDV-MCNC: 2.1 MMOL/L — HIGH (ref 0.5–2)
LYMPHOCYTES # BLD AUTO: 1.85 K/UL — SIGNIFICANT CHANGE UP (ref 1–3.3)
LYMPHOCYTES # BLD AUTO: 25.8 % — SIGNIFICANT CHANGE UP (ref 13–44)
MCHC RBC-ENTMCNC: 26 PG — LOW (ref 27–34)
MCHC RBC-ENTMCNC: 29.2 GM/DL — LOW (ref 32–36)
MCV RBC AUTO: 89 FL — SIGNIFICANT CHANGE UP (ref 80–100)
MONOCYTES # BLD AUTO: 0.73 K/UL — SIGNIFICANT CHANGE UP (ref 0–0.9)
MONOCYTES NFR BLD AUTO: 10.2 % — SIGNIFICANT CHANGE UP (ref 2–14)
NEUTROPHILS # BLD AUTO: 3.55 K/UL — SIGNIFICANT CHANGE UP (ref 1.8–7.4)
NEUTROPHILS NFR BLD AUTO: 49.6 % — SIGNIFICANT CHANGE UP (ref 43–77)
NRBC # BLD: 0 /100 WBCS — SIGNIFICANT CHANGE UP (ref 0–0)
NRBC # FLD: 0 K/UL — SIGNIFICANT CHANGE UP (ref 0–0)
NT-PROBNP SERPL-SCNC: 8872 PG/ML — HIGH
PCO2 BLDV: 58 MMHG — HIGH (ref 42–55)
PCO2 BLDV: 64 MMHG — HIGH (ref 42–55)
PCO2 BLDV: 82 MMHG — HIGH (ref 42–55)
PH BLDV: 7.17 — LOW (ref 7.32–7.43)
PH BLDV: 7.24 — LOW (ref 7.32–7.43)
PH BLDV: 7.24 — LOW (ref 7.32–7.43)
PLATELET # BLD AUTO: 126 K/UL — LOW (ref 150–400)
PO2 BLDV: 24 MMHG — LOW (ref 25–45)
PO2 BLDV: 36 MMHG — SIGNIFICANT CHANGE UP (ref 25–45)
PO2 BLDV: 43 MMHG — SIGNIFICANT CHANGE UP (ref 25–45)
POTASSIUM BLDV-SCNC: 4.8 MMOL/L — SIGNIFICANT CHANGE UP (ref 3.5–5.1)
POTASSIUM BLDV-SCNC: 5 MMOL/L — SIGNIFICANT CHANGE UP (ref 3.5–5.1)
POTASSIUM BLDV-SCNC: SIGNIFICANT CHANGE UP MMOL/L (ref 3.5–5.1)
POTASSIUM SERPL-MCNC: 4.8 MMOL/L — SIGNIFICANT CHANGE UP (ref 3.5–5.3)
POTASSIUM SERPL-MCNC: 6.2 MMOL/L — CRITICAL HIGH (ref 3.5–5.3)
POTASSIUM SERPL-SCNC: 4.8 MMOL/L — SIGNIFICANT CHANGE UP (ref 3.5–5.3)
POTASSIUM SERPL-SCNC: 6.2 MMOL/L — CRITICAL HIGH (ref 3.5–5.3)
PROT SERPL-MCNC: 8 G/DL — SIGNIFICANT CHANGE UP (ref 6–8.3)
RBC # BLD: 4.73 M/UL — SIGNIFICANT CHANGE UP (ref 4.2–5.8)
RBC # FLD: 21.5 % — HIGH (ref 10.3–14.5)
RSV RNA NPH QL NAA+NON-PROBE: SIGNIFICANT CHANGE UP
SAO2 % BLDV: 28 % — LOW (ref 67–88)
SAO2 % BLDV: 54.9 % — LOW (ref 67–88)
SAO2 % BLDV: 69.2 % — SIGNIFICANT CHANGE UP (ref 67–88)
SARS-COV-2 RNA SPEC QL NAA+PROBE: SIGNIFICANT CHANGE UP
SODIUM SERPL-SCNC: 143 MMOL/L — SIGNIFICANT CHANGE UP (ref 135–145)
SODIUM SERPL-SCNC: 145 MMOL/L — SIGNIFICANT CHANGE UP (ref 135–145)
TROPONIN T, HIGH SENSITIVITY RESULT: 45 NG/L — SIGNIFICANT CHANGE UP
TROPONIN T, HIGH SENSITIVITY RESULT: 49 NG/L — SIGNIFICANT CHANGE UP
WBC # BLD: 7.16 K/UL — SIGNIFICANT CHANGE UP (ref 3.8–10.5)
WBC # FLD AUTO: 7.16 K/UL — SIGNIFICANT CHANGE UP (ref 3.8–10.5)

## 2023-04-03 PROCEDURE — 99285 EMERGENCY DEPT VISIT HI MDM: CPT

## 2023-04-03 PROCEDURE — 99223 1ST HOSP IP/OBS HIGH 75: CPT

## 2023-04-03 PROCEDURE — 71045 X-RAY EXAM CHEST 1 VIEW: CPT | Mod: 26

## 2023-04-03 RX ORDER — CHLORHEXIDINE GLUCONATE 213 G/1000ML
1 SOLUTION TOPICAL ONCE
Refills: 0 | Status: DISCONTINUED | OUTPATIENT
Start: 2023-04-04 | End: 2023-04-06

## 2023-04-03 RX ORDER — IPRATROPIUM/ALBUTEROL SULFATE 18-103MCG
3 AEROSOL WITH ADAPTER (GRAM) INHALATION
Refills: 0 | Status: DISCONTINUED | OUTPATIENT
Start: 2023-04-03 | End: 2023-04-03

## 2023-04-03 RX ORDER — HYDRALAZINE HCL 50 MG
100 TABLET ORAL THREE TIMES A DAY
Refills: 0 | Status: DISCONTINUED | OUTPATIENT
Start: 2023-04-03 | End: 2023-04-06

## 2023-04-03 RX ORDER — CARVEDILOL PHOSPHATE 80 MG/1
25 CAPSULE, EXTENDED RELEASE ORAL EVERY 12 HOURS
Refills: 0 | Status: DISCONTINUED | OUTPATIENT
Start: 2023-04-03 | End: 2023-04-06

## 2023-04-03 RX ORDER — SEVELAMER CARBONATE 2400 MG/1
2 POWDER, FOR SUSPENSION ORAL
Refills: 0 | DISCHARGE

## 2023-04-03 RX ORDER — DEXTROSE 50 % IN WATER 50 %
50 SYRINGE (ML) INTRAVENOUS ONCE
Refills: 0 | Status: COMPLETED | OUTPATIENT
Start: 2023-04-03 | End: 2023-04-03

## 2023-04-03 RX ORDER — INSULIN HUMAN 100 [IU]/ML
5 INJECTION, SOLUTION SUBCUTANEOUS ONCE
Refills: 0 | Status: COMPLETED | OUTPATIENT
Start: 2023-04-03 | End: 2023-04-03

## 2023-04-03 RX ORDER — ASPIRIN/CALCIUM CARB/MAGNESIUM 324 MG
1 TABLET ORAL
Qty: 0 | Refills: 0 | DISCHARGE

## 2023-04-03 RX ORDER — SEVELAMER CARBONATE 2400 MG/1
1600 POWDER, FOR SUSPENSION ORAL
Refills: 0 | Status: DISCONTINUED | OUTPATIENT
Start: 2023-04-03 | End: 2023-04-06

## 2023-04-03 RX ORDER — ALBUTEROL 90 UG/1
2.5 AEROSOL, METERED ORAL ONCE
Refills: 0 | Status: COMPLETED | OUTPATIENT
Start: 2023-04-03 | End: 2023-04-03

## 2023-04-03 RX ORDER — FUROSEMIDE 40 MG
40 TABLET ORAL ONCE
Refills: 0 | Status: COMPLETED | OUTPATIENT
Start: 2023-04-03 | End: 2023-04-03

## 2023-04-03 RX ORDER — METOPROLOL TARTRATE 50 MG
1 TABLET ORAL
Refills: 0 | DISCHARGE

## 2023-04-03 RX ORDER — LANOLIN ALCOHOL/MO/W.PET/CERES
3 CREAM (GRAM) TOPICAL AT BEDTIME
Refills: 0 | Status: DISCONTINUED | OUTPATIENT
Start: 2023-04-03 | End: 2023-04-06

## 2023-04-03 RX ORDER — NIFEDIPINE 30 MG
90 TABLET, EXTENDED RELEASE 24 HR ORAL DAILY
Refills: 0 | Status: DISCONTINUED | OUTPATIENT
Start: 2023-04-03 | End: 2023-04-06

## 2023-04-03 RX ORDER — IPRATROPIUM/ALBUTEROL SULFATE 18-103MCG
3 AEROSOL WITH ADAPTER (GRAM) INHALATION ONCE
Refills: 0 | Status: COMPLETED | OUTPATIENT
Start: 2023-04-03 | End: 2023-04-03

## 2023-04-03 RX ORDER — ACETAMINOPHEN 500 MG
650 TABLET ORAL EVERY 6 HOURS
Refills: 0 | Status: DISCONTINUED | OUTPATIENT
Start: 2023-04-03 | End: 2023-04-06

## 2023-04-03 RX ORDER — TAMSULOSIN HYDROCHLORIDE 0.4 MG/1
1 CAPSULE ORAL
Refills: 0 | DISCHARGE

## 2023-04-03 RX ORDER — HYDRALAZINE HCL 50 MG
1 TABLET ORAL
Refills: 0 | DISCHARGE

## 2023-04-03 RX ORDER — SODIUM CHLORIDE 9 MG/ML
100 INJECTION INTRAMUSCULAR; INTRAVENOUS; SUBCUTANEOUS
Refills: 0 | Status: DISCONTINUED | OUTPATIENT
Start: 2023-04-03 | End: 2023-04-05

## 2023-04-03 RX ORDER — NIFEDIPINE 30 MG
1 TABLET, EXTENDED RELEASE 24 HR ORAL
Refills: 0 | DISCHARGE

## 2023-04-03 RX ORDER — IPRATROPIUM/ALBUTEROL SULFATE 18-103MCG
3 AEROSOL WITH ADAPTER (GRAM) INHALATION EVERY 6 HOURS
Refills: 0 | Status: DISCONTINUED | OUTPATIENT
Start: 2023-04-03 | End: 2023-04-06

## 2023-04-03 RX ORDER — HYDRALAZINE HCL 50 MG
100 TABLET ORAL THREE TIMES A DAY
Refills: 0 | Status: DISCONTINUED | OUTPATIENT
Start: 2023-04-03 | End: 2023-04-03

## 2023-04-03 RX ORDER — CALCIUM GLUCONATE 100 MG/ML
2 VIAL (ML) INTRAVENOUS ONCE
Refills: 0 | Status: COMPLETED | OUTPATIENT
Start: 2023-04-03 | End: 2023-04-03

## 2023-04-03 RX ORDER — CALCITRIOL 0.5 UG/1
1 CAPSULE ORAL
Refills: 0 | DISCHARGE

## 2023-04-03 RX ORDER — CALCITRIOL 0.5 UG/1
0.25 CAPSULE ORAL
Refills: 0 | Status: DISCONTINUED | OUTPATIENT
Start: 2023-04-03 | End: 2023-04-06

## 2023-04-03 RX ORDER — TAMSULOSIN HYDROCHLORIDE 0.4 MG/1
0.4 CAPSULE ORAL AT BEDTIME
Refills: 0 | Status: DISCONTINUED | OUTPATIENT
Start: 2023-04-03 | End: 2023-04-06

## 2023-04-03 RX ORDER — SODIUM ZIRCONIUM CYCLOSILICATE 10 G/10G
10 POWDER, FOR SUSPENSION ORAL ONCE
Refills: 0 | Status: COMPLETED | OUTPATIENT
Start: 2023-04-03 | End: 2023-04-03

## 2023-04-03 RX ORDER — HEPARIN SODIUM 5000 [USP'U]/ML
5000 INJECTION INTRAVENOUS; SUBCUTANEOUS EVERY 12 HOURS
Refills: 0 | Status: DISCONTINUED | OUTPATIENT
Start: 2023-04-03 | End: 2023-04-06

## 2023-04-03 RX ORDER — ALBUTEROL 90 UG/1
2 AEROSOL, METERED ORAL
Refills: 0 | Status: DISCONTINUED | OUTPATIENT
Start: 2023-04-03 | End: 2023-04-06

## 2023-04-03 RX ORDER — CARVEDILOL PHOSPHATE 80 MG/1
1 CAPSULE, EXTENDED RELEASE ORAL
Refills: 0 | DISCHARGE

## 2023-04-03 RX ADMIN — ALBUTEROL 2.5 MILLIGRAM(S): 90 AEROSOL, METERED ORAL at 16:51

## 2023-04-03 RX ADMIN — Medication 100 MILLIGRAM(S): at 22:24

## 2023-04-03 RX ADMIN — Medication 3 MILLILITER(S): at 15:30

## 2023-04-03 RX ADMIN — Medication 3 MILLILITER(S): at 22:51

## 2023-04-03 RX ADMIN — Medication 200 GRAM(S): at 16:57

## 2023-04-03 RX ADMIN — Medication 3 MILLILITER(S): at 16:00

## 2023-04-03 RX ADMIN — SODIUM ZIRCONIUM CYCLOSILICATE 10 GRAM(S): 10 POWDER, FOR SUSPENSION ORAL at 16:55

## 2023-04-03 RX ADMIN — Medication 40 MILLIGRAM(S): at 18:54

## 2023-04-03 RX ADMIN — Medication 3 MILLILITER(S): at 14:02

## 2023-04-03 RX ADMIN — Medication 50 MILLILITER(S): at 16:52

## 2023-04-03 RX ADMIN — Medication 40 MILLIGRAM(S): at 16:52

## 2023-04-03 RX ADMIN — INSULIN HUMAN 5 UNIT(S): 100 INJECTION, SOLUTION SUBCUTANEOUS at 16:56

## 2023-04-03 RX ADMIN — TAMSULOSIN HYDROCHLORIDE 0.4 MILLIGRAM(S): 0.4 CAPSULE ORAL at 22:24

## 2023-04-03 RX ADMIN — Medication 40 MILLIGRAM(S): at 22:24

## 2023-04-03 NOTE — CONSULT NOTE ADULT - ASSESSMENT
82 year old male non smoker with hx of dCHF comes into the ED from his PCP for eval of a cough and SOB for the past week  Hyperkalemia   URI     1 Renal -THe VBG potassium was under control.  No need for urgent HD today   HD in am   2 URI-Mucinex for congestion and or short course of Prednisone taper   3 CVS-BP at present under control   No evidence of heart failure    Sayed [facility]   [phone]

## 2023-04-03 NOTE — ED ADULT TRIAGE NOTE - CHIEF COMPLAINT QUOTE
p/t with hx CHF c/o of sob and chest discomfort since yesterday, p/t directly sent to room 11 for MD lopez

## 2023-04-03 NOTE — H&P ADULT - NSHPLABSRESULTS_GEN_ALL_CORE
12.3   7.16  )-----------( 126      ( 03 Apr 2023 14:16 )             42.1       04-03    143  |  107  |  59<H>  ----------------------------<  71  4.8   |  18<L>  |  10.74<H>    Ca    9.5      03 Apr 2023 19:07    TPro  8.0  /  Alb  4.4  /  TBili  0.3  /  DBili  x   /  AST  34  /  ALT  15  /  AlkPhos  127<H>  04-03                17:08 - VBG - pH: 7.24  | pCO2: 58    | pO2: 43    | Lactate: 2.1    14:36 - VBG - pH: 7.24  | pCO2: 64    | pO2: 36    | Lactate: 1.0    14:00 - VBG - pH: 7.17  | pCO2: 82    | pO2: 24    | Lactate: 1.8                CXR: As per my read - clear lungs no opacities, Will wait for prelim/official read    EKG: As per my read - NSR QTc 465 ms

## 2023-04-03 NOTE — H&P ADULT - NSHPPHYSICALEXAM_GEN_ALL_CORE
PHYSICAL EXAM:  VITALS: Vital Signs Last 24 Hrs  T(C): 37 (03 Apr 2023 22:45), Max: 37 (03 Apr 2023 22:45)  T(F): 98.6 (03 Apr 2023 22:45), Max: 98.6 (03 Apr 2023 22:45)  HR: 78 (03 Apr 2023 22:45) (70 - 89)  BP: 167/68 (03 Apr 2023 22:45) (148/88 - 178/80)  BP(mean): --  RR: 18 (03 Apr 2023 22:45) (16 - 28)  SpO2: 98% (03 Apr 2023 22:45) (94% - 100%)    Parameters below as of 03 Apr 2023 22:45  Patient On (Oxygen Delivery Method): room air      GENERAL: NAD, comfortable at bedside  HEAD:  Atraumatic, Normocephalic  EYES: EOMI, PERRL, conjunctiva and sclera clear  ENT: Moist Mucus Membranes present, no ulcers appreciated  NECK: Supple, No JVD  CHEST/LUNG: + significant wheezes in all lung fields b/l, + rhonchi, no accessory muscle use  HEART: Regular rate and rhythm; No murmurs, rubs, or gallops, (+)S1, S2  ABDOMEN: Soft, Nontender, Nondistended; Normal Bowel sounds   EXTREMITIES:  2+ Peripheral Pulses, No clubbing, cyanosis, or edema  PSYCH: normal mood and affect  NEUROLOGY: AAOx3, non-focal  SKIN: No rashes or lesions

## 2023-04-03 NOTE — ED PROVIDER NOTE - ATTENDING CONTRIBUTION TO CARE
Attending Statement: I have personally seen and examined this patient. I have fully participated in the care of this patient. I have reviewed all pertinent clinical information, including history physical exam, plan and the Resident's note and agree except as noted  82-year-old male history of end-stage renal disease on hemodialysis Tuesday Thursday Saturday, hypertension, CHF, BPH brought in by ambulance from doctor's office chief complaint of cough, shortness of breath.  Patient went to see his primary doctor because he has been having some chest congestion and coughing for the last couple days not feeling well.  Nonproductive cough.  Dyspnea on exertion.  He was found to be hypertensive and hypoxic in the office he called EMS.  EMS states he had audible wheezing with a pulse ox 89% on room air.  Patient not on home oxygen.  Was placed on nasal cannula that improved and transported.  Denies abdominal pain denies nausea vomiting or diarrhea.  Went to hemodialysis last Saturday 2 days ago.  Vital signs noted blood pressure 174/81 heart rate 73 respiration 18 satting 98 to 100% on room air talking in full sentences but appears dyspneic.  Audible bilateral expiratory wheezing.  Nontender abdomen.  Fistula with thrill of the left upper arm.  No significant leg swelling bilaterally.  Plan EKG, cardiac monitor, labs, chest x-ray, RVP, DuoNeb, steroids and admission Attending Statement: I have personally seen and examined this patient. I have fully participated in the care of this patient. I have reviewed all pertinent clinical information, including history physical exam, plan and the Resident's note and agree except as noted  82-year-old male history of end-stage renal disease on hemodialysis Tuesday Thursday Saturday, hypertension, CHF, BPH brought in by ambulance from doctor's office chief complaint of cough, shortness of breath.  Patient went to see his primary doctor because he has been having some chest congestion and coughing for the last couple days not feeling well.  Nonproductive cough.  Dyspnea on exertion.  He was found to be hypertensive and hypoxic in the office he called EMS.  EMS states he had audible wheezing with a pulse ox 89% on room air.  Patient not on home oxygen.  Was placed on nasal cannula that improved and transported.  Denies abdominal pain denies nausea vomiting or diarrhea.  Went to hemodialysis last Saturday 2 days ago.  Vital signs noted blood pressure 174/81 heart rate 73 respiration 18 satting 98 to 100% on room air talking in full sentences but appears dyspneic.  Audible bilateral expiratory wheezing.  Nontender abdomen.  Fistula with thrill of the left upper arm.  No significant leg swelling bilaterally.  Plan EKG, cardiac monitor, labs, chest x-ray, RVP, DuoNeb, steroids and admission  ekg sr hr 63 twi v6

## 2023-04-03 NOTE — ED ADULT NURSE NOTE - OBJECTIVE STATEMENT
Float: Received patient to 11 for SOB. A&o4, ambulatory, c/o SOB, audible wheezing heard, RR even and unlabored, pt c/o chest pain only with cough, noted to be NSR on monitor at this time. EMS found pt O2 on RA 89%, placed on 2L with positive outcome, now 100% on RA. Hx of ESRD, T,TH,SAT left AV fistula noted, +bruits/thrills, last session Saturday, Right 20G AC placed, labs drawn, Pending MD orders at this time. Report given to primary RN.

## 2023-04-03 NOTE — ED ADULT NURSE REASSESSMENT NOTE - NS ED NURSE REASSESS COMMENT FT1
Report given to [facility] 3 by NAVEED Bonilla. patient left ED in stable condition.
Resting comfortably on stretcher. denies any pain, not in acute distress. safety maintained. Will continue to monitor.
pt A&ox4, awake and alert, denies chest pain and SOB. no complaints of pain. denies headache, dizziness ,lightheadedness, radiating chest pain. breathing is spontaneous and unlabored and has since improved since nebulizer treatment. NSR on continuos cardiac and pulse ox monitoring in place. bed in lowest position, call bell within reach, siderails up x2. will continue to monitor.

## 2023-04-03 NOTE — ED PROVIDER NOTE - NS ED ROS FT
GENERAL: No fever, chills  HEENT: +cough,   CARDIAC: + chest pain with cough, No palpitations, lightheadedness, syncope  PULM: +dyspnea, +cough, +wheezing   GI: + abdominal pain with cough, No nausea, vomiting, diarrhea, constipation, melena, hematochezia  NEURO: No headache, motor weakness, sensory changes  MSK: No joint pain, back pain, pain in extremities

## 2023-04-03 NOTE — H&P ADULT - ASSESSMENT
This is a 83 y/o M with pmhx of CHF and ESRD on HD, BPH presented to the ED for cough. Found to have wheezing on exam. Likely has asthma exacerbation secondary to resolving URI infection. Admit for asthma exacerbation vs. bronchitis.

## 2023-04-03 NOTE — H&P ADULT - NSHPREVIEWOFSYSTEMS_GEN_ALL_CORE
REVIEW OF SYSTEMS:    CONSTITUTIONAL: No weakness, fevers or chills  EYES/ENT: No visual changes;  No dysphagia; No sore throat; No rhinorrhea; No sinus pain/pressure  NECK: No pain or stiffness  RESPIRATORY: + cough, no  wheezing, hemoptysis; No shortness of breath  CARDIOVASCULAR: No chest pain or palpitations; No lower extremity edema  GASTROINTESTINAL: No abdominal or epigastric pain. No nausea, vomiting, or hematemesis; No diarrhea or constipation. No melena or hematochezia.  GENITOURINARY: No dysuria, frequency or hematuria  NEUROLOGICAL: No numbness or weakness  MSK: ambulates without assistance  SKIN: No itching, burning, rashes, or lesions   All other review of systems is negative unless indicated above.

## 2023-04-03 NOTE — PATIENT PROFILE ADULT - FUNCTIONAL ASSESSMENT - DAILY ACTIVITY SCORE.
Add 21193 Cpt? (Important Note: In 2017 The Use Of 30090 Is Being Tracked By Cms To Determine Future Global Period Reimbursement For Global Periods): yes Detail Level: Simple 19

## 2023-04-03 NOTE — ED PROVIDER NOTE - PROGRESS NOTE DETAILS
pt stable, received one neb tx, pulse ox 98-100RA Lester, PGY3: Discussed case with Dr. Gustafson; will admit to his service

## 2023-04-03 NOTE — ED PROVIDER NOTE - CLINICAL SUMMARY MEDICAL DECISION MAKING FREE TEXT BOX
82 year old male non smoker with hx of CHF comes into the ED from his PCP for eval of a cough and SOB for the past week. Pt does not have a documented hx of COPD and reports never smoking but considered restrictive airway disease vs CHF. CHF is less likely as he does not appear to be fluid overloaded and has more diffuse wheezing than rales. Also considering viral/bacterial pneumonia as well as CAD. Will give breathing treatment, get a CBC, CMP, trop, proBNP, blood gas, chest xray. Likely Admit for dyspnea.

## 2023-04-03 NOTE — H&P ADULT - HISTORY OF PRESENT ILLNESS
This is a 83 y/o M with pmhx of CHF and ESRD on HD, BPH presented to the ED for cough. Patient overall appears to have poor health literacy. The patient reported getting runny nose, sore throat with a cough last week. Sore throat and runny nose resolved, however cough persisted. Denies Shortness of breath at this time. The patient also denies fevers. The cough is productive, white sputum. He also reports hx of asthma in the past and states he has inhaler at home (however this was not listed in his medication list from outpatient). The patient denied wheezing. Tolerated dialysis well last done on saturday. Denies chest pain. Denies abdominal pain. ROS otherwise negative.  This is a 83 y/o M with pmhx of CHF and ESRD on HD, BPH presented to the ED for cough. Patient overall appears to have poor health literacy. The patient reported getting runny nose, sore throat with a cough last week. Sore throat and runny nose resolved, however cough persisted. Denies Shortness of breath at this time. The patient also denies fevers. The cough is productive, white sputum. He also reports vague hx of asthma in the past and states he has inhaler at home (however this was not listed in his medication list from outpatient), but he does not seem to know anything about his asthma. The patient denied wheezing. Tolerated dialysis well last done on saturday. Denies chest pain. Denies abdominal pain. ROS otherwise negative.

## 2023-04-03 NOTE — H&P ADULT - PROBLEM SELECTOR PLAN 3
- c/w home meds  - patient was hypertensive at PCP office however is improved while the patient is admitted here

## 2023-04-03 NOTE — H&P ADULT - PROBLEM SELECTOR PLAN 2
- nephrology consult - no concerns for CHF exacerbation or fluid overload  - dialysis tomorrow as scheduled

## 2023-04-03 NOTE — ED PROVIDER NOTE - OBJECTIVE STATEMENT
82 year old male non smoker with hx of CHF comes into the ED from his PCP for eval of a cough and SOB for the past week. He states he has had a cold for the last week which is similar to what he gets every year around this time. At his PCP he was found to have a BP of 190/90 which is why he was prompted to come into the ED via ambulance. He states he is having chest pain/abd pain when he coughs and he has been wheezing for the past week. He does not use any breathing treatments at home, is not on any O2 at home, and has never needed to be intubated.

## 2023-04-03 NOTE — ED PROVIDER NOTE - PHYSICAL EXAMINATION
GENERAL: Not in acute distress, non-toxic appearing  HEAD: normocephalic, atraumatic  HEENT: EOMI, normal conjunctiva, oral mucosa moist, neck supple  CARDIAC: regular rate and rhythm, normal S1 and S2,  no appreciable murmurs  PULM: + Diffuse expiratory wheezing   GI: abdomen nondistended, soft, nontender, no guarding or rebound tenderness  NEURO: alert and oriented x 3, normal speech, no gross neurologic deficit  MSK: No visible deformities, no peripheral edema, calf tenderness/redness/swelling  SKIN: No visible rashes, dry, well-perfused  PSYCH: appropriate mood and affect

## 2023-04-03 NOTE — PATIENT PROFILE ADULT - FALL HARM RISK - RISK INTERVENTIONS

## 2023-04-03 NOTE — H&P ADULT - PROBLEM SELECTOR PLAN 1
Assessment:  - patient presented to the ED for cough, likely from URI viral infection  - found to have wheezing on exam, could be from asthma exacerbation vs. bronchitis  - vague hx of asthma in the past  - CXR clear, no opacity present    Plan:  - start solumedrol 40mg Q8hr  - duonebs Q6hr, albuterol Q3hr prn  - pulmonary consult  - monitor respiratory status

## 2023-04-04 LAB
ALBUMIN SERPL ELPH-MCNC: 4 G/DL — SIGNIFICANT CHANGE UP (ref 3.3–5)
ALP SERPL-CCNC: 117 U/L — SIGNIFICANT CHANGE UP (ref 40–120)
ALT FLD-CCNC: 15 U/L — SIGNIFICANT CHANGE UP (ref 4–41)
ANION GAP SERPL CALC-SCNC: 20 MMOL/L — HIGH (ref 7–14)
ANION GAP SERPL CALC-SCNC: 25 MMOL/L — HIGH (ref 7–14)
AST SERPL-CCNC: 45 U/L — HIGH (ref 4–40)
BASOPHILS # BLD AUTO: 0.01 K/UL — SIGNIFICANT CHANGE UP (ref 0–0.2)
BASOPHILS NFR BLD AUTO: 0.1 % — SIGNIFICANT CHANGE UP (ref 0–2)
BILIRUB SERPL-MCNC: 0.3 MG/DL — SIGNIFICANT CHANGE UP (ref 0.2–1.2)
BUN SERPL-MCNC: 60 MG/DL — HIGH (ref 7–23)
BUN SERPL-MCNC: 69 MG/DL — HIGH (ref 7–23)
CALCIUM SERPL-MCNC: 9 MG/DL — SIGNIFICANT CHANGE UP (ref 8.4–10.5)
CALCIUM SERPL-MCNC: 9.1 MG/DL — SIGNIFICANT CHANGE UP (ref 8.4–10.5)
CHLORIDE SERPL-SCNC: 103 MMOL/L — SIGNIFICANT CHANGE UP (ref 98–107)
CHLORIDE SERPL-SCNC: 96 MMOL/L — LOW (ref 98–107)
CO2 SERPL-SCNC: 12 MMOL/L — LOW (ref 22–31)
CO2 SERPL-SCNC: 17 MMOL/L — LOW (ref 22–31)
CREAT SERPL-MCNC: 11.76 MG/DL — HIGH (ref 0.5–1.3)
CREAT SERPL-MCNC: 9.06 MG/DL — HIGH (ref 0.5–1.3)
DIALYSIS INSTRUMENT RESULT - HEPATITIS B SURFACE ANTIGEN: NEGATIVE — SIGNIFICANT CHANGE UP
EGFR: 4 ML/MIN/1.73M2 — LOW
EGFR: 5 ML/MIN/1.73M2 — LOW
EOSINOPHIL # BLD AUTO: 0.03 K/UL — SIGNIFICANT CHANGE UP (ref 0–0.5)
EOSINOPHIL NFR BLD AUTO: 0.4 % — SIGNIFICANT CHANGE UP (ref 0–6)
GLUCOSE SERPL-MCNC: 121 MG/DL — HIGH (ref 70–99)
GLUCOSE SERPL-MCNC: 92 MG/DL — SIGNIFICANT CHANGE UP (ref 70–99)
HBV SURFACE AB SER-ACNC: 27.3 MIU/ML — SIGNIFICANT CHANGE UP
HCT VFR BLD CALC: 41.1 % — SIGNIFICANT CHANGE UP (ref 39–50)
HGB BLD-MCNC: 12.4 G/DL — LOW (ref 13–17)
IANC: 6.81 K/UL — SIGNIFICANT CHANGE UP (ref 1.8–7.4)
IMM GRANULOCYTES NFR BLD AUTO: 0.3 % — SIGNIFICANT CHANGE UP (ref 0–0.9)
LYMPHOCYTES # BLD AUTO: 0.48 K/UL — LOW (ref 1–3.3)
LYMPHOCYTES # BLD AUTO: 6.5 % — LOW (ref 13–44)
MAGNESIUM SERPL-MCNC: 3 MG/DL — HIGH (ref 1.6–2.6)
MCHC RBC-ENTMCNC: 26.3 PG — LOW (ref 27–34)
MCHC RBC-ENTMCNC: 30.2 GM/DL — LOW (ref 32–36)
MCV RBC AUTO: 87.3 FL — SIGNIFICANT CHANGE UP (ref 80–100)
MONOCYTES # BLD AUTO: 0.08 K/UL — SIGNIFICANT CHANGE UP (ref 0–0.9)
MONOCYTES NFR BLD AUTO: 1.1 % — LOW (ref 2–14)
NEUTROPHILS # BLD AUTO: 6.81 K/UL — SIGNIFICANT CHANGE UP (ref 1.8–7.4)
NEUTROPHILS NFR BLD AUTO: 91.6 % — HIGH (ref 43–77)
NRBC # BLD: 0 /100 WBCS — SIGNIFICANT CHANGE UP (ref 0–0)
NRBC # FLD: 0.02 K/UL — HIGH (ref 0–0)
PHOSPHATE SERPL-MCNC: 4.8 MG/DL — HIGH (ref 2.5–4.5)
PLATELET # BLD AUTO: 112 K/UL — LOW (ref 150–400)
POTASSIUM SERPL-MCNC: 5.4 MMOL/L — HIGH (ref 3.5–5.3)
POTASSIUM SERPL-MCNC: 6.6 MMOL/L — CRITICAL HIGH (ref 3.5–5.3)
POTASSIUM SERPL-SCNC: 5.4 MMOL/L — HIGH (ref 3.5–5.3)
POTASSIUM SERPL-SCNC: 6.6 MMOL/L — CRITICAL HIGH (ref 3.5–5.3)
PROT SERPL-MCNC: 7.7 G/DL — SIGNIFICANT CHANGE UP (ref 6–8.3)
RBC # BLD: 4.71 M/UL — SIGNIFICANT CHANGE UP (ref 4.2–5.8)
RBC # FLD: 21.2 % — HIGH (ref 10.3–14.5)
SODIUM SERPL-SCNC: 133 MMOL/L — LOW (ref 135–145)
SODIUM SERPL-SCNC: 140 MMOL/L — SIGNIFICANT CHANGE UP (ref 135–145)
WBC # BLD: 7.43 K/UL — SIGNIFICANT CHANGE UP (ref 3.8–10.5)
WBC # FLD AUTO: 7.43 K/UL — SIGNIFICANT CHANGE UP (ref 3.8–10.5)

## 2023-04-04 RX ADMIN — Medication 3 MILLILITER(S): at 09:52

## 2023-04-04 RX ADMIN — CARVEDILOL PHOSPHATE 25 MILLIGRAM(S): 80 CAPSULE, EXTENDED RELEASE ORAL at 17:16

## 2023-04-04 RX ADMIN — SEVELAMER CARBONATE 1600 MILLIGRAM(S): 2400 POWDER, FOR SUSPENSION ORAL at 17:16

## 2023-04-04 RX ADMIN — Medication 100 MILLIGRAM(S): at 05:43

## 2023-04-04 RX ADMIN — Medication 3 MILLILITER(S): at 15:03

## 2023-04-04 RX ADMIN — HEPARIN SODIUM 5000 UNIT(S): 5000 INJECTION INTRAVENOUS; SUBCUTANEOUS at 05:43

## 2023-04-04 RX ADMIN — Medication 3 MILLILITER(S): at 21:08

## 2023-04-04 RX ADMIN — Medication 100 MILLIGRAM(S): at 21:08

## 2023-04-04 RX ADMIN — HEPARIN SODIUM 5000 UNIT(S): 5000 INJECTION INTRAVENOUS; SUBCUTANEOUS at 17:17

## 2023-04-04 RX ADMIN — Medication 40 MILLIGRAM(S): at 05:43

## 2023-04-04 RX ADMIN — CARVEDILOL PHOSPHATE 25 MILLIGRAM(S): 80 CAPSULE, EXTENDED RELEASE ORAL at 05:44

## 2023-04-04 RX ADMIN — TAMSULOSIN HYDROCHLORIDE 0.4 MILLIGRAM(S): 0.4 CAPSULE ORAL at 21:08

## 2023-04-04 RX ADMIN — SEVELAMER CARBONATE 1600 MILLIGRAM(S): 2400 POWDER, FOR SUSPENSION ORAL at 09:48

## 2023-04-04 RX ADMIN — Medication 40 MILLIGRAM(S): at 21:08

## 2023-04-04 RX ADMIN — Medication 90 MILLIGRAM(S): at 05:43

## 2023-04-04 NOTE — PROGRESS NOTE ADULT - SUBJECTIVE AND OBJECTIVE BOX
NEPHROLOGY     Patient seen and examined reports feeling a little better, denies pain, no sob at rest, breathing comfortably on 2L NC, in no acute distress.     MEDICATIONS  (STANDING):  albuterol/ipratropium for Nebulization 3 milliLiter(s) Nebulizer every 6 hours  calcitriol   Capsule 0.25 MICROGram(s) Oral <User Schedule>  carvedilol 25 milliGRAM(s) Oral every 12 hours  chlorhexidine 4% Liquid 1 Application(s) Topical once  heparin   Injectable 5000 Unit(s) SubCutaneous every 12 hours  hydrALAZINE 100 milliGRAM(s) Oral three times a day  methylPREDNISolone sodium succinate Injectable 40 milliGRAM(s) IV Push every 8 hours  NIFEdipine XL 90 milliGRAM(s) Oral daily  sevelamer carbonate 1600 milliGRAM(s) Oral three times a day with meals  tamsulosin 0.4 milliGRAM(s) Oral at bedtime    VITALS:  T(C): , Max: 37 (04-03-23 @ 22:45)  T(F): , Max: 98.6 (04-03-23 @ 22:45)  HR: 65 (04-04-23 @ 05:40)  BP: 176/82 (04-04-23 @ 05:40)  RR: 15 (04-04-23 @ 05:40)  SpO2: 100% (04-04-23 @ 05:40)    PHYSICAL EXAM:  Constitutional: NAD  HEENT: EOMI  Neck:  No JVD, supple   Respiratory: cta   Cardiovascular: S1 and S2, RRR  Gastrointestinal: + BS, soft, NT, ND  Extremities: No peripheral edema, + peripheral pulses  Neurological: A/O x 3, CN2-12 intact  Psychiatric: Normal mood, normal affect  : No Aguilar  Skin: No rashes, C/D/I  Access: LUE AVF (+thrill)     LABS:                        12.4   7.43  )-----------( 112      ( 04 Apr 2023 06:41 )             41.1     04-03    143  |  107  |  59<H>  ----------------------------<  71  4.8   |  18<L>  |  10.74<H>    Ca    9.5      03 Apr 2023 19:07    TPro  8.0  /  Alb  4.4  /  TBili  0.3  /  DBili  x   /  AST  34  /  ALT  15  /  AlkPhos  127<H>  04-03    ASSESSMENT/PLAN:   82 year old male non smoker with hx of dCHF comes into the ED from his PCP for eval of a cough and SOB for the past week  Hyperkalemia - improved   URI     1 Renal - HD today, 3 hrs, 2.5kg UF   2 URI - on IV solumedrol, pulm consulted  3 CVS-BP elevated   No evidence of heart failure     NEPHROLOGY     Patient seen and examined reports feeling a little better, denies pain, no sob at rest, breathing comfortably on 2L NC, in no acute distress.     MEDICATIONS  (STANDING):  albuterol/ipratropium for Nebulization 3 milliLiter(s) Nebulizer every 6 hours  calcitriol   Capsule 0.25 MICROGram(s) Oral <User Schedule>  carvedilol 25 milliGRAM(s) Oral every 12 hours  chlorhexidine 4% Liquid 1 Application(s) Topical once  heparin   Injectable 5000 Unit(s) SubCutaneous every 12 hours  hydrALAZINE 100 milliGRAM(s) Oral three times a day  methylPREDNISolone sodium succinate Injectable 40 milliGRAM(s) IV Push every 8 hours  NIFEdipine XL 90 milliGRAM(s) Oral daily  sevelamer carbonate 1600 milliGRAM(s) Oral three times a day with meals  tamsulosin 0.4 milliGRAM(s) Oral at bedtime    VITALS:  T(C): , Max: 37 (04-03-23 @ 22:45)  T(F): , Max: 98.6 (04-03-23 @ 22:45)  HR: 65 (04-04-23 @ 05:40)  BP: 176/82 (04-04-23 @ 05:40)  RR: 15 (04-04-23 @ 05:40)  SpO2: 100% (04-04-23 @ 05:40)    PHYSICAL EXAM:  Constitutional: NAD  HEENT: EOMI  Neck:  No JVD, supple   Respiratory: cta   Cardiovascular: S1 and S2, RRR  Gastrointestinal: + BS, soft, NT, ND  Extremities: No peripheral edema, + peripheral pulses  Neurological: A/O x 3, CN2-12 intact  Psychiatric: Normal mood, normal affect  : No Aguilar  Skin: No rashes, C/D/I  Access: LUE AVF (+thrill)     LABS:                        12.4   7.43  )-----------( 112      ( 04 Apr 2023 06:41 )             41.1     04-03    143  |  107  |  59<H>  ----------------------------<  71  4.8   |  18<L>  |  10.74<H>    Ca    9.5      03 Apr 2023 19:07    TPro  8.0  /  Alb  4.4  /  TBili  0.3  /  DBili  x   /  AST  34  /  ALT  15  /  AlkPhos  127<H>  04-03    ASSESSMENT/PLAN:   82 year old male non smoker with hx of dCHF comes into the ED from his PCP for eval of a cough and SOB for the past week  Hyperkalemia  URI     1 Renal - HD today, 3 hrs, 2.5kg UF   2 URI - on IV solumedrol, pulm consulted  3 CVS-BP elevated   No evidence of heart failure     NEPHROLOGY     Patient seen and examined reports feeling a little better, denies pain, no sob at rest, breathing comfortably on 2L NC, in no acute distress.     MEDICATIONS  (STANDING):  albuterol/ipratropium for Nebulization 3 milliLiter(s) Nebulizer every 6 hours  calcitriol   Capsule 0.25 MICROGram(s) Oral <User Schedule>  carvedilol 25 milliGRAM(s) Oral every 12 hours  chlorhexidine 4% Liquid 1 Application(s) Topical once  heparin   Injectable 5000 Unit(s) SubCutaneous every 12 hours  hydrALAZINE 100 milliGRAM(s) Oral three times a day  methylPREDNISolone sodium succinate Injectable 40 milliGRAM(s) IV Push every 8 hours  NIFEdipine XL 90 milliGRAM(s) Oral daily  sevelamer carbonate 1600 milliGRAM(s) Oral three times a day with meals  tamsulosin 0.4 milliGRAM(s) Oral at bedtime    VITALS:  T(C): , Max: 37 (04-03-23 @ 22:45)  T(F): , Max: 98.6 (04-03-23 @ 22:45)  HR: 65 (04-04-23 @ 05:40)  BP: 176/82 (04-04-23 @ 05:40)  RR: 15 (04-04-23 @ 05:40)  SpO2: 100% (04-04-23 @ 05:40)    PHYSICAL EXAM:  Constitutional: NAD  HEENT: EOMI  Neck:  No JVD, supple   Respiratory: cta   Cardiovascular: S1 and S2, RRR  Gastrointestinal: + BS, soft, NT, ND  Extremities: No peripheral edema, + peripheral pulses  Neurological: A/O x 3, CN2-12 intact  Psychiatric: Normal mood, normal affect  : No Aguilar  Skin: No rashes, C/D/I  Access: LUE AVF (+thrill)     LABS:                        12.4   7.43  )-----------( 112      ( 04 Apr 2023 06:41 )             41.1     04-03    143  |  107  |  59<H>  ----------------------------<  71  4.8   |  18<L>  |  10.74<H>    Ca    9.5      03 Apr 2023 19:07    TPro  8.0  /  Alb  4.4  /  TBili  0.3  /  DBili  x   /  AST  34  /  ALT  15  /  AlkPhos  127<H>  04-03

## 2023-04-04 NOTE — PROGRESS NOTE ADULT - SUBJECTIVE AND OBJECTIVE BOX
CHIEF COMPLAINT:  82 year old male non smoker with hx of CHF comes into the ED from his PCP for eval of a cough and SOB for the past week. He states he has had a cold for the last week which is similar to what he gets every year around this time. At his PCP he was found to have a BP of 190/90 which is why he was prompted to come into the ED via ambulance. He states he is having chest pain/abd pain when he coughs and he has been wheezing for the past week. He does not use any breathing treatments at home, is not on any O2 at home, and has never needed to be intubated  HD at [place] and HD are Tu/Thur/Sat  SUBJECTIVE:     REVIEW OF SYSTEMS:    CONSTITUTIONAL: (  )  weakness,  (  ) fevers or chills  EYES/ENT: (  )visual changes;     NECK: (  ) pain or stiffness  RESPIRATORY:   (  )cough, wheezing, hemoptysis;  (  ) shortness of breath  CARDIOVASCULAR:  (  )chest pain or palpitations  GASTROINTESTINAL:   (  )abdominal or epigastric pain.  (  ) nausea, vomiting, or hematemesis;   (   ) diarrhea or constipation.   GENITOURINARY:   (    ) dysuria, frequency or hematuria  NEUROLOGICAL:  (   ) numbness or weakness   All other review of systems is negative unless indicated above    Vital Signs Last 24 Hrs  T(C): 36.8 (04 Apr 2023 05:40), Max: 37 (03 Apr 2023 22:45)  T(F): 98.2 (04 Apr 2023 05:40), Max: 98.6 (03 Apr 2023 22:45)  HR: 65 (04 Apr 2023 05:40) (65 - 89)  BP: 176/82 (04 Apr 2023 05:40) (148/88 - 178/80)  BP(mean): --  RR: 15 (04 Apr 2023 05:40) (15 - 28)  SpO2: 100% (04 Apr 2023 05:40) (94% - 100%)    Parameters below as of 04 Apr 2023 05:40  Patient On (Oxygen Delivery Method): nasal cannula  O2 Flow (L/min): 2      I&O's Summary      CAPILLARY BLOOD GLUCOSE      POCT Blood Glucose.: 162 mg/dL (03 Apr 2023 17:22)  POCT Blood Glucose.: 91 mg/dL (03 Apr 2023 16:43)      PHYSICAL EXAM:    Constitutional:  (   ) NAD,   (   )awake and alert  HEENT: PERR, EOMI,    Neck: Soft and supple, No LAD, No JVD  Respiratory:  (    Breath sounds are clear bilaterally,    (   ) wheezing, rales or rhonchi  Cardiovascular:     (   )S1 and S2, regular rate and rhythm, no Murmurs, gallops or rubs  Gastrointestinal:  (   )Bowel Sounds present, soft,   (  )nontender, nondistended,    Extremities:    (  ) peripheral edema  Vascular: 2+ peripheral pulses  Neurological:    (    )A/O x 3,   (  ) focal deficits  Musculoskeletal:    (   )  normal strength b/l upper  (     ) normal  lower extremities  Skin: No rashes    MEDICATIONS:  MEDICATIONS  (STANDING):  albuterol/ipratropium for Nebulization 3 milliLiter(s) Nebulizer every 6 hours  calcitriol   Capsule 0.25 MICROGram(s) Oral <User Schedule>  carvedilol 25 milliGRAM(s) Oral every 12 hours  chlorhexidine 4% Liquid 1 Application(s) Topical once  heparin   Injectable 5000 Unit(s) SubCutaneous every 12 hours  hydrALAZINE 100 milliGRAM(s) Oral three times a day  methylPREDNISolone sodium succinate Injectable 40 milliGRAM(s) IV Push every 8 hours  NIFEdipine XL 90 milliGRAM(s) Oral daily  sevelamer carbonate 1600 milliGRAM(s) Oral three times a day with meals  tamsulosin 0.4 milliGRAM(s) Oral at bedtime      LABS: All Labs Reviewed:                        12.4   7.43  )-----------( 112      ( 04 Apr 2023 06:41 )             41.1     04-03    143  |  107  |  59<H>  ----------------------------<  71  4.8   |  18<L>  |  10.74<H>    Ca    9.5      03 Apr 2023 19:07    TPro  8.0  /  Alb  4.4  /  TBili  0.3  /  DBili  x   /  AST  34  /  ALT  15  /  AlkPhos  127<H>  04-03          Blood Culture:   Urine Culture      RADIOLOGY/EKG:    ASSESSMENT AND PLAN:  · Assessment    This is a 81 y/o M with pmhx of CHF and ESRD on HD, BPH presented to the ED for cough. Found to have wheezing on exam. Likely has asthma exacerbation secondary to resolving URI infection. Admit for asthma exacerbation vs. bronchitis.      Problem/Plan - 1:  ·  Problem: Acute asthma exacerbation.   ·  Plan: Assessment:  - patient presented to the ED for cough, likely from URI viral infection  - found to have wheezing on exam, could be from asthma exacerbation vs. bronchitis  - vague hx of asthma in the past  - CXR clear, no opacity present    Plan:  - start solumedrol 40mg Q8hr  - duonebs Q6hr, albuterol Q3hr prn  - pulmonary consult  - monitor respiratory status.    Problem/Plan - 2:  ·  Problem: ESRD on dialysis.   ·  Plan: - nephrology consult - no concerns for CHF exacerbation or fluid overload  - dialysis tomorrow as scheduled.    Problem/Plan - 3:  ·  Problem: Benign essential HTN.   ·  Plan: - c/w home meds  - patient was hypertensive at PCP office however is improved while the patient is admitted here.    Problem/Plan - 4:  ·  Problem: Prophylactic measure.   ·  Plan: - heparin subq for dvt ppx.    DVT PPX:    ADVANCED DIRECTIVE:    DISPOSITION: CHIEF COMPLAINT: patient was seen in the ER condition improved significantly is still having cough and chest congestion  82 year old male non smoker with hx of CHF comes into the ED from his PCP for eval of a cough and SOB for the past week. He states he has had a cold for the last week which is similar to what he gets every year around this time.  in the office on 4/3/2023 he was found to have a BP of 190/90 which is why he was prompted to come into the ED via ambulance. He states he is having chest pain/abd pain when he coughs and he has been wheezing for the past week. He does not use any breathing treatments at home, is not on any O2 at home, and has never needed to be intubated  HD at [place] and HD are Tu/Thur/Sat  SUBJECTIVE:     REVIEW OF SYSTEMS:    CONSTITUTIONAL: ( x )  weakness,  (  ) fevers or chills  EYES/ENT: (  )visual changes;     NECK: (  ) pain or stiffness  RESPIRATORY:   ( x )cough, wheezing, hemoptysis;  (  ) shortness of breath  CARDIOVASCULAR:  (  )chest pain or palpitations  GASTROINTESTINAL:   (  )abdominal or epigastric pain.  (  ) nausea, vomiting, or hematemesis;   (   ) diarrhea or constipation.   GENITOURINARY:   (    ) dysuria, frequency or hematuria  NEUROLOGICAL:  (   ) numbness or weakness   All other review of systems is negative unless indicated above    Vital Signs Last 24 Hrs  T(C): 36.8 (04 Apr 2023 05:40), Max: 37 (03 Apr 2023 22:45)  T(F): 98.2 (04 Apr 2023 05:40), Max: 98.6 (03 Apr 2023 22:45)  HR: 65 (04 Apr 2023 05:40) (65 - 89)  BP: 176/82 (04 Apr 2023 05:40) (148/88 - 178/80)  BP(mean): --  RR: 15 (04 Apr 2023 05:40) (15 - 28)  SpO2: 100% (04 Apr 2023 05:40) (94% - 100%)    Parameters below as of 04 Apr 2023 05:40  Patient On (Oxygen Delivery Method): nasal cannula  O2 Flow (L/min): 2      I&O's Summary      CAPILLARY BLOOD GLUCOSE      POCT Blood Glucose.: 162 mg/dL (03 Apr 2023 17:22)  POCT Blood Glucose.: 91 mg/dL (03 Apr 2023 16:43)      PHYSICAL EXAM:    Constitutional:  (  x ) NAD,   ( x  )awake  t  HEENT: PERR, EOMI,    Neck: Soft and supple, No LAD, No JVD  Respiratory:  (   x Breath sounds are clear bilaterally,    ( x  ) wheezing, rales or rhonchi  Cardiovascular:     (  x )S1 and S2, regular rate and rhythm, no Murmurs, gallops or rubs  Gastrointestinal:  (  x )Bowel Sounds present, soft,   (  )nontender, nondistended,    Extremities:    (  ) peripheral edema  Vascular: 2+ peripheral pulses  Neurological:    (   x )A/O x 3,   (  ) focal deficits  Musculoskeletal:    ( x  )  normal strength b/l upper  ( x    ) normal  lower extremities  Skin: No rashes    MEDICATIONS:  MEDICATIONS  (STANDING):  albuterol/ipratropium for Nebulization 3 milliLiter(s) Nebulizer every 6 hours  calcitriol   Capsule 0.25 MICROGram(s) Oral <User Schedule>  carvedilol 25 milliGRAM(s) Oral every 12 hours  chlorhexidine 4% Liquid 1 Application(s) Topical once  heparin   Injectable 5000 Unit(s) SubCutaneous every 12 hours  hydrALAZINE 100 milliGRAM(s) Oral three times a day  methylPREDNISolone sodium succinate Injectable 40 milliGRAM(s) IV Push every 8 hours  NIFEdipine XL 90 milliGRAM(s) Oral daily  sevelamer carbonate 1600 milliGRAM(s) Oral three times a day with meals  tamsulosin 0.4 milliGRAM(s) Oral at bedtime      LABS: All Labs Reviewed:                        12.4   7.43  )-----------( 112      ( 04 Apr 2023 06:41 )             41.1     04-03    143  |  107  |  59<H>  ----------------------------<  71  4.8   |  18<L>  |  10.74<H>    Ca    9.5      03 Apr 2023 19:07    TPro  8.0  /  Alb  4.4  /  TBili  0.3  /  DBili  x   /  AST  34  /  ALT  15  /  AlkPhos  127<H>  04-03          Blood Culture:   Urine Culture      RADIOLOGY/EKG:    ASSESSMENT AND PLAN:  · Assessment    This is a 81 y/o M with pmhx of CHF and ESRD on HD, BPH presented to the ED for cough. Found to have wheezing on exam. Likely has asthma exacerbation secondary to resolving URI infection. Admit for asthma exacerbation vs. bronchitis.      Problem/Plan - 1:  ·  Problem: Acute asthma exacerbation.   ·  Plan: Assessment:  - patient presented to the ED for cough, likely from URI viral infection  - found to have wheezing on exam, could be from asthma exacerbation vs. bronchitis     - CXR clear, no opacity present    Plan:  - start solumedrol 40mg Q8hr  - duonebs Q6hr, albuterol Q3hr prn  - pulmonary consult if condition did not improve  - monitor respiratory status.    Problem/Plan - 2:  ·  Problem: ESRD on dialysis.   ·  Plan: - nephrology consult - no concerns for CHF exacerbation or fluid overload  - dialysis tomorrow as scheduled.    Problem/Plan - 3:  ·  Problem: Benign essential HTN.   ·  Plan: - c/w home meds  - patient was hypertensive at the office that improved now    Problem/Plan - 4:  ·  Problem: Prophylactic measure.   ·  Plan: - heparin subq for dvt ppx.    DVT PPX:    ADVANCED DIRECTIVE:    DISPOSITION: discharge home in the next 2-3 days discussed with patient and ACP team

## 2023-04-05 LAB
ANION GAP SERPL CALC-SCNC: 16 MMOL/L — HIGH (ref 7–14)
BUN SERPL-MCNC: 65 MG/DL — HIGH (ref 7–23)
CALCIUM SERPL-MCNC: 8.9 MG/DL — SIGNIFICANT CHANGE UP (ref 8.4–10.5)
CHLORIDE SERPL-SCNC: 96 MMOL/L — LOW (ref 98–107)
CO2 SERPL-SCNC: 23 MMOL/L — SIGNIFICANT CHANGE UP (ref 22–31)
CREAT SERPL-MCNC: 9.49 MG/DL — HIGH (ref 0.5–1.3)
CULTURE RESULTS: SIGNIFICANT CHANGE UP
EGFR: 5 ML/MIN/1.73M2 — LOW
GLUCOSE SERPL-MCNC: 161 MG/DL — HIGH (ref 70–99)
HBV CORE AB SER-ACNC: SIGNIFICANT CHANGE UP
HBV SURFACE AB SER-ACNC: 29.7 MIU/ML — SIGNIFICANT CHANGE UP
HBV SURFACE AG SER-ACNC: SIGNIFICANT CHANGE UP
HCV AB S/CO SERPL IA: 1 S/CO — HIGH (ref 0–0.99)
HCV AB SERPL-IMP: ABNORMAL
HCV RNA FLD QL NAA+PROBE: SIGNIFICANT CHANGE UP
HCV RNA SPEC QL PROBE+SIG AMP: SIGNIFICANT CHANGE UP
MAGNESIUM SERPL-MCNC: 2.5 MG/DL — SIGNIFICANT CHANGE UP (ref 1.6–2.6)
PHOSPHATE SERPL-MCNC: 3.9 MG/DL — SIGNIFICANT CHANGE UP (ref 2.5–4.5)
POTASSIUM SERPL-MCNC: 5 MMOL/L — SIGNIFICANT CHANGE UP (ref 3.5–5.3)
POTASSIUM SERPL-SCNC: 5 MMOL/L — SIGNIFICANT CHANGE UP (ref 3.5–5.3)
SODIUM SERPL-SCNC: 135 MMOL/L — SIGNIFICANT CHANGE UP (ref 135–145)
SPECIMEN SOURCE: SIGNIFICANT CHANGE UP

## 2023-04-05 RX ORDER — MUPIROCIN 20 MG/G
1 OINTMENT TOPICAL
Refills: 0 | Status: DISCONTINUED | OUTPATIENT
Start: 2023-04-05 | End: 2023-04-05

## 2023-04-05 RX ORDER — SODIUM CHLORIDE 9 MG/ML
100 INJECTION INTRAMUSCULAR; INTRAVENOUS; SUBCUTANEOUS
Refills: 0 | Status: DISCONTINUED | OUTPATIENT
Start: 2023-04-05 | End: 2023-04-06

## 2023-04-05 RX ORDER — MUPIROCIN 20 MG/G
1 OINTMENT TOPICAL
Refills: 0 | Status: DISCONTINUED | OUTPATIENT
Start: 2023-04-05 | End: 2023-04-06

## 2023-04-05 RX ADMIN — Medication 90 MILLIGRAM(S): at 06:36

## 2023-04-05 RX ADMIN — TAMSULOSIN HYDROCHLORIDE 0.4 MILLIGRAM(S): 0.4 CAPSULE ORAL at 22:51

## 2023-04-05 RX ADMIN — Medication 100 MILLIGRAM(S): at 06:22

## 2023-04-05 RX ADMIN — HEPARIN SODIUM 5000 UNIT(S): 5000 INJECTION INTRAVENOUS; SUBCUTANEOUS at 06:21

## 2023-04-05 RX ADMIN — Medication 3 MILLILITER(S): at 22:04

## 2023-04-05 RX ADMIN — HEPARIN SODIUM 5000 UNIT(S): 5000 INJECTION INTRAVENOUS; SUBCUTANEOUS at 18:47

## 2023-04-05 RX ADMIN — SEVELAMER CARBONATE 1600 MILLIGRAM(S): 2400 POWDER, FOR SUSPENSION ORAL at 18:47

## 2023-04-05 RX ADMIN — Medication 3 MILLILITER(S): at 16:08

## 2023-04-05 RX ADMIN — Medication 40 MILLIGRAM(S): at 21:59

## 2023-04-05 RX ADMIN — SEVELAMER CARBONATE 1600 MILLIGRAM(S): 2400 POWDER, FOR SUSPENSION ORAL at 12:49

## 2023-04-05 RX ADMIN — SEVELAMER CARBONATE 1600 MILLIGRAM(S): 2400 POWDER, FOR SUSPENSION ORAL at 08:52

## 2023-04-05 RX ADMIN — CALCITRIOL 0.25 MICROGRAM(S): 0.5 CAPSULE ORAL at 10:31

## 2023-04-05 RX ADMIN — Medication 40 MILLIGRAM(S): at 14:00

## 2023-04-05 RX ADMIN — Medication 100 MILLIGRAM(S): at 15:26

## 2023-04-05 RX ADMIN — CARVEDILOL PHOSPHATE 25 MILLIGRAM(S): 80 CAPSULE, EXTENDED RELEASE ORAL at 06:22

## 2023-04-05 RX ADMIN — Medication 100 MILLIGRAM(S): at 22:51

## 2023-04-05 RX ADMIN — Medication 40 MILLIGRAM(S): at 06:21

## 2023-04-05 NOTE — PROGRESS NOTE ADULT - SUBJECTIVE AND OBJECTIVE BOX
CHIEF COMPLAINT:    SUBJECTIVE:     REVIEW OF SYSTEMS:    CONSTITUTIONAL: (  )  weakness,  (  ) fevers or chills  EYES/ENT: (  )visual changes;     NECK: (  ) pain or stiffness  RESPIRATORY:   (  )cough, wheezing, hemoptysis;  (  ) shortness of breath  CARDIOVASCULAR:  (  )chest pain or palpitations  GASTROINTESTINAL:   (  )abdominal or epigastric pain.  (  ) nausea, vomiting, or hematemesis;   (   ) diarrhea or constipation.   GENITOURINARY:   (    ) dysuria, frequency or hematuria  NEUROLOGICAL:  (   ) numbness or weakness   All other review of systems is negative unless indicated above    Vital Signs Last 24 Hrs  T(C): 36.7 (05 Apr 2023 06:00), Max: 36.8 (04 Apr 2023 16:39)  T(F): 98 (05 Apr 2023 06:00), Max: 98.3 (04 Apr 2023 16:39)  HR: 62 (05 Apr 2023 06:00) (62 - 83)  BP: 126/67 (05 Apr 2023 06:00) (126/67 - 150/67)  BP(mean): --  RR: 18 (05 Apr 2023 06:00) (17 - 18)  SpO2: 95% (05 Apr 2023 06:00) (95% - 100%)    Parameters below as of 05 Apr 2023 06:00  Patient On (Oxygen Delivery Method): nasal cannula  O2 Flow (L/min): 2      I&O's Summary    04 Apr 2023 07:01  -  05 Apr 2023 07:00  --------------------------------------------------------  IN: 840 mL / OUT: 2700 mL / NET: -1860 mL        CAPILLARY BLOOD GLUCOSE          PHYSICAL EXAM:    Constitutional:  (   ) NAD,   (   )awake and alert  HEENT: PERR, EOMI,    Neck: Soft and supple, No LAD, No JVD  Respiratory:  (    Breath sounds are clear bilaterally,    (   ) wheezing, rales or rhonchi  Cardiovascular:     (   )S1 and S2, regular rate and rhythm, no Murmurs, gallops or rubs  Gastrointestinal:  (   )Bowel Sounds present, soft,   (  )nontender, nondistended,    Extremities:    (  ) peripheral edema  Vascular: 2+ peripheral pulses  Neurological:    (    )A/O x 3,   (  ) focal deficits  Musculoskeletal:    (   )  normal strength b/l upper  (     ) normal  lower extremities  Skin: No rashes    MEDICATIONS:  MEDICATIONS  (STANDING):  albuterol/ipratropium for Nebulization 3 milliLiter(s) Nebulizer every 6 hours  calcitriol   Capsule 0.25 MICROGram(s) Oral <User Schedule>  carvedilol 25 milliGRAM(s) Oral every 12 hours  chlorhexidine 4% Liquid 1 Application(s) Topical once  heparin   Injectable 5000 Unit(s) SubCutaneous every 12 hours  hydrALAZINE 100 milliGRAM(s) Oral three times a day  methylPREDNISolone sodium succinate Injectable 40 milliGRAM(s) IV Push every 8 hours  NIFEdipine XL 90 milliGRAM(s) Oral daily  sevelamer carbonate 1600 milliGRAM(s) Oral three times a day with meals  tamsulosin 0.4 milliGRAM(s) Oral at bedtime      LABS: All Labs Reviewed:                        12.4   7.43  )-----------( 112      ( 04 Apr 2023 06:41 )             41.1     04-05    135  |  96<L>  |  65<H>  ----------------------------<  161<H>  5.0   |  23  |  9.49<H>    Ca    8.9      05 Apr 2023 01:48  Phos  3.9     04-05  Mg     2.50     04-05    TPro  7.7  /  Alb  4.0  /  TBili  0.3  /  DBili  x   /  AST  45<H>  /  ALT  15  /  AlkPhos  117  04-04          Blood Culture:   Urine Culture      RADIOLOGY/EKG:    ASSESSMENT AND PLAN:    DVT PPX:    ADVANCED DIRECTIVE:    DISPOSITION: CHIEF COMPLAINT:  patient condition improving significantly   SUBJECTIVE:     REVIEW OF SYSTEMS:currently he is complaining of shortness of breath on  exertion    CONSTITUTIONAL: (  )  weakness,  (  ) fevers or chills  EYES/ENT: (  )visual changes;     NECK: (  ) pain or stiffness  RESPIRATORY:   (  )cough, wheezing, hemoptysis;  ( x ) shortness of breath  CARDIOVASCULAR:  (  )chest pain or palpitations  GASTROINTESTINAL:   (  )abdominal or epigastric pain.  (  ) nausea, vomiting, or hematemesis;   (   ) diarrhea or constipation.   GENITOURINARY:   (    ) dysuria, frequency or hematuria  NEUROLOGICAL:  (   ) numbness or weakness   All other review of systems is negative unless indicated above    Vital Signs Last 24 Hrs  T(C): 36.7 (05 Apr 2023 06:00), Max: 36.8 (04 Apr 2023 16:39)  T(F): 98 (05 Apr 2023 06:00), Max: 98.3 (04 Apr 2023 16:39)  HR: 62 (05 Apr 2023 06:00) (62 - 83)  BP: 126/67 (05 Apr 2023 06:00) (126/67 - 150/67)  BP(mean): --  RR: 18 (05 Apr 2023 06:00) (17 - 18)  SpO2: 95% (05 Apr 2023 06:00) (95% - 100%)    Parameters below as of 05 Apr 2023 06:00  Patient On (Oxygen Delivery Method): nasal cannula  O2 Flow (L/min): 2      I&O's Summary    04 Apr 2023 07:01  -  05 Apr 2023 07:00  --------------------------------------------------------  IN: 840 mL / OUT: 2700 mL / NET: -1860 mL        CAPILLARY BLOOD GLUCOSE          PHYSICAL EXAM:    Constitutional:  (  x ) NAD,   (  x )awake and alert  HEENT: PERR, EOMI,    Neck: Soft and supple, No LAD, No JVD  Respiratory:  (  x  Breath sounds are clear bilaterally,    ( x  ) wheezing,    Cardiovascular:     ( x  )S1 and S2, regular rate and rhythm, no Murmurs, gallops or rubs  Gastrointestinal:  (  x )Bowel Sounds present, soft,   (  )nontender, nondistended,    Extremities:    (  ) peripheral edema  Vascular: 2+ peripheral pulses  Neurological:    (   x )A/O x 3,   (  ) focal deficits  Musculoskeletal:    (x   )  normal strength b/l upper  (     ) normal  lower extremities  Skin: No rashes    MEDICATIONS:  MEDICATIONS  (STANDING):  albuterol/ipratropium for Nebulization 3 milliLiter(s) Nebulizer every 6 hours  calcitriol   Capsule 0.25 MICROGram(s) Oral <User Schedule>  carvedilol 25 milliGRAM(s) Oral every 12 hours  chlorhexidine 4% Liquid 1 Application(s) Topical once  heparin   Injectable 5000 Unit(s) SubCutaneous every 12 hours  hydrALAZINE 100 milliGRAM(s) Oral three times a day  methylPREDNISolone sodium succinate Injectable 40 milliGRAM(s) IV Push every 8 hours  NIFEdipine XL 90 milliGRAM(s) Oral daily  sevelamer carbonate 1600 milliGRAM(s) Oral three times a day with meals  tamsulosin 0.4 milliGRAM(s) Oral at bedtime      LABS: All Labs Reviewed:                        12.4   7.43  )-----------( 112      ( 04 Apr 2023 06:41 )             41.1     04-05    135  |  96<L>  |  65<H>  ----------------------------<  161<H>  5.0   |  23  |  9.49<H>    Ca    8.9      05 Apr 2023 01:48  Phos  3.9     04-05  Mg     2.50     04-05    TPro  7.7  /  Alb  4.0  /  TBili  0.3  /  DBili  x   /  AST  45<H>  /  ALT  15  /  AlkPhos  117  04-04          Blood Culture:   Urine Culture      RADIOLOGY/EKG:    ASSESSMENT AND PLAN:  patient condition improved significantly with try to taper his prednisone to oral in a.m. and monitor if he stayed stable discharge and will be off from 4/6/2023  DR Allen covering him [phone]  DVT PPX:    ADVANCED DIRECTIVE:    DISPOSITION:

## 2023-04-05 NOTE — PROGRESS NOTE ADULT - SUBJECTIVE AND OBJECTIVE BOX
NEPHROLOGY     Patient seen and examined, reports still with cough and mild velazco at times, denies sob at rest, comfortable on 2L at present, denies pain, in no acute distress. HD yesterday for 2.5hrs, unable to complete tx due to clotted venous chamber, removed 2.1L.     MEDICATIONS  (STANDING):  albuterol/ipratropium for Nebulization 3 milliLiter(s) Nebulizer every 6 hours  calcitriol   Capsule 0.25 MICROGram(s) Oral <User Schedule>  carvedilol 25 milliGRAM(s) Oral every 12 hours  chlorhexidine 4% Liquid 1 Application(s) Topical once  heparin   Injectable 5000 Unit(s) SubCutaneous every 12 hours  hydrALAZINE 100 milliGRAM(s) Oral three times a day  methylPREDNISolone sodium succinate Injectable 40 milliGRAM(s) IV Push every 8 hours  NIFEdipine XL 90 milliGRAM(s) Oral daily  sevelamer carbonate 1600 milliGRAM(s) Oral three times a day with meals  tamsulosin 0.4 milliGRAM(s) Oral at bedtime    VITALS:  T(C): , Max: 36.8 (04-04-23 @ 16:39)  T(F): , Max: 98.3 (04-04-23 @ 16:39)  HR: 62 (04-05-23 @ 06:00)  BP: 126/67 (04-05-23 @ 06:00)  RR: 18 (04-05-23 @ 06:00)  SpO2: 95% (04-05-23 @ 06:00)    I and O's:    04-04 @ 07:01  -  04-05 @ 07:00  --------------------------------------------------------  IN: 840 mL / OUT: 2700 mL / NET: -1860 mL    Height (cm): 152.4 (04-05 @ 06:00)  Weight (kg): 66 (04-05 @ 06:00)  BMI (kg/m2): 28.4 (04-05 @ 06:00)  BSA (m2): 1.63 (04-05 @ 06:00)    PHYSICAL EXAM:  Constitutional: NAD  HEENT: EOMI  Neck:  No JVD, supple   Respiratory: cta   Cardiovascular: S1 and S2, RRR  Gastrointestinal: + BS, soft, NT, ND  Extremities: No peripheral edema, + peripheral pulses  Neurological: A/O x 3, CN2-12 intact  Psychiatric: Normal mood, normal affect  : No Aguilar  Skin: No rashes, C/D/I  Access: LUE AVF (+thrill)     LABS:                        12.4   7.43  )-----------( 112      ( 04 Apr 2023 06:41 )             41.1     04-05    135  |  96<L>  |  65<H>  ----------------------------<  161<H>  5.0   |  23  |  9.49<H>    Ca    8.9      05 Apr 2023 01:48  Phos  3.9     04-05  Mg     2.50     04-05    TPro  7.7  /  Alb  4.0  /  TBili  0.3  /  DBili  x   /  AST  45<H>  /  ALT  15  /  AlkPhos  117  04-04    ASSESSMENT/PLAN:   82 year old male non smoker with hx of dCHF comes into the ED from his PCP for eval of a cough and SOB for the past week  Hyperkalemia  URI     1 Renal - HD tomorrow, 3 hrs, 2.5kg UF   2 URI - on IV solumedrol  3 CVS-BP improved  No evidence of heart failure     NEPHROLOGY     Patient seen and examined, reports still with cough and mild velazco at times, denies sob at rest, comfortable on 2L at present, denies pain, in no acute distress. HD yesterday for 2.5hrs, unable to complete tx due to clotted venous chamber, removed 2.1L.     MEDICATIONS  (STANDING):  albuterol/ipratropium for Nebulization 3 milliLiter(s) Nebulizer every 6 hours  calcitriol   Capsule 0.25 MICROGram(s) Oral <User Schedule>  carvedilol 25 milliGRAM(s) Oral every 12 hours  chlorhexidine 4% Liquid 1 Application(s) Topical once  heparin   Injectable 5000 Unit(s) SubCutaneous every 12 hours  hydrALAZINE 100 milliGRAM(s) Oral three times a day  methylPREDNISolone sodium succinate Injectable 40 milliGRAM(s) IV Push every 8 hours  NIFEdipine XL 90 milliGRAM(s) Oral daily  sevelamer carbonate 1600 milliGRAM(s) Oral three times a day with meals  tamsulosin 0.4 milliGRAM(s) Oral at bedtime    VITALS:  T(C): , Max: 36.8 (04-04-23 @ 16:39)  T(F): , Max: 98.3 (04-04-23 @ 16:39)  HR: 62 (04-05-23 @ 06:00)  BP: 126/67 (04-05-23 @ 06:00)  RR: 18 (04-05-23 @ 06:00)  SpO2: 95% (04-05-23 @ 06:00)    I and O's:    04-04 @ 07:01  -  04-05 @ 07:00  --------------------------------------------------------  IN: 840 mL / OUT: 2700 mL / NET: -1860 mL    Height (cm): 152.4 (04-05 @ 06:00)  Weight (kg): 66 (04-05 @ 06:00)  BMI (kg/m2): 28.4 (04-05 @ 06:00)  BSA (m2): 1.63 (04-05 @ 06:00)    PHYSICAL EXAM:  Constitutional: NAD  HEENT: EOMI  Neck:  No JVD, supple   Respiratory: cta   Cardiovascular: S1 and S2, RRR  Gastrointestinal: + BS, soft, NT, ND  Extremities: No peripheral edema, + peripheral pulses  Neurological: A/O x 3, CN2-12 intact  Psychiatric: Normal mood, normal affect  : No Aguilar  Skin: No rashes, C/D/I  Access: LUE AVF (+thrill)     LABS:                        12.4   7.43  )-----------( 112      ( 04 Apr 2023 06:41 )             41.1     04-05    135  |  96<L>  |  65<H>  ----------------------------<  161<H>  5.0   |  23  |  9.49<H>    Ca    8.9      05 Apr 2023 01:48  Phos  3.9     04-05  Mg     2.50     04-05    TPro  7.7  /  Alb  4.0  /  TBili  0.3  /  DBili  x   /  AST  45<H>  /  ALT  15  /  AlkPhos  117  04-04

## 2023-04-06 ENCOUNTER — TRANSCRIPTION ENCOUNTER (OUTPATIENT)
Age: 83
End: 2023-04-06

## 2023-04-06 VITALS — OXYGEN SATURATION: 98 %

## 2023-04-06 LAB
ANION GAP SERPL CALC-SCNC: 21 MMOL/L — HIGH (ref 7–14)
BUN SERPL-MCNC: 102 MG/DL — HIGH (ref 7–23)
CALCIUM SERPL-MCNC: 8.6 MG/DL — SIGNIFICANT CHANGE UP (ref 8.4–10.5)
CHLORIDE SERPL-SCNC: 96 MMOL/L — LOW (ref 98–107)
CO2 SERPL-SCNC: 18 MMOL/L — LOW (ref 22–31)
CREAT SERPL-MCNC: 11.96 MG/DL — HIGH (ref 0.5–1.3)
EGFR: 4 ML/MIN/1.73M2 — LOW
GIANT PLATELETS BLD QL SMEAR: PRESENT — SIGNIFICANT CHANGE UP
GLUCOSE SERPL-MCNC: 101 MG/DL — HIGH (ref 70–99)
HCT VFR BLD CALC: 36.2 % — LOW (ref 39–50)
HGB BLD-MCNC: 11.3 G/DL — LOW (ref 13–17)
MACROCYTES BLD QL: SLIGHT — SIGNIFICANT CHANGE UP
MAGNESIUM SERPL-MCNC: 2.8 MG/DL — HIGH (ref 1.6–2.6)
MANUAL SMEAR VERIFICATION: SIGNIFICANT CHANGE UP
MCHC RBC-ENTMCNC: 25.7 PG — LOW (ref 27–34)
MCHC RBC-ENTMCNC: 31.2 GM/DL — LOW (ref 32–36)
MCV RBC AUTO: 82.5 FL — SIGNIFICANT CHANGE UP (ref 80–100)
NRBC # BLD: 0 /100 WBCS — SIGNIFICANT CHANGE UP (ref 0–0)
NRBC # FLD: 0.03 K/UL — HIGH (ref 0–0)
PHOSPHATE SERPL-MCNC: 3.9 MG/DL — SIGNIFICANT CHANGE UP (ref 2.5–4.5)
PLAT MORPH BLD: ABNORMAL
PLATELET # BLD AUTO: 98 K/UL — LOW (ref 150–400)
PLATELET COUNT - ESTIMATE: ABNORMAL
POTASSIUM SERPL-MCNC: 5.1 MMOL/L — SIGNIFICANT CHANGE UP (ref 3.5–5.3)
POTASSIUM SERPL-SCNC: 5.1 MMOL/L — SIGNIFICANT CHANGE UP (ref 3.5–5.3)
RBC # BLD: 4.39 M/UL — SIGNIFICANT CHANGE UP (ref 4.2–5.8)
RBC # FLD: 19.9 % — HIGH (ref 10.3–14.5)
RBC BLD AUTO: ABNORMAL
SODIUM SERPL-SCNC: 135 MMOL/L — SIGNIFICANT CHANGE UP (ref 135–145)
WBC # BLD: 10.5 K/UL — SIGNIFICANT CHANGE UP (ref 3.8–10.5)
WBC # FLD AUTO: 10.5 K/UL — SIGNIFICANT CHANGE UP (ref 3.8–10.5)

## 2023-04-06 PROCEDURE — 93306 TTE W/DOPPLER COMPLETE: CPT | Mod: 26

## 2023-04-06 RX ORDER — IPRATROPIUM/ALBUTEROL SULFATE 18-103MCG
1 AEROSOL WITH ADAPTER (GRAM) INHALATION
Qty: 1 | Refills: 0
Start: 2023-04-06 | End: 2023-05-05

## 2023-04-06 RX ADMIN — Medication 3 MILLILITER(S): at 15:34

## 2023-04-06 RX ADMIN — MUPIROCIN 1 APPLICATION(S): 20 OINTMENT TOPICAL at 05:21

## 2023-04-06 RX ADMIN — Medication 90 MILLIGRAM(S): at 14:49

## 2023-04-06 RX ADMIN — SEVELAMER CARBONATE 1600 MILLIGRAM(S): 2400 POWDER, FOR SUSPENSION ORAL at 14:49

## 2023-04-06 RX ADMIN — SEVELAMER CARBONATE 1600 MILLIGRAM(S): 2400 POWDER, FOR SUSPENSION ORAL at 10:11

## 2023-04-06 RX ADMIN — HEPARIN SODIUM 5000 UNIT(S): 5000 INJECTION INTRAVENOUS; SUBCUTANEOUS at 05:48

## 2023-04-06 RX ADMIN — Medication 3 MILLILITER(S): at 03:22

## 2023-04-06 RX ADMIN — Medication 60 MILLIGRAM(S): at 11:44

## 2023-04-06 RX ADMIN — Medication 100 MILLIGRAM(S): at 14:49

## 2023-04-06 NOTE — DISCHARGE NOTE PROVIDER - NSDCCONDITION_GEN_ALL_CORE
Mary Kay is growing and developing well. Continue to keep your child rear facing in the car seat until she reaches the limit listed on the stickers on the side of your seat or in your manual.  You can use acetaminophen or ibuprofen for any fevers or discomfort from any shots that were given today. Two-year-old children require constant supervision and they are at a higher risk accidents and drownings.  We discussed physical activity and nutritional requirements for your child today.      Continue reading to your child daily to promote language and literacy development.  You may find that your toddler notices when you skip pages of familiar books.  Take the time let her ask questions or make statements about the story or the pictures.  Teach your baby shapes or colors as well.  These lessons help strengthen her memory.  Don't worry if she's not interested.  You can find something else to attract her attention! By 3 your child will know the 3 c's- colors, counting and consequences and you should understand 75% of what they say.    Your child should now return every year around his or her birthday for a checkup.    If your child was given vaccines, Vaccine Information Sheets were offered and counseling on vaccine side effects was given.  Side effects most commonly include fever, redness at the injection site, or swelling at the site.  Younger children may be fussy and older children may complain of pain. You can use acetaminophen at any age or ibuprofen for age 6 months and up.  Much more rarely, call back or go to the ER if your child has inconsolable crying, wheezing, difficulty breathing, or other concerns.     
Stable

## 2023-04-06 NOTE — DISCHARGE NOTE PROVIDER - CARE PROVIDER_API CALL
DR KATHRINE RODRIGUEZ [phone]  PMD,   Phone: (   )    -  Fax: (   )    -  Established Patient  Follow Up Time: 1 week

## 2023-04-06 NOTE — CONSULT NOTE ADULT - ASSESSMENT
EKG - NSR RBBB  Echo - 11/22 - Hyperdynamic LV severe asymetric LV hypertrophy     a/p     1) NSVT - asymptomatic no cp no SOB, no palpitations, K and Mg levels ok, pt asymptomatic, will get 2d echo if LV normal can d/c , continue coreg     2) HTN - cont coreg hydralazine and nifedipine     3) Acute asthma exacerbation - on prednisone

## 2023-04-06 NOTE — PROGRESS NOTE ADULT - SUBJECTIVE AND OBJECTIVE BOX
[facility] [phone]        Patient seen and examined he had HD today 2.4L removed.         MEDICATIONS  (STANDING):  albuterol/ipratropium for Nebulization 3 milliLiter(s) Nebulizer every 6 hours  calcitriol   Capsule 0.25 MICROGram(s) Oral <User Schedule>  carvedilol 25 milliGRAM(s) Oral every 12 hours  chlorhexidine 4% Liquid 1 Application(s) Topical once  heparin   Injectable 5000 Unit(s) SubCutaneous every 12 hours  hydrALAZINE 100 milliGRAM(s) Oral three times a day  mupirocin 2% Ointment 1 Application(s) Both Nostrils two times a day  NIFEdipine XL 90 milliGRAM(s) Oral daily  predniSONE   Tablet 60 milliGRAM(s) Oral daily  sevelamer carbonate 1600 milliGRAM(s) Oral three times a day with meals  tamsulosin 0.4 milliGRAM(s) Oral at bedtime      VITAL:  T(C): , Max: 36.7 (04-06-23 @ 06:20)  T(F): , Max: 98.1 (04-06-23 @ 09:40)  HR: 68 (04-06-23 @ 14:45)  BP: 133/68 (04-06-23 @ 14:45)  BP(mean): --  RR: 16 (04-06-23 @ 14:45)  SpO2: 99% (04-06-23 @ 14:45)  Wt(kg): --    I and O's:    04-05 @ 07:01  -  04-06 @ 07:00  --------------------------------------------------------  IN: 240 mL / OUT: 0 mL / NET: 240 mL    04-06 @ 07:01  -  04-06 @ 16:04  --------------------------------------------------------  IN: 640 mL / OUT: 2800 mL / NET: -2160 mL          PHYSICAL EXAM:    Constitutional: NAD  Neck:  No JVD  Respiratory: CTAB/L  Cardiovascular: S1 and S2  Gastrointestinal: BS+, soft, NT/ND  Extremities: No peripheral edema  Neurological: A/O x 3, no focal deficits  Psychiatric: Normal mood, normal affect  : No Aguilar  Skin: No rashes  Access: LUE AVF (+thrill)    LABS:                        11.3   10.50 )-----------( 98       ( 06 Apr 2023 05:14 )             36.2     04-06    135  |  96<L>  |  102<H>  ----------------------------<  101<H>  5.1   |  18<L>  |  11.96<H>    Ca    8.6      06 Apr 2023 05:14  Phos  3.9     04-06  Mg     2.80     04-06      Assessment and Plan:   · Assessment	    ASSESSMENT/PLAN:   82 year old male non smoker with hx of dCHF comes into the ED from his PCP for eval of a cough and SOB for the past week  Hyperkalemia- improving with HD  URI     1 Renal - s/p today  2 URI - s/p IV solumedrol, now on po prednisone   3 CVS-BP improved  No evidence of heart failure    Jemma Phillips NP  [facility]   [phone]

## 2023-04-06 NOTE — DISCHARGE NOTE PROVIDER - NSDCMRMEDTOKEN_GEN_ALL_CORE_FT
calcitriol 0.25 mcg oral capsule: 1 cap(s) orally Monday, Wednesday, and Friday  carvedilol 25 mg oral tablet: 1 tab(s) orally 2 times a day  Combivent Respimat 20 mcg-100 mcg/inh inhalation aerosol: 1 puff(s) inhaled 4 times a day as needed for  shortness of breath and/or wheezing  Flomax 0.4 mg oral capsule: 1 cap(s) orally once a day  hydrALAZINE 100 mg oral tablet: 1 tab(s) orally 3 times a day  predniSONE 20 mg oral tablet: 3 tab(s) orally once a day 3 tabs by mouth daily for 2 days, 2 tabs by mouth daily for 3 days, 1 tab by mouth daily for 3 daysm half a tab by mouth daily for 7 days  Procardia XL 90 mg oral tablet, extended release: 1 tab(s) orally once a day  sevelamer carbonate 800 mg oral tablet: 2 tab(s) orally 3 times a day

## 2023-04-06 NOTE — PROGRESS NOTE ADULT - PROBLEM/PLAN-3
----- Message from Lisbeth Davis sent at 10/31/2022 12:50 PM CDT -----  Contact: Pt Home [phone]  Requesting an RX refill or new RX.  Is this a refill or new RX: Refill  RX name and strength  candesartan (ATACAND) 32 MG tablet  Is this a 30 day or 90 day RX: 90  Pharmacy name and phone #   [id number] - [place] - [address] [address] [address] [place] [address]  Phone: [phone] Fax: [phone]  The doctors have asked that we provide their patients with the following 2 reminders -- prescription refills can take up to 72 hours, and a friendly reminder that in the future you can use your [facility] account to request refills:   Comment: Patient said that there's a note on the system where [facility] order the Candesartan from [facility].     DISPLAY PLAN FREE TEXT

## 2023-04-06 NOTE — DISCHARGE NOTE NURSING/CASE MANAGEMENT/SOCIAL WORK - NSDCPEFALRISK_GEN_ALL_CORE
For information on Fall & Injury Prevention, visit: [email or web address] OR  [email or web address] OR  https://www.cdc.gov/steadi/patient.html

## 2023-04-06 NOTE — PHARMACOTHERAPY INTERVENTION NOTE - COMMENTS
Discharge medications reviewed with the patient. Outpatient medication schedule was discussed in detail including: medication name, indication, dose, administration times, treatment duration, side effects and special instructions. Also showed patient how to use Combivent Respimat using the instruction manual Patient questions and concerns were answered and addressed. Patient demonstrated understanding and provided with educational handout.     Deisy Parry, PharmD, Clinical Pharmacy Specialist, [phone]

## 2023-04-06 NOTE — DISCHARGE NOTE PROVIDER - PROVIDER TOKENS
FREE:[LAST:[DR KATHRINE RODRIGUEZ [phone]  PMD],PHONE:[(   )    -],FAX:[(   )    -],FOLLOWUP:[1 week],ESTABLISHEDPATIENT:[T]]

## 2023-04-06 NOTE — PROGRESS NOTE ADULT - SUBJECTIVE AND OBJECTIVE BOX
Patient is a 82y old  Male who presents with a chief complaint of cough. (05 Apr 2023 09:50)  patient not known to me, assigned this AM to assume care  chart reviewed and events thus far noted      DATE OF SERVICE: 04-06-23 @ 11:30    SUBJECTIVE / OVERNIGHT EVENTS: overnight events noted    ROS:  Resp: No cough no sputum production  CVS: No chest pain no palpitations no orthopnea  GI: no N/V/D  : no dysuria, no hematuria  Neuro: no weakness no paresthesias  Heme: No petechiae no easy bruising  Msk: No joint pain no swelling  Skin: No rash no itching        MEDICATIONS  (STANDING):  albuterol/ipratropium for Nebulization 3 milliLiter(s) Nebulizer every 6 hours  calcitriol   Capsule 0.25 MICROGram(s) Oral <User Schedule>  carvedilol 25 milliGRAM(s) Oral every 12 hours  chlorhexidine 4% Liquid 1 Application(s) Topical once  heparin   Injectable 5000 Unit(s) SubCutaneous every 12 hours  hydrALAZINE 100 milliGRAM(s) Oral three times a day  mupirocin 2% Ointment 1 Application(s) Both Nostrils two times a day  NIFEdipine XL 90 milliGRAM(s) Oral daily  predniSONE   Tablet 60 milliGRAM(s) Oral daily  sevelamer carbonate 1600 milliGRAM(s) Oral three times a day with meals  tamsulosin 0.4 milliGRAM(s) Oral at bedtime    MEDICATIONS  (PRN):  acetaminophen     Tablet .. 650 milliGRAM(s) Oral every 6 hours PRN Temp greater or equal to 38C (100.4F), Mild Pain (1 - 3)  albuterol    90 MICROgram(s) HFA Inhaler 2 Puff(s) Inhalation every 3 hours PRN Shortness of Breath  melatonin 3 milliGRAM(s) Oral at bedtime PRN Insomnia  sodium chloride 0.9% Bolus. 100 milliLiter(s) IV Bolus every 5 minutes PRN SBP LESS THAN or EQUAL to 90 mmHg        CAPILLARY BLOOD GLUCOSE        I&O's Summary    05 Apr 2023 07:01  -  06 Apr 2023 07:00  --------------------------------------------------------  IN: 240 mL / OUT: 0 mL / NET: 240 mL    06 Apr 2023 07:01  -  06 Apr 2023 11:30  --------------------------------------------------------  IN: 640 mL / OUT: 2800 mL / NET: -2160 mL        Vital Signs Last 24 Hrs  T(C): 36.7 (06 Apr 2023 09:40), Max: 36.8 (05 Apr 2023 13:00)  T(F): 98.1 (06 Apr 2023 09:40), Max: 98.3 (05 Apr 2023 13:00)  HR: 69 (06 Apr 2023 09:40) (68 - 77)  BP: 138/69 (06 Apr 2023 09:40) (118/51 - 138/69)  BP(mean): --  RR: 17 (06 Apr 2023 09:40) (15 - 18)  SpO2: 98% (06 Apr 2023 05:16) (95% - 100%)    PHYSICAL EXAM:  GENERAL: in no apparent distress  HEAD:  Atraumatic, Normocephalic  EYES: EOMI, PERRLA, sclera clear  NECK: Supple, No JVD  CHEST/LUNG: clear b/l, no wheeze  HEART: S1 S2; no murmurs appreciated  ABDOMEN: Soft, Nontender, Bowel sounds present  EXTREMITIES:  No clubbing or cyanosis,  no edema  NEUROLOGY: AO x 3 non-focal  SKIN: No rashes or lesions    LABS:                        11.3   10.50 )-----------( 98       ( 06 Apr 2023 05:14 )             36.2     04-06    135  |  96<L>  |  102<H>  ----------------------------<  101<H>  5.1   |  18<L>  |  11.96<H>    Ca    8.6      06 Apr 2023 05:14  Phos  3.9     04-06  Mg     2.80     04-06                  All consultant(s) notes reviewed and care discussed with other providers        Contact Number, Dr Vásquez [phone]

## 2023-04-06 NOTE — PROGRESS NOTE ADULT - ASSESSMENT
ASSESSMENT/PLAN:   82 year old male non smoker with hx of dCHF comes into the ED from his PCP for eval of a cough and SOB for the past week  Hyperkalemia  URI     1 Renal - HD tomorrow, 3 hrs, 2.5kg UF   2 URI - on IV solumedrol.  I suspect can transition to a quick PO Pred taper   3 CVS-BP improved  No evidence of heart failure    [facility]   [phone] 
  ASSESSMENT/PLAN:   82 year old male non smoker with hx of dCHF comes into the ED from his PCP for eval of a cough and SOB for the past week  Hyperkalemia  URI     1 Renal - HD today, 3 hrs, 2.5kg UF   2 URI - on IV solumedrol, pulm consulted  3 CVS-BP elevated   No evidence of heart failure    [facility]   [phone] 
This is a 81 y/o M with pmhx of CHF and ESRD on HD, BPH presented to the ED for cough. Found to have wheezing on exam. Likely has asthma exacerbation secondary to resolving URI infection. Admit for asthma exacerbation vs. bronchitis.

## 2023-04-06 NOTE — CONSULT NOTE ADULT - SUBJECTIVE AND OBJECTIVE BOX
Roni Monson MD  Interventional Cardiology / Advance Heart Failure and Cardiac Transplant Specialist  [place] Office : [address] [place] [address] [place] [address]  Tel:   [place] Office : [address] [facility] [place] [address]  Tel: [phone]         HISTORY OF PRESENTING ILLNESS:  HPI:  This is a 81 y/o M with pmhx of CHF and ESRD on HD, BPH presented to the ED for cough. Patient overall appears to have poor health literacy. The patient reported getting runny nose, sore throat with a cough last week. Sore throat and runny nose resolved, however cough persisted. Denies Shortness of breath at this time. The patient also denies fevers. The cough is productive, white sputum. He also reports vague hx of asthma in the past and states he has inhaler at home (however this was not listed in his medication list from outpatient), but he does not seem to know anything about his asthma. The patient denied wheezing. Tolerated dialysis well last done on saturday. Denies chest pain. Denies abdominal pain. ROS otherwise negative.   Pt was ready for d/c but was found to have 9 beat NSVT, no cp no SOB, no palpitations, K and Mg levels ok, pt asymptomatic    PAST MEDICAL & SURGICAL HISTORY:  HTN - Hypertension      Glaucoma (Increased Eye Pressure)      BPH (Benign Prostatic Hypertrophy)      ESRD on dialysis      Excision of Sinus Polyp  2006      Laser Surgery Left  ? regarding procedure ; secondary to glaucoma 12/07      Laser Surgery :Right  ?  regarding procedure ; 12/07 ; secondary to glaucoma          SOCIAL HISTORY: Substance Use (street drugs): ( x ) never used  (  ) other:    FAMILY HISTORY:  Family history of diabetes mellitus (DM) (Mother, Sibling)         MEDICATIONS:  carvedilol 25 milliGRAM(s) Oral every 12 hours  heparin   Injectable 5000 Unit(s) SubCutaneous every 12 hours  hydrALAZINE 100 milliGRAM(s) Oral three times a day  NIFEdipine XL 90 milliGRAM(s) Oral daily      albuterol    90 MICROgram(s) HFA Inhaler 2 Puff(s) Inhalation every 3 hours PRN  albuterol/ipratropium for Nebulization 3 milliLiter(s) Nebulizer every 6 hours    acetaminophen     Tablet .. 650 milliGRAM(s) Oral every 6 hours PRN  melatonin 3 milliGRAM(s) Oral at bedtime PRN      predniSONE   Tablet 60 milliGRAM(s) Oral daily    calcitriol   Capsule 0.25 MICROGram(s) Oral <User Schedule>  chlorhexidine 4% Liquid 1 Application(s) Topical once  mupirocin 2% Ointment 1 Application(s) Both Nostrils two times a day  sodium chloride 0.9% Bolus. 100 milliLiter(s) IV Bolus every 5 minutes PRN  tamsulosin 0.4 milliGRAM(s) Oral at bedtime      FAMILY HISTORY:  Family history of diabetes mellitus (DM) (Mother, Sibling)          Allergies    grass, pollen (Rhinitis)  No Known Drug Allergies    Intolerances    	      PHYSICAL EXAM:  T(C): 36.7 (04-06-23 @ 09:40), Max: 36.8 (04-05-23 @ 15:20)  HR: 69 (04-06-23 @ 09:40) (68 - 77)  BP: 138/69 (04-06-23 @ 09:40) (118/51 - 138/69)  RR: 17 (04-06-23 @ 09:40) (15 - 18)  SpO2: 98% (04-06-23 @ 05:16) (95% - 100%)  Wt(kg): --  I&O's Summary    05 Apr 2023 07:01  -  06 Apr 2023 07:00  --------------------------------------------------------  IN: 240 mL / OUT: 0 mL / NET: 240 mL    06 Apr 2023 07:01  -  06 Apr 2023 13:52  --------------------------------------------------------  IN: 640 mL / OUT: 2800 mL / NET: -2160 mL        GENERAL: NAD   EYES: EOMI, PERRLA, conjunctiva and sclera clear  ENMT: No tonsillar erythema, exudates, or enlargement; Moist mucous membranes, Good dentition, No lesions  Cardiovascular: Normal S1 S2, No JVD, No murmurs, No edema  Respiratory: mild b/l rhonchi	  Gastrointestinal:  Soft, Non-tender, + BS	  Extremities: no edema    LABS:	 	    CARDIAC MARKERS:                                  11.3   10.50 )-----------( 98       ( 06 Apr 2023 05:14 )             36.2     04-06    135  |  96<L>  |  102<H>  ----------------------------<  101<H>  5.1   |  18<L>  |  11.96<H>    Ca    8.6      06 Apr 2023 05:14  Phos  3.9     04-06  Mg     2.80     04-06      proBNP:   Lipid Profile:   HgA1c:   TSH:     Consultant(s) Notes Reviewed:  [x ] YES  [ ] NO    Care Discussed with Consultants/Other Providers [ x] YES  [ ] NO    Imaging Personally Reviewed independently:  [x] YES  [ ] NO    All labs, radiologic studies, vitals, orders and medications list reviewed. Patient is seen and examined at bedside. Case discussed with medical team.    ASSESSMENT/PLAN: 	    
NEPHROLOGY - NSN    Patient seen and examined.    HPI:    82 year old male non smoker with hx of CHF comes into the ED from his PCP for eval of a cough and SOB for the past week. He states he has had a cold for the last week which is similar to what he gets every year around this time. At his PCP he was found to have a BP of 190/90 which is why he was prompted to come into the ED via ambulance. He states he is having chest pain/abd pain when he coughs and he has been wheezing for the past week. He does not use any breathing treatments at home, is not on any O2 at home, and has never needed to be intubated  HD at [place] and HD are Tu/Thur/Sat  No LE edema noted       PAST MEDICAL & SURGICAL HISTORY:  HTN - Hypertension      Glaucoma (Increased Eye Pressure)      BPH (Benign Prostatic Hypertrophy)      ESRD on dialysis      Excision of Sinus Polyp  2006      Laser Surgery Left  ? regarding procedure ; secondary to glaucoma 12/07      Laser Surgery :Right  ?  regarding procedure ; 12/07 ; secondary to glaucoma          MEDICATIONS  (STANDING):      Allergies    grass, pollen (Rhinitis)  No Known Drug Allergies    Intolerances        SOCIAL HISTORY:  Denies alcohol abuse, drug abuse or tobacco usage.     FAMILY HISTORY:  Family history of diabetes mellitus (DM) (Mother, Sibling)        VITALS:  T(C): 36.3 (04-03-23 @ 17:34), Max: 36.6 (04-03-23 @ 13:54)  HR: 73 (04-03-23 @ 17:34) (73 - 89)  BP: 157/71 (04-03-23 @ 17:34) (148/88 - 174/81)  RR: 18 (04-03-23 @ 17:34) (18 - 28)  SpO2: 99% (04-03-23 @ 17:34) (94% - 100%)    REVIEW OF SYSTEMS:  Denies any nausea, vomiting, diarrhea, fever or chills. Denies chest pain, SOB, focal weakness, hematuria or dysuria. Good oral intake and denies fatigue or weakness. All other pertinent systems are reviewed and are negative.    PHYSICAL EXAM:  Constitutional: NAD  HEENT: EOMI  Neck:  No JVD, supple   Respiratory: transmitted upper   Cardiovascular: S1 and S2, RRR  Gastrointestinal: + BS, soft, NT, ND  Extremities: No peripheral edema, + peripheral pulses  Neurological: A/O x 3, CN2-12 intact  Psychiatric: Normal mood, normal affect  : No Aguilar  Skin: No rashes, C/D/I  Access: avf    I and O's:        LABS:                        12.3   7.16  )-----------( 126      ( 03 Apr 2023 14:16 )             42.1     04-03    145  |  105  |  57<H>  ----------------------------<  91  6.2<HH>   |  26  |  11.12<H>    Ca    9.3      03 Apr 2023 14:16    TPro  8.0  /  Alb  4.4  /  TBili  0.3  /  DBili  x   /  AST  34  /  ALT  15  /  AlkPhos  127<H>  04-03      URINE:      RADIOLOGY & ADDITIONAL STUDIES:    < from: Xray Chest 1 View AP/PA (04.03.23 @ 15:35) >    ACC: [id number] EXAM:  XR CHEST AP OR PA 1V   ORDERED BY: CHIRAG EL     PROCEDURE DATE:  04/03/2023          INTERPRETATION:  CLINICAL INDICATION:  Shortness of Breath    COMPARISON: Chest radiograph dated 11/16/2022    TECHNIQUE: Frontal radiograph of the chest.    FINDINGS:    Cardiac/mediastinum/hilum: Heart size cannot be accurately assessed on   this projection.    Lung parenchyma/Pleura: The lungs are clear. There is no pleural   effusion. There is no pneumothorax.    Skeleton/soft tissues: No acute osseous abnormalities.    IMPRESSION:    Clear lungs.    --- End of Report ---          LATOYA AGRSIA MD; Resident Radiologist  This document has been electronically signed.  PRIYA ORLANDO MD; Attending Radiologist  This document has beenelectronically signed. Apr  3 2023  3:38PM    < end of copied text >

## 2023-04-06 NOTE — DISCHARGE NOTE PROVIDER - HOSPITAL COURSE
This is a 81 y/o M with pmhx of CHF and ESRD on HD, BPH presented to the ED for cough. Found to have wheezing on exam. Likely has asthma exacerbation secondary to resolving URI infection. Admit for asthma exacerbation vs. bronchitis.       Problem/Plan - 1:  ·  Problem: Acute asthma exacerbation.   ·  Plan: much improved  cleared by Dr Jaramillo for discharge today.     Problem/Plan - 2:  ·  Problem: ESRD on dialysis.   ·  Plan: continue hemodialysis as outpatient.     Problem/Plan - 3:  ·  Problem: Benign essential HTN.   ·  Plan: - c/w home meds  - patient was hypertensive at PCP office however is improved while the patient is admitted here. This is a 83 y/o M with pmhx of CHF and ESRD on HD, BPH presented to the ED for cough. Found to have wheezing on exam. Likely has asthma exacerbation secondary to resolving URI infection. Admit for asthma exacerbation vs. bronchitis.       Problem/Plan - 1:  ·  Problem: Acute asthma exacerbation.   ·  Plan: much improved  cleared by Dr Jaramillo for discharge today.     Problem/Plan - 2:  ·  Problem: ESRD on dialysis.   ·  Plan: continue hemodialysis as outpatient.     Problem/Plan - 3:  ·  Problem: Benign essential HTN.   ·  Plan: - c/w home meds  - patient was hypertensive at PCP office however is improved while the patient is admitted here.    Pt. had 9B NSVT asymptomatic and is on Coreg 25mg PO BID.  An Echo was done and was unchanged.  Cardiology was called and cleared the patient for discharge.  D/W attending and cardiology.

## 2023-04-06 NOTE — DISCHARGE NOTE PROVIDER - NSDCCPCAREPLAN_GEN_ALL_CORE_FT
PRINCIPAL DISCHARGE DIAGNOSIS  Diagnosis: Acute asthma exacerbation  Assessment and Plan of Treatment: Continue current medications as prescribed.  Avoid exposures to environmental allergens such as carpets, pets and both first-hand and second-hand smoking.  During seasonal allergy period, take a shower as soon as you get home and change your clothes immediately.  Follow up your routine medical appointments.      SECONDARY DISCHARGE DIAGNOSES  Diagnosis: ESRD on dialysis  Assessment and Plan of Treatment: Please follow up with your nephrologist for management, and continue your schedule hemodialysis.    Diagnosis: Benign essential HTN  Assessment and Plan of Treatment: Low sodium and fat diet, continue anti-hypertensive medications, and follow up with primary care physician.

## 2023-04-06 NOTE — DISCHARGE NOTE NURSING/CASE MANAGEMENT/SOCIAL WORK - PATIENT PORTAL LINK FT
You can access the FollowMyHealth Patient Portal offered by [facility] by registering at the following website: [email or web address]. By joining [employer]’s FollowMyHealth portal, you will also be able to view your health information using other applications (apps) compatible with our system. Keystone Flap Text: We discussed various closure modalities with the patient, including healing by second intention, primary closure, skin graft and various flaps.  The location and configuration of the defect indicated that a Keystone flap would result in the least disturbance of the position and function of the surrounding anatomic structures, and provide the best result.  The technique, its benefits, alternatives and risks were discussed with the patient.  The patient underwent the procedure as follows: The patient was positioned supine on the operating room table.  The area of the defect and the surrounding skin were anesthetized with buffered 1% lidocaine with epinephrine.  The area was washed with chlorhexidine.  Sterile drapes were applied. \\n\\nThe defect edges were debeveled with a #15 scalpel blade.  Given the location of the defect, shape of the defect a keystone flap was deemed most appropriate.  Using a sterile surgical marker, an appropriate keystone flap was drawn incorporating the defect, outlining the appropriate donor tissue and placing the expected incisions within the relaxed skin tension lines where possible. The area thus outlined was incised deep to adipose tissue with a #15 scalpel blade.  The skin margins were undermined to an appropriate distance in all directions around the primary defect and laterally outward around the flap utilizing iris scissors.

## 2023-05-15 NOTE — DIETITIAN INITIAL EVALUATION ADULT. - PROBLEM/PLAN-4
asthma/atrial fibrillation/brain aneurysm/cancer/congestive heart failure/COPD/coronary disease/diabetes/dementia/emphysema/heart disease/hypertension/irritable bowel syndrome/kidney disease/stroke/thyroid disease/VTE
DISPLAY PLAN FREE TEXT

## 2023-07-14 NOTE — PROVIDER CONTACT NOTE (CRITICAL VALUE NOTIFICATION) - NS PROVIDER READ BACK TO LAB
Goal Outcome Evaluation:   Pt up in chair for most of the day. Bicarb gtt infusing. No acute distress.   Caity Fuentes RN                        yes

## 2023-08-24 ENCOUNTER — EMERGENCY (EMERGENCY)
Facility: HOSPITAL | Age: 83
LOS: 1 days | Discharge: HOME | End: 2023-08-24
Attending: EMERGENCY MEDICINE | Admitting: EMERGENCY MEDICINE
Payer: MEDICARE

## 2023-08-24 VITALS — HEART RATE: 60 BPM | DIASTOLIC BLOOD PRESSURE: 55 MMHG | SYSTOLIC BLOOD PRESSURE: 120 MMHG

## 2023-08-24 VITALS
DIASTOLIC BLOOD PRESSURE: 79 MMHG | HEART RATE: 77 BPM | RESPIRATION RATE: 17 BRPM | TEMPERATURE: 98 F | SYSTOLIC BLOOD PRESSURE: 144 MMHG | OXYGEN SATURATION: 100 %

## 2023-08-24 LAB
ALBUMIN SERPL ELPH-MCNC: 4.4 G/DL — SIGNIFICANT CHANGE UP (ref 3.3–5)
ALP SERPL-CCNC: 113 U/L — SIGNIFICANT CHANGE UP (ref 40–120)
ALT FLD-CCNC: 8 U/L — SIGNIFICANT CHANGE UP (ref 4–41)
ANION GAP SERPL CALC-SCNC: 13 MMOL/L — SIGNIFICANT CHANGE UP (ref 7–14)
ANISOCYTOSIS BLD QL: SLIGHT — SIGNIFICANT CHANGE UP
APTT BLD: 34.3 SEC — SIGNIFICANT CHANGE UP (ref 24.5–35.6)
AST SERPL-CCNC: 23 U/L — SIGNIFICANT CHANGE UP (ref 4–40)
BASE EXCESS BLDV CALC-SCNC: 4.3 MMOL/L — HIGH (ref -2–3)
BASE EXCESS BLDV CALC-SCNC: 6.2 MMOL/L — HIGH (ref -2–3)
BASOPHILS # BLD AUTO: 0.04 K/UL — SIGNIFICANT CHANGE UP (ref 0–0.2)
BASOPHILS NFR BLD AUTO: 0.8 % — SIGNIFICANT CHANGE UP (ref 0–2)
BILIRUB SERPL-MCNC: 0.5 MG/DL — SIGNIFICANT CHANGE UP (ref 0.2–1.2)
BLD GP AB SCN SERPL QL: NEGATIVE — SIGNIFICANT CHANGE UP
BLOOD GAS VENOUS COMPREHENSIVE RESULT: SIGNIFICANT CHANGE UP
BLOOD GAS VENOUS COMPREHENSIVE RESULT: SIGNIFICANT CHANGE UP
BUN SERPL-MCNC: 22 MG/DL — SIGNIFICANT CHANGE UP (ref 7–23)
CALCIUM SERPL-MCNC: 9.1 MG/DL — SIGNIFICANT CHANGE UP (ref 8.4–10.5)
CHLORIDE BLDV-SCNC: 102 MMOL/L — SIGNIFICANT CHANGE UP (ref 96–108)
CHLORIDE BLDV-SCNC: 106 MMOL/L — SIGNIFICANT CHANGE UP (ref 96–108)
CHLORIDE SERPL-SCNC: 101 MMOL/L — SIGNIFICANT CHANGE UP (ref 98–107)
CO2 BLDV-SCNC: 32.9 MMOL/L — HIGH (ref 22–26)
CO2 BLDV-SCNC: 35.3 MMOL/L — HIGH (ref 22–26)
CO2 SERPL-SCNC: 27 MMOL/L — SIGNIFICANT CHANGE UP (ref 22–31)
CREAT SERPL-MCNC: 5.71 MG/DL — HIGH (ref 0.5–1.3)
EGFR: 9 ML/MIN/1.73M2 — LOW
ELLIPTOCYTES BLD QL SMEAR: SLIGHT — SIGNIFICANT CHANGE UP
EOSINOPHIL # BLD AUTO: 0.64 K/UL — HIGH (ref 0–0.5)
EOSINOPHIL NFR BLD AUTO: 12.2 % — HIGH (ref 0–6)
GAS PNL BLDV: 137 MMOL/L — SIGNIFICANT CHANGE UP (ref 136–145)
GAS PNL BLDV: 138 MMOL/L — SIGNIFICANT CHANGE UP (ref 136–145)
GAS PNL BLDV: SIGNIFICANT CHANGE UP
GLUCOSE BLDV-MCNC: 217 MG/DL — HIGH (ref 70–99)
GLUCOSE BLDV-MCNC: 98 MG/DL — SIGNIFICANT CHANGE UP (ref 70–99)
GLUCOSE SERPL-MCNC: 85 MG/DL — SIGNIFICANT CHANGE UP (ref 70–99)
HCO3 BLDV-SCNC: 31 MMOL/L — HIGH (ref 22–29)
HCO3 BLDV-SCNC: 33 MMOL/L — HIGH (ref 22–29)
HCT VFR BLD CALC: 33.4 % — LOW (ref 39–50)
HCT VFR BLDA CALC: 26 % — LOW (ref 39–51)
HCT VFR BLDA CALC: 31 % — LOW (ref 39–51)
HEMOLYSIS INDEX: 4 — SIGNIFICANT CHANGE UP
HGB BLD CALC-MCNC: 10.4 G/DL — LOW (ref 12.6–17.4)
HGB BLD CALC-MCNC: 8.8 G/DL — LOW (ref 12.6–17.4)
HGB BLD-MCNC: 10.3 G/DL — LOW (ref 13–17)
HYPOCHROMIA BLD QL: SIGNIFICANT CHANGE UP
IANC: 2.49 K/UL — SIGNIFICANT CHANGE UP (ref 1.8–7.4)
INR BLD: 0.95 RATIO — SIGNIFICANT CHANGE UP (ref 0.85–1.18)
LACTATE BLDV-MCNC: 1.1 MMOL/L — SIGNIFICANT CHANGE UP (ref 0.5–2)
LACTATE BLDV-MCNC: 2.7 MMOL/L — HIGH (ref 0.5–2)
LIDOCAIN IGE QN: 84 U/L — HIGH (ref 7–60)
LYMPHOCYTES # BLD AUTO: 1.37 K/UL — SIGNIFICANT CHANGE UP (ref 1–3.3)
LYMPHOCYTES # BLD AUTO: 26.1 % — SIGNIFICANT CHANGE UP (ref 13–44)
MACROCYTES BLD QL: SLIGHT — SIGNIFICANT CHANGE UP
MCHC RBC-ENTMCNC: 25.5 PG — LOW (ref 27–34)
MCHC RBC-ENTMCNC: 30.8 GM/DL — LOW (ref 32–36)
MCV RBC AUTO: 82.7 FL — SIGNIFICANT CHANGE UP (ref 80–100)
MICROCYTES BLD QL: SIGNIFICANT CHANGE UP
MONOCYTES # BLD AUTO: 1.05 K/UL — HIGH (ref 0–0.9)
MONOCYTES NFR BLD AUTO: 20 % — HIGH (ref 2–14)
MYELOCYTES NFR BLD: 0.9 % — HIGH (ref 0–0)
NEUTROPHILS # BLD AUTO: 2.1 K/UL — SIGNIFICANT CHANGE UP (ref 1.8–7.4)
NEUTROPHILS NFR BLD AUTO: 40 % — LOW (ref 43–77)
PCO2 BLDV: 59 MMHG — HIGH (ref 42–55)
PCO2 BLDV: 62 MMHG — HIGH (ref 42–55)
PH BLDV: 7.33 — SIGNIFICANT CHANGE UP (ref 7.32–7.43)
PH BLDV: 7.34 — SIGNIFICANT CHANGE UP (ref 7.32–7.43)
PLAT MORPH BLD: NORMAL — SIGNIFICANT CHANGE UP
PLATELET # BLD AUTO: 86 K/UL — LOW (ref 150–400)
PLATELET COUNT - ESTIMATE: ABNORMAL
PO2 BLDV: 24 MMHG — LOW (ref 25–45)
PO2 BLDV: 37 MMHG — SIGNIFICANT CHANGE UP (ref 25–45)
POIKILOCYTOSIS BLD QL AUTO: SLIGHT — SIGNIFICANT CHANGE UP
POLYCHROMASIA BLD QL SMEAR: SLIGHT — SIGNIFICANT CHANGE UP
POTASSIUM BLDV-SCNC: 4.6 MMOL/L — SIGNIFICANT CHANGE UP (ref 3.5–5.1)
POTASSIUM BLDV-SCNC: 6.3 MMOL/L — CRITICAL HIGH (ref 3.5–5.1)
POTASSIUM SERPL-MCNC: 4.5 MMOL/L — SIGNIFICANT CHANGE UP (ref 3.5–5.3)
POTASSIUM SERPL-MCNC: 4.7 MMOL/L — SIGNIFICANT CHANGE UP (ref 3.5–5.3)
POTASSIUM SERPL-SCNC: 4.5 MMOL/L — SIGNIFICANT CHANGE UP (ref 3.5–5.3)
POTASSIUM SERPL-SCNC: 4.7 MMOL/L — SIGNIFICANT CHANGE UP (ref 3.5–5.3)
PROT SERPL-MCNC: 7.7 G/DL — SIGNIFICANT CHANGE UP (ref 6–8.3)
PROTHROM AB SERPL-ACNC: 10.8 SEC — SIGNIFICANT CHANGE UP (ref 9.5–13)
RBC # BLD: 4.04 M/UL — LOW (ref 4.2–5.8)
RBC # FLD: 14.6 % — HIGH (ref 10.3–14.5)
RBC BLD AUTO: ABNORMAL
RH IG SCN BLD-IMP: POSITIVE — SIGNIFICANT CHANGE UP
SAO2 % BLDV: 28.9 % — LOW (ref 67–88)
SAO2 % BLDV: 55.2 % — LOW (ref 67–88)
SCHISTOCYTES BLD QL AUTO: SLIGHT — SIGNIFICANT CHANGE UP
SODIUM SERPL-SCNC: 141 MMOL/L — SIGNIFICANT CHANGE UP (ref 135–145)
TARGETS BLD QL SMEAR: SLIGHT — SIGNIFICANT CHANGE UP
WBC # BLD: 5.24 K/UL — SIGNIFICANT CHANGE UP (ref 3.8–10.5)
WBC # FLD AUTO: 5.24 K/UL — SIGNIFICANT CHANGE UP (ref 3.8–10.5)

## 2023-08-24 PROCEDURE — 99285 EMERGENCY DEPT VISIT HI MDM: CPT

## 2023-08-24 PROCEDURE — 71045 X-RAY EXAM CHEST 1 VIEW: CPT | Mod: 26

## 2023-08-24 RX ORDER — SODIUM CHLORIDE 9 MG/ML
1000 INJECTION INTRAMUSCULAR; INTRAVENOUS; SUBCUTANEOUS ONCE
Refills: 0 | Status: COMPLETED | OUTPATIENT
Start: 2023-08-24 | End: 2023-08-24

## 2023-08-24 RX ORDER — GABAPENTIN 400 MG/1
2 CAPSULE ORAL
Qty: 16 | Refills: 0
Start: 2023-08-24 | End: 2023-08-27

## 2023-08-24 RX ADMIN — SODIUM CHLORIDE 1000 MILLILITER(S): 9 INJECTION INTRAMUSCULAR; INTRAVENOUS; SUBCUTANEOUS at 16:57

## 2023-08-24 NOTE — ED PROVIDER NOTE - PROGRESS NOTE DETAILS
Frankie GRANDE: Spoke w/ vascular fellow El. Fellow states that pt's fistula wound discharge is likely 2/2 fibrinous discharge from the ulcer. Pt's white count is normal. Pt is compliant w/ cipro and rdaha and Dr. Carrillo will see the patient in the office this week. Pt is aware of plan and will f/u w/ nephrology for dialysis and Dr. Carrillo. Frankie GRANDE: Pt is stable and ready for discharge. Strict return precautions given. All questions answered. Pt is amenable to discharge.

## 2023-08-24 NOTE — ED PROVIDER NOTE - CARE PROVIDER_API CALL
Carroll Carrillo)  Surgery; Vascular Surgery  [address] [place] [address], [address]  [place] [address]  Phone: [phone]  Fax: [phone]  Established Patient  Follow Up Time: 1-3 Days

## 2023-08-24 NOTE — ED PROVIDER NOTE - NSFOLLOWUPINSTRUCTIONS_ED_ALL_ED_FT
You were seen for discharge from your graft site. Please continue taking your oral antibiotics as prescribed.  Please follow-up with your vascular surgeon this week.       SEEK IMMEDIATE MEDICAL CARE IF YOU HAVE ANY OF THE FOLLOWING SYMPTOMS: shortness of breath, chest pain, fevers, or lightheadedness/dizziness. You were seen for discharge from your graft site. Please continue taking your oral antibiotics as prescribed.  Please follow-up with your vascular surgeon this week.       SEEK IMMEDIATE MEDICAL CARE IF YOU HAVE ANY OF THE FOLLOWING SYMPTOMS: shortness of breath, chest pain, fevers, increased redness or swelling of your graft site, increased drainage from the graft site, or lightheadedness/dizziness.

## 2023-08-24 NOTE — ED ADULT NURSE NOTE - CHIEF COMPLAINT QUOTE
Pt coming from [facility] for infected ANGEL fistula. Staff noted abnormal drainage to site. Pt was able to have and tolerate a full session of dialysis today. No other complaints at this time. No acute distress noted. Hx

## 2023-08-24 NOTE — ED ADULT TRIAGE NOTE - CHIEF COMPLAINT QUOTE
Pt coming from [facility] for infected ANGEL fistula. Staff noted abnormal drainage to site. Pt was able to have and tolerate a full session of dialysis today. No other complaints at this time. No acute distress noted. Hx

## 2023-08-24 NOTE — ED PROVIDER NOTE - CARE PLAN
1 Principal Discharge DX:	Infected prosthetic vascular graft   Principal Discharge DX:	Discharge from wound

## 2023-08-24 NOTE — ED PROVIDER NOTE - CLINICAL SUMMARY MEDICAL DECISION MAKING FREE TEXT BOX
Megan Dominguez MD attending physicianPatient was sent to the emergency department because he had drainage from his fistula site on his left arm.  He is an end-stage renal dialysis patient he received dialysis today through his permacath.  I spoke with Dr. Hardy who is his nephrologist who believes that about a week ago there was some procedure done where they did an angio of the AV shunt.    Patient is end-stage renal disease past history of CHF and BPH.  On exam    Patient is a poor historian and I would assume has some significant dementia    There is a note from Carroll Adams in the chart referring patient for an infected fistula he gets dialysis Tuesday Thursday Saturday medications include aspirin albuterol ipratropium carvedilol Renvela tamsulosin and Tylenol    Patient awake and alert although seems a bit confused with baseline dementia most likely.  Vital signs include blood pressure 144/79 respiratory rate 17 heart rate 77 temp is 98 O2 sat is normal.  As per Dr. Hardy he had a procedure perhaps last week where they did an angio of this.  Cor regular rate rhythm positive S1-S2 no extra heart sounds.  Lungs are clear at this time.  Abdomen soft no rebound no guarding.  There is an area on the fistula where there is 2 areas of erosion 1 with some pus coming from it    Surgery and nephrology both contacted to see the patient.  If the patient is going to stay then nephrology needs to be recalled

## 2023-08-24 NOTE — ED ADULT NURSE NOTE - NSFALLHARMRISKINTERV_ED_ALL_ED
Assistance OOB with selected safe patient handling equipment if applicable/Assistance with ambulation/Communicate risk of Fall with Harm to all staff, patient, and family/Monitor gait and stability/Provide visual cue: red socks, yellow wristband, yellow gown, etc/Reinforce activity limits and safety measures with patient and family/Bed in lowest position, wheels locked, appropriate side rails in place/Call bell, personal items and telephone in reach/Instruct patient to call for assistance before getting out of bed/chair/stretcher/Non-slip footwear applied when patient is off stretcher/[place] to call system/Physically safe environment - no spills, clutter or unnecessary equipment/Purposeful Proactive Rounding/Room/bathroom lighting operational, light cord in reach

## 2023-08-24 NOTE — ED ADULT NURSE REASSESSMENT NOTE - NS ED NURSE REASSESS COMMENT FT1
PT is resting in stretcher, easily arousable to verbal stimuli. no apparent distress noted at this time. US IV in progress. hand off will be given to primary nurse when return to area.

## 2023-08-24 NOTE — CONSULT NOTE ADULT - SUBJECTIVE AND OBJECTIVE BOX
82 M ESRD (Tues, Thurs, Sat) presents from dialysis with concern for L AVF infection. According to pt, AVF has not been working so L permacath was placed. Patient has left radiocephalic AVF. Fistula was ligated last friday and perm cath was placed as the fistula was ulcerated and outflow was occluded.    PAST MEDICAL & SURGICAL HISTORY:  HTN - Hypertension  Glaucoma (Increased Eye Pressure)  BPH (Benign Prostatic Hypertrophy)  ESRD on dialysis  Excision of Sinus Polyp  2006  Laser Surgery Left  ? regarding procedure ; secondary to glaucoma 12/07  Laser Surgery :Right  ?  regarding procedure ; 12/07 ; secondary to glaucoma    REVIEW OF SYSTEMS  In HPI above.     Allergic/Immunologic:	 No Anaphylaxis/ Intolerance/ Recent illnesses    MEDICATIONS  (STANDING):    MEDICATIONS  (PRN):    Allergies    No Known Drug Allergies  grass, pollen (Rhinitis)    Intolerances    SOCIAL HISTORY:  Smoking Hx: denies  Etoh Hx: denies  IVDA Hx: denies    FAMILY HISTORY:  Family history of diabetes mellitus (DM) (Mother, Sibling)  unless noted, no significant family hx with Mother, Father, Siblings    Vital Signs Last 24 Hrs  T(C): 36.7 (24 Aug 2023 14:20), Max: 36.7 (24 Aug 2023 14:20)  T(F): 98 (24 Aug 2023 14:20), Max: 98 (24 Aug 2023 14:20)  HR: 77 (24 Aug 2023 14:20) (77 - 77)  BP: 144/79 (24 Aug 2023 14:20) (144/79 - 144/79)  BP(mean): --  RR: 17 (24 Aug 2023 14:20) (17 - 17)  SpO2: 100% (24 Aug 2023 14:20) (100% - 100%)    Parameters below as of 24 Aug 2023 14:20  Patient On (Oxygen Delivery Method): room air    General:  NAD  L permacath in place  L AVF s/p ligation with no flow    LABS:                        10.3   5.24  )-----------( 86       ( 24 Aug 2023 15:40 )             33.4     08-24    141  |  101  |  22  ----------------------------<  85  4.7   |  27  |  5.71<H>    Ca    9.1      24 Aug 2023 15:40    TPro  7.7  /  Alb  4.4  /  TBili  0.5  /  DBili  x   /  AST  23  /  ALT  8   /  AlkPhos  113  08-24      Urinalysis Basic - ( 24 Aug 2023 15:40 )    Color: x / Appearance: x / SG: x / pH: x  Gluc: 85 mg/dL / Ketone: x  / Bili: x / Urobili: x   Blood: x / Protein: x / Nitrite: x   Leuk Esterase: x / RBC: x / WBC x   Sq Epi: x / Non Sq Epi: x / Bacteria: x    RADIOLOGY & ADDITIONAL STUDIES:  n/a

## 2023-08-24 NOTE — ED PROVIDER NOTE - PHYSICAL EXAMINATION
GENERAL: NAD  HEENT:  Atraumatic  CHEST/LUNG: Chest rise equal bilaterally  HEART: Regular rate and rhythm  ABDOMEN: Soft, Nontender, Nondistended  EXTREMITIES:  Extremities warm.  Left extremities: Purulent drainage noted at fistula site, culture sent.   PSYCH: A&Ox3  SKIN: No obvious rashes or lesions  NEUROLOGY: strength and sensation intact in all extremities

## 2023-08-24 NOTE — CONSULT NOTE ADULT - SUBJECTIVE AND OBJECTIVE BOX
HPI: 82 M ESRD (Tues, Thurs, Sat) presents from dialysis with concern for L AVF. According to pt, AVF has not been working so L permacath was placed. Pt had a L AVF fistulogram? last week with Dr. Carrillo. Denies fever, chills, shortness of breath, nausea, vomiting.     PAST MEDICAL & SURGICAL HISTORY:  HTN - Hypertension  Glaucoma (Increased Eye Pressure)  BPH (Benign Prostatic Hypertrophy)  ESRD on dialysis  Excision of Sinus Polyp  2006  Laser Surgery Left  ? regarding procedure ; secondary to glaucoma 12/07  Laser Surgery :Right  ?  regarding procedure ; 12/07 ; secondary to glaucoma    REVIEW OF SYSTEMS  In HPI above.     Allergic/Immunologic:	 No Anaphylaxis/ Intolerance/ Recent illnesses    MEDICATIONS  (STANDING):    MEDICATIONS  (PRN):    Allergies    No Known Drug Allergies  grass, pollen (Rhinitis)    Intolerances    SOCIAL HISTORY:  Smoking Hx: denies  Etoh Hx: denies  IVDA Hx: denies    FAMILY HISTORY:  Family history of diabetes mellitus (DM) (Mother, Sibling)  unless noted, no significant family hx with Mother, Father, Siblings    Vital Signs Last 24 Hrs  T(C): 36.7 (24 Aug 2023 14:20), Max: 36.7 (24 Aug 2023 14:20)  T(F): 98 (24 Aug 2023 14:20), Max: 98 (24 Aug 2023 14:20)  HR: 77 (24 Aug 2023 14:20) (77 - 77)  BP: 144/79 (24 Aug 2023 14:20) (144/79 - 144/79)  BP(mean): --  RR: 17 (24 Aug 2023 14:20) (17 - 17)  SpO2: 100% (24 Aug 2023 14:20) (100% - 100%)    Parameters below as of 24 Aug 2023 14:20  Patient On (Oxygen Delivery Method): room air    General:  NAD  L permacath  L AVF, pus, possibly aneurysmal, no thrill    LABS:                        10.3   5.24  )-----------( 86       ( 24 Aug 2023 15:40 )             33.4     08-24    141  |  101  |  22  ----------------------------<  85  4.7   |  27  |  5.71<H>    Ca    9.1      24 Aug 2023 15:40    TPro  7.7  /  Alb  4.4  /  TBili  0.5  /  DBili  x   /  AST  23  /  ALT  8   /  AlkPhos  113  08-24      Urinalysis Basic - ( 24 Aug 2023 15:40 )    Color: x / Appearance: x / SG: x / pH: x  Gluc: 85 mg/dL / Ketone: x  / Bili: x / Urobili: x   Blood: x / Protein: x / Nitrite: x   Leuk Esterase: x / RBC: x / WBC x   Sq Epi: x / Non Sq Epi: x / Bacteria: x    RADIOLOGY & ADDITIONAL STUDIES:    ASSESSMENT:   82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    PLAN:      *** INCOMPLETE HPI: 82 M ESRD (Tues, Thurs, Sat) presents from dialysis with concern for L AVF infection. According to pt, AVF has not been working so L permacath was placed. Pt had a L AVF fistulogram? last week with Dr. Carrillo. L brachiocephalic fistula creation by Dr. Castellanos in 3/2021. Denies fever, chills, shortness of breath, nausea, vomiting.     PAST MEDICAL & SURGICAL HISTORY:  HTN - Hypertension  Glaucoma (Increased Eye Pressure)  BPH (Benign Prostatic Hypertrophy)  ESRD on dialysis  Excision of Sinus Polyp  2006  Laser Surgery Left  ? regarding procedure ; secondary to glaucoma 12/07  Laser Surgery :Right  ?  regarding procedure ; 12/07 ; secondary to glaucoma    REVIEW OF SYSTEMS  In HPI above.     Allergic/Immunologic:	 No Anaphylaxis/ Intolerance/ Recent illnesses    MEDICATIONS  (STANDING):    MEDICATIONS  (PRN):    Allergies    No Known Drug Allergies  grass, pollen (Rhinitis)    Intolerances    SOCIAL HISTORY:  Smoking Hx: denies  Etoh Hx: denies  IVDA Hx: denies    FAMILY HISTORY:  Family history of diabetes mellitus (DM) (Mother, Sibling)  unless noted, no significant family hx with Mother, Father, Siblings    Vital Signs Last 24 Hrs  T(C): 36.7 (24 Aug 2023 14:20), Max: 36.7 (24 Aug 2023 14:20)  T(F): 98 (24 Aug 2023 14:20), Max: 98 (24 Aug 2023 14:20)  HR: 77 (24 Aug 2023 14:20) (77 - 77)  BP: 144/79 (24 Aug 2023 14:20) (144/79 - 144/79)  BP(mean): --  RR: 17 (24 Aug 2023 14:20) (17 - 17)  SpO2: 100% (24 Aug 2023 14:20) (100% - 100%)    Parameters below as of 24 Aug 2023 14:20  Patient On (Oxygen Delivery Method): room air    General:  NAD  L permacath in place  L AVF, pus, possibly aneurysmal, no thrill    LABS:                        10.3   5.24  )-----------( 86       ( 24 Aug 2023 15:40 )             33.4     08-24    141  |  101  |  22  ----------------------------<  85  4.7   |  27  |  5.71<H>    Ca    9.1      24 Aug 2023 15:40    TPro  7.7  /  Alb  4.4  /  TBili  0.5  /  DBili  x   /  AST  23  /  ALT  8   /  AlkPhos  113  08-24      Urinalysis Basic - ( 24 Aug 2023 15:40 )    Color: x / Appearance: x / SG: x / pH: x  Gluc: 85 mg/dL / Ketone: x  / Bili: x / Urobili: x   Blood: x / Protein: x / Nitrite: x   Leuk Esterase: x / RBC: x / WBC x   Sq Epi: x / Non Sq Epi: x / Bacteria: x    RADIOLOGY & ADDITIONAL STUDIES:  n/a    ASSESSMENT:   82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    PLAN:  - Will follow up with plan from attending  - Discussed with vascular surgery fellow on call

## 2023-08-24 NOTE — CONSULT NOTE ADULT - ASSESSMENT
ASSESSMENT:   82M with possible ligated left RCF 2/2 ulcers. No flow through fistula. Getting HD through permcath. Sent in from outpatient HD center due to concerns for possible infection.    Scant fibrinous discharge from ulcerated site, no purulence noted, no erythema, non fluctuant, no induration    No concern for AVF infection    Continue HD via permacath  Continue vanc with HD and PO cipro as previously prescribed  Patient to follow up with Dr Carrillo in office  Plan dw Dr Carrillo

## 2023-08-24 NOTE — ED ADULT NURSE NOTE - OBJECTIVE STATEMENT
As per pt he had regular session of dialysis today, has no complaints, denies fevers. As per triage note dialysis nurse noticed drainage - site covered with gauze and ointment noted to be applied. No redness or warm noted to area.

## 2023-08-24 NOTE — ED PROVIDER NOTE - PATIENT PORTAL LINK FT
You can access the FollowMyHealth Patient Portal offered by [facility] by registering at the following website: [email or web address]. By joining [facility]’s FollowMyHealth portal, you will also be able to view your health information using other applications (apps) compatible with our system.

## 2023-08-24 NOTE — ED PROVIDER NOTE - OBJECTIVE STATEMENT
83 y/o male w/ PMH CHF and ESRD on HD (Tues, Thurs, Saturday - last session was today, received full sessioN), BPH sent in by vascular MD 2/2 fistula site infection. Pt is otherwise asymptomatic. Denies fevers, chills, nausea, vomiting, dizziness, chest pain, SOB, abdominal pain, dysuria, hematuria.     Surgeon: Carroll Carrillo MD

## 2023-08-24 NOTE — ED PROVIDER NOTE - ATTENDING CONTRIBUTION TO CARE
Megan Dominguez MD attending physicianPatient was sent to the emergency department because he had drainage from his fistula site on his left arm.  He is an end-stage renal dialysis patient he received dialysis today through his permacath.  I spoke with Dr. Hardy who is his nephrologist who believes that about a week ago there was some procedure done where they did an angio of the AV shunt.    Patient is end-stage renal disease past history of CHF and BPH.  On exam    Patient is a poor historian and I would assume has some significant dementia    There is a note from Carroll Adams in the chart referring patient for an infected fistula he gets dialysis Tuesday Thursday Saturday medications include aspirin albuterol ipratropium carvedilol Renvela tamsulosin and Tylenol    Patient awake and alert although seems a bit confused with baseline dementia most likely.  Vital signs include blood pressure 144/79 respiratory rate 17 heart rate 77 temp is 98 O2 sat is normal.  As per Dr. Hardy he had a procedure perhaps last week where they did an angio of this.  Cor regular rate rhythm positive S1-S2 no extra heart sounds.  Lungs are clear at this time.  Abdomen soft no rebound no guarding.  There is an area on the fistula where there is 2 areas of erosion 1 with some pus coming from it    Surgery and nephrology both contacted to see the patient.  If the patient is going to stay then nephrology needs to be recalled    I performed a history and physical exam of the patient and discussed their management with the resident and /or advanced care provider. I reviewed the resident and /or ACP's note and agree with the documented findings and plan of care. My medical decison making and observations are found above.

## 2023-11-13 NOTE — ED ADULT TRIAGE NOTE - GLASGOW COMA SCALE: BEST MOTOR RESPONSE, MLM
Patients mother called stating the sons cast is wet and is requesting a call from the doctor team. Patient mother stated she's already dried it with a hair dryer.    Please advice eusebia at [phone]     (M6) obeys commands

## 2024-01-16 NOTE — ED PROVIDER NOTE - PHYSICAL EXAMINATION
Gen: Alert, looks weak  Head: NC, AT   Eyes: PERRL, EOMI, normal lids/conjunctiva  ENT: normal hearing, patent oropharynx without erythema/exudate, uvula midline  Neck: supple, no tenderness, Trachea midline  Pulm: Bilateral BS, crackles in the lung bases   CV: RRR, no M/R/G, 2+ radial and dp pulses bl, no edema  Abd: obese, soft, NT/ND, +BS, no hepatosplenomegaly  Mskel: extremities x4 with normal ROM and no joint effusions. no ctl spine ttp.   Skin: no rash, no bruising   Neuro: AAOx3, no sensory/motor deficits, CN 2-12 intact

## 2024-01-26 NOTE — CONSULT NOTE ADULT - GEN GEN HX ROS MEA POS PC
HPI    17 y.o. 9 m.o. female here with Mom, who serves as independent historian.    5 days of swollen painful tonsils. No fever. No cough, congestion. Has white spots, maybe tonsil stones. Hurts to swallow but still good PO/UOP. Energy level low at baseline, but not worse this week. Taking iron supplement only.     Review of Systems  as per HPI    /66   Pulse 75   Temp 98.3 °F (36.8 °C) (Oral)   Resp 16   Wt 54.9 kg (121 lb 0.5 oz)   LMP 01/25/2024 (Exact Date)     Physical Exam  Vitals reviewed.   Constitutional:       General: She is not in acute distress.     Appearance: She is well-developed.   HENT:      Head: Normocephalic.      Right Ear: Tympanic membrane normal.      Left Ear: Tympanic membrane normal.      Mouth/Throat:      Pharynx: Oropharyngeal exudate and posterior oropharyngeal erythema present.   Eyes:      General:         Right eye: No discharge.         Left eye: No discharge.      Conjunctiva/sclera: Conjunctivae normal.      Pupils: Pupils are equal, round, and reactive to light.   Cardiovascular:      Rate and Rhythm: Normal rate and regular rhythm.      Heart sounds: Normal heart sounds. No murmur heard.  Pulmonary:      Effort: Pulmonary effort is normal. No respiratory distress.      Breath sounds: Normal breath sounds. No wheezing or rales.   Abdominal:      General: Bowel sounds are normal. There is no distension.      Palpations: Abdomen is soft. There is no mass.      Tenderness: There is no abdominal tenderness.   Musculoskeletal:         General: Normal range of motion.      Cervical back: Normal range of motion and neck supple.   Lymphadenopathy:      Cervical: Cervical adenopathy (tender, bilateral R>L) present.   Skin:     General: Skin is warm.      Coloration: Skin is not pale.      Findings: No rash.   Neurological:      General: No focal deficit present.      Mental Status: She is alert.      Deep Tendon Reflexes: Reflexes are normal and symmetric.   Psychiatric:          Behavior: Behavior normal.         Noni was seen today for other misc.    Diagnoses and all orders for this visit:    Pharyngitis, unspecified etiology  -     POCT Strep A, Molecular       - Strep negative    - Supportive care: tylenol/motrin, fluids, handwashing, honey, gargling  - Reviewed return precautions      Beth Mcmullen MD     fever

## 2024-11-02 NOTE — PHYSICAL THERAPY INITIAL EVALUATION ADULT - IMPAIRMENTS CONTRIBUTING TO GAIT DEVIATIONS, PT EVAL
"Progress Note - Hospitalist   Name: Vlad Gregorio 64 y.o. male I MRN: [id number]  Unit/Bed#: PPHP-317-01 I Date of Admission: 10/29/2024   Date of Service: 11/2/2024 I Hospital Day: 4    Assessment & Plan  Penetrating atherosclerotic ulcer of aorta (HCC)  CT concerning for penetrating aortic arch ulcer without evidence of extravasation or dissection  Patient has been evaluated by cardiothoracic surgery and we appreciate their expertise.  No plans for surgical management at this time  Blood pressure appears  generally better controlled now that patient is nearing the end of his withdrawals and can take oral medications.  Will add hydrochlorothiazide and amlodipine PO  Tobacco abuse  Consider Nicotine patch  Right leg weakness  Patient presenting to the ED for lower extremity weakness, CT head demonstrates \"New hypodense areas involving the bilateral basal ganglia, which may be related to prior infarcts, or worsening chronic microangiopathic changes, but more recent infarcts in these regions cannot be excluded\"  Neuro following and we appreciate their expertise.  Patient agitated at this time with acute alcohol withdrawal and likely will be unable to complete a MRI at this time  Alcohol abuse  On admission patient reported drinking a case of beer over several days with a course of a week.  He appears to be going through withdrawals and has since admitted that he drinks about half a case of beer a day  Patient has been given benzodiazepine medications for his withdrawal symptoms with apparent paradoxical agitation  Patient was placed on ICU level of care on Precedex infusion and has since been weaned.  He is resting comfortably in bed and has no new complaints  Continue thiamine, folate.  Certified  consult placed  CVA (cerebral vascular accident) (HCC)  Patient presented to the ED for lower extremity weakness, CT of the head showing \"acute infarcts involving the right corona radiata/gangliocapsular " "region, left gangliocapsular region and right parietal lobe\"  Transthoracic echo without significant abnormalities, neurology recommending ELICEO to rule out thrombus, cardiac mass, or other possible source of multifocal infarct   Continue to monitor on telemetry  Resume aspirin, Plavix, and atorvastatin    VTE Pharmacologic Prophylaxis:   Moderate Risk (Score 3-4) - Pharmacological DVT Prophylaxis Ordered: heparin.    Mobility:   Basic Mobility Inpatient Raw Score: 12  JH-HLM Goal: 4: Move to chair/commode  JH-HLM Achieved: 1: Laying in bed  JH-HLM Goal NOT achieved. Continue with multidisciplinary rounding and encourage appropriate mobility to improve upon JH-HLM goals.    Patient Centered Rounds: I performed bedside rounds with nursing staff today.   Discussions with Specialists or Other Care Team Provider: Neurology    Education and Discussions with Family / Patient: Updated  (daughter) via phone.    Current Length of Stay: 4 day(s)  Current Patient Status: Inpatient   Certification Statement: The patient will continue to require additional inpatient hospital stay due to CVA  Discharge Plan: Anticipate discharge in 48-72 hrs to discharge location to be determined pending rehab evaluations.    Code Status: Level 1 - Full Code    Subjective   Patient seen and examined at bedside.  He is resting comfortably in bed.  He has no new pains or complaints today    Objective :  Temp:  [97.9 °F (36.6 °C)-98.9 °F (37.2 °C)] 98.7 °F (37.1 °C)  HR:  [67-84] 71  BP: (126-159)/(62-82) 158/77  Resp:  [16-22] 16  SpO2:  [90 %-98 %] 98 %    Body mass index is 31.42 kg/m².     Input and Output Summary (last 24 hours):     Intake/Output Summary (Last 24 hours) at 11/2/2024 1530  Last data filed at 11/2/2024 1230  Gross per 24 hour   Intake 1010 ml   Output 625 ml   Net 385 ml       Physical Exam  Vitals and nursing note reviewed.   Constitutional:       General: He is not in acute distress.     Appearance: He is " well-developed. He is not diaphoretic.   HENT:      Head: Normocephalic and atraumatic.      Mouth/Throat:      Mouth: Mucous membranes are moist.   Eyes:      General: No scleral icterus.     Extraocular Movements: Extraocular movements intact.      Conjunctiva/sclera: Conjunctivae normal.   Cardiovascular:      Rate and Rhythm: Normal rate and regular rhythm.      Pulses: Normal pulses.      Heart sounds: No murmur heard.     No friction rub. No gallop.   Pulmonary:      Effort: Pulmonary effort is normal. No respiratory distress.      Breath sounds: No wheezing or rhonchi.      Comments: Ronchi clear with cough  Abdominal:      General: Abdomen is flat. Bowel sounds are normal. There is no distension.      Palpations: Abdomen is soft.      Tenderness: There is no abdominal tenderness. There is no guarding or rebound.   Musculoskeletal:         General: No swelling.      Right lower leg: No edema.      Left lower leg: No edema.   Skin:     General: Skin is warm.      Capillary Refill: Capillary refill takes less than 2 seconds.      Coloration: Skin is not jaundiced.      Findings: No rash.   Neurological:      Mental Status: He is alert and oriented to person, place, and time.   Psychiatric:         Mood and Affect: Mood normal.           Lines/Drains:        Telemetry:  Telemetry Orders (From admission, onward)               24 Hour Telemetry Monitoring  Continuous x 24 Hours (Telem)        Question:  Reason for 24 Hour Telemetry  Answer:  TIA/Suspected CVA/ Confirmed CVA                     Telemetry Reviewed: Normal Sinus Rhythm  Indication for Continued Telemetry Use: Acute CVA               Lab Results: I have reviewed the following results:   Results from last 7 days   Lab Units 11/01/24  0444   WBC Thousand/uL 5.96   HEMOGLOBIN g/dL 13.4   HEMATOCRIT % 39.9   PLATELETS Thousands/uL 182   SEGS PCT % 81*   LYMPHO PCT % 9*   MONO PCT % 9   EOS PCT % 0     Results from last 7 days   Lab Units 11/02/24  0443  11/01/24  0511 10/31/24  0604   SODIUM mmol/L 136   < > 135   POTASSIUM mmol/L 3.7   < > 4.0   CHLORIDE mmol/L 107   < > 103   CO2 mmol/L 23   < > 25   BUN mg/dL 20   < > 14   CREATININE mg/dL 0.91   < > 0.94   ANION GAP mmol/L 6   < > 7   CALCIUM mg/dL 8.4   < > 8.4   ALBUMIN g/dL  --   --  3.8   TOTAL BILIRUBIN mg/dL  --   --  1.05*   ALK PHOS U/L  --   --  50   ALT U/L  --   --  14   AST U/L  --   --  19   GLUCOSE RANDOM mg/dL 88   < > 96    < > = values in this interval not displayed.     Results from last 7 days   Lab Units 10/30/24  0447   INR  1.15     Results from last 7 days   Lab Units 10/30/24  0726 10/30/24  0446 10/29/24  1432   POC GLUCOSE mg/dl 106 116 132     Results from last 7 days   Lab Units 10/30/24  0447   HEMOGLOBIN A1C % 5.5     Results from last 7 days   Lab Units 11/01/24  0642   LACTIC ACID mmol/L 0.6       Recent Cultures (last 7 days):         Imaging Results Review: I reviewed radiology reports from this admission including: MRI brain.  Other Study Results Review: No additional pertinent studies reviewed.    Last 24 Hours Medication List:     Current Facility-Administered Medications:     amLODIPine (NORVASC) tablet 5 mg, Daily    aspirin (ECOTRIN LOW STRENGTH) EC tablet 81 mg, Daily    atorvastatin (LIPITOR) tablet 80 mg, Daily With Dinner    carvedilol (COREG) tablet 12.5 mg, BID With Meals    chlorhexidine (PERIDEX) 0.12 % oral rinse 15 mL, Q12H [facility]    clopidogrel (PLAVIX) tablet 75 mg, Daily    folic acid 1 mg, thiamine (VITAMIN B1) 100 mg in sodium chloride 0.9 % 100 mL IV piggyback, Daily, Last Rate: 0 mL/hr at 11/01/24 1005    heparin (porcine) subcutaneous injection 5,000 Units, Q8H SHEA    hydrALAZINE (APRESOLINE) injection 5 mg, Q6H PRN    hydroCHLOROthiazide tablet 12.5 mg, Daily    labetalol (NORMODYNE) injection 10 mg, Q4H PRN    PHENobarbital injection 90 mg, TID **FOLLOWED BY** [START ON 11/3/2024] PHENobarbital injection 60 mg, TID **FOLLOWED BY** [START ON 11/5/2024]  PHENobarbital injection 30 mg, TID **FOLLOWED BY** [START ON 11/7/2024] PHENobarbital injection 15 mg, TID    Administrative Statements   Today, Patient Was Seen By: Oliver Sewell      **Please Note: This note may have been constructed using a voice recognition system.**   no impairement

## 2024-12-31 NOTE — ED PROVIDER NOTE - PROGRESS NOTE DETAILS
Offered cataract surgery of the left eye then right eye at [facility]      Kimberlee Giang MD  [phone]  
David: patient endorsed to me from sign-out. Work-up reassuring. Patient feeling better. Will d/c on short course of prednisone for gout flare. Safe for discharge.
fall/impaired judgment/safety awareness

## 2025-04-03 NOTE — DISCHARGE NOTE PROVIDER - CARE PROVIDERS DIRECT ADDRESSES
03-Apr-2025 ,DirectAddress_Unknown,[email or web address],DirectAddress_Unknown ,DirectAddress_Unknown,[email or web address],DirectAddress_Unknown,DirectAddress_Unknown
